# Patient Record
Sex: MALE | Race: WHITE | Employment: OTHER | URBAN - METROPOLITAN AREA
[De-identification: names, ages, dates, MRNs, and addresses within clinical notes are randomized per-mention and may not be internally consistent; named-entity substitution may affect disease eponyms.]

---

## 2017-01-04 ENCOUNTER — APPOINTMENT (OUTPATIENT)
Dept: CARDIAC REHAB | Age: 82
End: 2017-01-04

## 2017-01-11 ENCOUNTER — HOSPITAL ENCOUNTER (OUTPATIENT)
Dept: CARDIAC REHAB | Age: 82
End: 2017-01-11

## 2017-01-18 ENCOUNTER — HOSPITAL ENCOUNTER (OUTPATIENT)
Dept: CARDIAC REHAB | Age: 82
Discharge: HOME | End: 2017-01-18

## 2017-01-18 NOTE — PROGRESS NOTES
[address] [place] [address]   Cardiac Rehabilitation Discharge Assessment and Long-Term Goals    Exercise (Required):  LTG:  Maintain >150 minutes of aerobic exercise weekly, with MET levels > , resistance training 2-3 days/week and increase 6-MW test by  feet. Comments:  LTG Status: []- Met []- Not Met Comments:      Initial 6-MW Test:     Discharge 6-MW Test:   Discharge Plan: []-Maintenance Program    []-HEP      [x]-Other:NON-ROUTINE DISCHARGED. Nutrition (Required):  LTG:  Patient able to verbalize an understanding of life-long adherence to a healthy diet to include low sodium diet, lower caffeine and alcohol intake. Comments:   LTG Status: []-Met []-Not Met Comments:   Initial RYP score:   Discharge RYP score:   Discharge Plan: []-Maintain dietary            [x]-Other: NON-ROUTINE DISCHARGED. Psychosocial (Required):  LTG:  Use relaxation techniques when stressors are identified. Verbalize coping skills and decrease vulnerability to stress. Maintain decreased smoking behaviors/smoking cessation. Improve PHQ-9score, [facility] COOP score and maintain a healthy psychosocial well-being. * Comments:   LTG Status: []-Met    []-Not Met Comments:      Initial PHQ-9 score:   Discharge PHQ-9 score:   Discharge Plan: []-  Maintenance healthy psychosocial well-being          [x]-  Other: NON-ROUTINE DISCHARGED. *See attached [facility] [place] Quality of Life tool for pre and post-treatment screenings. Oxygen:     []- Risk Factor []- Not a Risk Factor at This Time  LTG:  Patient will independently perform pursed-lip breathing and diaphragmatic breathing, especially when short of breath. Patient will follow Oxygen prescription.   Comments:  LTG Status: []-Met []-Not Met Comments:      Initial resting saturations:      Discharge resting saturations:  Discharge Plan: []-Maintenance healthy oxygen saturations  []-Other:     CHF (Prevention of Exacerbation): []- Risk Factor []-Not a Risk Factor at This Time  LTG:  Able to verbalize individual baselines, signs and symptoms of worsening heart failure and appropriate self-management. Compliant with medications, sodium intake and fluid intake. Weighs self daily and monitors blood pressure at home. Exercises daily. Comments:   LTG Status: []-Met []-Not Met Comments:   Discharge Plan:  []-Maintain CHF exacerbation prevention     []-Other:      Hypertension: []-Risk Factor []-Not a Risk Factor at This Time  LTG:  Consistent resting BP < , home monitoring and BP medication compliance. Comments:   LTG Status: []-Met []-Not Met Comments:      Initial Resting BP:    Discharge Resting BP:   Discharge Plan: []-Maintain healthy blood pressure  []- Other     Diabetes: []-Risk Factor []-Not a Risk Factor at This Time  LTG:  Blood sugar < 250 and > 80 for exercise, fasting blood sugar <130 and medication compliance. Comments:   LTG Status: []-Met       []- Not Met Comments:      Initial Fasting Blood Glucose:  Discharge Fasting Blood Glucose:   Discharge Plan: []- Maintain healthy glucose levels     []- Other:    Josie Harris RN  1/18/2017, 8:52 AM    I have reviewed the above findings and concur with these findings with any changes and/or additional comments noted below.   I certify I have conducted the outcome assessment (above) based on patient-centered outcomes (including the [facility]) which includes objective clinical measurements of the effectiveness of the cardiac rehabilitation program for this individual.

## 2017-01-23 ENCOUNTER — APPOINTMENT (OUTPATIENT)
Dept: CARDIAC REHAB | Age: 82
End: 2017-01-23

## 2017-01-24 ENCOUNTER — OFFICE VISIT (OUTPATIENT)
Dept: INTERNAL MEDICINE CLINIC | Age: 82
End: 2017-01-24

## 2017-01-24 VITALS
TEMPERATURE: 96.9 F | HEIGHT: 70 IN | BODY MASS INDEX: 29.06 KG/M2 | RESPIRATION RATE: 16 BRPM | DIASTOLIC BLOOD PRESSURE: 80 MMHG | OXYGEN SATURATION: 97 % | HEART RATE: 66 BPM | SYSTOLIC BLOOD PRESSURE: 130 MMHG | WEIGHT: 203 LBS

## 2017-01-24 DIAGNOSIS — Z00.00 ROUTINE GENERAL MEDICAL EXAMINATION AT A HEALTH CARE FACILITY: Primary | ICD-10-CM

## 2017-01-24 DIAGNOSIS — E78.2 MIXED HYPERLIPIDEMIA: ICD-10-CM

## 2017-01-24 DIAGNOSIS — E11.22 TYPE 2 DIABETES MELLITUS WITH STAGE 3 CHRONIC KIDNEY DISEASE, WITH LONG-TERM CURRENT USE OF INSULIN (HCC): ICD-10-CM

## 2017-01-24 DIAGNOSIS — I25.10 ATHEROSCLEROSIS OF NATIVE CORONARY ARTERY OF NATIVE HEART WITHOUT ANGINA PECTORIS: ICD-10-CM

## 2017-01-24 DIAGNOSIS — N18.30 TYPE 2 DIABETES MELLITUS WITH STAGE 3 CHRONIC KIDNEY DISEASE, WITH LONG-TERM CURRENT USE OF INSULIN (HCC): ICD-10-CM

## 2017-01-24 DIAGNOSIS — I10 ESSENTIAL HYPERTENSION: Chronic | ICD-10-CM

## 2017-01-24 DIAGNOSIS — Z79.4 TYPE 2 DIABETES MELLITUS WITH STAGE 3 CHRONIC KIDNEY DISEASE, WITH LONG-TERM CURRENT USE OF INSULIN (HCC): ICD-10-CM

## 2017-01-24 NOTE — PATIENT INSTRUCTIONS
Learning About Diabetes Food Guidelines  Your Care Instructions  Meal planning is important to manage diabetes. It helps keep your blood sugar at a target level (which you set with your doctor). You don't have to eat special foods. You can eat what your family eats, including sweets once in a while. But you do have to pay attention to how often you eat and how much you eat of certain foods. You may want to work with a dietitian or a certified diabetes educator (CDE) to help you plan meals and snacks. A dietitian or CDE can also help you lose weight if that is one of your goals. What should you know about eating carbs? Managing the amount of carbohydrate (carbs) you eat is an important part of healthy meals when you have diabetes. Carbohydrate is found in many foods. · Learn which foods have carbs. And learn the amounts of carbs in different foods. ¨ Bread, cereal, pasta, and rice have about 15 grams of carbs in a serving. A serving is 1 slice of bread (1 ounce), ½ cup of cooked cereal, or 1/3 cup of cooked pasta or rice. ¨ Fruits have 15 grams of carbs in a serving. A serving is 1 small fresh fruit, such as an apple or orange; ½ of a banana; ½ cup of cooked or canned fruit; ½ cup of fruit juice; 1 cup of melon or raspberries; or 2 tablespoons of dried fruit. ¨ Milk and no-sugar-added yogurt have 15 grams of carbs in a serving. A serving is 1 cup of milk or 2/3 cup of no-sugar-added yogurt. ¨ Starchy vegetables have 15 grams of carbs in a serving. A serving is ½ cup of mashed potatoes or sweet potato; 1 cup winter squash; ½ of a small baked potato; ½ cup of cooked beans; or ½ cup cooked corn or green peas. · Learn how much carbs to eat each day and at each meal. A dietitian or CDE can teach you how to keep track of the amount of carbs you eat. This is called carbohydrate counting. · If you are not sure how to count carbohydrate grams, use the Plate Method to plan meals.  It is a good, quick way to make sure that you have a balanced meal. It also helps you spread carbs throughout the day. ¨ Divide your plate by types of foods. Put non-starchy vegetables on half the plate, meat or other protein food on one-quarter of the plate, and a grain or starchy vegetable in the final quarter of the plate. To this you can add a small piece of fruit and 1 cup of milk or yogurt, depending on how many carbs you are supposed to eat at a meal.  · Try to eat about the same amount of carbs at each meal. Do not \"save up\" your daily allowance of carbs to eat at one meal.  · Proteins have very little or no carbs per serving. Examples of proteins are beef, chicken, turkey, fish, eggs, tofu, cheese, cottage cheese, and peanut butter. A serving size of meat is 3 ounces, which is about the size of a deck of cards. Examples of meat substitute serving sizes (equal to 1 ounce of meat) are 1/4 cup of cottage cheese, 1 egg, 1 tablespoon of peanut butter, and ½ cup of tofu. How can you eat out and still eat healthy? · Learn to estimate the serving sizes of foods that have carbohydrate. If you measure food at home, it will be easier to estimate the amount in a serving of restaurant food. · If the meal you order has too much carbohydrate (such as potatoes, corn, or baked beans), ask to have a low-carbohydrate food instead. Ask for a salad or green vegetables. · If you use insulin, check your blood sugar before and after eating out to help you plan how much to eat in the future. · If you eat more carbohydrate at a meal than you had planned, take a walk or do other exercise. This will help lower your blood sugar. What else should you know? · Limit saturated fat, such as the fat from meat and dairy products. This is a healthy choice because people who have diabetes are at higher risk of heart disease. So choose lean cuts of meat and nonfat or low-fat dairy products. Use olive or canola oil instead of butter or shortening when cooking.   · Don't skip meals. Your blood sugar may drop too low if you skip meals and take insulin or certain medicines for diabetes. · Check with your doctor before you drink alcohol. Alcohol can cause your blood sugar to drop too low. Alcohol can also cause a bad reaction if you take certain diabetes medicines. Follow-up care is a key part of your treatment and safety. Be sure to make and go to all appointments, and call your doctor if you are having problems. It's also a good idea to know your test results and keep a list of the medicines you take. Where can you learn more? Go to http://da-ahsan.info/. Enter R351 in the search box to learn more about \"Learning About Diabetes Food Guidelines. \"  Current as of: May 23, 2016  Content Version: 11.1  © 8937-8230 [employer]. Care instructions adapted under license by [employer] (which disclaims liability or warranty for this information). If you have questions about a medical condition or this instruction, always ask your healthcare professional. [place] [address] any warranty or liability for your use of this information. Medicare Wellness Visit, Male    The best way to live healthy is to have a healthy lifestyle by eating a well-balanced diet, exercising regularly, limiting alcohol and stopping smoking. Regular physical exams and screening tests are another way to keep healthy. Preventive exams provided by your health care provider can find health problems before they become diseases or illnesses. Preventive services including immunizations, screening tests, monitoring and exams can help you take care of your own health. All people over age 72 should have a pneumovax  and and a prevnar shot to prevent pneumonia. These are once in a lifetime unless you and your provider decide differently. All people over 65 should have a yearly flu shot and a tetanus vaccine every 10 years.     Screening for diabetes mellitus with a blood sugar test should be done every year. Glaucoma is a disease of the eye due to increased ocular pressure that can lead to blindness and it should be done every year by an eye professional.    Cardiovascular screening tests that check for elevated lipids (fatty part of blood) which can lead to heart disease and strokes should be done every 5 years. Colorectal screening that evaluates for blood or polyps in your colon should be done yearly as a stool test or every five years as a flexible sigmoidoscope or every 10 years as a colonoscopy up to age 76. Men up to age 76 may need a screening blood test for prostate cancer at certain intervals, depending on their personal and family history. This decision is between the patient and his provider. If you have been a smoker or had family history of abdominal aortic aneurysms, you and your provider may decide to schedule an ultrasound test of your aorta. Hepatitis C screening is also recommended for anyone born between [birth year] through [address]. A shingles vaccine is also recommended once in a lifetime after age 61. Your Medicare Wellness Exam is recommended annually.     Here is a list of your current Health Maintenance items with a due date:  Health Maintenance Due   Topic Date Due    Diabetic Foot Care  07/31/1945    Albumin Urine Test  07/31/1945    Eye Exam  07/31/1945    Shingles Vaccine  07/31/1997    Glaucoma Screening   07/31/2002    Annual Well Visit  12/04/2016    Cholesterol Test   01/07/2017

## 2017-01-24 NOTE — MR AVS SNAPSHOT
Visit Information Date & Time Provider Department Dept. Phone Encounter #  
 1/24/2017  2:00 PM Riddhi Morin MD [facility] [place] [phone] [id number] Follow-up Instructions Return if symptoms worsen or fail to improve. Follow-up and Disposition History Your Appointments 2/13/2017  3:00 PM  
Follow Up with Jamel Quiroz MD  
[facility] [place] [id number] ([address]) Appt Note: 4 month follow up Veronica Bautista [phone]  
[phone] [address]  
  
    
  
 1/31/2017  1:00 PM  
Any with Estrellita Green MD  
[facility] ([address]) Appt Note: annual  
 [address] 
[address] [place] [address]  
[address] [facility] [address] [address] [facility] [address] Upcoming Health Maintenance Date Due  
 FOOT EXAM Q1 7/31/1945 MICROALBUMIN Q1 7/31/1945 EYE EXAM RETINAL OR DILATED Q1 7/31/1945 ZOSTER VACCINE AGE 60> 7/31/1997 GLAUCOMA SCREENING Q2Y 7/31/2002 MEDICARE YEARLY EXAM 12/4/2016 LIPID PANEL Q1 1/7/2017 HEMOGLOBIN A1C Q6M 2/26/2017 DTaP/Tdap/Td series (2 - Td) 6/27/2022 Allergies as of 1/24/2017  Review Complete On: 1/24/2017 By: Riddhi Morin MD  
  
 Severity Noted Reaction Type Reactions Amaryl [Glimepiride]    Drowsiness Lipitor [Atorvastatin]    Myalgia Niacin    Other (comments) Facial blistering, swelling in face Pravachol [Pravastatin]    Myalgia Zocor [Simvastatin]    Myalgia Current Immunizations  Reviewed on 12/4/2015 Name Date Influenza Vaccine (Quad) PF 10/4/2016, 12/4/2015 Pneumococcal Conjugate (PCV-13) 12/3/2014 Pneumococcal Polysaccharide (PPSV-23) 5/23/2013 10:38 AM, 4/4/2003 Tdap 6/27/2012 Not reviewed this visit You Were Diagnosed With   
  
 Codes Comments Routine general medical examination at a health care facility    -  Primary ICD-10-CM: Z00.00 ICD-9-CM: V70.0 Essential hypertension     ICD-10-CM: I10 
ICD-9-CM: 401.9 Type 2 diabetes mellitus with stage 3 chronic kidney disease, with long-term current use of insulin (HCC)     ICD-10-CM: E11.22, N18.3, Z79.4 ICD-9-CM: 250.40, 585.3, V58.67 Mixed hyperlipidemia     ICD-10-CM: E78.2 ICD-9-CM: 272.2 Atherosclerosis of native coronary artery of native heart without angina pectoris     ICD-10-CM: I25.10 ICD-9-CM: 414.01 Vitals BP Pulse Temp Resp Height(growth percentile) 130/80 (BP 1 Location: Left arm, BP Patient Position: Sitting) 66 96.9 °F (36.1 °C) (Tympanic) 16 5' 10\" (1.778 m) Weight(growth percentile) SpO2 BMI Smoking Status 203 lb (92.1 kg) 97% 29.13 kg/m2 Former Smoker BMI and BSA Data Body Mass Index Body Surface Area  
 29.13 kg/m 2 2.13 m 2 Preferred Pharmacy Pharmacy Name Phone [id number] [address] [facility] [phone] Your Updated Medication List  
  
   
This list is accurate as of: 1/24/17  2:41 PM.  Always use your most recent med list.  
  
  
  
  
 acetaminophen 325 mg tablet Commonly known as:  TYLENOL Take 2 Tabs by mouth every four (4) hours as needed. aspirin delayed-release 81 mg tablet Take 1 Tab by mouth daily. buPROPion  mg SR tablet Commonly known as:  WELLBUTRIN SR  
1 bid CALTRATE 600 PO Take 1 Tab by mouth daily. CENTRUM SILVER PO Take 1 Tab by mouth daily. clotrimazole-betamethasone topical cream  
Commonly known as:  LOTRISONE  
qpply bid prn groin rash COMPOUNDMAX BASE Crea Generic drug:  cream base no. 171 (bulk) 240 g by Does Not Apply route four (4) times daily. Anti-Inflam Meloxicam 0.09% Topirmate 1% Methocarbamol 2%  Lidocaine 2% Prilocaine 2% CRESTOR 10 mg tablet Generic drug:  rosuvastatin Take 10 mg by mouth nightly.  
  
 gabapentin 300 mg capsule Commonly known as:  NEURONTIN Take 1 Cap by mouth daily. Indications: NEUROPATHIC PAIN, RESTLESS LEGS SYNDROME  
  
 guaiFENesin-codeine 100-10 mg/5 mL solution Commonly known as:  CHERATUSSIN AC  
1 or 2 tsp q 6 hrs prn cough LANTUS SOLOSTAR 100 unit/mL (3 mL) pen Generic drug:  insulin glargine 30 Units by SubCUTAneous route nightly. 30 units  
  
 metoprolol succinate 50 mg XL tablet Commonly known as:  TOPROL-XL  
TAKE 1 TABLET BY MOUTH ONCE DAILY Justina Pen Needle 32 gauge x 5/32\" Ndle Generic drug:  Insulin Needles (Disposable) Use as directed. ONGLYZA 5 mg Tab tablet Generic drug:  sAXagliptin Take 5 mg by mouth daily. Indications: TYPE 2 DIABETES MELLITUS  
  
 RANEXA 500 mg SR tablet Generic drug:  ranolazine ER  
TAKE 1 TAB BY MOUTH TWO (2) TIMES A DAY. VESIcare 5 mg tablet Generic drug:  solifenacin Take 5 mg by mouth daily. Follow-up Instructions Return if symptoms worsen or fail to improve. Patient Instructions Learning About Diabetes Food Guidelines Your Care Instructions Meal planning is important to manage diabetes. It helps keep your blood sugar at a target level (which you set with your doctor). You don't have to eat special foods. You can eat what your family eats, including sweets once in a while. But you do have to pay attention to how often you eat and how much you eat of certain foods. You may want to work with a dietitian or a certified diabetes educator (CDE) to help you plan meals and snacks. A dietitian or CDE can also help you lose weight if that is one of your goals. What should you know about eating carbs? Managing the amount of carbohydrate (carbs) you eat is an important part of healthy meals when you have diabetes. Carbohydrate is found in many foods. · Learn which foods have carbs. And learn the amounts of carbs in different foods. ¨ Bread, cereal, pasta, and rice have about 15 grams of carbs in a serving. A serving is 1 slice of bread (1 ounce), ½ cup of cooked cereal, or 1/3 cup of cooked pasta or rice. ¨ Fruits have 15 grams of carbs in a serving. A serving is 1 small fresh fruit, such as an apple or orange; ½ of a banana; ½ cup of cooked or canned fruit; ½ cup of fruit juice; 1 cup of melon or raspberries; or 2 tablespoons of dried fruit. ¨ Milk and no-sugar-added yogurt have 15 grams of carbs in a serving. A serving is 1 cup of milk or 2/3 cup of no-sugar-added yogurt. ¨ Starchy vegetables have 15 grams of carbs in a serving. A serving is ½ cup of mashed potatoes or sweet potato; 1 cup winter squash; ½ of a small baked potato; ½ cup of cooked beans; or ½ cup cooked corn or green peas. · Learn how much carbs to eat each day and at each meal. A dietitian or CDE can teach you how to keep track of the amount of carbs you eat. This is called carbohydrate counting. · If you are not sure how to count carbohydrate grams, use the Plate Method to plan meals. It is a good, quick way to make sure that you have a balanced meal. It also helps you spread carbs throughout the day. ¨ Divide your plate by types of foods. Put non-starchy vegetables on half the plate, meat or other protein food on one-quarter of the plate, and a grain or starchy vegetable in the final quarter of the plate. To this you can add a small piece of fruit and 1 cup of milk or yogurt, depending on how many carbs you are supposed to eat at a meal. 
· Try to eat about the same amount of carbs at each meal. Do not \"save up\" your daily allowance of carbs to eat at one meal. 
· Proteins have very little or no carbs per serving. Examples of proteins are beef, chicken, turkey, fish, eggs, tofu, cheese, cottage cheese, and peanut butter. A serving size of meat is 3 ounces, which is about the size of a deck of cards.  Examples of meat substitute serving sizes (equal to 1 ounce of meat) are 1/4 cup of cottage cheese, 1 egg, 1 tablespoon of peanut butter, and ½ cup of tofu. How can you eat out and still eat healthy? · Learn to estimate the serving sizes of foods that have carbohydrate. If you measure food at home, it will be easier to estimate the amount in a serving of restaurant food. · If the meal you order has too much carbohydrate (such as potatoes, corn, or baked beans), ask to have a low-carbohydrate food instead. Ask for a salad or green vegetables. · If you use insulin, check your blood sugar before and after eating out to help you plan how much to eat in the future. · If you eat more carbohydrate at a meal than you had planned, take a walk or do other exercise. This will help lower your blood sugar. What else should you know? · Limit saturated fat, such as the fat from meat and dairy products. This is a healthy choice because people who have diabetes are at higher risk of heart disease. So choose lean cuts of meat and nonfat or low-fat dairy products. Use olive or canola oil instead of butter or shortening when cooking. · Don't skip meals. Your blood sugar may drop too low if you skip meals and take insulin or certain medicines for diabetes. · Check with your doctor before you drink alcohol. Alcohol can cause your blood sugar to drop too low. Alcohol can also cause a bad reaction if you take certain diabetes medicines. Follow-up care is a key part of your treatment and safety. Be sure to make and go to all appointments, and call your doctor if you are having problems. It's also a good idea to know your test results and keep a list of the medicines you take. Where can you learn more? Go to http://da-ahsan.info/. Enter D722 in the search box to learn more about \"Learning About Diabetes Food Guidelines. \" Current as of: May 23, 2016 Content Version: 11.1 © 4617-5851 [employer].  Care instructions adapted under license by [address] (which disclaims liability or warranty for this information). If you have questions about a medical condition or this instruction, always ask your healthcare professional. [address] any warranty or liability for your use of this information. Medicare Wellness Visit, Male The best way to live healthy is to have a healthy lifestyle by eating a well-balanced diet, exercising regularly, limiting alcohol and stopping smoking. Regular physical exams and screening tests are another way to keep healthy. Preventive exams provided by your health care provider can find health problems before they become diseases or illnesses. Preventive services including immunizations, screening tests, monitoring and exams can help you take care of your own health. All people over age 72 should have a pneumovax  and and a prevnar shot to prevent pneumonia. These are once in a lifetime unless you and your provider decide differently. All people over 65 should have a yearly flu shot and a tetanus vaccine every 10 years. Screening for diabetes mellitus with a blood sugar test should be done every year. Glaucoma is a disease of the eye due to increased ocular pressure that can lead to blindness and it should be done every year by an eye professional. 
 
Cardiovascular screening tests that check for elevated lipids (fatty part of blood) which can lead to heart disease and strokes should be done every 5 years. Colorectal screening that evaluates for blood or polyps in your colon should be done yearly as a stool test or every five years as a flexible sigmoidoscope or every 10 years as a colonoscopy up to age 76. Men up to age 76 may need a screening blood test for prostate cancer at certain intervals, depending on their personal and family history. This decision is between the patient and his provider. If you have been a smoker or had family history of abdominal aortic aneurysms, you and your provider may decide to schedule an ultrasound test of your aorta. Hepatitis C screening is also recommended for anyone born between [birth year] through [address]. A shingles vaccine is also recommended once in a lifetime after age 61. Your Medicare Wellness Exam is recommended annually. Here is a list of your current Health Maintenance items with a due date: 
Health Maintenance Due Topic Date Due  
 Diabetic Foot Care  07/31/1945  Albumin Urine Test  07/31/1945 [facility] Eye Exam  07/31/1945  Shingles Vaccine  07/31/1997  Glaucoma Screening   07/31/2002 [facility] Annual Well Visit  12/04/2016  Cholesterol Test   01/07/2017 Patient Instructions History Introducing [facility]! Mariana Bell introduces [employer] patient portal. Now you can access parts of your medical record, email your doctor's office, and request medication refills online. 1. In your internet browser, go to [email or web address] 2. Click on the First Time User? Click Here link in the Sign In box. You will see the New Member Sign Up page. 3. Enter your [employer] Access Code exactly as it appears below. You will not need to use this code after youve completed the sign-up process. If you do not sign up before the expiration date, you must request a new code. · [employer] Access Code: [email or web address] Expires: 4/24/2017  2:02 PM 
 
4. Enter the last four digits of your Social Security Number (xxxx) and Date of Birth (mm/dd/yyyy) as indicated and click Submit. You will be taken to the next sign-up page. 5. Create a [employer] ID. This will be your [employer] login ID and cannot be changed, so think of one that is secure and easy to remember. 6. Create a [employer] password. You can change your password at any time. 7. Enter your Password Reset Question and Answer. This can be used at a later time if you forget your password. 8. Enter your e-mail address. You will receive e-mail notification when new information is available in [address] 9. Click Sign Up. You can now view and download portions of your medical record. 10. Click the Download Summary menu link to download a portable copy of your medical information. If you have questions, please visit the Frequently Asked Questions section of the [employer] website. Remember, [employer] is NOT to be used for urgent needs. For medical emergencies, dial 911. Now available from your iPhone and Android! Please provide this summary of care documentation to your next provider. Your primary care clinician is listed as Valente Nieto. If you have any questions after today's visit, please call [phone].

## 2017-01-24 NOTE — PROGRESS NOTES
This is a Subsequent Medicare Annual Wellness Visit providing Personalized Prevention Plan Services (PPPS) (Performed 12 months after initial AWV and PPPS )    I have reviewed the patient's medical history in detail and updated the computerized patient record. History     Past Medical History   Diagnosis Date    Angina     Anxiety     Arthritis     CAD (coronary artery disease)      s/p drug-eluting stents to RCA for high-grade stenoses in 2/09    Cardiac catheterization 08/24/2016     Mod calcification. Co-dom. oLM 50-70%. LAD 30%. Dx 30%. Cx 70% focal.  OM 80% focal.  RCA 30%.  Cardiac echocardiogram 08/23/2016     Tech difficult. EF 55-60%. No WMA. Normal diastolic fx. Lipomatous septal hypertrophy.  Cardiac nuclear imaging test, abnormal 08/23/2016     Sm reversible inferior defect concerning for ischemia. Mild inferior hypk. EF 68%. Neg EKG on pharm stress test.    Carotid duplex 08/26/2016     Mild <50% CYRUS stenosis. Chronic LICA occlusion. Similar to study of 3/29/16.     Carotid stenosis (HCC)      w/ known total occlusion of lt internal carotid artery; followed by pts vascular surgeon    Chronic kidney disease     COPD (chronic obstructive pulmonary disease) ([address]     Depression     Diabetes ([address]      type 2    Dyslipidemia      on Crestor; managed by pts PCP    Dyspnea     Heart disease     Hypercholesterolemia     Hypertension     Low back pain     Osteoarthrosis involving multiple sites     Skin cancer ([address]      squamous cell lesions of the skin    Stroke ([address]     Venous (peripheral) insufficiency       Past Surgical History   Procedure Laterality Date    Hx other surgical       groin surgery    Hx hernia repair       1970s    Hx angioplasty  2009     2 stents placed and heart attack during the procedure    Hx colonoscopy  10/2003     neg; declines f/u    Hx heart catheterization  5/2013    Hx tonsil and adenoidectomy      Cardiac catheterization 8/24/2016          Coronary artery angiogram  8/24/2016          Lv angiography  8/24/2016          Ir intravascular ultrasound initial  8/24/2016          Hx coronary artery bypass graft  09/2016     Current Outpatient Prescriptions   Medication Sig Dispense Refill    RANEXA 500 mg SR tablet TAKE 1 TAB BY MOUTH TWO (2) TIMES A DAY. 180 Tab 2    metoprolol succinate (TOPROL-XL) 50 mg XL tablet TAKE 1 TABLET BY MOUTH ONCE DAILY 90 Tab 2    rosuvastatin (CRESTOR) 10 mg tablet Take 10 mg by mouth nightly.  solifenacin (VESICARE) 5 mg tablet Take 5 mg by mouth daily.  guaiFENesin-codeine (CHERATUSSIN AC) 100-10 mg/5 mL solution 1 or 2 tsp q 6 hrs prn cough 120 mL 1    clotrimazole-betamethasone (LOTRISONE) topical cream qpply bid prn groin rash 45 g 0    aspirin delayed-release 81 mg tablet Take 1 Tab by mouth daily. 30 Tab 2    acetaminophen (TYLENOL) 325 mg tablet Take 2 Tabs by mouth every four (4) hours as needed. (Patient taking differently: Take 2 Tabs by mouth every six (6) hours as needed for Pain.) 100 Tab 2    COMPOUNDMAX BASE crea 240 g by Does Not Apply route four (4) times daily. Anti-Inflam Meloxicam 0.09% Topirmate 1% Methocarbamol 2%  Lidocaine 2% Prilocaine 2% (Patient not taking: Reported on 10/5/2016) 240 g 3    buPROPion SR (WELLBUTRIN SR) 100 mg SR tablet 1 bid (Patient not taking: Reported on 10/24/2016) 180 Tab 0    SHAWN PEN NEEDLE 32 x 5/32 \" ndle Use as directed. 3    gabapentin (NEURONTIN) 300 mg capsule Take 1 Cap by mouth daily. Indications: NEUROPATHIC PAIN, RESTLESS LEGS SYNDROME 90 Cap 2    insulin glargine (LANTUS SOLOSTAR) 100 unit/mL (3 mL) pen 30 Units by SubCUTAneous route nightly. 30 units       saxagliptin (ONGLYZA) 5 mg Tab tablet Take 5 mg by mouth daily. Indications: TYPE 2 DIABETES MELLITUS      MULTIVITAMINS W-MINERALS/LUT (CENTRUM SILVER PO) Take 1 Tab by mouth daily.  CALCIUM CARBONATE (CALTRATE 600 PO) Take 1 Tab by mouth daily. Allergies   Allergen Reactions    Amaryl [Glimepiride] Drowsiness    Lipitor [Atorvastatin] Myalgia    Niacin Other (comments)     Facial blistering, swelling in face    Pravachol [Pravastatin] Myalgia    Zocor [Simvastatin] Myalgia     Family History   Problem Relation Age of Onset   [de-identified] Parkinsonism Mother     Hypertension Mother     Cancer Father      lung    Heart defect Brother      Social History   Substance Use Topics    Smoking status: Former Smoker     Years: 2.00     Quit date: 1/1/1955    Smokeless tobacco: Never Used    Alcohol use Yes      Comment: drinks weekly couple beers     Patient Active Problem List   Diagnosis Code    Hypercholesterolemia E78.00    Carot[facility] artery disease ([facility] I77.9    Spondylosis M47.9    Coronary atherosclerosis of native coronary artery I25.10    HTN (hypertension) I10    Atherosclerosis of native arteries of the extremities with intermittent claudication I70.219    Carotid artery occlusion without infarction I65.29    DM (diabetes mellitus) ([facility] E11.9    Spondylosis M47.9    CKD (chronic kidney disease) stage 3, GFR 30-59 ml/min N18.3    CHI (closed head injury) S09. 90XA    Low back pain M54.5    Venous (peripheral) insufficiency I87.2    Osteoarthrosis involving multiple sites M15.9    Type 2 diabetes mellitus with stage 3 chronic kidney disease (HCC) E11.22, N18.3    Acute chest pain R07.9    CAD (coronary artery disease) I25.10    COPD (chronic obstructive pulmonary disease) ([facility]) J44.9    Type 2 diabetes mellitus without complication, with long-term current use of insulin ([facility]) E11.9, Z79.4    S/P CABG x 2 Z95.1    Mixed hyperlipidemia E78.2       Depression Risk Factor Screening:     PHQ 2 / 9, over the last two weeks 12/4/2015   Little interest or pleasure in doing things Several days   Feeling down, depressed or hopeless Several days   Total Score PHQ 2 2     Alcohol Risk Factor Screening:    On any occasion during the past 3 months, have you had more than 4 drinks containing alcohol? Yes    Do you average more than 14 drinks per week? No      Functional Ability and Level of Safety:     Hearing Loss   moderate    Activities of Daily Living   Partial assistance. Requires assistance with: ambulation    Fall Risk     Fall Risk Assessment, last 12 mths 10/7/2016   Able to walk? Yes   Fall in past 12 months? Yes   Fall with injury? No   Number of falls in past 12 months 8 or more   Fall Risk Score 8     Abuse Screen   Patient is not abused    Review of Systems   A comprehensive review of systems was negative except for that written in the HPI. Physical Examination     Evaluation of Cognitive Function:  Mood/affect:  neutral  Appearance: age appropriate  Family member/caregiver input: wife    heavy WN in NAD  Visit Vitals    /80 (BP 1 Location: Left arm, BP Patient Position: Sitting)    Pulse 66    Temp 96.9 °F (36.1 °C) (Tympanic)    Resp 16    Ht 5' 10\" (1.778 m)    Wt 203 lb (92.1 kg)    SpO2 97%    BMI 29.13 kg/m2     OP: clear  Neck: supple w/o mass or bruits  Chest: clear  CV: RRR w/o m,r,g; pulses NP distal legs  Abd: +BS, soft, NT w/o mass or HSM  Ext: tr - 1+ edema  Neuro: NF      Patient Care Team:  Jennifer Nicholas MD as PCP - General (Internal Medicine)  Claudell Sarin, MD (Cardiology)  Nena Kwan MD as Consulting Provider (Endocrinology)  Emilia Loo NP (Nurse Practitioner)    Advice/Referrals/Counseling   Education and counseling provided:  Are appropriate based on today's review and evaluation    Assessment/Plan     Encounter Diagnoses   Name Primary?     Routine general medical examination at a health care facility Yes    Essential hypertension     Type 2 diabetes mellitus with stage 3 chronic kidney disease, with long-term current use of insulin (HCC)     Mixed hyperlipidemia     Atherosclerosis of native coronary artery of native heart without angina pectoris    Medicare wellness performed  Labs followed by endocrine  Continue dietary/exercise efforts  Vaccines: flu vaccine already given  Advance directive discussed    PROGRESS NOTE  HPI:   F/u visit for routine evaluation of HTN, T2DM, hyperlipidemia, hypothyroidism, depression (mild), CAD  Labs followed by endocrine  w/o chest pain/abd. discomfort; no increased dyspnea, cough or pedal edema; denies constitutional complaints of fever, night sweats or wt loss; no evidence of GI/ hemorrhage; no polyuria/polydipsia. Activity is sedentary    ROS is otherwise negative. Past Medical History   Diagnosis Date    Angina     Anxiety     Arthritis     CAD (coronary artery disease)      s/p drug-eluting stents to RCA for high-grade stenoses in 2/09    Cardiac catheterization 08/24/2016     Mod calcification. Co-dom. oLM 50-70%. LAD 30%. Dx 30%. Cx 70% focal.  OM 80% focal.  RCA 30%.  Cardiac echocardiogram 08/23/2016     Tech difficult. EF 55-60%. No WMA. Normal diastolic fx. Lipomatous septal hypertrophy.  Cardiac nuclear imaging test, abnormal 08/23/2016     Sm reversible inferior defect concerning for ischemia. Mild inferior hypk. EF 68%. Neg EKG on pharm stress test.    Carotid duplex 08/26/2016     Mild <50% CYRUS stenosis. Chronic LICA occlusion. Similar to study of 3/29/16.     Carotid stenosis (HCC)      w/ known total occlusion of lt internal carotid artery; followed by pts vascular surgeon    Chronic kidney disease     COPD (chronic obstructive pulmonary disease) ([address]     Depression     Diabetes ([address]      type 2    Dyslipidemia      on Crestor; managed by pts PCP    Dyspnea     Heart disease     Hypercholesterolemia     Hypertension     Low back pain     Osteoarthrosis involving multiple sites     Skin cancer ([address]      squamous cell lesions of the skin    Stroke ([address]     Venous (peripheral) insufficiency        Past Surgical History   Procedure Laterality Date    Hx other surgical groin surgery    Hx hernia repair       1970s    Hx angioplasty  2009     2 stents placed and heart attack during the procedure    Hx colonoscopy  10/2003     neg; declines f/u    Hx heart catheterization  5/2013    Hx tonsil and adenoidectomy      Cardiac catheterization  8/24/2016          Coronary artery angiogram  8/24/2016          Lv angiography  8/24/2016          Ir intravascular ultrasound initial  8/24/2016          Hx coronary artery bypass graft  09/2016       Social History     Social History    Marital status:      Spouse name: N/A    Number of children: N/A    Years of education: N/A     Occupational History    Not on file. Social History Main Topics    Smoking status: Former Smoker     Years: 2.00     Quit date: 1/1/1955    Smokeless tobacco: Never Used    Alcohol use Yes      Comment: drinks weekly couple beers    Drug use: No    Sexual activity: No     Other Topics Concern    Not on file     Social History Narrative       Allergies   Allergen Reactions    Amaryl [Glimepiride] Drowsiness    Lipitor [Atorvastatin] Myalgia    Niacin Other (comments)     Facial blistering, swelling in face    Pravachol [Pravastatin] Myalgia    Zocor [Simvastatin] Myalgia       Family History   Problem Relation Age of Onset    Parkinsonism Mother     Hypertension Mother     Cancer Father      lung    Heart defect Brother        Current Outpatient Prescriptions   Medication Sig Dispense Refill    RANEXA 500 mg SR tablet TAKE 1 TAB BY MOUTH TWO (2) TIMES A DAY. 180 Tab 2    metoprolol succinate (TOPROL-XL) 50 mg XL tablet TAKE 1 TABLET BY MOUTH ONCE DAILY 90 Tab 2    rosuvastatin (CRESTOR) 10 mg tablet Take 10 mg by mouth nightly.  solifenacin (VESICARE) 5 mg tablet Take 5 mg by mouth daily.       guaiFENesin-codeine (CHERATUSSIN AC) 100-10 mg/5 mL solution 1 or 2 tsp q 6 hrs prn cough 120 mL 1    clotrimazole-betamethasone (LOTRISONE) topical cream qpply bid prn groin rash 45 g 0    aspirin delayed-release 81 mg tablet Take 1 Tab by mouth daily. 30 Tab 2    acetaminophen (TYLENOL) 325 mg tablet Take 2 Tabs by mouth every four (4) hours as needed. (Patient taking differently: Take 2 Tabs by mouth every six (6) hours as needed for Pain.) 100 Tab 2    COMPOUNDMAX BASE crea 240 g by Does Not Apply route four (4) times daily. Anti-Inflam Meloxicam 0.09% Topirmate 1% Methocarbamol 2%  Lidocaine 2% Prilocaine 2% (Patient not taking: Reported on 10/5/2016) 240 g 3    buPROPion SR (WELLBUTRIN SR) 100 mg SR tablet 1 bid (Patient not taking: Reported on 10/24/2016) 180 Tab 0    SHAWN PEN NEEDLE 32 x 5/32 \" ndle Use as directed. 3    gabapentin (NEURONTIN) 300 mg capsule Take 1 Cap by mouth daily. Indications: NEUROPATHIC PAIN, RESTLESS LEGS SYNDROME 90 Cap 2    insulin glargine (LANTUS SOLOSTAR) 100 unit/mL (3 mL) pen 30 Units by SubCUTAneous route nightly. 30 units       saxagliptin (ONGLYZA) 5 mg Tab tablet Take 5 mg by mouth daily. Indications: TYPE 2 DIABETES MELLITUS      MULTIVITAMINS W-MINERALS/LUT (CENTRUM SILVER PO) Take 1 Tab by mouth daily.  CALCIUM CARBONATE (CALTRATE 600 PO) Take 1 Tab by mouth daily. Visit Vitals    /80 (BP 1 Location: Left arm, BP Patient Position: Sitting)    Pulse 66    Temp 96.9 °F (36.1 °C) (Tympanic)    Resp 16    Ht 5' 10\" (1.778 m)    Wt 203 lb (92.1 kg)    SpO2 97%    BMI 29.13 kg/m2       PE  Well nourished in NAD  HEENT:  OP: clear. Neck: supple w/o mass or bruits. Chest: clear. CV: RRR w/o m,r,g; pulses NP distal legs  Abd: soft, NT, w/o HSM or mass. Ext: tr - 1 + edema. Neuro: NF. Assessment and Plan    Encounter Diagnoses   Name Primary?     Routine general medical examination at a health care facility Yes    Essential hypertension     Type 2 diabetes mellitus with stage 3 chronic kidney disease, with long-term current use of insulin (HCC)     Mixed hyperlipidemia     Atherosclerosis of native coronary artery of native heart without angina pectoris      BP @ goal  Labs monitored by his endocrinologist (T2DM/hyperlipidemia/hypothyroidism)  Up to date with eye exams  CAD - stable post CABG Fall 2016; keep f/u with cardiology  Plans to see neurology for increasing cognitive decline post CABG  No change in rx  OV 4 - 6  mos or prn  I have explained plan to patient and the patient verbalizes understanding

## 2017-01-25 ENCOUNTER — APPOINTMENT (OUTPATIENT)
Dept: CARDIAC REHAB | Age: 82
End: 2017-01-25

## 2017-01-30 ENCOUNTER — APPOINTMENT (OUTPATIENT)
Dept: CARDIAC REHAB | Age: 82
End: 2017-01-30

## 2017-02-01 ENCOUNTER — APPOINTMENT (OUTPATIENT)
Dept: CARDIAC REHAB | Age: 82
End: 2017-02-01

## 2017-02-06 ENCOUNTER — APPOINTMENT (OUTPATIENT)
Dept: CARDIAC REHAB | Age: 82
End: 2017-02-06

## 2017-02-08 ENCOUNTER — APPOINTMENT (OUTPATIENT)
Dept: CARDIAC REHAB | Age: 82
End: 2017-02-08

## 2017-02-13 ENCOUNTER — OFFICE VISIT (OUTPATIENT)
Dept: CARDIOLOGY CLINIC | Age: 82
End: 2017-02-13

## 2017-02-13 ENCOUNTER — APPOINTMENT (OUTPATIENT)
Dept: CARDIAC REHAB | Age: 82
End: 2017-02-13

## 2017-02-13 VITALS
DIASTOLIC BLOOD PRESSURE: 74 MMHG | BODY MASS INDEX: 29.2 KG/M2 | OXYGEN SATURATION: 96 % | SYSTOLIC BLOOD PRESSURE: 130 MMHG | WEIGHT: 204 LBS | HEIGHT: 70 IN | HEART RATE: 68 BPM

## 2017-02-13 DIAGNOSIS — I25.10 CORONARY ARTERY DISEASE INVOLVING NATIVE CORONARY ARTERY OF NATIVE HEART WITHOUT ANGINA PECTORIS: Primary | ICD-10-CM

## 2017-02-13 DIAGNOSIS — Z79.4 TYPE 2 DIABETES MELLITUS WITHOUT COMPLICATION, WITH LONG-TERM CURRENT USE OF INSULIN (HCC): ICD-10-CM

## 2017-02-13 DIAGNOSIS — Z95.1 S/P CABG X 2: ICD-10-CM

## 2017-02-13 DIAGNOSIS — F03.91 DEMENTIA WITH BEHAVIORAL DISTURBANCE, UNSPECIFIED DEMENTIA TYPE: ICD-10-CM

## 2017-02-13 DIAGNOSIS — I65.29 CAROTID ARTERY OCCLUSION WITHOUT INFARCTION, UNSPECIFIED LATERALITY: ICD-10-CM

## 2017-02-13 DIAGNOSIS — E11.9 TYPE 2 DIABETES MELLITUS WITHOUT COMPLICATION, WITH LONG-TERM CURRENT USE OF INSULIN (HCC): ICD-10-CM

## 2017-02-13 DIAGNOSIS — E78.00 HYPERCHOLESTEROLEMIA: Chronic | ICD-10-CM

## 2017-02-13 DIAGNOSIS — I10 ESSENTIAL HYPERTENSION: Chronic | ICD-10-CM

## 2017-02-13 NOTE — MR AVS SNAPSHOT
Visit Information Date & Time Provider Department Dept. Phone Encounter #  
 2/13/2017  3:00 PM Ana Rosa Dalton MD [facility] [address] [id number]  
  
 3/8/2017  1:45 PM  
Any with Janice Luong MD  
[facility] ([address]) Appt Note: annual; lvm to rs appt Aysha [address] [place] [address]  
[address] [facility] [address] [place] [address] Upcoming Health Maintenance Date Due  
 FOOT EXAM Q1 7/31/1945 MICROALBUMIN Q1 7/31/1945 EYE EXAM RETINAL OR DILATED Q1 7/31/1945 ZOSTER VACCINE AGE 60> 7/31/1997 GLAUCOMA SCREENING Q2Y 7/31/2002 LIPID PANEL Q1 1/7/2017 HEMOGLOBIN A1C Q6M 2/26/2017 MEDICARE YEARLY EXAM 1/25/2018 DTaP/Tdap/Td series (2 - Td) 6/27/2022 Allergies as of 2/13/2017  Review Complete On: 1/24/2017 By: Jayjay Hackett MD  
  
 Severity Noted Reaction Type Reactions Amaryl [Glimepiride]    Drowsiness Lipitor [Atorvastatin]    Myalgia Niacin    Other (comments) Facial blistering, swelling in face Pravachol [Pravastatin]    Myalgia Zocor [Simvastatin]    Myalgia Current Immunizations  Reviewed on 12/4/2015 Name Date Influenza Vaccine (Quad) PF 10/4/2016, 12/4/2015 Pneumococcal Conjugate (PCV-13) 12/3/2014 Pneumococcal Polysaccharide (PPSV-23) 5/23/2013 10:38 AM, 4/4/2003 Tdap 6/27/2012 Not reviewed this visit You Were Diagnosed With   
  
 Codes Comments Hypercholesterolemia    -  Primary ICD-10-CM: E78.00 ICD-9-CM: 272.0 Essential hypertension     ICD-10-CM: I10 
ICD-9-CM: 401.9 Coronary artery disease involving native coronary artery of native heart without angina pectoris     ICD-10-CM: I25.10 ICD-9-CM: 414.01 Atherosclerosis of native coronary artery of native heart without angina pectoris     ICD-10-CM: I25.10 ICD-9-CM: 414.01   
 Carotid artery occlusion without infarction, unspecified laterality     ICD-10-CM: I65.29 ICD-9-CM: 433.10 Vitals BP Pulse Height(growth percentile) Weight(growth percentile) SpO2 BMI  
 130/74 68 5' 10\" (1.778 m) 204 lb (92.5 kg) 96% 29.27 kg/m2 Smoking Status Former Smoker Vitals History BMI and BSA Data Body Mass Index Body Surface Area  
 29.27 kg/m 2 2.14 m 2 Preferred Pharmacy Pharmacy Name Phone [id number] [address] Virk, [facility] [facility] [phone] Your Updated Medication List  
  
   
This list is accurate as of: 2/13/17  3:42 PM.  Always use your most recent med list.  
  
  
  
  
 acetaminophen 325 mg tablet Commonly known as:  TYLENOL Take 2 Tabs by mouth every four (4) hours as needed. aspirin delayed-release 81 mg tablet Take 1 Tab by mouth daily. buPROPion  mg SR tablet Commonly known as:  WELLBUTRIN SR  
1 bid CALTRATE 600 PO Take 1 Tab by mouth daily. CENTRUM SILVER PO Take 1 Tab by mouth daily. clotrimazole-betamethasone topical cream  
Commonly known as:  LOTRISONE  
qpply bid prn groin rash COMPOUNDMAX BASE Crea Generic drug:  cream base no. 171 (bulk) 240 g by Does Not Apply route four (4) times daily. Anti-Inflam Meloxicam 0.09% Topirmate 1% Methocarbamol 2%  Lidocaine 2% Prilocaine 2% CRESTOR 10 mg tablet Generic drug:  rosuvastatin Take 10 mg by mouth nightly.  
  
 gabapentin 300 mg capsule Commonly known as:  NEURONTIN Take 1 Cap by mouth daily. Indications: NEUROPATHIC PAIN, RESTLESS LEGS SYNDROME  
  
 guaiFENesin-codeine 100-10 mg/5 mL solution Commonly known as:  CHERATUSSIN AC  
1 or 2 tsp q 6 hrs prn cough LANTUS SOLOSTAR 100 unit/mL (3 mL) pen Generic drug:  insulin glargine 30 Units by SubCUTAneous route nightly. 30 units  
  
 metoprolol succinate 50 mg XL tablet Commonly known as:  TOPROL-XL  
 TAKE 1 TABLET BY MOUTH ONCE DAILY Justina Pen Needle 32 gauge x 5/32\" Ndle Generic drug:  Insulin Needles (Disposable) Use as directed. ONGLYZA 5 mg Tab tablet Generic drug:  sAXagliptin Take 5 mg by mouth daily. Indications: TYPE 2 DIABETES MELLITUS  
  
 RANEXA 500 mg SR tablet Generic drug:  ranolazine ER  
TAKE 1 TAB BY MOUTH TWO (2) TIMES A DAY. VESIcare 5 mg tablet Generic drug:  solifenacin Take 5 mg by mouth daily. We Performed the Following AMB POC EKG ROUTINE W/ 12 LEADS, INTER & REP [68303 CPT(R)] Introducing [facility] SERVICES! Louis Hansen introduces [employer] patient portal. Now you can access parts of your medical record, email your doctor's office, and request medication refills online. 1. In your internet browser, go to [email or web address]://[employer] ([employer]). [employer] ([employer]) 2. Click on the First Time User? Click Here link in the Sign In box. You will see the New Member Sign Up page. 3. Enter your [employer] Access Code exactly as it appears below. You will not need to use this code after youve completed the sign-up process. If you do not sign up before the expiration date, you must request a new code. · [employer] Access Code: [email or web address] Expires: 4/24/2017  2:02 PM 
 
4. Enter the last four digits of your Social Security Number (xxxx) and Date of Birth (mm/dd/yyyy) as indicated and click Submit. You will be taken to the next sign-up page. 5. Create a [employer] ID. This will be your [employer] login ID and cannot be changed, so think of one that is secure and easy to remember. 6. Create a [employer] password. You can change your password at any time. 7. Enter your Password Reset Question and Answer. This can be used at a later time if you forget your password. 8. Enter your e-mail address. You will receive e-mail notification when new information is available in [address] 9. Click Sign Up. You can now view and download portions of your medical record. 10. Click the Download Summary menu link to download a portable copy of your medical information. If you have questions, please visit the Frequently Asked Questions section of the [employer] website. Remember, [employer] is NOT to be used for urgent needs. For medical emergencies, dial 911. Now available from your iPhone and Android! Please provide this summary of care documentation to your next provider. Your primary care clinician is listed as Sidney Andrews. If you have any questions after today's visit, please call [phone].

## 2017-02-13 NOTE — PROGRESS NOTES
HISTORY OF PRESENT ILLNESS  Daysi Funes is a 80 y.o. male. Leg Swelling   Pertinent negatives include no chest pain, no abdominal pain, no headaches and no shortness of breath. Patient presents for a follow-up office visit. He has a significant history for coronary disease, status post drug-eluting stent in his RCA 2009, carotid artery disease, hypertension, dyslipidemia, and diabetes mellitus type 2. Patient was admitted to the hospital in August 2016 with symptoms concerning for unstable angina. He ruled out for a myocardial infarction, but underwent a nuclear stress test which is somewhat abnormal and as a result he underwent a cardiac catheterization which demonstrated high-grade left main coronary artery stenosis with a 70% lesion confirmed by I harman. He also had an obtuse marginal focal stenosis of 75-80%. He subsequently underwent a two-vessel bypass surgery several days later using a LIMA to LAD and SVG to OM1. His postoperative course was somewhat complicated by increased confusion and delirium. The patient has since been diagnosed with dementia, neurologic workup ongoing via neurology. He was last seen in our office 4 months ago. Since last visit he states he has been feeling very well with exception of his memory issues and hallucinations. He denies any recurrent chest pain or pressure. His shortness of breath has resolved. In fact he states he feels he has more energy than he has in the past year. He does have mild leg swelling at the end of the day, which usually improves at night. No palpitations dizziness or syncope. Past Medical History   Diagnosis Date    Angina     Anxiety     Arthritis     CAD (coronary artery disease)      s/p drug-eluting stents to RCA for high-grade stenoses in 2/09    Cardiac catheterization 08/24/2016     Mod calcification. Co-dom. oLM 50-70%. LAD 30%. Dx 30%. Cx 70% focal.  OM 80% focal.  RCA 30%.       Cardiac echocardiogram 08/23/2016     Tech difficult. EF 55-60%. No WMA. Normal diastolic fx. Lipomatous septal hypertrophy.  Cardiac nuclear imaging test, abnormal 08/23/2016     Sm reversible inferior defect concerning for ischemia. Mild inferior hypk. EF 68%. Neg EKG on pharm stress test.    Carotid duplex 08/26/2016     Mild <50% CYRUS stenosis. Chronic LICA occlusion. Similar to study of 3/29/16.  Carotid stenosis (HCC)      w/ known total occlusion of lt internal carotid artery; followed by pts vascular surgeon    Chronic kidney disease     [facility]     Depression     Diabetes ([facility]      type 2    Dyslipidemia      on Crestor; managed by pts PCP    Dyspnea     Heart disease     Hypercholesterolemia     Hypertension     Low back pain     Osteoarthrosis involving multiple sites     Skin cancer ([facility]      squamous cell lesions of the skin    Stroke ([facility]     Venous (peripheral) insufficiency       Current Outpatient Prescriptions   Medication Sig Dispense Refill    RANEXA 500 mg SR tablet TAKE 1 TAB BY MOUTH TWO (2) TIMES A DAY. 180 Tab 2    metoprolol succinate (TOPROL-XL) 50 mg XL tablet TAKE 1 TABLET BY MOUTH ONCE DAILY 90 Tab 2    rosuvastatin (CRESTOR) 10 mg tablet Take 10 mg by mouth nightly.  solifenacin (VESICARE) 5 mg tablet Take 5 mg by mouth daily.  guaiFENesin-codeine (CHERATUSSIN AC) 100-10 mg/5 mL solution 1 or 2 tsp q 6 hrs prn cough 120 mL 1    clotrimazole-betamethasone (LOTRISONE) topical cream qpply bid prn groin rash 45 g 0    aspirin delayed-release 81 mg tablet Take 1 Tab by mouth daily. 30 Tab 2    acetaminophen (TYLENOL) 325 mg tablet Take 2 Tabs by mouth every four (4) hours as needed. (Patient taking differently: Take 2 Tabs by mouth every six (6) hours as needed for Pain.) 100 Tab 2    COMPOUNDMAX BASE crea 240 g by Does Not Apply route four (4) times daily.  Anti-Inflam Meloxicam 0.09% Topirmate 1% Methocarbamol 2%  Lidocaine 2% Prilocaine 2% (Patient not taking: Reported on 10/5/2016) 240 g 3    buPROPion SR (WELLBUTRIN SR) 100 mg SR tablet 1 bid (Patient not taking: Reported on 10/24/2016) 180 Tab 0    SHAWN PEN NEEDLE 32 x 5/32 \" ndle Use as directed. 3    gabapentin (NEURONTIN) 300 mg capsule Take 1 Cap by mouth daily. Indications: NEUROPATHIC PAIN, RESTLESS LEGS SYNDROME 90 Cap 2    insulin glargine (LANTUS SOLOSTAR) 100 unit/mL (3 mL) pen 30 Units by SubCUTAneous route nightly. 30 units       saxagliptin (ONGLYZA) 5 mg Tab tablet Take 5 mg by mouth daily. Indications: TYPE 2 DIABETES MELLITUS      MULTIVITAMINS W-MINERALS/LUT (CENTRUM SILVER PO) Take 1 Tab by mouth daily.  CALCIUM CARBONATE (CALTRATE 600 PO) Take 1 Tab by mouth daily. Allergies   Allergen Reactions    Amaryl [Glimepiride] Drowsiness    Lipitor [Atorvastatin] Myalgia    Niacin Other (comments)     Facial blistering, swelling in face    Pravachol [Pravastatin] Myalgia    Zocor [Simvastatin] Myalgia      Social History   Substance Use Topics    Smoking status: Former Smoker     Years: 2.00     Quit date: 1/1/1955    Smokeless tobacco: Never Used    Alcohol use Yes      Comment: drinks weekly couple beers         Review of Systems   Constitutional: Negative for chills, fever and weight loss. HENT: Negative for nosebleeds. Eyes: Negative for blurred vision and double vision. Respiratory: Negative for cough, shortness of breath and wheezing. Cardiovascular: Positive for leg swelling. Negative for chest pain, palpitations, orthopnea, claudication and PND. Gastrointestinal: Negative for abdominal pain, heartburn, nausea and vomiting. Genitourinary: Negative for dysuria and hematuria. Musculoskeletal: Negative for falls and myalgias. Skin: Negative for rash. Neurological: Negative for dizziness, focal weakness and headaches. Endo/Heme/Allergies: Does not bruise/bleed easily. Psychiatric/Behavioral: Positive for hallucinations and memory loss. Negative for substance abuse. Visit Vitals    /74    Pulse 68    Ht 5' 10\" (1.778 m)    Wt 92.5 kg (204 lb)    SpO2 96%    BMI 29.27 kg/m2      Physical Exam   Constitutional: He is oriented to person, place, and time. He appears well-developed and well-nourished. HENT:   Head: Normocephalic and atraumatic. Eyes: Conjunctivae are normal.   Neck: Neck supple. No JVD present. Carotid bruit is not present. Cardiovascular: Normal rate, regular rhythm, S1 normal, S2 normal and normal pulses. Exam reveals no gallop and no S3. No murmur heard. Pulmonary/Chest: Breath sounds normal. He has no wheezes. He has no rales. Abdominal: Soft. Bowel sounds are normal. There is no tenderness. Musculoskeletal: He exhibits edema (trace bilateral lower extremity). Neurological: He is alert and oriented to person, place, and time. Skin: Skin is warm and dry. EKG:  Normal sinus rhythm,  Normal axis, normal QTc interval, nonspecific T-wave abnormality. Compared to the previous EKG, no significant interval change. ASSESSMENT and PLAN    Coronary artery disease. Status post to drug-eluting stents to his mid RCA in 2009, Xience 2.5 mm in diameter for a total length of 40 mm. He underwent a repeat cardiac catheterization in May 2014 showing a widely patent RCA stent and no significant additional disease. He developed unstable angina, underwent a repeat cardiac catheterization in August 2016 which demonstrated high-grade left main stenosis of 70% and a focal 75-80% OM1 lesion. His RCA stent was patent. He then underwent a two-vessel bypass surgery using a LIMA to LAD and SVG to OM1. No recurrent chest pain since his procedure. I would continue an aspirin, statin, and beta-blocker indefinitely. Dyslipidemia. On Crestor 10 mg, his goal LDL should be 70 or less. Hypertension.   Patient blood pressure remains controlled on his current antihypertensive regimen. All of which I would continue. Carotid artery disease, and known total occlusion of his left internal carotid artery, his right internal carotid artery is patent. This was unchanged on a follow-up carotid scan in August 2016. Diabetes mellitus type 2, on oral agents and insulin, being managed by his PCP, his hemoglobin A1c should be less than 7. Dementia. Workup in progress by neurology. Followup in 6 months, sooner if needed.

## 2017-02-13 NOTE — PROGRESS NOTES
1. Have you been to the ER, urgent care clinic since your last visit? Hospitalized since your last visit? No     2. Have you seen or consulted any other health care providers outside of the [address] [place] [address] since your last visit? Include any pap smears or colon screening.  No

## 2017-02-15 ENCOUNTER — APPOINTMENT (OUTPATIENT)
Dept: CARDIAC REHAB | Age: 82
End: 2017-02-15

## 2017-02-20 ENCOUNTER — APPOINTMENT (OUTPATIENT)
Dept: CARDIAC REHAB | Age: 82
End: 2017-02-20

## 2017-02-22 ENCOUNTER — HOSPITAL ENCOUNTER (OUTPATIENT)
Age: 82
Discharge: HOME | End: 2017-02-22
Attending: PSYCHIATRY & NEUROLOGY
Payer: MEDICARE

## 2017-02-22 ENCOUNTER — APPOINTMENT (OUTPATIENT)
Dept: CARDIAC REHAB | Age: 82
End: 2017-02-22

## 2017-02-22 ENCOUNTER — HOSPITAL ENCOUNTER (OUTPATIENT)
Dept: LAB | Age: 82
Discharge: HOME | End: 2017-02-22
Payer: MEDICARE

## 2017-02-22 DIAGNOSIS — F03.90 DEMENTIA (HCC): ICD-10-CM

## 2017-02-22 LAB
FOLATE SERPL-MCNC: >20 NG/ML (ref 3.1–17.5)
T4 FREE SERPL-MCNC: 1 NG/DL (ref 0.7–1.5)
TSH SERPL DL<=0.05 MIU/L-ACNC: 1.37 UIU/ML (ref 0.36–3.74)
VIT B12 SERPL-MCNC: 734 PG/ML (ref 211–911)

## 2017-02-22 PROCEDURE — 70551 MRI BRAIN STEM W/O DYE: CPT

## 2017-02-24 LAB
VIT B1 BLD-SCNC: 194.9 NMOL/L (ref 66.5–200)
VIT B6 SERPL-MCNC: 12.9 UG/L (ref 5.3–46.7)

## 2017-02-27 PROBLEM — R32 ENURESIS: Status: ACTIVE | Noted: 2017-02-27

## 2017-05-31 ENCOUNTER — OFFICE VISIT (OUTPATIENT)
Dept: INTERNAL MEDICINE CLINIC | Age: 82
End: 2017-05-31

## 2017-05-31 VITALS
RESPIRATION RATE: 16 BRPM | WEIGHT: 195 LBS | OXYGEN SATURATION: 97 % | DIASTOLIC BLOOD PRESSURE: 60 MMHG | HEIGHT: 70 IN | HEART RATE: 79 BPM | BODY MASS INDEX: 27.92 KG/M2 | SYSTOLIC BLOOD PRESSURE: 120 MMHG | TEMPERATURE: 98.9 F

## 2017-05-31 DIAGNOSIS — E88.81 METABOLIC SYNDROME: ICD-10-CM

## 2017-05-31 DIAGNOSIS — F01.518 VASCULAR DEMENTIA WITH BEHAVIOR DISTURBANCE: Primary | ICD-10-CM

## 2017-05-31 NOTE — PROGRESS NOTES
1. Have you been to the ER, urgent care clinic since your last visit? Hospitalized since your last visit? No    2. Have you seen or consulted any other health care providers outside of the [address] [place] [address] since your last visit? Include any pap smears or colon screening.  No

## 2017-05-31 NOTE — PROGRESS NOTES
HPI:   Increasing dementia x last 12 mos with occasional hallucinations  Neurology has recently seen him and felt etiology vascular; he began CPAP for BÁRBARA  Hx notable for metabolic syndrome    ROS is otherwise negative. Past Medical History:   Diagnosis Date    Angina     Anxiety     Arthritis     CAD (coronary artery disease)     s/p drug-eluting stents to RCA for high-grade stenoses in 2/09    Cardiac catheterization 08/24/2016    Mod calcification. Co-dom. oLM 50-70%. LAD 30%. Dx 30%. Cx 70% focal.  OM 80% focal.  RCA 30%.  Cardiac echocardiogram 08/23/2016    Tech difficult. EF 55-60%. No WMA. Normal diastolic fx. Lipomatous septal hypertrophy.  Cardiac nuclear imaging test, abnormal 08/23/2016    Sm reversible inferior defect concerning for ischemia. Mild inferior hypk. EF 68%. Neg EKG on pharm stress test.    Carotid duplex 08/26/2016    Mild <50% CYRUS stenosis. Chronic LICA occlusion. Similar to study of 3/29/16.     Carotid stenosis (HCC)     w/ known total occlusion of lt internal carotid artery; followed by pts vascular surgeon    Chronic kidney disease     COPD (chronic obstructive pulmonary disease) ([address]     Dementia     Depression     Diabetes ([address]     type 2    Dyslipidemia     on Crestor; managed by pts PCP    Dyspnea     Heart disease     Hypercholesterolemia     Hypertension     Low back pain     Osteoarthrosis involving multiple sites     Skin cancer ([address]     squamous cell lesions of the skin    Stroke ([address]     Venous (peripheral) insufficiency        Past Surgical History:   Procedure Laterality Date    CARDIAC CATHETERIZATION  8/24/2016         CORONARY ARTERY ANGIOGRAM  8/24/2016         HX ANGIOPLASTY  2009    2 stents placed and heart attack during the procedure    HX COLONOSCOPY  10/2003    neg; declines f/u    HX CORONARY ARTERY BYPASS GRAFT  09/2016    HX HEART CATHETERIZATION  5/2013    HX HERNIA REPAIR      1970s    HX OTHER SURGICAL      groin surgery    HX TONSIL AND ADENOIDECTOMY      IR INTRAVASCULAR ULTRASOUND INITIAL  8/24/2016         LV ANGIOGRAPHY  8/24/2016            Social History     Social History    Marital status:      Spouse name: N/A    Number of children: N/A    Years of education: N/A     Occupational History    Not on file. Social History Main Topics    Smoking status: Former Smoker     Years: 2.00     Quit date: 1/1/1955    Smokeless tobacco: Never Used    Alcohol use Yes      Comment: drinks weekly couple beers    Drug use: No    Sexual activity: No     Other Topics Concern    Not on file     Social History Narrative       Allergies   Allergen Reactions    Amaryl [Glimepiride] Drowsiness    Lipitor [Atorvastatin] Myalgia    Niacin Other (comments)     Facial blistering, swelling in face    Pravachol [Pravastatin] Myalgia    Zocor [Simvastatin] Myalgia       Family History   Problem Relation Age of Onset    Parkinsonism Mother     Hypertension Mother     Cancer Father      lung    Heart defect Brother        Current Outpatient Prescriptions   Medication Sig Dispense Refill    solifenacin (VESICARE) 5 mg tablet Take 1 Tab by mouth daily. Indications: BLADDER HYPERACTIVITY, INCREASED URINARY FREQUENCY, URINARY URGE INCONTINENCE, URINARY URGENCY 90 Tab 6    metoprolol succinate (TOPROL-XL) 50 mg XL tablet TAKE 1 TABLET BY MOUTH ONCE DAILY 90 Tab 2    rosuvastatin (CRESTOR) 10 mg tablet Take 10 mg by mouth nightly.  guaiFENesin-codeine (CHERATUSSIN AC) 100-10 mg/5 mL solution 1 or 2 tsp q 6 hrs prn cough 120 mL 1    clotrimazole-betamethasone (LOTRISONE) topical cream qpply bid prn groin rash 45 g 0    aspirin delayed-release 81 mg tablet Take 1 Tab by mouth daily. 30 Tab 2    acetaminophen (TYLENOL) 325 mg tablet Take 2 Tabs by mouth every four (4) hours as needed.  (Patient taking differently: Take 2 Tabs by mouth every six (6) hours as needed for Pain.) 100 Tab 2    COMPOUNDMAX BASE crea 240 g by Does Not Apply route four (4) times daily. Anti-Inflam Meloxicam 0.09% Topirmate 1% Methocarbamol 2%  Lidocaine 2% Prilocaine 2% (Patient not taking: Reported on 10/5/2016) 240 g 3    buPROPion SR (WELLBUTRIN SR) 100 mg SR tablet 1 bid 180 Tab 0    SHAWN PEN NEEDLE 32 x 5/32 \" ndle Use as directed. 3    gabapentin (NEURONTIN) 300 mg capsule Take 1 Cap by mouth daily. Indications: NEUROPATHIC PAIN, RESTLESS LEGS SYNDROME 90 Cap 2    insulin glargine (LANTUS SOLOSTAR) 100 unit/mL (3 mL) pen 30 Units by SubCUTAneous route nightly. 30 units       MULTIVITAMINS W-MINERALS/LUT (CENTRUM SILVER PO) Take 1 Tab by mouth daily.  CALCIUM CARBONATE (CALTRATE 600 PO) Take 1 Tab by mouth daily. Visit Vitals    /60 (BP 1 Location: Right arm, BP Patient Position: Sitting)    Pulse 79    Temp 98.9 °F (37.2 °C) (Tympanic)    Resp 16    Ht 5' 10\" (1.778 m)    Wt 195 lb (88.5 kg)    SpO2 97%    BMI 27.98 kg/m2       PE  Well nourished in NAD  HEENT:  OP: clear. Neck: supple w/o mass or bruits. Chest: clear. CV: RRR w/o m,r,g; pulses NP distal legs  Abd: soft, NT, w/o HSM or mass. Ext: 1+ edema. Neuro: mild confusion; NF. Assessment and Plan    Encounter Diagnoses   Name Primary?  Vascular dementia with behavior disturbance Yes    Metabolic syndrome    Dementia - sees neuro (Dr Jem Schafer); recent w/u c/w vascular etiology; explained antipsychotics tend to cause excess harm vs benefit  Metabolic syndrome - management per endocrine  I have reviewed/discussed the above normal BMI with the patient. I have recommended the following interventions: dietary management education, guidance, and counseling .  .    OV 4-6 mos or prn  I have explained plan to wife/patient and the they verbalizes understanding

## 2017-06-22 ENCOUNTER — HOSPITAL ENCOUNTER (EMERGENCY)
Age: 82
Discharge: HOME | End: 2017-06-22
Attending: EMERGENCY MEDICINE
Payer: MEDICARE

## 2017-06-22 ENCOUNTER — APPOINTMENT (OUTPATIENT)
Dept: CT IMAGING | Age: 82
End: 2017-06-22
Attending: EMERGENCY MEDICINE
Payer: MEDICARE

## 2017-06-22 VITALS
DIASTOLIC BLOOD PRESSURE: 95 MMHG | WEIGHT: 200 LBS | BODY MASS INDEX: 28.63 KG/M2 | HEART RATE: 70 BPM | OXYGEN SATURATION: 99 % | SYSTOLIC BLOOD PRESSURE: 119 MMHG | TEMPERATURE: 98.2 F | HEIGHT: 70 IN | RESPIRATION RATE: 18 BRPM

## 2017-06-22 DIAGNOSIS — S02.2XXA CLOSED FRACTURE OF NASAL BONE, INITIAL ENCOUNTER: ICD-10-CM

## 2017-06-22 DIAGNOSIS — T07.XXXA MULTIPLE ABRASIONS: ICD-10-CM

## 2017-06-22 DIAGNOSIS — R03.0 ELEVATED BLOOD PRESSURE READING: ICD-10-CM

## 2017-06-22 DIAGNOSIS — S09.90XA CHI (CLOSED HEAD INJURY), INITIAL ENCOUNTER: Primary | ICD-10-CM

## 2017-06-22 PROCEDURE — 99284 EMERGENCY DEPT VISIT MOD MDM: CPT

## 2017-06-22 PROCEDURE — 72125 CT NECK SPINE W/O DYE: CPT

## 2017-06-22 PROCEDURE — 70450 CT HEAD/BRAIN W/O DYE: CPT

## 2017-06-22 PROCEDURE — 74011250637 HC RX REV CODE- 250/637: Performed by: EMERGENCY MEDICINE

## 2017-06-22 RX ORDER — OXYCODONE AND ACETAMINOPHEN 5; 325 MG/1; MG/1
1 TABLET ORAL
Status: COMPLETED | OUTPATIENT
Start: 2017-06-22 | End: 2017-06-22

## 2017-06-22 RX ORDER — OXYCODONE AND ACETAMINOPHEN 5; 325 MG/1; MG/1
1 TABLET ORAL
Qty: 15 TAB | Refills: 0 | Status: SHIPPED | OUTPATIENT
Start: 2017-06-22 | End: 2017-09-26

## 2017-06-22 RX ADMIN — OXYCODONE HYDROCHLORIDE AND ACETAMINOPHEN 1 TABLET: 5; 325 TABLET ORAL at 17:36

## 2017-06-22 NOTE — ED NOTES
Spoke to patients wife on the telephone. She provided a phone number to contact once the patient is discharged.      [phone] (h) [phone] (c)

## 2017-06-22 NOTE — ED NOTES
Hourly rounding complete. Safety  Pt resting   [x  ]  On stretcher with side rails up and bed in locked position, call bell within reach  [  ]  Sitting in chair with casters locked, call bell within reach    Toileting  [x  ] pt denies need to use bathroom  [  ] pt assisted to bathroom  [  ] pt assisted with bedpan  [  ] pt independent to bathroom as needed    Ongoing Plan of Care  Plan of care and expected time for test and results reviewed with pt.     Pain Management / Comfort  [  ] dimmed lights  [  ] warm blanket provided  [  ] pain assessed  [x  ] monitor alarms reviewed

## 2017-06-22 NOTE — ED NOTES
Reviewed DC instructions with patient. Pt verbalized an understanding. Pt instructed to follow up with PCP this week and return to ED with any worsening S/S. Pt was sent home with 1 prescription. Pt signed paper DC instructions; RN placed in scan bin. RN contacted patients wife for a ride home. RN used wheelchair to take patient to waiting room. Pts family stated they would drive patient home. Pt left ED AOX4; with no distress noted. Pt left ED with all belongings.

## 2017-06-22 NOTE — DISCHARGE INSTRUCTIONS
Scrapes (Abrasions): Care Instructions  Your Care Instructions  Scrapes (abrasions) are wounds where your skin has been rubbed or torn off. Most scrapes do not go deep into the skin, but some may remove several layers of skin. Scrapes usually don't bleed much, but they may ooze pinkish fluid. Scrapes on the head or face may appear worse than they are. They may bleed a lot because of the good blood supply to this area. Most scrapes heal well and may not need a bandage. They usually heal within 3 to 7 days. A large, deep scrape may take 1 to 2 weeks or longer to heal. A scab may form on some scrapes. Follow-up care is a key part of your treatment and safety. Be sure to make and go to all appointments, and call your doctor if you are having problems. It's also a good idea to know your test results and keep a list of the medicines you take. How can you care for yourself at home? · If your doctor told you how to care for your wound, follow your doctor's instructions. If you did not get instructions, follow this general advice:  ¨ Wash the scrape with clean water 2 times a day. Don't use hydrogen peroxide or alcohol, which can slow healing. ¨ You may cover the scrape with a thin layer of petroleum jelly, such as Vaseline, and a nonstick bandage. ¨ Apply more petroleum jelly and replace the bandage as needed. · Prop up the injured area on a pillow anytime you sit or lie down during the next 3 days. Try to keep it above the level of your heart. This will help reduce swelling. · Be safe with medicines. Take pain medicines exactly as directed. ¨ If the doctor gave you a prescription medicine for pain, take it as prescribed. ¨ If you are not taking a prescription pain medicine, ask your doctor if you can take an over-the-counter medicine. When should you call for help?   Call your doctor now or seek immediate medical care if:  · You have signs of infection, such as:  ¨ Increased pain, swelling, warmth, or redness around the scrape. ¨ Red streaks leading from the scrape. ¨ Pus draining from the scrape. ¨ A fever. · The scrape starts to bleed, and blood soaks through the bandage. Oozing small amounts of blood is normal.  Watch closely for changes in your health, and be sure to contact your doctor if the scrape is not getting better each day. Where can you learn more? Go to http://da-ahsan.info/. Enter A374 in the search box to learn more about \"Scrapes (Abrasions): Care Instructions. \"  Current as of: March 20, 2017  Content Version: 11.3  © 7758-5000 [employer]. Care instructions adapted under license by [employer] (which disclaims liability or warranty for this information). If you have questions about a medical condition or this instruction, always ask your healthcare professional. [place] [address] any warranty or liability for your use of this information. Broken Nose: Care Instructions  Your Care Instructions    A broken nose is a break, or fracture, of the bone or cartilage. Most broken noses need only home care and a follow-up visit with a doctor. The swelling should go down in a few days. Bruises around your eyes and nose should go away in 2 to 3 weeks. You heal best when you take good care of yourself. Eat a variety of healthy foods, and don't smoke. Follow-up care is a key part of your treatment and safety. Be sure to make and go to all appointments, and call your doctor if you are having problems. It's also a good idea to know your test results and keep a list of the medicines you take. How can you care for yourself at home? · If you have a nasal splint or packing, leave it in place until a doctor removes it. · If your doctor prescribed antibiotics, take them as directed. Do not stop taking them just because you feel better. You need to take the full course of antibiotics.   · Take decongestants as directed to help you breathe after the splint or packing is removed. Your doctor may give you a prescription or suggest over-the-counter medicine. · Be safe with medicines. Take pain medicines exactly as directed. ¨ If the doctor gave you a prescription medicine for pain, take it as prescribed. ¨ If you are not taking a prescription pain medicine, ask your doctor if you can take an over-the-counter medicine. · Put ice or a cold pack on your nose for 10 to 20 minutes at a time. Try to do this every 1 to 2 hours for the first 3 days (when you are awake) or until the swelling goes down. Put a thin cloth between the ice pack and your skin. · Sleep with your head slightly raised until the swelling goes down. Prop up your head and shoulders on pillows. · Do not play contact sports for 6 weeks. When should you call for help? Call your doctor now or seek immediate medical care if:  · You have a fever or a severe headache. · You have a nosebleed that does not stop after you have pinched your nostrils together 3 times for 10 minutes each time (30 minutes total). · Blood runs down the back of your throat even after you pinch your nose. · You have signs of infection, such as:  ¨ Increased pain, swelling, warmth, or redness. ¨ Red streaks leading from the wound. ¨ Pus draining from your nose. ¨ A fever. · You have nausea and vomiting, confusion, or trouble staying awake. Watch closely for changes in your health, and be sure to contact your doctor if:  · Your nose still hurts after you take pain medicine. · You cannot breathe through your nose after the swelling goes down. · You do not get better as expected. Where can you learn more? Go to http://da-ahsan.info/. Enter Y345 in the search box to learn more about \"Broken Nose: Care Instructions. \"  Current as of: March 21, 2017  Content Version: 11.3  © 1799-7939 [employer].  Care instructions adapted under license by [employer] (which disclaims liability or warranty for this information). If you have questions about a medical condition or this instruction, always ask your healthcare professional. [address] any warranty or liability for your use of this information.

## 2017-06-22 NOTE — ED PROVIDER NOTES
HPI Comments: Patient with history of CAD hypertension, diabetes brought in by EMS after a fall. He was using a new cane, planted it in a puddle, slipped and fell forward. No loss consciousness he does take aspirin and Plavix. Has brisk bleeding from nose, evidence of trauma to the head and LEFT knee, he was able to ambulate after the fall, denies any chest pain or shortness breath denies any abdominal pain nausea or vomiting. He started complaining of cervical pain, no thoracic pain or lumbar pain. No difficulty moving arms or legs. \    Patient is a 80 y.o. male presenting with fall. Fall   Associated symptoms include headaches. Pertinent negatives include no fever and no abdominal pain. Past Medical History:   Diagnosis Date    Angina     Anxiety     Arthritis     CAD (coronary artery disease)     s/p drug-eluting stents to RCA for high-grade stenoses in 2/09    Cardiac catheterization 08/24/2016    Mod calcification. Co-dom. oLM 50-70%. LAD 30%. Dx 30%. Cx 70% focal.  OM 80% focal.  RCA 30%.  Cardiac echocardiogram 08/23/2016    Tech difficult. EF 55-60%. No WMA. Normal diastolic fx. Lipomatous septal hypertrophy.  Cardiac nuclear imaging test, abnormal 08/23/2016    Sm reversible inferior defect concerning for ischemia. Mild inferior hypk. EF 68%. Neg EKG on pharm stress test.    Carotid duplex 08/26/2016    Mild <50% CYRUS stenosis. Chronic LICA occlusion. Similar to study of 3/29/16.     Carotid stenosis (HCC)     w/ known total occlusion of lt internal carotid artery; followed by pts vascular surgeon    Chronic kidney disease     COPD (chronic obstructive pulmonary disease) ([address]     Dementia     Depression     Diabetes ([facility] [address]     type 2    Dyslipidemia     on Crestor; managed by pts PCP    Dyspnea     Heart disease     Hypercholesterolemia     Hypertension     Low back pain     Osteoarthrosis involving multiple sites     Skin cancer ([address]     squamous cell lesions of the skin    Stroke ([facility] [address]     Venous (peripheral) insufficiency        Past Surgical History:   Procedure Laterality Date    CARDIAC CATHETERIZATION  8/24/2016         CORONARY ARTERY ANGIOGRAM  8/24/2016         HX ANGIOPLASTY  2009    2 stents placed and heart attack during the procedure    HX COLONOSCOPY  10/2003    neg; declines f/u    HX CORONARY ARTERY BYPASS GRAFT  09/2016    HX HEART CATHETERIZATION  5/2013    HX HERNIA REPAIR      1970s    HX OTHER SURGICAL      groin surgery    HX TONSIL AND ADENOIDECTOMY      IR INTRAVASCULAR ULTRASOUND INITIAL  8/24/2016         LV ANGIOGRAPHY  8/24/2016              Family History:   Problem Relation Age of Onset    Parkinsonism Mother     Hypertension Mother     Cancer Father      lung    Heart defect Brother        Social History     Social History    Marital status:      Spouse name: N/A    Number of children: N/A    Years of education: N/A     Occupational History    Not on file. Social History Main Topics    Smoking status: Former Smoker     Years: 2.00     Quit date: 1/1/1955    Smokeless tobacco: Never Used    Alcohol use Yes      Comment: drinks weekly couple beers    Drug use: No    Sexual activity: No     Other Topics Concern    Not on file     Social History Narrative         ALLERGIES: Amaryl [glimepiride]; Lipitor [atorvastatin]; Niacin; Pravachol [pravastatin]; and Zocor [simvastatin]    Review of Systems   Constitutional: Negative for fever. HENT: Negative for nosebleeds. Eyes: Negative for visual disturbance. Respiratory: Negative for shortness of breath. Cardiovascular: Negative for chest pain. Gastrointestinal: Negative for abdominal pain. Genitourinary: Negative for dysuria. Musculoskeletal: Positive for arthralgias. Negative for myalgias. Skin: Positive for wound. Negative for rash. Neurological: Positive for headaches. Negative for weakness.    All other systems reviewed and are negative. Vitals:    06/22/17 1656 06/22/17 1657 06/22/17 1658 06/22/17 1700   BP:    (!) 119/95   Pulse:       Resp:       Temp:       SpO2: 99% 100% 99% 99%   Weight:       Height:                Physical Exam   Constitutional:   General:  Well-developed, well-nourished, no apparent distress. Head: Normocephalic, abrasion on the crown of the head approximately 2 cm in diameter. .    Eyes:  Pupils midrange extraocular movements intact. No pallor or conjunctival injection. Nose: Tenderness palpation over the nasal bridge, there is a 1 cm skin tear/abrasion with active bleeding from the nostrils, pressure was applied. .    Ears:  Grossly normal to inspection, no discharge. Mouth:  Mucous membranes moist, no appreciable intraoral lesion. Neck/Back:  Trachea midline, no asymmetry. No pain on palpation down cervical, thoracic, or lumbar spine step-off or deformity. Chest:  Grossly normal inspection, symmetric chest rise. No pain on palpation of the chest wall  Pulmonary:  Clear to auscultation bilaterally no wheezes rhonchi or rales. Cardiovascular:  S1-S2 no murmurs rubs or gallops. Abdomen: Soft, nontender, nondistended no guarding rebound or peritoneal signs. Extremities:  Grossly normal to inspection, peripheral pulses intact  upper extremity is nontender to palpation LEFT knee overlying abrasion without appreciable joint effusion or point tenderness, RIGHT lower extremity chronic skin changes and stasis dermatitis  Neurologic:  Alert and oriented no appreciable focal neurologic deficit. Skin:  Warm and dry  Psychiatric:  Grossly normal mood and affect. Nursing note reviewed, vital signs reviewed.           MDM  Number of Diagnoses or Management Options  CHI (closed head injury), initial encounter:   Closed fracture of nasal bone, initial encounter:   Elevated blood pressure reading:   Multiple abrasions:   Diagnosis management comments: ED course:  Patient presents after a trip and fall, mechanical but higher risk for intracranial pathology since he is on aspirin and Plavix. We'll plan for CT head and neck update tetanus, history of epistaxis and reevaluate     CT head and neck per radiology no acute findings     Tetanus not needed per patient had a recently had a tetanus shot, he requested Percocet for pain, was instructed to push fluids and take fiber and follow up with primary care physician for further management will be referred to ENT for a suspected nasal fracture     Patient's presentation, history, physical exam and laboratory evaluations were reviewed. At this time patient was felt to be stable for outpatient management and follow with primary care/specialist.  Patient was instructed to return to the emergency department with any concerns. Disposition:     Discharged home        Portions of this chart were created with Dragon medical speech to text program.   Unrecognized errors may be present.       ED Course       Procedures

## 2017-06-22 NOTE — ED NOTES
Hourly rounding complete. Safety  Pt resting   [x  ]  On stretcher with side rails up and bed in locked position, call bell within reach  [  ]  Sitting in chair with casters locked, call bell within reach    Toileting  [ x ] pt denies need to use bathroom  [  ] pt assisted to bathroom  [  ] pt assisted with bedpan  [  ] pt independent to bathroom as needed    Ongoing Plan of Care  Plan of care and expected time for test and results reviewed with pt. Pain Management / Comfort  [  ] dimmed lights  [  ] warm blanket provided  [  ] pain assessed  [ x ] monitor alarms reviewed    Pts bleeding has stopped and is controlled at this time.

## 2017-07-10 ENCOUNTER — HOSPITAL ENCOUNTER (EMERGENCY)
Age: 82
Discharge: HOME | End: 2017-07-11
Attending: EMERGENCY MEDICINE | Admitting: EMERGENCY MEDICINE
Payer: MEDICARE
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
## 2017-07-10 DIAGNOSIS — S40.012A CONTUSION OF LEFT SHOULDER, INITIAL ENCOUNTER: ICD-10-CM

## 2017-07-10 LAB
ANION GAP BLD CALC-SCNC: 5 MMOL/L (ref 3–18)
BASOPHILS # BLD AUTO: 0.1 K/UL (ref 0–0.1)
BASOPHILS # BLD: 1 % (ref 0–2)
BUN SERPL-MCNC: 24 MG/DL (ref 7–18)
BUN/CREAT SERPL: 17 (ref 12–20)
CALCIUM SERPL-MCNC: 8.7 MG/DL (ref 8.5–10.1)
CHLORIDE SERPL-SCNC: 108 MMOL/L (ref 100–108)
CO2 SERPL-SCNC: 29 MMOL/L (ref 21–32)
CREAT SERPL-MCNC: 1.44 MG/DL (ref 0.6–1.3)
DIFFERENTIAL METHOD BLD: ABNORMAL
EOSINOPHIL # BLD: 0.3 K/UL (ref 0–0.4)
EOSINOPHIL NFR BLD: 3 % (ref 0–5)
ERYTHROCYTE [DISTWIDTH] IN BLOOD BY AUTOMATED COUNT: 20 % (ref 11.6–14.5)
GLUCOSE BLD STRIP.AUTO-MCNC: 105 MG/DL (ref 70–110)
GLUCOSE SERPL-MCNC: 104 MG/DL (ref 74–99)
HCT VFR BLD AUTO: 37.3 % (ref 36–48)
HGB BLD-MCNC: 11.7 G/DL (ref 13–16)
LYMPHOCYTES # BLD AUTO: 19 % (ref 21–52)
LYMPHOCYTES # BLD: 2 K/UL (ref 0.9–3.6)
MCH RBC QN AUTO: 22.8 PG (ref 24–34)
MCHC RBC AUTO-ENTMCNC: 31.4 G/DL (ref 31–37)
MCV RBC AUTO: 72.6 FL (ref 74–97)
MONOCYTES # BLD: 1.5 K/UL (ref 0.05–1.2)
MONOCYTES NFR BLD AUTO: 14 % (ref 3–10)
NEUTS SEG # BLD: 6.6 K/UL (ref 1.8–8)
NEUTS SEG NFR BLD AUTO: 63 % (ref 40–73)
PLATELET # BLD AUTO: 314 K/UL (ref 135–420)
PMV BLD AUTO: 9.6 FL (ref 9.2–11.8)
POTASSIUM SERPL-SCNC: 4.4 MMOL/L (ref 3.5–5.5)
RBC # BLD AUTO: 5.14 M/UL (ref 4.7–5.5)
SODIUM SERPL-SCNC: 142 MMOL/L (ref 136–145)
WBC # BLD AUTO: 10.5 K/UL (ref 4.6–13.2)

## 2017-07-10 PROCEDURE — 80048 BASIC METABOLIC PNL TOTAL CA: CPT | Performed by: EMERGENCY MEDICINE

## 2017-07-10 PROCEDURE — 85025 COMPLETE CBC W/AUTO DIFF WBC: CPT | Performed by: EMERGENCY MEDICINE

## 2017-07-10 PROCEDURE — 99285 EMERGENCY DEPT VISIT HI MDM: CPT

## 2017-07-10 PROCEDURE — 93005 ELECTROCARDIOGRAM TRACING: CPT

## 2017-07-10 PROCEDURE — 82962 GLUCOSE BLOOD TEST: CPT

## 2017-07-10 RX ORDER — METOPROLOL SUCCINATE 50 MG/1
TABLET, EXTENDED RELEASE ORAL
Qty: 90 TAB | Refills: 3 | Status: SHIPPED | OUTPATIENT
Start: 2017-07-10 | End: 2018-03-27

## 2017-07-11 ENCOUNTER — APPOINTMENT (OUTPATIENT)
Dept: CT IMAGING | Age: 82
End: 2017-07-11
Attending: PHYSICIAN ASSISTANT
Payer: MEDICARE

## 2017-07-11 ENCOUNTER — APPOINTMENT (OUTPATIENT)
Dept: GENERAL RADIOLOGY | Age: 82
End: 2017-07-11
Attending: EMERGENCY MEDICINE
Payer: MEDICARE

## 2017-07-11 VITALS
DIASTOLIC BLOOD PRESSURE: 93 MMHG | SYSTOLIC BLOOD PRESSURE: 118 MMHG | OXYGEN SATURATION: 100 % | RESPIRATION RATE: 23 BRPM | HEART RATE: 65 BPM | TEMPERATURE: 98.9 F

## 2017-07-11 LAB
ATRIAL RATE: 67 BPM
CALCULATED P AXIS, ECG09: 62 DEGREES
CALCULATED R AXIS, ECG10: 45 DEGREES
CALCULATED T AXIS, ECG11: 28 DEGREES
DIAGNOSIS, 93000: NORMAL
P-R INTERVAL, ECG05: 156 MS
Q-T INTERVAL, ECG07: 406 MS
QRS DURATION, ECG06: 90 MS
QTC CALCULATION (BEZET), ECG08: 429 MS
VENTRICULAR RATE, ECG03: 67 BPM

## 2017-07-11 PROCEDURE — 73030 X-RAY EXAM OF SHOULDER: CPT

## 2017-07-11 PROCEDURE — 74011250637 HC RX REV CODE- 250/637: Performed by: PHYSICIAN ASSISTANT

## 2017-07-11 PROCEDURE — 70450 CT HEAD/BRAIN W/O DYE: CPT

## 2017-07-11 PROCEDURE — 72125 CT NECK SPINE W/O DYE: CPT

## 2017-07-11 RX ORDER — HYDROCODONE BITARTRATE AND ACETAMINOPHEN 5; 325 MG/1; MG/1
1 TABLET ORAL
Qty: 6 TAB | Refills: 0 | Status: SHIPPED | OUTPATIENT
Start: 2017-07-11 | End: 2017-09-26

## 2017-07-11 RX ORDER — OXYCODONE AND ACETAMINOPHEN 5; 325 MG/1; MG/1
1 TABLET ORAL
Status: COMPLETED | OUTPATIENT
Start: 2017-07-11 | End: 2017-07-11

## 2017-07-11 RX ADMIN — OXYCODONE AND ACETAMINOPHEN 1 TABLET: 5; 325 TABLET ORAL at 04:00

## 2017-07-11 NOTE — ED NOTES
Pt arrives from home by medic with c/o neck pain. Pt was walking outside when he tripped over the edge of a sidewalk and fell.  No LOC>

## 2017-07-11 NOTE — ED PROVIDER NOTES
HPI Comments: 73yo male presenting to ED for evaluation of neck pain and left shoulder pain after tripping while walking on sidewalk [place]. Pt was visiting friend in [place] and reportedly tripped over uneven part of sidewalk causing him to fall on left shoulder and head. Witnessed by wife, no LOC reported. C/o left shoulder pain with palpation and certain movements and neck pain. Denies change in vision, headaches, dizziness, CP, SOB, back pain, abdominal pain, NVD, urinary symptoms or any other symptoms at this time. Pt is currently on plavix. Past Medical History:  No date: Angina  No date: Anxiety  No date: Arthritis  No date: CAD (coronary artery disease)      Comment: s/p drug-eluting stents to RCA for high-grade                stenoses in 2/09 08/24/2016: Cardiac catheterization      Comment: Mod calcification. Co-dom. oLM 50-70%. LAD                30%.  Dx 30%. Cx 70% focal.  OM 80% focal.                 RCA 30%.    08/23/2016: Cardiac echocardiogram      Comment: Tech difficult. EF 55-60%. No WMA. Normal                diastolic fx. Lipomatous septal hypertrophy. 08/23/2016: Cardiac nuclear imaging test, abnormal      Comment: Sm reversible inferior defect concerning for                ischemia. Mild inferior hypk. EF 68%. Neg                EKG on pharm stress test.  08/26/2016: Carotid duplex      Comment: Mild <50% CYRUS stenosis. Chronic LICA                occlusion. Similar to study of 3/29/16.   No date: Carotid stenosis (HCC)      Comment: w/ known total occlusion of lt internal                carotid artery; followed by pts vascular                surgeon  No date: Chronic kidney disease  No date: COPD (chronic obstructive pulmonary disease) (*  No date: Dementia  No date: Depression  No date: Diabetes ([facility] [address]      Comment: type 2  No date: Dyslipidemia      Comment: on Crestor; managed by pts PCP  No date: Dyspnea  No date: Heart disease  No date: Hypercholesterolemia  No date: Hypertension  No date: Low back pain  No date: Osteoarthrosis involving multiple sites  No date: Skin cancer ([address]      Comment: squamous cell lesions of the skin  No date: Stroke [facility])  No date: Venous (peripheral) insufficiency   Debbi Yung MD PCP      Patient is a 80 y.o. male presenting with neck pain and fall. The history is provided by the patient and the spouse. Neck Pain      Fall          Past Medical History:   Diagnosis Date    Angina     Anxiety     Arthritis     CAD (coronary artery disease)     s/p drug-eluting stents to RCA for high-grade stenoses in 2/09    Cardiac catheterization 08/24/2016    Mod calcification. Co-dom. oLM 50-70%. LAD 30%. Dx 30%. Cx 70% focal.  OM 80% focal.  RCA 30%.  Cardiac echocardiogram 08/23/2016    Tech difficult. EF 55-60%. No WMA. Normal diastolic fx. Lipomatous septal hypertrophy.  Cardiac nuclear imaging test, abnormal 08/23/2016    Sm reversible inferior defect concerning for ischemia. Mild inferior hypk. EF 68%. Neg EKG on pharm stress test.    Carotid duplex 08/26/2016    Mild <50% CYRUS stenosis. Chronic LICA occlusion. Similar to study of 3/29/16.     Carotid stenosis (HCC)     w/ known total occlusion of lt internal carotid artery; followed by pts vascular surgeon    Chronic kidney disease     COPD (chronic obstructive pulmonary disease) ([address]     Dementia     Depression     Diabetes ([address]     type 2    Dyslipidemia     on Crestor; managed by pts PCP    Dyspnea     Heart disease     Hypercholesterolemia     Hypertension     Low back pain     Osteoarthrosis involving multiple sites     Skin cancer ([address]     squamous cell lesions of the skin    Stroke ([address]     Venous (peripheral) insufficiency        Past Surgical History:   Procedure Laterality Date    CARDIAC CATHETERIZATION  8/24/2016         CORONARY ARTERY ANGIOGRAM  8/24/2016         HX ANGIOPLASTY  2009    2 stents placed and heart attack during the procedure    HX COLONOSCOPY  10/2003    neg; declines f/u    HX CORONARY ARTERY BYPASS GRAFT  09/2016    HX HEART CATHETERIZATION  5/2013    HX HERNIA REPAIR      1970s    HX OTHER SURGICAL      groin surgery    HX TONSIL AND ADENOIDECTOMY      IR INTRAVASCULAR ULTRASOUND INITIAL  8/24/2016         LV ANGIOGRAPHY  8/24/2016              Family History:   Problem Relation Age of Onset    Parkinsonism Mother     Hypertension Mother     Cancer Father      lung    Heart defect Brother        Social History     Social History    Marital status:      Spouse name: N/A    Number of children: N/A    Years of education: N/A     Occupational History    Not on file. Social History Main Topics    Smoking status: Former Smoker     Years: 2.00     Quit date: 1/1/1955    Smokeless tobacco: Never Used    Alcohol use Yes      Comment: drinks weekly couple beers    Drug use: No    Sexual activity: No     Other Topics Concern    Not on file     Social History Narrative         ALLERGIES: Amaryl [glimepiride]; Lipitor [atorvastatin]; Niacin; Pravachol [pravastatin]; and Zocor [simvastatin]    Review of Systems   Musculoskeletal: Positive for neck pain. Vitals:    07/10/17 2212   BP: 157/59   Pulse: 67   Resp: 11   Temp: 98.9 °F (37.2 °C)   SpO2: 99%            Physical Exam   Constitutional: He is oriented to person, place, and time. He appears well-developed and well-nourished. No distress. HENT:   Head: Normocephalic and atraumatic. Nose: Nose normal.   Mouth/Throat: Oropharynx is clear and moist. No oropharyngeal exudate. Erythema noted to inferior orbits but pt reports this is from another recent fall where he slipped on hardwood floors at home and broke nose. Eyes: Conjunctivae and EOM are normal. Pupils are equal, round, and reactive to light. Neck: Neck supple. Spinous process tenderness and muscular tenderness present. Decreased range of motion present.  No edema and no erythema present. Cardiovascular: Normal rate, regular rhythm, normal heart sounds and intact distal pulses. No murmur heard. Pulmonary/Chest: Effort normal and breath sounds normal. No respiratory distress. He has no wheezes. He has no rales. He exhibits no tenderness. Abdominal: Soft. Bowel sounds are normal. He exhibits no distension and no mass. There is no tenderness. There is no rebound and no guarding. Musculoskeletal: He exhibits tenderness. He exhibits no edema or deformity. Left anterior shoulder TTP with deep palpation, certain movements. No deformity. FROM appreciated. Neurological: He is alert and oriented to person, place, and time. He has normal reflexes. Skin: Skin is warm and dry. He is not diaphoretic. Nursing note and vitals reviewed. MDM  Number of Diagnoses or Management Options  Contusion of left shoulder, initial encounter:   Fall, initial encounter:   Diagnosis management comments: Patient s/p mechanical fall with headache and neck pain associated without evidence of intracranial/thoracic/abdominal/spine/ or long bone injury. Due to plavix, age will image with CT scan. No preceding syncope, chest pain, palpitations or weakness that would mandate more aggressive work-up for an underlying cardiovascular source. Patient denies dysuria, frequency or fever. C-collar removed using Nexus criteria. No midline tenderness when collar removed. FROM appreciated. Ct of head and bran and CT of C-spine negative for acute processes. Xray of left shoulder visualized by self. No acute process identifiable by my eyes. At this time pt is appropriate for d/c home with wife. No indication for further ED work up or admission. Wife requesting pain Rx, will give very short course with precautions for drowsiness and fall risk. PCP f/u in 2 days.   Return precautions discussed       Amount and/or Complexity of Data Reviewed  Clinical lab tests: ordered and reviewed  Tests in the radiology section of CPT®: ordered and reviewed      ED Course       Procedures

## 2017-07-11 NOTE — DISCHARGE INSTRUCTIONS

## 2017-09-29 ENCOUNTER — OFFICE VISIT (OUTPATIENT)
Dept: INTERNAL MEDICINE CLINIC | Age: 82
End: 2017-09-29

## 2017-09-29 VITALS
HEIGHT: 70 IN | TEMPERATURE: 97.5 F | RESPIRATION RATE: 16 BRPM | WEIGHT: 202 LBS | OXYGEN SATURATION: 98 % | BODY MASS INDEX: 28.92 KG/M2 | DIASTOLIC BLOOD PRESSURE: 76 MMHG | HEART RATE: 64 BPM | SYSTOLIC BLOOD PRESSURE: 130 MMHG

## 2017-09-29 DIAGNOSIS — E88.81 METABOLIC SYNDROME: ICD-10-CM

## 2017-09-29 DIAGNOSIS — I87.2 VENOUS INSUFFICIENCY: Primary | ICD-10-CM

## 2017-09-29 DIAGNOSIS — Z23 ENCOUNTER FOR IMMUNIZATION: ICD-10-CM

## 2017-09-29 RX ORDER — FUROSEMIDE 40 MG/1
TABLET ORAL
Qty: 90 TAB | Refills: 3 | Status: SHIPPED | OUTPATIENT
Start: 2017-09-29 | End: 2018-03-27

## 2017-09-29 RX ORDER — ROSUVASTATIN CALCIUM 10 MG/1
10 TABLET, COATED ORAL
Qty: 90 TAB | Refills: 2 | Status: SHIPPED | OUTPATIENT
Start: 2017-09-29 | End: 2018-03-27

## 2017-09-29 RX ORDER — POTASSIUM CHLORIDE 750 MG/1
TABLET, EXTENDED RELEASE ORAL
Qty: 90 TAB | Refills: 3 | Status: SHIPPED | OUTPATIENT
Start: 2017-09-29 | End: 2017-10-30

## 2017-09-29 NOTE — PROGRESS NOTES
HPI:   Multiple problems including dementia, vascular disease, metabolic syndrome presents with  Increasing pedal edema x 3 weeks w/o chest pain, dyspnea, orthopnea, PND; improved with elevation      ROS is otherwise negative. Past Medical History:   Diagnosis Date    Angina     Anxiety     Arthritis     CAD (coronary artery disease)     s/p drug-eluting stents to RCA for high-grade stenoses in 2/09    Cardiac catheterization 08/24/2016    Mod calcification. Co-dom. oLM 50-70%. LAD 30%. Dx 30%. Cx 70% focal.  OM 80% focal.  RCA 30%.  Cardiac echocardiogram 08/23/2016    Tech difficult. EF 55-60%. No WMA. Normal diastolic fx. Lipomatous septal hypertrophy.  Cardiac nuclear imaging test, abnormal 08/23/2016    Sm reversible inferior defect concerning for ischemia. Mild inferior hypk. EF 68%. Neg EKG on pharm stress test.    Carotid duplex 08/26/2016    Mild <50% CYRUS stenosis. Chronic LICA occlusion. Similar to study of 3/29/16.     Carotid stenosis (HCC)     w/ known total occlusion of lt internal carotid artery; followed by pts vascular surgeon    Chronic kidney disease     COPD (chronic obstructive pulmonary disease) ([address]     Dementia     Depression     Diabetes ([address]     type 2    Dyslipidemia     on Crestor; managed by pts PCP    Dyspnea     Heart disease     Hypercholesterolemia     Hypertension     Low back pain     Osteoarthrosis involving multiple sites     Skin cancer ([address]     squamous cell lesions of the skin    Stroke ([address]     Venous (peripheral) insufficiency        Past Surgical History:   Procedure Laterality Date    CARDIAC CATHETERIZATION  8/24/2016         CORONARY ARTERY ANGIOGRAM  8/24/2016         HX ANGIOPLASTY  2009    2 stents placed and heart attack during the procedure    HX COLONOSCOPY  10/2003    neg; declines f/u    HX CORONARY ARTERY BYPASS GRAFT  09/2016    HX HEART CATHETERIZATION  5/2013    HX HERNIA REPAIR      1970s    HX OTHER SURGICAL      groin surgery    HX TONSIL AND ADENOIDECTOMY      IR INTRAVASCULAR ULTRASOUND INITIAL  8/24/2016         LV ANGIOGRAPHY  8/24/2016            Social History     Social History    Marital status:      Spouse name: N/A    Number of children: N/A    Years of education: N/A     Occupational History    Not on file. Social History Main Topics    Smoking status: Former Smoker     Years: 2.00     Quit date: 1/1/1955    Smokeless tobacco: Never Used    Alcohol use Yes      Comment: drinks weekly couple beers    Drug use: No    Sexual activity: No     Other Topics Concern    Not on file     Social History Narrative       Allergies   Allergen Reactions    Amaryl [Glimepiride] Drowsiness    Lipitor [Atorvastatin] Myalgia    Niacin Other (comments)     Facial blistering, swelling in face    Pravachol [Pravastatin] Myalgia    Zocor [Simvastatin] Myalgia       Family History   Problem Relation Age of Onset    Parkinsonism Mother     Hypertension Mother     Cancer Father      lung    Heart defect Brother        Current Outpatient Prescriptions   Medication Sig Dispense Refill    isosorbide mononitrate ER (IMDUR) 30 mg tablet TAKE 1 TAB BY MOUTH EVERY MORNING. 3    metoprolol succinate (TOPROL-XL) 50 mg XL tablet TAKE 1 TABLET BY MOUTH ONCE DAILY 90 Tab 3    solifenacin (VESICARE) 5 mg tablet Take 1 Tab by mouth daily. Indications: BLADDER HYPERACTIVITY, INCREASED URINARY FREQUENCY, URINARY URGE INCONTINENCE, URINARY URGENCY 90 Tab 6    aspirin delayed-release 81 mg tablet Take 1 Tab by mouth daily. 30 Tab 2    acetaminophen (TYLENOL) 325 mg tablet Take 2 Tabs by mouth every four (4) hours as needed. (Patient taking differently: Take 2 Tabs by mouth every six (6) hours as needed for Pain.) 100 Tab 2    COMPOUNDMAX BASE crea 240 g by Does Not Apply route four (4) times daily.  Anti-Inflam Meloxicam 0.09% Topirmate 1% Methocarbamol 2%  Lidocaine 2% Prilocaine 2% (Patient not taking: Reported on 10/5/2016) 240 g 3    buPROPion SR (WELLBUTRIN SR) 100 mg SR tablet 1 bid 180 Tab 0    SHAWN PEN NEEDLE 32 x 5/32 \" ndle Use as directed. 3    gabapentin (NEURONTIN) 300 mg capsule Take 1 Cap by mouth daily. Indications: NEUROPATHIC PAIN, RESTLESS LEGS SYNDROME 90 Cap 2    insulin glargine (LANTUS SOLOSTAR) 100 unit/mL (3 mL) pen 30 Units by SubCUTAneous route nightly. 30 units       MULTIVITAMINS W-MINERALS/LUT (CENTRUM SILVER PO) Take 1 Tab by mouth daily.  CALCIUM CARBONATE (CALTRATE 600 PO) Take 1 Tab by mouth daily. Visit Vitals    /76 (BP 1 Location: Left arm, BP Patient Position: Sitting)    Pulse 64    Temp 97.5 °F (36.4 °C) (Tympanic)    Resp 16    Ht 5' 10\" (1.778 m)    Wt 202 lb (91.6 kg)    SpO2 98%    BMI 28.98 kg/m2       PE  heavy in NAD  HEENT:   OP: clear. Neck: supple w/o mass or bruits. Chest: clear. CV: RRR w/o m,r,g; pulses NP distal legs  Abd: soft, NT, w/o HSM or mass. Ext: 1+ edema with venous stasis dermatitis  Neuro: NF. Assessment and Plan    Encounter Diagnoses   Name Primary?  Venous insufficiency Yes    Metabolic syndrome     Encounter for immunization      Venous insufficiency   Decrease sodium; elevate; compression hose; lasix 40 mg qam; KCl 10 meq qd  F/U worsening or unimproved 7 days  Metabolic syndrome - continue dietary efforts; endocrinology manages  Keep f/u with multiple consultants; pt stopped crestor (ran out of it); advised reinstituting it  Flu vaccine given  I have reviewed/discussed the above normal BMI with the patient. I have recommended the following interventions: dietary management education, guidance, and counseling .  .    OV 4 weeks or prn  I have explained plan to wife andpatient and the they verbalizes understanding

## 2017-09-29 NOTE — PROGRESS NOTES
1. Have you been to the ER, urgent care clinic since your last visit? Hospitalized since your last visit? Yes When: sept  Where: [facility] Reason for visit: fall    2. Have you seen or consulted any other health care providers outside of the [address] [place] [address] since your last visit? Include any pap smears or colon screening.  No

## 2017-09-29 NOTE — PATIENT INSTRUCTIONS
Diet and Exercise for Metabolic Syndrome: Care Instructions  Your Care Instructions  Metabolic syndrome is the name for a group of health problems. It includes having too much fat around your waist and high blood pressure. It also includes high triglycerides, high blood sugar, and low levels of healthy (HDL) cholesterol. These problems make it more likely you will have a heart attack or stroke or get diabetes. Your family history (your genes) can cause metabolic syndrome. So can unhealthy eating habits and not getting enough exercise. You can help lower your risk of heart attack, stroke, and diabetes if you eat healthy foods and get more exercise. It may be hard to make these lifestyle changes. But even small changes can help. Follow-up care is a key part of your treatment and safety. Be sure to make and go to all appointments, and call your doctor if you are having problems. It's also a good idea to know your test results and keep a list of the medicines you take. How can you care for yourself at home? Eat more fruits and vegetables  · Fruits and vegetables have nutrients to help protect you from heart disease and high blood pressure. They are low in fat and high in fiber. Dark green, orange, and yellow ones are the healthiest.  · Keep lots of vegetables ready for snacks. · Buy fruit that is in season. Then put it where you can see it so you will want to eat it. · Cook dishes that have a lot of vegetables. Soups and stir-fries are good choices. Limit saturated and trans fats  · Read food labels, and try to avoid saturated and trans fats. They increase your risk of heart disease. · Use olive or canola oil when you cook. · Bake, broil, grill, or steam foods. Avoid fried foods. · Limit how much high-fat meat you eat. This includes hot dogs and sausages. When you prepare meat, cut off all the fat. · You can replace high-fat meat with fish and skinless poultry.  You can also try products made from soybeans, like tofu. Soybeans may be very good for your heart. · Eat at least 2 servings of fish a week. Fish with omega-3 fatty acids may help reduce your risk of heart attack. [place] and sardines are good examples. · Choose low-fat or fat-free milk and dairy products. Eat foods high in fiber  · Foods high in fiber may reduce your cholesterol. And they may give you important vitamins and minerals. Good examples are oatmeal, cooked dried beans, brown rice, citrus fruits, and apples. · Eat whole-grain breads and cereals. They have more fiber than white bread or pastries. Limit high-sugar foods  · Limit foods and drinks that are high in sugar. Some examples are soda pop, sugar-sweetened fruit drinks, candy, and many desserts. · Limit the amount of sugar, honey, and other sweeteners that you add to food and drinks. · Choose water instead of soda pop or other sugar-sweetened drinks. · Limit fruit juice. Limit salt and sodium  · You can help lower your blood pressure if you limit salt and sodium. · If you take the salt shaker off the table, it may be easier to use less salt. You can also try using half the salt in a recipe. And you can avoid adding salt to cooking water for pasta, rice, and potatoes. · Try to eat fewer snacks, fast foods, and other high-salt, processed foods. Check labels so you know how much sodium a food has. · Choose canned goods (soups, vegetables, and beans) that are low in sodium. Get regular exercise  · Get more exercise. Make sure your doctor knows when you start a new exercise program. Even small amounts of exercise will help you get stronger and have more energy. It can also help you manage your weight and your stress. · Walking is a good choice. Little by little, increase the amount you walk every day. Try for at least 30 minutes on most days of the week. Where can you learn more? Go to http://dottie.info/.   Enter 539 [id number] in the search box to learn more about \"Diet and Exercise for Metabolic Syndrome: Care Instructions. \"  Current as of: July 26, 2016  Content Version: 11.3  © 7045-5806 [employer]. Care instructions adapted under license by [employer] (which disclaims liability or warranty for this information). If you have questions about a medical condition or this instruction, always ask your healthcare professional. [address] any warranty or liability for your use of this information.

## 2017-10-05 ENCOUNTER — TELEPHONE (OUTPATIENT)
Dept: INTERNAL MEDICINE CLINIC | Age: 82
End: 2017-10-05

## 2017-10-05 NOTE — TELEPHONE ENCOUNTER
Spoke with patients wife and told her  That Dr Elías Ramachandran said to continue with the lasix and potassium.  Also reminded her that he needs to wear compression stocking and keep thaat leg elevated

## 2017-10-05 NOTE — TELEPHONE ENCOUNTER
Patients wife called regarding him taking lasix and potassium. He saw Dr Rudy Dhillon on Friday and was treated with lasix and potassium for swelling of the leg. The wife something really strange is happening. The medication is making his other leg become really thin and is not affecting the swollen leg. Please call her at [phone].

## 2017-10-06 ENCOUNTER — TELEPHONE (OUTPATIENT)
Dept: INTERNAL MEDICINE CLINIC | Age: 82
End: 2017-10-06

## 2017-10-06 DIAGNOSIS — I87.2 VENOUS STASIS ULCER LIMITED TO BREAKDOWN OF SKIN WITHOUT VARICOSE VEINS, UNSPECIFIED SITE (HCC): Primary | ICD-10-CM

## 2017-10-06 DIAGNOSIS — L97.901 VENOUS STASIS ULCER LIMITED TO BREAKDOWN OF SKIN WITHOUT VARICOSE VEINS, UNSPECIFIED SITE (HCC): Primary | ICD-10-CM

## 2017-10-06 NOTE — TELEPHONE ENCOUNTER
Mrs Reeves contacted office to request a referral to wound care for ulcers on leg. One was scratched open today and she needs assistance with care of his wounds.

## 2017-10-16 ENCOUNTER — TELEPHONE (OUTPATIENT)
Dept: INTERNAL MEDICINE CLINIC | Age: 82
End: 2017-10-16

## 2017-10-16 DIAGNOSIS — I87.2 VENOUS INSUFFICIENCY: Primary | ICD-10-CM

## 2017-10-16 NOTE — TELEPHONE ENCOUNTER
Patient wife states her  was given medication for swelling in his right leg. Patient is not able to take the medication. She states it affecting him mentally. She doesn't know what she needs to do. Please advise.

## 2017-10-23 ENCOUNTER — HOSPITAL ENCOUNTER (EMERGENCY)
Age: 82
Discharge: HOME | DRG: 602 | End: 2017-10-24
Attending: EMERGENCY MEDICINE
Payer: MEDICARE

## 2017-10-23 ENCOUNTER — APPOINTMENT (OUTPATIENT)
Dept: CT IMAGING | Age: 82
DRG: 602 | End: 2017-10-23
Attending: EMERGENCY MEDICINE
Payer: MEDICARE

## 2017-10-23 DIAGNOSIS — M25.561 ARTHRALGIA OF RIGHT LOWER LEG: Primary | ICD-10-CM

## 2017-10-23 DIAGNOSIS — L97.909 DIABETIC LEG ULCER (HCC): ICD-10-CM

## 2017-10-23 DIAGNOSIS — E11.622 DIABETIC LEG ULCER (HCC): ICD-10-CM

## 2017-10-23 LAB
ANION GAP SERPL CALC-SCNC: 3 MMOL/L (ref 3–18)
APTT PPP: 32.4 SEC (ref 23–36.4)
BASOPHILS # BLD: 0.1 K/UL (ref 0–0.06)
BASOPHILS NFR BLD: 1 % (ref 0–2)
BNP SERPL-MCNC: 233 PG/ML (ref 0–1800)
BUN SERPL-MCNC: 19 MG/DL (ref 7–18)
BUN/CREAT SERPL: 16 (ref 12–20)
CALCIUM SERPL-MCNC: 9.2 MG/DL (ref 8.5–10.1)
CHLORIDE SERPL-SCNC: 104 MMOL/L (ref 100–108)
CO2 SERPL-SCNC: 32 MMOL/L (ref 21–32)
CREAT SERPL-MCNC: 1.2 MG/DL (ref 0.6–1.3)
D DIMER PPP FEU-MCNC: 0.86 UG/ML(FEU)
DIFFERENTIAL METHOD BLD: ABNORMAL
EOSINOPHIL # BLD: 0.5 K/UL (ref 0–0.4)
EOSINOPHIL NFR BLD: 5 % (ref 0–5)
ERYTHROCYTE [DISTWIDTH] IN BLOOD BY AUTOMATED COUNT: 20.9 % (ref 11.6–14.5)
GLUCOSE SERPL-MCNC: 200 MG/DL (ref 74–99)
HCT VFR BLD AUTO: 45.1 % (ref 36–48)
HGB BLD-MCNC: 13.9 G/DL (ref 13–16)
INR PPP: 1.1 (ref 0.8–1.2)
LACTATE BLD-SCNC: 1 MMOL/L (ref 0.4–2)
LYMPHOCYTES # BLD: 1.8 K/UL (ref 0.9–3.6)
LYMPHOCYTES NFR BLD: 17 % (ref 21–52)
MCH RBC QN AUTO: 22.5 PG (ref 24–34)
MCHC RBC AUTO-ENTMCNC: 30.8 G/DL (ref 31–37)
MCV RBC AUTO: 73.1 FL (ref 74–97)
MONOCYTES # BLD: 1.6 K/UL (ref 0.05–1.2)
MONOCYTES NFR BLD: 15 % (ref 3–10)
NEUTS SEG # BLD: 6.4 K/UL (ref 1.8–8)
NEUTS SEG NFR BLD: 62 % (ref 40–73)
PLATELET # BLD AUTO: 361 K/UL (ref 135–420)
PLATELET COMMENTS,PCOM: ABNORMAL
PMV BLD AUTO: 10 FL (ref 9.2–11.8)
POTASSIUM SERPL-SCNC: 4.4 MMOL/L (ref 3.5–5.5)
PROTHROMBIN TIME: 13.4 SEC (ref 11.5–15.2)
RBC # BLD AUTO: 6.17 M/UL (ref 4.7–5.5)
RBC MORPH BLD: ABNORMAL
SODIUM SERPL-SCNC: 139 MMOL/L (ref 136–145)
WBC # BLD AUTO: 10.4 K/UL (ref 4.6–13.2)

## 2017-10-23 RX ORDER — SODIUM CHLORIDE 0.9 % (FLUSH) 0.9 %
5-10 SYRINGE (ML) INJECTION AS NEEDED
Status: DISCONTINUED | OUTPATIENT
Start: 2017-10-23 | End: 2017-10-24 | Stop reason: HOSPADM

## 2017-10-23 RX ORDER — SODIUM CHLORIDE 0.9 % (FLUSH) 0.9 %
5-10 SYRINGE (ML) INJECTION EVERY 8 HOURS
Status: DISCONTINUED | OUTPATIENT
Start: 2017-10-23 | End: 2017-10-24 | Stop reason: HOSPADM

## 2017-10-23 RX ORDER — SULFAMETHOXAZOLE AND TRIMETHOPRIM 800; 160 MG/1; MG/1
1 TABLET ORAL ONCE
Status: COMPLETED | OUTPATIENT
Start: 2017-10-23 | End: 2017-10-23

## 2017-10-23 RX ORDER — BACITRACIN ZINC 500 UNIT/G
OINTMENT (GRAM) TOPICAL
Status: COMPLETED | OUTPATIENT
Start: 2017-10-23 | End: 2017-10-24

## 2017-10-23 RX ORDER — TRAMADOL HYDROCHLORIDE 50 MG/1
50 TABLET ORAL
Status: COMPLETED | OUTPATIENT
Start: 2017-10-23 | End: 2017-10-23

## 2017-10-23 RX ORDER — SULFAMETHOXAZOLE AND TRIMETHOPRIM 800; 160 MG/1; MG/1
1 TABLET ORAL 2 TIMES DAILY
Qty: 14 TAB | Refills: 0 | Status: SHIPPED | OUTPATIENT
Start: 2017-10-23 | End: 2017-10-30

## 2017-10-23 RX ADMIN — TRAMADOL HYDROCHLORIDE 50 MG: 50 TABLET, FILM COATED ORAL at 21:44

## 2017-10-23 RX ADMIN — SULFAMETHOXAZOLE AND TRIMETHOPRIM 1 TABLET: 800; 160 TABLET ORAL at 21:44

## 2017-10-23 RX ADMIN — CEFAZOLIN SODIUM 1 G: 1 INJECTION, POWDER, FOR SOLUTION INTRAMUSCULAR; INTRAVENOUS at 21:45

## 2017-10-23 NOTE — ED TRIAGE NOTES
Pt states he has a diabetic wound on his foot that continued to bleed and he is on blood thinners, he also reports increased leg swelling and has noted redness and pain in the foot. Leg is warm to touch.

## 2017-10-24 ENCOUNTER — HOSPITAL ENCOUNTER (INPATIENT)
Age: 82
LOS: 6 days | Discharge: NURSING, REHAB OR LONG-TERM CARE | DRG: 602 | End: 2017-10-30
Attending: EMERGENCY MEDICINE | Admitting: HOSPITALIST
Payer: MEDICARE

## 2017-10-24 ENCOUNTER — HOSPITAL ENCOUNTER (EMERGENCY)
Dept: CT IMAGING | Age: 82
DRG: 602 | End: 2017-10-24
Attending: PHYSICIAN ASSISTANT
Payer: MEDICARE

## 2017-10-24 VITALS
BODY MASS INDEX: 28.63 KG/M2 | SYSTOLIC BLOOD PRESSURE: 168 MMHG | TEMPERATURE: 97.6 F | HEIGHT: 70 IN | HEART RATE: 71 BPM | RESPIRATION RATE: 20 BRPM | WEIGHT: 200 LBS | OXYGEN SATURATION: 97 % | DIASTOLIC BLOOD PRESSURE: 75 MMHG

## 2017-10-24 DIAGNOSIS — E11.65 POORLY CONTROLLED DIABETES MELLITUS (HCC): ICD-10-CM

## 2017-10-24 DIAGNOSIS — F03.91 DEMENTIA WITH BEHAVIORAL DISTURBANCE, UNSPECIFIED DEMENTIA TYPE: ICD-10-CM

## 2017-10-24 DIAGNOSIS — T14.8XXA OPEN WOUND: Primary | ICD-10-CM

## 2017-10-24 DIAGNOSIS — L03.115 CELLULITIS OF RIGHT LOWER EXTREMITY: ICD-10-CM

## 2017-10-24 PROBLEM — L03.119 CELLULITIS, LEG: Status: ACTIVE | Noted: 2017-10-24

## 2017-10-24 LAB
ALBUMIN SERPL-MCNC: 3.6 G/DL (ref 3.4–5)
ALBUMIN/GLOB SERPL: 1.1 {RATIO} (ref 0.8–1.7)
ALP SERPL-CCNC: 129 U/L (ref 45–117)
ALT SERPL-CCNC: 26 U/L (ref 16–61)
ANION GAP SERPL CALC-SCNC: 5 MMOL/L (ref 3–18)
APPEARANCE UR: CLEAR
AST SERPL-CCNC: 30 U/L (ref 15–37)
BASOPHILS # BLD: 0 K/UL (ref 0–0.06)
BASOPHILS NFR BLD: 0 % (ref 0–2)
BILIRUB SERPL-MCNC: 1.4 MG/DL (ref 0.2–1)
BILIRUB UR QL: NEGATIVE
BUN SERPL-MCNC: 19 MG/DL (ref 7–18)
BUN/CREAT SERPL: 14 (ref 12–20)
CALCIUM SERPL-MCNC: 8.9 MG/DL (ref 8.5–10.1)
CHLORIDE SERPL-SCNC: 101 MMOL/L (ref 100–108)
CO2 SERPL-SCNC: 31 MMOL/L (ref 21–32)
COLOR UR: YELLOW
CREAT SERPL-MCNC: 1.36 MG/DL (ref 0.6–1.3)
DIFFERENTIAL METHOD BLD: ABNORMAL
EOSINOPHIL # BLD: 0 K/UL (ref 0–0.4)
EOSINOPHIL NFR BLD: 0 % (ref 0–5)
EPITH CASTS URNS QL MICRO: NORMAL /LPF (ref 0–5)
ERYTHROCYTE [DISTWIDTH] IN BLOOD BY AUTOMATED COUNT: 20.2 % (ref 11.6–14.5)
GLOBULIN SER CALC-MCNC: 3.4 G/DL (ref 2–4)
GLUCOSE SERPL-MCNC: 215 MG/DL (ref 74–99)
GLUCOSE UR STRIP.AUTO-MCNC: 250 MG/DL
HCT VFR BLD AUTO: 43.9 % (ref 36–48)
HGB BLD-MCNC: 13.9 G/DL (ref 13–16)
HGB UR QL STRIP: ABNORMAL
KETONES UR QL STRIP.AUTO: NEGATIVE MG/DL
LACTATE BLD-SCNC: 1.5 MMOL/L (ref 0.4–2)
LEUKOCYTE ESTERASE UR QL STRIP.AUTO: NEGATIVE
LYMPHOCYTES # BLD: 0.1 K/UL (ref 0.9–3.6)
LYMPHOCYTES NFR BLD: 1 % (ref 21–52)
MCH RBC QN AUTO: 22.6 PG (ref 24–34)
MCHC RBC AUTO-ENTMCNC: 31.7 G/DL (ref 31–37)
MCV RBC AUTO: 71.3 FL (ref 74–97)
MONOCYTES # BLD: 0.7 K/UL (ref 0.05–1.2)
MONOCYTES NFR BLD: 6 % (ref 3–10)
NEUTS SEG # BLD: 11.4 K/UL (ref 1.8–8)
NEUTS SEG NFR BLD: 93 % (ref 40–73)
NITRITE UR QL STRIP.AUTO: NEGATIVE
PH UR STRIP: 7 [PH] (ref 5–8)
PLATELET # BLD AUTO: 346 K/UL (ref 135–420)
PLATELET COMMENTS,PCOM: ABNORMAL
PMV BLD AUTO: 9.8 FL (ref 9.2–11.8)
POTASSIUM SERPL-SCNC: 4.5 MMOL/L (ref 3.5–5.5)
PROT SERPL-MCNC: 7 G/DL (ref 6.4–8.2)
PROT UR STRIP-MCNC: ABNORMAL MG/DL
RBC # BLD AUTO: 6.16 M/UL (ref 4.7–5.5)
RBC #/AREA URNS HPF: NORMAL /HPF (ref 0–5)
RBC MORPH BLD: ABNORMAL
SODIUM SERPL-SCNC: 137 MMOL/L (ref 136–145)
SP GR UR REFRACTOMETRY: 1.02 (ref 1–1.03)
UROBILINOGEN UR QL STRIP.AUTO: 1 EU/DL (ref 0.2–1)
WBC # BLD AUTO: 12.2 K/UL (ref 4.6–13.2)
WBC URNS QL MICRO: NORMAL /HPF (ref 0–4)

## 2017-10-24 PROCEDURE — 83605 ASSAY OF LACTIC ACID: CPT

## 2017-10-24 PROCEDURE — 74011000250 HC RX REV CODE- 250: Performed by: PHYSICIAN ASSISTANT

## 2017-10-24 PROCEDURE — 96365 THER/PROPH/DIAG IV INF INIT: CPT

## 2017-10-24 PROCEDURE — 81001 URINALYSIS AUTO W/SCOPE: CPT

## 2017-10-24 PROCEDURE — 96361 HYDRATE IV INFUSION ADD-ON: CPT

## 2017-10-24 PROCEDURE — 85025 COMPLETE CBC W/AUTO DIFF WBC: CPT | Performed by: EMERGENCY MEDICINE

## 2017-10-24 PROCEDURE — 99285 EMERGENCY DEPT VISIT HI MDM: CPT

## 2017-10-24 PROCEDURE — 80053 COMPREHEN METABOLIC PANEL: CPT | Performed by: EMERGENCY MEDICINE

## 2017-10-24 PROCEDURE — 74011000258 HC RX REV CODE- 258: Performed by: PHYSICIAN ASSISTANT

## 2017-10-24 PROCEDURE — 87086 URINE CULTURE/COLONY COUNT: CPT

## 2017-10-24 PROCEDURE — 74011250636 HC RX REV CODE- 250/636: Performed by: PHYSICIAN ASSISTANT

## 2017-10-24 PROCEDURE — 96372 THER/PROPH/DIAG INJ SC/IM: CPT

## 2017-10-24 PROCEDURE — 65270000029 HC RM PRIVATE

## 2017-10-24 PROCEDURE — 87040 BLOOD CULTURE FOR BACTERIA: CPT

## 2017-10-24 RX ADMIN — BACITRACIN ZINC: 500 OINTMENT TOPICAL at 00:16

## 2017-10-24 RX ADMIN — SODIUM CHLORIDE 500 ML: 900 INJECTION, SOLUTION INTRAVENOUS at 19:19

## 2017-10-24 RX ADMIN — WATER 10 MG: 1 INJECTION INTRAMUSCULAR; INTRAVENOUS; SUBCUTANEOUS at 19:54

## 2017-10-24 RX ADMIN — SODIUM CHLORIDE 1000 MG: 900 INJECTION, SOLUTION INTRAVENOUS at 22:24

## 2017-10-24 RX ADMIN — CEFTRIAXONE 1 G: 1 INJECTION, POWDER, FOR SOLUTION INTRAMUSCULAR; INTRAVENOUS at 21:00

## 2017-10-24 NOTE — ED NOTES
Diet Ginger Ale given per patient request and with provider approval.  Patient resting in bed. Warm blankets provided. No s/sx of distress noted.

## 2017-10-24 NOTE — IP AVS SNAPSHOT
Gia Taylor 
 
 
 [address] Patient: Julissa Perez. MRN: [id number] :1935 About your hospitalization You were admitted on:  2017 You last received care in the:  [facility] [address] You were discharged on:  2017 Why you were hospitalized Your primary diagnosis was:  Not on File Your diagnoses also included:  Open Wound, Cellulitis, Leg, Poorly Controlled Diabetes Mellitus (Hcc) Things You Need To Do (next 8 weeks) Follow up with Merrick Montague MD  
  
Phone:  [phone] Where:  [facility],  [facility], [address] [facility] [address]  TRANSITIONAL CARE MANAGEMENT with Alicia Ortiz MD at 12:15 PM  
Where:  [address] ([address] [place] [address])  Office Visit with VIVEK Bliss at 10:00 AM  
Where: [facility] ([address] [place] [address]) Discharge Orders None A check montserrat indicates which time of day the medication should be taken. My Medications STOP taking these medications buPROPion  mg SR tablet Commonly known as:  WELLBUTRIN SR  
   
  
 potassium chloride 10 mEq tablet Commonly known as:  KLOR-CON  
   
  
 trimethoprim-sulfamethoxazole 160-800 mg per tablet Commonly known as:  BACTRIM DS  
   
  
  
TAKE these medications as instructed Instructions Each Dose to Equal  
 Morning Noon Evening Bedtime  
 acetaminophen 500 mg tablet Commonly known as:  TYLENOL Your last dose was: Your next dose is: Take 1 Tab by mouth every six (6) hours as needed. 500 mg  
    
   
   
   
  
 amoxicillin-clavulanate 875-125 mg per tablet Commonly known as:  AUGMENTIN Your last dose was: Your next dose is: Take 1 Tab by mouth every twelve (12) hours for 7 days. 1 Tab  
    
   
   
   
  
 aspirin delayed-release 81 mg tablet Your last dose was: Your next dose is: Take 1 Tab by mouth daily. 81 mg CALTRATE 600 PO Your last dose was: Your next dose is: Take 1 Tab by mouth daily. 1 Tab CENTRUM SILVER PO Your last dose was: Your next dose is: Take 1 Tab by mouth daily. 1 Tab COMPOUNDMAX BASE Crea Generic drug:  cream base no. 171 (bulk) Your last dose was: Your next dose is:    
   
   
 240 g by Does Not Apply route four (4) times daily. Anti-Inflam Meloxicam 0.09% Topirmate 1% Methocarbamol 2%  Lidocaine 2% Prilocaine 2%  
 240 g  
    
   
   
   
  
 cycloSPORINE 0.05 % ophthalmic emulsion Commonly known as:  RESTASIS Your last dose was: Your next dose is:    
   
   
 Administer 1 Drop to both eyes every twelve (12) hours. 1 Drop  
    
   
   
   
  
 furosemide 40 mg tablet Commonly known as:  LASIX Your last dose was: Your next dose is:    
   
   
 1 qam  
     
   
   
   
  
 gabapentin 300 mg capsule Commonly known as:  NEURONTIN Your last dose was: Your next dose is: Take 1 Cap by mouth daily. Indications: NEUROPATHIC PAIN, RESTLESS LEGS SYNDROME  
 300 mg  
    
   
   
   
  
 isosorbide mononitrate ER 30 mg tablet Commonly known as:  IMDUR Your last dose was: Your next dose is: TAKE 1 TAB BY MOUTH EVERY MORNING. Lactobacillus Acidoph & Bulgar 1 million cell Tab tablet Commonly known as:  Verta Hood Your last dose was: Your next dose is: Take 2 Tabs by mouth two (2) times a day for 10 days. 2 Tab LANTUS SOLOSTAR 100 unit/mL (3 mL) Inpn Generic drug:  insulin glargine Your last dose was: Your next dose is:    
   
   
 30 Units by SubCUTAneous route nightly. 30 units 30 Units  
    
   
   
   
  
 metoprolol succinate 50 mg XL tablet Commonly known as:  TOPROL-XL Your last dose was: Your next dose is: TAKE 1 TABLET BY MOUTH ONCE DAILY Justina Pen Needle 32 gauge x 5/32\" Ndle Generic drug:  Insulin Needles (Disposable) Your last dose was: Your next dose is:    
   
   
 Use as directed. OTHER Your last dose was: Your next dose is:    
   
   
 Incentive spirometry- Use as directed  
     
   
   
   
  
 polyvinyl alcohol 1.4 % ophthalmic solution Commonly known as:  Shelvia Matar Your last dose was: Your next dose is:    
   
   
 Administer 1 Drop to both eyes as needed for up to 30 days. 1 Drop  
    
   
   
   
  
 rosuvastatin 10 mg tablet Commonly known as:  CRESTOR Your last dose was: Your next dose is: Take 1 Tab by mouth nightly. 10 mg  
    
   
   
   
  
 solifenacin 5 mg tablet Commonly known as:  VESIcare Your last dose was: Your next dose is: Take 1 Tab by mouth daily. Indications: BLADDER HYPERACTIVITY, INCREASED URINARY FREQUENCY, URINARY URGE INCONTINENCE, URINARY URGENCY  
 5 mg Where to Get Your Medications These medications were sent to [address] [place] [address], [address] [place] [address],[address] [address] Hours:  24-hours Phone:  [phone]  
  acetaminophen 500 mg tablet  
 aspirin delayed-release 81 mg tablet  
 cycloSPORINE 0.05 % ophthalmic emulsion Lactobacillus Acidoph & Bulgar 1 million cell Tab tablet  
 polyvinyl alcohol 1.4 % ophthalmic solution Information on where to get these meds will be given to you by the nurse or doctor. ! Ask your nurse or doctor about these medications  
  amoxicillin-clavulanate 875-125 mg per tablet OTHER Discharge Instructions Diet- Cardiac, Diabetic, chopped meats Glucerna , 1 can PO BID Activity - as tolerated FALL PRECAUTIONS 
ASPIRATION PRECAUTIONS DECUBITUS PRECAUTIONS Seizure precautions Incentive Spirometry Q30 minutes when awake PT, OT Evaluate and Treat Check CBC, CMP, Mg in 3 days Wound care as directed F/u with PCP in 1 week F/u with Vascular Surgeon in 10 days ACO Transitions of Care Introducing [employer] [address] Rocio Navarro offers a voluntary care coordination program to provide high quality service and care to [facility] fee-for-service beneficiaries. Mariel Jaime was designed to help you enhance your health and well-being through the following services: ? Transitions of Care  support for individuals who are transitioning from one care setting to another (example: Hospital to home). ? Chronic and Complex Care Coordination  support for individuals and caregivers of those with serious or chronic illnesses or with more than one chronic (ongoing) condition and those who take a number of different medications. If you meet specific medical criteria, a [facility] [address] may call you directly to coordinate your care with your primary care physician and your other care providers. For questions about the [facility] programs, please, contact your physicians office. For general questions or additional information about Accountable Care Organizations: 
Please visit www.medicare.gov/acos. html or call 1-800-MEDICARE (4-724-968--401-454-0533) TTY users should call 1-601.123.3338. [employer] Announcement We are excited to announce that we are making your provider's discharge notes available to you in [employer].   You will see these notes when they are completed and signed by the physician that discharged you from your recent hospital stay. If you have any questions or concerns about any information you see in shoutr, please call the Health Information Department where you were seen or reach out to your Primary Care Provider for more information about your plan of care. Introducing [facility] SERVICES! Rigoberto Salomon introduces shoutr patient portal. Now you can access parts of your medical record, email your doctor's office, and request medication refills online. 1. In your internet browser, go to [email or web address] 2. Click on the First Time User? Click Here link in the Sign In box. You will see the New Member Sign Up page. 3. Enter your shoutr Access Code exactly as it appears below. You will not need to use this code after youve completed the sign-up process. If you do not sign up before the expiration date, you must request a new code. · shoutr Access Code: [email or web address] Expires: 12/25/2017  3:50 PM 
 
4. Enter the last four digits of your Social Security Number (xxxx) and Date of Birth (mm/dd/yyyy) as indicated and click Submit. You will be taken to the next sign-up page. 5. Create a shoutr ID. This will be your shoutr login ID and cannot be changed, so think of one that is secure and easy to remember. 6. Create a shoutr password. You can change your password at any time. 7. Enter your Password Reset Question and Answer. This can be used at a later time if you forget your password. 8. Enter your e-mail address. You will receive e-mail notification when new information is available in [address] 9. Click Sign Up. You can now view and download portions of your medical record. 10. Click the Download Summary menu link to download a portable copy of your medical information.  
 
If you have questions, please visit the Frequently Asked Questions section of the [employer]. Remember, MyChart is NOT to be used for urgent needs. For medical emergencies, dial 911. Now available from your iPhone and Android! Providers Seen During Your Hospitalization Provider Specialty Primary office phone Carley Burnette MD Emergency Medicine [phone] Jc Camarena MD Internal Medicine [phone] Your Primary Care Physician (PCP) Primary Care Physician Office Phone Office Fax Holger Corral [phone] You are allergic to the following Allergen Reactions Amaryl (Glimepiride) Drowsiness Lipitor (Atorvastatin) Myalgia Niacin Other (comments) Facial blistering, swelling in face Pravachol (Pravastatin) Myalgia Zocor (Simvastatin) Myalgia Recent Documentation Weight BMI Smoking Status 90.5 kg 28.64 kg/m2 Former Smoker Emergency Contacts Name Discharge Info Relation Home [facility] [3] Spouse [3] [phone] [facility]  Spouse [3] [phone] Naya Tran  Spouse [3] [phone] Patient Belongings The following personal items are in your possession at time of discharge: 
  Dental Appliances: None  Visual Aid: None      Home Medications: None   Jewelry: None  Clothing: Pants, Shirt    Other Valuables: None Please provide this summary of care documentation to your next provider. Signatures-by signing, you are acknowledging that this After Visit Summary has been reviewed with you and you have received a copy. Patient Signature:  ____________________________________________________________ Date:  ____________________________________________________________  
  
Ade Loco Provider Signature:  ____________________________________________________________ Date:  ____________________________________________________________

## 2017-10-24 NOTE — ED NOTES
Per patient's family, patient began having shaking and not answering her. Episode witnessed by MD and EDT. Per MD, IV Ancef discontinued.

## 2017-10-24 NOTE — ED NOTES
Pt and spouse instructed to follow up with his PCP/neurologist/vascular specialists in the am.  Instructed to return for worsening pain/redness/fever/swelling/other concerns. Wound care twice daily recommended with use of antibiotic ointment and occlusive dressings after gentle soap/water wash (no soaking). Pt affect calm, respirations regular/non labored. No distress noted.

## 2017-10-24 NOTE — ED NOTES
Patient noted to have episode of urinary incontinence on return from CT. Patient wearing depends. Linens and brief changed. Patient cleaned of urinary incontinence. Patient's skin noted to be flaky and dry to back and abdomen. No s/sx of pressure ulcers to buttocks. Patient repositioned in bed for comfort. Absorbent bag placed under patient's buttocks and legs. Incontinence brief in place. Patient noted to have saturated prior incontinence brief. Wife remains at bedside.

## 2017-10-24 NOTE — ED NOTES
Report received/care assumed. Pt is awake/alert/conversant; affect is calm, respirations regular/non labored, and skin warm/dry. Adult non verbal pain scale 0; pt does report pain but does not quantify pain at this time. No distress noted at this time. Rails up x 2 with call light in reach.

## 2017-10-24 NOTE — ED TRIAGE NOTES
EMS states that pt's wife stated that pt hd an episode of confusion today. Pt has dementia and is back to baseline according to wife. Pt was seen here yesterday for wound to right lower leg.

## 2017-10-24 NOTE — ED NOTES
Bedside and Verbal shift change report given to [address] [place] [address] (oncoming nurse) by Patria Cohen RN (offgoing nurse).  Report included the following information SBAR, ED Summary, Procedure Summary, Intake/Output, MAR, Recent Results and Cardiac Rhythm SR.

## 2017-10-24 NOTE — IP AVS SNAPSHOT
[address] Patient: Teressa Aaron. MRN: [id number] :1935 My Medications STOP taking these medications buPROPion  mg SR tablet Commonly known as:  WELLBUTRIN SR  
   
  
 potassium chloride 10 mEq tablet Commonly known as:  KLOR-CON  
   
  
 trimethoprim-sulfamethoxazole 160-800 mg per tablet Commonly known as:  BACTRIM DS  
   
  
  
TAKE these medications as instructed Instructions Each Dose to Equal  
 Morning Noon Evening Bedtime  
 acetaminophen 500 mg tablet Commonly known as:  TYLENOL Your last dose was: Your next dose is: Take 1 Tab by mouth every six (6) hours as needed. 500 mg  
    
   
   
   
  
 amoxicillin-clavulanate 875-125 mg per tablet Commonly known as:  AUGMENTIN Your last dose was: Your next dose is: Take 1 Tab by mouth every twelve (12) hours for 7 days. 1 Tab  
    
   
   
   
  
 aspirin delayed-release 81 mg tablet Your last dose was: Your next dose is: Take 1 Tab by mouth daily. 81 mg CALTRATE 600 PO Your last dose was: Your next dose is: Take 1 Tab by mouth daily. 1 Tab CENTRUM SILVER PO Your last dose was: Your next dose is: Take 1 Tab by mouth daily. 1 Tab COMPOUNDMAX BASE Crea Generic drug:  cream base no. 171 (bulk) Your last dose was: Your next dose is:    
   
   
 240 g by Does Not Apply route four (4) times daily. Anti-Inflam Meloxicam 0.09% Topirmate 1% Methocarbamol 2%  Lidocaine 2% Prilocaine 2%  
 240 g  
    
   
   
   
  
 cycloSPORINE 0.05 % ophthalmic emulsion Commonly known as:  RESTASIS Your last dose was: Your next dose is: Administer 1 Drop to both eyes every twelve (12) hours. 1 Drop  
    
   
   
   
  
 furosemide 40 mg tablet Commonly known as:  LASIX Your last dose was: Your next dose is:    
   
   
 1 qam  
     
   
   
   
  
 gabapentin 300 mg capsule Commonly known as:  NEURONTIN Your last dose was: Your next dose is: Take 1 Cap by mouth daily. Indications: NEUROPATHIC PAIN, RESTLESS LEGS SYNDROME  
 300 mg  
    
   
   
   
  
 isosorbide mononitrate ER 30 mg tablet Commonly known as:  IMDUR Your last dose was: Your next dose is: TAKE 1 TAB BY MOUTH EVERY MORNING. Lactobacillus Acidoph & Bulgar 1 million cell Tab tablet Commonly known as:  Jaguar [place] Your last dose was: Your next dose is: Take 2 Tabs by mouth two (2) times a day for 10 days. 2 Tab LANTUS SOLOSTAR 100 unit/mL (3 mL) Inpn Generic drug:  insulin glargine Your last dose was: Your next dose is:    
   
   
 30 Units by SubCUTAneous route nightly. 30 units 30 Units  
    
   
   
   
  
 metoprolol succinate 50 mg XL tablet Commonly known as:  TOPROL-XL Your last dose was: Your next dose is: TAKE 1 TABLET BY MOUTH ONCE DAILY Justina Pen Needle 32 gauge x 5/32\" Ndle Generic drug:  Insulin Needles (Disposable) Your last dose was: Your next dose is:    
   
   
 Use as directed. OTHER Your last dose was: Your next dose is:    
   
   
 Incentive spirometry- Use as directed  
     
   
   
   
  
 polyvinyl alcohol 1.4 % ophthalmic solution Commonly known as:  Raman Chung Your last dose was: Your next dose is:    
   
   
 Administer 1 Drop to both eyes as needed for up to 30 days. 1 Drop  
    
   
   
   
  
 rosuvastatin 10 mg tablet Commonly known as:  CRESTOR  
 Your last dose was: Your next dose is: Take 1 Tab by mouth nightly. 10 mg  
    
   
   
   
  
 solifenacin 5 mg tablet Commonly known as:  VESIcare Your last dose was: Your next dose is: Take 1 Tab by mouth daily. Indications: BLADDER HYPERACTIVITY, INCREASED URINARY FREQUENCY, URINARY URGE INCONTINENCE, URINARY URGENCY  
 5 mg Where to Get Your Medications These medications were sent to [address] [place] [address], [facility] [place], [place],[address] [place] [address] Hours:  24-hours Phone:  [phone]  
  acetaminophen 500 mg tablet  
 aspirin delayed-release 81 mg tablet  
 cycloSPORINE 0.05 % ophthalmic emulsion Lactobacillus Acidoph & Bulgar 1 million cell Tab tablet  
 polyvinyl alcohol 1.4 % ophthalmic solution Information on where to get these meds will be given to you by the nurse or doctor. ! Ask your nurse or doctor about these medications  
  amoxicillin-clavulanate 875-125 mg per tablet  OTHER

## 2017-10-24 NOTE — ED NOTES
Patient's spouse advises that patient sustained a fall and stuck his head on October 13th. Provider made aware of her statement.

## 2017-10-24 NOTE — ED PROVIDER NOTES
HPI Comments: Lis Hayden. is a [de-identified] y.o. male with hx of hypercholesterolemia, DM, dyslipidemia, HTN, carotid stenosis, CKD, venous peripheral insufficiency, CAD, CVA, CABG, skin cancer, arthritis, and dementia presenting to the ED with c/o worsening redness and swelling to right lower leg. Pt poor historian due to dementia. Wife at bedside assisting with hx. Wife states pt's dementia has worsened since CABG in September 2016. Wife states sx have been ongoing for the past couple of months, however last night pt was scratching the area and pulled a piece of skin, reports area began bleeding and bled for 45 minutes. Pt has an open wound on his right lower leg that has been there for the past couple of months. Pt denies fever, nausea, vomiting or diarrhea. Associated sx include bilateral leg swelling. Severity is 3/10. Pt is complaint with his medications, report he takes Lantus and Plavix. Pt is currently not taking any abx. Pt has no other sx or complaints. PCP: Allyson Bajwa MD         Past Medical History:   Diagnosis Date    Angina     Anxiety     Arthritis     CAD (coronary artery disease)     s/p drug-eluting stents to RCA for high-grade stenoses in 2/09    Cardiac catheterization 08/24/2016    Mod calcification. Co-dom. oLM 50-70%. LAD 30%. Dx 30%. Cx 70% focal.  OM 80% focal.  RCA 30%.  Cardiac echocardiogram 08/23/2016    Tech difficult. EF 55-60%. No WMA. Normal diastolic fx. Lipomatous septal hypertrophy.  Cardiac nuclear imaging test, abnormal 08/23/2016    Sm reversible inferior defect concerning for ischemia. Mild inferior hypk. EF 68%. Neg EKG on pharm stress test.    Carotid duplex 08/26/2016    Mild <50% CYRUS stenosis. Chronic LICA occlusion. Similar to study of 3/29/16.     Carotid stenosis (HCC)     w/ known total occlusion of lt internal carotid artery; followed by pts vascular surgeon    Chronic kidney disease     COPD (ch[facility] obstructive [facility] disease) ([facility]     Dementia     Depression     Diabetes ([facility] [address]     type 2    Dyslipidemia     on Crestor; managed by pts PCP    Dyspnea     Heart disease     Hypercholesterolemia     Hypertension     Low back pain     Osteoarthrosis involving multiple sites     Skin cancer ([facility] [address]     squamous cell lesions of the skin    Stroke ([facility] [address] [facility]     Venous (peripheral) insufficiency        Past Surgical History:   Procedure Laterality Date    CARDIAC CATHETERIZATION  8/24/2016         CORONARY ARTERY ANGIOGRAM  8/24/2016         HX ANGIOPLASTY  2009    2 stents placed and heart attack during the procedure    HX COLONOSCOPY  10/2003    neg; declines f/u    HX CORONARY ARTERY BYPASS GRAFT  09/2016    HX HEART CATHETERIZATION  5/2013    HX HERNIA REPAIR      1970s    HX OTHER SURGICAL      groin surgery    HX TONSIL AND ADENOIDECTOMY      IR INTRAVASCULAR ULTRASOUND INITIAL  8/24/2016         LV ANGIOGRAPHY  8/24/2016              Family History:   Problem Relation Age of Onset    Parkinsonism Mother     Hypertension Mother     Cancer Father      lung    Heart defect Brother        Social History     Social History    Marital status:      Spouse name: N/A    Number of children: N/A    Years of education: N/A     Occupational History    Not on file. Social History Main Topics    Smoking status: Former Smoker     Years: 2.00     Quit date: 1/1/1955    Smokeless tobacco: Never Used    Alcohol use Yes      Comment: drinks weekly couple beers    Drug use: No    Sexual activity: No     Other Topics Concern    Not on file     Social History Narrative         ALLERGIES: Amaryl [glimepiride]; Lipitor [atorvastatin]; Niacin; Pravachol [pravastatin]; and Zocor [simvastatin]    Review of Systems   Constitutional: Negative for diaphoresis and fever. HENT: Negative for congestion. Respiratory: Negative for cough and shortness of breath.     Cardiovascular: Positive for leg swelling (Bilateral leg swelling ). Negative for chest pain. Gastrointestinal: Negative for abdominal pain, diarrhea, nausea and vomiting. Musculoskeletal: Negative for back pain. Skin: Positive for wound (Open wound on lower right leg ). Negative for rash. Redness to his right lower leg    Neurological: Negative for dizziness. All other systems reviewed and are negative. Vitals:    10/23/17 2200 10/23/17 2245 10/23/17 2311 10/23/17 2350   BP: 189/76   168/75   Pulse: 72 70 70 71   Resp: 19 15 14 20   Temp:       SpO2: 100% 100% 97%    Weight:       Height:                Physical Exam   Constitutional: He appears well-developed and well-nourished. HENT:   Head: Normocephalic and atraumatic. Eyes: Pupils are equal, round, and reactive to light. Neck: Neck supple. Cardiovascular: Normal rate. No murmur heard. Pulmonary/Chest: Effort normal. He has no wheezes. Abdominal: Soft. There is no tenderness. Musculoskeletal: He exhibits no tenderness. Right mid lower extremity erythema with 2+ edema   No warmth    Neurological: He is alert. A&Ox2   Neurovascularly intact    Skin: No pallor. 2x4 cm open wound on interior calf with no drainage    Nursing note and vitals reviewed. [facility]  ED Course       Procedures    Vitals:  No data found.         Medications ordered:   Medications   ceFAZolin (ANCEF) 1 g in 0.9% sodium chloride (MBP/ADV) 50 mL MBP (0 g IntraVENous Stopped 10/23/17 2201)   trimethoprim-sulfamethoxazole (BACTRIM DS, SEPTRA DS) 160-800 mg per tablet 1 Tab (1 Tab Oral Given 10/23/17 2144)   traMADol (ULTRAM) tablet 50 mg (50 mg Oral Given 10/23/17 2144)   bacitracin zinc (BACITRACIN) 500 unit/gram ointment ( Topical Given 10/24/17 0016)         Lab findings:  Recent Results (from the past 12 hour(s))   CBC WITH AUTOMATED DIFF    Collection Time: 10/24/17  6:10 PM   Result Value Ref Range    WBC 12.2 4.6 - 13.2 K/uL    RBC 6.16 (H) 4.70 - 5.50 M/uL    HGB 13.9 13.0 - 16.0 g/dL    HCT 43.9 36.0 - 48.0 %    MCV 71.3 (L) 74.0 - 97.0 FL    MCH 22.6 (L) 24.0 - 34.0 PG    MCHC 31.7 31.0 - 37.0 g/dL    RDW 20.2 (H) 11.6 - 14.5 %    PLATELET 771 262 - 445 K/uL    MPV 9.8 9.2 - 11.8 FL    NEUTROPHILS 93 (H) 40 - 73 %    LYMPHOCYTES 1 (L) 21 - 52 %    MONOCYTES 6 3 - 10 %    EOSINOPHILS 0 0 - 5 %    BASOPHILS 0 0 - 2 %    ABS. NEUTROPHILS 11.4 (H) 1.8 - 8.0 K/UL    ABS. LYMPHOCYTES 0.1 (L) 0.9 - 3.6 K/UL    ABS. MONOCYTES 0.7 0.05 - 1.2 K/UL    ABS. EOSINOPHILS 0.0 0.0 - 0.4 K/UL    ABS. BASOPHILS 0.0 0.0 - 0.06 K/UL    DF AUTOMATED      PLATELET COMMENTS ADEQUATE PLATELETS      RBC COMMENTS ANISOCYTOSIS  2+       METABOLIC PANEL, COMPREHENSIVE    Collection Time: 10/24/17  6:10 PM   Result Value Ref Range    Sodium 137 136 - 145 mmol/L    Potassium 4.5 3.5 - 5.5 mmol/L    Chloride 101 100 - 108 mmol/L    CO2 31 21 - 32 mmol/L    Anion gap 5 3.0 - 18 mmol/L    Glucose 215 (H) 74 - 99 mg/dL    BUN 19 (H) 7.0 - 18 MG/DL    Creatinine 1.36 (H) 0.6 - 1.3 MG/DL    BUN/Creatinine ratio 14 12 - 20      GFR est AA >60 >60 ml/min/1.73m2    GFR est non-AA 50 (L) >60 ml/min/1.73m2    Calcium 8.9 8.5 - 10.1 MG/DL    Bilirubin, total 1.4 (H) 0.2 - 1.0 MG/DL    ALT (SGPT) 26 16 - 61 U/L    AST (SGOT) 30 15 - 37 U/L    Alk.  phosphatase 129 (H) 45 - 117 U/L    Protein, total 7.0 6.4 - 8.2 g/dL    Albumin 3.6 3.4 - 5.0 g/dL    Globulin 3.4 2.0 - 4.0 g/dL    A-G Ratio 1.1 0.8 - 1.7     POC LACTIC ACID    Collection Time: 10/24/17  6:37 PM   Result Value Ref Range    Lactic Acid (POC) 1.5 0.4 - 2.0 mmol/L   URINALYSIS W/ RFLX MICROSCOPIC    Collection Time: 10/24/17  8:20 PM   Result Value Ref Range    Color YELLOW      Appearance CLEAR      Specific gravity 1.017 1.005 - 1.030      pH (UA) 7.0 5.0 - 8.0      Protein TRACE (A) NEG mg/dL    Glucose 250 (A) NEG mg/dL    Ketone NEGATIVE  NEG mg/dL    Bilirubin NEGATIVE  NEG      Blood TRACE (A) NEG      Urobilinogen 1.0 0.2 - 1.0 EU/dL    Nitrites NEGATIVE  NEG      Leukocyte Esterase NEGATIVE  NEG     URINE MICROSCOPIC ONLY    Collection Time: 10/24/17  8:20 PM   Result Value Ref Range    WBC 0 to 3 0 - 4 /hpf    RBC 4 to 10 0 - 5 /hpf    Epithelial cells FEW 0 - 5 /lpf   GLUCOSE, POC    Collection Time: 10/25/17  3:16 AM   Result Value Ref Range    Glucose (POC) 125 (H) 70 - 110 mg/dL           X-Ray, CT or other radiology findings or impressions:  CT HEAD WO CONT   1. No acute intracranial process identified. DUPLEX LOWER EXT VENOUS RIGHT   1. No evidence of deep venous thrombosis detected in the veins  visualized. 2. Deep veins visualized include the common femoral, femoral,  popliteal, posterior tibial and peroneal veins. 3. No evidence of superficial thrombosis detected. 4. Superficial veins visualized include the proximal great saphenous  vein. 5. No evidence of deep vein thrombosis in the contralateral common  femoral vein. Progress notes, Consult notes or additional Procedure notes: Worsening cellulitis, but no s/o sepsis. Worsening dementia but stable for dc and close f/u. No s/o dvt. abx given. Det ret inst given. Spouse agrees w dc plan. No emc. Verbalizes understanding of detailed ret inst given. Disposition:  Diagnosis:   1. Arthralgia of right lower leg    2. Diabetic leg ulcer ([facility] [address]        Disposition: Discharge     Follow-up Information     Follow up With Details Comments Contact Info    Maki Ledesma MD Schedule an appointment as soon as possible for a visit in 2 days  [address] [place] [address] [address] [facility] [address]      Nyasia Ch MD Schedule an appointment as soon as possible for a visit in 2 days  [facility]  [phone]             Discharge Medication List as of 10/23/2017 11:26 PM      START taking these medications    Details   trimethoprim-sulfamethoxazole (BACTRIM DS) 160-800 mg per tablet Take 1 Tab by mouth two (2) times a day for 7 days. , Print, Disp-14 Tab, R-0 CONTINUE these medications which have NOT CHANGED    Details   furosemide (LASIX) 40 mg tablet 1 qam, Normal, Disp-90 Tab, R-3      potassium chloride (KLOR-CON) 10 mEq tablet 1 every day with lasix, Normal, Disp-90 Tab, R-3      rosuvastatin (CRESTOR) 10 mg tablet Take 1 Tab by mouth nightly., Normal, Disp-90 Tab, R-2      isosorbide mononitrate ER (IMDUR) 30 mg tablet TAKE 1 TAB BY MOUTH EVERY MORNING., Historical Med, R-3      metoprolol succinate (TOPROL-XL) 50 mg XL tablet TAKE 1 TABLET BY MOUTH ONCE DAILY, Normal, Disp-90 Tab, R-3      solifenacin (VESICARE) 5 mg tablet Take 1 Tab by mouth daily. Indications: BLADDER HYPERACTIVITY, INCREASED URINARY FREQUENCY, URINARY URGE INCONTINENCE, URINARY URGENCY, Normal, Disp-90 Tab, R-6      aspirin delayed-release 81 mg tablet Take 1 Tab by mouth daily. , No Print, Disp-30 Tab, R-2      acetaminophen (TYLENOL) 325 mg tablet Take 2 Tabs by mouth every four (4) hours as needed., No Print, Disp-100 Tab, R-2      COMPOUNDMAX BASE crea 240 g by Does Not Apply route four (4) times daily. Anti-Inflam Meloxicam 0.09% Topirmate 1% Methocarbamol 2%  Lidocaine 2% Prilocaine 2%, Normal, Disp-240 g, R-3, TEODORA      buPROPion SR (WELLBUTRIN SR) 100 mg SR tablet 1 bid, No Print, Disp-180 Tab, R-0      SHAWN PEN NEEDLE 32 x 5/32 \" ndle Use as directed., Historical Med, R-3      gabapentin (NEURONTIN) 300 mg capsule Take 1 Cap by mouth daily. Indications: NEUROPATHIC PAIN, RESTLESS LEGS SYNDROME, Normal, Disp-90 Cap, R-2      insulin glargine (LANTUS SOLOSTAR) 100 unit/mL (3 mL) pen 30 Units by SubCUTAneous route nightly. 30 units , Historical Med      MULTIVITAMINS W-MINERALS/LUT (CENTRUM SILVER PO) Take 1 Tab by mouth daily. , Historical Med      CALCIUM CARBONATE (CALTRATE 600 PO) Take 1 Tab by mouth daily. , Historical Med               Scribe Attestation      Christine Morel acting as a scribe for and in the presence of Jacob Infante MD      October 23, 2017 at 8:18 PM       Provider Attestation:      I personally performed the services described in the documentation, reviewed the documentation, as recorded by the scribe in my presence, and it accurately and completely records my words and actions.  October 23, 2017 at 8:18 PM - Ysabel Yanez MD

## 2017-10-24 NOTE — PROCEDURES
[facility]  *** FINAL REPORT ***    Name: Destinee Soto  MRN: [id number]  : 1935  HIS Order #: [id number]  [id number] [facility] Visit #: [id number]  Date: 23 Oct 2017    TYPE OF TEST: Peripheral Venous Testing    REASON FOR TEST  Pain in limb, Limb swelling    Right Leg:-  Deep venous thrombosis:           No  Superficial venous thrombosis:    No  Deep venous insufficiency:        Not examined  Superficial venous insufficiency: Not examined      INTERPRETATION/FINDINGS  Right leg :  Duplex images were obtained using 2-D gray scale, color flow, and  spectral Doppler analysis. 1. No evidence of deep venous thrombosis detected in the veins  visualized. 2. Deep veins visualized include the common femoral, femoral,  popliteal, posterior tibial and peroneal veins. 3. No evidence of superficial thrombosis detected. 4. Superficial veins visualized include the proximal great saphenous  vein. 5. No evidence of deep vein thrombosis in the contralateral common  femoral vein. ADDITIONAL COMMENTS    I have personally reviewed the data relevant to the interpretation of  this  study. TECHNOLOGIST: Luana Miner RVT  Signed: 10/23/2017 10:29 PM    PHYSICIAN: Alphonse Glass D.O.   Signed: 10/24/2017 01:37 PM

## 2017-10-24 NOTE — ED PROVIDER NOTES
Patient is a [de-identified] y.o. male presenting with altered mental status. Altered mental status       Curt Carey. is a [de-identified] y.o. male with hx of diabetes, CAD post stenting, CABG, Stroke currently on plavix was seen here last night due to R lower leg pain and skin tear secondary to scratching. Pt has hx of dementia and is accompanied with wife via EMS who provided much of the history. He was give Rx for bactrim which she forgot to get filled. He is hear today because per wife \" he has been acting strange for the past couple of days\" Denies of any fever, diarrhea c/o abdominal pain or urinary sx. Past Medical History:   Diagnosis Date    Angina     Anxiety     Arthritis     CAD (coronary artery disease)     s/p drug-eluting stents to RCA for high-grade stenoses in 2/09    Cardiac catheterization 08/24/2016    Mod calcification. Co-dom. oLM 50-70%. LAD 30%. Dx 30%. Cx 70% focal.  OM 80% focal.  RCA 30%.  Cardiac echocardiogram 08/23/2016    Tech difficult. EF 55-60%. No WMA. Normal diastolic fx. Lipomatous septal hypertrophy.  Cardiac nuclear imaging test, abnormal 08/23/2016    Sm reversible inferior defect concerning for ischemia. Mild inferior hypk. EF 68%. Neg EKG on pharm stress test.    Carotid duplex 08/26/2016    Mild <50% CYRUS stenosis. Chronic LICA occlusion. Similar to study of 3/29/16.     Carotid stenosis (HCC)     w/ known total occlusion of lt internal carotid artery; followed by pts vascular surgeon    Chronic kidney disease     COPD (chronic obstructive pulmonary disease) ([address]     Dementia     Depression     Diabetes ([address]     type 2    Dyslipidemia     on Crestor; managed by pts PCP    Dyspnea     Heart disease     Hypercholesterolemia     Hypertension     Low back pain     Osteoarthrosis involving multiple sites     Skin cancer ([address]     squamous cell lesions of the skin    Stroke ([address]     Venous (peripheral) insufficiency        Past Surgical History:   Procedure Laterality Date    CARDIAC CATHETERIZATION  8/24/2016         CORONARY ARTERY ANGIOGRAM  8/24/2016         HX ANGIOPLASTY  2009    2 stents placed and heart attack during the procedure    HX COLONOSCOPY  10/2003    neg; declines f/u    HX CORONARY ARTERY BYPASS GRAFT  09/2016    HX HEART CATHETERIZATION  5/2013    HX HERNIA REPAIR      1970s    HX OTHER SURGICAL      groin surgery    HX TONSIL AND ADENOIDECTOMY      IR INTRAVASCULAR ULTRASOUND INITIAL  8/24/2016         LV ANGIOGRAPHY  8/24/2016              Family History:   Problem Relation Age of Onset    Parkinsonism Mother     Hypertension Mother     Cancer Father      lung    Heart defect Brother        Social History     Social History    Marital status:      Spouse name: N/A    Number of children: N/A    Years of education: N/A     Occupational History    Not on file. Social History Main Topics    Smoking status: Former Smoker     Years: 2.00     Quit date: 1/1/1955    Smokeless tobacco: Never Used    Alcohol use Yes      Comment: drinks weekly couple beers    Drug use: No    Sexual activity: No     Other Topics Concern    Not on file     Social History Narrative         ALLERGIES: Amaryl [glimepiride]; Lipitor [atorvastatin]; Niacin; Pravachol [pravastatin]; and Zocor [simvastatin]    Review of Systems   Constitutional: Negative. HENT: Negative. Eyes: Negative. Respiratory: Negative. Cardiovascular: Negative. Gastrointestinal: Negative. Endocrine: Negative. Genitourinary: Negative. Musculoskeletal: Negative. Skin: Negative. Allergic/Immunologic: Negative. Neurological: Negative. Hematological: Negative. Vitals:    10/24/17 1806   BP: 157/63   Pulse: 92   Resp: 18   Temp: 99.1 °F (37.3 °C)   SpO2: 96%            Physical Exam   Constitutional:   Appears agitated otherwise follow command. HENT:   Head: Normocephalic and atraumatic.    Eyes: Conjunctivae and EOM are normal. Pupils are equal, round, and reactive to light. Neck: Normal range of motion. Cardiovascular: Normal rate, regular rhythm and normal heart sounds. Pulmonary/Chest: Effort normal and breath sounds normal. No respiratory distress. He has no wheezes. He has no rales. He exhibits no tenderness. Abdominal: Soft. Bowel sounds are normal. He exhibits no distension. There is no tenderness. There is no rebound and no guarding. Musculoskeletal:        Legs:  Psychiatric:   Appear agitated . MDM  Number of Diagnoses or Management Options  Cellulitis of right lower extremity:   Dementia with behavioral disturbance, unspecified dementia type:   Open wound:   Poorly controlled diabetes mellitus [facility]):   Diagnosis management comments: Pt had Ct head yesterday which was negative for ICH. Presents today with wife with increased agitation and per wife apparent seizure episodes. Increased agitation and attempting to pull IV access. Given dose of geodone 10 gm IM. Wife not able to take care for him. Will admit. Placed order for rocephin and vancomycin for soft tissue coverage. Spoke to Dr Rolando James who is the accepting physician. Explained daughter and wife on the admission. ED Course       Procedures    Recent Results (from the past 12 hour(s))   CBC WITH AUTOMATED DIFF    Collection Time: 10/24/17  6:10 PM   Result Value Ref Range    WBC 12.2 4.6 - 13.2 K/uL    RBC 6.16 (H) 4.70 - 5.50 M/uL    HGB 13.9 13.0 - 16.0 g/dL    HCT 43.9 36.0 - 48.0 %    MCV 71.3 (L) 74.0 - 97.0 FL    MCH 22.6 (L) 24.0 - 34.0 PG    MCHC 31.7 31.0 - 37.0 g/dL    RDW 20.2 (H) 11.6 - 14.5 %    PLATELET 577 816 - 028 K/uL    MPV 9.8 9.2 - 11.8 FL    NEUTROPHILS 93 (H) 40 - 73 %    LYMPHOCYTES 1 (L) 21 - 52 %    MONOCYTES 6 3 - 10 %    EOSINOPHILS 0 0 - 5 %    BASOPHILS 0 0 - 2 %    ABS. NEUTROPHILS 11.4 (H) 1.8 - 8.0 K/UL    ABS. LYMPHOCYTES 0.1 (L) 0.9 - 3.6 K/UL    ABS. MONOCYTES 0.7 0.05 - 1.2 K/UL    ABS. EOSINOPHILS 0.0 0.0 - 0.4 K/UL    ABS. BASOPHILS 0.0 0.0 - 0.06 K/UL    DF AUTOMATED      PLATELET COMMENTS ADEQUATE PLATELETS      RBC COMMENTS ANISOCYTOSIS  2+       METABOLIC PANEL, COMPREHENSIVE    Collection Time: 10/24/17  6:10 PM   Result Value Ref Range    Sodium 137 136 - 145 mmol/L    Potassium 4.5 3.5 - 5.5 mmol/L    Chloride 101 100 - 108 mmol/L    CO2 31 21 - 32 mmol/L    Anion gap 5 3.0 - 18 mmol/L    Glucose 215 (H) 74 - 99 mg/dL    BUN 19 (H) 7.0 - 18 MG/DL    Creatinine 1.36 (H) 0.6 - 1.3 MG/DL    BUN/Creatinine ratio 14 12 - 20      GFR est AA >60 >60 ml/min/1.73m2    GFR est non-AA 50 (L) >60 ml/min/1.73m2    Calcium 8.9 8.5 - 10.1 MG/DL    Bilirubin, total 1.4 (H) 0.2 - 1.0 MG/DL    ALT (SGPT) 26 16 - 61 U/L    AST (SGOT) 30 15 - 37 U/L    Alk. phosphatase 129 (H) 45 - 117 U/L    Protein, total 7.0 6.4 - 8.2 g/dL    Albumin 3.6 3.4 - 5.0 g/dL    Globulin 3.4 2.0 - 4.0 g/dL    A-G Ratio 1.1 0.8 - 1.7     POC LACTIC ACID    Collection Time: 10/24/17  6:37 PM   Result Value Ref Range    Lactic Acid (POC) 1.5 0.4 - 2.0 mmol/L       CLINICAL IMPRESSION:    1. Open wound    2. Dementia with behavioral disturbance, unspecified dementia type    3. Cellulitis of right lower extremity    4.  Poorly controlled diabetes mellitus ([facility] [address]        Written by De Contreras PA-C

## 2017-10-25 LAB
GLUCOSE BLD STRIP.AUTO-MCNC: 125 MG/DL (ref 70–110)
GLUCOSE BLD STRIP.AUTO-MCNC: 155 MG/DL (ref 70–110)

## 2017-10-25 PROCEDURE — 74011000258 HC RX REV CODE- 258: Performed by: HOSPITALIST

## 2017-10-25 PROCEDURE — 92610 EVALUATE SWALLOWING FUNCTION: CPT

## 2017-10-25 PROCEDURE — 74011636637 HC RX REV CODE- 636/637: Performed by: HOSPITALIST

## 2017-10-25 PROCEDURE — 82962 GLUCOSE BLOOD TEST: CPT

## 2017-10-25 PROCEDURE — 74011250636 HC RX REV CODE- 250/636: Performed by: HOSPITALIST

## 2017-10-25 PROCEDURE — 74011250637 HC RX REV CODE- 250/637: Performed by: HOSPITALIST

## 2017-10-25 PROCEDURE — 65270000029 HC RM PRIVATE

## 2017-10-25 RX ORDER — INSULIN GLARGINE 100 [IU]/ML
30 INJECTION, SOLUTION SUBCUTANEOUS
Status: DISCONTINUED | OUTPATIENT
Start: 2017-10-25 | End: 2017-10-30 | Stop reason: HOSPADM

## 2017-10-25 RX ORDER — ISOSORBIDE MONONITRATE 30 MG/1
30 TABLET, EXTENDED RELEASE ORAL DAILY
Status: DISCONTINUED | OUTPATIENT
Start: 2017-10-25 | End: 2017-10-30 | Stop reason: HOSPADM

## 2017-10-25 RX ORDER — METOPROLOL SUCCINATE 50 MG/1
50 TABLET, EXTENDED RELEASE ORAL DAILY
Status: DISCONTINUED | OUTPATIENT
Start: 2017-10-25 | End: 2017-10-30 | Stop reason: HOSPADM

## 2017-10-25 RX ORDER — ONDANSETRON 2 MG/ML
4 INJECTION INTRAMUSCULAR; INTRAVENOUS
Status: DISCONTINUED | OUTPATIENT
Start: 2017-10-25 | End: 2017-10-30 | Stop reason: HOSPADM

## 2017-10-25 RX ORDER — LORAZEPAM 2 MG/ML
1 INJECTION INTRAMUSCULAR
Status: DISCONTINUED | OUTPATIENT
Start: 2017-10-25 | End: 2017-10-30 | Stop reason: HOSPADM

## 2017-10-25 RX ORDER — CYCLOSPORINE 0.5 MG/ML
1 EMULSION OPHTHALMIC EVERY 12 HOURS
Status: DISCONTINUED | OUTPATIENT
Start: 2017-10-25 | End: 2017-10-30 | Stop reason: HOSPADM

## 2017-10-25 RX ORDER — ROSUVASTATIN CALCIUM 5 MG/1
10 TABLET, COATED ORAL
Status: DISCONTINUED | OUTPATIENT
Start: 2017-10-25 | End: 2017-10-30 | Stop reason: HOSPADM

## 2017-10-25 RX ORDER — FUROSEMIDE 40 MG/1
40 TABLET ORAL DAILY
Status: DISCONTINUED | OUTPATIENT
Start: 2017-10-25 | End: 2017-10-30 | Stop reason: HOSPADM

## 2017-10-25 RX ORDER — HEPARIN SODIUM 5000 [USP'U]/ML
5000 INJECTION, SOLUTION INTRAVENOUS; SUBCUTANEOUS EVERY 8 HOURS
Status: DISCONTINUED | OUTPATIENT
Start: 2017-10-26 | End: 2017-10-30 | Stop reason: HOSPADM

## 2017-10-25 RX ORDER — SOLIFENACIN SUCCINATE 5 MG/1
5 TABLET, FILM COATED ORAL DAILY
Status: DISCONTINUED | OUTPATIENT
Start: 2017-10-25 | End: 2017-10-30 | Stop reason: HOSPADM

## 2017-10-25 RX ORDER — GABAPENTIN 300 MG/1
300 CAPSULE ORAL DAILY
Status: DISCONTINUED | OUTPATIENT
Start: 2017-10-25 | End: 2017-10-30 | Stop reason: HOSPADM

## 2017-10-25 RX ORDER — SODIUM CHLORIDE 9 MG/ML
75 INJECTION, SOLUTION INTRAVENOUS CONTINUOUS
Status: DISCONTINUED | OUTPATIENT
Start: 2017-10-25 | End: 2017-10-29

## 2017-10-25 RX ORDER — ASPIRIN 81 MG/1
81 TABLET ORAL DAILY
Status: DISCONTINUED | OUTPATIENT
Start: 2017-10-25 | End: 2017-10-30 | Stop reason: HOSPADM

## 2017-10-25 RX ORDER — HEPARIN SODIUM 5000 [USP'U]/ML
5000 INJECTION, SOLUTION INTRAVENOUS; SUBCUTANEOUS EVERY 12 HOURS
Status: DISCONTINUED | OUTPATIENT
Start: 2017-10-25 | End: 2017-10-25

## 2017-10-25 RX ORDER — POTASSIUM CHLORIDE 750 MG/1
10 TABLET, EXTENDED RELEASE ORAL DAILY
Status: DISCONTINUED | OUTPATIENT
Start: 2017-10-25 | End: 2017-10-25

## 2017-10-25 RX ORDER — BUPROPION HYDROCHLORIDE 100 MG/1
100 TABLET, EXTENDED RELEASE ORAL DAILY
Status: DISCONTINUED | OUTPATIENT
Start: 2017-10-25 | End: 2017-10-26

## 2017-10-25 RX ORDER — ACETAMINOPHEN 500 MG
500 TABLET ORAL
Status: DISCONTINUED | OUTPATIENT
Start: 2017-10-25 | End: 2017-10-30 | Stop reason: HOSPADM

## 2017-10-25 RX ORDER — POLYVINYL ALCOHOL 14 MG/ML
1 SOLUTION/ DROPS OPHTHALMIC AS NEEDED
Status: DISCONTINUED | OUTPATIENT
Start: 2017-10-25 | End: 2017-10-30 | Stop reason: HOSPADM

## 2017-10-25 RX ORDER — ACETAMINOPHEN 325 MG/1
650 TABLET ORAL
Status: DISCONTINUED | OUTPATIENT
Start: 2017-10-25 | End: 2017-10-25

## 2017-10-25 RX ADMIN — GABAPENTIN 300 MG: 300 CAPSULE ORAL at 10:21

## 2017-10-25 RX ADMIN — SODIUM CHLORIDE 3.38 G: 900 INJECTION, SOLUTION INTRAVENOUS at 08:00

## 2017-10-25 RX ADMIN — SODIUM CHLORIDE 1000 MG: 900 INJECTION, SOLUTION INTRAVENOUS at 05:18

## 2017-10-25 RX ADMIN — HEPARIN SODIUM 5000 UNITS: 5000 INJECTION, SOLUTION INTRAVENOUS; SUBCUTANEOUS at 17:15

## 2017-10-25 RX ADMIN — METOPROLOL SUCCINATE 50 MG: 50 TABLET, EXTENDED RELEASE ORAL at 10:21

## 2017-10-25 RX ADMIN — VANCOMYCIN HYDROCHLORIDE 1250 MG: 10 INJECTION, POWDER, LYOPHILIZED, FOR SOLUTION INTRAVENOUS at 21:35

## 2017-10-25 RX ADMIN — SOLIFENACIN SUCCINATE 5 MG: 5 TABLET, FILM COATED ORAL at 10:20

## 2017-10-25 RX ADMIN — ASPIRIN 81 MG: 81 TABLET, COATED ORAL at 10:21

## 2017-10-25 RX ADMIN — HEPARIN SODIUM 5000 UNITS: 5000 INJECTION, SOLUTION INTRAVENOUS; SUBCUTANEOUS at 05:22

## 2017-10-25 RX ADMIN — INSULIN GLARGINE 30 UNITS: 100 INJECTION, SOLUTION SUBCUTANEOUS at 03:17

## 2017-10-25 RX ADMIN — SODIUM CHLORIDE 75 ML/HR: 900 INJECTION, SOLUTION INTRAVENOUS at 03:00

## 2017-10-25 RX ADMIN — ISOSORBIDE MONONITRATE 30 MG: 30 TABLET ORAL at 10:21

## 2017-10-25 RX ADMIN — BUPROPION HYDROCHLORIDE 100 MG: 100 TABLET, FILM COATED, EXTENDED RELEASE ORAL at 10:20

## 2017-10-25 RX ADMIN — INSULIN GLARGINE 30 UNITS: 100 INJECTION, SOLUTION SUBCUTANEOUS at 21:35

## 2017-10-25 RX ADMIN — FUROSEMIDE 40 MG: 40 TABLET ORAL at 10:20

## 2017-10-25 RX ADMIN — ROSUVASTATIN CALCIUM 10 MG: 5 TABLET ORAL at 21:37

## 2017-10-25 RX ADMIN — SODIUM CHLORIDE 3.38 G: 900 INJECTION, SOLUTION INTRAVENOUS at 03:18

## 2017-10-25 RX ADMIN — SODIUM CHLORIDE 3.38 G: 900 INJECTION, SOLUTION INTRAVENOUS at 20:54

## 2017-10-25 RX ADMIN — SODIUM CHLORIDE 75 ML/HR: 900 INJECTION, SOLUTION INTRAVENOUS at 20:53

## 2017-10-25 RX ADMIN — SODIUM CHLORIDE 3.38 G: 900 INJECTION, SOLUTION INTRAVENOUS at 14:18

## 2017-10-25 RX ADMIN — POTASSIUM CHLORIDE 10 MEQ: 10 TABLET, EXTENDED RELEASE ORAL at 10:28

## 2017-10-25 NOTE — ED NOTES
Pts family updated on plan of care, Angella Mendiola (daughter) [phone]; Garett Walter (spouse) [phone]

## 2017-10-25 NOTE — ED NOTES
Pts wife, Mary Persons called and updated on plan of care, given room number at [facility]

## 2017-10-25 NOTE — DIABETES MGMT
Glycemic Control Plan of Care    POC BG report on 10/24/2017: None. POC BG report on 10/25/2017 at time of review: None. Patient somewhat confused. Family visiting at bedside stated that she brought chocolate cake and patient ate it. Recommendation(s):  1.) POC BG checks ACHS. Done. 2.) Encouraged family member who came to visit not to bring extra food from outside. 3.) Consider correctional insulin for POC BG > 150 mg/dL. Assessment:  Patient is [de-identified]year old with past medical history including type 2 diabetes mellitus, hypertension, carotid artery occlusion, CKD stage 3, venous peripheral insufficiency, CAD with CABG, dyslipidemia, skin cancer, and COPD - was admitted on 10/24/2017 with report of worsening redness and swelling of right lower leg. Noted:  Cellulitis right leg. Type 2 diabetes mellitus with pending A1c report (ordered 10/25/2017). Most recent blood glucose values:    Results for Avi Magana (MRN [id number]) as of 10/25/2017 15:20   Ref. Range 10/25/2017 03:16   GLUCOSE,FAST - POC Latest Ref Range: 70 - 110 mg/dL 125 (H)     No POC BG report at time of this review for 10/25/2017. Placed order. Current A1C: Last reported A1c of 6.5% (08/26/2016). Ordered A1c 10/25/2017 and pending result. Current hospital diabetes medications:  Basal lantus insulin 30 units daily at bedtime. Total daily dose insulin requirement previous day: 10/24/2017  Lantus: 30 units    Home diabetes medications: Patient seen earlier this afternoon with a family member visiting at bedside and obtained the following information:  Lantus 30 units daily at bedtime. Diet: Diabetic consistent carb regular. Goals:  Blood glucose will be within target range of  mg/dL by 10/28/2017.     Education:  ___  Refer to Diabetes Education Record             _X__  Education not indicated at this time    Charito Mayers RN

## 2017-10-25 NOTE — PROGRESS NOTES
Problem: Dysphagia (Adult)  Goal: *Acute Goals and Plan of Care (Insert Text)  Patient will:  1. Tolerate PO trials with 0 s/s overt distress in 4/5 trials  2. Utilize compensatory swallow strategies/maneuvers (decrease bite/sip, size/rate, alt. liq/sol) with min cues in 4/5 trials    Recommend:   Regular diet with thin liquids  Meds as tolerated  Aspiration precautions  HOB >45 degrees during all intake and for at least 30 min after po   Small bites/sips, Slow rate of intake     Speech LAnguage Pathology bedside swallow evaluation    Patient: Migdalia Overton [de-identified] y.o. male)  Date: 10/25/2017  Primary Diagnosis: Cellulitis, leg  Open wound  Poorly controlled diabetes mellitus (HCC)  Cellulitis, leg  Precautions: Aspiration       ASSESSMENT :  Based on the objective data described below, the patient presents with mild dysphagia related to confusion. Pt alert but confused, oriented to person only. Oral mech exam revealed structures functional for speech and deglutition. Pt with noted paraphasias, suspect related to hx of dementia. Pt observed self-feeding regular, thin liquid lunch tray. Pt demo mildly prolonged mastication with wrap from lunch tray, able to clear with liquid wash. Pt tolerating thin liquids +straw via consecutive swallows with timely swallow initiation and adequate laryngeal elevation to palpation. Recommend continue regular diet with thin liquids with use of the above mentioned comp strategies. Will follow up x1-2 visits to ensure diet tolerance. Patient will benefit from skilled intervention to address the above impairments.   Patients rehabilitation potential is considered to be Good  Factors which may influence rehabilitation potential include:   []            None noted  [x]            Mental ability/status  [x]            Medical condition  []            Home/family situation and support systems  []            Safety awareness  []            Pain tolerance/management  [] Other: PLAN :  Recommendations and Planned Interventions:  As above   Frequency/Duration: Patient will be followed by speech-language pathology x1-2 visits to address goals. Discharge Recommendations: To Be Determined     SUBJECTIVE:   Patient stated \"yeah. OBJECTIVE:     Past Medical History:   Diagnosis Date    Angina     Anxiety     Arthritis     CAD (coronary artery disease)     s/p drug-eluting stents to RCA for high-grade stenoses in 2/09    Cardiac catheterization 08/24/2016    Mod calcification. Co-dom. oLM 50-70%. LAD 30%. Dx 30%. Cx 70% focal.  OM 80% focal.  RCA 30%.  Cardiac echocardiogram 08/23/2016    Tech difficult. EF 55-60%. No WMA. Normal diastolic fx. Lipomatous septal hypertrophy.  Cardiac nuclear imaging test, abnormal 08/23/2016    Sm reversible inferior defect concerning for ischemia. Mild inferior hypk. EF 68%. Neg EKG on pharm stress test.    Carotid duplex 08/26/2016    Mild <50% CYRUS stenosis. Chronic LICA occlusion. Similar to study of 3/29/16.     Carotid stenosis (HCC)     w/ known total occlusion of lt internal carotid artery; followed by pts vascular surgeon    Chronic kidney disease     COPD (chronic obstructive pulmonary disease) ([address]     Dementia     Depression     Diabetes ([address]     type 2    Dyslipidemia     on Crestor; managed by pts PCP    Dyspnea     Heart disease     Hypercholesterolemia     Hypertension     Low back pain     Osteoarthrosis involving multiple sites     Skin cancer ([address]     squamous cell lesions of the skin    Stroke ([address]     Venous (peripheral) insufficiency      Past Surgical History:   Procedure Laterality Date    CARDIAC CATHETERIZATION  8/24/2016         CORONARY ARTERY ANGIOGRAM  8/24/2016         HX ANGIOPLASTY  2009    2 stents placed and heart attack during the procedure    HX COLONOSCOPY  10/2003    neg; declines f/u    HX CORONARY ARTERY BYPASS GRAFT  09/2016    HX HEART CATHETERIZATION 5/2013    HX HERNIA REPAIR      1970s    HX OTHER SURGICAL      groin surgery    HX TONSIL AND ADENOIDECTOMY      IR INTRAVASCULAR ULTRASOUND INITIAL  8/24/2016         LV ANGIOGRAPHY  8/24/2016          Prior Level of Function/Home Situation: Unknown  Diet prior to admission: Unknown  Current Diet:  Regular   Cognitive and Communication Status:  Neurologic State: Alert, Confused  Orientation Level: Oriented to person  Cognition: Follows commands  Oral Assessment:  Oral Assessment  Labial: No impairment  Dentition: Intact  Oral Hygiene: Good  Lingual: No impairment  Velum: No impairment  Mandible: No impairment  P.O. Trials:  Patient Position: [facility] at [facility]  Vocal quality prior to P.O.: No impairment  Consistency Presented: Thin liquid; Solid  How Presented: Self-fed/presented;Cup/sip;Spoon;Straw;Successive swallows  Bolus Acceptance: No impairment  Bolus Formation/Control: Impaired  Type of Impairment: Mastication  Propulsion: Discoordination  Oral Residue: None  Initiation of Swallow: No impairment  Laryngeal Elevation: Functional  Aspiration Signs/Symptoms: None  Pharyngeal Phase Characteristics: No impairment, issues, or problems   Effective Modifications: Small sips and bites; Alternate liquids/solids  Cues for Modifications: Minimal  Oral Phase Severity: Mild  Pharyngeal Phase Severity : No impairment    GCODESwallowing:  Swallow Current Status CI= 1-19%   Swallow Goal Status CI= 1-19%    The severity rating is based on the following outcomes:    Clinical Judgment    Pain:  Pt confused, unable to report pain level    After treatment:   []            Patient left in no apparent distress sitting up in chair  [x]            Patient left in no apparent distress in bed  [x]            Call bell left within reach  [x]            Nursing notified  []            Caregiver present  []            Bed alarm activated    COMMUNICATION/EDUCATION:   [x]            SLP educated pt with regard to compensatory swallow strategies and       aspiration/reflux precautions including: small bites/sips,       alternate liquids/solids, decrease feeding rate, HOB > 45 with all po, and upright in bed at 30 degrees after po for at least 45 minutes. []            Patient/family have participated as able in goal setting and plan of care. []            Patient/family agree to work toward stated goals and plan of care. []            Patient understands intent and goals of therapy; neutral about participation. [x]            Patient is unable to participate in goal setting and plan of care.     Thank you for this referral.  Melissa Recinos M.S., [id number] [facility]  Speech-Language Pathologist

## 2017-10-25 NOTE — PROGRESS NOTES
Hospitalist Progress Note    Patient: Hyacinth Ashford. MRN: [id number]  CSN: [id number]    YOB: 1935  Age: [de-identified] y.o. Sex: male    DOA: 10/24/2017 LOS:  LOS: 1 day          Did well with SLP. Wife at bedside reports 2 episodes at [facility] of patient's arms extended out in front of him and shaking, and then he stared off and did not respond to her. He has hx of stroke, she cannot recall which neurologist he followed with in the past.     She cleans his eyes with warm, moist wash cloths daily, and uses restasis and artificial tears. He had appointment she believes with Dr. Reid Lozada this week, not certain of name of vascular surgeon. She states wounds to legs look much better than at [facility]. Assessment/Plan       1. Cellulitis with associated wound RLE. Blood cx (10/24) ngtd. Wound care. Vascular consult. abx.  2. Dementia with behavioral disturbance   3. DM2 - ssi  4. mild dysphagia related to confusion - SLP recs noted  5. Anemia mc hc - check studies. 6. Acute metabolic encephalopathy multifactorial in the setting of possible seizure, and acute infection  7. Possible seizure - seizure precautions, tele, neuro to consult   8. Full code. Wife at bedside, all questions answered to the best of my ability. Taylor Hall (daughter) [phone]; Holden Raines (spouse) [phone]    Additional Notes:      Case discussed with:  [x]Patient  [x]Family  [x]Nursing  []Case Management  DVT Prophylaxis:  []Lovenox  [x]Hep SQ  []SCDs  []Coumadin   []On Heparin gtt    Vital signs/Intake and Output:  Visit Vitals    /57 (BP 1 Location: Right arm, BP Patient Position: At rest)    Pulse 78    Temp 99.4 °F (37.4 °C)    Resp 18    SpO2 96%     Current Shift:     Last three shifts:       Awake alert and oriented x 1  Ncat. Perrl. Ectropion b.l lower eyelid, crusting to lashes  RRR  cta b.l   Soft nt nd nabs  Trace edema b.l. dp 1+ b.l  Moving all 4s.    Open wound 1.5 cm to right mid shin poa. + surrounding erythema and warmth. Medications Reviewed      Labs: Results:       Chemistry Recent Labs      10/24/17   1810  10/23/17   2005   GLU  215*  200*   NA  137  139   K  4.5  4.4   CL  101  104   CO2  31  32   BUN  19*  19*   CREA  1.36*  1.20   CA  8.9  9.2   AGAP  5  3   BUCR  14  16   AP  129*   --    TP  7.0   --    ALB  3.6   --    GLOB  3.4   --    AGRAT  1.1   --       CBC w/Diff Recent Labs      10/24/17   1810  10/23/17   2005   WBC  12.2  10.4   RBC  6.16*  6.17*   HGB  13.9  13.9   HCT  43.9  45.1   PLT  346  361   GRANS  93*  62   LYMPH  1*  17*   EOS  0  5      Cardiac Enzymes No results for input(s): CPK, CKND1, QUAN in the last 72 hours. No lab exists for component: CKRMB, TROIP   Coagulation Recent Labs      10/23/17   2005   PTP  13.4   INR  1.1   APTT  32.4       Lipid Panel Lab Results   Component Value Date/Time    Cholesterol, total 117 05/23/2013 01:28 AM    HDL Cholesterol 37 05/23/2013 01:28 AM    LDL, calculated 62 05/23/2013 01:28 AM    VLDL, calculated 18 05/23/2013 01:28 AM    Triglyceride 90 05/23/2013 01:28 AM    CHOL/HDL Ratio 3.2 05/23/2013 01:28 AM      BNP No results for input(s): BNPP in the last 72 hours. Liver Enzymes Recent Labs      10/24/17   1810   TP  7.0   ALB  3.6   AP  129*   SGOT  30      Thyroid Studies Lab Results   Component Value Date/Time    TSH 1.37 02/22/2017 12:03 PM        Procedures/imaging: see electronic medical records for all procedures/Xrays and details which were not copied into this note but were reviewed prior to creation of Plan.

## 2017-10-25 NOTE — ED NOTES
TRANSFER - OUT REPORT:    Verbal report given to [id number] Leodan Pimentel RN(name) on [facility].  being transferred to Medical room 461(unit) for routine progression of care       Report consisted of patients Situation, Background, Assessment and   Recommendations(SBAR). Information from the following report(s) SBAR, Kardex and ED Summary was reviewed with the receiving nurse. Lines:   Peripheral IV 10/24/17 Right Wrist (Active)        Opportunity for questions and clarification was provided.       Patient transported with:

## 2017-10-25 NOTE — INTERDISCIPLINARY ROUNDS
IDR/SLIDR Summary          Patient: Leslie Barker MRN: [id number]    Age: [de-identified] y.o. YOB: 1935 Room/Bed: [facility]/   Admit Diagnosis: Cellulitis, leg  Open wound  Poorly controlled diabetes mellitus (HCC)  Cellulitis, leg  Principal Diagnosis: <principal problem not specified>   Goals: Safety  Readmission: NO  Quality Measure: SEPSIS  VTE Prophylaxis: Chemical  Influenza Vaccine screening completed? YES  Pneumococcal Vaccine screening completed? NO  Mobility needs: Yes   Nutrition plan:Yes  Consults:P.T, O.T. and Speech    Financial concerns:No  Escalated to CM? YES  RRAT Score: 38   Interventions:Home Health  Testing due for pt today?  NO  LOS: 1 days Expected length of stay unknown days  Discharge plan: home with family and home health   PCP: Lakesha Wilson MD  Transportation needs: Yes    Days before discharge:two or more days before discharge   Discharge disposition: Home    Signed:     Re Feldman RN  10/25/2017  6:41 PM

## 2017-10-25 NOTE — PROGRESS NOTES
Kinetic Dosing- Initial Progress Note    Pharmacy Consult ordered by Dr. Matilda Packer     Indication: SSTI    Patient clinical status and labs ordered/reviewed. Pt Weight     Serum Creatinine Lab Results   Component Value Date/Time    Creatinine 1.36 10/24/2017 06:10 PM       Creatinine Clearance Estimated Creatinine Clearance: 49.1 mL/min (based on Cr of 1.36). BUN Lab Results   Component Value Date/Time    BUN 19 10/24/2017 06:10 PM       WBC Lab Results   Component Value Date/Time    WBC 12.2 10/24/2017 06:10 PM      Temperature Temp: 99.4 °F (37.4 °C)   HR Pulse (Heart Rate): 78     BP BP: 131/57           Kinetic Dosing Parameters:   Vd = 75 L     K = 0.027 hr-1              t ½ = 25 hr    Drug Levels:   Vancomycin    No results for input(s): VANCP, VANCT, VANCR, VANRA in the last 72 hours. Gentamicin   No results for input(s): GENP, GENT in the last 72 hours. No lab exists for component:  GENR   Tobramycin   No results for input(s): TOBP, TOBT, TOBR in the last 72 hours. Amikacin   No results for input(s): Torey Madera in the last 72 hours.     No lab exists for component:  NOE Naik DAMIKR     Dose for naïve patient was initiated at: Vancomycin 2gm load then 1250mg q24h     Continue to monitor    Sign: MAN[facility]  Date: 10/25/2017  Time: 10:37 AM

## 2017-10-25 NOTE — H&P
HISTORY & PHYSICAL            Patient: Peter Fuentes MRN: [id number]  [facility]: [id number]    YOB: 1935  Age: [de-identified] y.o. Sex: male    DOA: 10/24/2017 LOS:  LOS: 1 day        DOA: 10/24/2017        Assessment/Plan     Active Problems:    Open wound (10/24/2017)      Cellulitis, leg (10/24/2017)      Poorly controlled diabetes mellitus ([address] (10/24/2017)        Plan:  1. Cellulitis R leg - IV Abx - Vanco & Zosyn , will probably benefit from wound care   2. T2DM - monitor BS , check A1c  3. HTN - continue home meds, monitor BP  4. HLPD - continue crestor   5. CAD - continue imdur   6. Continue other home meds   DVT Px - Heparin   Full code           Severity of Signs & Symptoms  - Severe  Risk of adverse events - high  Current Medical Rx Plan - As Above   Patient history & comorbidities - Per HPI  Discharge Plan - SNF           HPI:     Peter Fuentes is a [de-identified] y.o. male with hx of diabetes, CAD post stenting, CABG, Stroke currently on plavix was seen at [facility] ER last night due to R lower leg pain and skin tear secondary to scratching. Pt has hx of dementia and is unable to provide any info -- the wife is currently not available by bedside & most of the info is obtained from the Er records. Pt was seen in the ER last & given Bactrim DS  which she forgot to get filled. He is here today because per wife \" he has been acting strange for the past couple of days\" Denies of any fever, diarrhea c/o abdominal pain or urinary sx - per Er chart note   ER also mentions that wife says she is not able to care for him anymore   Will admit for IV Abx     Past Medical History:   Diagnosis Date    Angina     Anxiety     Arthritis     CAD (coronary artery disease)     s/p drug-eluting stents to RCA for high-grade stenoses in 2/09    Cardiac catheterization 08/24/2016    Mod calcification. Co-dom. oLM 50-70%. LAD 30%. Dx 30%. Cx 70% focal.  OM 80% focal.  RCA 30%.       Cardiac echocardiogram 08/23/2016    Tech difficult. EF 55-60%. No WMA. Normal diastolic fx. Lipomatous septal hypertrophy.  Cardiac nuclear imaging test, abnormal 08/23/2016    Sm reversible inferior defect concerning for ischemia. Mild inferior hypk. EF 68%. Neg EKG on pharm stress test.    Carotid duplex 08/26/2016    Mild <50% CYRUS stenosis. Chronic LICA occlusion. Similar to study of 3/29/16.     Carotid stenosis (HCC)     w/ known total occlusion of lt internal carotid artery; followed by pts vascular surgeon    Chronic kidney disease     COPD (chronic obstructive pulmonary disease) ([address]     Dementia     Depression     Diabetes ([address]     type 2    Dyslipidemia     on Crestor; managed by pts PCP    Dyspnea     Heart disease     Hypercholesterolemia     Hypertension     Low back pain     Osteoarthrosis involving multiple sites     Skin cancer ([address]     squamous cell lesions of the skin    Stroke ([address]     Venous (peripheral) insufficiency        Past Surgical History:   Procedure Laterality Date    CARDIAC CATHETERIZATION  8/24/2016         CORONARY ARTERY ANGIOGRAM  8/24/2016         HX ANGIOPLASTY  2009    2 stents placed and heart attack during the procedure    HX COLONOSCOPY  10/2003    neg; declines f/u    HX CORONARY ARTERY BYPASS GRAFT  09/2016    HX HEART CATHETERIZATION  5/2013    HX HERNIA REPAIR      1970s    HX OTHER SURGICAL      groin surgery    HX TONSIL AND ADENOIDECTOMY      IR INTRAVASCULAR ULTRASOUND INITIAL  8/24/2016         LV ANGIOGRAPHY  8/24/2016            Family History   Problem Relation Age of Onset    Parkinsonism Mother     Hypertension Mother     Cancer Father      lung    Heart defect Brother        Social History     Social History    Marital status:      Spouse name: N/A    Number of children: N/A    Years of education: N/A     Social History Main Topics    Smoking status: Former Smoker     Years: 2.00     Quit date: 1/1/1955    Smokeless tobacco: Never Used   [employer] Alcohol use Yes      Comment: drinks weekly couple beers    Drug use: No    Sexual activity: No     Other Topics Concern    None     Social History Narrative       Prior to Admission medications    Medication Sig Start Date End Date Taking? Authorizing Provider   trimethoprim-sulfamethoxazole (BACTRIM DS) 160-800 mg per tablet Take 1 Tab by mouth two (2) times a day for 7 days. 10/23/17 10/30/17  Layo Segura MD   furosemide (LASIX) 40 mg tablet 1 qam 9/29/17   Juma Souza MD   potassium chloride (KLOR-CON) 10 mEq tablet 1 every day with lasix 9/29/17   Juma Souza MD   rosuvastatin (CRESTOR) 10 mg tablet Take 1 Tab by mouth nightly. 9/29/17   Juma Souza MD   isosorbide mononitrate ER (IMDUR) 30 mg tablet TAKE 1 TAB BY MOUTH EVERY MORNING. 7/26/17   Historical Provider   metoprolol succinate (TOPROL-XL) 50 mg XL tablet TAKE 1 TABLET BY MOUTH ONCE DAILY 7/10/17   Corinne Fabry, MD   solifenacin (VESICARE) 5 mg tablet Take 1 Tab by mouth daily. Indications: BLADDER HYPERACTIVITY, INCREASED URINARY FREQUENCY, URINARY URGE INCONTINENCE, URINARY URGENCY 2/27/17   Russ Vega MD   aspirin delayed-release 81 mg tablet Take 1 Tab by mouth daily. 9/2/16   Brayden Byrd PA-C   acetaminophen (TYLENOL) 325 mg tablet Take 2 Tabs by mouth every four (4) hours as needed. Patient taking differently: Take 2 Tabs by mouth every six (6) hours as needed for Pain. 9/2/16   Chi Hill PA-C   COMPOUNDMAX BASE crea 240 g by Does Not Apply route four (4) times daily. Anti-Inflam Meloxicam 0.09% Topirmate 1% Methocarbamol 2%  Lidocaine 2% Prilocaine 2% 2/17/16   Francisco Browning NP   buPROPion SR [facility] SR) 100 mg SR tablet 1 bid 12/4/15   Juma Souza MD   SHAWN PEN NEEDLE 32 x 5/32 \" ndle Use as directed. 10/20/15   Historical Provider   gabapentin (NEURONTIN) 300 mg capsule Take 1 Cap by mouth daily.  Indications: NEUROPATHIC PAIN, RESTLESS LEGS SYNDROME 11/3/15   Francisco Browning NP insulin glargine (LANTUS SOLOSTAR) 100 unit/mL (3 mL) pen 30 Units by SubCUTAneous route nightly. 30 units     Historical Provider   MULTIVITAMINS W-MINERALS/LUT (CENTRUM SILVER PO) Take 1 Tab by mouth daily. Historical Provider   CALCIUM CARBONATE (CALTRATE 600 PO) Take 1 Tab by mouth daily. Historical Provider       Allergies   Allergen Reactions    Amaryl [Glimepiride] Drowsiness    Lipitor [Atorvastatin] Myalgia    Niacin Other (comments)     Facial blistering, swelling in face    Pravachol [Pravastatin] Myalgia    Zocor [Simvastatin] Myalgia       Review of Systems  Review of systems not obtained due to patient factors. Physical Exam:      Visit Vitals    /52    Pulse 85    Temp 98.6 °F (37 °C)    Resp 22    SpO2 94%       Physical Exam:    Gen: In general, this is a well nourished male in no acute distress  HEENT: Sclerae nonicteric. Oral mucous membranes moist. Dentition poor  Neck: Supple with midline trachea. CV: RRR without murmur or rub appreciated. Resp:Respirations are unlabored without use of accessory muscles. Lung fields bilaterally without wheezes or rhonchi. Abd: Soft, nontender, nondistended. Extrem: Extremities are warm, without cyanosis or clubbing. R leg  pitting pretibial edema with redness, covered in bandage ( new dressing done in the ER )   Skin: Warm, no visible rashes.   Neuro: Patient has dementia     Labs Reviewed:    Recent Results (from the past 24 hour(s))   CBC WITH AUTOMATED DIFF    Collection Time: 10/24/17  6:10 PM   Result Value Ref Range    WBC 12.2 4.6 - 13.2 K/uL    RBC 6.16 (H) 4.70 - 5.50 M/uL    HGB 13.9 13.0 - 16.0 g/dL    HCT 43.9 36.0 - 48.0 %    MCV 71.3 (L) 74.0 - 97.0 FL    MCH 22.6 (L) 24.0 - 34.0 PG    MCHC 31.7 31.0 - 37.0 g/dL    RDW 20.2 (H) 11.6 - 14.5 %    PLATELET 730 233 - 393 K/uL    MPV 9.8 9.2 - 11.8 FL    NEUTROPHILS 93 (H) 40 - 73 %    LYMPHOCYTES 1 (L) 21 - 52 %    MONOCYTES 6 3 - 10 %    EOSINOPHILS 0 0 - 5 % BASOPHILS 0 0 - 2 %    ABS. NEUTROPHILS 11.4 (H) 1.8 - 8.0 K/UL    ABS. LYMPHOCYTES 0.1 (L) 0.9 - 3.6 K/UL    ABS. MONOCYTES 0.7 0.05 - 1.2 K/UL    ABS. EOSINOPHILS 0.0 0.0 - 0.4 K/UL    ABS. BASOPHILS 0.0 0.0 - 0.06 K/UL    DF AUTOMATED      PLATELET COMMENTS ADEQUATE PLATELETS      RBC COMMENTS ANISOCYTOSIS  2+       METABOLIC PANEL, COMPREHENSIVE    Collection Time: 10/24/17  6:10 PM   Result Value Ref Range    Sodium 137 136 - 145 mmol/L    Potassium 4.5 3.5 - 5.5 mmol/L    Chloride 101 100 - 108 mmol/L    CO2 31 21 - 32 mmol/L    Anion gap 5 3.0 - 18 mmol/L    Glucose 215 (H) 74 - 99 mg/dL    BUN 19 (H) 7.0 - 18 MG/DL    Creatinine 1.36 (H) 0.6 - 1.3 MG/DL    BUN/Creatinine ratio 14 12 - 20      GFR est AA >60 >60 ml/min/1.73m2    GFR est non-AA 50 (L) >60 ml/min/1.73m2    Calcium 8.9 8.5 - 10.1 MG/DL    Bilirubin, total 1.4 (H) 0.2 - 1.0 MG/DL    ALT (SGPT) 26 16 - 61 U/L    AST (SGOT) 30 15 - 37 U/L    Alk.  phosphatase 129 (H) 45 - 117 U/L    Protein, total 7.0 6.4 - 8.2 g/dL    Albumin 3.6 3.4 - 5.0 g/dL    Globulin 3.4 2.0 - 4.0 g/dL    A-G Ratio 1.1 0.8 - 1.7     POC LACTIC ACID    Collection Time: 10/24/17  6:37 PM   Result Value Ref Range    Lactic Acid (POC) 1.5 0.4 - 2.0 mmol/L   URINALYSIS W/ RFLX MICROSCOPIC    Collection Time: 10/24/17  8:20 PM   Result Value Ref Range    Color YELLOW      Appearance CLEAR      Specific gravity 1.017 1.005 - 1.030      pH (UA) 7.0 5.0 - 8.0      Protein TRACE (A) NEG mg/dL    Glucose 250 (A) NEG mg/dL    Ketone NEGATIVE  NEG mg/dL    Bilirubin NEGATIVE  NEG      Blood TRACE (A) NEG      Urobilinogen 1.0 0.2 - 1.0 EU/dL    Nitrites NEGATIVE  NEG      Leukocyte Esterase NEGATIVE  NEG     URINE MICROSCOPIC ONLY    Collection Time: 10/24/17  8:20 PM   Result Value Ref Range    WBC 0 to 3 0 - 4 /hpf    RBC 4 to 10 0 - 5 /hpf    Epithelial cells FEW 0 - 5 /lpf   GLUCOSE, POC    Collection Time: 10/25/17  3:16 AM   Result Value Ref Range    Glucose (POC) 125 (H) 70 - 110 mg/dL       Imaging Reviewed:    None         Abel Lazo MD  10/25/2017, 2:47 AM

## 2017-10-26 LAB
ALBUMIN SERPL-MCNC: 2.5 G/DL (ref 3.4–5)
ALBUMIN/GLOB SERPL: 0.8 {RATIO} (ref 0.8–1.7)
ALP SERPL-CCNC: 83 U/L (ref 45–117)
ALT SERPL-CCNC: 25 U/L (ref 16–61)
ANION GAP SERPL CALC-SCNC: 6 MMOL/L (ref 3–18)
AST SERPL-CCNC: 39 U/L (ref 15–37)
BACTERIA SPEC CULT: NORMAL
BASOPHILS # BLD: 0 K/UL (ref 0–0.1)
BASOPHILS NFR BLD: 0 % (ref 0–2)
BILIRUB DIRECT SERPL-MCNC: 0.3 MG/DL (ref 0–0.2)
BILIRUB SERPL-MCNC: 0.8 MG/DL (ref 0.2–1)
BUN SERPL-MCNC: 22 MG/DL (ref 7–18)
BUN/CREAT SERPL: 15 (ref 12–20)
CALCIUM SERPL-MCNC: 7.4 MG/DL (ref 8.5–10.1)
CHLORIDE SERPL-SCNC: 105 MMOL/L (ref 100–108)
CHOLEST SERPL-MCNC: 74 MG/DL
CO2 SERPL-SCNC: 27 MMOL/L (ref 21–32)
CREAT SERPL-MCNC: 1.49 MG/DL (ref 0.6–1.3)
DIFFERENTIAL METHOD BLD: ABNORMAL
EOSINOPHIL # BLD: 0.1 K/UL (ref 0–0.4)
EOSINOPHIL NFR BLD: 1 % (ref 0–5)
ERYTHROCYTE [DISTWIDTH] IN BLOOD BY AUTOMATED COUNT: 19.3 % (ref 11.6–14.5)
EST. AVERAGE GLUCOSE BLD GHB EST-MCNC: 180 MG/DL
GLOBULIN SER CALC-MCNC: 3.1 G/DL (ref 2–4)
GLUCOSE BLD STRIP.AUTO-MCNC: 120 MG/DL (ref 70–110)
GLUCOSE BLD STRIP.AUTO-MCNC: 160 MG/DL (ref 70–110)
GLUCOSE BLD STRIP.AUTO-MCNC: 62 MG/DL (ref 70–110)
GLUCOSE SERPL-MCNC: 77 MG/DL (ref 74–99)
HBA1C MFR BLD: 7.9 % (ref 4.2–5.6)
HCT VFR BLD AUTO: 35.8 % (ref 36–48)
HDLC SERPL-MCNC: 31 MG/DL (ref 40–60)
HDLC SERPL: 2.4 {RATIO} (ref 0–5)
HGB BLD-MCNC: 11.3 G/DL (ref 13–16)
LDLC SERPL CALC-MCNC: 27.2 MG/DL (ref 0–100)
LIPID PROFILE,FLP: ABNORMAL
LYMPHOCYTES # BLD: 1.1 K/UL (ref 0.9–3.6)
LYMPHOCYTES NFR BLD: 11 % (ref 21–52)
MAGNESIUM SERPL-MCNC: 2 MG/DL (ref 1.6–2.6)
MCH RBC QN AUTO: 23.1 PG (ref 24–34)
MCHC RBC AUTO-ENTMCNC: 31.6 G/DL (ref 31–37)
MCV RBC AUTO: 73.2 FL (ref 74–97)
MONOCYTES # BLD: 1.1 K/UL (ref 0.05–1.2)
MONOCYTES NFR BLD: 11 % (ref 3–10)
NEUTS SEG # BLD: 7.3 K/UL (ref 1.8–8)
NEUTS SEG NFR BLD: 77 % (ref 40–73)
PLATELET # BLD AUTO: 208 K/UL (ref 135–420)
PMV BLD AUTO: 10.3 FL (ref 9.2–11.8)
POTASSIUM SERPL-SCNC: 3.4 MMOL/L (ref 3.5–5.5)
PROT SERPL-MCNC: 5.6 G/DL (ref 6.4–8.2)
RBC # BLD AUTO: 4.89 M/UL (ref 4.7–5.5)
SERVICE CMNT-IMP: NORMAL
SODIUM SERPL-SCNC: 138 MMOL/L (ref 136–145)
TRIGL SERPL-MCNC: 79 MG/DL (ref ?–150)
VLDLC SERPL CALC-MCNC: 15.8 MG/DL
WBC # BLD AUTO: 9.5 K/UL (ref 4.6–13.2)

## 2017-10-26 PROCEDURE — 95816 EEG AWAKE AND DROWSY: CPT

## 2017-10-26 PROCEDURE — 36415 COLL VENOUS BLD VENIPUNCTURE: CPT | Performed by: HOSPITALIST

## 2017-10-26 PROCEDURE — 74011250637 HC RX REV CODE- 250/637: Performed by: HOSPITALIST

## 2017-10-26 PROCEDURE — 65270000029 HC RM PRIVATE

## 2017-10-26 PROCEDURE — 80048 BASIC METABOLIC PNL TOTAL CA: CPT | Performed by: HOSPITALIST

## 2017-10-26 PROCEDURE — 97530 THERAPEUTIC ACTIVITIES: CPT

## 2017-10-26 PROCEDURE — 80076 HEPATIC FUNCTION PANEL: CPT | Performed by: HOSPITALIST

## 2017-10-26 PROCEDURE — 83735 ASSAY OF MAGNESIUM: CPT | Performed by: HOSPITALIST

## 2017-10-26 PROCEDURE — 74011250636 HC RX REV CODE- 250/636: Performed by: HOSPITALIST

## 2017-10-26 PROCEDURE — 74011636637 HC RX REV CODE- 636/637: Performed by: HOSPITALIST

## 2017-10-26 PROCEDURE — 97162 PT EVAL MOD COMPLEX 30 MIN: CPT

## 2017-10-26 PROCEDURE — 77030011256 HC DRSG MEPILEX <16IN NO BORD MOLN -A

## 2017-10-26 PROCEDURE — 93922 UPR/L XTREMITY ART 2 LEVELS: CPT

## 2017-10-26 PROCEDURE — 85025 COMPLETE CBC W/AUTO DIFF WBC: CPT | Performed by: HOSPITALIST

## 2017-10-26 PROCEDURE — 74011000258 HC RX REV CODE- 258: Performed by: HOSPITALIST

## 2017-10-26 PROCEDURE — 83036 HEMOGLOBIN GLYCOSYLATED A1C: CPT | Performed by: HOSPITALIST

## 2017-10-26 PROCEDURE — 80061 LIPID PANEL: CPT | Performed by: HOSPITALIST

## 2017-10-26 PROCEDURE — 95819 EEG AWAKE AND ASLEEP: CPT

## 2017-10-26 PROCEDURE — 95816 EEG AWAKE AND DROWSY: CPT | Performed by: HOSPITALIST

## 2017-10-26 PROCEDURE — 82962 GLUCOSE BLOOD TEST: CPT

## 2017-10-26 PROCEDURE — 74011000250 HC RX REV CODE- 250: Performed by: HOSPITALIST

## 2017-10-26 RX ORDER — THERA TABS 400 MCG
1 TAB ORAL DAILY
Status: DISCONTINUED | OUTPATIENT
Start: 2017-10-27 | End: 2017-10-30 | Stop reason: HOSPADM

## 2017-10-26 RX ORDER — POTASSIUM CHLORIDE 1.5 G/1.77G
20 POWDER, FOR SOLUTION ORAL
Status: COMPLETED | OUTPATIENT
Start: 2017-10-26 | End: 2017-10-26

## 2017-10-26 RX ADMIN — BUPROPION HYDROCHLORIDE 100 MG: 100 TABLET, FILM COATED, EXTENDED RELEASE ORAL at 09:55

## 2017-10-26 RX ADMIN — HEPARIN SODIUM 5000 UNITS: 5000 INJECTION, SOLUTION INTRAVENOUS; SUBCUTANEOUS at 09:55

## 2017-10-26 RX ADMIN — CYCLOSPORINE 1 DROP: 0.5 EMULSION OPHTHALMIC at 01:24

## 2017-10-26 RX ADMIN — SODIUM CHLORIDE 3.38 G: 900 INJECTION, SOLUTION INTRAVENOUS at 22:27

## 2017-10-26 RX ADMIN — FUROSEMIDE 40 MG: 40 TABLET ORAL at 09:54

## 2017-10-26 RX ADMIN — ROSUVASTATIN CALCIUM 10 MG: 5 TABLET ORAL at 22:28

## 2017-10-26 RX ADMIN — METOPROLOL SUCCINATE 50 MG: 50 TABLET, EXTENDED RELEASE ORAL at 09:55

## 2017-10-26 RX ADMIN — HEPARIN SODIUM 5000 UNITS: 5000 INJECTION, SOLUTION INTRAVENOUS; SUBCUTANEOUS at 18:21

## 2017-10-26 RX ADMIN — INSULIN GLARGINE 30 UNITS: 100 INJECTION, SOLUTION SUBCUTANEOUS at 22:27

## 2017-10-26 RX ADMIN — SODIUM CHLORIDE 3.38 G: 900 INJECTION, SOLUTION INTRAVENOUS at 01:23

## 2017-10-26 RX ADMIN — HEPARIN SODIUM 5000 UNITS: 5000 INJECTION, SOLUTION INTRAVENOUS; SUBCUTANEOUS at 01:25

## 2017-10-26 RX ADMIN — ASPIRIN 81 MG: 81 TABLET, COATED ORAL at 09:55

## 2017-10-26 RX ADMIN — CYCLOSPORINE 1 DROP: 0.5 EMULSION OPHTHALMIC at 22:39

## 2017-10-26 RX ADMIN — ISOSORBIDE MONONITRATE 30 MG: 30 TABLET ORAL at 09:55

## 2017-10-26 RX ADMIN — GABAPENTIN 300 MG: 300 CAPSULE ORAL at 09:54

## 2017-10-26 RX ADMIN — CYCLOSPORINE 1 DROP: 0.5 EMULSION OPHTHALMIC at 09:00

## 2017-10-26 RX ADMIN — SOLIFENACIN SUCCINATE 5 MG: 5 TABLET, FILM COATED ORAL at 09:55

## 2017-10-26 RX ADMIN — SODIUM CHLORIDE 3.38 G: 900 INJECTION, SOLUTION INTRAVENOUS at 09:54

## 2017-10-26 RX ADMIN — VANCOMYCIN HYDROCHLORIDE 1250 MG: 10 INJECTION, POWDER, LYOPHILIZED, FOR SOLUTION INTRAVENOUS at 23:20

## 2017-10-26 RX ADMIN — POTASSIUM CHLORIDE 20 MEQ: 1.5 POWDER, FOR SOLUTION ORAL at 11:56

## 2017-10-26 NOTE — ROUTINE PROCESS
Bedside and Verbal shift change report given to Argelia Lanza RN (oncoming nurse) by Yessenia Mariscal RN (offgoing nurse). Report included the following information SBAR and Kardex.

## 2017-10-26 NOTE — WOUND CARE
Physical Exam   Room 461: wound assessment  Wound Leg Lower Anterior;Right (Active) vascular wound POA   DRESSING TYPE Open to air 10/26/2017 10:44 AM   Non-Pressure Injury Partial thickness (epider/derm) 10/26/2017 10:44 AM   Wound Length (cm) 1 cm 10/26/2017 10:44 AM   Wound Width (cm) 2.5 cm 10/26/2017 10:44 AM   Wound Depth (cm) 0.1 10/26/2017 10:44 AM   Wound Surface area (cm^3) 0.25 cm^2 10/26/2017 10:44 AM   Condition of Base Granulation 10/26/2017 10:44 AM   Condition of Edges Closed 10/26/2017 10:44 AM   Epithelialization (%) 0 10/26/2017 10:44 AM   Tissue Type Red 10/26/2017 10:44 AM   Tissue Type Percent Red 100 10/26/2017 10:44 AM   Drainage Amount  Scant 10/26/2017 10:44 AM   Drainage Color Serosanguinous 10/26/2017 10:44 AM   Wound Odor None 10/26/2017 10:44 AM   Periwound Skin Condition Intact 10/26/2017 10:44 AM   Dressing Type Applied Open to air 10/26/2017 10:44 AM   Procedure Tolerated Well 10/26/2017 10:44 AM   Number of days:2   Pt agreeable to recommended treatment. Pt was incontinent of stool, pericare provided & bed pad changed. Wound care education provided to pt at this time. Will turn over care to nursing staff at this time.   Ashia YANEZN, RN, [facility] [place], [address]

## 2017-10-26 NOTE — PROGRESS NOTES
Hospitalist Progress Note      Patient: Michelle Ngo. MRN: [id number]  CSN: [id number]    YOB: 1935  Age: [de-identified] y.o. Sex: male    DOA: 10/24/2017 LOS:  LOS: 2 days          No seizure activity noted by family friends at bedside. None documented in chart. Patient is more interactive and appears to be feeling better. accucheck 62 this am.     Assessment/Plan       1. Cellulitis with associated wound RLE. Blood cx (10/24) ngtd. Wound care. Vascular consult. Abx. Duplex negative for clot RLE. 2. Dementia with behavioral disturbance   3. DM2 with hypoglycemia - ssi. A1c 7.9. Consult diabetes management. HS glucerna, nutritionist consulted  4. mild dysphagia related to confusion - SLP recs noted  5. Anemia mc hc - check studies. 6. Acute metabolic encephalopathy multifactorial in the setting of possible seizure, and acute infection  7. Possible seizure - seizure precautions, tele, neuro to consult   8. Hypokalemia replete as needed. 9. Full code. Wife at bedside. Ree Bautista (daughter) [phone]; Irineo Bosworth (spouse) [phone]    Additional Notes:      Case discussed with:  [x]Patient  []Family  [x]Nursing  []Case Management  DVT Prophylaxis:  []Lovenox  [x]Hep SQ  []SCDs  []Coumadin   []On Heparin gtt    Vital signs/Intake and Output:  Visit Vitals    /71 (BP 1 Location: Right arm, BP Patient Position: At rest)    Pulse 63    Temp 98.3 °F (36.8 °C)    Resp 18    Wt 90.5 kg (199 lb 9.6 oz)    SpO2 98%    BMI 28.64 kg/m2     Current Shift:     Last three shifts:       Awake alert and oriented x 1, improved eye contact, more interactive and appropriate. Ncat. Perrl. Ectropion b.l lower eyelid, crusting to lashes  RRR  cta b.l   Soft nt nd nabs  Trace edema b.l. dp 1+ b.l  Moving all 4s. Open wound 1.5 cm to right mid shin poa. + minimal surrounding erythema and warmth.        Medications Reviewed      Labs: Results:       Chemistry Recent Labs      10/26/17   0332  10/24/17   1810 10/23/17   2005   GLU  77  215*  200*   NA  138  137  139   K  3.4*  4.5  4.4   CL  105  101  104   CO2  27  31  32   BUN  22*  19*  19*   CREA  1.49*  1.36*  1.20   CA  7.4*  8.9  9.2   AGAP  6  5  3   BUCR  15  14  16   AP  83  129*   --    TP  5.6*  7.0   --    ALB  2.5*  3.6   --    GLOB  3.1  3.4   --    AGRAT  0.8  1.1   --       CBC w/Diff Recent Labs      10/26/17   0332  10/24/17   1810  10/23/17   2005   WBC  9.5  12.2  10.4   RBC  4.89  6.16*  6.17*   HGB  11.3*  13.9  13.9   HCT  35.8*  43.9  45.1   PLT  208  346  361   GRANS  77*  93*  62   LYMPH  11*  1*  17*   EOS  1  0  5      Cardiac Enzymes No results for input(s): CPK, CKND1, QUAN in the last 72 hours. No lab exists for component: CKRMB, TROIP   Coagulation Recent Labs      10/23/17   2005   PTP  13.4   INR  1.1   APTT  32.4       Lipid Panel Lab Results   Component Value Date/Time    Cholesterol, total 74 10/26/2017 03:32 AM    HDL Cholesterol 31 10/26/2017 03:32 AM    LDL, calculated 27.2 10/26/2017 03:32 AM    VLDL, calculated 15.8 10/26/2017 03:32 AM    Triglyceride 79 10/26/2017 03:32 AM    CHOL/HDL Ratio 2.4 10/26/2017 03:32 AM      BNP No results for input(s): BNPP in the last 72 hours. Liver Enzymes Recent Labs      10/26/17   0332   TP  5.6*   ALB  2.5*   AP  83   SGOT  39*      Thyroid Studies Lab Results   Component Value Date/Time    TSH 1.37 02/22/2017 12:03 PM        Procedures/imaging: see electronic medical records for all procedures/Xrays and details which were not copied into this note but were reviewed prior to creation of Plan.

## 2017-10-26 NOTE — DIABETES MGMT
Glycemic Control Plan of Care    POC BG range on 10/25/2017: 125-155 mg/dL. POC BG report on 10/26/2017 at time of review: 62 (time: 8:35 AM), 160 mg/dL. No hypoglycemia treatment noted. Patient stated that he ate his breakfast. Discussed with Haja Waters RN, hospital treatment process for hypoglycemia. Noted Dr. Johnnie Ling already ordered bedtime snack. Patient awake, alert and oriented x3. Completed assessment of home diabetes management and education. Patient stated that he is on lantus insulin 30 units daily at bedtime at home, but regularly eat chocolate cakes. See separate education notes, 10/26/2017. Recommendation(s):  1.) Continue POC BG monitoring and discussed with nursing to follow hospital policy for treatment of hypoglycemia. Assessment:  Patient is [de-identified]year old with past medical history including type 2 diabetes mellitus, hypertension, carotid artery occlusion, CKD stage 3, venous peripheral insufficiency, CAD with CABG, dyslipidemia, skin cancer, and COPD - was admitted on 10/24/2017 with report of worsening redness and swelling of right lower leg. Noted:  Cellulitis right leg. Type 2 diabetes mellitus with current A1c of 7.9% (10/26/2017). Most recent blood glucose values:    Results for Mukesh Cunningham (MRN [id number]) as of 10/26/2017 13:24   Ref. Range 10/25/2017 03:16 10/25/2017 15:39   GLUCOSE,FAST - POC Latest Ref Range: 70 - 110 mg/dL 125 (H) 155 (H)     Results for Mukesh Cunningham (MRN [id number]) as of 10/26/2017 13:24   Ref. Range 10/26/2017 08:55 10/26/2017 12:48   GLUCOSE,FAST - POC Latest Ref Range: 70 - 110 mg/dL 62 (L) 160 (H)     Current A1C: 7.9% (10/26/2017) is equivalent to average blood glucose of 180 mg/dL during the last 2-3 months. Current hospital diabetes medications:  Basal lantus insulin 30 units daily at bedtime.     Total daily dose insulin requirement previous day: 10/25/2017  Lantus: 30 units    Home diabetes medications: Patient seen earlier this afternoon with a family member visiting at bedside and obtained the following information:  Lantus 30 units daily at bedtime. Diet: Diabetic consistent carb regular; no concentrated sweets; HS snack    Goals:  Blood glucose will be within target range of  mg/dL by 10/29/2017.     Education:  ___  Refer to Diabetes Education Record             _X__  Education not indicated at this time    Celia Douglass RN

## 2017-10-26 NOTE — PROGRESS NOTES
0900- Assisted patient with breakfast.    1542- Back to the room from procedure. 1720- Dressing change to the right leg as per order. Pt. Resting quietly in bed.

## 2017-10-26 NOTE — PROGRESS NOTES
Problem: Mobility Impaired (Adult and Pediatric)  Goal: *Acute Goals and Plan of Care (Insert Text)  STG's to be addressed within 3 days:  1. Bed mobility:  Supine to sit to supine S with HR for meals. 2. Activity tolerance: Tolerate up in chair 1-2 hrs for ADLs. 3. Transfers:  Sit to stand to chair S with LRAD for ADL's. LTG's to be addressed within 7 days:  1. Standing/Ambulation Balance:  Increase to Good with LRAD for safe transfers and gait. 2. Ambulation:  Ambulate > 200 ft. S with LRAD for home mobility. 3. Patient Education:  Independent with HEP for home safety. Outcome: Progressing Towards Goal  physical Therapy EVALUATION    Patient: Silver Oconnor. [de-identified] y.o. male)  Date: 10/26/2017  Primary Diagnosis: Cellulitis, leg  Open wound  Poorly controlled diabetes mellitus (HCC)  Cellulitis, leg  Precautions:   Fall    ASSESSMENT :  Based on the objective data described below, the patient presents to PT with decreased functional mobility with regard to bed mobility, transfers, gait and overall tolerance for activity. Patient's spouse present at bedside, reports that patient requires increased assistance with ADLs, was utilizing a cane for ambulation, has increased number of falls PTA. Patient with new onset seizure, R LE cellulitis. Patient received in bed, confused, oriented to self only, demonstrates impulsivity and poor safety awareness. Patient require mod A for bed mobility, constant verbal/tactile/manual cues for hand placement and task sequencing. Patient able to transition into standing min A with RW, mild unsteadiness with initial stand. Patient noted to have soiled linens with urine and BM, CNA in at bedside for dafne-care. Patient able to ambulate ~ 125 ft min A with RW, noted path deviations, constant verbal/tactile/visual/manual cues provided for correction and for safety.   Educated patient with maintaining appropriate CoG over Serafin within RW, limited carry over noted secondary to confusion. Patient able to return back to room, set up in chair at bedside for dinner, spouse at bedside. Instructed patient/spouse to call for assistance when patient is ready to return to bed or when spouse is leaving, both patient and spouse verbalized comprehension. Patient would benefit from PT to address above impairments and assist with discharge planning. Patient will benefit from skilled intervention to address the above impairments. Patients rehabilitation potential is considered to be Fair  Factors which may influence rehabilitation potential include:   []         None noted  [x]         Mental ability/status  [x]         Medical condition  []         Home/family situation and support systems  [x]         Safety awareness  []         Pain tolerance/management  []         Other:      PLAN :  Recommendations and Planned Interventions:  [x]           Bed Mobility Training             [x]    Neuromuscular Re-Education  [x]           Transfer Training                   []    Orthotic/Prosthetic Training  [x]           Gait Training                          []    Modalities  [x]           Therapeutic Exercises          []    Edema Management/Control  [x]           Therapeutic Activities            [x]    Patient and Family Training/Education  []           Other (comment):    Frequency/Duration: Patient will be followed by physical therapy daily x 4-7 x week to address goals. [facility] [address] [facility]  Further Equipment Recommendations for Discharge: rolling walker     SUBJECTIVE:   Patient stated Oh hello.     OBJECTIVE DATA SUMMARY:     Past Medical History:   Diagnosis Date    Angina     Anxiety     Arthritis     CAD (coronary artery disease)     s/p drug-eluting stents to RCA for high-grade stenoses in 2/09    Cardiac catheterization 08/24/2016    Mod calcification. Co-dom. oLM 50-70%. LAD 30%. Dx 30%.   Cx 70% focal.  OM 80% focal.  RCA 30%.  Cardiac echocardiogram 08/23/2016    Tech difficult. EF 55-60%. No WMA. Normal diastolic fx. Lipomatous septal hypertrophy.  Cardiac nuclear imaging test, abnormal 08/23/2016    Sm reversible inferior defect concerning for ischemia. Mild inferior hypk. EF 68%. Neg EKG on pharm stress test.    Carotid duplex 08/26/2016    Mild <50% CYRUS stenosis. Chronic LICA occlusion. Similar to study of 3/29/16.     Carotid stenosis (HCC)     w/ known total occlusion of lt internal carotid artery; followed by pts vascular surgeon    Chronic kidney disease     COPD (chronic obstructive pulmonary disease) ([address]     Dementia     Depression     Diabetes ([address]     type 2    Dyslipidemia     on Crestor; managed by pts PCP    Dyspnea     Heart disease     Hypercholesterolemia     Hypertension     Low back pain     Osteoarthrosis involving multiple sites     Skin cancer ([address]     squamous cell lesions of the skin    Stroke [facility]     Venous (peripheral) insufficiency      Past Surgical History:   Procedure Laterality Date    CARDIAC CATHETERIZATION  8/24/2016         CORONARY ARTERY ANGIOGRAM  8/24/2016         HX ANGIOPLASTY  2009    2 stents placed and heart attack during the procedure    HX COLONOSCOPY  10/2003    neg; declines f/u    HX CORONARY ARTERY BYPASS GRAFT  09/2016    HX HEART CATHETERIZATION  5/2013    HX HERNIA REPAIR      1970s    HX OTHER SURGICAL      groin surgery    HX TONSIL AND ADENOIDECTOMY      IR INTRAVASCULAR ULTRASOUND INITIAL  8/24/2016         LV ANGIOGRAPHY  8/24/2016          Barriers to Learning/Limitations: yes;  altered mental status (Confusion)  Compensate with: Visual Cues, Verbal Cues and Tactile Cues  Prior Level of Function/Home Situation:   Home Situation  Home Environment: Private residence  # Steps to Enter: 1  One/Two Story Residence: Two story, live on 1st floor  # of Interior Steps: 14  Interior Rails: None  Lift Chair Available: Yes  Living Alone: No  Support Systems: Spouse/Significant Other/Partner  Patient Expects to be Discharged to[de-identified] Private residence  Current DME Used/Available at Home: [address]  Critical Behavior:  Neurologic State: Alert;Confused  Psychosocial  Patient Behaviors: Calm;Confused; Cooperative  Family  Behaviors: Cooperative;Supportive  Strength:    Strength: Generally decreased, functional (B LE 3/5)  Tone & Sensation:   Tone: Normal (B LE)  Sensation: Intact (B LE intact to LT)   Range Of Motion:  AROM: Generally decreased, functional (B LE)  Functional Mobility:  Bed Mobility:  Rolling: Minimum assistance  Supine to Sit: Moderate assistance  Scooting: Minimum assistance  Transfers:  Sit to Stand: Minimum assistance; Additional time (with RW)  Stand to Sit: Contact guard assistance; Additional time (with RW)  Balance:   Sitting: Impaired; With support  Sitting - Static: Fair (occasional)  Sitting - Dynamic: Fair (occasional)  Standing: Impaired;Pull to stand; With support  Standing - Static: Fair;Constant support (with RW)  Standing - Dynamic : Fair (with RW)  Ambulation/Gait Training:  Distance (ft): 125 Feet (ft)  Assistive Device: Gait belt;Walker, rolling  Ambulation - Level of Assistance: Minimal assistance  Base of Support: Narrowed  Speed/Chanel: Pace decreased (<100 feet/min); Slow;Shuffled  Interventions: Visual/Demos; Verbal cues; Tactile cues; Safety awareness training;Manual cues  Pain:  Pain Scale 1: Numeric (0 - 10)  Pain Intensity 1: 0  Activity Tolerance:   Fair/good  Please refer to the flowsheet for vital signs taken during this treatment.   After treatment:   [x] Patient left in no apparent distress sitting up in chair  [] Patient left sitting on EOB  [] Patient left in no apparent distress in bed  [] Patient declined to be OOB at this time due to   [x] Call bell left within reach  [] Nursing notified  [x] Caregiver present  [] Bed alarm activated    COMMUNICATION/EDUCATION:   [x]         Fall prevention education was provided and the patient/caregiver indicated understanding. [x]         Patient/family have participated as able in goal setting and plan of care. [x]         Patient/family agree to work toward stated goals and plan of care. []         Patient understands intent and goals of therapy, but is neutral about his/her participation. []         Patient is unable to participate in goal setting and plan of care.     Thank you for this referral.  Calista Foster, PT   Time Calculation: 31 mins     G-codes:  Eval Complexity: History: MEDIUM  Complexity : 1-2 comorbidities / personal factors will impact the outcome/ POC Exam:MEDIUM Complexity : 3 Standardized tests and measures addressing body structure, function, activity limitation and / or participation in recreation  Presentation: MEDIUM Complexity : Evolving with changing characteristics  Overall Complexity:MEDIUM

## 2017-10-26 NOTE — CONSULTS
Surgery Consult      Patient: Migdalia Overton MRN: [id number]  CSN: [id number]      YOB: 1935    Age: [de-identified] y.o. Sex: male      DOA: 10/24/2017       HPI:     Migdalia Overton is a [de-identified] y.o. male who presents with bilateral lower extremities with ulcers. Worse on the right then left. Unclear if he has any claudication but does seem that he has good days and bad days and seems to have more pain and discomfort when more swollen. No rest pain. Seems somewhat out of it and not fully clear with his care currently. States no previous dvt or PE. Does have varicose veins. Past Medical History:   Diagnosis Date    Angina     Anxiety     Arthritis     CAD (coronary artery disease)     s/p drug-eluting stents to RCA for high-grade stenoses in 2/09    Cardiac catheterization 08/24/2016    Mod calcification. Co-dom. oLM 50-70%. LAD 30%. Dx 30%. Cx 70% focal.  OM 80% focal.  RCA 30%.  Cardiac echocardiogram 08/23/2016    Tech difficult. EF 55-60%. No WMA. Normal diastolic fx. Lipomatous septal hypertrophy.  Cardiac nuclear imaging test, abnormal 08/23/2016    Sm reversible inferior defect concerning for ischemia. Mild inferior hypk. EF 68%. Neg EKG on pharm stress test.    Carotid duplex 08/26/2016    Mild <50% CYRUS stenosis. Chronic LICA occlusion. Similar to study of 3/29/16.     Carotid stenosis (HCC)     w/ known total occlusion of lt internal carotid artery; followed by pts vascular surgeon    Chronic kidney disease     COPD (chronic obstructive pulmonary disease) ([address]     Dementia     Depression     Diabetes ([address]     type 2    Dyslipidemia     on Crestor; managed by pts PCP    Dyspnea     Heart disease     Hypercholesterolemia     Hypertension     Low back pain     Osteoarthrosis involving multiple sites     Skin cancer ([address]     squamous cell lesions of the skin    Stroke ([address]     Venous (peripheral) insufficiency        Past Surgical History: Procedure Laterality Date    CARDIAC CATHETERIZATION  8/24/2016         CORONARY ARTERY ANGIOGRAM  8/24/2016         HX ANGIOPLASTY  2009    2 stents placed and heart attack during the procedure    HX COLONOSCOPY  10/2003    neg; declines f/u    HX CORONARY ARTERY BYPASS GRAFT  09/2016    HX HEART CATHETERIZATION  5/2013    HX HERNIA REPAIR      1970s    HX OTHER SURGICAL      groin surgery    HX TONSIL AND ADENOIDECTOMY      IR INTRAVASCULAR ULTRASOUND INITIAL  8/24/2016         LV ANGIOGRAPHY  8/24/2016            Family History   Problem Relation Age of Onset    Parkinsonism Mother     Hypertension Mother     Cancer Father      lung    Heart defect Brother        Social History     Social History    Marital status:      Spouse name: N/A    Number of children: N/A    Years of education: N/A     Social History Main Topics    Smoking status: Former Smoker     Years: 2.00     Quit date: 1/1/1955    Smokeless tobacco: Never Used    Alcohol use Yes      Comment: drinks weekly couple beers    Drug use: No    Sexual activity: No     Other Topics Concern    None     Social History Narrative       Prior to Admission medications    Medication Sig Start Date End Date Taking? Authorizing Provider   trimethoprim-sulfamethoxazole (BACTRIM DS) 160-800 mg per tablet Take 1 Tab by mouth two (2) times a day for 7 days. 10/23/17 10/30/17  Giselle Carter MD   furosemide (LASIX) 40 mg tablet 1 qam 9/29/17   Roxanna Vizcarra MD   potassium chloride (KLOR-CON) 10 mEq tablet 1 every day with lasix 9/29/17   Roxanna Vizcarra MD   rosuvastatin (CRESTOR) 10 mg tablet Take 1 Tab by mouth nightly.  9/29/17   Roxanna Vizcarra MD   isosorbide mononitrate ER (IMDUR) 30 mg tablet TAKE 1 TAB BY MOUTH EVERY MORNING. 7/26/17   Historical Provider   metoprolol succinate (TOPROL-XL) 50 mg XL tablet TAKE 1 TABLET BY MOUTH ONCE DAILY 7/10/17   Lenore Navarro MD   solifenacin (VESICARE) 5 mg tablet Take 1 Tab by mouth daily. Indications: BLADDER HYPERACTIVITY, INCREASED URINARY FREQUENCY, URINARY URGE INCONTINENCE, URINARY URGENCY 2/27/17   America Hickman MD   aspirin delayed-release 81 mg tablet Take 1 Tab by mouth daily. 9/2/16   Flakita Dunham PA-C   acetaminophen (TYLENOL) 325 mg tablet Take 2 Tabs by mouth every four (4) hours as needed. Patient taking differently: Take 2 Tabs by mouth every six (6) hours as needed for Pain. 9/2/16   Yelena Hill PA-C   COMPOUNDMAX BASE crea 240 g by Does Not Apply route four (4) times daily. Anti-Inflam Meloxicam 0.09% Topirmate 1% Methocarbamol 2%  Lidocaine 2% Prilocaine 2% 2/17/16   Martina Kaminski, KATHY   buPROPion SR [facility] SR) 100 mg SR tablet 1 bid 12/4/15   Mikaela Jefferson MD   SHAWN PEN NEEDLE 32 x 5/32 \" ndle Use as directed. 10/20/15   Historical Provider   gabapentin (NEURONTIN) 300 mg capsule Take 1 Cap by mouth daily. Indications: NEUROPATHIC PAIN, RESTLESS LEGS SYNDROME 11/3/15   Martina Kaminski, NP   insulin glargine (LANTUS SOLOSTAR) 100 unit/mL (3 mL) pen 30 Units by SubCUTAneous route nightly. 30 units     Historical Provider   MULTIVITAMINS W-MINERALS/LUT (CENTRUM SILVER PO) Take 1 Tab by mouth daily. Historical Provider   CALCIUM CARBONATE (CALTRATE 600 PO) Take 1 Tab by mouth daily.     Historical Provider       Allergies   Allergen Reactions    Amaryl [Glimepiride] Drowsiness    Lipitor [Atorvastatin] Myalgia    Niacin Other (comments)     Facial blistering, swelling in face    Pravachol [Pravastatin] Myalgia    Zocor [Simvastatin] Myalgia       Physical Exam:      Visit Vitals    /62 (BP 1 Location: Left arm, BP Patient Position: At rest)    Pulse 68    Temp 97.3 °F (36.3 °C)    Resp 18    Wt 199 lb 9.6 oz (90.5 kg)    SpO2 96%    BMI 28.64 kg/m2       GENERAL: alert, cooperative, distracted, no distress, appears older than stated age, pale, THROAT & NECK: normal and no erythema or exudates noted. , LUNG: clear to auscultation bilaterally, HEART: regular rate and rhythm, S1, S2 normal, no murmur, click, rub or gallop, ABDOMEN: soft, non-tender. Bowel sounds normal. No masses,  no organomegaly, EXTREMITIES:  edema trace on RLE and none on LLE does have varicose veins of bilateral lower extremities. Some errythema of RLE calf with numerous small skin ulcers only involving skin layer with 100% fibrinous exudate no purulence identified and they are circumferential around calf. LLE is free from ulcers, NEUROLOGIC: AOx2. Cranial nerves 2-12 and sensation grossly intact. ROS:  Constitutional: negative  Eyes: negative  Ears, nose, mouth, throat, and face: negative  Respiratory: negative  Cardiovascular: negative  Gastrointestinal: negative  Genitourinary:negative  Hematologic/lymphatic: negative  Musculoskeletal:negative  Neurological: negative  Behavioral/Psych: negative  Endocrine: negative  Allergic/Immunologic: negative  Unless otherwise mentioned in the HPI. Data Review:    CBC:   Lab Results   Component Value Date/Time    WBC 9.5 10/26/2017 03:32 AM    RBC 4.89 10/26/2017 03:32 AM    HGB 11.3 10/26/2017 03:32 AM    HCT 35.8 10/26/2017 03:32 AM    PLATELET 077 50/05/5722 03:32 AM      BMP:   Lab Results   Component Value Date/Time    Glucose 77 10/26/2017 03:32 AM    Sodium 138 10/26/2017 03:32 AM    Potassium 3.4 10/26/2017 03:32 AM    Chloride 105 10/26/2017 03:32 AM    CO2 27 10/26/2017 03:32 AM    BUN 22 10/26/2017 03:32 AM    Creatinine 1.49 10/26/2017 03:32 AM    Calcium 7.4 10/26/2017 03:32 AM     Coagulation:   Lab Results   Component Value Date/Time    Prothrombin time 13.4 10/23/2017 08:05 PM    INR 1.1 10/23/2017 08:05 PM    aPTT 32.4 10/23/2017 08:05 PM         Assessment/Plan     [de-identified] y/o male with swelling of RLE with ulcers. --Needs outpatient workup with CVI ultrasound  --Continue abx  --Wrap leg with kerlex and ace bandage from base of toes to knee.   --will get arterial ultrasound for baseline imaging  --Likely venous ulcers will improve with compression of leg. --Please call with any further questions or concerns.     Active Problems:    Open wound (10/24/2017)      Cellulitis, leg (10/24/2017)      Poorly controlled diabetes mellitus ([facility] (10/24/2017)        Consuelo Simpson MD  October 26, 2017

## 2017-10-26 NOTE — DIABETES MGMT
Diabetes Patient/Family Education Record    Factors That  May Influence Patients Ability  to Learn or  Comply with Recommendations   []   Language barrier    []   Cultural needs   []   Motivation    []   Cognitive limitation    []   Physical   [x]   Education    []   Physiological factors   []   Hearing/vision/speaking impairment   []   Buddhism beliefs    []   Financial factors   []  Other:   []  No factors identified at this time. Person Instructed:   [x]   Patient   []   Family   []  Other     Preference for Learning:   [x]   Verbal   [x]   Written   []  Demonstration     Level of Comprehension & Competence:    [x]  Good                                      [] Fair                                     []  Poor                             []  Needs Reinforcement   [x]  Teachback completed    Education Component:   [x]  Medication management, including how to administer insulin (if appropriate) and potential medication interactions: Patient stated that he is on lantus insulin 30 units daily before bedtime. [x]  Nutritional management: Patient stated, \"I love eating chocolate cakes all the time and it's delicious. \" Educated patient on the importance of limiting intake of concentrated sweets such as chocolate cakes and he agreed to eat only twice a week instead of everyday. []  Exercise   [x]  Signs, symptoms, and treatment of hyperglycemia and hypoglycemia   [] Prevention, recognition and treatment of hyperglycemia and hypoglycemia   [x]  Importance of blood glucose monitoring and how to obtain a blood glucose meter: Patient stated that he has BG meter and testing supplies at home.  Encouraged patient to check his blood sugar daily as recommended by his medical provider.    []  Instruction on use of the blood glucose meter   [x]  Discuss the importance of HbA1C monitoring: Discussed with patient A1c 7.9% (10/26/2017) which is equivalent to average blood glucose of 180 mg/dL during the past 2-3 months. Upon further discussion, patient reported pattern of elevated fasting blood sugar above 160 and stated, \"I love eating chocolate cake. \" Educated patient on nutrition. See notes above.    []  Sick day guidelines   [x]  Proper use and disposal of lancets, needles, syringes or insulin pens (if appropriate)   []  Potential long-term complications (retinopathy, kidney disease, neuropathy, foot care)   [x] Information about whom to contact in case of emergency or for more information    [x]  Goal:  Patient/family will demonstrate understanding of Diabetes Self Management Skills by: 10/26/2017  Plan for post-discharge education or self-management support:    [x] Outpatient class schedule provided            [] Patient Declined    [] Scheduled for outpatient classes (date) _______         Hernán Obando RN

## 2017-10-26 NOTE — PROGRESS NOTES
Care Management Interventions  PCP Verified by CM: Yes  Physical Therapy Consult: Yes  Occupational Therapy Consult: Yes  Speech Therapy Consult: Yes  Current Support Network: Lives with Spouse  Plan discussed with Pt/Family/Caregiver: Yes     Pt is a [de-identified]year old male admitted for cellulitis, leg, open wound. Pt is alert and confused in room. Pt's spouse available and shares her concerns regarding her caring for pt. Pt's spouse inquired about options for placement. She was provided with SNF listing. Pt's spouse mentions that pt does not qualify for Medicaid due to assets, she informs that they may have long term care insurance. CM provided her with additional resources for Assisted Living-Memory care, [facility]. Pt's spouse shares that they have a daughter who is a  and lives in [place].

## 2017-10-26 NOTE — PROGRESS NOTES
Problem: Falls - Risk of  Goal: *Absence of Falls  Document Pan Fall Risk and appropriate interventions in the flowsheet.    Outcome: Progressing Towards Goal  Fall Risk Interventions:       Mentation Interventions: Bed/chair exit alarm, Adequate sleep, hydration, pain control, Evaluate medications/consider consulting pharmacy    Medication Interventions: Bed/chair exit alarm, Evaluate medications/consider consulting pharmacy, Patient to call before getting OOB, Teach patient to arise slowly    Elimination Interventions: Bed/chair exit alarm, Call light in reach, Patient to call for help with toileting needs    History of Falls Interventions: Bed/chair exit alarm, Door open when patient unattended

## 2017-10-26 NOTE — PROCEDURES
[facility]  *** FINAL REPORT ***    Name: Nita Krishnamurthy  MRN: [id number]    Inpatient  : 1935  HIS Order #: [id number]  [address] [facility] Visit #: [id number]  Date: 26 Oct 2017    TYPE OF TEST: Peripheral Arterial Testing    REASON FOR TEST    Right Leg  Doppler:    Normal  Ankle/Brachial: 0.97    Left Leg  Doppler:    Normal  Ankle/Brachial: 0.99    INTERPRETATION/FINDINGS  Physiologic testing was performed using continuous wave Doppler and  segmental pressures. 1. No evidence of arterial insufficiency in the bilateral lower  extremities at rest.  2. The right ankle/brachial index is 0.97 and the left ankle/brachial  index is 0.99. ADDITIONAL COMMENTS    I have personally reviewed the data relevant to the interpretation of  this  study.     TECHNOLOGIST: Erika Cordova RVT  Signed: 10/26/2017 03:44 PM    PHYSICIAN: Malena Holguin MD  Signed: 10/27/2017 09:46 AM

## 2017-10-26 NOTE — CONSULTS
[facility] [address]    Name:  Malu Rivera  MR#:  [id number]  :  1935  Account #:  [de-identified]  Date of Adm:  10/24/2017  Date of Consultation:  10/26/2017      REASON FOR CONSULTATION: New-onset seizure. HISTORY OF PRESENT ILLNESS: This is an 82-year-old man who  had 2 seizure events as documented on the chart on 10/25/2017 at  2:47 a.m. where the patient was acting strange for a couple of days  and then had an episode where he had an extension of his arms and  had seizure-like behavior. The patient is on Wellbutrin. He cannot tell  me how long he has been on Wellbutrin other than \"I been on it a  while. \"    Other medications include  1. Lasix. 2. Neurontin 300 mg a day. 3. Insulin. 4. Isosorbide. 5. Ativan as needed. 6. Metoprolol. 7. Zofran. 8. Currently on piperacillin tazobactam.  9. Crestor. 10. Vesicare. 11. Vancomycin. 12. Multivitamin. Otherwise, the patient's past medical history, social history, family  history and review of systems cannot be obtained in any certainty, as  he clearly has a dementing process. On the chart, it is noted that he  has known coronary artery disease, carotid vascular disease with  carotid stenosis, renal insufficiency, dementia, depression, type 2  diabetes, dyslipidemia, and history of stroke. FAMILY HISTORY: Mother with Parkinson's. He was on the Wellbutrin in the outpatient setting. PHYSICAL EXAMINATION  VITAL SIGNS: Shows a blood pressure of 134/62. 96% saturation. Nontachypneic, pulse 68, afebrile. NEUROLOGIC: Pleasant, awake. States that he has to [employer] back to [place], he is in charge of a fire storm. \" He told the dietitian that  he was losing weight and that his normal weight was 150 pounds,  although he weighs 200 pounds on admission today. He recognizes he  is in [place]. He can register, but had difficulty recalling. He did not  drift. Gaze is conjugate. Pupils were reactive.  He did not have  asterixis. Reflexes were nonpathologic. His heart was regular. His A1c  is 7.9. His GFR is 45. ASSESSMENT: Asked to provide input in a patient who has had 2  events consistent with seizure. EEG shows some diffuse mild slowing,  reasonably appropriate for his age. Likely an underlying dementing  process. The patient is on Wellbutrin. In patients with new onset  seizures who are on bupropion, I discontinue the bupropion. It is a  common side effect that this drug could cause seizure. If seizure  events continue off of bupropion, other causes of seizure including an  MRI of the brain should be explored. Also noted that the patient is on  VESIcare. Be aware that it may have affect the patient's underlying  dementing process, as it does have an anticholinergic quality. If not  needed or if it does not give adequate benefit for whatever it is treating,  please discontinue it. I have no further recommendations. MD MING Jimenez / Ame Mcgee  D:  10/26/2017   15:12  T:  10/26/2017   15:54  Job #:  [id number]

## 2017-10-26 NOTE — PROGRESS NOTES
NUTRITION    Nutrition Consult: General Nutrition Management & Supplements     RECOMMENDATIONS / PLAN:     - Assistance with meals  - Add chopped meats, chopped vegetables, and exclude knife (due to confusion of which silverware to use)   - Continue RD inpatient monitoring and evaluation. NUTRITION INTERVENTIONS & DIAGNOSIS:     [x] Meals/Snacks: modified diet  [x] Medical food supplementation: Glucerna Shake once daily  [x] Coordination of care: discussed with RN that pt needs assistance with meals. Nutrition Diagnosis:  Inadequate oral intake related to decreased appetite as evidenced by poor meal intake x 2 weeks PTA    ASSESSMENT:     Pt had poor appetite and meal intake x 2 weeks PTA per wife. Had good appetite today, but difficulty with meals due to his dementia. Discussed modifying diet order; wife agreed with plan. Per wife, pt having confusion over which silverware to use during meals. Pt needs assistance with meals. Average po intake adequate to meet patients estimated nutritional needs:   [] Yes     [x] No   [] Unable to determine at this time    Diet: DIET DIABETIC WITH OPTIONS Consistent Carb 1800kcal; Regular; No Conc. Sweets  DIET NUTRITIONAL SUPPLEMENTS HS Snack; [facility]      Food Allergies: none known   Current Appetite:   [] Good     [x] Fair     [] Poor     [] Other:  Appetite/meal intake prior to admission:   [] Good     [] Fair     [x] Poor (x 2 weeks PTA)     [] Other:  Feeding Limitations:  [] Swallowing difficulty    [] Chewing difficulty    [] Other:  Current Meal Intake: No data found.       BM:  10/25  Skin Integrity: vascular wound right lower leg  Edema: 1+ LLE, LUE  Pertinent Medications: Reviewed    Recent Labs      10/26/17   0332  10/24/17   1810  10/23/17   2005   NA  138  137  139   K  3.4*  4.5  4.4   CL  105  101  104   CO2  27  31  32   GLU  77  215*  200*   BUN  22*  19*  19*   CREA  1.49*  1.36*  1.20   CA  7.4*  8.9  9.2   MG  2.0   --    --    ALB  2.5*  3.6 --    SGOT  39*  30   --    ALT  25  26   --      No intake or output data in the 24 hours ending 10/26/17 1726    Anthropometrics:  Ht Readings from Last 1 Encounters:   10/23/17 5' 10\" (1.778 m)     Last 3 Recorded Weights in this Encounter    10/25/17 2144   Weight: 90.5 kg (199 lb 9.6 oz)     Body mass index is 28.64 kg/(m^2). Weight History:  Per wife report, pt lost weight a year ago in September 2016. Since then pt's wt has remained stable. Weight Metrics 10/25/2017 10/23/2017 9/29/2017 9/26/2017 6/22/2017 5/31/2017 2/27/2017   Weight 199 lb 9.6 oz 200 lb 202 lb 197 lb 200 lb 195 lb 204 lb   BMI 28.64 kg/m2 28.7 kg/m2 28.98 kg/m2 28.27 kg/m2 28.7 kg/m2 27.98 kg/m2 29.27 kg/m2        Admitting Diagnosis: Cellulitis, leg  Open wound  Poorly controlled diabetes mellitus (HCC)  Cellulitis, leg  Pertinent PMHx: CAD, CKD, COPD, dementia, depression, DM, dyslipidemia, heart disease, hypercholesterolemia, HTN, stroke, skin cancer    Education Needs:        [x] None identified  [] Identified - Not appropriate at this time  []  Identified and addressed - refer to education log  Learning Limitations:   [] None identified  [x] Identified: dementia    Cultural, Adventist & ethnic food preferences:  [x] None identified    [] Identified and addressed     ESTIMATED NUTRITION NEEDS:     Calories: 5859-5142 kcal (MSJx1.2-1.3) based on  [x] Actual BW: 91 kg      [] IBW   Protein: 73-91 gm (0.8-1 gm/kg) based on  [x] Actual BW      [] IBW   Fluid: 1 mL/kcal     MONITORING & EVALUATION:     Nutrition Goal(s):   1. Po intake of meals will meet >75% of patient estimated nutritional needs within the next 7 days.   Outcome:  [] Met/Ongoing    []  Not Met    [x] New/Initial Goal     Monitoring:   [x] Diet tolerance   [x] Meal intake   [x] Supplement intake   [] GI symptoms/ability to tolerate po diet   [] Respiratory status   [] Plan of care      Previous Recommendations (for follow-up assessments only):     [] Implemented       []   Not Implemented (RD to address)     [] No Recommendation Made     Discharge Planning:  Diabetic, cardiac diet   [x] Participated in care planning, discharge planning, & interdisciplinary rounds as appropriate      Myrl Lesches, RD   Pager: [phone]

## 2017-10-27 LAB
ANION GAP SERPL CALC-SCNC: 4 MMOL/L (ref 3–18)
BASOPHILS # BLD: 0 K/UL (ref 0–0.1)
BASOPHILS NFR BLD: 0 % (ref 0–2)
BUN SERPL-MCNC: 18 MG/DL (ref 7–18)
BUN/CREAT SERPL: 14 (ref 12–20)
CALCIUM SERPL-MCNC: 7.4 MG/DL (ref 8.5–10.1)
CHLORIDE SERPL-SCNC: 104 MMOL/L (ref 100–108)
CO2 SERPL-SCNC: 29 MMOL/L (ref 21–32)
CREAT SERPL-MCNC: 1.25 MG/DL (ref 0.6–1.3)
DATE LAST DOSE: NORMAL
DIFFERENTIAL METHOD BLD: ABNORMAL
EOSINOPHIL # BLD: 0.3 K/UL (ref 0–0.4)
EOSINOPHIL NFR BLD: 3 % (ref 0–5)
ERYTHROCYTE [DISTWIDTH] IN BLOOD BY AUTOMATED COUNT: 19.3 % (ref 11.6–14.5)
GLUCOSE BLD STRIP.AUTO-MCNC: 120 MG/DL (ref 70–110)
GLUCOSE BLD STRIP.AUTO-MCNC: 140 MG/DL (ref 70–110)
GLUCOSE BLD STRIP.AUTO-MCNC: 72 MG/DL (ref 70–110)
GLUCOSE SERPL-MCNC: 76 MG/DL (ref 74–99)
HCT VFR BLD AUTO: 37.7 % (ref 36–48)
HGB BLD-MCNC: 11.8 G/DL (ref 13–16)
LYMPHOCYTES # BLD: 1.4 K/UL (ref 0.9–3.6)
LYMPHOCYTES NFR BLD: 13 % (ref 21–52)
MAGNESIUM SERPL-MCNC: 2 MG/DL (ref 1.6–2.6)
MCH RBC QN AUTO: 22.9 PG (ref 24–34)
MCHC RBC AUTO-ENTMCNC: 31.3 G/DL (ref 31–37)
MCV RBC AUTO: 73.2 FL (ref 74–97)
MONOCYTES # BLD: 1.4 K/UL (ref 0.05–1.2)
MONOCYTES NFR BLD: 13 % (ref 3–10)
NEUTS SEG # BLD: 7.5 K/UL (ref 1.8–8)
NEUTS SEG NFR BLD: 71 % (ref 40–73)
PLATELET # BLD AUTO: 214 K/UL (ref 135–420)
PMV BLD AUTO: 10.2 FL (ref 9.2–11.8)
POTASSIUM SERPL-SCNC: 3.3 MMOL/L (ref 3.5–5.5)
RBC # BLD AUTO: 5.15 M/UL (ref 4.7–5.5)
REPORTED DOSE,DOSE: NORMAL UNITS
REPORTED DOSE/TIME,TMG: 2200
SODIUM SERPL-SCNC: 137 MMOL/L (ref 136–145)
VANCOMYCIN TROUGH SERPL-MCNC: 10.8 UG/ML (ref 10–20)
WBC # BLD AUTO: 10.7 K/UL (ref 4.6–13.2)

## 2017-10-27 PROCEDURE — 80202 ASSAY OF VANCOMYCIN: CPT | Performed by: HOSPITALIST

## 2017-10-27 PROCEDURE — 65270000029 HC RM PRIVATE

## 2017-10-27 PROCEDURE — 74011250637 HC RX REV CODE- 250/637: Performed by: HOSPITALIST

## 2017-10-27 PROCEDURE — 82962 GLUCOSE BLOOD TEST: CPT

## 2017-10-27 PROCEDURE — 74011636637 HC RX REV CODE- 636/637: Performed by: HOSPITALIST

## 2017-10-27 PROCEDURE — 97535 SELF CARE MNGMENT TRAINING: CPT

## 2017-10-27 PROCEDURE — 97166 OT EVAL MOD COMPLEX 45 MIN: CPT

## 2017-10-27 PROCEDURE — 74011000258 HC RX REV CODE- 258: Performed by: HOSPITALIST

## 2017-10-27 PROCEDURE — 74011000250 HC RX REV CODE- 250: Performed by: HOSPITALIST

## 2017-10-27 PROCEDURE — 36415 COLL VENOUS BLD VENIPUNCTURE: CPT | Performed by: HOSPITALIST

## 2017-10-27 PROCEDURE — 92526 ORAL FUNCTION THERAPY: CPT

## 2017-10-27 PROCEDURE — 74011250636 HC RX REV CODE- 250/636: Performed by: HOSPITALIST

## 2017-10-27 PROCEDURE — 85025 COMPLETE CBC W/AUTO DIFF WBC: CPT | Performed by: HOSPITALIST

## 2017-10-27 PROCEDURE — 83735 ASSAY OF MAGNESIUM: CPT | Performed by: HOSPITALIST

## 2017-10-27 PROCEDURE — 77030027138 HC INCENT SPIROMETER -A

## 2017-10-27 PROCEDURE — 80048 BASIC METABOLIC PNL TOTAL CA: CPT | Performed by: HOSPITALIST

## 2017-10-27 PROCEDURE — 97110 THERAPEUTIC EXERCISES: CPT

## 2017-10-27 RX ADMIN — SOLIFENACIN SUCCINATE 5 MG: 5 TABLET, FILM COATED ORAL at 09:41

## 2017-10-27 RX ADMIN — HEPARIN SODIUM 5000 UNITS: 5000 INJECTION, SOLUTION INTRAVENOUS; SUBCUTANEOUS at 03:03

## 2017-10-27 RX ADMIN — VANCOMYCIN HYDROCHLORIDE 1250 MG: 10 INJECTION, POWDER, LYOPHILIZED, FOR SOLUTION INTRAVENOUS at 23:19

## 2017-10-27 RX ADMIN — ISOSORBIDE MONONITRATE 30 MG: 30 TABLET ORAL at 09:40

## 2017-10-27 RX ADMIN — ASPIRIN 81 MG: 81 TABLET, COATED ORAL at 09:41

## 2017-10-27 RX ADMIN — CYCLOSPORINE 1 DROP: 0.5 EMULSION OPHTHALMIC at 21:40

## 2017-10-27 RX ADMIN — GABAPENTIN 300 MG: 300 CAPSULE ORAL at 09:41

## 2017-10-27 RX ADMIN — THERA TABS 1 TABLET: TAB at 09:41

## 2017-10-27 RX ADMIN — HEPARIN SODIUM 5000 UNITS: 5000 INJECTION, SOLUTION INTRAVENOUS; SUBCUTANEOUS at 18:00

## 2017-10-27 RX ADMIN — HEPARIN SODIUM 5000 UNITS: 5000 INJECTION, SOLUTION INTRAVENOUS; SUBCUTANEOUS at 09:39

## 2017-10-27 RX ADMIN — SODIUM CHLORIDE 3.38 G: 900 INJECTION, SOLUTION INTRAVENOUS at 08:00

## 2017-10-27 RX ADMIN — SODIUM CHLORIDE 3.38 G: 900 INJECTION, SOLUTION INTRAVENOUS at 02:43

## 2017-10-27 RX ADMIN — METOPROLOL SUCCINATE 50 MG: 50 TABLET, EXTENDED RELEASE ORAL at 09:40

## 2017-10-27 RX ADMIN — CYCLOSPORINE 1 DROP: 0.5 EMULSION OPHTHALMIC at 09:00

## 2017-10-27 RX ADMIN — INSULIN GLARGINE 30 UNITS: 100 INJECTION, SOLUTION SUBCUTANEOUS at 21:41

## 2017-10-27 RX ADMIN — FUROSEMIDE 40 MG: 40 TABLET ORAL at 09:40

## 2017-10-27 RX ADMIN — SODIUM CHLORIDE 3.38 G: 900 INJECTION, SOLUTION INTRAVENOUS at 21:34

## 2017-10-27 RX ADMIN — ROSUVASTATIN CALCIUM 10 MG: 5 TABLET ORAL at 21:41

## 2017-10-27 RX ADMIN — SODIUM CHLORIDE 3.38 G: 900 INJECTION, SOLUTION INTRAVENOUS at 14:00

## 2017-10-27 NOTE — PROGRESS NOTES
I have assumed care of Mr. Anaya. The patient was assessed at shift change. He is currently sleeping.

## 2017-10-27 NOTE — PROGRESS NOTES
Problem: Mobility Impaired (Adult and Pediatric)  Goal: *Acute Goals and Plan of Care (Insert Text)  STG's to be addressed within 3 days:  1. Bed mobility:  Supine to sit to supine S with HR for meals. 2. Activity tolerance: Tolerate up in chair 1-2 hrs for ADLs. 3. Transfers:  Sit to stand to chair S with LRAD for ADL's. LTG's to be addressed within 7 days:  1. Standing/Ambulation Balance:  Increase to Good with LRAD for safe transfers and gait. 2. Ambulation:  Ambulate > 200 ft. S with LRAD for home mobility. 3. Patient Education:  Independent with HEP for home safety. Outcome: Progressing Towards Goal  physical Therapy TREATMENT    Patient: Teressa Aaron. [de-identified] y.o. male)  Date: 10/27/2017  Diagnosis: Cellulitis, leg  Open wound  Poorly controlled diabetes mellitus (HCC)  Cellulitis, leg <principal problem not specified>  Precautions: Fall   Chart, physical therapy assessment, plan of care and goals were reviewed. ASSESSMENT:  Patient presents today sleeping in bed, rousable to voice, pleasant and attentive throughout treatment. Patient refusing OOB activity at this time d/t lethargy, however agreeable to in-bed exercise training. He states he prefers morning treatments. Patient trained in supine heel slides, SLR, hip ABD/ADD, and marches requiring consistent verbal and tactile cuing for correct technique. Patient then required moderate cuing to remember exercises, provided with list on white board with instruction to complete 3X/day. Patient will likely require additional reinforcement d/t confusion. Progression toward goals:  []      Improving appropriately and progressing toward goals  [x]      Improving slowly and progressing toward goals  []      Not making progress toward goals and plan of care will be adjusted     PLAN:  Patient continues to benefit from skilled intervention to address the above impairments. Continue treatment per established plan of care.   Discharge Recommendations: Skilled Nursing Facility  Further Equipment Recommendations for Discharge:  rolling walker     SUBJECTIVE:   Patient stated I'm too lazy to think right now.     OBJECTIVE DATA SUMMARY:   Critical Behavior:  Neurologic State: Alert, Confused  Orientation Level: Oriented to person, Disoriented to time, Disoriented to situation, Disoriented to place  Cognition: Follows commands, Impulsive  Safety/Judgement: Decreased awareness of environment, Decreased awareness of need for assistance, Decreased awareness of need for safety, Decreased insight into deficits, Fall prevention  Functional Mobility Training:  Bed Mobility:  Patient refused  Transfers:  Patient refused  Ambulation/Gait Training:   N/A patient refused   Therapeutic Exercises:   Supine heel slides, SLR, hip ABD. Marches 3X10 each  Pain:  Pain Scale 1: Numeric (0 - 10)  Pain Intensity 1: 0  Activity Tolerance:   Fair  Please refer to the flowsheet for vital signs taken during this treatment. After treatment:   [] Patient left in no apparent distress sitting up in chair  [x] Patient left in no apparent distress in bed  [x] Call bell left within reach  [x] Nursing notified Jc Van)  [] Caregiver present  [] Bed alarm activated      David Caballero   Time Calculation: 25 mins    Mobility [id number] Current  CK= 40-59%   Goal  CI= 1-19%. The severity rating is based on the Level of Assistance required for Functional Mobility and ADLs.

## 2017-10-27 NOTE — PROGRESS NOTES
Hospitalist Progress Note      Patient: Rhonda Anthony. MRN: [id number]  CSN: [id number]    YOB: 1935  Age: [de-identified] y.o. Sex: male    DOA: 10/24/2017 LOS:  LOS: 3 days        Patient seen and evaluated independently on 10/27/17   Patient lying in bed in NAD, awake, has dementia    Assessment/Plan       1. Cellulitis with associated wound RLE. Blood cx (10/24) ngtd. Wound care. Vascular input noted, abx  2. Dementia with behavioral disturbance   3. DM2 with hypoglycemia - ssi. monitor  4. mild dysphagia related to confusion - diet as per speech  6. Acute metabolic encephalopathy multifactorial in the setting of possible seizure, and acute infection- improved  7. Possible seizure - seizure precautions, as per neuro   9. Full code.    Dispo- ?snf/SNF,  involved  D/w GE Restrepo Grant (daughter) [phone]; Luis Felipe Raza (spouse) [phone]    Additional Notes:      Case discussed with:  [x]Patient  []Family  [x]Nursing  []Case Management  DVT Prophylaxis:  []Lovenox  [x]Hep SQ  []SCDs  []Coumadin   []On Heparin gtt    Vital signs/Intake and Output:  Visit Vitals    /63 (BP 1 Location: Right arm, BP Patient Position: At rest)    Pulse 65    Temp 98.6 °F (37 °C)    Resp 19    Wt 90.5 kg (199 lb 9.6 oz)    SpO2 95%    BMI 28.64 kg/m2       General:  Awake, has dementia  Cardiovascular:  S1S2+, RRR  Pulmonary:  CTA b/l  GI:  Soft, BS+, NT, ND  Extremities:  Right leg cellulitis and wound        Medications Reviewed      Labs: Results:       Chemistry Recent Labs      10/27/17   0340  10/26/17   0332  10/24/17   1810   GLU  76  77  215*   NA  137  138  137   K  3.3*  3.4*  4.5   CL  104  105  101   CO2  29  27  31   BUN  18  22*  19*   CREA  1.25  1.49*  1.36*   CA  7.4*  7.4*  8.9   AGAP  4  6  5   BUCR  14  15  14   AP   --   83  129*   TP   --   5.6*  7.0   ALB   --   2.5*  3.6   GLOB   --   3.1  3.4   AGRAT   --   0.8  1.1      CBC w/Diff Recent Labs      10/27/17   0340  10/26/17   0332  10/24/17 1810   WBC  10.7  9.5  12.2   RBC  5.15  4.89  6.16*   HGB  11.8*  11.3*  13.9   HCT  37.7  35.8*  43.9   PLT  214  208  346   GRANS  71  77*  93*   LYMPH  13*  11*  1*   EOS  3  1  0      Cardiac Enzymes No results for input(s): CPK, CKND1, QUAN in the last 72 hours. No lab exists for component: CKRMB, TROIP   Coagulation No results for input(s): PTP, INR, APTT in the last 72 hours. No lab exists for component: INREXT, INREXT    Lipid Panel Lab Results   Component Value Date/Time    Cholesterol, total 74 10/26/2017 03:32 AM    HDL Cholesterol 31 10/26/2017 03:32 AM    LDL, calculated 27.2 10/26/2017 03:32 AM    VLDL, calculated 15.8 10/26/2017 03:32 AM    Triglyceride 79 10/26/2017 03:32 AM    CHOL/HDL Ratio 2.4 10/26/2017 03:32 AM      BNP No results for input(s): BNPP in the last 72 hours. Liver Enzymes Recent Labs      10/26/17   0332   TP  5.6*   ALB  2.5*   AP  83   SGOT  39*      Thyroid Studies Lab Results   Component Value Date/Time    TSH 1.37 02/22/2017 12:03 PM        Procedures/imaging: see electronic medical records for all procedures/Xrays and details which were not copied into this note but were reviewed prior to creation of Plan.

## 2017-10-27 NOTE — WOUND CARE
Physical Exam   Room 461: applied size F double layer tubigrip to right leg from base of toes to tibia. Will write orders for skin care.   Sebastien YANEZN, RN, [facility] [place], [address]

## 2017-10-27 NOTE — CDMP QUERY
Wound Care Nurse evaluated patient on 10-26-17 and charted  the following assessment. If you concur with Wound Care eval, could you please document?    => Vascular wound right lower leg, anterior, POA (1 cm x 2.5 cm x 0.1 cm)  => Other Explanation of clinical findings  => Unable to Determine (no explanation of clinical findings)    The medical record reflects the following:    ----> Risk: [de-identified] yr old male presented with c/o R lower leg pain/skin tear due to scratching    ----> Clinical Indicators: Per Wound Care Nurse Note of 10-26-17:  Vascular wound right lower leg, anterior ,POA (1 cm x 2.5 cm x 0.1 cm)    ----> Treatment: Per Wound Care Notes of 10-27-17: applied size F double layer tubigrip to right leg from base of toes to tibia. Will write orders for skin care. If you DECLINE this query or would like to communicate with [employer], please utilize the \"[employer] message box\" at the TOP of the Progress Note on the right.       Thank you,  Josie Arias RN

## 2017-10-27 NOTE — PROGRESS NOTES
Chart reviewed. Arterial studies show no evidence of arterial insufficiency in the bilateral lower extremities at rest.  The right ankle/brachial index is 0.97 and the left ankle/brachial index is 0.99. Will need continued wound care for RLE ulcers as well as compression wrap and leg elevation for edema. Needs outpt workup with CVI ultrasound. Orders for outpt f/u placed. Will remain available as needed. Please call with questions.      Dasha Marcial  [phone]

## 2017-10-27 NOTE — ROUTINE PROCESS
1920-Bedside and Verbal shift change report given to Nabila RN (oncoming nurse) by Blank Corona RN (offgoing nurse). Report included the following information SBAR, Kardex, ED Summary, [facility] and Recent Results.
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
## 2017-10-27 NOTE — PROGRESS NOTES
Problem: Self Care Deficits Care Plan (Adult)  Goal: *Acute Goals and Plan of Care (Insert Text)  Occupational Therapy Goals  Initiated 10/27/2017 within 7 day(s). 1.  Patient will perform lower body dressing with minimal assistance/contact guard assist   2. Patient will perform functional task for 5 minutes while seated on EOB with supervision for balance. 3.  Patient will perform toilet transfers with supervision/set-up. 4.  Patient will perform all aspects of toileting with supervision/set-up. Outcome: Progressing Towards Goal  Occupational Therapy EVALUATION    Patient: Yinka Pascual [de-identified] y.o. male)  Date: 10/27/2017  Primary Diagnosis: Cellulitis, leg  Open wound  Poorly controlled diabetes mellitus (HCC)  Cellulitis, leg        Precautions:   Fall    ASSESSMENT :  Based on the objective data described below, the patient presents with decreased ADLs, decreased functional mobility and muscle weakness, complicated by pleasant confusion. Patient motivated to care for himself but limited by sitting/standing balance during functional tasks. Min assist given for LB self care while seated. Patient unable to use urinal independently while seated on EOB. He has a supportive wife at home. Recommend continued therapy at SNF after discharge to maximize patient's independence for safe return to home. Bed alarm set at end of session. Patient will benefit from skilled intervention to address the above impairments.   Patients rehabilitation potential is considered to be Good  Factors which may influence rehabilitation potential include:   []             None noted  []             Mental ability/status  [x]             Medical condition  []             Home/family situation and support systems  []             Safety awareness  []             Pain tolerance/management  []             Other:      PLAN :  Recommendations and Planned Interventions:  [x]               Self Care Training                  [x] Therapeutic Activities  [x]               Functional Mobility Training    []        Cognitive Retraining  [x]               Therapeutic Exercises           [x]        Endurance Activities  [x]               Balance Training                   []        Neuromuscular Re-Education  []               Visual/Perceptual Training     [x]   Home Safety Training  [x]               Patient Education                 [x]        Family Training/Education  []               Other (comment):    Frequency/Duration: Patient will be followed by occupational therapy 1-2 times per day/4-7 days per week to address goals. Discharge Recommendations: [facility]  Further Equipment Recommendations for Discharge: N/A     Barriers to Learning/Limitations: yes;  altered mental status (i.e.Sedation, Confusion)  Compensate with: visual, verbal, tactile, kinesthetic cues/model     PATIENT COMPLEXITY      Eval Complexity: History: MEDIUM Complexity : Expanded review of history including physical, cognitive and psychosocial  history ; Examination: MEDIUM Complexity : 3-5 performance deficits relating to physical, cognitive , or psychosocial skils that result in activity limitations and / or participation restrictions; Decision Making:MEDIUM Complexity : Patient may present with comorbidities that affect occupational performnce. Miniml to moderate modification of tasks or assistance (eg, physical or verbal ) with assesment(s) is necessary to enable patient to complete evaluation  Assessment: Moderate Complexity     G-CODES:     Self Care  Current  CK= 40-59%   Goal  CJ= 20-39%. The severity rating is based on the Level of Assistance required for Functional Mobility and ADLs. SUBJECTIVE:   Patient stated I have a little surprise for you.     OBJECTIVE DATA SUMMARY:     Past Medical History:   Diagnosis Date    Angina     Anxiety     Arthritis     CAD (coronary artery disease)     s/p drug-eluting stents to RCA for high-grade stenoses in 2/09    Cardiac catheterization 08/24/2016    Mod calcification. Co-dom. oLM 50-70%. LAD 30%. Dx 30%. Cx 70% focal.  OM 80% focal.  RCA 30%.  Cardiac echocardiogram 08/23/2016    Tech difficult. EF 55-60%. No WMA. Normal diastolic fx. Lipomatous septal hypertrophy.  Cardiac nuclear imaging test, abnormal 08/23/2016    Sm reversible inferior defect concerning for ischemia. Mild inferior hypk. EF 68%. Neg EKG on pharm stress test.    Carotid duplex 08/26/2016    Mild <50% CYRUS stenosis. Chronic LICA occlusion. Similar to study of 3/29/16.  Carotid stenosis (HCC)     w/ known total occlusion of lt internal carotid artery; followed by pts vascular surgeon    Chronic kidney disease     COPD (chronic obstructive pulmonary disease) ([address]     Dementia     Depression     Diabetes ([address]     type 2    Dyslipidemia     on Crestor; managed by pts PCP    Dyspnea     Heart disease     Hypercholesterolemia     Hypertension     Low back pain     Osteoarthrosis involving multiple sites     Skin cancer ([address]     squamous cell lesions of the skin    Stroke ([address]     Venous (peripheral) insufficiency      Past Surgical History:   Procedure Laterality Date    CARDIAC CATHETERIZATION  8/24/2016         CORONARY ARTERY ANGIOGRAM  8/24/2016         HX ANGIOPLASTY  2009    2 stents placed and heart attack during the procedure    HX COLONOSCOPY  10/2003    neg; declines f/u    HX CORONARY ARTERY BYPASS GRAFT  09/2016    HX HEART CATHETERIZATION  5/2013    HX HERNIA REPAIR      1970s    HX OTHER SURGICAL      groin surgery    HX TONSIL AND ADENOIDECTOMY      IR INTRAVASCULAR ULTRASOUND INITIAL  8/24/2016         LV ANGIOGRAPHY  8/24/2016          Prior Level of Function/Home Situation: Pt stated he was independent with basic self care tasks and used a [facility] for functional mobility PTA.   Home Situation  Home Environment: Private residence  # Steps to Enter: 1  One/Two Story Residence: Two story, live on 1st floor  # of Interior Steps: 15  Interior Rails: None  Lift Chair Available: Yes  Living Alone: No  Support Systems: Spouse/Significant Other/Partner  Patient Expects to be Discharged to[de-identified] Private residence  Current DME Used/Available at Home: Cane, quad  Tub or Shower Type:  (Pt sponge bathes at home.)  [x]  Right hand dominant   []  Left hand dominant  Cognitive/Behavioral Status:  Neurologic State: Alert;Confused  Orientation Level: Oriented to person;Disoriented to time;Disoriented to situation;Disoriented to place  Cognition: Follows commands; Impulsive    Skin: Intact on UEs    Edema: None noted in UEs    Vision/Perceptual:    Acuity: Within Defined Limits      Coordination:  Fine Motor Skills-Upper: Left Intact; Right Intact    Gross Motor Skills-Upper: Left Intact; Right Intact    Balance:  Sitting: Impaired  Standing: Impaired    Strength:  Strength: Within functional limits (UEs)    Tone & Sensation:  Tone: Normal (UEs)  Sensation: Intact (UEs)    Range of Motion:  AROM: Within functional limits (UEs)  PROM: Within functional limits (UEs)    Functional Mobility and Transfers for ADLs:  Bed Mobility:  Supine to Sit: Minimum assistance  Sit to Supine: Minimum assistance  Transfers:  Sit to Stand: Minimum assistance   Toilet Transfer : Minimum assistance     ADL Assessment:  Feeding: Setup    Oral Facial Hygiene/Grooming: Setup    Bathing: Moderate assistance    Upper Body Dressing: Setup;Supervision    Lower Body Dressing: Moderate assistance    Toileting: Moderate assistance    ADL Intervention:  Patient with bladder accident in bed and stating he needed to urinate once seated on EOB. Patient given urinal but unable to position it correctly and he urinated on the bed. Hygiene performed with min assist for buttocks. He was able to clean his perineal area with setup. New gown donned with setup. Min assist needed for standing during hygiene and linen change on bed. Pain:  Pt reports 0/10 pain or discomfort prior to treatment.    Pt reports 0/10 pain or discomfort post treatment. Activity Tolerance:   Good    Please refer to the flowsheet for vital signs taken during this treatment. After treatment:   [] Patient left in no apparent distress sitting up in chair  [x] Patient left in no apparent distress in bed  [x] Call bell left within reach  [x] Nursing notified  [] Caregiver present  [x] Bed alarm activated    COMMUNICATION/EDUCATION:   [x] Home safety education was provided and the patient/caregiver indicated understanding. [x] Patient/family have participated as able in goal setting and plan of care. [x] Patient/family agree to work toward stated goals and plan of care. [] Patient understands intent and goals of therapy, but is neutral about his/her participation. [] Patient is unable to participate in goal setting and plan of care.     Thank you for this referral.  Gaby Iraheta MS OTR/L   Time Calculation: 25 mins

## 2017-10-27 NOTE — PROGRESS NOTES
No interval seizures  Will leave as final recommendation to image brain either CT or preferably mri  Please call or reconsult if significant pathology found  Stay off Wellbutrin  Will sign off  Reconsult if needed

## 2017-10-27 NOTE — ROUTINE PROCESS
0700--Bedside shift change report given to COLETTE Coffey (oncoming nurse) by [employer], RN (offgoing nurse). Report included the following information SBAR, MAR and Recent Results. 0810--Bedside shift change report given to Elias Marcus (oncoming nurse) by Jacquelyn Segovia RN (offgoing nurse). Report included the following information SBAR, MAR and Recent Results.

## 2017-10-27 NOTE — PROCEDURES
[place]    Name:  Tutu Carpio  MR#:  [id number]  :  1935  Account #:  [de-identified]  Date of Adm:  10/24/2017  Date of Service:      REFERRING PHYSICIAN:     BETTY PHYSICIAN: Stefania Hall. Octavia Monte MD    INDICATIONS: This is an 17-year-old male who is right handed who is  sent for altered mental status and possible staring spells. CURRENT MEDICATIONS: Now include. 1. Lantus. 2. Neurontin. 3. Wellbutrin. 4. Toprol. 5. Imdur. 6. Lasix. 7. Crestor. 8. Zosyn. 9. Vancomycin. 10. Zofran. 11. Ativan. The study was performed as an inpatient utilizing both referential and  differential montages, as well as [employer] 10/20 electrode  placement system on an 18-channel EEG instrument. The patient was  initially awake. He demonstrates 2-way 5-6 Hz theta activity which is  generalized seen throughout the recording, time is obscured by  movement artifact. The patient was noted to be awake and at times  drowsy without significant change in background activity. There were  no focal, lateralized or abnormal paroxysmal discharges noted. Otherwise, minimal alerting photic stimulation failed to change  background activity. There were no other focal lateralized or abnormal  paroxysmal discharges noted. On single channel EKG monitoring,  ectopy was appreciated. ELECTROENCEPHALOGRAM INTERPRETATION: The recording is  abnormal due to the presence of mild generalized slowing. This may  be secondary to his dementia consistent with other encephalopathy. EEG, there were no epileptiform . Findings  discussed with caretaker. Findings discussed with Dr. Lady Craig.         Ronald Higgins MD    NM / CD  D:  10/26/2017   15:42  T:  10/27/2017   06:23  Job #:  [id number]

## 2017-10-27 NOTE — PROGRESS NOTES
Bedside and Verbal shift change report given to Isha Godinez RN (oncoming nurse) by [employer] RN (offgoing nurse). Report included the following information SBAR and Kardex.

## 2017-10-27 NOTE — PROGRESS NOTES
Problem: Dysphagia (Adult)  Goal: *Acute Goals and Plan of Care (Insert Text)  Patient will:  1. Tolerate PO trials with 0 s/s overt distress in 4/5 trials - GOAL MET 10/27/17  2. Utilize compensatory swallow strategies/maneuvers (decrease bite/sip, size/rate, alt. liq/sol) with min cues in 4/5 trials - GOAL MET 10/27/17    Recommend:   Regular diet with thin liquids  Meds as tolerated  Aspiration precautions  HOB >45 degrees during all intake and for at least 30 min after po   Small bites/sips, Slow rate of intake      Outcome: Progressing Towards Goal  Speech language pathology dysphagia treatment & discharge    Patient: Lurdes Garcia. [de-identified] y.o. male)  Date: 10/27/2017  Diagnosis: Cellulitis, leg  Open wound  Poorly controlled diabetes mellitus (HCC)  Cellulitis, leg <principal problem not specified>       Precautions:  Fall    ASSESSMENT:  Dysphagia tx completed this afternoon. Pt accepted regular solids with thin liquids without overt s/sx of aspiration. Exhibited (+) bolus cohesion, manipulation and A-P transit. Further exhibited (+) swallow timing/reflex and hyolaryngeal excursion. Pt able to manipulate and clear with 0 clinical s/s aspiration and/or oropharyngeal dysphagia. Pt safe for regular solid, thin liquid diet. SLP educated pt on role of speech therapist in current setting with re: speech/swallow; verbalized comprehension. SLP available for re-evaluation if indicated by MD.     Progression toward goals:  [x]         Improving appropriately - goals met/approximated  []         Not making progress/Not appropriate - will d/c POC     PLAN:  Recommendations and Planned Interventions:  Maximum therapeutic gains met; safest, least restrictive diet achieved. D/C ST intervention at this time. Discharge Recommendations:  None     SUBJECTIVE:   Patient stated I am much better.     OBJECTIVE:   Cognitive and Communication Status:  Neurologic State: Appropriate for age  Orientation Level: Appropriate for age  Cognition: Appropriate decision making  Perception: Appears intact  Perseveration: No perseveration noted  Safety/Judgement: Decreased awareness of environment, Decreased awareness of need for assistance, Decreased awareness of need for safety, Decreased insight into deficits, Fall prevention     Dysphagia Treatment:  Oral Assessment:  Oral Assessment  Labial: No impairment  Dentition: Natural, Intact  Oral Hygiene: good  Lingual: No impairment  Velum: No impairment  Mandible: No impairment  P.O. Trials:   Patient Position: [facility] 45   Vocal quality prior to P.O.: No impairment   Consistency Presented: Thin liquid, Solid   How Presented: Self-fed/presented, Straw, Successive swallows       Bolus Acceptance: No impairment   Bolus Formation/Control: No impairment   Type of Impairment: Mastication   Propulsion: No impairment   Oral Residue: None   Initiation of Swallow: No impairment   Laryngeal Elevation: Functional   Aspiration Signs/Symptoms: None   Pharyngeal Phase Characteristics: No impairment, issues, or problems    Effective Modifications: None   Cues for Modifications: Minimal     Oral Phase Severity: No impairment   Pharyngeal Phase Severity : No impairment     PAIN:  Start of Tx: 0  End of Tx: 0     After treatment:   []              Patient left in no apparent distress sitting up in chair  [x]              Patient left in no apparent distress in bed  [x]              Call bell left within reach  [x]              Nursing notified  []              Caregiver present  []              Bed alarm activated      COMMUNICATION/EDUCATION:   [x] Aspiration precautions; swallow safety; compensatory techniques  []        Patient unable to participate in education; education ongoing with staff  []  Posted safety precautions in patient's room. [] Oral-motor/laryngeal strengthening exercises      Christina TUCKER 1623 Jazzy MARTÍNEZ CCC-SLP   Time Calculation: 10 mins

## 2017-10-28 LAB
GLUCOSE BLD STRIP.AUTO-MCNC: 163 MG/DL (ref 70–110)
GLUCOSE BLD STRIP.AUTO-MCNC: 169 MG/DL (ref 70–110)
GLUCOSE BLD STRIP.AUTO-MCNC: 80 MG/DL (ref 70–110)
GLUCOSE BLD STRIP.AUTO-MCNC: 99 MG/DL (ref 70–110)

## 2017-10-28 PROCEDURE — 74011250637 HC RX REV CODE- 250/637: Performed by: HOSPITALIST

## 2017-10-28 PROCEDURE — 77030010545

## 2017-10-28 PROCEDURE — 97110 THERAPEUTIC EXERCISES: CPT

## 2017-10-28 PROCEDURE — 74011250636 HC RX REV CODE- 250/636: Performed by: HOSPITALIST

## 2017-10-28 PROCEDURE — 82962 GLUCOSE BLOOD TEST: CPT

## 2017-10-28 PROCEDURE — 74011636637 HC RX REV CODE- 636/637: Performed by: HOSPITALIST

## 2017-10-28 PROCEDURE — 74011000250 HC RX REV CODE- 250: Performed by: HOSPITALIST

## 2017-10-28 PROCEDURE — 65270000029 HC RM PRIVATE

## 2017-10-28 PROCEDURE — 74011000258 HC RX REV CODE- 258: Performed by: HOSPITALIST

## 2017-10-28 RX ADMIN — SODIUM CHLORIDE 3.38 G: 900 INJECTION, SOLUTION INTRAVENOUS at 16:40

## 2017-10-28 RX ADMIN — SODIUM CHLORIDE 3.38 G: 900 INJECTION, SOLUTION INTRAVENOUS at 20:43

## 2017-10-28 RX ADMIN — CYCLOSPORINE 1 DROP: 0.5 EMULSION OPHTHALMIC at 21:49

## 2017-10-28 RX ADMIN — ROSUVASTATIN CALCIUM 10 MG: 5 TABLET ORAL at 21:48

## 2017-10-28 RX ADMIN — SODIUM CHLORIDE 3.38 G: 900 INJECTION, SOLUTION INTRAVENOUS at 10:26

## 2017-10-28 RX ADMIN — HEPARIN SODIUM 5000 UNITS: 5000 INJECTION, SOLUTION INTRAVENOUS; SUBCUTANEOUS at 03:18

## 2017-10-28 RX ADMIN — SODIUM CHLORIDE 3.38 G: 900 INJECTION, SOLUTION INTRAVENOUS at 03:15

## 2017-10-28 RX ADMIN — INSULIN GLARGINE 30 UNITS: 100 INJECTION, SOLUTION SUBCUTANEOUS at 21:48

## 2017-10-28 RX ADMIN — VANCOMYCIN HYDROCHLORIDE 1250 MG: 10 INJECTION, POWDER, LYOPHILIZED, FOR SOLUTION INTRAVENOUS at 16:41

## 2017-10-28 NOTE — PROGRESS NOTES
Had spoken to pharmacist Marilyn Hawley re: Vanco trough = 10.8 ug/ml. He said to give the currently ordered dose.

## 2017-10-28 NOTE — PROGRESS NOTES
Hospitalist Progress Note      Patient: Heath Nguyen. MRN: [id number]  CSN: [id number]    YOB: 1935  Age: [de-identified] y.o. Sex: male    DOA: 10/24/2017 LOS:  LOS: 4 days        Patient sitting in bed in NAD, wife at bedside    Assessment/Plan       1. Cellulitis with associated wound RLE. Blood cx (10/24) ngtd. Wound care. Vascular input noted, - cont abx  2. Dementia with behavioral disturbance   3. DM2 with hypoglycemia - SSI, lantus  4. mild dysphagia related to confusion - diet as per speech  6. Acute metabolic encephalopathy multifactorial in the setting of possible seizure, and acute infection- improved  7. Possible seizure - seizure precautions, as per neuro  , check MRI  9. Full code.  D/w Wife Marlene Gomez- SNF, d/w  [address] [place] [address]  D/w RN  Sarah Chun (daughter) [phone]; Sudhir Jones (spouse) [phone]    Additional Notes:      Case discussed with:  [x]Patient  []Family  [x]Nursing  []Case Management  DVT Prophylaxis:  []Lovenox  [x]Hep SQ  []SCDs  []Coumadin   []On Heparin gtt    Vital signs/Intake and Output:  Visit Vitals    /76 (BP 1 Location: Right arm, BP Patient Position: At rest)    Pulse 97    Temp 97.8 °F (36.6 °C)    Resp 18    Wt 90.5 kg (199 lb 9.6 oz)    SpO2 97%    BMI 28.64 kg/m2       General:  Awake, has dementia  Cardiovascular:  S1S2+, RRR  Pulmonary:  CTA b/l  GI:  Soft, BS+, NT, ND  Extremities:  Right leg cellulitis and wound        Medications Reviewed      Labs: Results:       Chemistry Recent Labs      10/27/17   0340  10/26/17   0332   GLU  76  77   NA  137  138   K  3.3*  3.4*   CL  104  105   CO2  29  27   BUN  18  22*   CREA  1.25  1.49*   CA  7.4*  7.4*   AGAP  4  6   BUCR  14  15   AP   --   83   TP   --   5.6*   ALB   --   2.5*   GLOB   --   3.1   AGRAT   --   0.8      CBC w/Diff Recent Labs      10/27/17   0340  10/26/17   0332   WBC  10.7  9.5   RBC  5.15  4.89   HGB  11.8*  11.3*   HCT  37.7  35.8*   PLT  214  208   GRANS  71  77*   LYMPH  13*  11*   EOS 3  1      Cardiac Enzymes No results for input(s): CPK, CKND1, QUAN in the last 72 hours. No lab exists for component: CKRMB, TROIP   Coagulation No results for input(s): PTP, INR, APTT in the last 72 hours. No lab exists for component: INREXT, INREXT    Lipid Panel Lab Results   Component Value Date/Time    Cholesterol, total 74 10/26/2017 03:32 AM    HDL Cholesterol 31 10/26/2017 03:32 AM    LDL, calculated 27.2 10/26/2017 03:32 AM    VLDL, calculated 15.8 10/26/2017 03:32 AM    Triglyceride 79 10/26/2017 03:32 AM    CHOL/HDL Ratio 2.4 10/26/2017 03:32 AM      BNP No results for input(s): BNPP in the last 72 hours. Liver Enzymes Recent Labs      10/26/17   0332   TP  5.6*   ALB  2.5*   AP  83   SGOT  39*      Thyroid Studies Lab Results   Component Value Date/Time    TSH 1.37 02/22/2017 12:03 PM        Procedures/imaging: see electronic medical records for all procedures/Xrays and details which were not copied into this note but were reviewed prior to creation of Plan.     Daniel Richmond MD

## 2017-10-28 NOTE — ROUTINE PROCESS
Bedside and Verbal shift change report given to GE Andersen (oncoming nurse) by Leif Delatorre RN (offgoing nurse). Report included the following information SBAR, Kardex, MAR and Recent Results. SITUATION:  Code Status: Full Code  Reason for Admission: Cellulitis, leg  Open wound  Poorly controlled diabetes mellitus ([facility]  Cellulitis, leg  Hospital day: 4  Problem List:       Hospital Problems  Date Reviewed: 9/29/2017          Codes Class Noted POA    Open wound ICD-10-CM: T14. 8XXA  ICD-9-CM: 879.8  10/24/2017 Unknown        Cellulitis, leg ICD-10-CM: T28.673  ICD-9-CM: 682.6  10/24/2017 Unknown        Poorly controlled diabetes mellitus ([facility] ICD-10-CM: E11.65  ICD-9-CM: 250.00  10/24/2017 Unknown              BACKGROUND:   Past Medical History:   Past Medical History:   Diagnosis Date    Angina     Anxiety     Arthritis     CAD (coronary artery disease)     s/p drug-eluting stents to RCA for high-grade stenoses in 2/09    Cardiac catheterization 08/24/2016    Mod calcification. Co-dom. oLM 50-70%. LAD 30%. Dx 30%. Cx 70% focal.  OM 80% focal.  RCA 30%.  Cardiac echocardiogram 08/23/2016    Tech difficult. EF 55-60%. No WMA. Normal diastolic fx. Lipomatous septal hypertrophy.  Cardiac nuclear imaging test, abnormal 08/23/2016    Sm reversible inferior defect concerning for ischemia. Mild inferior hypk. EF 68%. Neg EKG on pharm stress test.    Carotid duplex 08/26/2016    Mild <50% CYRUS stenosis. Chronic LICA occlusion. Similar to study of 3/29/16.     Carotid stenosis (HCC)     w/ known total occlusion of lt internal carotid artery; followed by pts vascular surgeon    Chronic kidney disease     COPD (chronic obstructive pulmonary disease) ([facility]     Dementia     Depression     Diabetes ([facility]     type 2    Dyslipidemia     on Crestor; managed by pts PCP    Dyspnea     Heart disease     Hypercholesterolemia     Hypertension     Low back pain     Osteoarthrosis involving multiple sites     Skin cancer ([facility]     squamous cell lesions of the skin    Stroke ([facility]     Venous (peripheral) insufficiency       Patient taking anticoagulants yes    Patient has a defibrillator: no    History of shots YES for example, flu, pneumonia, tetanus   Isolation History NO for example, MRSA, CDiff    ASSESSMENT:  Changes in Assessment Throughout Shift: none  Significant Changes in 24 hours (for example, RR/code, fall)  Patient has Central Line: no   Patient has Kim Cath: no   Mobility Issues  PT  IV Patency  OR Checklist  Pending Tests    Last Vitals:  Vitals w/ MEWS Score (last day)     Date/Time MEWS Score Pulse Resp Temp BP Level of Consciousness SpO2    10/28/17 0829 1 70 19 97.8 °F (36.6 °C) 163/80 Alert 93 %    10/28/17 0447 1 66 20 98.1 °F (36.7 °C) 171/77 Alert 97 %    10/28/17 0035 1 62 20 99.3 °F (37.4 °C) 165/70 Alert 96 %    10/27/17 2129 1 75 18 98.9 °F (37.2 °C) 157/62 Alert 93 %    10/27/17 1603 1 61 18 97.8 °F (36.6 °C) 134/66 Alert 97 %    10/27/17 1134 1 65 19 98.6 °F (37 °C) 144/63 Alert 95 %    10/27/17 0938 -- 68 18 -- 161/79 Alert 96 %    10/27/17 0400 1 71 16 98.1 °F (36.7 °C) 158/72 Alert 94 %    10/27/17 0000 1 77 18 98.2 °F (36.8 °C) 161/64 Alert 93 %            PAIN    Pain Assessment    Pain Intensity 1: 0 (10/28/17 0318)              Patient Stated Pain Goal: 0  Intervention effective: yes  Time of last intervention: Denied pain Reassessment Completed: yes   Other actions taken for pain: Distraction    Last 3 Weights:  Last 3 Recorded Weights in this Encounter    10/25/17 2144   Weight: 90.5 kg (199 lb 9.6 oz)   Weight change:     INTAKE/OUPUT    Current Shift:      Last three shifts: 10/26 1901 - 10/28 0700  In: -   Out: 750 [Urine:750]    RECOMMENDATIONS AND DISCHARGE PLANNING  Patient needs and requests: Comfort and safety    Pending tests/procedures: labs     Discharge plan for patient: SNF    Discharge planning Needs or Barriers: NONE    Estimated Discharge Date: TBD Posted on Whiteboard in Patients Room: yes       \"HEALS\" SAFETY CHECK  A safety check occurred in the patient's room between off going nurse and oncoming nurse listed above. The safety check included the below items:    H  High Alert Medications Verify all high alert medication drips (heparin, PCA, etc.)  E  Equipment Suction is set up for ALL patients (with zafar)  Red plugs utilized for all equipment (IV pumps, etc.)  WOWs wiped down at end of shift. Room stocked with oxygen, suction, and other unit-specific supplies  A  Alarms Bed alarm is set for fall risk patients  Ensure chair alarm is in place and activated if patient is up in a chair  L  Lines Check IV for any infiltration  Kim bag is empty if patient has a Kim   Tubing and IV bags are labeled  S  Safety  Room is clean, patient is clean, and equipment is clean. Hallways are clear from equipment besides carts. Fall bracelet on for fall risk patients  Ensure room is clear and free of clutter  Suction is set up for ALL patients (with zafar)  Hallways are clear from equipment besides carts.    Isolation precautions followed, supplies available outside room, sign posted    Claudetta Ebbs, RN
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
## 2017-10-28 NOTE — PROGRESS NOTES
Problem: Falls - Risk of  Goal: *Absence of Falls  Document Pan Fall Risk and appropriate interventions in the flowsheet.    Outcome: Progressing Towards Goal  Fall Risk Interventions:  Mobility Interventions: Bed/chair exit alarm, Patient to call before getting OOB    Mentation Interventions: Bed/chair exit alarm, Door open when patient unattended    Medication Interventions: Bed/chair exit alarm, Patient to call before getting OOB, Teach patient to arise slowly    Elimination Interventions: Bed/chair exit alarm, Call light in reach, Patient to call for help with toileting needs, Urinal in reach    History of Falls Interventions: Bed/chair exit alarm, Door open when patient unattended, Room close to nurse's station

## 2017-10-29 LAB
ANION GAP SERPL CALC-SCNC: 8 MMOL/L (ref 3–18)
BASOPHILS # BLD: 0.1 K/UL (ref 0–0.06)
BASOPHILS NFR BLD: 1 % (ref 0–3)
BUN SERPL-MCNC: 13 MG/DL (ref 7–18)
BUN/CREAT SERPL: 13 (ref 12–20)
CALCIUM SERPL-MCNC: 7.9 MG/DL (ref 8.5–10.1)
CHLORIDE SERPL-SCNC: 107 MMOL/L (ref 100–108)
CO2 SERPL-SCNC: 25 MMOL/L (ref 21–32)
CREAT SERPL-MCNC: 1.04 MG/DL (ref 0.6–1.3)
DIFFERENTIAL METHOD BLD: ABNORMAL
EOSINOPHIL # BLD: 0.1 K/UL (ref 0–0.4)
EOSINOPHIL NFR BLD: 1 % (ref 0–5)
ERYTHROCYTE [DISTWIDTH] IN BLOOD BY AUTOMATED COUNT: 19 % (ref 11.6–14.5)
GLUCOSE BLD STRIP.AUTO-MCNC: 101 MG/DL (ref 70–110)
GLUCOSE BLD STRIP.AUTO-MCNC: 149 MG/DL (ref 70–110)
GLUCOSE BLD STRIP.AUTO-MCNC: 169 MG/DL (ref 70–110)
GLUCOSE BLD STRIP.AUTO-MCNC: 79 MG/DL (ref 70–110)
GLUCOSE SERPL-MCNC: 111 MG/DL (ref 74–99)
HCT VFR BLD AUTO: 37.3 % (ref 36–48)
HGB BLD-MCNC: 11.9 G/DL (ref 13–16)
LYMPHOCYTES # BLD: 2 K/UL (ref 0.8–3.5)
LYMPHOCYTES NFR BLD: 19 % (ref 20–51)
MAGNESIUM SERPL-MCNC: 2.1 MG/DL (ref 1.6–2.6)
MCH RBC QN AUTO: 23.1 PG (ref 24–34)
MCHC RBC AUTO-ENTMCNC: 31.9 G/DL (ref 31–37)
MCV RBC AUTO: 72.3 FL (ref 74–97)
MONOCYTES # BLD: 1.2 K/UL (ref 0–1)
MONOCYTES NFR BLD: 12 % (ref 2–9)
NEUTS BAND NFR BLD MANUAL: 1 % (ref 0–5)
NEUTS SEG # BLD: 6.9 K/UL (ref 1.8–8)
NEUTS SEG NFR BLD: 66 % (ref 42–75)
PLATELET # BLD AUTO: 297 K/UL (ref 135–420)
PLATELET COMMENTS,PCOM: ABNORMAL
PMV BLD AUTO: 10.7 FL (ref 9.2–11.8)
POTASSIUM SERPL-SCNC: 3.2 MMOL/L (ref 3.5–5.5)
RBC # BLD AUTO: 5.16 M/UL (ref 4.7–5.5)
RBC MORPH BLD: ABNORMAL
SODIUM SERPL-SCNC: 140 MMOL/L (ref 136–145)
WBC # BLD AUTO: 10.3 K/UL (ref 4.6–13.2)

## 2017-10-29 PROCEDURE — 74011250637 HC RX REV CODE- 250/637: Performed by: HOSPITALIST

## 2017-10-29 PROCEDURE — 74011250636 HC RX REV CODE- 250/636: Performed by: HOSPITALIST

## 2017-10-29 PROCEDURE — 80048 BASIC METABOLIC PNL TOTAL CA: CPT | Performed by: HOSPITALIST

## 2017-10-29 PROCEDURE — 82962 GLUCOSE BLOOD TEST: CPT

## 2017-10-29 PROCEDURE — 85025 COMPLETE CBC W/AUTO DIFF WBC: CPT | Performed by: HOSPITALIST

## 2017-10-29 PROCEDURE — 74011000250 HC RX REV CODE- 250: Performed by: HOSPITALIST

## 2017-10-29 PROCEDURE — 74011000258 HC RX REV CODE- 258: Performed by: HOSPITALIST

## 2017-10-29 PROCEDURE — 74011636637 HC RX REV CODE- 636/637: Performed by: HOSPITALIST

## 2017-10-29 PROCEDURE — 36415 COLL VENOUS BLD VENIPUNCTURE: CPT | Performed by: HOSPITALIST

## 2017-10-29 PROCEDURE — 83735 ASSAY OF MAGNESIUM: CPT | Performed by: HOSPITALIST

## 2017-10-29 PROCEDURE — 65270000029 HC RM PRIVATE

## 2017-10-29 PROCEDURE — 74011250637 HC RX REV CODE- 250/637: Performed by: EMERGENCY MEDICINE

## 2017-10-29 RX ORDER — POTASSIUM CHLORIDE 1.5 G/1.77G
40 POWDER, FOR SOLUTION ORAL
Status: COMPLETED | OUTPATIENT
Start: 2017-10-29 | End: 2017-10-29

## 2017-10-29 RX ADMIN — POTASSIUM CHLORIDE 40 MEQ: 1.5 POWDER, FOR SOLUTION ORAL at 18:02

## 2017-10-29 RX ADMIN — INSULIN GLARGINE 30 UNITS: 100 INJECTION, SOLUTION SUBCUTANEOUS at 21:34

## 2017-10-29 RX ADMIN — ISOSORBIDE MONONITRATE 30 MG: 30 TABLET ORAL at 10:01

## 2017-10-29 RX ADMIN — SODIUM CHLORIDE 3.38 G: 900 INJECTION, SOLUTION INTRAVENOUS at 10:01

## 2017-10-29 RX ADMIN — FUROSEMIDE 40 MG: 40 TABLET ORAL at 10:01

## 2017-10-29 RX ADMIN — CYCLOSPORINE 1 DROP: 0.5 EMULSION OPHTHALMIC at 10:20

## 2017-10-29 RX ADMIN — HEPARIN SODIUM 5000 UNITS: 5000 INJECTION, SOLUTION INTRAVENOUS; SUBCUTANEOUS at 10:20

## 2017-10-29 RX ADMIN — GABAPENTIN 300 MG: 300 CAPSULE ORAL at 10:01

## 2017-10-29 RX ADMIN — THERA TABS 1 TABLET: TAB at 10:01

## 2017-10-29 RX ADMIN — METOPROLOL SUCCINATE 50 MG: 50 TABLET, EXTENDED RELEASE ORAL at 10:19

## 2017-10-29 RX ADMIN — SODIUM CHLORIDE 3.38 G: 900 INJECTION, SOLUTION INTRAVENOUS at 20:30

## 2017-10-29 RX ADMIN — HEPARIN SODIUM 5000 UNITS: 5000 INJECTION, SOLUTION INTRAVENOUS; SUBCUTANEOUS at 18:01

## 2017-10-29 RX ADMIN — CYCLOSPORINE 1 DROP: 0.5 EMULSION OPHTHALMIC at 21:35

## 2017-10-29 RX ADMIN — SODIUM CHLORIDE 3.38 G: 900 INJECTION, SOLUTION INTRAVENOUS at 01:06

## 2017-10-29 RX ADMIN — SODIUM CHLORIDE 75 ML/HR: 900 INJECTION, SOLUTION INTRAVENOUS at 04:00

## 2017-10-29 RX ADMIN — SODIUM CHLORIDE 3.38 G: 900 INJECTION, SOLUTION INTRAVENOUS at 14:08

## 2017-10-29 RX ADMIN — VANCOMYCIN HYDROCHLORIDE 1250 MG: 10 INJECTION, POWDER, LYOPHILIZED, FOR SOLUTION INTRAVENOUS at 10:45

## 2017-10-29 RX ADMIN — ROSUVASTATIN CALCIUM 10 MG: 5 TABLET ORAL at 21:35

## 2017-10-29 RX ADMIN — ASPIRIN 81 MG: 81 TABLET, COATED ORAL at 10:01

## 2017-10-29 RX ADMIN — HEPARIN SODIUM 5000 UNITS: 5000 INJECTION, SOLUTION INTRAVENOUS; SUBCUTANEOUS at 01:05

## 2017-10-29 RX ADMIN — SOLIFENACIN SUCCINATE 5 MG: 5 TABLET, FILM COATED ORAL at 10:01

## 2017-10-29 NOTE — ROUTINE PROCESS
Bedside and Verbal shift change report given to aDrien Denis LPN (oncoming nurse) by Maddie Couch, RN (offgoing nurse). Report included the following information SBAR, Kardex, MAR and Recent Results. SITUATION:  Code Status: Full Code  Reason for Admission: Cellulitis, leg  Open wound  Poorly controlled diabetes mellitus ([facility] [address]  Cellulitis, leg  Hospital day: 5  Problem List:       Hospital Problems  Date Reviewed: 9/29/2017          Codes Class Noted POA    Open wound ICD-10-CM: T14. 8XXA  ICD-9-CM: 879.8  10/24/2017 Unknown        Cellulitis, leg ICD-10-CM: H47.760  ICD-9-CM: 682.6  10/24/2017 Unknown        Poorly controlled diabetes mellitus ([facility] ICD-10-CM: E11.65  ICD-9-CM: 250.00  10/24/2017 Unknown              BACKGROUND:   Past Medical History:   Past Medical History:   Diagnosis Date    Angina     Anxiety     Arthritis     CAD (coronary artery disease)     s/p drug-eluting stents to RCA for high-grade stenoses in 2/09    Cardiac catheterization 08/24/2016    Mod calcification. Co-dom. oLM 50-70%. LAD 30%. Dx 30%. Cx 70% focal.  OM 80% focal.  RCA 30%.  Cardiac echocardiogram 08/23/2016    Tech difficult. EF 55-60%. No WMA. Normal diastolic fx. Lipomatous septal hypertrophy.  Cardiac nuclear imaging test, abnormal 08/23/2016    Sm reversible inferior defect concerning for ischemia. Mild inferior hypk. EF 68%. Neg EKG on pharm stress test.    Carotid duplex 08/26/2016    Mild <50% CYRUS stenosis. Chronic LICA occlusion. Similar to study of 3/29/16.     Carotid stenosis (HCC)     w/ known total occlusion of lt internal carotid artery; followed by pts vascular surgeon    Chronic kidney disease     COPD (chronic obstructive pulmonary disease) ([facility]     Dementia     Depression     Diabetes ([facility] [address]     type 2    Dyslipidemia     on Crestor; managed by pts PCP    Dyspnea     Heart disease     Hypercholesterolemia     Hypertension     Low back pain     Osteoarthrosis involving multiple sites     Skin cancer ([facility]     squamous cell lesions of the skin    [facility]     Venous (peripheral) insufficiency       Patient taking anticoagulants yes    Patient has a defibrillator: no    History of shots YES for example, flu, pneumonia, tetanus   Isolation History NO for example, MRSA, CDiff    ASSESSMENT:  Changes in Assessment Throughout Shift: none  Significant Changes in 24 hours (for example, RR/code, fall)  Patient has Central Line: no   Patient has Kim Cath: no   Mobility Issues  PT  IV Patency  OR Checklist  Pending Tests    Last Vitals:  Vitals w/ MEWS Score (last day)     Date/Time MEWS Score Pulse Resp Temp BP Level of Consciousness SpO2    10/29/17 0546 1 72 18 98.7 °F (37.1 °C) 160/65 Alert 98 %    10/28/17 1953 1 69 18 98.6 °F (37 °C) 158/68 Alert 98 %    10/28/17 1715 1 97 18 97.8 °F (36.6 °C) 154/76 Alert 97 %    10/28/17 1315 1 68 20 97.6 °F (36.4 °C) 131/66 Alert 92 %    10/28/17 0829 1 70 19 97.8 °F (36.6 °C) 163/80 Alert 93 %    10/28/17 0447 1 66 20 98.1 °F (36.7 °C) 171/77 Alert 97 %    10/28/17 0035 1 62 20 99.3 °F (37.4 °C) 165/70 Alert 96 %            PAIN    Pain Assessment    Pain Intensity 1: 0 (10/29/17 0400)              Patient Stated Pain Goal: 0  Intervention effective: yes  Time of last intervention: Denied pain Reassessment Completed: yes   Other actions taken for pain: Distraction    Last 3 Weights:  Last 3 Recorded Weights in this Encounter    10/25/17 2144   Weight: 90.5 kg (199 lb 9.6 oz)   Weight change:     INTAKE/OUPUT    Current Shift:      Last three shifts: 10/27 1901 - 10/29 0700  In: 7140 [P.O.:240;  I.V.:6900]  Out: 800 [address] AND DISCHARGE PLANNING  Patient needs and requests: Comfort and safety    Pending tests/procedures: labs     Discharge plan for patient: SNF    Discharge planning Needs or Barriers: NONE    Estimated Discharge Date: TBD Posted on Whiteboard in Patients Room: yes       \"HEALS\" SAFETY CHECK  A safety check occurred in the patient's room between off going nurse and oncoming nurse listed above. The safety check included the below items:    H  High Alert Medications Verify all high alert medication drips (heparin, PCA, etc.)  E  Equipment Suction is set up for ALL patients (with zafar)  Red plugs utilized for all equipment (IV pumps, etc.)  WOWs wiped down at end of shift. Room stocked with oxygen, suction, and other unit-specific supplies  A  Alarms Bed alarm is set for fall risk patients  Ensure chair alarm is in place and activated if patient is up in a chair  L  Lines Check IV for any infiltration  Kim bag is empty if patient has a Kim   Tubing and IV bags are labeled  S  Safety  Room is clean, patient is clean, and equipment is clean. Hallways are clear from equipment besides carts. Fall bracelet on for fall risk patients  Ensure room is clear and free of clutter  Suction is set up for ALL patients (with zafar)  Hallways are clear from equipment besides carts.    Isolation precautions followed, supplies available outside room, sign posted    Eyad Queen RN

## 2017-10-29 NOTE — PROGRESS NOTES
Hospitalist Progress Note      Patient: Michelle Ngo. MRN: [id number]  CSN: [id number]    YOB: 1935  Age: [de-identified] y.o. Sex: male    DOA: 10/24/2017 LOS:  LOS: 5 days        Patient in NAD, lying in bed    Assessment/Plan       1. Cellulitis with associated wound RLE. Blood cx (10/24) ngtd. Wound care. Vascular input noted, -on abx  2. Dementia with behavioral disturbance   3. DM2 with hypoglycemia - SSI, lantus  4. mild dysphagia related to confusion - diet as per speech  6. Acute metabolic encephalopathy multifactorial in the setting of possible seizure, and acute infection- improved  7. Possible seizure - seizure precautions, as per neuro  , d/w Neurology Dr Cheyenne Ndiaye and MRI can be done as OP  9. Full code.    9- Vascular wound right lower leg, anterior, POA (1 cm x 2.5 cm x 0.1 cm- wound care  Replete K  Dispo- SNF, ?tomorrow  D/w RN  Ree Michele (daughter) [phone]; Irineo Bosworth (spouse) [phone]    Additional Notes:      Case discussed with:  [x]Patient  []Family  [x]Nursing  []Case Management  DVT Prophylaxis:  []Lovenox  [x]Hep SQ  []SCDs  []Coumadin   []On Heparin gtt    Vital signs/Intake and Output:  Visit Vitals    /54 (BP 1 Location: Right arm, BP Patient Position: At rest)    Pulse 64    Temp 97.8 °F (36.6 °C)    Resp 20    Wt 90.5 kg (199 lb 9.6 oz)    SpO2 95%    BMI 28.64 kg/m2       General:  Awake, has dementia  Cardiovascular:  S1S2+, RRR  Pulmonary:  CTA b/l  GI:  Soft, BS+, NT, ND  Extremities:  Right leg cellulitis and wound      Medications Reviewed      Labs: Results:       Chemistry Recent Labs      10/29/17   0303  10/27/17   0340   GLU  111*  76   NA  140  137   K  3.2*  3.3*   CL  107  104   CO2  25  29   BUN  13  18   CREA  1.04  1.25   CA  7.9*  7.4*   AGAP  8  4   BUCR  13  14      CBC w/Diff Recent Labs      10/29/17   0303  10/27/17   0340   WBC  10.3  10.7   RBC  5.16  5.15   HGB  11.9*  11.8*   HCT  37.3  37.7   PLT  297  214   GRANS  66  71   LYMPH  19*  13*   EOS 1  3      Cardiac Enzymes No results for input(s): CPK, CKND1, QUAN in the last 72 hours. No lab exists for component: CKRMB, TROIP   Coagulation No results for input(s): PTP, INR, APTT in the last 72 hours. No lab exists for component: INREXT, INREXT    Lipid Panel Lab Results   Component Value Date/Time    Cholesterol, total 74 10/26/2017 03:32 AM    HDL Cholesterol 31 10/26/2017 03:32 AM    LDL, calculated 27.2 10/26/2017 03:32 AM    VLDL, calculated 15.8 10/26/2017 03:32 AM    Triglyceride 79 10/26/2017 03:32 AM    CHOL/HDL Ratio 2.4 10/26/2017 03:32 AM      BNP No results for input(s): BNPP in the last 72 hours. Liver Enzymes No results for input(s): TP, ALB, TBIL, AP, SGOT, GPT in the last 72 hours. No lab exists for component: DBIL   Thyroid Studies Lab Results   Component Value Date/Time    TSH 1.37 02/22/2017 12:03 PM        Procedures/imaging: see electronic medical records for all procedures/Xrays and details which were not copied into this note but were reviewed prior to creation of Plan.     Robbin Tran MD

## 2017-10-29 NOTE — PROGRESS NOTES
Problem: Falls - Risk of  Goal: *Absence of Falls  Document Pan Fall Risk and appropriate interventions in the flowsheet.    Outcome: Progressing Towards Goal  Fall Risk Interventions:  Mobility Interventions: Bed/chair exit alarm, Patient to call before getting OOB    Mentation Interventions: Bed/chair exit alarm, Adequate sleep, hydration, pain control, Door open when patient unattended    Medication Interventions: Bed/chair exit alarm, Patient to call before getting OOB    Elimination Interventions: Bed/chair exit alarm, Call light in reach, Patient to call for help with toileting needs    History of Falls Interventions: Bed/chair exit alarm, Door open when patient unattended

## 2017-10-30 VITALS
RESPIRATION RATE: 18 BRPM | DIASTOLIC BLOOD PRESSURE: 68 MMHG | TEMPERATURE: 97.6 F | HEART RATE: 75 BPM | BODY MASS INDEX: 28.64 KG/M2 | WEIGHT: 199.6 LBS | SYSTOLIC BLOOD PRESSURE: 155 MMHG | OXYGEN SATURATION: 98 %

## 2017-10-30 LAB
ANION GAP SERPL CALC-SCNC: 5 MMOL/L (ref 3–18)
BACTERIA SPEC CULT: NORMAL
BACTERIA SPEC CULT: NORMAL
BUN SERPL-MCNC: 11 MG/DL (ref 7–18)
BUN/CREAT SERPL: 9 (ref 12–20)
CALCIUM SERPL-MCNC: 8.5 MG/DL (ref 8.5–10.1)
CHLORIDE SERPL-SCNC: 106 MMOL/L (ref 100–108)
CO2 SERPL-SCNC: 30 MMOL/L (ref 21–32)
CREAT SERPL-MCNC: 1.25 MG/DL (ref 0.6–1.3)
DATE LAST DOSE: NORMAL
GLUCOSE BLD STRIP.AUTO-MCNC: 111 MG/DL (ref 70–110)
GLUCOSE BLD STRIP.AUTO-MCNC: 143 MG/DL (ref 70–110)
GLUCOSE BLD STRIP.AUTO-MCNC: 62 MG/DL (ref 70–110)
GLUCOSE SERPL-MCNC: 144 MG/DL (ref 74–99)
POTASSIUM SERPL-SCNC: 3.4 MMOL/L (ref 3.5–5.5)
REPORTED DOSE,DOSE: NORMAL UNITS
REPORTED DOSE/TIME,TMG: 1000
SERVICE CMNT-IMP: NORMAL
SERVICE CMNT-IMP: NORMAL
SODIUM SERPL-SCNC: 141 MMOL/L (ref 136–145)
VANCOMYCIN TROUGH SERPL-MCNC: 16.9 UG/ML (ref 10–20)

## 2017-10-30 PROCEDURE — 36415 COLL VENOUS BLD VENIPUNCTURE: CPT | Performed by: HOSPITALIST

## 2017-10-30 PROCEDURE — 77030011256 HC DRSG MEPILEX <16IN NO BORD MOLN -A

## 2017-10-30 PROCEDURE — 74011000250 HC RX REV CODE- 250: Performed by: HOSPITALIST

## 2017-10-30 PROCEDURE — 74011250636 HC RX REV CODE- 250/636: Performed by: HOSPITALIST

## 2017-10-30 PROCEDURE — 74011250637 HC RX REV CODE- 250/637: Performed by: HOSPITALIST

## 2017-10-30 PROCEDURE — 97530 THERAPEUTIC ACTIVITIES: CPT

## 2017-10-30 PROCEDURE — 74011000258 HC RX REV CODE- 258: Performed by: HOSPITALIST

## 2017-10-30 PROCEDURE — 97110 THERAPEUTIC EXERCISES: CPT

## 2017-10-30 PROCEDURE — 80048 BASIC METABOLIC PNL TOTAL CA: CPT | Performed by: HOSPITALIST

## 2017-10-30 PROCEDURE — 80202 ASSAY OF VANCOMYCIN: CPT | Performed by: HOSPITALIST

## 2017-10-30 PROCEDURE — 82962 GLUCOSE BLOOD TEST: CPT

## 2017-10-30 RX ORDER — AMOXICILLIN AND CLAVULANATE POTASSIUM 875; 125 MG/1; MG/1
1 TABLET, FILM COATED ORAL EVERY 12 HOURS
Status: DISCONTINUED | OUTPATIENT
Start: 2017-10-30 | End: 2017-10-30 | Stop reason: HOSPADM

## 2017-10-30 RX ORDER — AMOXICILLIN AND CLAVULANATE POTASSIUM 875; 125 MG/1; MG/1
1 TABLET, FILM COATED ORAL EVERY 12 HOURS
Qty: 14 TAB | Refills: 0 | Status: SHIPPED
Start: 2017-10-30 | End: 2017-11-06

## 2017-10-30 RX ORDER — ASPIRIN 81 MG/1
81 TABLET ORAL DAILY
Qty: 30 TAB | Refills: 0 | Status: SHIPPED | OUTPATIENT
Start: 2017-10-30 | End: 2018-03-27

## 2017-10-30 RX ORDER — ACETAMINOPHEN 500 MG
500 TABLET ORAL
Qty: 20 TAB | Refills: 0 | Status: ON HOLD | OUTPATIENT
Start: 2017-10-30 | End: 2018-03-26

## 2017-10-30 RX ORDER — CYCLOSPORINE 0.5 MG/ML
1 EMULSION OPHTHALMIC EVERY 12 HOURS
Qty: 15 EACH | Refills: 0 | Status: ON HOLD | OUTPATIENT
Start: 2017-10-30 | End: 2018-03-26

## 2017-10-30 RX ORDER — UREA 10 %
2 LOTION (ML) TOPICAL 2 TIMES DAILY
Qty: 40 TAB | Refills: 0 | Status: SHIPPED | OUTPATIENT
Start: 2017-10-30 | End: 2017-11-09

## 2017-10-30 RX ORDER — UREA 10 %
2 LOTION (ML) TOPICAL 2 TIMES DAILY
Status: DISCONTINUED | OUTPATIENT
Start: 2017-10-30 | End: 2017-10-30 | Stop reason: HOSPADM

## 2017-10-30 RX ORDER — POLYVINYL ALCOHOL 14 MG/ML
1 SOLUTION/ DROPS OPHTHALMIC AS NEEDED
Qty: 15 ML | Refills: 0 | Status: SHIPPED | OUTPATIENT
Start: 2017-10-30 | End: 2017-11-29

## 2017-10-30 RX ADMIN — CYCLOSPORINE 1 DROP: 0.5 EMULSION OPHTHALMIC at 09:00

## 2017-10-30 RX ADMIN — SOLIFENACIN SUCCINATE 5 MG: 5 TABLET, FILM COATED ORAL at 08:35

## 2017-10-30 RX ADMIN — SODIUM CHLORIDE 3.38 G: 900 INJECTION, SOLUTION INTRAVENOUS at 01:17

## 2017-10-30 RX ADMIN — THERA TABS 1 TABLET: TAB at 08:35

## 2017-10-30 RX ADMIN — SODIUM CHLORIDE 3.38 G: 900 INJECTION, SOLUTION INTRAVENOUS at 08:35

## 2017-10-30 RX ADMIN — ISOSORBIDE MONONITRATE 30 MG: 30 TABLET ORAL at 08:35

## 2017-10-30 RX ADMIN — ASPIRIN 81 MG: 81 TABLET, COATED ORAL at 08:35

## 2017-10-30 RX ADMIN — METOPROLOL SUCCINATE 50 MG: 50 TABLET, EXTENDED RELEASE ORAL at 08:35

## 2017-10-30 RX ADMIN — HEPARIN SODIUM 5000 UNITS: 5000 INJECTION, SOLUTION INTRAVENOUS; SUBCUTANEOUS at 01:18

## 2017-10-30 RX ADMIN — FUROSEMIDE 40 MG: 40 TABLET ORAL at 08:35

## 2017-10-30 RX ADMIN — HEPARIN SODIUM 5000 UNITS: 5000 INJECTION, SOLUTION INTRAVENOUS; SUBCUTANEOUS at 11:52

## 2017-10-30 RX ADMIN — GABAPENTIN 300 MG: 300 CAPSULE ORAL at 08:35

## 2017-10-30 RX ADMIN — VANCOMYCIN HYDROCHLORIDE 1250 MG: 10 INJECTION, POWDER, LYOPHILIZED, FOR SOLUTION INTRAVENOUS at 04:03

## 2017-10-30 NOTE — PROGRESS NOTES
Problem: Falls - Risk of  Goal: *Absence of Falls  Document Pan Fall Risk and appropriate interventions in the flowsheet.    Outcome: Progressing Towards Goal  Fall Risk Interventions:  Mobility Interventions: Bed/chair exit alarm, Patient to call before getting OOB    Mentation Interventions: Bed/chair exit alarm, Door open when patient unattended    Medication Interventions: Bed/chair exit alarm, Patient to call before getting OOB    Elimination Interventions: Bed/chair exit alarm, Call light in reach, Patient to call for help with toileting needs    History of Falls Interventions: Bed/chair exit alarm, Door open when patient unattended

## 2017-10-30 NOTE — DISCHARGE INSTRUCTIONS
Diet- Cardiac, Diabetic, chopped meats  Glucerna , 1 can PO BID  Activity - as tolerated  FALL PRECAUTIONS  ASPIRATION PRECAUTIONS  DECUBITUS PRECAUTIONS  Seizure precautions  Incentive Spirometry Q30 minutes when awake  PT, OT Evaluate and Treat  Check CBC, CMP, Mg in 3 days  Wound care as directed  F/u with PCP in 1 week  F/u with Vascular Surgeon in 10 days

## 2017-10-30 NOTE — PROGRESS NOTES
I have assumed care of  Johana Angel. He was assessed during bedside report. He is currently sleeping.

## 2017-10-30 NOTE — ROUTINE PROCESS
Bedside and Verbal shift change report given to Jaxon Ruffin (oncoming nurse) by Shakir Mcdaniel RN (offgoing nurse). Report included the following information SBAR, Kardex, MAR and Recent Results. SITUATION:  Code Status: Full Code  Reason for Admission: Cellulitis, leg  Open wound  Poorly controlled diabetes mellitus ([facility]  Cellulitis, leg  Hospital day: 6  Problem List:       Hospital Problems  Date Reviewed: 9/29/2017          Codes Class Noted POA    Open wound ICD-10-CM: T14. 8XXA  ICD-9-CM: 879.8  10/24/2017 Unknown        Cellulitis, leg ICD-10-CM: L35.359  ICD-9-CM: 682.6  10/24/2017 Unknown        Poorly controlled diabetes mellitus ([facility] [address] ICD-10-CM: E11.65  ICD-9-CM: 250.00  10/24/2017 Unknown              BACKGROUND:   Past Medical History:   Past Medical History:   Diagnosis Date    Angina     Anxiety     Arthritis     CAD (coronary artery disease)     s/p drug-eluting stents to RCA for high-grade stenoses in 2/09    Cardiac catheterization 08/24/2016    Mod calcification. Co-dom. oLM 50-70%. LAD 30%. Dx 30%. Cx 70% focal.  OM 80% focal.  RCA 30%.  Cardiac echocardiogram 08/23/2016    Tech difficult. EF 55-60%. No WMA. Normal diastolic fx. Lipomatous septal hypertrophy.  Cardiac nuclear imaging test, abnormal 08/23/2016    Sm reversible inferior defect concerning for ischemia. Mild inferior hypk. EF 68%. Neg EKG on pharm stress test.    Carotid duplex 08/26/2016    Mild <50% CYRUS stenosis. Chronic LICA occlusion. Similar to study of 3/29/16.     Carotid stenosis (HCC)     w/ known total occlusion of lt internal carotid artery; followed by pts vascular surgeon    Chronic kidney disease     COPD (chronic obstructive pulmonary disease) ([facility] [address]     Dementia     Depression     Diabetes ([facility] [address]     type 2    Dyslipidemia     on Crestor; managed by pts PCP    Dyspnea     Heart disease     Hypercholesterolemia     Hypertension     Low back pain     Osteoarthrosis involving multiple sites     Skin cancer ([facility] [address]     squamous cell lesions of the skin    Stroke ([facility]     Venous (peripheral) insufficiency       Patient taking anticoagulants yes    Patient has a defibrillator: no    History of shots YES for example, flu, pneumonia, tetanus   Isolation History NO for example, MRSA, CDiff    ASSESSMENT:  Changes in Assessment Throughout Shift: none  Significant Changes in 24 hours (for example, RR/code, fall)  Patient has Central Line: no   Patient has Kim Cath: no   Mobility Issues  PT  IV Patency  OR Checklist  Pending Tests    Last Vitals:  Vitals w/ MEWS Score (last day)     Date/Time MEWS Score Pulse Resp Temp BP Level of Consciousness SpO2    10/30/17 0419 1 66 19 99.3 °F (37.4 °C) 151/82 Alert 95 %    10/30/17 0011 1 67 19 98.4 °F (36.9 °C) 150/63 Alert 96 %    10/29/17 2042 1 64 20 97.8 °F (36.6 °C) 121/67 Alert 95 %    10/29/17 1807 1 60 19 97.8 °F (36.6 °C) 151/65 Alert 97 %    10/29/17 1303 1 64 20 97.8 °F (36.6 °C) 131/54 Alert 95 %    10/29/17 0900 -- -- -- -- -- (!)  Confused or Agitated --    10/29/17 0839 1 67 19 98.1 °F (36.7 °C) 157/66 Alert 96 %    10/29/17 0546 1 72 18 98.7 °F (37.1 °C) 160/65 Alert 98 %            PAIN    Pain Assessment    Pain Intensity 1: 0 (10/30/17 0400)              Patient Stated Pain Goal: 0  Intervention effective: yes  Time of last intervention: Denied pain Reassessment Completed: yes   Other actions taken for pain: Distraction    Last 3 Weights:  Last 3 Recorded Weights in this Encounter    10/25/17 2144   Weight: 90.5 kg (199 lb 9.6 oz)   Weight change:     INTAKE/OUPUT    Current Shift:      Last three shifts: 10/28 1901 - 10/30 0700  In: -   Out: 200 [Urine:200]    RECOMMENDATIONS AND DISCHARGE PLANNING  Patient needs and requests: Comfort and safety    Pending tests/procedures: labs     Discharge plan for patient: SNF    Discharge planning Needs or Barriers: NONE    Estimated Discharge Date: TBD Posted on Whiteboard in Patients Room: yes       \"HEALS\" SAFETY CHECK  A safety check occurred in the patient's room between off going nurse and oncoming nurse listed above. The safety check included the below items:    H  High Alert Medications Verify all high alert medication drips (heparin, PCA, etc.)  E  Equipment Suction is set up for ALL patients (with zafar)  Red plugs utilized for all equipment (IV pumps, etc.)  WOWs wiped down at end of shift. Room stocked with oxygen, suction, and other unit-specific supplies  A  Alarms Bed alarm is set for fall risk patients  Ensure chair alarm is in place and activated if patient is up in a chair  L  Lines Check IV for any infiltration  Kim bag is empty if patient has a Kim   Tubing and IV bags are labeled  S  Safety  Room is clean, patient is clean, and equipment is clean. Hallways are clear from equipment besides carts. Fall bracelet on for fall risk patients  Ensure room is clear and free of clutter  Suction is set up for ALL patients (with zafar)  Hallways are clear from equipment besides carts.    Isolation precautions followed, supplies available outside room, sign posted    Alem Celeste RN

## 2017-10-30 NOTE — PROGRESS NOTES
When  attempted to visit patient it was not a good time to visit. Patient was resting during hospital Quiet Hour. Chaplains will provide spiritual care as needed, as requested. Merrick Rios MDiv.   Board Certified [employer] [phone]

## 2017-10-30 NOTE — DISCHARGE SUMMARY
[facility]  TRANSFER SUMMARY    Name:  Jf Zamarripa  MR#:  [id number]  :  1935  Account #:  [de-identified]  Date of Adm:  10/24/2017  Date of Transfer:  10/30/2017      DISCHARGE DIAGNOSES  1. Cellulitis with associated wound on the right lower extremity. 2. Dementia with behavior disturbances. 3. Type 2 diabetes with some episodes of hypoglycemia. 4. Mild dysphagia. 5. Acute metabolic encephalopathy, which has shown some  improvement to baseline. 6. Possible seizure episodes, which were felt to be related to  Wellbutrin. 7. Vascular wound to the right leg, for which the patient is receiving  wound care. 8. Hypokalemia. HOSPITAL COURSE: This is an 44-year-old male who was brought to  the ER with right leg pain and skin tear. The patient has known history  of dementia and there was some history of behavior disturbances. The  patient was admitted for cellulitis. The patient was started on  antibiotics. Wound Care was consulted. Vascular Surgery was  consulted. It was felt that the wound was a vascular wound. Vascular  Surgery has recommended to continue wound care. We will complete  a course of antibiotics. The patient was also noted to have some  possible seizure activity. Neurology was consulted and they  discontinued the Wellbutrin. There is concern that this may be  Wellbutrin related. At admission, the patient did have a CT scan of the  head, which was negative. Neurology subsequently recommended  MRI of the brain if possible, so I discussed with the neurologist, Dr. Cheyenne Ndiaye, yesterday, which was 10/29/2017, and he recommends that  the MRI can be done as an outpatient, so I will defer to the physician  taking care of the patient at the [facility] nursing [facility] to arrange for the  patient to have an MRI of the brain to be done in the next 10 days.  The  patient will need to follow up with the PCP in 1 week and Vascular  Surgery in 10 days and with Neurology in the next 10 days. So, the  patient has remained hemodynamically stable. PT, OT have assessed  the patient and have recommended skilled nursing facility placement. I  have discussed with the patient's wife, the patient is a FULL CODE. Wife is agreeable with plans to transfer to skilled nursing facility. I also  discussed with the  and they are working on arranging  the bed. So, the patient will be transitioned to skilled nursing facility  once a bed will be available. DISCHARGE MEDICATIONS  Include  1. Tylenol 500 mg p.o. every 6 hours p.r.n. pain or fever. 2. Augmentin 875/125 one tablet p.o. every 12 hours for 7 more days. 3. Aspirin 81 mg p.o. daily. 4. Caltrate 600 mg p.o. daily. 5. Centrum Silver 1 tablet p.o. daily. 6. Compound base cream as directed. 7. Restasis eyedrops 1 drop to both eyes every 12 hours. 8. Lasix 40 mg p.o. daily in the morning. 9. Neurontin 300 mg p.o. daily. 10. Imdur 30 mg p.o. daily. 11. Floranex 2 tablets p.o. twice daily for the next 10 days. 12. Lantus 30 units subcutaneous daily at night. 13. Toprol-XL 50 mg p.o. daily. 14. Liquifilm tears 1 drop to both eyes as needed. 15. Crestor 10 mg p.o. daily. 16. VESIcare 5 mg tablet p.o. daily. INSTRUCTIONS  1. Diet is cardiac, diabetic, chopped meats and vegetables. No  concentrated sweets. No knife in patient's room. It confuses the  patient. Please provide only a spoon and fork. Glucerna shake 1 p.o.  twice daily. 2. Activity as tolerated. 3. Fall precautions. 4. Aspiration precautions. 5. Decubitus precautions. 6. Seizure precautions. 7. Incentive spirometry every 30 minutes when awake. 8. PT, OT evaluate and treat. 9. Check a CBC, CMP, magnesium in 3 days. 10. Wound care as directed. 11. Follow up with PCP in 1 week. 12. Follow up with vascular surgeon in 10 days. 13. Follow up with Neurology in 10 days. 14. Please arrange for the patient to have an MRI of the brain in the  next 10 days.   15. Please check Accu-Cheks or fingerstick blood sugar checks before  meals and at bedtime. So, the patient will be transitioned to skilled nursing facility once a bed  is available. TIME SPENT: Total time for the discharge is more than 35 minutes.         MD KOJO Galeana / KELSEA  D:  10/30/2017   12:29  T:  10/30/2017   13:02  Job #:  [id number]

## 2017-10-30 NOTE — PROGRESS NOTES
Problem: Mobility Impaired (Adult and Pediatric)  Goal: *Acute Goals and Plan of Care (Insert Text)  STG's to be addressed within 3 days:  1. Bed mobility:  Supine to sit to supine S with HR for meals. 2. Activity tolerance: Tolerate up in chair 1-2 hrs for ADLs. 3. Transfers:  Sit to stand to chair S with LRAD for ADL's. LTG's to be addressed within 7 days:  1. Standing/Ambulation Balance:  Increase to Good with LRAD for safe transfers and gait. 2. Ambulation:  Ambulate > 200 ft. S with LRAD for home mobility. 3. Patient Education:  Independent with HEP for home safety. physical Therapy TREATMENT    Patient: Teressa Aaron. [de-identified] y.o. male)  Date: 10/30/2017  Diagnosis: Cellulitis, leg  Open wound  Poorly controlled diabetes mellitus (HCC)  Cellulitis, leg <principal problem not specified>       Precautions: Fall   Chart, physical therapy assessment, plan of care and goals were reviewed. ASSESSMENT:  Pt found lying supine improperly in bed, apparent that pt may have been attempting to transfer form bed. Pt pleasant and willing to participate in therapy but only able to orient to person. Pt required min-a for bed mobility to seated EOB due to lack of acknowledgement of VC. Pt performed seated therex with no adverse affects. Pt verbally stated \"stinks don't it\" but gave no inclination that pt was having BM. CNA arrived to assist with cleaning. During pericare, pt performed sit to stand to RW with min-a requiring VC to promote forward weight shift, which allowed pt to stand with only CGA. Pt able to standing ~5 min at EOB with education on posture but pt unable to maintain position due to confusion level. Pt returned to bed supine requiring only VC and SBA even for scooting. Pt followed commands increasingly as tx progressed.    Progression toward goals:  []      Improving appropriately and progressing toward goals  [x]      Improving slowly and progressing toward goals  []      Not making progress toward goals and plan of care will be adjusted     PLAN:  Patient continues to benefit from skilled intervention to address the above impairments. Continue treatment per established plan of care. Discharge Recommendations:  Fredy Mcdaniel  Further Equipment Recommendations for Discharge:  Evette Reges     Mobility  Current  CJ= 20-39%   Goal  CI= 1-19%. The severity rating is based on the Level of Assistance required for Functional Mobility and ADLs. SUBJECTIVE:   Patient stated Has my wife talked to you yet?     OBJECTIVE DATA SUMMARY:   Critical Behavior:  Neurologic State: Alert, Confused  Orientation Level: Oriented to person, Disoriented to time, Disoriented to situation, Disoriented to place  Cognition: Impaired decision making  Safety/Judgement: Decreased awareness of environment, Decreased awareness of need for assistance, Decreased awareness of need for safety, Decreased insight into deficits, Fall prevention  Functional Mobility Training:  Bed Mobility:  Rolling: Minimum assistance  Supine to Sit: Minimum assistance  Sit to Supine: Stand-by asssistance   Transfers:  Sit to Stand: Contact guard assistance;Minimum assistance  Stand to Sit: Contact guard assistance    Balance:  Sitting: Intact (Lack of safety awarness in general)  Sitting - Static: Good (unsupported)  Sitting - Dynamic: Fair (occasional)  Standing: Impaired;Pull to stand  Standing - Static: Fair (VC and TC for forward weight shift)  Ambulation/Gait Training:  Therapeutic Exercises:   Seated: Marches, LAQ, ankle pumps  Pain:  Pain Scale 1: Numeric (0 - 10)  Pain Intensity 1: 0   Activity Tolerance:   Fair+  Please refer to the flowsheet for vital signs taken during this treatment.   After treatment:   [] Patient left in no apparent distress sitting up in chair  [x] Patient left in no apparent distress in bed  [x] Call bell left within reach  [x] Nursing notified  [] Caregiver present  [] Bed alarm activated      Kimmy Kumar Gonzales   Time Calculation: 30 mins

## 2017-10-30 NOTE — INTERDISCIPLINARY ROUNDS
IDR/SLIDR Summary          Patient: Leslie Barker MRN: [id number]    Age: [de-identified] y.o. YOB: 1935 Room/Bed: [facility]   Admit Diagnosis: Cellulitis, leg  Open wound  Poorly controlled diabetes mellitus (HCC)  Cellulitis, leg  Principal Diagnosis: <principal problem not specified>   Goals: open wound is healed  Readmission: NO  Quality Measure: Not applicable  VTE Prophylaxis: Chemical  Influenza Vaccine screening completed? YES  Pneumococcal Vaccine screening completed? YES  Mobility needs: No   Nutrition plan:Yes  Consults:Case Management    Financial concerns:No  Escalated to CM? YES  RRAT Score: 41   Interventions:Home Health  Testing due for pt today?  YES  LOS: 6 days Expected length of stay 3 days  Discharge plan: Yes, SNF   PCP: Lakesha Wilson MD  Transportation needs: Yes    Days before discharge:two or more days before discharge   Discharge disposition: Nursing Home    Signed:     Ralph Sheldon RN  10/30/2017  3:12 AM

## 2017-10-30 NOTE — PROGRESS NOTES
The patient had a Blood sugar of 60. Orange juice was given and breakfast was eaten. His blood sugar was rechecked; It is now 111.

## 2017-10-31 ENCOUNTER — PATIENT OUTREACH (OUTPATIENT)
Dept: INTERNAL MEDICINE CLINIC | Age: 82
End: 2017-10-31

## 2017-10-31 NOTE — PROGRESS NOTES
Transitions of Care Coordination    Ambar Simpson was hospitalized at [facility] 10/24-10/30/17 for cellulitis of the right LE, dementia with behavioral disturbance, poorly controlled DM and transferred to [facility] RRAT = 41    Portions of the Discharge Summary written by Dr. Israel Aschoff on 10/30/17 are below in italics. DISCHARGE DIAGNOSES  1. Cellulitis with associated wound on the right lower extremity. 2. Dementia with behavior disturbances. 3. Type 2 diabetes with some episodes of hypoglycemia. 4. Mild dysphagia. 5. Acute metabolic encephalopathy, which has shown some  improvement to baseline. 6. Possible seizure episodes, which were felt to be related to  Wellbutrin. 7. Vascular wound to the right leg, for which the patient is receiving  wound care. 8. Hypokalemia.     HOSPITAL COURSE: This is an 63-year-old male who was brought to  the ER with right leg pain and skin tear. The patient has known history  of dementia and there was some history of behavior disturbances. The  patient was admitted for cellulitis. The patient was started on  antibiotics. Wound Care was consulted. Vascular Surgery was  consulted. It was felt that the wound was a vascular wound. Vascular  Surgery has recommended to continue wound care. We will complete  a course of antibiotics. The patient was also noted to have some  possible seizure activity. Neurology was consulted and they  discontinued the Wellbutrin. There is concern that this may be  Wellbutrin related. At admission, the patient did have a CT scan of the  head, which was negative. Neurology subsequently recommended  MRI of the brain if possible, so I discussed with the neurologist, Dr. Corey Cam, yesterday, which was 10/29/2017, and he recommends that  the MRI can be done as an outpatient, so I will defer to the physician  taking care of the patient at the skilled nursing facility to arrange for the  patient to have an MRI of the brain to be done in the next 10 days. The  patient will need to follow up with the PCP in 1 week and Vascular  Surgery in 10 days and with Neurology in the next 10 days. So, the  patient has remained hemodynamically stable. PT, OT have assessed  the patient and have recommended skilled nursing facility placement. I  have discussed with the patient's wife, the patient is a FULL CODE. Wife is agreeable with plans to transfer to skilled nursing facility. I also  discussed with the  and they are working on arranging  the bed. So, the patient will be transitioned to skilled nursing facility  once a bed will be available. INSTRUCTIONS  1. Diet is cardiac, diabetic, chopped meats and vegetables. No  concentrated sweets. No knife in patient's room. It confuses the  patient. Please provide only a spoon and fork. Glucerna shake 1 p.o.  twice daily. 2. Activity as tolerated. 3. Fall precautions. 4. Aspiration precautions. 5. Decubitus precautions. 6. Seizure precautions. 7. Incentive spirometry every 30 minutes when awake. 8. PT, OT evaluate and treat. 9. Check a CBC, CMP, magnesium in 3 days. 10. Wound care as directed. 11. Follow up with PCP in 1 week. 12. Follow up with vascular surgeon in 10 days. 13. Follow up with Neurology in 10 days. 14. Please arrange for the patient to have an MRI of the brain in the  next 10 days. 15. Please check Accu-Cheks or fingerstick blood sugar checks before  meals and at bedtime.       Merary Napier. is a [de-identified] y.o. male. This patient was received as a referral from High Risk Report . Call to [facility].)  Reached nurse, Jake Guzman, and identified self/role. Per nurse no problems or concerns identified at this time. Medications reconciled, allergies and orders reviewed. Message to Haris Rivas RN, for CCT follow up while in SNF. Goals        Post Hospitalization     Prevent complications post hospitalization.       Supportive resources in place to maintain patient in the community (ie.  Home Health, DME equipment, refer to, medication assistant plan, etc.)

## 2017-11-01 ENCOUNTER — HOSPITAL ENCOUNTER (OUTPATIENT)
Dept: LAB | Age: 82
Discharge: HOME | End: 2017-11-01

## 2017-11-01 LAB
EST. AVERAGE GLUCOSE BLD GHB EST-MCNC: 169 MG/DL
HBA1C MFR BLD: 7.5 % (ref 4.2–5.6)

## 2017-11-01 PROCEDURE — 83036 HEMOGLOBIN GLYCOSYLATED A1C: CPT | Performed by: FAMILY MEDICINE

## 2017-11-02 ENCOUNTER — PATIENT OUTREACH (OUTPATIENT)
Dept: CASE MANAGEMENT | Age: 82
End: 2017-11-02

## 2017-11-02 ENCOUNTER — HOSPITAL ENCOUNTER (OUTPATIENT)
Dept: LAB | Age: 82
Discharge: HOME | End: 2017-11-02

## 2017-11-02 LAB
ALBUMIN SERPL-MCNC: 2.8 G/DL (ref 3.4–5)
ALBUMIN/GLOB SERPL: 0.8 {RATIO} (ref 0.8–1.7)
ALP SERPL-CCNC: 128 U/L (ref 45–117)
ALT SERPL-CCNC: 70 U/L (ref 16–61)
ANION GAP SERPL CALC-SCNC: 7 MMOL/L (ref 3–18)
AST SERPL-CCNC: 41 U/L (ref 15–37)
BASOPHILS # BLD: 0.1 K/UL (ref 0–0.06)
BASOPHILS NFR BLD: 1 % (ref 0–2)
BILIRUB SERPL-MCNC: 0.6 MG/DL (ref 0.2–1)
BUN SERPL-MCNC: 20 MG/DL (ref 7–18)
BUN/CREAT SERPL: 16 (ref 12–20)
CALCIUM SERPL-MCNC: 8.4 MG/DL (ref 8.5–10.1)
CHLORIDE SERPL-SCNC: 102 MMOL/L (ref 100–108)
CO2 SERPL-SCNC: 28 MMOL/L (ref 21–32)
CREAT SERPL-MCNC: 1.22 MG/DL (ref 0.6–1.3)
DIFFERENTIAL METHOD BLD: ABNORMAL
EOSINOPHIL # BLD: 0.5 K/UL (ref 0–0.4)
EOSINOPHIL NFR BLD: 4 % (ref 0–5)
ERYTHROCYTE [DISTWIDTH] IN BLOOD BY AUTOMATED COUNT: 19.7 % (ref 11.6–14.5)
GLOBULIN SER CALC-MCNC: 3.3 G/DL (ref 2–4)
GLUCOSE SERPL-MCNC: 210 MG/DL (ref 74–99)
HCT VFR BLD AUTO: 41.6 % (ref 36–48)
HGB BLD-MCNC: 12.9 G/DL (ref 13–16)
LYMPHOCYTES # BLD: 2.3 K/UL (ref 0.9–3.6)
LYMPHOCYTES NFR BLD: 18 % (ref 21–52)
MAGNESIUM SERPL-MCNC: 2.2 MG/DL (ref 1.6–2.6)
MCH RBC QN AUTO: 22.9 PG (ref 24–34)
MCHC RBC AUTO-ENTMCNC: 31 G/DL (ref 31–37)
MCV RBC AUTO: 73.8 FL (ref 74–97)
MONOCYTES # BLD: 1.7 K/UL (ref 0.05–1.2)
MONOCYTES NFR BLD: 13 % (ref 3–10)
NEUTS SEG # BLD: 8.2 K/UL (ref 1.8–8)
NEUTS SEG NFR BLD: 64 % (ref 40–73)
PLATELET # BLD AUTO: 400 K/UL (ref 135–420)
PMV BLD AUTO: 10.6 FL (ref 9.2–11.8)
POTASSIUM SERPL-SCNC: 3.6 MMOL/L (ref 3.5–5.5)
PREALB SERPL-MCNC: 15.9 MG/DL (ref 20–40)
PROT SERPL-MCNC: 6.1 G/DL (ref 6.4–8.2)
RBC # BLD AUTO: 5.64 M/UL (ref 4.7–5.5)
SODIUM SERPL-SCNC: 137 MMOL/L (ref 136–145)
WBC # BLD AUTO: 12.8 K/UL (ref 4.6–13.2)

## 2017-11-02 PROCEDURE — 85025 COMPLETE CBC W/AUTO DIFF WBC: CPT | Performed by: FAMILY MEDICINE

## 2017-11-02 PROCEDURE — 83735 ASSAY OF MAGNESIUM: CPT | Performed by: FAMILY MEDICINE

## 2017-11-02 PROCEDURE — 80053 COMPREHEN METABOLIC PANEL: CPT | Performed by: FAMILY MEDICINE

## 2017-11-02 PROCEDURE — 84134 ASSAY OF PREALBUMIN: CPT | Performed by: FAMILY MEDICINE

## 2017-11-03 NOTE — PROGRESS NOTES
Community Care Team Documentation for Patient in [facility]     Patient discharged from [facility] 10/24/2017 - 10/30/2017 to [facility], [address], on 10/30/2017. Hospital Discharge diagnosis:  Cellulitis. SNF Attending Provider:      Anticipated discharge date from SNF:  11/28/2017      PCP : Akash Alonzo MD    Nurse Navigator: Tara Henderson RN    11/28 to [facility]. Severe cognitive deficits. Participated with therapy. Memory affects participation. Augmentin until 11/6. VSS    High Risk            33       Total Score        3 Has Seen PCP in Last 6 Months (Yes=3, No=0)    2 . Living with Significant Other. Assisted Living. LTAC. SNF. or   Rehab    28 Charlson Comorbidity Score (Age + Comorbid Conditions)        Criteria that do not apply:    Patient Length of Stay (>5 days = 3)    IP Visits Last 12 Months (1-3=4, 4=9, >4=11)    Pt.  Coverage (Medicare=5 , Medicaid, or Self-Pay=4)      Active Ambulatory Problems     Diagnosis Date Noted    Hypercholesterolemia     Carotid artery disease ([address]     Spondylosis 11/14/2012    Coronary atherosclerosis of native coronary artery 11/19/2012    HTN (hypertension) 11/19/2012    Atherosclerosis of native arteries of the extremities with intermittent claudication 11/19/2012    Carotid artery occlusion without infarction 11/19/2012    DM (diabetes mellitus) ([address] 11/19/2012    Spondylosis 11/19/2012    CKD (chronic kidney disease) stage 3, GFR 30-59 ml/min 05/20/2013    CHI (closed head injury) 05/11/2015    Low back pain     Venous (peripheral) insufficiency     Osteoarthrosis involving multiple sites     Type 2 diabetes mellitus with stage 3 chronic kidney disease ([address] 07/20/2016    Acute chest pain 08/22/2016    CAD (coronary artery disease) 08/29/2016    COPD (chronic obstructive pulmonary disease) ([address] 08/29/2016    Type 2 diabetes mellitus without complication, with long-term current use of insulin ([facility] 10/24/2016    S/P CABG x 2 10/24/2016    Mixed hyperlipidemia 01/24/2017    Enuresis 02/27/2017    Open wound 10/24/2017    Cellulitis, leg 10/24/2017    Poorly controlled diabetes mellitus ([address] 10/24/2017     Resolved Ambulatory Problems     Diagnosis Date Noted    Syncope and collapse 11/19/2012    Cough 11/19/2012    Unstable angina ([facility] 05/20/2013    Unstable angina ([facility] 05/01/2014     Past Medical History:   Diagnosis Date    Angina     Anxiety     Arthritis     CAD (coronary artery disease)     Cardiac catheterization 08/24/2016    Cardiac echocardiogram 08/23/2016    Cardiac nuclear imaging test, abnormal 08/23/2016    Carotid duplex 08/26/2016    [facility] stenosis [facility]     Chronic kidney disease     COPD (chronic obstructive pulmonary disease) ([facility])     Dementia     Depression     Diabetes (HCC)     Dyslipidemia     Dyspnea     Heart disease     Hypercholesterolemia     Hypertension     Low back pain     Osteoarthrosis involving multiple sites     Skin cancer ([facility]     Stroke ([facility]     Venous (peripheral) insufficiency

## 2017-11-09 ENCOUNTER — PATIENT OUTREACH (OUTPATIENT)
Dept: CASE MANAGEMENT | Age: 82
End: 2017-11-09

## 2017-11-10 ENCOUNTER — HOSPITAL ENCOUNTER (OUTPATIENT)
Age: 82
Discharge: HOME | End: 2017-11-10
Attending: FAMILY MEDICINE
Payer: MEDICARE

## 2017-11-10 DIAGNOSIS — R56.9 NEW ONSET SEIZURE (HCC): ICD-10-CM

## 2017-11-10 PROCEDURE — 70553 MRI BRAIN STEM W/O & W/DYE: CPT

## 2017-11-10 PROCEDURE — 74011250636 HC RX REV CODE- 250/636: Performed by: FAMILY MEDICINE

## 2017-11-10 PROCEDURE — A9585 GADOBUTROL INJECTION: HCPCS | Performed by: FAMILY MEDICINE

## 2017-11-10 RX ADMIN — GADOBUTROL 7.5 ML: 604.72 INJECTION INTRAVENOUS at 18:00

## 2017-11-16 ENCOUNTER — PATIENT OUTREACH (OUTPATIENT)
Dept: CASE MANAGEMENT | Age: 82
End: 2017-11-16

## 2017-11-17 NOTE — PROGRESS NOTES
Community Care Team Documentation for Patient in [facility]     Patient discharged from [facility] 10/24/2017 - 10/30/2017 to [facility], [address], on 10/30/2017. Hospital Discharge diagnosis:  Cellulitis. SNF Attending Provider:      Anticipated discharge date from SNF:  11/28/2017      PCP : Riddhi Morin MD    Nurse Navigator: Shereen De Luna RN    Dc to [facility]. SP ends 11/21. Progressing with therapy. Supervision for most activities, therapy goal: to get to modified independent     High Risk            33       Total Score        3 Has Seen PCP in Last 6 Months (Yes=3, No=0)    2 . Living with Significant Other. Assisted Living. LTAC. SNF. or   Rehab    28 Charlson Comorbidity Score (Age + Comorbid Conditions)        Criteria that do not apply:    Patient Length of Stay (>5 days = 3)    IP Visits Last 12 Months (1-3=4, 4=9, >4=11)    Pt.  Coverage (Medicare=5 , Medicaid, or Self-Pay=4)      Active Ambulatory Problems     Diagnosis Date Noted    Hypercholesterolemia     Carotid artery disease ([address]     Spondylosis 11/14/2012    Coronary atherosclerosis of native coronary artery 11/19/2012    HTN (hypertension) 11/19/2012    Atherosclerosis of native arteries of the extremities with intermittent claudication 11/19/2012    Carotid artery occlusion without infarction 11/19/2012    DM (diabetes mellitus) ([address] 11/19/2012    Spondylosis 11/19/2012    CKD (chronic kidney disease) stage 3, GFR 30-59 ml/min 05/20/2013    CHI (closed head injury) 05/11/2015    Low back pain     Venous (peripheral) insufficiency     Osteoarthrosis involving multiple sites     Type 2 diabetes mellitus with stage 3 chronic kidney disease ([address] 07/20/2016    Acute chest pain 08/22/2016    CAD (coronary artery disease) 08/29/2016    COPD (chronic obstructive pulmonary disease) ([address] 08/29/2016    Type 2 diabetes mellitus without complication, with long-term current use of insulin ([facility] 10/24/2016    S/P CABG x 2 10/24/2016    Mixed hyperlipidemia 01/24/2017    Enuresis 02/27/2017    Open wound 10/24/2017    Cellulitis, leg 10/24/2017    Poorly controlled diabetes mellitus ([address] 10/24/2017     Resolved Ambulatory Problems     Diagnosis Date Noted    Syncope and collapse 11/19/2012    Cough 11/19/2012    Unstable angina ([facility] 05/20/2013    Unstable angina ([facility] 05/01/2014     Past Medical History:   Diagnosis Date    Angina     Anxiety     Arthritis     CAD (coronary artery disease)     Cardiac catheterization 08/24/2016    Cardiac echocardiogram 08/23/2016    Cardiac nuclear imaging test, abnormal 08/23/2016    Carotid duplex 08/26/2016    [facility]     Chronic kidney disease     COPD (chronic obstructive pulmonary disease) ([facility]     Dementia     Depression     Diabetes (HCC)     Dyslipidemia     Dyspnea     Heart disease     Hypercholesterolemia     Hypertension     Low back pain     Osteoarthrosis involving multiple sites     Skin cancer ([address]     Stroke ([facility]     Venous (peripheral) insufficiency

## 2017-11-17 NOTE — PROGRESS NOTES
Community Care Team Documentation for Patient in [facility]     Patient discharged from [facility] 10/24/2017 - 10/30/2017 to [facility], on 10/30/2017. Hospital Discharge diagnosis:  Cellulitis. SNF Attending Provider:      Anticipated discharge date from SNF:  11/28/2017      PCP : Ernestine Monaco MD    Nurse Navigator: Giovanna Causey RN    Dc to [facility]. Severe cognitive deficits. Participated with therapy. No changes    High Risk            33       Total Score        3 Has Seen PCP in Last 6 Months (Yes=3, No=0)    2 . Living with Significant Other. Assisted Living. LTAC. SNF. or   Rehab    28 Charlson Comorbidity Score (Age + Comorbid Conditions)        Criteria that do not apply:    Patient Length of Stay (>5 days = 3)    IP Visits Last 12 Months (1-3=4, 4=9, >4=11)    Pt.  Coverage (Medicare=5 , Medicaid, or Self-Pay=4)      Active Ambulatory Problems     Diagnosis Date Noted    Hypercholesterolemia     Carotid artery disease ([address]     Spondylosis 11/14/2012    Coronary atherosclerosis of native coronary artery 11/19/2012    HTN (hypertension) 11/19/2012    Atherosclerosis of native arteries of the extremities with intermittent claudication 11/19/2012    Carotid artery occlusion without infarction 11/19/2012    DM (diabetes mellitus) ([address] 11/19/2012    Spondylosis 11/19/2012    CKD (chronic kidney disease) stage 3, GFR 30-59 ml/min 05/20/2013    CHI (closed head injury) 05/11/2015    Low back pain     Venous (peripheral) insufficiency     Osteoarthrosis involving multiple sites     Type 2 diabetes mellitus with stage 3 chronic kidney disease ([address] 07/20/2016    Acute chest pain 08/22/2016    CAD (coronary artery disease) 08/29/2016    COPD (chronic obstructive pulmonary disease) ([address] 08/29/2016    Type 2 diabetes mellitus without complication, with long-term current use of insulin ([address] 10/24/2016    S/P CABG x 2 10/24/2016    Mixed hyperlipidemia 01/24/2017    Enuresis 02/27/2017    Open wound 10/24/2017    Cellulitis, leg 10/24/2017    Poorly controlled diabetes mellitus ([address] 10/24/2017     Resolved Ambulatory Problems     Diagnosis Date Noted    Syncope and collapse 11/19/2012    Cough 11/19/2012    Unstable angina ([facility] 05/20/2013    Unstable angina ([facility] 05/01/2014     Past Medical History:   Diagnosis Date    Angina     Anxiety     Arthritis     CAD (coronary artery disease)     Cardiac catheterization 08/24/2016    Cardiac echocardiogram 08/23/2016    Cardiac nuclear imaging test, abnormal 08/23/2016    Carotid duplex 08/26/2016    Carotid stenosis (HCC)     Chronic kidney disease     COPD (chronic obstructive pulmonary disease) (HCC)     Dementia     Depression     Diabetes (HCC)     Dyslipidemia     Dyspnea     Heart disease     Hypercholesterolemia     Hypertension     Low back pain     Osteoarthrosis involving multiple sites     Skin cancer ([facility]     Stroke ([facility]     Venous (peripheral) insufficiency

## 2017-11-18 ENCOUNTER — HOSPITAL ENCOUNTER (OUTPATIENT)
Dept: LAB | Age: 82
Discharge: HOME | End: 2017-11-18

## 2017-11-18 LAB
EST. AVERAGE GLUCOSE BLD GHB EST-MCNC: 166 MG/DL
HBA1C MFR BLD: 7.4 % (ref 4.2–5.6)

## 2017-11-18 PROCEDURE — 87086 URINE CULTURE/COLONY COUNT: CPT | Performed by: FAMILY MEDICINE

## 2017-11-18 PROCEDURE — 83036 HEMOGLOBIN GLYCOSYLATED A1C: CPT | Performed by: FAMILY MEDICINE

## 2017-11-18 PROCEDURE — 87186 SC STD MICRODIL/AGAR DIL: CPT | Performed by: FAMILY MEDICINE

## 2017-11-18 PROCEDURE — 81001 URINALYSIS AUTO W/SCOPE: CPT | Performed by: FAMILY MEDICINE

## 2017-11-18 PROCEDURE — 87077 CULTURE AEROBIC IDENTIFY: CPT | Performed by: FAMILY MEDICINE

## 2017-11-20 LAB
APPEARANCE UR: NORMAL
BACTERIA URNS QL MICRO: ABNORMAL /HPF
BILIRUB UR QL: NEGATIVE
COLOR UR: YELLOW
EPITH CASTS URNS QL MICRO: ABNORMAL /LPF (ref 0–5)
GLUCOSE UR STRIP.AUTO-MCNC: NEGATIVE MG/DL
HGB UR QL STRIP: NORMAL
KETONES UR QL STRIP.AUTO: NEGATIVE MG/DL
LEUKOCYTE ESTERASE UR QL STRIP.AUTO: NORMAL
NITRITE UR QL STRIP.AUTO: NEGATIVE
PH UR STRIP: 5.5 [PH]
PROT UR STRIP-MCNC: NEGATIVE MG/DL
RBC #/AREA URNS HPF: ABNORMAL /HPF (ref 0–5)
SP GR UR REFRACTOMETRY: 1.02 (ref 1–1.04)
UROBILINOGEN UR QL STRIP.AUTO: 1 EU/DL
WBC URNS QL MICRO: ABNORMAL /HPF (ref 0–5)

## 2017-11-21 LAB
BACTERIA SPEC CULT: ABNORMAL
SERVICE CMNT-IMP: ABNORMAL

## 2017-11-22 ENCOUNTER — PATIENT OUTREACH (OUTPATIENT)
Dept: CASE MANAGEMENT | Age: 82
End: 2017-11-22

## 2017-11-27 NOTE — PROGRESS NOTES
Community Care Team Documentation for Patient in [facility]     Patient discharged from [facility] 10/24/2017 - 10/30/2017 to [facility], on 10/30/2017. Hospital Discharge diagnosis:  Cellulitis. SNF Attending Provider:      Anticipated discharge date from SNF:  11/28/2017      PCP : Qamar Frost MD    Nurse Navigator: Bala Gramajo RN    Dc to [facility]. [place], T-11/27 last day of therapy   SBA ADLs   S with bed mob and transfers   Gait with RW x 200ft and supervision    High Risk            33       Total Score        3 Has Seen PCP in Last 6 Months (Yes=3, No=0)    2 . Living with Significant Other. Assisted Living. LTAC. SNF. or   Rehab    28 Charlson Comorbidity Score (Age + Comorbid Conditions)        Criteria that do not apply:    Patient Length of Stay (>5 days = 3)    IP Visits Last 12 Months (1-3=4, 4=9, >4=11)    Pt.  Coverage (Medicare=5 , Medicaid, or Self-Pay=4)      Active Ambulatory Problems     Diagnosis Date Noted    Hypercholesterolemia     Carotid artery disease ([address]     Spondylosis 11/14/2012    Coronary atherosclerosis of native coronary artery 11/19/2012    HTN (hypertension) 11/19/2012    Atherosclerosis of native arteries of the extremities with intermittent claudication 11/19/2012    Carotid artery occlusion without infarction 11/19/2012    DM (diabetes mellitus) ([address] 11/19/2012    Spondylosis 11/19/2012    CKD (chronic kidney disease) stage 3, GFR 30-59 ml/min 05/20/2013    CHI (closed head injury) 05/11/2015    Low back pain     Venous (peripheral) insufficiency     Osteoarthrosis involving multiple sites     Type 2 diabetes mellitus with stage 3 chronic kidney disease ([address] 07/20/2016    Acute chest pain 08/22/2016    CAD (coronary artery disease) 08/29/2016    COPD (chronic obstructive pulmonary disease) ([address] 08/29/2016    Type 2 diabetes mellitus without complication, with long-term current use of insulin ([facility] 10/24/2016    S/P CABG x 2 10/24/2016    Mixed hyperlipidemia 01/24/2017    Enuresis 02/27/2017    Open wound 10/24/2017    Cellulitis, leg 10/24/2017    Poorly controlled diabetes mellitus ([address] 10/24/2017     Resolved Ambulatory Problems     Diagnosis Date Noted    Syncope and collapse 11/19/2012    Cough 11/19/2012    Unstable angina ([facility] 05/20/2013    Unstable angina ([facility] 05/01/2014     Past Medical History:   Diagnosis Date    Angina     Anxiety     Arthritis     CAD (coronary artery disease)     Cardiac catheterization 08/24/2016    Cardiac echocardiogram 08/23/2016    Cardiac nuclear imaging test, abnormal 08/23/2016    Carotid duplex 08/26/2016    [facility]     Chronic kidney disease     COPD (chronic obstructive pulmonary disease) ([facility]     Dementia     Depression     Diabetes (HCC)     Dyslipidemia     Dyspnea     Heart disease     Hypercholesterolemia     Hypertension     Low back pain     Osteoarthrosis involving multiple sites     Skin cancer ([address]     Stroke ([facility]     Venous (peripheral) insufficiency

## 2017-11-29 ENCOUNTER — PATIENT OUTREACH (OUTPATIENT)
Dept: INTERNAL MEDICINE CLINIC | Age: 82
End: 2017-11-29

## 2017-11-29 RX ORDER — ASCORBIC ACID 250 MG
TABLET ORAL 2 TIMES DAILY
COMMUNITY
End: 2018-03-27

## 2017-11-29 RX ORDER — TEMAZEPAM 7.5 MG/1
CAPSULE ORAL
Status: ON HOLD | COMMUNITY
End: 2018-03-26

## 2017-11-29 NOTE — PROGRESS NOTES
Transitions of Care Coordination    Seven Irwin was hospitalized at [facility] 10/24-10/30/17 for cellulitis of the right LE, dementia with behavioral disturbance, poorly controlled DM and transferred to [facility] 10/30-11/28/17. The patient has been discharged to [address] [place] [address] where he will be a permanent resident. Call to [facility]. Reached nurse, Barbie Morton, and identified self/role. Per Barbie Morton patient is doing well. No problems or concerns noted at this time. Medications reconciled. Allergies and orders reviewed. Ry Raines is ordered for wound care. Will follow. Goals        Post Hospitalization     Prevent complications post hospitalization.  Supportive resources in place to maintain patient in the community (ie.  Home Health, DME equipment, refer to, medication assistant plan, etc.)

## 2017-12-06 ENCOUNTER — PATIENT OUTREACH (OUTPATIENT)
Dept: INTERNAL MEDICINE CLINIC | Age: 82
End: 2017-12-06

## 2017-12-06 NOTE — PROGRESS NOTES
Transitions of Care Coordination    Gómez Domínguez was hospitalized at [facility] 10/24-10/30/17 for cellulitis of the right LE, dementia with behavioral disturbance, poorly controlled DM and transferred to [facility] 10/30-11/28/17. The patient has been discharged to [address] [place] [address] where he will be a permanent resident.     Call to [facility]. Reached nurse, Ortega Ramos, and identified self/role. Per nurse patient is doing well. No problems or concerns noted at this time. Will follow. Goals Addressed             Most Recent       Post Hospitalization     Prevent complications post hospitalization. On track (12/6/2017)     Supportive resources in place to maintain patient in the community (ie.  Home Health, DME equipment, refer to, medication assistant plan, etc.)   On track (12/6/2017)             Plan:  Follow up with [facility]done 12/6/17

## 2017-12-29 ENCOUNTER — PATIENT OUTREACH (OUTPATIENT)
Dept: INTERNAL MEDICINE CLINIC | Age: 82
End: 2017-12-29

## 2017-12-29 NOTE — PROGRESS NOTES
Transitions of Care Coordination    Vesta Rivers was hospitalized at [facility] 10/24-10/30/17 for cellulitis of the right LE, dementia with behavioral disturbance, poorly controlled DM and transferred to [facility] 10/30-11/28/17.  The patient was discharged to [address] [place] [address] where he is a permanent resident. Chart reviewed. Goals met. Episode resolved: no ED visit or hospitalization 30 days from discharge. Goals Addressed             Most Recent       Post Hospitalization     Prevent complications post hospitalization. On track (12/29/2017)     Supportive resources in place to maintain patient in the community (ie.  Home Health, DME equipment, refer to, medication assistant plan, etc.)   On track (12/29/2017)             Plan:  Follow up with [facility] 12/6/17

## 2018-03-22 ENCOUNTER — HOSPITAL ENCOUNTER (INPATIENT)
Age: 83
LOS: 5 days | Discharge: HOME | DRG: 194 | End: 2018-03-27
Attending: EMERGENCY MEDICINE | Admitting: INTERNAL MEDICINE
Payer: MEDICARE

## 2018-03-22 ENCOUNTER — APPOINTMENT (OUTPATIENT)
Dept: CT IMAGING | Age: 83
DRG: 194 | End: 2018-03-22
Attending: EMERGENCY MEDICINE
Payer: MEDICARE

## 2018-03-22 ENCOUNTER — HOSPITAL ENCOUNTER (EMERGENCY)
Age: 83
Discharge: HOME | DRG: 194 | End: 2018-03-22
Attending: EMERGENCY MEDICINE | Admitting: EMERGENCY MEDICINE
Payer: MEDICARE

## 2018-03-22 ENCOUNTER — APPOINTMENT (OUTPATIENT)
Dept: GENERAL RADIOLOGY | Age: 83
DRG: 194 | End: 2018-03-22
Attending: PHYSICIAN ASSISTANT
Payer: MEDICARE

## 2018-03-22 VITALS
TEMPERATURE: 99.4 F | RESPIRATION RATE: 23 BRPM | HEART RATE: 79 BPM | OXYGEN SATURATION: 96 % | SYSTOLIC BLOOD PRESSURE: 150 MMHG | DIASTOLIC BLOOD PRESSURE: 74 MMHG

## 2018-03-22 DIAGNOSIS — I10 ESSENTIAL HYPERTENSION: Chronic | ICD-10-CM

## 2018-03-22 DIAGNOSIS — J10.1 INFLUENZA B: Primary | ICD-10-CM

## 2018-03-22 DIAGNOSIS — G47.09 OTHER INSOMNIA: ICD-10-CM

## 2018-03-22 PROBLEM — J11.1 FLU: Status: ACTIVE | Noted: 2018-03-22

## 2018-03-22 PROBLEM — R06.2 WHEEZING: Status: ACTIVE | Noted: 2018-03-22

## 2018-03-22 LAB
ALBUMIN SERPL-MCNC: 3.6 G/DL (ref 3.4–5)
ALBUMIN/GLOB SERPL: 1.1 {RATIO} (ref 0.8–1.7)
ALP SERPL-CCNC: 144 U/L (ref 45–117)
ALT SERPL-CCNC: 22 U/L (ref 16–61)
ANION GAP SERPL CALC-SCNC: 12 MMOL/L (ref 3–18)
ANION GAP SERPL CALC-SCNC: 7 MMOL/L (ref 3–18)
AST SERPL-CCNC: 29 U/L (ref 15–37)
BASOPHILS # BLD: 0 K/UL (ref 0–0.06)
BASOPHILS # BLD: 0 K/UL (ref 0–0.1)
BASOPHILS NFR BLD: 0 % (ref 0–2)
BASOPHILS NFR BLD: 0 % (ref 0–3)
BILIRUB SERPL-MCNC: 1.3 MG/DL (ref 0.2–1)
BNP SERPL-MCNC: 603 PG/ML (ref 0–1800)
BUN SERPL-MCNC: 15 MG/DL (ref 7–18)
BUN SERPL-MCNC: 16 MG/DL (ref 7–18)
BUN/CREAT SERPL: 11 (ref 12–20)
BUN/CREAT SERPL: 12 (ref 12–20)
CALCIUM SERPL-MCNC: 8.4 MG/DL (ref 8.5–10.1)
CALCIUM SERPL-MCNC: 8.8 MG/DL (ref 8.5–10.1)
CHLORIDE SERPL-SCNC: 102 MMOL/L (ref 100–108)
CHLORIDE SERPL-SCNC: 102 MMOL/L (ref 100–108)
CO2 SERPL-SCNC: 25 MMOL/L (ref 21–32)
CO2 SERPL-SCNC: 29 MMOL/L (ref 21–32)
CREAT SERPL-MCNC: 1.37 MG/DL (ref 0.6–1.3)
CREAT SERPL-MCNC: 1.41 MG/DL (ref 0.6–1.3)
DIFFERENTIAL METHOD BLD: ABNORMAL
DIFFERENTIAL METHOD BLD: ABNORMAL
EOSINOPHIL # BLD: 0.1 K/UL (ref 0–0.4)
EOSINOPHIL # BLD: 0.4 K/UL (ref 0–0.4)
EOSINOPHIL NFR BLD: 1 % (ref 0–5)
EOSINOPHIL NFR BLD: 3 % (ref 0–5)
ERYTHROCYTE [DISTWIDTH] IN BLOOD BY AUTOMATED COUNT: 15.7 % (ref 11.6–14.5)
ERYTHROCYTE [DISTWIDTH] IN BLOOD BY AUTOMATED COUNT: 16.1 % (ref 11.6–14.5)
EST. AVERAGE GLUCOSE BLD GHB EST-MCNC: 206 MG/DL
FLUAV AG NPH QL IA: NEGATIVE
FLUBV AG NOSE QL IA: POSITIVE
GLOBULIN SER CALC-MCNC: 3.4 G/DL (ref 2–4)
GLUCOSE BLD STRIP.AUTO-MCNC: 209 MG/DL (ref 70–110)
GLUCOSE SERPL-MCNC: 199 MG/DL (ref 74–99)
GLUCOSE SERPL-MCNC: 202 MG/DL (ref 74–99)
HBA1C MFR BLD: 8.8 % (ref 4.2–5.6)
HCT VFR BLD AUTO: 48.6 % (ref 36–48)
HCT VFR BLD AUTO: 53.6 % (ref 36–48)
HGB BLD-MCNC: 16.7 G/DL (ref 13–16)
HGB BLD-MCNC: 17.7 G/DL (ref 13–16)
LACTATE BLD-SCNC: 0.9 MMOL/L (ref 0.4–2)
LACTATE BLD-SCNC: 2.4 MMOL/L (ref 0.4–2)
LYMPHOCYTES # BLD: 1 K/UL (ref 0.9–3.6)
LYMPHOCYTES # BLD: 1.6 K/UL (ref 0.8–3.5)
LYMPHOCYTES NFR BLD: 14 % (ref 20–51)
LYMPHOCYTES NFR BLD: 9 % (ref 21–52)
MCH RBC QN AUTO: 27.6 PG (ref 24–34)
MCH RBC QN AUTO: 28.2 PG (ref 24–34)
MCHC RBC AUTO-ENTMCNC: 33 G/DL (ref 31–37)
MCHC RBC AUTO-ENTMCNC: 34.4 G/DL (ref 31–37)
MCV RBC AUTO: 82 FL (ref 74–97)
MCV RBC AUTO: 83.6 FL (ref 74–97)
MONOCYTES # BLD: 1.3 K/UL (ref 0–1)
MONOCYTES # BLD: 1.4 K/UL (ref 0.05–1.2)
MONOCYTES NFR BLD: 11 % (ref 2–9)
MONOCYTES NFR BLD: 13 % (ref 3–10)
NEUTS BAND NFR BLD MANUAL: 1 % (ref 0–5)
NEUTS SEG # BLD: 8.4 K/UL (ref 1.8–8)
NEUTS SEG # BLD: 8.4 K/UL (ref 1.8–8)
NEUTS SEG NFR BLD: 71 % (ref 42–75)
NEUTS SEG NFR BLD: 77 % (ref 40–73)
PLATELET # BLD AUTO: 189 K/UL (ref 135–420)
PLATELET # BLD AUTO: 200 K/UL (ref 135–420)
PLATELET COMMENTS,PCOM: ABNORMAL
PMV BLD AUTO: 10.6 FL (ref 9.2–11.8)
PMV BLD AUTO: 10.8 FL (ref 9.2–11.8)
POTASSIUM SERPL-SCNC: 3.5 MMOL/L (ref 3.5–5.5)
POTASSIUM SERPL-SCNC: 4.1 MMOL/L (ref 3.5–5.5)
PROT SERPL-MCNC: 7 G/DL (ref 6.4–8.2)
RBC # BLD AUTO: 5.93 M/UL (ref 4.7–5.5)
RBC # BLD AUTO: 6.41 M/UL (ref 4.7–5.5)
RBC MORPH BLD: ABNORMAL
SODIUM SERPL-SCNC: 138 MMOL/L (ref 136–145)
SODIUM SERPL-SCNC: 139 MMOL/L (ref 136–145)
WBC # BLD AUTO: 10.9 K/UL (ref 4.6–13.2)
WBC # BLD AUTO: 11.7 K/UL (ref 4.6–13.2)

## 2018-03-22 PROCEDURE — 65270000029 HC RM PRIVATE

## 2018-03-22 PROCEDURE — 87804 INFLUENZA ASSAY W/OPTIC: CPT | Performed by: PHYSICIAN ASSISTANT

## 2018-03-22 PROCEDURE — 99283 EMERGENCY DEPT VISIT LOW MDM: CPT

## 2018-03-22 PROCEDURE — 83605 ASSAY OF LACTIC ACID: CPT

## 2018-03-22 PROCEDURE — 99285 EMERGENCY DEPT VISIT HI MDM: CPT

## 2018-03-22 PROCEDURE — 83036 HEMOGLOBIN GLYCOSYLATED A1C: CPT | Performed by: INTERNAL MEDICINE

## 2018-03-22 PROCEDURE — 80053 COMPREHEN METABOLIC PANEL: CPT | Performed by: EMERGENCY MEDICINE

## 2018-03-22 PROCEDURE — 82962 GLUCOSE BLOOD TEST: CPT

## 2018-03-22 PROCEDURE — 85025 COMPLETE CBC W/AUTO DIFF WBC: CPT | Performed by: PHYSICIAN ASSISTANT

## 2018-03-22 PROCEDURE — 80048 BASIC METABOLIC PNL TOTAL CA: CPT | Performed by: PHYSICIAN ASSISTANT

## 2018-03-22 PROCEDURE — 83880 ASSAY OF NATRIURETIC PEPTIDE: CPT | Performed by: INTERNAL MEDICINE

## 2018-03-22 PROCEDURE — 74011250637 HC RX REV CODE- 250/637: Performed by: PHYSICIAN ASSISTANT

## 2018-03-22 PROCEDURE — 72125 CT NECK SPINE W/O DYE: CPT

## 2018-03-22 PROCEDURE — 70450 CT HEAD/BRAIN W/O DYE: CPT

## 2018-03-22 PROCEDURE — 71046 X-RAY EXAM CHEST 2 VIEWS: CPT

## 2018-03-22 PROCEDURE — 87040 BLOOD CULTURE FOR BACTERIA: CPT | Performed by: PHYSICIAN ASSISTANT

## 2018-03-22 PROCEDURE — 74011250636 HC RX REV CODE- 250/636: Performed by: INTERNAL MEDICINE

## 2018-03-22 RX ORDER — ISOSORBIDE MONONITRATE 30 MG/1
30 TABLET, EXTENDED RELEASE ORAL DAILY
Status: DISCONTINUED | OUTPATIENT
Start: 2018-03-23 | End: 2018-03-27 | Stop reason: HOSPADM

## 2018-03-22 RX ORDER — OSELTAMIVIR PHOSPHATE 75 MG/1
75 CAPSULE ORAL 2 TIMES DAILY
Status: DISCONTINUED | OUTPATIENT
Start: 2018-03-22 | End: 2018-03-22 | Stop reason: DRUGHIGH

## 2018-03-22 RX ORDER — HEPARIN SODIUM 5000 [USP'U]/ML
5000 INJECTION, SOLUTION INTRAVENOUS; SUBCUTANEOUS EVERY 8 HOURS
Status: DISCONTINUED | OUTPATIENT
Start: 2018-03-22 | End: 2018-03-27 | Stop reason: HOSPADM

## 2018-03-22 RX ORDER — GABAPENTIN 300 MG/1
300 CAPSULE ORAL DAILY
Status: DISCONTINUED | OUTPATIENT
Start: 2018-03-23 | End: 2018-03-27 | Stop reason: HOSPADM

## 2018-03-22 RX ORDER — FUROSEMIDE 10 MG/ML
40 INJECTION INTRAMUSCULAR; INTRAVENOUS DAILY
Status: DISCONTINUED | OUTPATIENT
Start: 2018-03-22 | End: 2018-03-23

## 2018-03-22 RX ORDER — OSELTAMIVIR PHOSPHATE 30 MG/1
30 CAPSULE ORAL 2 TIMES DAILY
Status: DISCONTINUED | OUTPATIENT
Start: 2018-03-23 | End: 2018-03-27 | Stop reason: HOSPADM

## 2018-03-22 RX ORDER — INSULIN LISPRO 100 [IU]/ML
INJECTION, SOLUTION INTRAVENOUS; SUBCUTANEOUS
Status: DISCONTINUED | OUTPATIENT
Start: 2018-03-23 | End: 2018-03-27 | Stop reason: HOSPADM

## 2018-03-22 RX ORDER — OSELTAMIVIR PHOSPHATE 75 MG/1
75 CAPSULE ORAL
Status: COMPLETED | OUTPATIENT
Start: 2018-03-22 | End: 2018-03-22

## 2018-03-22 RX ORDER — DEXTROSE 50 % IN WATER (D50W) INTRAVENOUS SYRINGE
25-50 AS NEEDED
Status: DISCONTINUED | OUTPATIENT
Start: 2018-03-22 | End: 2018-03-27 | Stop reason: HOSPADM

## 2018-03-22 RX ORDER — ROSUVASTATIN CALCIUM 10 MG/1
10 TABLET, COATED ORAL
Status: DISCONTINUED | OUTPATIENT
Start: 2018-03-22 | End: 2018-03-27 | Stop reason: HOSPADM

## 2018-03-22 RX ORDER — OSELTAMIVIR PHOSPHATE 75 MG/1
75 CAPSULE ORAL 2 TIMES DAILY
Qty: 10 CAP | Refills: 0 | Status: SHIPPED | OUTPATIENT
Start: 2018-03-22 | End: 2018-03-27

## 2018-03-22 RX ORDER — ALBUTEROL SULFATE 0.83 MG/ML
5 SOLUTION RESPIRATORY (INHALATION)
Status: DISPENSED | OUTPATIENT
Start: 2018-03-22 | End: 2018-03-23

## 2018-03-22 RX ORDER — NALOXONE HYDROCHLORIDE 0.4 MG/ML
0.4 INJECTION, SOLUTION INTRAMUSCULAR; INTRAVENOUS; SUBCUTANEOUS AS NEEDED
Status: DISCONTINUED | OUTPATIENT
Start: 2018-03-22 | End: 2018-03-27 | Stop reason: HOSPADM

## 2018-03-22 RX ORDER — SOLIFENACIN SUCCINATE 5 MG/1
5 TABLET, FILM COATED ORAL DAILY
Status: DISCONTINUED | OUTPATIENT
Start: 2018-03-23 | End: 2018-03-27 | Stop reason: HOSPADM

## 2018-03-22 RX ORDER — IPRATROPIUM BROMIDE AND ALBUTEROL SULFATE 2.5; .5 MG/3ML; MG/3ML
3 SOLUTION RESPIRATORY (INHALATION)
Status: DISCONTINUED | OUTPATIENT
Start: 2018-03-22 | End: 2018-03-27 | Stop reason: HOSPADM

## 2018-03-22 RX ORDER — DOCUSATE SODIUM 100 MG/1
100 CAPSULE, LIQUID FILLED ORAL
Status: DISCONTINUED | OUTPATIENT
Start: 2018-03-22 | End: 2018-03-27 | Stop reason: HOSPADM

## 2018-03-22 RX ORDER — METOPROLOL SUCCINATE 50 MG/1
50 TABLET, EXTENDED RELEASE ORAL DAILY
Status: DISCONTINUED | OUTPATIENT
Start: 2018-03-23 | End: 2018-03-27 | Stop reason: HOSPADM

## 2018-03-22 RX ORDER — MAGNESIUM SULFATE 100 %
4 CRYSTALS MISCELLANEOUS AS NEEDED
Status: DISCONTINUED | OUTPATIENT
Start: 2018-03-22 | End: 2018-03-27 | Stop reason: HOSPADM

## 2018-03-22 RX ORDER — ACETAMINOPHEN 325 MG/1
650 TABLET ORAL
Status: DISCONTINUED | OUTPATIENT
Start: 2018-03-22 | End: 2018-03-27 | Stop reason: HOSPADM

## 2018-03-22 RX ORDER — ASPIRIN 81 MG/1
81 TABLET ORAL DAILY
Status: DISCONTINUED | OUTPATIENT
Start: 2018-03-23 | End: 2018-03-27 | Stop reason: HOSPADM

## 2018-03-22 RX ORDER — INSULIN GLARGINE 100 [IU]/ML
15 INJECTION, SOLUTION SUBCUTANEOUS
Status: DISCONTINUED | OUTPATIENT
Start: 2018-03-22 | End: 2018-03-23

## 2018-03-22 RX ORDER — ONDANSETRON 2 MG/ML
4 INJECTION INTRAMUSCULAR; INTRAVENOUS
Status: DISCONTINUED | OUTPATIENT
Start: 2018-03-22 | End: 2018-03-27 | Stop reason: HOSPADM

## 2018-03-22 RX ORDER — OXYCODONE AND ACETAMINOPHEN 5; 325 MG/1; MG/1
1 TABLET ORAL
Status: DISCONTINUED | OUTPATIENT
Start: 2018-03-22 | End: 2018-03-27 | Stop reason: HOSPADM

## 2018-03-22 RX ADMIN — HEPARIN SODIUM 5000 UNITS: 5000 INJECTION, SOLUTION INTRAVENOUS; SUBCUTANEOUS at 22:15

## 2018-03-22 RX ADMIN — FUROSEMIDE 40 MG: 10 INJECTION, SOLUTION INTRAMUSCULAR; INTRAVENOUS at 21:51

## 2018-03-22 RX ADMIN — OSELTAMIVIR PHOSPHATE 75 MG: 75 CAPSULE ORAL at 12:00

## 2018-03-22 RX ADMIN — METHYLPREDNISOLONE SODIUM SUCCINATE 125 MG: 125 INJECTION, POWDER, FOR SOLUTION INTRAMUSCULAR; INTRAVENOUS at 21:57

## 2018-03-22 NOTE — ED PROVIDER NOTES
HPI Comments: Jacqueline Hansen is a [de-identified] y.o. Male with hx of HTN, DM sent from NH due to cough and bodyaches. Pt states he was seen in NH by PCP yesterday and was prescribed unknown medication for his cough. Pt reports not feeling any better today. He reports generalized weakness/malaise. States he has been so tired that he didn't get out of bed today until EMS arrived. Pt c/o bodyaches \"everywhere\". He c/o productive cough with yellow sputum, no blood. He denies CP, palps, SOB, wheezing, abd pain, n/v/d, dizziness, HA. No leg swelling, rash, numbness, tingling or weakness to extremities        Patient is a [de-identified] y.o. male presenting with general illness and cough. The history is provided by the patient. Generalized Body Aches   This is a new problem. The current episode started yesterday. The problem occurs constantly. The problem has not changed since onset. Pertinent negatives include no chest pain, no abdominal pain, no headaches and no shortness of breath. Nothing aggravates the symptoms. Nothing relieves the symptoms. He has tried nothing for the symptoms. Cough   This is a new problem. The current episode started yesterday. The problem occurs hourly. The problem has not changed since onset. The cough is productive of purulent sputum. There has been no fever. Associated symptoms include eye redness and myalgias. Pertinent negatives include no chest pain, no chills, no sweats, no ear congestion, no ear pain, no headaches, no rhinorrhea, no sore throat, no shortness of breath, no wheezing, no nausea and no vomiting. Treatments tried: unknown. He is not a smoker. His past medical history is significant for COPD. Past Medical History:   Diagnosis Date    Angina     Anxiety     Arthritis     CAD (coronary artery disease)     s/p drug-eluting stents to RCA for high-grade stenoses in 2/09    Cardiac catheterization 08/24/2016    Mod calcification. Co-dom. oLM 50-70%. LAD 30%. Dx 30%.   Cx 70% focal.  OM 80% focal.  RCA 30%.  Cardiac echocardiogram 08/23/2016    Tech difficult. EF 55-60%. No WMA. Normal diastolic fx. Lipomatous septal hypertrophy.  Cardiac nuclear imaging test, abnormal 08/23/2016    Sm reversible inferior defect concerning for ischemia. Mild inferior hypk. EF 68%. Neg EKG on pharm stress test.    Carotid duplex 08/26/2016    Mild <50% CYRUS stenosis. Chronic LICA occlusion. Similar to study of 3/29/16.  Carotid stenosis (HCC)     w/ known total occlusion of lt internal carotid artery; followed by pts vascular surgeon    Chronic kidney disease     COPD (chronic obstructive pulmonary disease) ([address]     Dementia     Depression     Diabetes ([facility]     type 2    Dyslipidemia     on Crestor; managed by pts PCP    Dyspnea     Heart disease     Hypercholesterolemia     Hypertension     Low back pain     Osteoarthrosis involving multiple sites     Skin cancer ([facility]     squamous cell lesions of the skin    Stroke ([facility]     Venous (peripheral) insufficiency        Past Surgical History:   Procedure Laterality Date    CARDIAC CATHETERIZATION  8/24/2016         CORONARY ARTERY ANGIOGRAM  8/24/2016         HX ANGIOPLASTY  2009    2 stents placed and heart attack during the procedure    HX COLONOSCOPY  10/2003    neg; declines f/u    HX CORONARY ARTERY BYPASS GRAFT  09/2016    HX HEART CATHETERIZATION  5/2013    HX HERNIA REPAIR      1970s    HX OTHER SURGICAL      groin surgery    HX TONSIL AND ADENOIDECTOMY      IR INTRAVASCULAR ULTRASOUND INITIAL  8/24/2016         LV ANGIOGRAPHY  8/24/2016              Family History:   Problem Relation Age of Onset    Parkinsonism Mother     Hypertension Mother     Cancer Father      lung    Heart defect Brother        Social History     Social History    Marital status:      Spouse name: N/A    Number of children: N/A    Years of education: N/A     Occupational History    Not on file.      Social History Main Topics    Smoking status: Former Smoker     Years: 2.00     Quit date: 1/1/1955    Smokeless tobacco: Never Used    Alcohol use Yes      Comment: drinks weekly couple beers    Drug use: No    Sexual activity: No     Other Topics Concern    Not on file     Social History Narrative         ALLERGIES: Amaryl [glimepiride]; Lipitor [atorvastatin]; Niacin; Pravachol [pravastatin]; and Zocor [simvastatin]    Review of Systems   Constitutional: Positive for fatigue. Negative for chills and fever. HENT: Positive for congestion. Negative for ear pain, mouth sores, rhinorrhea, sinus pain, sore throat and trouble swallowing. Eyes: Positive for discharge and redness. Pt with chronic condition causing bilat eye redness and discharge. No new changes, pt has been evaluated since childhood. Respiratory: Positive for cough. Negative for chest tightness, shortness of breath and wheezing. Cardiovascular: Negative for chest pain, palpitations and leg swelling. Gastrointestinal: Negative for abdominal distention, abdominal pain, constipation, diarrhea, nausea and vomiting. Genitourinary: Negative. Musculoskeletal: Positive for myalgias. Negative for arthralgias, back pain and joint swelling. Skin: Negative. Neurological: Negative for headaches. Psychiatric/Behavioral: Negative. Vitals:    03/22/18 0900 03/22/18 0915 03/22/18 0930 03/22/18 0945   BP: 145/64 144/60 133/66 150/74   Pulse: 79 79 76 79   Resp: 23 21 22 23   Temp:       SpO2: 95% 95% 95% 96%            Physical Exam   Constitutional: He is oriented to person, place, and time. Vital signs are normal. He appears well-developed and well-nourished. He is cooperative. Non-toxic appearance. No distress. HENT:   Head: Normocephalic and atraumatic.    Right Ear: Tympanic membrane, external ear and ear canal normal.   Left Ear: Tympanic membrane, external ear and ear canal normal.   Nose: Nose normal.   Mouth/Throat: Uvula is midline, oropharynx is clear and moist and mucous membranes are normal.   Eyes: Conjunctivae and EOM are normal. Right eye exhibits discharge and exudate. Left eye exhibits discharge and exudate. bilat inner lower lids erythematous, bilat eye discharge, prurulent   Neck: Normal range of motion. Neck supple. Cardiovascular: Normal rate, regular rhythm and normal heart sounds. Pulmonary/Chest: Effort normal and breath sounds normal. No accessory muscle usage. No respiratory distress. Abdominal: Soft. Normal appearance and bowel sounds are normal. There is no tenderness. There is no rebound and no CVA tenderness. Musculoskeletal: Normal range of motion. He exhibits no edema. Neurological: He is alert and oriented to person, place, and time. He has normal strength. Skin: Skin is warm and intact. No rash noted. Psychiatric: He has a normal mood and affect. His speech is normal and behavior is normal. Judgment and thought content normal. Cognition and memory are normal.   Nursing note and vitals reviewed.        MDM  Number of Diagnoses or Management Options  Diagnosis management comments: Cough with bodyaches x 1 day  DDx: influenza, URI, pnuemonia  Check labs due to pt feeling generalized weakness/malaise and due to comorbidities        ED Course       Procedures    Vitals:  Patient Vitals for the past 12 hrs:   Temp Pulse Resp BP SpO2   03/22/18 0945 - 79 23 150/74 96 %   03/22/18 0930 - 76 22 133/66 95 %   03/22/18 0915 - 79 21 144/60 95 %   03/22/18 0900 - 79 23 145/64 95 %   03/22/18 0845 - 78 24 126/55 94 %   03/22/18 0842 99.4 °F (37.4 °C) 83 19 183/68 99 %       Medications ordered:   Medications   oseltamivir (TAMIFLU) capsule 75 mg (not administered)         Lab findings:  Recent Results (from the past 12 hour(s))   INFLUENZA A & B AG (RAPID TEST)    Collection Time: 03/22/18  9:40 AM   Result Value Ref Range    Influenza A Antigen NEGATIVE  NEG      Influenza B Antigen POSITIVE (A) NEG METABOLIC PANEL, BASIC    Collection Time: 03/22/18  9:40 AM   Result Value Ref Range    Sodium 138 136 - 145 mmol/L    Potassium 3.5 3.5 - 5.5 mmol/L    Chloride 102 100 - 108 mmol/L    CO2 29 21 - 32 mmol/L    Anion gap 7 3.0 - 18 mmol/L    Glucose 202 (H) 74 - 99 mg/dL    BUN 16 7.0 - 18 MG/DL    Creatinine 1.37 (H) 0.6 - 1.3 MG/DL    BUN/Creatinine ratio 12 12 - 20      GFR est AA >60 >60 ml/min/1.73m2    GFR est non-AA 50 (L) >60 ml/min/1.73m2    Calcium 8.8 8.5 - 10.1 MG/DL   CBC WITH AUTOMATED DIFF    Collection Time: 03/22/18  9:40 AM   Result Value Ref Range    WBC 11.7 4.6 - 13.2 K/uL    RBC 5.93 (H) 4.70 - 5.50 M/uL    HGB 16.7 (H) 13.0 - 16.0 g/dL    HCT 48.6 (H) 36.0 - 48.0 %    MCV 82.0 74.0 - 97.0 FL    MCH 28.2 24.0 - 34.0 PG    MCHC 34.4 31.0 - 37.0 g/dL    RDW 15.7 (H) 11.6 - 14.5 %    PLATELET 103 913 - 309 K/uL    MPV 10.6 9.2 - 11.8 FL    NEUTROPHILS PENDING %    LYMPHOCYTES PENDING %    MONOCYTES PENDING %    EOSINOPHILS PENDING %    BASOPHILS PENDING %    ABS. NEUTROPHILS PENDING K/UL    ABS. LYMPHOCYTES PENDING K/UL    ABS. MONOCYTES PENDING K/UL    ABS. EOSINOPHILS PENDING K/UL    ABS. BASOPHILS PENDING K/UL    DF PENDING    POC LACTIC ACID    Collection Time: 03/22/18  9:41 AM   Result Value Ref Range    Lactic Acid (POC) 0.9 0.4 - 2.0 mmol/L       X-Ray, CT or other radiology findings or impressions:  XR CHEST PA LAT   Final Result        IMPRESSION:    1. No acute infiltrates or pleural effusion. 2. Patient had prior thoracic surgery. Progress notes, Consult notes or additional Procedure notes:   10:14 AM called [address] [place] [address] to speak with nurse caring for pt. Placed on hold for 7 minutes, no answer. 10:26 AM ED  attempted to speak with [address] [place] [address] Road, no answer once, placed on hold for 10 minutes 2nd call.     10:36 AM Maya Luo, pt's PCP listed on nursing home face sheet paged, no answer at 11:01 AM    11:53 AM spoke with Maya Luo regarding pt, informed him of diagnosis of flu and treatment with Tamiflu. He will speak with NH staff regarding isolation precautions and treating him with meds. Reevaluation of patient:   10:37 AM  Informed pt he has the flu. Will give pt first dose of Tamiflu now prior to discharge. Pt's vitals stable. Disposition:  Diagnosis:   1. Influenza B        Disposition: [address] [facility]    Follow-up Information     Follow up With Details Comments Contact Info    [facility] EMERGENCY DEPT  As needed, If symptoms worsen [address] [address]  [address], NP Call today for re-evaluation [address]  [address] [facility] [address] [phone] [facility]      Hector Torres MD Call today for re-evaluation [phone]              Patient's Medications   Start Taking    OSELTAMIVIR (TAMIFLU) 75 MG CAPSULE    Take 1 Cap by mouth two (2) times a day for 5 days. Continue Taking    ACETAMINOPHEN (TYLENOL) 500 MG TABLET    Take 1 Tab by mouth every six (6) hours as needed. ASCORBIC ACID, VITAMIN C, (VITAMIN C) 250 MG TABLET    Take  by mouth two (2) times a day. ASPIRIN DELAYED-RELEASE 81 MG TABLET    Take 1 Tab by mouth daily. CALCIUM CARBONATE (CALTRATE 600 PO)    Take 1 Tab by mouth daily. COMPOUNDMAX BASE CREA    240 g by Does Not Apply route four (4) times daily. Anti-Inflam Meloxicam 0.09% Topirmate 1% Methocarbamol 2%  Lidocaine 2% Prilocaine 2%    CYCLOSPORINE (RESTASIS) 0.05 % OPHTHALMIC EMULSION    Administer 1 Drop to both eyes every twelve (12) hours. FUROSEMIDE (LASIX) 40 MG TABLET    1 qam    GABAPENTIN (NEURONTIN) 300 MG CAPSULE    Take 1 Cap by mouth daily. Indications: NEUROPATHIC PAIN, RESTLESS LEGS SYNDROME    INSULIN GLARGINE (LANTUS SOLOSTAR) 100 UNIT/ML (3 ML) PEN    30 Units by SubCUTAneous route nightly. 30 units     ISOSORBIDE MONONITRATE ER (IMDUR) 30 MG TABLET    TAKE 1 TAB BY MOUTH EVERY MORNING.     METOPROLOL SUCCINATE (TOPROL-XL) 50 MG XL TABLET    TAKE 1 TABLET BY MOUTH ONCE DAILY    MULTIVITAMINS W-MINERALS/LUT (CENTRUM SILVER PO)    Take 1 Tab by mouth daily. SHAWN PEN NEEDLE 32 X 5/32 \" NDLE    Use as directed. OTHER    Incentive spirometry- Use as directed    ROSUVASTATIN (CRESTOR) 10 MG TABLET    Take 1 Tab by mouth nightly. SOLIFENACIN (VESICARE) 5 MG TABLET    Take 1 Tab by mouth daily. Indications: BLADDER HYPERACTIVITY, INCREASED URINARY FREQUENCY, URINARY URGE INCONTINENCE, URINARY URGENCY    TEMAZEPAM (RESTORIL) 7.5 MG CAPSULE    Take  by mouth nightly as needed for Sleep. These Medications have changed    No medications on file   Stop Taking    No medications on file     The patient will be discharged to . Warning signs of worsening condition were discussed and the patient verbalized understanding. Based on patient's age, coexisting illness, exam, and the results of this ED evaluation, the decision to treat as an outpatient was made. While it is impossible to completely exclude the possibility of underlying serious disease or worsening of condition, I feel the relative likelihood is extremely low. I discussed this uncertainty with the patient, who understood ED evaluation and treatment and felt comfortable with the outpatient treatment plan. All questions regarding care, test results, and follow up were answered. The patient is stable and appropriate to discharge. Patient understands importance to return to the emergency department for any new or worsening symptoms. I stressed the importance of follow up for repeat assessment and possibly further evaluation/treatment. Attempts made to contact providers at  but no answer.     Jayde Riggs PA-C

## 2018-03-22 NOTE — IP AVS SNAPSHOT
[address] Patient: Trev Do MRN: [id number] :1935 A check montserrat indicates which time of day the medication should be taken. My Medications START taking these medications Instructions Each Dose to Equal  
 Morning Noon Evening Bedtime  
 albuterol-ipratropium 2.5 mg-0.5 mg/3 ml Nebu Commonly known as:  Izola Cons Your last dose was: Your next dose is:    
   
   
 3 mL by Nebulization route every six (6) hours as needed. 3 mL  
    
   
   
   
  
 alcohol swabs Padm Commonly known as:  ALCOHOL PREP SWABS Your last dose was: Your next dose is:    
   
   
 Use as directed Blood-Glucose Meter monitoring kit Your last dose was: Your next dose is:    
   
   
 Use as directed  
     
   
   
   
  
 ciprofloxacin HCl 0.3 % ophthalmic solution Commonly known as:  Flip Lopezs Your last dose was: Your next dose is:    
   
   
 Administer 1 Drop to both eyes every four (4) hours (while awake). For 3 more days. End 3/29/2018  Indications: BACTERIAL CONJUNCTIVITIS  
 1 Drop  
    
   
   
   
  
 docusate sodium 100 mg capsule Commonly known as:  Kenphil Argue Your last dose was: Your next dose is: Take 1 Cap by mouth two (2) times daily as needed for Constipation for up to 90 days. 100 mg  
    
   
   
   
  
 famotidine 20 mg tablet Commonly known as:  PEPCID Your last dose was: Your next dose is: Take 1 Tab by mouth two (2) times a day. Indications: PREVENTION OF STRESS ULCER  
 20 mg  
    
   
   
   
  
 guaiFENesin 100 mg/5 mL liquid Commonly known as:  ROBITUSSIN Your last dose was: Your next dose is: Take 5 mL by mouth every four (4) hours as needed for Cough.   
 100 mg  
    
   
   
   
  
 insulin lispro 200 unit/mL (3 mL) Inpn Commonly known as:  HumaLOG KwikPen Insulin Your last dose was: Your next dose is: For Blood Sugar (mg/dL) of:    Less than 150 =   0 units          150 -199 =   2 units 200 -249 =   4 units 250 -299 =   6 units 300 -349 =   8 units 350 and above =   10 units Check BG then Inject insulin per SS 3 times daily before meals and at bedtime  Indications: type 2 diabetes mellitus  
     
   
   
   
  
 lancets 23 gauge Misc Your last dose was: Your next dose is:    
   
   
 1 Box by Does Not Apply route Before breakfast, lunch, and dinner. Use as directed 1 Box  
    
   
   
   
  
 predniSONE 10 mg tablet Commonly known as:  Renée Mary Your last dose was: Your next dose is: Take 4 tabs for 1 day, then 3 tabs for 3 days, then 2 tabs for 3 days, then 1 tab by mouth for 3 days  Indications: COPD exacerbation CHANGE how you take these medications Instructions Each Dose to Equal  
 Morning Noon Evening Bedtime  
 furosemide 20 mg tablet Commonly known as:  LASIX What changed:   
- medication strength 
- how much to take 
- how to take this - when to take this 
- additional instructions Your last dose was: Your next dose is: Take 1 Tab by mouth daily. Indications: Edema 20 mg  
    
   
   
   
  
 insulin glargine 100 unit/mL (3 mL) Inpn Commonly known as:  LANTUS SOLOSTAR U-100 INSULIN What changed:   
- how much to take 
- additional instructions Your last dose was: Your next dose is:    
   
   
 35 Units by SubCUTAneous route nightly. Indications: type 2 diabetes mellitus 35 Units  
    
   
   
   
  
 isosorbide mononitrate ER 30 mg tablet Commonly known as:  IMDUR Start taking on:  3/27/2018 What changed:  See the new instructions. Your last dose was: Your next dose is: Take 1 Tab by mouth daily. 30 mg  
    
   
   
   
  
 metoprolol succinate 50 mg XL tablet Commonly known as:  TOPROL-XL Start taking on:  3/27/2018 What changed:  See the new instructions. Your last dose was: Your next dose is: Take 1 Tab by mouth daily. Indications: hypertension 50 mg  
    
   
   
   
  
 multivit-min-FA-lycopen-lutein 0.4-300-250 mg-mcg-mcg Tab Commonly known as:  CENTRUM SILVER What changed:   
- medication strength 
- how much to take 
- how to take this - when to take this 
- additional instructions Your last dose was: Your next dose is: Take 1 tab daily  
     
   
   
   
  
 oseltamivir 30 mg capsule Commonly known as:  TAMIFLU What changed:   
- medication strength 
- how much to take Your last dose was: Your next dose is: Take 1 Cap by mouth two (2) times a day. Indications: INFLUENZA  
 30 mg  
    
   
   
   
  
 temazepam 7.5 mg capsule Commonly known as:  RESTORIL What changed:  how much to take Your last dose was: Your next dose is: Take 1 Cap by mouth nightly as needed for Sleep. Max Daily Amount: 7.5 mg.  
 7.5 mg  
    
   
   
   
  
  
CONTINUE taking these medications Instructions Each Dose to Equal  
 Morning Noon Evening Bedtime  
 acetaminophen 500 mg tablet Commonly known as:  TYLENOL Your last dose was: Your next dose is: Take 1 Tab by mouth every six (6) hours as needed. Indications: pain 500 mg  
    
   
   
   
  
 aspirin delayed-release 81 mg tablet Start taking on:  3/27/2018 Your last dose was: Your next dose is: Take 1 Tab by mouth daily. Indications: Cerebral Thromboembolism Prevention 81 mg CALTRATE 600 PO Your last dose was: Your next dose is: Take 1 Tab by mouth daily. 1 Tab COMPOUNDMAX BASE Crea Generic drug:  cream base no. 171 (bulk) Your last dose was: Your next dose is:    
   
   
 240 g by Does Not Apply route four (4) times daily. Anti-Inflam Meloxicam 0.09% Topirmate 1% Methocarbamol 2%  Lidocaine 2% Prilocaine 2%  
 240 g  
    
   
   
   
  
 cycloSPORINE 0.05 % ophthalmic emulsion Commonly known as:  RESTASIS Your last dose was: Your next dose is:    
   
   
 Administer 1 Drop to both eyes every twelve (12) hours. Indications: Dry Eye  
 1 Drop  
    
   
   
   
  
 gabapentin 300 mg capsule Commonly known as:  NEURONTIN Start taking on:  3/27/2018 Your last dose was: Your next dose is: Take 1 Cap by mouth daily. Indications: NEUROPATHIC PAIN, Restless Legs Syndrome 300 mg Insulin Needles (Disposable) 32 gauge x 5/32\" Ndle Commonly known as:  Justina Pen Needle Your last dose was: Your next dose is:    
   
   
 Use as directed. OTHER Your last dose was: Your next dose is:    
   
   
 Incentive spirometry- Use as directed  
     
   
   
   
  
 rosuvastatin 10 mg tablet Commonly known as:  CRESTOR Your last dose was: Your next dose is: Take 1 Tab by mouth nightly. 10 mg  
    
   
   
   
  
 solifenacin 5 mg tablet Commonly known as:  VESIcare Start taking on:  3/27/2018 Your last dose was: Your next dose is: Take 1 Tab by mouth daily. Indications: Bladder Hyperactivity, INCREASED URINARY FREQUENCY, URINARY URGE INCONTINENCE, URINARY URGENCY  
 5 mg STOP taking these medications VITAMIN C 250 mg tablet Generic drug:  ascorbic acid (vitamin C) Where to Get Your Medications These medications were sent to [address] [place] [address], [facility] [place] [address] Hours:  24-hours Phone:  [phone] Blood-Glucose Meter monitoring kit Information on where to get these meds will be given to you by the nurse or doctor. ! Ask your nurse or doctor about these medications  
  acetaminophen 500 mg tablet  
 albuterol-ipratropium 2.5 mg-0.5 mg/3 ml Nebu  
 alcohol swabs Padm  
 aspirin delayed-release 81 mg tablet  
 ciprofloxacin HCl 0.3 % ophthalmic solution  
 cycloSPORINE 0.05 % ophthalmic emulsion  
 docusate sodium 100 mg capsule  
 famotidine 20 mg tablet  
 furosemide 20 mg tablet  
 gabapentin 300 mg capsule  
 guaiFENesin 100 mg/5 mL liquid  
 insulin glargine 100 unit/mL (3 mL) Inpn  
 insulin lispro 200 unit/mL (3 mL) Inpn Insulin Needles (Disposable) 32 gauge x 5/32\" Ndle  
 isosorbide mononitrate ER 30 mg tablet  
 lancets 23 gauge Misc  
 metoprolol succinate 50 mg XL tablet  
 multivit-min-FA-lycopen-lutein 0.4-300-250 mg-mcg-mcg Tab  
 oseltamivir 30 mg capsule OTHER  
 predniSONE 10 mg tablet  
 rosuvastatin 10 mg tablet  
 solifenacin 5 mg tablet  
 temazepam 7.5 mg capsule

## 2018-03-22 NOTE — ED NOTES
Attempted to call report back to [facility] without success. Discharge instructions to be reviewed with patient upon arrival of [address] transport.

## 2018-03-22 NOTE — IP AVS SNAPSHOT
[address] [facility] 
 
 
 [address]  [facility] Patient: Una Corona MRN: [id number] :1935 About your hospitalization You were admitted on:  2018 You last received care in the:  [facility] [address] [facility] You were discharged on:  2018 Why you were hospitalized Your primary diagnosis was:  Flu Your diagnoses also included:  Copd (Chronic Obstructive Pulmonary Disease) ([facility]), Wheezing, Type 2 Diabetes Mellitus With Stage 3 Chronic Kidney Disease ([facility]) Follow-up Information Follow up With Details Comments Contact Info Fred Pickett MD In 1 week  [facility] Ronak Flanagan [facility] [address] [facility] [address] 
[phone] Tabatha Stern MD In 1 week  [address] [place] [id number] 
[phone] Discharge Orders None A check montserrat indicates which time of day the medication should be taken. My Medications START taking these medications Instructions Each Dose to Equal  
 Morning Noon Evening Bedtime  
 albuterol-ipratropium 2.5 mg-0.5 mg/3 ml Nebu Commonly known as:  Crystal  Your last dose was: Your next dose is:    
   
   
 3 mL by Nebulization route every six (6) hours as needed. 3 mL  
    
   
   
   
  
 alcohol swabs Pad Commonly known as:  ALCOHOL PREP SWABS Your last dose was: Your next dose is:    
   
   
 Use as directed Blood-Glucose Meter monitoring kit Your last dose was: Your next dose is:    
   
   
 Use as directed  
     
   
   
   
  
 ciprofloxacin HCl 0.3 % ophthalmic solution Commonly known as:  Brianna Graves Your last dose was: Your next dose is:    
   
   
 Administer 1 Drop to both eyes every four (4) hours (while awake). For 3 more days. End 3/29/2018  Indications: BACTERIAL CONJUNCTIVITIS  
 1 Drop  
    
   
   
   
  
 docusate sodium 100 mg capsule Commonly known as:  Desiree Monica Your last dose was: Your next dose is: Take 1 Cap by mouth two (2) times daily as needed for Constipation for up to 90 days. 100 mg  
    
   
   
   
  
 famotidine 20 mg tablet Commonly known as:  PEPCID Your last dose was: Your next dose is: Take 1 Tab by mouth two (2) times a day. Indications: PREVENTION OF STRESS ULCER  
 20 mg  
    
   
   
   
  
 guaiFENesin 100 mg/5 mL liquid Commonly known as:  ROBITUSSIN Your last dose was: Your next dose is: Take 5 mL by mouth every four (4) hours as needed for Cough. 100 mg  
    
   
   
   
  
 insulin lispro 200 unit/mL (3 mL) Inpn Commonly known as:  HumaLOG KwikPen Insulin Your last dose was: Your next dose is: For Blood Sugar (mg/dL) of:    Less than 150 =   0 units          150 -199 =   2 units 200 -249 =   4 units 250 -299 =   6 units 300 -349 =   8 units 350 and above =   10 units Check BG then Inject insulin per SS 3 times daily before meals and at bedtime  Indications: type 2 diabetes mellitus  
     
   
   
   
  
 lancets 23 gauge Misc Your last dose was: Your next dose is:    
   
   
 1 Box by Does Not Apply route Before breakfast, lunch, and dinner. Use as directed 1 Box  
    
   
   
   
  
 predniSONE 10 mg tablet Commonly known as:  Nhung Blair Your last dose was: Your next dose is: Take 4 tabs for 1 day, then 3 tabs for 3 days, then 2 tabs for 3 days, then 1 tab by mouth for 3 days  Indications: COPD exacerbation CHANGE how you take these medications Instructions Each Dose to Equal  
 Morning Noon Evening Bedtime  
 furosemide 20 mg tablet Commonly known as:  LASIX What changed:   
- medication strength 
- how much to take 
- how to take this - when to take this 
- additional instructions Your last dose was: Your next dose is: Take 1 Tab by mouth daily. Indications: Edema 20 mg  
    
   
   
   
  
 insulin glargine 100 unit/mL (3 mL) Inpn Commonly known as:  LANTUS SOLOSTAR U-100 INSULIN What changed:   
- how much to take 
- additional instructions Your last dose was: Your next dose is:    
   
   
 35 Units by SubCUTAneous route nightly. Indications: type 2 diabetes mellitus 35 Units  
    
   
   
   
  
 isosorbide mononitrate ER 30 mg tablet Commonly known as:  IMDUR Start taking on:  3/27/2018 What changed:  See the new instructions. Your last dose was: Your next dose is: Take 1 Tab by mouth daily. 30 mg  
    
   
   
   
  
 metoprolol succinate 50 mg XL tablet Commonly known as:  TOPROL-XL Start taking on:  3/27/2018 What changed:  See the new instructions. Your last dose was: Your next dose is: Take 1 Tab by mouth daily. Indications: hypertension 50 mg  
    
   
   
   
  
 multivit-min-FA-lycopen-lutein 0.4-300-250 mg-mcg-mcg Tab Commonly known as:  CENTRUM SILVER What changed:   
- medication strength 
- how much to take 
- how to take this - when to take this 
- additional instructions Your last dose was: Your next dose is: Take 1 tab daily  
     
   
   
   
  
 oseltamivir 30 mg capsule Commonly known as:  TAMIFLU What changed:   
- medication strength 
- how much to take Your last dose was: Your next dose is: Take 1 Cap by mouth two (2) times a day. Indications: INFLUENZA  
 30 mg  
    
   
   
   
  
 temazepam 7.5 mg capsule Commonly known as:  RESTORIL What changed:  how much to take Your last dose was: Your next dose is: Take 1 Cap by mouth nightly as needed for Sleep. Max Daily Amount: 7.5 mg.  
 7.5 mg  
    
   
   
   
  
  
CONTINUE taking these medications Instructions Each Dose to Equal  
 Morning Noon Evening Bedtime  
 acetaminophen 500 mg tablet Commonly known as:  TYLENOL Your last dose was: Your next dose is: Take 1 Tab by mouth every six (6) hours as needed. Indications: pain 500 mg  
    
   
   
   
  
 aspirin delayed-release 81 mg tablet Start taking on:  3/27/2018 Your last dose was: Your next dose is: Take 1 Tab by mouth daily. Indications: Cerebral Thromboembolism Prevention 81 mg CALTRATE 600 PO Your last dose was: Your next dose is: Take 1 Tab by mouth daily. 1 Tab COMPOUNDMAX BASE Crea Generic drug:  cream base no. 171 (bulk) Your last dose was: Your next dose is:    
   
   
 240 g by Does Not Apply route four (4) times daily. Anti-Inflam Meloxicam 0.09% Topirmate 1% Methocarbamol 2%  Lidocaine 2% Prilocaine 2%  
 240 g  
    
   
   
   
  
 cycloSPORINE 0.05 % ophthalmic emulsion Commonly known as:  RESTASIS Your last dose was: Your next dose is:    
   
   
 Administer 1 Drop to both eyes every twelve (12) hours. Indications: Dry Eye  
 1 Drop  
    
   
   
   
  
 gabapentin 300 mg capsule Commonly known as:  NEURONTIN Start taking on:  3/27/2018 Your last dose was: Your next dose is: Take 1 Cap by mouth daily. Indications: NEUROPATHIC PAIN, Restless Legs Syndrome 300 mg Insulin Needles (Disposable) 32 gauge x 5/32\" Ndle Commonly known as:  Justina Pen Needle Your last dose was: Your next dose is:    
   
   
 Use as directed. OTHER Your last dose was: Your next dose is:    
   
   
 Incentive spirometry- Use as directed  
     
   
   
   
  
 rosuvastatin 10 mg tablet Commonly known as:  CRESTOR Your last dose was: Your next dose is: Take 1 Tab by mouth nightly. 10 mg  
    
   
   
   
  
 solifenacin 5 mg tablet Commonly known as:  VESIcare Start taking on:  3/27/2018 Your last dose was: Your next dose is: Take 1 Tab by mouth daily. Indications: Bladder Hyperactivity, INCREASED URINARY FREQUENCY, URINARY URGE INCONTINENCE, URINARY URGENCY  
 5 mg STOP taking these medications VITAMIN C 250 mg tablet Generic drug:  ascorbic acid (vitamin C) Where to Get Your Medications These medications were sent to [address] [place] [address], [address] [facility] [place] [address] Hours:  24-hours Phone:  [phone] Blood-Glucose Meter monitoring kit Information on where to get these meds will be given to you by the nurse or doctor. ! Ask your nurse or doctor about these medications  
  acetaminophen 500 mg tablet  
 albuterol-ipratropium 2.5 mg-0.5 mg/3 ml Nebu  
 alcohol swabs Padm  
 aspirin delayed-release 81 mg tablet  
 ciprofloxacin HCl 0.3 % ophthalmic solution  
 cycloSPORINE 0.05 % ophthalmic emulsion  
 docusate sodium 100 mg capsule  
 famotidine 20 mg tablet  
 furosemide 20 mg tablet  
 gabapentin 300 mg capsule  
 guaiFENesin 100 mg/5 mL liquid  
 insulin glargine 100 unit/mL (3 mL) Inpn  
 insulin lispro 200 unit/mL (3 mL) Inpn Insulin Needles (Disposable) 32 gauge x 5/32\" Ndle  
 isosorbide mononitrate ER 30 mg tablet  
 lancets 23 gauge Misc  
 metoprolol succinate 50 mg XL tablet  
 multivit-min-FA-lycopen-lutein 0.4-300-250 mg-mcg-mcg Tab  
 oseltamivir 30 mg capsule OTHER  
 predniSONE 10 mg tablet  
 rosuvastatin 10 mg tablet  
 solifenacin 5 mg tablet  
 temazepam 7.5 mg capsule Discharge Instructions Learning About Asthma Triggers What are asthma triggers? When you have asthma, certain things can make your symptoms worse.  These are called triggers. Learn what triggers an asthma attack for you, and avoid the triggers when you can. Common triggers include colds, smoke, air pollution, dust, pollen, pets, stress, and cold air. How do asthma triggers affect you? Triggers can make it harder for your lungs to work as they should. They can lead to sudden breathing problems and other symptoms. When you are around a trigger, an asthma attack is more likely. If your symptoms are severe, you may need emergency treatment or have to go to the hospital for treatment. What can you do to avoid triggers? The first thing is to know your triggers. When you are having symptoms, note the things around you that might be causing them. Then look for patterns that may be triggering your symptoms. Record your triggers on a piece of paper or in an asthma diary. When you have your list of possible triggers, work with your doctor to find ways to avoid them. Avoid colds and flu. Get a pneumococcal vaccine shot. If you have had one before, ask your doctor whether you need a second dose. Get a flu vaccine every year, as soon as it's available. If you must be around people with colds or the flu, wash your hands often. Here are some ways to avoid a few common triggers. · Do not smoke or allow others to smoke around you. If you need help quitting, talk to your doctor about stop-smoking programs and medicines. These can increase your chances of quitting for good. · If there is a lot of pollution, pollen, or dust outside, stay at home and keep your windows closed. Use an air conditioner or air filter in your home. Check your local weather report or newspaper for air quality and pollen reports. What else should you know? · Take your controller medicine every day, not just when you have symptoms. It helps prevent problems before they occur.  
· Your doctor may suggest that you check how well your lungs are working by measuring your peak expiratory flow (PEF) throughout the day. Your PEF may drop when you are near things that trigger symptoms. Where can you learn more? Go to http://da-ahsan.info/. Enter B203 in the search box to learn more about \"Learning About Asthma Triggers. \" Current as of: May 12, 2017 Content Version: 11.4 © 7669-4575 [employer]. Care instructions adapted under license by [employer] (which disclaims liability or warranty for this information). If you have questions about a medical condition or this instruction, always ask your healthcare professional. [place] [address] any warranty or liability for your use of this information. Learning About Asthma Triggers What are asthma triggers? When you have asthma, certain things can make your symptoms worse. These are called triggers. Learn what triggers an asthma attack for you, and avoid the triggers when you can. Common triggers include colds, smoke, air pollution, dust, pollen, pets, stress, and cold air. How do asthma triggers affect you? Triggers can make it harder for your lungs to work as they should. They can lead to sudden breathing problems and other symptoms. When you are around a trigger, an asthma attack is more likely. If your symptoms are severe, you may need emergency treatment or have to go to the hospital for treatment. What can you do to avoid triggers? The first thing is to know your triggers. When you are having symptoms, note the things around you that might be causing them. Then look for patterns that may be triggering your symptoms. Record your triggers on a piece of paper or in an asthma diary. When you have your list of possible triggers, work with your doctor to find ways to avoid them. Avoid colds and flu. Get a pneumococcal vaccine shot. If you have had one before, ask your doctor whether you need a second dose.  Get a flu vaccine every year, as soon as it's available. If you must be around people with colds or the flu, wash your hands often. Here are some ways to avoid a few common triggers. · Do not smoke or allow others to smoke around you. If you need help quitting, talk to your doctor about stop-smoking programs and medicines. These can increase your chances of quitting for good. · If there is a lot of pollution, pollen, or dust outside, stay at home and keep your windows closed. Use an air conditioner or air filter in your home. Check your local weather report or newspaper for air quality and pollen reports. What else should you know? · Take your controller medicine every day, not just when you have symptoms. It helps prevent problems before they occur. · Your doctor may suggest that you check how well your lungs are working by measuring your peak expiratory flow (PEF) throughout the day. Your PEF may drop when you are near things that trigger symptoms. Where can you learn more? Go to http://da-ahsan.info/. Enter A568 in the search box to learn more about \"Learning About Asthma Triggers. \" Current as of: May 12, 2017 Content Version: 11.4 © 2378-8171 Healthwise, [employer]. Care instructions adapted under license by [employer] (which disclaims liability or warranty for this information). If you have questions about a medical condition or this instruction, always ask your healthcare professional. [address] any warranty or liability for your use of this information. ACO Transitions of Care Introducing [employer] [address] Rocio Navarro offers a voluntary care coordination program to provide high quality service and care to [facility] fee-for-service beneficiaries. Darlene Bishop was designed to help you enhance your health and well-being through the following services: ? Transitions of Care  support for individuals who are transitioning from one care setting to another (example: Hospital to home). ? Chronic and Complex Care Coordination  support for individuals and caregivers of those with serious or chronic illnesses or with more than one chronic (ongoing) condition and those who take a number of different medications. If you meet specific medical criteria, a [facility] may call you directly to coordinate your care with your primary care physician and your other care providers. For questions about the [facility] programs, please, contact your physicians office. For general questions or additional information about Accountable Care Organizations: 
Please visit www.medicare.gov/acos. html or call 1-800-MEDICARE (1-777.290.9054) TTY users should call 4-471.266.2567. [employer] Announcement We are excited to announce that we are making your provider's discharge notes available to you in [employer]. You will see these notes when they are completed and signed by the physician that discharged you from your recent hospital stay. If you have any questions or concerns about any information you see in SkyPower, please call the Health Information Department where you were seen or reach out to your Primary Care Provider for more information about your plan of care. Introducing [facility] & HE[facility] SERVICES! [employer] introduces [employer] patient portal. Now you can access parts of your medical record, email your doctor's office, and request medication refills online. 1. In your internet browser, go to [email or web address] 2. Click on the First Time User? Click Here link in the Sign In box. You will see the New Member Sign Up page. 3. Enter your [employer] Access Code exactly as it appears below. You will not need to use this code after youve completed the sign-up process.  If you do not sign up before the expiration date, you must request a new code. · [employer] Access Code: [email or web address] Expires: 6/20/2018 10:57 AM 
 
4. Enter the last four digits of your Social Security Number (xxxx) and Date of Birth (mm/dd/yyyy) as indicated and click Submit. You will be taken to the next sign-up page. 5. Create a MobilePeakt ID. This will be your [employer] login ID and cannot be changed, so think of one that is secure and easy to remember. 6. Create a [employer] password. You can change your password at any time. 7. Enter your Password Reset Question and Answer. This can be used at a later time if you forget your password. 8. Enter your e-mail address. You will receive e-mail notification when new information is available in [address] 9. Click Sign Up. You can now view and download portions of your medical record. 10. Click the Download Summary menu link to download a portable copy of your medical information. If you have questions, please visit the Frequently Asked Questions section of the [employer] website. Remember, [employer] is NOT to be used for urgent needs. For medical emergencies, dial 911. Now available from your iPhone and Android! Providers Seen During Your Hospitalization Provider Specialty Primary office phone Daniel Lamb MD Emergency Medicine [phone] Shant Herring MD Hospitalist [phone] Jose Alejandro Holland MD [facility] [phone] Boston Bryant MD Family Practice [phone] Louis Dempsey MD Family Practice [phone] Your Primary Care Physician (PCP) Primary Care Physician Office Phone Office Fax Airam Gross [phone] You are allergic to the following Allergen Reactions Amaryl (Glimepiride) Drowsiness Lipitor (Atorvastatin) Myalgia Niacin Other (comments) Facial blistering, swelling in face Pravachol (Pravastatin) Myalgia Zocor (Simvastatin) Myalgia Recent Documentation Height Weight BMI Smoking Status 1.78 m 73 kg 23.04 kg/m2 Former Smoker Emergency Contacts Name Discharge Info Relation Home Work Mobile Naya Dunne CAREGIVER [3] Spouse [3] [phone] Patient Belongings The following personal items are in your possession at time of discharge: 
  Dental Appliances: None  Visual Aid: None      Home Medications: None   Jewelry: None  Clothing: None    Other Valuables: None Please provide this summary of care documentation to your next provider. Signatures-by signing, you are acknowledging that this After Visit Summary has been reviewed with you and you have received a copy. Patient Signature:  ____________________________________________________________ Date:  ____________________________________________________________  
  
[facility] Signature:  ____________________________________________________________ Date:  ____________________________________________________________

## 2018-03-22 NOTE — ED NOTES
Orange juice provided to the patient. Awaiting medical transport to take patient back home. Explanation of wait provided to the patient. The patient verbalized understanding.

## 2018-03-22 NOTE — ED TRIAGE NOTES
Pt arrived via EMS, pt was seen this AM and dx with flu B, was c/o generalized weakness this AM, had a fall at [employer], states he felt slightly dizzy before falling, denies LOC, c/o neck pain on movement, has bilateral wheezing, given 1 albuterol in transport, mask placed on patient upon arrival, a&ox3, side rails up, attached to continuous pulse ox and BP, safety intact, report given to [facility], [address] [facility]

## 2018-03-22 NOTE — ED PROVIDER NOTES
EMERGENCY DEPARTMENT HISTORY AND PHYSICAL EXAM    6:01 PM      Date: 3/22/2018  Patient Name: Sheran Cooks    History of Presenting Illness     Chief Complaint   Patient presents with   Jannell Erb Fall    Neck Pain         History Provided By: Patient    Chief Complaint: syncopal episode  Duration: 30 Minutes  Timing:  Acute  Location: n/a  Quality: n/a  Severity: N/A  Modifying Factors: diagnosed with Flu this morning  Associated Symptoms: denies any other associated signs or symptoms      Additional History (Context): Sheran Cooks is a [de-identified] y.o. male with diabetes, hypertension and COPD who presents after an acute onset syncopal episode about 30 minutes ago and was diagnosed this morning with the Flu. Pt was back at his nursing facility after being seen at this ED this morning for the Flu and after bathing was trying to stand up and could not and then fell on the floor and hit his head on the radiator. No other concerns or symptoms at this time. PCP: Jessica Ling MD    Current Facility-Administered Medications   Medication Dose Route Frequency Provider Last Rate Last Dose    albuterol (PROVENTIL VENTOLIN) nebulizer solution 5 mg  5 mg Nebulization NOW Jojo Nicholson MD        sodium chloride 0.9 % bolus infusion 1,000 mL  1,000 mL IntraVENous ONCE Jojo Nicholson MD        sodium chloride 0.9 % bolus infusion 1,000 mL  1,000 mL IntraVENous ONCE Jojo Nicholson MD        oseltamivir (TAMIFLU) capsule 75 mg  75 mg Oral BID Jojo Nicholson MD         Current Outpatient Prescriptions   Medication Sig Dispense Refill    oseltamivir (TAMIFLU) 75 mg capsule Take 1 Cap by mouth two (2) times a day for 5 days. 10 Cap 0    ascorbic acid, vitamin C, (VITAMIN C) 250 mg tablet Take  by mouth two (2) times a day.  temazepam (RESTORIL) 7.5 mg capsule Take  by mouth nightly as needed for Sleep.  acetaminophen (TYLENOL) 500 mg tablet Take 1 Tab by mouth every six (6) hours as needed.  20 Tab 0    aspirin delayed-release 81 mg tablet Take 1 Tab by mouth daily. 30 Tab 0    cycloSPORINE (RESTASIS) 0.05 % ophthalmic emulsion Administer 1 Drop to both eyes every twelve (12) hours. 15 Each 0    OTHER Incentive spirometry- Use as directed 1 Each 0    furosemide (LASIX) 40 mg tablet 1 qam 90 Tab 3    rosuvastatin (CRESTOR) 10 mg tablet Take 1 Tab by mouth nightly. 90 Tab 2    isosorbide mononitrate ER (IMDUR) 30 mg tablet TAKE 1 TAB BY MOUTH EVERY MORNING. 3    metoprolol succinate (TOPROL-XL) 50 mg XL tablet TAKE 1 TABLET BY MOUTH ONCE DAILY 90 Tab 3    solifenacin (VESICARE) 5 mg tablet Take 1 Tab by mouth daily. Indications: BLADDER HYPERACTIVITY, INCREASED URINARY FREQUENCY, URINARY URGE INCONTINENCE, URINARY URGENCY 90 Tab 6    COMPOUNDMAX BASE crea 240 g by Does Not Apply route four (4) times daily. Anti-Inflam Meloxicam 0.09% Topirmate 1% Methocarbamol 2%  Lidocaine 2% Prilocaine 2% 240 g 3    SHAWN PEN NEEDLE 32 x 5/32 \" ndle Use as directed. 3    gabapentin (NEURONTIN) 300 mg capsule Take 1 Cap by mouth daily. Indications: NEUROPATHIC PAIN, RESTLESS LEGS SYNDROME 90 Cap 2    insulin glargine (LANTUS SOLOSTAR) 100 unit/mL (3 mL) pen 30 Units by SubCUTAneous route nightly. 30 units       MULTIVITAMINS W-MINERALS/LUT (CENTRUM SILVER PO) Take 1 Tab by mouth daily.  CALCIUM CARBONATE (CALTRATE 600 PO) Take 1 Tab by mouth daily. Past History     Past Medical History:  Past Medical History:   Diagnosis Date    Angina     Anxiety     Arthritis     CAD (coronary artery disease)     s/p drug-eluting stents to RCA for high-grade stenoses in 2/09    Cardiac catheterization 08/24/2016    Mod calcification. Co-dom. oLM 50-70%. LAD 30%. Dx 30%. Cx 70% focal.  OM 80% focal.  RCA 30%.  Cardiac echocardiogram 08/23/2016    Tech difficult. EF 55-60%. No WMA. Normal diastolic fx. Lipomatous septal hypertrophy.       Cardiac nuclear imaging test, abnormal 08/23/2016    Brandie reversible inferior defect concerning for ischemia. Mild inferior hypk. EF 68%. Neg EKG on pharm stress test.    Carotid duplex 08/26/2016    Mild <50% CYRUS stenosis. Chronic LICA occlusion. Similar to study of 3/29/16.  Carotid stenosis (HCC)     w/ known total occlusion of lt internal carotid artery; followed by pts vascular surgeon    Chronic kidney disease     [facility]     Dementia     Depression     Diabetes ([facility]     type 2    Dyslipidemia     on Crestor; managed by pts PCP    Dyspnea     Heart disease     Hypercholesterolemia     Hypertension     Low back pain     Osteoarthrosis involving multiple sites     Skin cancer ([facility] [address]     squamous cell lesions of the skin    Stroke ([facility]     Venous (peripheral) insufficiency        Past Surgical History:  Past Surgical History:   Procedure Laterality Date    CARDIAC CATHETERIZATION  8/24/2016         CORONARY ARTERY ANGIOGRAM  8/24/2016         HX ANGIOPLASTY  2009    2 stents placed and heart attack during the procedure    HX COLONOSCOPY  10/2003    neg; declines f/u    HX CORONARY ARTERY BYPASS GRAFT  09/2016    HX HEART CATHETERIZATION  5/2013    HX HERNIA REPAIR      1970s    HX OTHER SURGICAL      groin surgery    HX TONSIL AND ADENOIDECTOMY      IR INTRAVASCULAR ULTRASOUND INITIAL  8/24/2016         LV ANGIOGRAPHY  8/24/2016            Family History:  Family History   Problem Relation Age of Onset    Parkinsonism Mother     Hypertension Mother     Cancer Father      lung    Heart defect Brother        Social History:  Social History   Substance Use Topics    Smoking status: Former Smoker     Years: 2.00     Quit date: 1/1/1955    Smokeless tobacco: Never Used    Alcohol use Yes      Comment: drinks weekly couple beers       Allergies:   Allergies   Allergen Reactions    Amaryl [Glimepiride] Drowsiness    Lipitor [Atorvastatin] Myalgia    Niacin Other (comments)     Facial blistering, swelling in face    Pravachol [Pravastatin] Myalgia    Zocor [Simvastatin] Myalgia         Review of Systems       Review of Systems   Constitutional: Negative for chills and fever. Respiratory: Negative for shortness of breath. Cardiovascular: Negative for chest pain. Gastrointestinal: Negative for diarrhea, nausea and vomiting. Neurological: Positive for syncope. All other systems reviewed and are negative. Physical Exam     Visit Vitals    BP (!) 149/129 (BP 1 Location: Left arm, BP Patient Position: At rest)    Pulse (!) 112    Temp 98.9 °F (37.2 °C)    Resp 16    SpO2 96%         Physical Exam   Constitutional: He is oriented to person, place, and time. He appears well-developed and well-nourished. No distress. HENT:   Head: Normocephalic and atraumatic. Eyes: Conjunctivae and EOM are normal. Right eye exhibits no discharge. Left eye exhibits no discharge. No scleral icterus. Injected lower eye lids bilaterally    Neck: Normal range of motion. Neck supple. No tracheal deviation present. Tenderness to palpation to cervical spine   Cardiovascular: Normal rate, regular rhythm and normal heart sounds. No murmur heard. Pulmonary/Chest: Effort normal. No respiratory distress. He has wheezes (expiratory ) in the right upper field, the right middle field, the right lower field, the left upper field, the left middle field and the left lower field. He has no rales. Abdominal: Soft. He exhibits no distension. There is no tenderness. There is no rebound and no guarding. Musculoskeletal: Normal range of motion. He exhibits no edema or deformity. Neurological: He is alert and oriented to person, place, and time. No cranial nerve deficit. Skin: Skin is warm and dry. He is not diaphoretic. Psychiatric: He has a normal mood and affect.  His behavior is normal. Judgment and thought content normal.         Diagnostic Study Results     Labs -  Recent Results (from the past 12 hour(s))   INFLUENZA A & B AG (RAPID TEST)    Collection Time: 03/22/18  9:40 AM   Result Value Ref Range    Influenza A Antigen NEGATIVE  NEG      Influenza B Antigen POSITIVE (A) NEG     METABOLIC PANEL, BASIC    Collection Time: 03/22/18  9:40 AM   Result Value Ref Range    Sodium 138 136 - 145 mmol/L    Potassium 3.5 3.5 - 5.5 mmol/L    Chloride 102 100 - 108 mmol/L    CO2 29 21 - 32 mmol/L    Anion gap 7 3.0 - 18 mmol/L    Glucose 202 (H) 74 - 99 mg/dL    BUN 16 7.0 - 18 MG/DL    Creatinine 1.37 (H) 0.6 - 1.3 MG/DL    BUN/Creatinine ratio 12 12 - 20      GFR est AA >60 >60 ml/min/1.73m2    GFR est non-AA 50 (L) >60 ml/min/1.73m2    Calcium 8.8 8.5 - 10.1 MG/DL   CBC WITH AUTOMATED DIFF    Collection Time: 03/22/18  9:40 AM   Result Value Ref Range    WBC 11.7 4.6 - 13.2 K/uL    RBC 5.93 (H) 4.70 - 5.50 M/uL    HGB 16.7 (H) 13.0 - 16.0 g/dL    HCT 48.6 (H) 36.0 - 48.0 %    MCV 82.0 74.0 - 97.0 FL    MCH 28.2 24.0 - 34.0 PG    MCHC 34.4 31.0 - 37.0 g/dL    RDW 15.7 (H) 11.6 - 14.5 %    PLATELET 096 946 - 944 K/uL    MPV 10.6 9.2 - 11.8 FL    NEUTROPHILS 71 42 - 75 %    BAND NEUTROPHILS 1 0 - 5 %    LYMPHOCYTES 14 (L) 20 - 51 %    MONOCYTES 11 (H) 2 - 9 %    EOSINOPHILS 3 0 - 5 %    BASOPHILS 0 0 - 3 %    ABS. NEUTROPHILS 8.4 (H) 1.8 - 8.0 K/UL    ABS. LYMPHOCYTES 1.6 0.8 - 3.5 K/UL    ABS. MONOCYTES 1.3 (H) 0 - 1.0 K/UL    ABS. EOSINOPHILS 0.4 0.0 - 0.4 K/UL    ABS.  BASOPHILS 0.0 0.0 - 0.06 K/UL    DF MANUAL      PLATELET COMMENTS ADEQUATE PLATELETS      RBC COMMENTS ANISOCYTOSIS  1+       POC LACTIC ACID    Collection Time: 03/22/18  9:41 AM   Result Value Ref Range    Lactic Acid (POC) 0.9 0.4 - 2.0 mmol/L   CBC WITH AUTOMATED DIFF    Collection Time: 03/22/18  6:00 PM   Result Value Ref Range    WBC 10.9 4.6 - 13.2 K/uL    RBC 6.41 (H) 4.70 - 5.50 M/uL    HGB 17.7 (H) 13.0 - 16.0 g/dL    HCT 53.6 (H) 36.0 - 48.0 %    MCV 83.6 74.0 - 97.0 FL    MCH 27.6 24.0 - 34.0 PG    MCHC 33.0 31.0 - 37.0 g/dL    RDW 16.1 (H) 11.6 - 14.5 %    PLATELET 884 242 - 290 K/uL    MPV 10.8 9.2 - 11.8 FL    NEUTROPHILS 77 (H) 40 - 73 %    LYMPHOCYTES 9 (L) 21 - 52 %    MONOCYTES 13 (H) 3 - 10 %    EOSINOPHILS 1 0 - 5 %    BASOPHILS 0 0 - 2 %    ABS. NEUTROPHILS 8.4 (H) 1.8 - 8.0 K/UL    ABS. LYMPHOCYTES 1.0 0.9 - 3.6 K/UL    ABS. MONOCYTES 1.4 (H) 0.05 - 1.2 K/UL    ABS. EOSINOPHILS 0.1 0.0 - 0.4 K/UL    ABS. BASOPHILS 0.0 0.0 - 0.1 K/UL    DF AUTOMATED     METABOLIC PANEL, COMPREHENSIVE    Collection Time: 03/22/18  6:00 PM   Result Value Ref Range    Sodium 139 136 - 145 mmol/L    Potassium 4.1 3.5 - 5.5 mmol/L    Chloride 102 100 - 108 mmol/L    CO2 25 21 - 32 mmol/L    Anion gap 12 3.0 - 18 mmol/L    Glucose 199 (H) 74 - 99 mg/dL    BUN 15 7.0 - 18 MG/DL    Creatinine 1.41 (H) 0.6 - 1.3 MG/DL    BUN/Creatinine ratio 11 (L) 12 - 20      GFR est AA 59 (L) >60 ml/min/1.73m2    GFR est non-AA 48 (L) >60 ml/min/1.73m2    Calcium 8.4 (L) 8.5 - 10.1 MG/DL    Bilirubin, total 1.3 (H) 0.2 - 1.0 MG/DL    ALT (SGPT) 22 16 - 61 U/L    AST (SGOT) 29 15 - 37 U/L    Alk. phosphatase 144 (H) 45 - 117 U/L    Protein, total 7.0 6.4 - 8.2 g/dL    Albumin 3.6 3.4 - 5.0 g/dL    Globulin 3.4 2.0 - 4.0 g/dL    A-G Ratio 1.1 0.8 - 1.7     POC LACTIC ACID    Collection Time: 03/22/18  8:32 PM   Result Value Ref Range    Lactic Acid (POC) 2.4 (HH) 0.4 - 2.0 mmol/L       Radiologic Studies -   CT SPINE CERV WO CONT   Final Result      CT HEAD WO CONT   Final Result      CT SPINE CERV: IMPRESSION:     1. No evidence of acute fracture or acute malalignment.     2. Multilevel advanced degenerative/chronic changes as detailed above,  characterized by posterior longitudinal ligament ossification, causing  significant spinal canal and foraminal stenoses, similar to prior. CT HEAD: IMPRESSION:     1. No acute intracranial findings.     2.  A few small remote left hemispheric infarcts, similar to prior.     -Mild periventricular white matter low-attenuation, likely chronic microvascular  ischemic change. Medical Decision Making   I am the first provider for this patient. I reviewed the vital signs, available nursing notes, past medical history, past surgical history, family history and social history. Vital Signs-Reviewed the patient's vital signs. Records Reviewed: Nursing Notes and Old Medical Records (Time of Review: 6:01 PM)    ED Course: Progress Notes, Reevaluation, and Consults:  Consult:  Discussed care with Dr Julio Pyle, Specialty: Hospitalist. Standard discussion; including history of patients chief complaint, available diagnostic results, and treatment course. Agrees to admit. 8:47 PM, 3/22/2018     Provider Notes (Medical Decision Making):   Pt with Flu B and was intially discharged home but was weak and had a syncopal episode with head injury, pt returned with worsened FLU symptoms and a lactate of 2.4 and will be admitted due to high probability of deterioration. For Hospitalized Patients:    1. Hospitalization Decision Time:  The decision to hospitalize the patient was made by Dr. Tahira Hinds at 8:46 PM on 3/22/2018    2. Aspirin: Aspirin was not given because the patient did not present with a stroke at the time of their Emergency Department evaluation    Diagnosis     Clinical Impression:   1. Influenza B        Disposition: admit    Follow-up Information     Follow up With Details Comments Contact Info    Db Chris MD Go in 2 days For follow up [address] [facility] [address] [facility] EMERGENCY DEPT Go to As needed, If symptoms worsen [address] [facility] [address]  [phone]           Patient's Medications   Start Taking    No medications on file   Continue Taking    ACETAMINOPHEN (TYLENOL) 500 MG TABLET    Take 1 Tab by mouth every six (6) hours as needed. ASCORBIC ACID, VITAMIN C, (VITAMIN C) 250 MG TABLET    Take  by mouth two (2) times a day.     ASPIRIN DELAYED-RELEASE 81 MG TABLET    Take 1 Tab by mouth daily. CALCIUM CARBONATE (CALTRATE 600 PO)    Take 1 Tab by mouth daily. COMPOUNDMAX BASE CREA    240 g by Does Not Apply route four (4) times daily. Anti-Inflam Meloxicam 0.09% Topirmate 1% Methocarbamol 2%  Lidocaine 2% Prilocaine 2%    CYCLOSPORINE (RESTASIS) 0.05 % OPHTHALMIC EMULSION    Administer 1 Drop to both eyes every twelve (12) hours. FUROSEMIDE (LASIX) 40 MG TABLET    1 qam    GABAPENTIN (NEURONTIN) 300 MG CAPSULE    Take 1 Cap by mouth daily. Indications: NEUROPATHIC PAIN, RESTLESS LEGS SYNDROME    INSULIN GLARGINE (LANTUS SOLOSTAR) 100 UNIT/ML (3 ML) PEN    30 Units by SubCUTAneous route nightly. 30 units     ISOSORBIDE MONONITRATE ER (IMDUR) 30 MG TABLET    TAKE 1 TAB BY MOUTH EVERY MORNING. METOPROLOL SUCCINATE (TOPROL-XL) 50 MG XL TABLET    TAKE 1 TABLET BY MOUTH ONCE DAILY    MULTIVITAMINS W-MINERALS/LUT (CENTRUM SILVER PO)    Take 1 Tab by mouth daily. SHAWN PEN NEEDLE 32 X 5/32 \" NDLE    Use as directed. OSELTAMIVIR (TAMIFLU) 75 MG CAPSULE    Take 1 Cap by mouth two (2) times a day for 5 days. OTHER    Incentive spirometry- Use as directed    ROSUVASTATIN (CRESTOR) 10 MG TABLET    Take 1 Tab by mouth nightly. SOLIFENACIN (VESICARE) 5 MG TABLET    Take 1 Tab by mouth daily. Indications: BLADDER HYPERACTIVITY, INCREASED URINARY FREQUENCY, URINARY URGE INCONTINENCE, URINARY URGENCY    TEMAZEPAM (RESTORIL) 7.5 MG CAPSULE    Take  by mouth nightly as needed for Sleep.    These Medications have changed    No medications on file   Stop Taking    No medications on file     _______________________________    Attestations:  [facility] acting as a scribe for and in the presence of Marci Little MD      March 22, 2018 at 6:01 PM       Provider Attestation:      I personally performed the services described in the documentation, reviewed the documentation, as recorded by the scribe in my presence, and it accurately and completely records my words and actions.  March 22, 2018 at 6:01 PM - Delta Chávez MD    _______________________________

## 2018-03-22 NOTE — DISCHARGE INSTRUCTIONS

## 2018-03-22 NOTE — ED NOTES
Pt verbalized understanding of discharge instructions and signed for copy, copy sent to [facility], pt leaving ED at this time via [address] transport.

## 2018-03-22 NOTE — ED TRIAGE NOTES
Pt arrived via EMS c/o generalized body aches, pt was dx with bronchitis yesterday and was given medication.  Pt states he felt no relief, pt is currently wearing mask, meets no sepsis criteria at this time, alert and orientedx3, confused to time, changed into gown, safety intact      Pt is from [facility]

## 2018-03-22 NOTE — ED NOTES
Sepsis Assessment Documentation:       Vital Signs  Level of Consciousness: Alert  Temp: 99.4 °F (37.4 °C)  Temp Source: Oral  Pulse (Heart Rate): 83  Resp Rate: 19  BP: 183/68  MAP (Calculated): 106  BP 1 Location: Left arm  BP 1 Method: Automatic  BP Patient Position: At rest  MEWS Score: 1      Recent Dx of bronchitis

## 2018-03-23 LAB
ANION GAP SERPL CALC-SCNC: 10 MMOL/L (ref 3–18)
BUN SERPL-MCNC: 18 MG/DL (ref 7–18)
BUN/CREAT SERPL: 12 (ref 12–20)
CALCIUM SERPL-MCNC: 8.8 MG/DL (ref 8.5–10.1)
CHLORIDE SERPL-SCNC: 100 MMOL/L (ref 100–108)
CO2 SERPL-SCNC: 26 MMOL/L (ref 21–32)
CREAT SERPL-MCNC: 1.47 MG/DL (ref 0.6–1.3)
ERYTHROCYTE [DISTWIDTH] IN BLOOD BY AUTOMATED COUNT: 16.2 % (ref 11.6–14.5)
GLUCOSE BLD STRIP.AUTO-MCNC: 222 MG/DL (ref 70–110)
GLUCOSE BLD STRIP.AUTO-MCNC: 232 MG/DL (ref 70–110)
GLUCOSE BLD STRIP.AUTO-MCNC: 297 MG/DL (ref 70–110)
GLUCOSE BLD STRIP.AUTO-MCNC: 327 MG/DL (ref 70–110)
GLUCOSE BLD STRIP.AUTO-MCNC: 407 MG/DL (ref 70–110)
GLUCOSE BLD STRIP.AUTO-MCNC: 421 MG/DL (ref 70–110)
GLUCOSE SERPL-MCNC: 305 MG/DL (ref 74–99)
HCT VFR BLD AUTO: 49.8 % (ref 36–48)
HGB BLD-MCNC: 16.9 G/DL (ref 13–16)
MAGNESIUM SERPL-MCNC: 2.3 MG/DL (ref 1.6–2.6)
MCH RBC QN AUTO: 28 PG (ref 24–34)
MCHC RBC AUTO-ENTMCNC: 33.9 G/DL (ref 31–37)
MCV RBC AUTO: 82.5 FL (ref 74–97)
PHOSPHATE SERPL-MCNC: 2.8 MG/DL (ref 2.5–4.9)
PLATELET # BLD AUTO: 211 K/UL (ref 135–420)
PMV BLD AUTO: 11 FL (ref 9.2–11.8)
POTASSIUM SERPL-SCNC: 3.8 MMOL/L (ref 3.5–5.5)
RBC # BLD AUTO: 6.04 M/UL (ref 4.7–5.5)
SODIUM SERPL-SCNC: 136 MMOL/L (ref 136–145)
WBC # BLD AUTO: 10.7 K/UL (ref 4.6–13.2)

## 2018-03-23 PROCEDURE — 85027 COMPLETE CBC AUTOMATED: CPT | Performed by: INTERNAL MEDICINE

## 2018-03-23 PROCEDURE — 74011250636 HC RX REV CODE- 250/636: Performed by: PHYSICIAN ASSISTANT

## 2018-03-23 PROCEDURE — 80048 BASIC METABOLIC PNL TOTAL CA: CPT | Performed by: INTERNAL MEDICINE

## 2018-03-23 PROCEDURE — 65270000029 HC RM PRIVATE

## 2018-03-23 PROCEDURE — 74011250637 HC RX REV CODE- 250/637: Performed by: EMERGENCY MEDICINE

## 2018-03-23 PROCEDURE — 83735 ASSAY OF MAGNESIUM: CPT | Performed by: INTERNAL MEDICINE

## 2018-03-23 PROCEDURE — 36415 COLL VENOUS BLD VENIPUNCTURE: CPT | Performed by: INTERNAL MEDICINE

## 2018-03-23 PROCEDURE — 77030011256 HC DRSG MEPILEX <16IN NO BORD MOLN -A

## 2018-03-23 PROCEDURE — 84100 ASSAY OF PHOSPHORUS: CPT | Performed by: INTERNAL MEDICINE

## 2018-03-23 PROCEDURE — 74011250637 HC RX REV CODE- 250/637: Performed by: INTERNAL MEDICINE

## 2018-03-23 PROCEDURE — 74011636637 HC RX REV CODE- 636/637: Performed by: FAMILY MEDICINE

## 2018-03-23 PROCEDURE — 74011636637 HC RX REV CODE- 636/637: Performed by: PHYSICIAN ASSISTANT

## 2018-03-23 PROCEDURE — 77030033263 HC DRSG MEPILEX 16-48IN BORD MOLN -B

## 2018-03-23 PROCEDURE — 82962 GLUCOSE BLOOD TEST: CPT

## 2018-03-23 PROCEDURE — 74011636637 HC RX REV CODE- 636/637: Performed by: INTERNAL MEDICINE

## 2018-03-23 PROCEDURE — 74011250636 HC RX REV CODE- 250/636: Performed by: INTERNAL MEDICINE

## 2018-03-23 PROCEDURE — 77030010545

## 2018-03-23 RX ORDER — GUAIFENESIN 100 MG/5ML
100 SOLUTION ORAL
Status: DISCONTINUED | OUTPATIENT
Start: 2018-03-23 | End: 2018-03-27 | Stop reason: HOSPADM

## 2018-03-23 RX ORDER — INSULIN GLARGINE 100 [IU]/ML
30 INJECTION, SOLUTION SUBCUTANEOUS
Status: DISCONTINUED | OUTPATIENT
Start: 2018-03-23 | End: 2018-03-24

## 2018-03-23 RX ORDER — SODIUM CHLORIDE 9 MG/ML
75 INJECTION, SOLUTION INTRAVENOUS CONTINUOUS
Status: DISCONTINUED | OUTPATIENT
Start: 2018-03-23 | End: 2018-03-24

## 2018-03-23 RX ADMIN — INSULIN GLARGINE 30 UNITS: 100 INJECTION, SOLUTION SUBCUTANEOUS at 22:47

## 2018-03-23 RX ADMIN — HEPARIN SODIUM 5000 UNITS: 5000 INJECTION, SOLUTION INTRAVENOUS; SUBCUTANEOUS at 22:48

## 2018-03-23 RX ADMIN — INSULIN LISPRO 9 UNITS: 100 INJECTION, SOLUTION INTRAVENOUS; SUBCUTANEOUS at 11:02

## 2018-03-23 RX ADMIN — HEPARIN SODIUM 5000 UNITS: 5000 INJECTION, SOLUTION INTRAVENOUS; SUBCUTANEOUS at 14:22

## 2018-03-23 RX ADMIN — ISOSORBIDE MONONITRATE 30 MG: 30 TABLET, EXTENDED RELEASE ORAL at 11:01

## 2018-03-23 RX ADMIN — METHYLPREDNISOLONE SODIUM SUCCINATE 40 MG: 40 INJECTION, POWDER, FOR SOLUTION INTRAMUSCULAR; INTRAVENOUS at 05:37

## 2018-03-23 RX ADMIN — INSULIN LISPRO 6 UNITS: 100 INJECTION, SOLUTION INTRAVENOUS; SUBCUTANEOUS at 22:48

## 2018-03-23 RX ADMIN — METHYLPREDNISOLONE SODIUM SUCCINATE 40 MG: 40 INJECTION, POWDER, FOR SOLUTION INTRAMUSCULAR; INTRAVENOUS at 14:22

## 2018-03-23 RX ADMIN — GABAPENTIN 300 MG: 300 CAPSULE ORAL at 11:00

## 2018-03-23 RX ADMIN — OSELTAMIVIR PHOSPHATE 30 MG: 30 CAPSULE ORAL at 01:07

## 2018-03-23 RX ADMIN — INSULIN LISPRO 15 UNITS: 100 INJECTION, SOLUTION INTRAVENOUS; SUBCUTANEOUS at 17:22

## 2018-03-23 RX ADMIN — ROSUVASTATIN CALCIUM 10 MG: 10 TABLET, FILM COATED ORAL at 22:48

## 2018-03-23 RX ADMIN — OSELTAMIVIR PHOSPHATE 30 MG: 30 CAPSULE ORAL at 11:01

## 2018-03-23 RX ADMIN — HEPARIN SODIUM 5000 UNITS: 5000 INJECTION, SOLUTION INTRAVENOUS; SUBCUTANEOUS at 05:38

## 2018-03-23 RX ADMIN — METHYLPREDNISOLONE SODIUM SUCCINATE 40 MG: 40 INJECTION, POWDER, FOR SOLUTION INTRAMUSCULAR; INTRAVENOUS at 22:49

## 2018-03-23 RX ADMIN — ASPIRIN 81 MG: 81 TABLET, COATED ORAL at 11:01

## 2018-03-23 RX ADMIN — SODIUM CHLORIDE 75 ML/HR: 900 INJECTION, SOLUTION INTRAVENOUS at 12:00

## 2018-03-23 RX ADMIN — ROSUVASTATIN CALCIUM 10 MG: 10 TABLET, FILM COATED ORAL at 01:07

## 2018-03-23 RX ADMIN — OSELTAMIVIR PHOSPHATE 30 MG: 30 CAPSULE ORAL at 17:17

## 2018-03-23 RX ADMIN — INSULIN LISPRO 15 UNITS: 100 INJECTION, SOLUTION INTRAVENOUS; SUBCUTANEOUS at 14:20

## 2018-03-23 RX ADMIN — METOPROLOL SUCCINATE 50 MG: 50 TABLET, EXTENDED RELEASE ORAL at 11:01

## 2018-03-23 RX ADMIN — SOLIFENACIN SUCCINATE 5 MG: 5 TABLET, FILM COATED ORAL at 11:00

## 2018-03-23 RX ADMIN — INSULIN GLARGINE 15 UNITS: 100 INJECTION, SOLUTION SUBCUTANEOUS at 01:07

## 2018-03-23 NOTE — PROGRESS NOTES
[facility] Hospitalist Group  Progress Note    Patient: Jose Luis Mayes Age: [de-identified] y.o. : 1935 MR#: [id number] SSN: [id number]  Date: 3/23/2018     Subjective:     Pt reports coming to hospital twice. First for SOB and found to be flu positive. Came back after a fall. Denies SOB now, states he feels \"pretty good\". He is not on chronic O2 at home. States he is previous 1/2ppd smoker for 4 years. Denies any chest pain, SOB, abd pain, N/V/D/C. Assessment/Plan:   1. Influenza B positive: cont Tamiflu. 2. COPD with acute exacerbation: bronchodilators prn, IV steroids. Continue O2 supplementation and wean as able. 3. Type 2 diabetes mellitus with hyperglycemia: diabetic mgt consult, increase Lantus to 30 units qhs per home use. Cont SSI. 4. Mechanical Fall: CT head with no acute intracranial findings, CT cerv spine also neg for acute findings. 5. Acute on CRF: gentle hydration, Dc Lasix for now. May restart PO tomorrow. 6. Dementia: monitor. 7. Coronary artery disease with 2- vessel disease s/p CABG x 2  on 2016: check echo. Reviewed previous echo. 8. Hypertension: cont. BP meds  9. Dyslipidemia: cont statin    Goals of care:  Full code  Disposition:  [x]PT/OT ordered   [x] Case management referral    Case discussed with:  [x]Patient  []Family  []Nursing  []Case Management  DVT Prophylaxis:  []Lovenox  [x]Hep SQ  []SCDs  []Coumadin   []On Heparin gtt    Objective:   VS:   Visit Vitals    /69 (BP 1 Location: Right arm, BP Patient Position: At rest)    Pulse 74    Temp 96.3 °F (35.7 °C)    Resp 20    Ht 5' 10.08\" (1.78 m)  Comment: bmi    Wt 73 kg (160 lb 15 oz)    SpO2 91%    BMI 23.04 kg/m2      Tmax/24hrs: Temp (24hrs), Av.8 °F (36.6 °C), Min:96.3 °F (35.7 °C), Max:99.1 °F (37.3 °C)    Intake/Output Summary (Last 24 hours) at 03/23/18 1111  Last data filed at 18 0900   Gross per 24 hour   Intake              400 ml   Output                0 ml Net              400 ml       General:  Awake, alert, NAD  Cardiovascular:  RRR  Pulmonary: CTA  GI:  NT, normal BS  Extremities:  No edema or cyanosis  Neuro: AAOx4    Labs:    Recent Results (from the past 24 hour(s))   CBC WITH AUTOMATED DIFF    Collection Time: 03/22/18  6:00 PM   Result Value Ref Range    WBC 10.9 4.6 - 13.2 K/uL    RBC 6.41 (H) 4.70 - 5.50 M/uL    HGB 17.7 (H) 13.0 - 16.0 g/dL    HCT 53.6 (H) 36.0 - 48.0 %    MCV 83.6 74.0 - 97.0 FL    MCH 27.6 24.0 - 34.0 PG    MCHC 33.0 31.0 - 37.0 g/dL    RDW 16.1 (H) 11.6 - 14.5 %    PLATELET 830 753 - 217 K/uL    MPV 10.8 9.2 - 11.8 FL    NEUTROPHILS 77 (H) 40 - 73 %    LYMPHOCYTES 9 (L) 21 - 52 %    MONOCYTES 13 (H) 3 - 10 %    EOSINOPHILS 1 0 - 5 %    BASOPHILS 0 0 - 2 %    ABS. NEUTROPHILS 8.4 (H) 1.8 - 8.0 K/UL    ABS. LYMPHOCYTES 1.0 0.9 - 3.6 K/UL    ABS. MONOCYTES 1.4 (H) 0.05 - 1.2 K/UL    ABS. EOSINOPHILS 0.1 0.0 - 0.4 K/UL    ABS. BASOPHILS 0.0 0.0 - 0.1 K/UL    DF AUTOMATED     METABOLIC PANEL, COMPREHENSIVE    Collection Time: 03/22/18  6:00 PM   Result Value Ref Range    Sodium 139 136 - 145 mmol/L    Potassium 4.1 3.5 - 5.5 mmol/L    Chloride 102 100 - 108 mmol/L    CO2 25 21 - 32 mmol/L    Anion gap 12 3.0 - 18 mmol/L    Glucose 199 (H) 74 - 99 mg/dL    BUN 15 7.0 - 18 MG/DL    Creatinine 1.41 (H) 0.6 - 1.3 MG/DL    BUN/Creatinine ratio 11 (L) 12 - 20      GFR est AA 59 (L) >60 ml/min/1.73m2    GFR est non-AA 48 (L) >60 ml/min/1.73m2    Calcium 8.4 (L) 8.5 - 10.1 MG/DL    Bilirubin, total 1.3 (H) 0.2 - 1.0 MG/DL    ALT (SGPT) 22 16 - 61 U/L    AST (SGOT) 29 15 - 37 U/L    Alk.  phosphatase 144 (H) 45 - 117 U/L    Protein, total 7.0 6.4 - 8.2 g/dL    Albumin 3.6 3.4 - 5.0 g/dL    Globulin 3.4 2.0 - 4.0 g/dL    A-G Ratio 1.1 0.8 - 1.7     NT-PRO BNP    Collection Time: 03/22/18  7:55 PM   Result Value Ref Range    NT pro- 0 - 1800 PG/ML   POC LACTIC ACID    Collection Time: 03/22/18  8:32 PM   Result Value Ref Range    Lactic Acid (POC) 2.4 (HH) 0.4 - 2.0 mmol/L   GLUCOSE, POC    Collection Time: 03/22/18 10:20 PM   Result Value Ref Range    Glucose (POC) 209 (H) 70 - 110 mg/dL   GLUCOSE, POC    Collection Time: 03/23/18 12:37 AM   Result Value Ref Range    Glucose (POC) 232 (H) 70 - 335 mg/dL   METABOLIC PANEL, BASIC    Collection Time: 03/23/18  2:47 AM   Result Value Ref Range    Sodium 136 136 - 145 mmol/L    Potassium 3.8 3.5 - 5.5 mmol/L    Chloride 100 100 - 108 mmol/L    CO2 26 21 - 32 mmol/L    Anion gap 10 3.0 - 18 mmol/L    Glucose 305 (H) 74 - 99 mg/dL    BUN 18 7.0 - 18 MG/DL    Creatinine 1.47 (H) 0.6 - 1.3 MG/DL    BUN/Creatinine ratio 12 12 - 20      GFR est AA 56 (L) >60 ml/min/1.73m2    GFR est non-AA 46 (L) >60 ml/min/1.73m2    Calcium 8.8 8.5 - 10.1 MG/DL   MAGNESIUM    Collection Time: 03/23/18  2:47 AM   Result Value Ref Range    Magnesium 2.3 1.6 - 2.6 mg/dL   PHOSPHORUS    Collection Time: 03/23/18  2:47 AM   Result Value Ref Range    Phosphorus 2.8 2.5 - 4.9 MG/DL   CBC W/O DIFF    Collection Time: 03/23/18  2:47 AM   Result Value Ref Range    WBC 10.7 4.6 - 13.2 K/uL    RBC 6.04 (H) 4.70 - 5.50 M/uL    HGB 16.9 (H) 13.0 - 16.0 g/dL    HCT 49.8 (H) 36.0 - 48.0 %    MCV 82.5 74.0 - 97.0 FL    MCH 28.0 24.0 - 34.0 PG    MCHC 33.9 31.0 - 37.0 g/dL    RDW 16.2 (H) 11.6 - 14.5 %    PLATELET 103 381 - 658 K/uL    MPV 11.0 9.2 - 11.8 FL   GLUCOSE, POC    Collection Time: 03/23/18  7:32 AM   Result Value Ref Range    Glucose (POC) 337 (H) 70 - 110 mg/dL   GLUCOSE, POC    Collection Time: 03/23/18  7:33 AM   Result Value Ref Range    Glucose (POC) 297 (H) 70 - 110 mg/dL   GLUCOSE, POC    Collection Time: 03/23/18 11:06 AM   Result Value Ref Range    Glucose (POC) 421 (HH) 70 - 110 mg/dL       Signed By: Sunil Weinstein PA-C     March 23, 2018 11:11 AM

## 2018-03-23 NOTE — H&P
History & Physical    Patient: Hemalatha Mercado MRN: [id number]  CSN: [id number]    YOB: 1935  Age: [de-identified] y.o. Sex: male      DOA: 3/22/2018    Chief Complaint:   Chief Complaint   Patient presents with    Fall    Neck Pain          HPI:     Hemalatha Mercado is a [de-identified] y.o.  male who has PMH  Of CAD s/p RCA stent in 2009,  carotid artery disease with total occlusion of left ICA, hypertension, dyslipidemia, diabetes mellitus and dementia  PT presents originally to ER in AM with a CC of body aches and productive cough. Pt was found +ve for influenza and given Tamiflu. Pt went back to his AL facility in There Pt had a fall and looked weaker with SOB and dizzy. Pt was sent back to ER. Pt is seen and examined. pleasantly confused but very poor historian ( base line as per his wife)  Pt has elevated lactic acid and heavy wheezing with SOB on mild exertion and looks weak. Denies CP/fever/abdominal pain and urinary Sx but continues to c/o generalized weakness and has heavy wheezing. Past Medical History:   Diagnosis Date    Angina     Anxiety     Arthritis     CAD (coronary artery disease)     s/p drug-eluting stents to RCA for high-grade stenoses in 2/09    Cardiac catheterization 08/24/2016    Mod calcification. Co-dom. oLM 50-70%. LAD 30%. Dx 30%. Cx 70% focal.  OM 80% focal.  RCA 30%.  Cardiac echocardiogram 08/23/2016    Tech difficult. EF 55-60%. No WMA. Normal diastolic fx. Lipomatous septal hypertrophy.  Cardiac nuclear imaging test, abnormal 08/23/2016    Sm reversible inferior defect concerning for ischemia. Mild inferior hypk. EF 68%. Neg EKG on pharm stress test.    Carotid duplex 08/26/2016    Mild <50% CYRUS stenosis. Chronic LICA occlusion. Similar to study of 3/29/16.     Carotid stenosis (HCC)     w/ known total occlusion of lt internal carotid artery; followed by pts vascular surgeon    Chronic kidney disease     [facility]) [facility]     Dementia     Depression     Diabetes ([facility]     type 2    Dyslipidemia     on Crestor; managed by pts PCP    Dyspnea     Heart disease     Hypercholesterolemia     Hypertension     Low back pain     Osteoarthrosis involving multiple sites     Skin cancer ([facility]     squamous cell lesions of the skin    Stroke ([facility]     Venous (peripheral) insufficiency        Past Surgical History:   Procedure Laterality Date    CARDIAC CATHETERIZATION  8/24/2016         CORONARY ARTERY ANGIOGRAM  8/24/2016         HX ANGIOPLASTY  2009    2 stents placed and heart attack during the procedure    HX COLONOSCOPY  10/2003    neg; declines f/u    HX CORONARY ARTERY BYPASS GRAFT  09/2016    HX HEART CATHETERIZATION  5/2013    HX HERNIA REPAIR      1970s    HX OTHER SURGICAL      groin surgery    HX TONSIL AND ADENOIDECTOMY      IR INTRAVASCULAR ULTRASOUND INITIAL  8/24/2016         LV ANGIOGRAPHY  8/24/2016            Family History   Problem Relation Age of Onset    Parkinsonism Mother     Hypertension Mother     Cancer Father      lung    Heart defect Brother        Social History     Social History    Marital status:      Spouse name: N/A    Number of children: N/A    Years of education: N/A     Social History Main Topics    Smoking status: Former Smoker     Years: 2.00     Quit date: 1/1/1955    Smokeless tobacco: Never Used    Alcohol use Yes      Comment: drinks weekly couple beers    Drug use: No    Sexual activity: No     Other Topics Concern    Not on file     Social History Narrative       Prior to Admission medications    Medication Sig Start Date End Date Taking? Authorizing Provider   oseltamivir (TAMIFLU) 75 mg capsule Take 1 Cap by mouth two (2) times a day for 5 days. 3/22/18 3/27/18  Jayde Guerrero PA-C   ascorbic acid, vitamin C, (VITAMIN C) 250 mg tablet Take  by mouth two (2) times a day.     Historical Provider   temazepam (RESTORIL) 7.5 mg capsule Take  by mouth nightly as needed for Sleep. Historical Provider   acetaminophen (TYLENOL) 500 mg tablet Take 1 Tab by mouth every six (6) hours as needed. 10/30/17   Tatum Peraza MD   aspirin delayed-release 81 mg tablet Take 1 Tab by mouth daily. 10/30/17   Tatum Peraza MD   cycloSPORINE (RESTASIS) 0.05 % ophthalmic emulsion Administer 1 Drop to both eyes every twelve (12) hours. 10/30/17   Tatum Peraza MD   OTHER Incentive spirometry- Use as directed 10/30/17   Tatum Peraza MD   furosemide (LASIX) 40 mg tablet 1 qam 9/29/17   Celso Phalen, MD   rosuvastatin (CRESTOR) 10 mg tablet Take 1 Tab by mouth nightly. 9/29/17   Celso Phalen, MD   isosorbide mononitrate ER (IMDUR) 30 mg tablet TAKE 1 TAB BY MOUTH EVERY MORNING. 7/26/17   Historical Provider   metoprolol succinate (TOPROL-XL) 50 mg XL tablet TAKE 1 TABLET BY MOUTH ONCE DAILY 7/10/17   Manish Felix MD   solifenacin (VESICARE) 5 mg tablet Take 1 Tab by mouth daily. Indications: BLADDER HYPERACTIVITY, INCREASED URINARY FREQUENCY, URINARY URGE INCONTINENCE, URINARY URGENCY 2/27/17   Alirio Christine MD   [facility] crea 240 g by Does Not Apply route four (4) times daily. Anti-Inflam Meloxicam 0.09% Topirmate 1% Methocarbamol 2%  Lidocaine 2% Prilocaine 2% 2/17/16   Kaylen Casarez NP   SHAWN PEN NEEDLE 32 x 5/32 \" ndle Use as directed. 10/20/15   Historical Provider   gabapentin (NEURONTIN) 300 mg capsule Take 1 Cap by mouth daily. Indications: NEUROPATHIC PAIN, RESTLESS LEGS SYNDROME 11/3/15   Kaylen Casarez NP   insulin glargine (LANTUS SOLOSTAR) 100 unit/mL (3 mL) pen 30 Units by SubCUTAneous route nightly. 30 units     Historical Provider   MULTIVITAMINS W-MINERALS/LUT (CENTRUM SILVER PO) Take 1 Tab by mouth daily. Historical Provider   CALCIUM CARBONATE (CALTRATE 600 PO) Take 1 Tab by mouth daily.     Historical Provider       Allergies   Allergen Reactions    Amaryl [Glimepiride] Drowsiness    Lipitor [Atorvastatin] Myalgia    Niacin Other (comments)     Facial blistering, swelling in face    Pravachol [Pravastatin] Myalgia    Zocor [Simvastatin] Myalgia         Review of Systems  GENERAL: Patient alert, awake and oriented times 2, able to communicate full sentences and not in distress. Looks weak  HEENT: No change in vision, no earache, tinnitus, sore throat or sinus congestion. NECK: No pain or stiffness. PULMONARY: +ve shortness of breath, cough and wheeze. Cardiovascular: no pnd / orthopnea, no CP  GASTROINTESTINAL: No abdominal pain, nausea, vomiting or diarrhea, melena or bright red blood per rectum. GENITOURINARY: No urinary frequency, urgency, hesitancy or dysuria. MUSCULOSKELETAL: No joint or muscle pain, no back pain, no recent trauma. DERMATOLOGIC: No rash, no itching, no lesions. ENDOCRINE: No polyuria, polydipsia, no heat or cold intolerance. No recent change in weight. HEMATOLOGICAL: No anemia or easy bruising or bleeding. NEUROLOGIC: No headache, seizures, numbness, tingling or weakness. Physical Exam:     Physical Exam:  Visit Vitals    /65    Pulse 93    Temp 98.9 °F (37.2 °C)    Resp 16    Wt 73 kg (160 lb 15 oz)    SpO2 96%    BMI 23.09 kg/m2      O2 Device: Room air    Temp (24hrs), Av.2 °F (37.3 °C), Min:98.9 °F (37.2 °C), Max:99.4 °F (37.4 °C)             General:  Alert, cooperative, no distress, appears stated age. Head: Normocephalic, without obvious abnormality, atraumatic. Eyes:  Conjunctivae/corneas clear. PERRL, EOMs intact. Nose: Nares normal. No drainage or sinus tenderness. Neck: Supple, symmetrical, trachea midline, no adenopathy, thyroid: no enlargement, no carotid bruit and no JVD. Lungs:   Decrease BS with heavy wheezing more on YENI   Heart:  Regular rate and rhythm, S1, S2 normal.     Abdomen: Soft, non-tender. Bowel sounds normal.    Extremities: Extremities normal, atraumatic, no cyanosis or edema.    Pulses: 2+ and symmetric all extremities. Skin:  No rashes or lesions   Neurologic: AAOx2, No focal motor or sensory deficit. Labs Reviewed: All lab results for the last 24 hours reviewed.   CXR and EKG    Procedures/imaging: see electronic medical records for all procedures/Xrays and details which were not copied into this note but were reviewed prior to creation of Plan      Assessment/Plan     Principal Problem:    Flu (3/22/2018)    Active Problems:    Type 2 diabetes mellitus with stage 3 chronic kidney disease ([facility] (7/20/2016)      COPD (chronic obstructive pulmonary disease) ([facility] (8/29/2016)      Wheezing (3/22/2018)       Pt will be admitted for COPD exacerbation 2 ry to Influenza B    Will continue IV steroids, Bronchodilators  Will continue Oseltamivir  BID  Will hold on Abx    Hx of CAD and HTN >> continue ASA and BB  Will continue Lasix but IV for now   Will get BNP    DM with hyperglycemia >> will continue lantus Smaller dose and SSI    Acute on CRF >> received IVF on admission  Will monitor for now      DVT/GI Prophylaxis: Hep SQ    Plan of care is discussed in details with Patient/Family at bedside and agreed upon    Gilda Steele MD  3/22/2018 9:48 PM

## 2018-03-23 NOTE — PROGRESS NOTES
I have assumed care of Mr. Kari Oneal. He was assessed after bedside report. He is currently resting in his recliner.

## 2018-03-23 NOTE — ED NOTES
Pt. Incontinent of urine; dafne care given and new brief applied.  Pt. Repositioned in bed w/assist.

## 2018-03-23 NOTE — DIABETES MGMT
Diabetes Patient/Family Education Record    Factors That  May Influence Patients Ability  to Learn or  Comply with Recommendations   []   Language barrier    []   Cultural needs   []   Motivation    []   Cognitive limitation    []   Physical   []   Education    []   Physiological factors   []   Hearing/vision/speaking impairment   []   Lutheran beliefs    []   Financial factors   []  Other:   []  No factors identified at this time. Person Instructed:   [x]   Patient   []   Family   []  Other     Preference for Learning:   [x]   Verbal   [x]   Written   []  Demonstration     Level of Comprehension & Competence:    [x]  Good                                      [] Fair                                     []  Poor                             []  Needs Reinforcement   [x]  Teachback completed    Education Component: Patient stated that he is staying in an assisted living facility. [x]  Medication management, including how to administer insulin (if appropriate) and potential medication interactions: Patient stated that he is only on one diabetes medication:  Lantus insulin (SoloStar) which is a pen. He is getting 30 units daily at bedtime. Patient stated that he is staying in an assisted living facility and the staff is assisting with his insulin. [x]  Nutritional management: Patient stated that he is getting 3 meals daily at the assisted facility where he is staying. He verbalized understanding about the importance of eating adequate food. [x]  Exercise: Patient reported that he has been experiencing loss of balance recently and history of falls but denies injury. Patient knows not to walk without assistance if he is feeling dizzy and weak. He has access in the facility to call for assistance.    [x]  Signs, symptoms, and treatment of hyperglycemia and hypoglycemia: Patient stated that he does not experience low blood sugar regularly but he knows when it is happening because of feeling shaky and sweaty. Discussed and emphasized the importance of eating 3 meals daily to help prevent low blood sugar. Patient is more concern about his elevated blood sugar at least during the last couple of months. Patient denies not getting his daily lantus insulin every evening before bed. Patient also stated that the assisted living facility staff is making sure he is on the right diet because of his diabetes. [x] Prevention, recognition and treatment of hyperglycemia and hypoglycemia   [x]  Importance of blood glucose monitoring and how to obtain a blood glucose meter: Patient stated that he has been staying in an assisted living facility and the staff there is making sure that his blood sugar is monitored. Patient stated that he has no problem using BG meter.    []  Instruction on use of the blood glucose meter   [x]  Discuss the importance of HbA1C monitoring: Current A1c of 8.8% (03/22/2018) which is equivalent to average blood glucose of 206 mg/dL during the past 2-3 months.    Patient reported difficult to control blood sugar recently and described, \"it has been all over\" and suspect that it's due to  medical conditions which has affected his blood sugar.    []  Sick day guidelines   []  Proper use and disposal of lancets, needles, syringes or insulin pens (if appropriate)   []  Potential long-term complications (retinopathy, kidney disease, neuropathy, foot care)   [x] Information about whom to contact in case of emergency or for more information    [x]  Goal:  Patient/family will demonstrate understanding of Diabetes Self Management Skills by: 3/30/2018  Plan for post-discharge education or self-management support:    [x] Outpatient class schedule provided            [] Patient Declined    [] Scheduled for outpatient classes (date) _______     Margaret Bee RN  pgr [phone]

## 2018-03-23 NOTE — DIABETES MGMT
GLYCEMIC CONTROL:    POC BG report on 03/22/2018: 209 mg/dL. POC BG report on 03/23/2018 at time of review; 232, 337, 297 mg/dL. Patient reported eating meals with no problem. Patient on 15 units lantus QHS at time of review and correctional lispro insulin. Home diabetes med: lantus (pen) insulin (SoloStar) 30 units daily at bedtime. Patient is currently on IV Solumedrol 40 mg every 6 hours. Discussed elevated BG readings with VIVEK Oliver with hospitalist and obtained verbal order to increase basal lantus insulin dose to 30 units QHS. Also modified correctional lispro insulin to very resistant dose.     Jamilah Garcia RN [facility]  Pager: [phone]

## 2018-03-23 NOTE — ROUTINE PROCESS
TRANSFER - OUT REPORT:    Verbal report given to Nik Rios RN on Hemalatha Mercado  being transferred to [address] [place] [address] for routine progression of care       Report consisted of patients Situation, Background, Assessment and   Recommendations(SBAR). Information from the following report(s) SBAR, ED Summary and MAR was reviewed with the receiving nurse. Lines:   Peripheral IV 03/22/18 Right Antecubital (Active)   Site Assessment Clean, dry, & intact 3/22/2018  6:00 PM   Phlebitis Assessment 0 3/22/2018  6:00 PM   Infiltration Assessment 0 3/22/2018  6:00 PM       Peripheral IV 03/22/18 Right Hand (Active)   Site Assessment Clean, dry, & intact 3/22/2018  6:30 PM   Phlebitis Assessment 0 3/22/2018  6:30 PM   Infiltration Assessment 0 3/22/2018  6:30 PM        Opportunity for questions and clarification was provided.       Patient transported with:   Monitor  Registered Nurse

## 2018-03-23 NOTE — DIABETES MGMT
NUTRITIONAL ASSESSMENT GLYCEMIC CONTROL/ PLAN OF CARE     Jeannie Brunner           [de-identified] y.o.           3/22/2018                 1. Influenza B       INTERVENTIONS/PLAN:   Continue inpatient monitoring and intervention   ASSESSMENT:    Pt is an [de-identified]year old male with a past medical history significant for CAD, hypertension, dyslipidemia, diabetes, and dementia. Pt resides at assisted living facility. Blood glucose is currently elevated, noted Lantus insulin increased to home dose. Pt is tolerating diet and eating well.      Diabetes Management:   Recent blood glucose:   3/23/2018 00:37 3/23/2018 07:32 3/23/2018 07:33 3/23/2018 11:06   232 (H) 337 (H) 297 (H) 421 (HH)    Within target range (non-ICU: <140; ICU<180): [] Yes   [x]  No    Current Insulin regimen:   Lantus insulin 30 units every bedtime  Correctional Lispro insulin 4 times daily ACHS (very insulin resistant)  Home medication/insulin regimen:   Lantus insulin 30 units at bedtime  HbA1c: 8.8% (estimated average glucose of 206 mg/dL)  Adequate glycemic control PTA:  [] Yes  [x] No     SUBJECTIVE/OBJECTIVE:   Information obtained from: chart review, staff    Diet: Diabetic consistent carbohydrate, 1500 mL FR    Patient Vitals for the past 100 hrs:   % Diet Eaten   03/23/18 0900 100 %     Medications: [x]  Reviewed     Most Recent POC Glucose:   Recent Labs      03/23/18   0247  03/22/18   1800  03/22/18   0940   GLU  305*  199*  202*      Labs:   Lab Results   Component Value Date/Time    Hemoglobin A1c 8.8 (H) 03/22/2018 09:40 AM    Hemoglobin A1c, External 5.8 01/07/2016     Lab Results   Component Value Date/Time    Sodium 136 03/23/2018 02:47 AM    Potassium 3.8 03/23/2018 02:47 AM    Chloride 100 03/23/2018 02:47 AM    CO2 26 03/23/2018 02:47 AM    Anion gap 10 03/23/2018 02:47 AM    Glucose 305 (H) 03/23/2018 02:47 AM    BUN 18 03/23/2018 02:47 AM    Creatinine 1.47 (H) 03/23/2018 02:47 AM    Calcium 8.8 03/23/2018 02:47 AM    Magnesium 2.3 03/23/2018 02:47 AM    Phosphorus 2.8 03/23/2018 02:47 AM    Albumin 3.6 03/22/2018 06:00 PM     Anthropometrics: Wt Readings from Last 1 Encounters:   03/22/18 73 kg (160 lb 15 oz)      Ht Readings from Last 1 Encounters:   03/23/18 5' 10.08\" (1.78 m)     Estimated Nutrition Needs:  3752-0701 Kcal/day, 58-73 grams protein/day  Based on:   [x] Actual BW    []  IBW   []  Adjusted BW      Nutrition Diagnoses:    Altered nutrition related lab value related to diabetes as evidenced by Hemoglobin A1c of 8.8%  Nutrition Interventions: coordination of care  Goal: Blood glucose will be within target range of  mg/dL by 3/26/18    Nutrition Monitoring and Evaluation    []     Monitor po intake on meal rounds  [x]     Continue inpatient monitoring and intervention  []     Other:    Jovan Lpoez RD, CDE  pgr [phone]

## 2018-03-24 LAB
ANION GAP SERPL CALC-SCNC: 8 MMOL/L (ref 3–18)
BUN SERPL-MCNC: 39 MG/DL (ref 7–18)
BUN/CREAT SERPL: 27 (ref 12–20)
CALCIUM SERPL-MCNC: 8.3 MG/DL (ref 8.5–10.1)
CHLORIDE SERPL-SCNC: 104 MMOL/L (ref 100–108)
CO2 SERPL-SCNC: 27 MMOL/L (ref 21–32)
CREAT SERPL-MCNC: 1.47 MG/DL (ref 0.6–1.3)
ERYTHROCYTE [DISTWIDTH] IN BLOOD BY AUTOMATED COUNT: 16 % (ref 11.6–14.5)
GLUCOSE BLD STRIP.AUTO-MCNC: 285 MG/DL (ref 70–110)
GLUCOSE BLD STRIP.AUTO-MCNC: 337 MG/DL (ref 70–110)
GLUCOSE BLD STRIP.AUTO-MCNC: 339 MG/DL (ref 70–110)
GLUCOSE BLD STRIP.AUTO-MCNC: 401 MG/DL (ref 70–110)
GLUCOSE BLD STRIP.AUTO-MCNC: 427 MG/DL (ref 70–110)
GLUCOSE SERPL-MCNC: 237 MG/DL (ref 74–99)
HCT VFR BLD AUTO: 46 % (ref 36–48)
HGB BLD-MCNC: 15.6 G/DL (ref 13–16)
MCH RBC QN AUTO: 27.9 PG (ref 24–34)
MCHC RBC AUTO-ENTMCNC: 33.9 G/DL (ref 31–37)
MCV RBC AUTO: 82.1 FL (ref 74–97)
PLATELET # BLD AUTO: 230 K/UL (ref 135–420)
PMV BLD AUTO: 11 FL (ref 9.2–11.8)
POTASSIUM SERPL-SCNC: 4 MMOL/L (ref 3.5–5.5)
RBC # BLD AUTO: 5.6 M/UL (ref 4.7–5.5)
SODIUM SERPL-SCNC: 139 MMOL/L (ref 136–145)
WBC # BLD AUTO: 20.7 K/UL (ref 4.6–13.2)

## 2018-03-24 PROCEDURE — 36415 COLL VENOUS BLD VENIPUNCTURE: CPT | Performed by: INTERNAL MEDICINE

## 2018-03-24 PROCEDURE — 85027 COMPLETE CBC AUTOMATED: CPT | Performed by: INTERNAL MEDICINE

## 2018-03-24 PROCEDURE — 74011636637 HC RX REV CODE- 636/637: Performed by: PHYSICIAN ASSISTANT

## 2018-03-24 PROCEDURE — 97530 THERAPEUTIC ACTIVITIES: CPT

## 2018-03-24 PROCEDURE — 74011250637 HC RX REV CODE- 250/637: Performed by: EMERGENCY MEDICINE

## 2018-03-24 PROCEDURE — 74011250636 HC RX REV CODE- 250/636: Performed by: PHYSICIAN ASSISTANT

## 2018-03-24 PROCEDURE — 74011000250 HC RX REV CODE- 250: Performed by: PHYSICIAN ASSISTANT

## 2018-03-24 PROCEDURE — 80048 BASIC METABOLIC PNL TOTAL CA: CPT | Performed by: INTERNAL MEDICINE

## 2018-03-24 PROCEDURE — 74011250637 HC RX REV CODE- 250/637: Performed by: PHYSICIAN ASSISTANT

## 2018-03-24 PROCEDURE — 74011250636 HC RX REV CODE- 250/636: Performed by: INTERNAL MEDICINE

## 2018-03-24 PROCEDURE — 74011636637 HC RX REV CODE- 636/637: Performed by: FAMILY MEDICINE

## 2018-03-24 PROCEDURE — 97161 PT EVAL LOW COMPLEX 20 MIN: CPT

## 2018-03-24 PROCEDURE — 65270000029 HC RM PRIVATE

## 2018-03-24 PROCEDURE — 74011250637 HC RX REV CODE- 250/637: Performed by: INTERNAL MEDICINE

## 2018-03-24 PROCEDURE — 82962 GLUCOSE BLOOD TEST: CPT

## 2018-03-24 RX ORDER — FUROSEMIDE 20 MG/1
20 TABLET ORAL DAILY
Status: DISCONTINUED | OUTPATIENT
Start: 2018-03-24 | End: 2018-03-27 | Stop reason: HOSPADM

## 2018-03-24 RX ORDER — PREDNISONE 20 MG/1
40 TABLET ORAL
Status: DISCONTINUED | OUTPATIENT
Start: 2018-03-25 | End: 2018-03-27 | Stop reason: HOSPADM

## 2018-03-24 RX ORDER — INSULIN GLARGINE 100 [IU]/ML
38 INJECTION, SOLUTION SUBCUTANEOUS
Status: DISCONTINUED | OUTPATIENT
Start: 2018-03-24 | End: 2018-03-25

## 2018-03-24 RX ORDER — CIPROFLOXACIN HYDROCHLORIDE 3.5 MG/ML
1 SOLUTION/ DROPS TOPICAL
Status: DISCONTINUED | OUTPATIENT
Start: 2018-03-24 | End: 2018-03-27 | Stop reason: HOSPADM

## 2018-03-24 RX ADMIN — INSULIN GLARGINE 38 UNITS: 100 INJECTION, SOLUTION SUBCUTANEOUS at 22:19

## 2018-03-24 RX ADMIN — HEPARIN SODIUM 5000 UNITS: 5000 INJECTION, SOLUTION INTRAVENOUS; SUBCUTANEOUS at 05:25

## 2018-03-24 RX ADMIN — ISOSORBIDE MONONITRATE 30 MG: 30 TABLET, EXTENDED RELEASE ORAL at 08:36

## 2018-03-24 RX ADMIN — INSULIN LISPRO 15 UNITS: 100 INJECTION, SOLUTION INTRAVENOUS; SUBCUTANEOUS at 17:00

## 2018-03-24 RX ADMIN — HEPARIN SODIUM 5000 UNITS: 5000 INJECTION, SOLUTION INTRAVENOUS; SUBCUTANEOUS at 22:18

## 2018-03-24 RX ADMIN — ASPIRIN 81 MG: 81 TABLET, COATED ORAL at 08:37

## 2018-03-24 RX ADMIN — INSULIN LISPRO 15 UNITS: 100 INJECTION, SOLUTION INTRAVENOUS; SUBCUTANEOUS at 12:57

## 2018-03-24 RX ADMIN — ROSUVASTATIN CALCIUM 10 MG: 10 TABLET, FILM COATED ORAL at 22:18

## 2018-03-24 RX ADMIN — SOLIFENACIN SUCCINATE 5 MG: 5 TABLET, FILM COATED ORAL at 08:36

## 2018-03-24 RX ADMIN — OSELTAMIVIR PHOSPHATE 30 MG: 30 CAPSULE ORAL at 17:01

## 2018-03-24 RX ADMIN — HEPARIN SODIUM 5000 UNITS: 5000 INJECTION, SOLUTION INTRAVENOUS; SUBCUTANEOUS at 15:23

## 2018-03-24 RX ADMIN — METOPROLOL SUCCINATE 50 MG: 50 TABLET, EXTENDED RELEASE ORAL at 08:37

## 2018-03-24 RX ADMIN — METHYLPREDNISOLONE SODIUM SUCCINATE 40 MG: 40 INJECTION, POWDER, FOR SOLUTION INTRAMUSCULAR; INTRAVENOUS at 15:23

## 2018-03-24 RX ADMIN — METHYLPREDNISOLONE SODIUM SUCCINATE 40 MG: 40 INJECTION, POWDER, FOR SOLUTION INTRAMUSCULAR; INTRAVENOUS at 05:24

## 2018-03-24 RX ADMIN — OSELTAMIVIR PHOSPHATE 30 MG: 30 CAPSULE ORAL at 08:37

## 2018-03-24 RX ADMIN — FUROSEMIDE 20 MG: 20 TABLET ORAL at 16:59

## 2018-03-24 RX ADMIN — CIPROFLOXACIN HYDROCHLORIDE 1 DROP: 3 SOLUTION/ DROPS OPHTHALMIC at 17:32

## 2018-03-24 RX ADMIN — CIPROFLOXACIN HYDROCHLORIDE 1 DROP: 3 SOLUTION/ DROPS OPHTHALMIC at 22:22

## 2018-03-24 RX ADMIN — INSULIN LISPRO 12 UNITS: 100 INJECTION, SOLUTION INTRAVENOUS; SUBCUTANEOUS at 22:20

## 2018-03-24 RX ADMIN — GABAPENTIN 300 MG: 300 CAPSULE ORAL at 08:36

## 2018-03-24 RX ADMIN — SODIUM CHLORIDE 75 ML/HR: 900 INJECTION, SOLUTION INTRAVENOUS at 04:08

## 2018-03-24 RX ADMIN — INSULIN LISPRO 9 UNITS: 100 INJECTION, SOLUTION INTRAVENOUS; SUBCUTANEOUS at 08:40

## 2018-03-24 NOTE — PROGRESS NOTES
Problem: Mobility Impaired (Adult and Pediatric)  Goal: *Acute Goals and Plan of Care (Insert Text)  Physical Therapy Goals  Initiated 3/24/2018 and to be accomplished within 5-7 day(s)  1. Patient will move from supine to sit and sit to supine  in bed with supervision/set-up. 2.  Patient will transfer from bed to chair and chair to bed with supervision/set-up using the least restrictive device. 3.  Patient will perform sit to stand with supervision/set-up. 4.  Patient will ambulate with supervision/set-up for 75 feet with the least restrictive device. 5.  Patient will ascend/descend 3-4 stairs with 1-2 handrail(s) with minimal assistance/contact guard assist.  Outcome: Progressing Towards Goal  physical Therapy EVALUATION    Patient: Una Corona (58 y.o. male)  Date: 3/24/2018  Primary Diagnosis: Flu        Precautions:    Fall, Skin    ASSESSMENT :  Based on the objective data described below, the patient presents to PT with decreased independence with functional mobility and decreased endurance and activity tolerance. He was found supine in bed and agreeable for PT consult. He required min A for supine to sit and CG for sit to stand to sit for bed to chair transfer. He was left sitting up in the bedside chair eating his breakfast.     Patient will benefit from skilled intervention to address the above impairments.   Patients rehabilitation potential is considered to be Good  Factors which may influence rehabilitation potential include:   [x]         None noted  []         Mental ability/status  []         Medical condition  []         Home/family situation and support systems  []         Safety awareness  []         Pain tolerance/management  []         Other:      PLAN :  Recommendations and Planned Interventions:  [x]           Bed Mobility Training             [x]    Neuromuscular Re-Education  [x]           Transfer Training                   []    Orthotic/Prosthetic Training  [x] Gait Training                          []    Modalities  [x]           Therapeutic Exercises          []    Edema Management/Control  [x]           Therapeutic Activities            [x]    Patient and Family Training/Education  []           Other (comment):    Frequency/Duration: Patient will be followed by physical therapy 1-2 times per day/4-7 days per week to address goals. Discharge Recommendations: Fredy Mcdaniel  Further Equipment Recommendations for Discharge: N/A     G-CODES      Mobility  Current  CJ= 20-39%   Goal  CI= 1-19%. The severity rating is based on the Level of Assistance required for Functional Mobility and ADLs. Eval Complexity: History: MEDIUM  Complexity : 1-2 comorbidities / personal factors will impact the outcome/ POC Exam:MEDIUM Complexity : 3 Standardized tests and measures addressing body structure, function, activity limitation and / or participation in recreation  Presentation: LOW Complexity : Stable, uncomplicated  Clinical Decision Making:Other outcome measures level of assistance with functional mobilty  LOW Overall Complexity:LOW     SUBJECTIVE:   Patient stated I have a black straight cane here somewhere. Have you seen my briefcase I had with me? Zhen Webster    OBJECTIVE DATA SUMMARY:     Past Medical History:   Diagnosis Date    Angina     Anxiety     Arthritis     CAD (coronary artery disease)     s/p drug-eluting stents to RCA for high-grade stenoses in 2/09    Cardiac catheterization 08/24/2016    Mod calcification. Co-dom. oLM 50-70%. LAD 30%. Dx 30%. Cx 70% focal.  OM 80% focal.  RCA 30%.  Cardiac echocardiogram 08/23/2016    Tech difficult. EF 55-60%. No WMA. Normal diastolic fx. Lipomatous septal hypertrophy.  Cardiac nuclear imaging test, abnormal 08/23/2016    Sm reversible inferior defect concerning for ischemia. Mild inferior hypk. EF 68%. Neg EKG on pharm stress test.    Carotid duplex 08/26/2016    Mild <50% CYRUS stenosis. Chronic LICA occlusion. Similar to study of 3/29/16.  Carotid stenosis (HCC)     w/ known total occlusion of lt internal carotid artery; followed by pts vascular surgeon    Chronic kidney disease     [facility] [address]     Dementia     Depression     Diabetes ([facility] [address]     type 2    Dyslipidemia     on Crestor; managed by pts PCP    Dyspnea     Heart disease     Hypercholesterolemia     Hypertension     Low back pain     Osteoarthrosis involving multiple sites     Skin cancer ([facility] [address]     squamous cell lesions of the skin    Stroke ([facility] [address]     Venous (peripheral) insufficiency      Past Surgical History:   Procedure Laterality Date    CARDIAC CATHETERIZATION  8/24/2016         CORONARY ARTERY ANGIOGRAM  8/24/2016         HX ANGIOPLASTY  2009    2 stents placed and heart attack during the procedure    HX COLONOSCOPY  10/2003    neg; declines f/u    HX CORONARY ARTERY BYPASS GRAFT  09/2016    HX HEART CATHETERIZATION  5/2013    HX HERNIA REPAIR      1970s    HX OTHER SURGICAL      groin surgery    HX TONSIL AND ADENOIDECTOMY      IR INTRAVASCULAR ULTRASOUND INITIAL  8/24/2016         LV ANGIOGRAPHY  8/24/2016          Prior Level of Function/Home Situation: SC use per his reports, was at an assissted living facility  [address]: Skilled nursing facility  One/Two Story Residence: One story  Living Alone: No  Support Systems: Family member(s)  Patient Expects to be Discharged to[de-identified] Skilled nursing facility  Current DME Used/Available at Home: Cane, straight  Critical Behavior:  Neurologic State: Alert  Orientation Level: Oriented to person;Oriented to place;Oriented to situation  Cognition: Follows commands; Appropriate decision making; Appropriate for age attention/concentration; Appropriate safety awareness  Safety/Judgement: Fall prevention  Psychosocial  Patient Behaviors: Calm; Cooperative  Purposeful Interaction: Yes  Strength:    Strength: Within functional limits (B LE)  Tone & Sensation:   Tone: Normal    Sensation: Intact     Range Of Motion:  AROM: Within functional limits (B LE)   Functional Mobility:  Bed Mobility:     Supine to Sit: Minimum assistance;Contact guard assistance      Transfers:  Sit to Stand: Contact guard assistance  Stand to Sit: Contact guard assistance     Bed to Chair: Contact guard assistance   Balance:   Sitting: Intact  Standing: With support  Ambulation/Gait Training:  Distance (ft): 3 Feet (ft) (bed to chair transfer)  Assistive Device: Other (comment) (R arm assist)  Ambulation - Level of Assistance: Contact guard assistance     Gait Description (WDL): Exceptions to [facility]     Base of Support: Widened     Speed/Chanel: Fluctuations  Step Length: Left shortened;Right shortened     Therapeutic Exercises:      Pain:  Pt reports 0/10 pain or discomfort prior to treatment.    Pt reports 0/10 pain or discomfort post treatment. Activity Tolerance:    vlad well. Please refer to the flowsheet for vital signs taken during this treatment. After treatment:   [x]         Patient left in no apparent distress sitting up in chair  []         Patient left in no apparent distress in bed  [x]         Call bell left within reach  []         Nursing notified  []         Caregiver present  []         Bed alarm activated    COMMUNICATION/EDUCATION:   [x]         Fall prevention education was provided and the patient/caregiver indicated understanding. [x]         Patient/family have participated as able in goal setting and plan of care. [x]         Patient/family agree to work toward stated goals and plan of care. []         Patient understands intent and goals of therapy, but is neutral about his/her participation. []         Patient is unable to participate in goal setting and plan of care.     Thank you for this referral.  F[place], PT   Time Calculation: 20 mins

## 2018-03-24 NOTE — PROGRESS NOTES
Care Management Interventions  PCP Verified by CM: Yes  Mode of Transport at Discharge:  (family)  Transition of Care Consult (CM Consult): Discharge Planning  Current Support Network: Assisted Living, Family Lives Nearby  Confirm Follow Up Transport: Family  Plan discussed with Pt/Family/Caregiver: Yes  Discharge Location  Discharge Placement: Skilled nursing facility    Pt is a [de-identified] year admitted for flu, COPD. Pt is alert and oriented and alone in room. Pt reports that his wife lives at home and that he lives in the independent side of assisted living at [facility]. He states that he stays in a facility because his wife was concerned about leaving him alone. Pt states that he has been at [facility] for about 4 months for memory loss but that he does not feel like he needs to be there anymore as he feels that his memory has improved. Pt informed that PT is recommending SNF rehab. Pt states that he has been to [facility] before for [facility]hab and that is where he would like to go for rehab. Informed him that he probably will not qualify to go to [facility] but that this CM will call [facility] to ask them to evaluate pt. Pt given SNF list to review.

## 2018-03-24 NOTE — ROUTINE PROCESS
Bedside and Verbal shift change report given to Jacklyn RN (oncoming nurse) by Smiley Benedict RN (offgoing nurse). Report included the following information SBAR, Kardex, MAR and Recent Results. SITUATION:  Code Status: Full Code  Reason for Admission: Flu  Hospital day: 2  Problem List:       Hospital Problems  Date Reviewed: 9/29/2017          Codes Class Noted POA    * (Principal)Flu ICD-10-CM: J11.1  ICD-9-CM: 487.1  3/22/2018 Yes        Wheezing ICD-10-CM: R06.2  ICD-9-CM: 786.07  3/22/2018 Yes        COPD (chronic obstructive pulmonary disease) ([facility]) (Chronic) ICD-10-CM: J44.9  ICD-9-CM: 068  8/29/2016 Yes        Type 2 diabetes mellitus with stage 3 chronic kidney disease (HCC) ICD-10-CM: E11.22, N18.3  ICD-9-CM: 250.40, 585.3  7/20/2016 Yes              BACKGROUND:   Past Medical History:   Past Medical History:   Diagnosis Date    Angina     Anxiety     Arthritis     CAD (coronary artery disease)     s/p drug-eluting stents to RCA for high-grade stenoses in 2/09    Cardiac catheterization 08/24/2016    Mod calcification. Co-dom. oLM 50-70%. LAD 30%. Dx 30%. Cx 70% focal.  OM 80% focal.  RCA 30%.  Cardiac echocardiogram 08/23/2016    Tech difficult. EF 55-60%. No WMA. Normal diastolic fx. Lipomatous septal hypertrophy.  Cardiac nuclear imaging test, abnormal 08/23/2016    Sm reversible inferior defect concerning for ischemia. Mild inferior hypk. EF 68%. Neg EKG on pharm stress test.    Carotid duplex 08/26/2016    Mild <50% CYRUS stenosis. Chronic LICA occlusion. Similar to study of 3/29/16.     Carotid stenosis ([facility])     w/ known total occlusion of lt internal carotid artery; followed by pts vascular surgeon    Chronic kidney disease     COPD [facility] disease) ([facility] [address]     Dementia     Depression     Diabetes ([facility] [address]     type 2    Dyslipidemia     on Crestor; managed by pts PCP    Dyspnea     Heart disease     Hypercholesterolemia     Hypertension     Low back pain     Osteoarthrosis involving multiple sites     Skin cancer (HCC)     squamous cell lesions of the skin    Stroke ([facility] [address]     Venous (peripheral) insufficiency       Patient taking anticoagulants yes    Patient has a defibrillator: no    History of shots NO for example, flu, pneumonia, tetanus   Isolation History YES for example, MRSA, CDiff    ASSESSMENT:  Changes in Assessment Throughout Shift: NONE  Significant Changes in 24 hours (for example, RR/code, fall)  Patient has Central Line: no Reasons if yes:   Patient has Kim Cath: no Reasons if yes: Mobility Issues  PT  IV Patency  OR Checklist  Pending Tests    Last Vitals:  Vitals w/ MEWS Score (last day)     Date/Time MEWS Score Pulse Resp Temp BP Level of Consciousness SpO2    03/24/18 1613 1 70 18 96.9 °F (36.1 °C) 134/68 Alert 94 %    03/24/18 1240 1 68 18 96.6 °F (35.9 °C) 130/53 Alert 96 %    03/24/18 0714 1 84 18 97 °F (36.1 °C) 147/72 Alert 96 %    03/24/18 0400 1 67 18 97.4 °F (36.3 °C) 145/74 Alert 96 %    03/24/18 0000 1 84 18 97.6 °F (36.4 °C) 137/74 Alert 96 %    03/23/18 2000 1 72 18 96.5 °F (35.8 °C) 116/61 Alert 96 %    03/23/18 1724 1 75 18 98.3 °F (36.8 °C) 110/59 Alert --    03/23/18 1107 1 74 20 96.3 °F (35.7 °C) 129/69 Alert 91 %    03/23/18 0800 1 78 20 97 °F (36.1 °C) 132/75 Alert 96 %    03/23/18 0423 2 82 22 99.1 °F (37.3 °C) 120/69 Alert 94 %    03/23/18 0105 -- 83 24 -- 132/70 Alert 91 %            PAIN    Pain Assessment    Pain Intensity 1: 0 (03/24/18 0821)              Patient Stated Pain Goal: 0  Intervention effective: yes  Time of last intervention: No stated pain.  Reassessment Completed: yes   Other actions taken for pain:     Last 3 Weights:  Last 3 Recorded Weights in this Encounter    03/22/18 2100   Weight: 73 kg (160 lb 15 oz)   Weight change:     INTAKE/OUPUT    Current Shift:      Last three shifts: 03/23 0701 - 03/24 1900  In: 1360 [P.O.:1360]  Out: 501 [Urine:501]    RECOMMENDATIONS AND DISCHARGE PLANNING  Patient needs and requests: none    Pending tests/procedures: labs     Discharge plan for patient: TBD    Discharge planning Needs or Barriers: None    Estimated Discharge Date: TBD Posted on Whiteboard in Patients Room: yes       \"HEALS\" SAFETY CHECK  A safety check occurred in the patient's room between off going nurse and oncoming nurse listed above. The safety check included the below items:    H  High Alert Medications Verify all high alert medication drips (heparin, PCA, etc.)  E  Equipment Suction is set up for ALL patients (with zafar)  Red plugs utilized for all equipment (IV pumps, etc.)  WOWs wiped down at end of shift. Room stocked with oxygen, suction, and other unit-specific supplies  A  Alarms Bed alarm is set for fall risk patients  Ensure chair alarm is in place and activated if patient is up in a chair  L  Lines Check IV for any infiltration  Kim bag is empty if patient has a Kim   Tubing and IV bags are labeled  S  Safety  Room is clean, patient is clean, and equipment is clean. Hallways are clear from equipment besides carts. Fall bracelet on for fall risk patients  Ensure room is clear and free of clutter  Suction is set up for ALL patients (with zafar)  Hallways are clear from equipment besides carts.    Isolation precautions followed, supplies available outside room, sign posted    Smiley Benedict RN

## 2018-03-24 NOTE — ROUTINE PROCESS
Bedside and Verbal shift change report given to GE Watkins (oncoming nurse) by Zack Hart RN (offgoing nurse). Report included the following information SBAR, Kardex, MAR and Recent Results. SITUATION:  Code Status: Full Code  Reason for Admission: Flu  Hospital day: 2  Problem List:       Hospital Problems  Date Reviewed: 9/29/2017          Codes Class Noted POA    * (Principal)Flu ICD-10-CM: J11.1  ICD-9-CM: 487.1  3/22/2018 Yes        Wheezing ICD-10-CM: R06.2  ICD-9-CM: 786.07  3/22/2018 Yes        COPD (chronic obstructive pulmonary disease) (HCC) (Chronic) ICD-10-CM: J44.9  ICD-9-CM: 137  8/29/2016 Yes        Type 2 diabetes mellitus with stage 3 chronic kidney disease (HCC) ICD-10-CM: E11.22, N18.3  ICD-9-CM: 250.40, 585.3  7/20/2016 Yes              BACKGROUND:   Past Medical History:   Past Medical History:   Diagnosis Date    Angina     Anxiety     Arthritis     CAD (coronary artery disease)     s/p drug-eluting stents to RCA for high-grade stenoses in 2/09    Cardiac catheterization 08/24/2016    Mod calcification. Co-dom. oLM 50-70%. LAD 30%. Dx 30%. Cx 70% focal.  OM 80% focal.  RCA 30%.  Cardiac echocardiogram 08/23/2016    Tech difficult. EF 55-60%. No WMA. Normal diastolic fx. Lipomatous septal hypertrophy.  Cardiac nuclear imaging test, abnormal 08/23/2016    Sm reversible inferior defect concerning for ischemia. Mild inferior hypk. EF 68%. Neg EKG on pharm stress test.    Carotid duplex 08/26/2016    Mild <50% CYRUS stenosis. Chronic LICA occlusion. Similar to study of 3/29/16.     Carotid stenosis ([facility])     w/ known total occlusion of lt internal carotid artery; followed by pts vascular surgeon    Chronic kidney disease     COPD (chronic obstructive [facility] disease) [facility] [address]     Dementia     Depression     Diabetes ([facility] [address]     type 2    Dyslipidemia     on Crestor; managed by pts PCP    Dyspnea     Heart disease     Hypercholesterolemia     Hypertension     Low back pain     Osteoarthrosis involving multiple sites     Skin cancer (HCC)     squamous cell lesions of the skin    Stroke ([facility] [address]     Venous (peripheral) insufficiency       Patient taking anticoagulants yes    Patient has a defibrillator: no    History of shots NO for example, flu, pneumonia, tetanus   Isolation History YES for example, MRSA, CDiff    ASSESSMENT:  Changes in Assessment Throughout Shift: NONE  Significant Changes in 24 hours (for example, RR/code, fall)  Patient has Central Line: no Reasons if yes:   Patient has Kim Cath: no Reasons if yes: Mobility Issues  PT  IV Patency  OR Checklist  Pending Tests    Last Vitals:  Vitals w/ MEWS Score (last day)     Date/Time MEWS Score Pulse Resp Temp BP Level of Consciousness SpO2    03/24/18 0714 1 84 18 97 °F (36.1 °C) 147/72 Alert 96 %    03/24/18 0400 1 67 18 97.4 °F (36.3 °C) 145/74 Alert 96 %    03/24/18 0000 1 84 18 97.6 °F (36.4 °C) 137/74 Alert 96 %    03/23/18 2000 1 72 18 96.5 °F (35.8 °C) 116/61 Alert 96 %    03/23/18 1724 1 75 18 98.3 °F (36.8 °C) 110/59 Alert --    03/23/18 1107 1 74 20 96.3 °F (35.7 °C) 129/69 Alert 91 %    03/23/18 0800 1 78 20 97 °F (36.1 °C) 132/75 Alert 96 %    03/23/18 0423 2 82 22 99.1 °F (37.3 °C) 120/69 Alert 94 %    03/23/18 0105 -- 83 24 -- 132/70 Alert 91 %            PAIN    Pain Assessment    Pain Intensity 1: 0 (03/24/18 0400)              Patient Stated Pain Goal: 0  Intervention effective: yes  Time of last intervention: No stated pain.  Reassessment Completed: yes   Other actions taken for pain:     Last 3 Weights:  Last 3 Recorded Weights in this Encounter    03/22/18 2100   Weight: 73 kg (160 lb 15 oz)   Weight change:     INTAKE/OUPUT    Current Shift:      Last three shifts: 03/22 1901 - 03/24 0700  In: 400 [P.O.:400]  Out: 500 [Urine:500]    RECOMMENDATIONS AND DISCHARGE PLANNING  Patient needs and requests: none    Pending tests/procedures: labs     Discharge plan for patient: TBD    Discharge planning Needs or Barriers: None    Estimated Discharge Date: TBD Posted on Whiteboard in Patients Room: yes       \"HEALS\" SAFETY CHECK  A safety check occurred in the patient's room between off going nurse and oncoming nurse listed above. The safety check included the below items:    H  High Alert Medications Verify all high alert medication drips (heparin, PCA, etc.)  E  Equipment Suction is set up for ALL patients (with zafar)  Red plugs utilized for all equipment (IV pumps, etc.)  WOWs wiped down at end of shift. Room stocked with oxygen, suction, and other unit-specific supplies  A  Alarms Bed alarm is set for fall risk patients  Ensure chair alarm is in place and activated if patient is up in a chair  L  Lines Check IV for any infiltration  Kim bag is empty if patient has a Kim   Tubing and IV bags are labeled  S  Safety  Room is clean, patient is clean, and equipment is clean. Hallways are clear from equipment besides carts. Fall bracelet on for fall risk patients  Ensure room is clear and free of clutter  Suction is set up for ALL patients (with zafar)  Hallways are clear from equipment besides carts.    Isolation precautions followed, supplies available outside room, sign posted    Reza Lindquist RN

## 2018-03-24 NOTE — PROGRESS NOTES
[facility]ist [facility]  Progress Note    Patient: Rachael Covarrubias Age: [de-identified] y.o. : 1935 MR#: [id number] SSN: [id number]  Date: 3/24/2018     Subjective:     Pt states he feels well. Denies any chest pain, SOB, abd pain, N/V/D/C. D/w Spouse Ms. Dunne at [phone], updated on PT recs for SNF. She would like CM to call cell # [phone] regarding SNF options. Assessment/Plan:   1. Influenza B positive: cont Tamiflu. 2. COPD with acute exacerbation: bronchodilators prn, switch IV steroids to PO. Continue O2 supplementation and wean as able. 3. Type 2 diabetes mellitus with hyperglycemia: diabetic mgt consult, increase Lantus to 38 units. Cont SSI. 4. Mechanical Fall: CT head with no acute intracranial findings, CT cerv spine also neg for acute findings. 5. Acute on CRF stage 3: s/p gentle hydration, start low dose Lasix  6. Dementia: monitor. 7. Coronary artery disease with 2- vessel disease s/p CABG x 2  on 2016: check echo. Reviewed previous echo. 8. Hypertension: cont. BP meds  9. Dyslipidemia: cont statin  10. Possible bacterial conjunctivitis: start Cipro eye drops for 5 days. 11. Leukocytosis: likely d/t steroids. Monitor. IV steroids is dc'd. Goals of care:  Full code  Disposition:  [x]PT/OT ordered   [x] Case management referral.   PT recs SNF, CM is consult    Case discussed with:  [x]Patient  []Family  [x]Nursing  [x]Case Management  DVT Prophylaxis:  []Lovenox  [x]Hep SQ  []SCDs  []Coumadin   []On Heparin gtt    Objective:   VS:   Visit Vitals    /53 (BP 1 Location: Left arm, BP Patient Position: At rest)    Pulse 68    Temp 96.6 °F (35.9 °C)    Resp 18    Ht 5' 10.08\" (1.78 m)  Comment: bmi    Wt 73 kg (160 lb 15 oz)    SpO2 96%    BMI 23.04 kg/m2      Tmax/24hrs: Temp (24hrs), Av.2 °F (36.2 °C), Min:96.5 °F (35.8 °C), Max:98.3 °F (36.8 °C)    Intake/Output Summary (Last 24 hours) at 18 1512  Last data filed at 03/24/18 0925   Gross per 24 hour   Intake              240 ml   Output              500 ml   Net             -260 ml       General:  Awake, alert, NAD  Cardiovascular:  RRR  Pulmonary: CTA  GI:  NT, normal BS  Extremities:  No edema or cyanosis  Eye: chronic red loose eyelids, small purulent fluid over eyelashes and eye    Labs:    Recent Results (from the past 24 hour(s))   GLUCOSE, POC    Collection Time: 03/23/18  5:22 PM   Result Value Ref Range    Glucose (POC) 327 (H) 70 - 110 mg/dL   GLUCOSE, POC    Collection Time: 03/23/18  9:43 PM   Result Value Ref Range    Glucose (POC) 222 (H) 70 - 211 mg/dL   METABOLIC PANEL, BASIC    Collection Time: 03/24/18  1:55 AM   Result Value Ref Range    Sodium 139 136 - 145 mmol/L    Potassium 4.0 3.5 - 5.5 mmol/L    Chloride 104 100 - 108 mmol/L    CO2 27 21 - 32 mmol/L    Anion gap 8 3.0 - 18 mmol/L    Glucose 237 (H) 74 - 99 mg/dL    BUN 39 (H) 7.0 - 18 MG/DL    Creatinine 1.47 (H) 0.6 - 1.3 MG/DL    BUN/Creatinine ratio 27 (H) 12 - 20      GFR est AA 56 (L) >60 ml/min/1.73m2    GFR est non-AA 46 (L) >60 ml/min/1.73m2    Calcium 8.3 (L) 8.5 - 10.1 MG/DL   CBC W/O DIFF    Collection Time: 03/24/18  1:55 AM   Result Value Ref Range    WBC 20.7 (H) 4.6 - 13.2 K/uL    RBC 5.60 (H) 4.70 - 5.50 M/uL    HGB 15.6 13.0 - 16.0 g/dL    HCT 46.0 36.0 - 48.0 %    MCV 82.1 74.0 - 97.0 FL    MCH 27.9 24.0 - 34.0 PG    MCHC 33.9 31.0 - 37.0 g/dL    RDW 16.0 (H) 11.6 - 14.5 %    PLATELET 430 426 - 275 K/uL    MPV 11.0 9.2 - 11.8 FL   GLUCOSE, POC    Collection Time: 03/24/18  7:20 AM   Result Value Ref Range    Glucose (POC) 285 (H) 70 - 110 mg/dL   GLUCOSE, POC    Collection Time: 03/24/18 12:45 PM   Result Value Ref Range    Glucose (POC) 427 (HH) 70 - 110 mg/dL       Signed By: Blayne Covarrubias PA-C     March 24, 2018 3:12 PM

## 2018-03-24 NOTE — PROGRESS NOTES
Assumed care; Pt resting in recliner; no distress noted; Pt denies pain; Chair alarm on; Call light and personal belonging within reach. Will continue to monitor. 0000: Pt returned to bed; Bed alarm on; Bed in low position; Call light and personal belonging within reach.  Will continue to monitor

## 2018-03-24 NOTE — PROGRESS NOTES
03/24/18 1200   RT Walking Oximetry   Stage Resting (Room Air)   SpO2 93 %   HR 88 bpm   Rate of Dyspnea 0   O2 Device None (Room air)   Walk/Assistive Device Halie Khan Walked in Feet (ft) 50 ft   Comments pt tolerated well

## 2018-03-24 NOTE — PROGRESS NOTES
Problem: Falls - Risk of  Goal: *Absence of Falls  Document Pan Fall Risk and appropriate interventions in the flowsheet.    Outcome: Progressing Towards Goal  Fall Risk Interventions:  Mobility Interventions: Patient to call before getting OOB, Bed/chair exit alarm    Mentation Interventions: Adequate sleep, hydration, pain control, Bed/chair exit alarm, More frequent rounding, Reorient patient, Toileting rounds    Medication Interventions: Patient to call before getting OOB, Teach patient to arise slowly    Elimination Interventions: Call light in reach    History of Falls Interventions: Room close to nurse's station, Door open when patient unattended

## 2018-03-25 LAB
ANION GAP SERPL CALC-SCNC: 10 MMOL/L (ref 3–18)
BUN SERPL-MCNC: 34 MG/DL (ref 7–18)
BUN/CREAT SERPL: 26 (ref 12–20)
CALCIUM SERPL-MCNC: 8.3 MG/DL (ref 8.5–10.1)
CHLORIDE SERPL-SCNC: 103 MMOL/L (ref 100–108)
CO2 SERPL-SCNC: 25 MMOL/L (ref 21–32)
CREAT SERPL-MCNC: 1.32 MG/DL (ref 0.6–1.3)
ERYTHROCYTE [DISTWIDTH] IN BLOOD BY AUTOMATED COUNT: 16 % (ref 11.6–14.5)
GLUCOSE BLD STRIP.AUTO-MCNC: 176 MG/DL (ref 70–110)
GLUCOSE BLD STRIP.AUTO-MCNC: 268 MG/DL (ref 70–110)
GLUCOSE BLD STRIP.AUTO-MCNC: 293 MG/DL (ref 70–110)
GLUCOSE BLD STRIP.AUTO-MCNC: 338 MG/DL (ref 70–110)
GLUCOSE SERPL-MCNC: 237 MG/DL (ref 74–99)
HCT VFR BLD AUTO: 48.3 % (ref 36–48)
HGB BLD-MCNC: 15.8 G/DL (ref 13–16)
MCH RBC QN AUTO: 27.4 PG (ref 24–34)
MCHC RBC AUTO-ENTMCNC: 32.7 G/DL (ref 31–37)
MCV RBC AUTO: 83.7 FL (ref 74–97)
PLATELET # BLD AUTO: 249 K/UL (ref 135–420)
PMV BLD AUTO: 11.1 FL (ref 9.2–11.8)
POTASSIUM SERPL-SCNC: 4 MMOL/L (ref 3.5–5.5)
RBC # BLD AUTO: 5.77 M/UL (ref 4.7–5.5)
SODIUM SERPL-SCNC: 138 MMOL/L (ref 136–145)
WBC # BLD AUTO: 19.2 K/UL (ref 4.6–13.2)

## 2018-03-25 PROCEDURE — 74011250637 HC RX REV CODE- 250/637: Performed by: EMERGENCY MEDICINE

## 2018-03-25 PROCEDURE — 74011000250 HC RX REV CODE- 250: Performed by: INTERNAL MEDICINE

## 2018-03-25 PROCEDURE — 74011250636 HC RX REV CODE- 250/636: Performed by: INTERNAL MEDICINE

## 2018-03-25 PROCEDURE — 36415 COLL VENOUS BLD VENIPUNCTURE: CPT | Performed by: PHYSICIAN ASSISTANT

## 2018-03-25 PROCEDURE — 74011250637 HC RX REV CODE- 250/637: Performed by: PHYSICIAN ASSISTANT

## 2018-03-25 PROCEDURE — 65270000029 HC RM PRIVATE

## 2018-03-25 PROCEDURE — 97165 OT EVAL LOW COMPLEX 30 MIN: CPT

## 2018-03-25 PROCEDURE — 97530 THERAPEUTIC ACTIVITIES: CPT

## 2018-03-25 PROCEDURE — 85027 COMPLETE CBC AUTOMATED: CPT | Performed by: PHYSICIAN ASSISTANT

## 2018-03-25 PROCEDURE — 82962 GLUCOSE BLOOD TEST: CPT

## 2018-03-25 PROCEDURE — 74011636637 HC RX REV CODE- 636/637: Performed by: FAMILY MEDICINE

## 2018-03-25 PROCEDURE — 74011636637 HC RX REV CODE- 636/637: Performed by: PHYSICIAN ASSISTANT

## 2018-03-25 PROCEDURE — 97116 GAIT TRAINING THERAPY: CPT

## 2018-03-25 PROCEDURE — 74011250637 HC RX REV CODE- 250/637: Performed by: INTERNAL MEDICINE

## 2018-03-25 PROCEDURE — 80048 BASIC METABOLIC PNL TOTAL CA: CPT | Performed by: PHYSICIAN ASSISTANT

## 2018-03-25 RX ORDER — GUAIFENESIN 100 MG/5ML
100 SOLUTION ORAL
Status: DISCONTINUED | OUTPATIENT
Start: 2018-03-25 | End: 2018-03-27 | Stop reason: HOSPADM

## 2018-03-25 RX ORDER — INSULIN GLARGINE 100 [IU]/ML
41 INJECTION, SOLUTION SUBCUTANEOUS
Status: DISCONTINUED | OUTPATIENT
Start: 2018-03-25 | End: 2018-03-26

## 2018-03-25 RX ADMIN — CIPROFLOXACIN HYDROCHLORIDE 1 DROP: 3 SOLUTION/ DROPS OPHTHALMIC at 05:56

## 2018-03-25 RX ADMIN — OSELTAMIVIR PHOSPHATE 30 MG: 30 CAPSULE ORAL at 08:14

## 2018-03-25 RX ADMIN — HEPARIN SODIUM 5000 UNITS: 5000 INJECTION, SOLUTION INTRAVENOUS; SUBCUTANEOUS at 21:34

## 2018-03-25 RX ADMIN — CIPROFLOXACIN HYDROCHLORIDE 1 DROP: 3 SOLUTION/ DROPS OPHTHALMIC at 19:02

## 2018-03-25 RX ADMIN — ISOSORBIDE MONONITRATE 30 MG: 30 TABLET, EXTENDED RELEASE ORAL at 08:15

## 2018-03-25 RX ADMIN — OSELTAMIVIR PHOSPHATE 30 MG: 30 CAPSULE ORAL at 17:31

## 2018-03-25 RX ADMIN — INSULIN GLARGINE 41 UNITS: 100 INJECTION, SOLUTION SUBCUTANEOUS at 21:37

## 2018-03-25 RX ADMIN — METOPROLOL SUCCINATE 50 MG: 50 TABLET, EXTENDED RELEASE ORAL at 08:14

## 2018-03-25 RX ADMIN — INSULIN LISPRO 12 UNITS: 100 INJECTION, SOLUTION INTRAVENOUS; SUBCUTANEOUS at 15:33

## 2018-03-25 RX ADMIN — FUROSEMIDE 20 MG: 20 TABLET ORAL at 08:14

## 2018-03-25 RX ADMIN — INSULIN LISPRO 9 UNITS: 100 INJECTION, SOLUTION INTRAVENOUS; SUBCUTANEOUS at 11:49

## 2018-03-25 RX ADMIN — ROSUVASTATIN CALCIUM 10 MG: 10 TABLET, FILM COATED ORAL at 21:35

## 2018-03-25 RX ADMIN — CIPROFLOXACIN HYDROCHLORIDE 1 DROP: 3 SOLUTION/ DROPS OPHTHALMIC at 08:21

## 2018-03-25 RX ADMIN — PREDNISONE 40 MG: 20 TABLET ORAL at 08:14

## 2018-03-25 RX ADMIN — HEPARIN SODIUM 5000 UNITS: 5000 INJECTION, SOLUTION INTRAVENOUS; SUBCUTANEOUS at 14:37

## 2018-03-25 RX ADMIN — INSULIN LISPRO 9 UNITS: 100 INJECTION, SOLUTION INTRAVENOUS; SUBCUTANEOUS at 21:38

## 2018-03-25 RX ADMIN — ASPIRIN 81 MG: 81 TABLET, COATED ORAL at 08:14

## 2018-03-25 RX ADMIN — CIPROFLOXACIN HYDROCHLORIDE 1 DROP: 3 SOLUTION/ DROPS OPHTHALMIC at 15:17

## 2018-03-25 RX ADMIN — INSULIN LISPRO 3 UNITS: 100 INJECTION, SOLUTION INTRAVENOUS; SUBCUTANEOUS at 08:15

## 2018-03-25 RX ADMIN — HEPARIN SODIUM 5000 UNITS: 5000 INJECTION, SOLUTION INTRAVENOUS; SUBCUTANEOUS at 05:56

## 2018-03-25 RX ADMIN — GUAIFENESIN 100 MG: 200 SOLUTION ORAL at 15:11

## 2018-03-25 RX ADMIN — GABAPENTIN 300 MG: 300 CAPSULE ORAL at 08:14

## 2018-03-25 RX ADMIN — IPRATROPIUM BROMIDE AND ALBUTEROL SULFATE 3 ML: .5; 3 SOLUTION RESPIRATORY (INHALATION) at 06:03

## 2018-03-25 RX ADMIN — SOLIFENACIN SUCCINATE 5 MG: 5 TABLET, FILM COATED ORAL at 08:14

## 2018-03-25 NOTE — ROUTINE PROCESS
Bedside and Verbal shift change report given to Jacklyn CAROLINA (oncoming nurse) by Foster Ross RN (offgoing nurse). Report included the following information SBAR, Kardex, MAR and Recent Results. SITUATION:  Code Status: Full Code  Reason for Admission: Flu  Hospital day: 3  Problem List:       Hospital Problems  Date Reviewed: 9/29/2017          Codes Class Noted POA    * (Principal)Flu ICD-10-CM: J11.1  ICD-9-CM: 487.1  3/22/2018 Yes        Wheezing ICD-10-CM: R06.2  ICD-9-CM: 786.07  3/22/2018 Yes        COPD (chronic obstructive pulmonary disease) (HCC) (Chronic) ICD-10-CM: J44.9  ICD-9-CM: 428  8/29/2016 Yes        Type 2 diabetes mellitus with stage 3 chronic kidney disease (HCC) ICD-10-CM: E11.22, N18.3  ICD-9-CM: 250.40, 585.3  7/20/2016 Yes              BACKGROUND:   Past Medical History:   Past Medical History:   Diagnosis Date    Angina     Anxiety     Arthritis     CAD (coronary artery disease)     s/p drug-eluting stents to RCA for high-grade stenoses in 2/09    Cardiac catheterization 08/24/2016    Mod calcification. Co-dom. oLM 50-70%. LAD 30%. Dx 30%. Cx 70% focal.  OM 80% focal.  RCA 30%.  Cardiac echocardiogram 08/23/2016    Tech difficult. EF 55-60%. No WMA. Normal diastolic fx. Lipomatous septal hypertrophy.  Cardiac nuclear imaging test, abnormal 08/23/2016    Sm reversible inferior defect concerning for ischemia. Mild inferior hypk. EF 68%. Neg EKG on pharm stress test.    Carotid duplex 08/26/2016    Mild <50% CYRUS stenosis. Chronic LICA occlusion. Similar to study of 3/29/16.     Carot[facility] stenosis [facility]     w/ known total occlusion of lt internal carotid artery; followed by pts vascular surgeon    Chronic kidney disease     [facility]) [facility] [address]     Dementia     Depression     Diabetes ([facility] [address]     type 2    Dyslipidemia     on Crestor; managed by pts PCP    Dyspnea     Heart disease     Hypercholesterolemia     Hypertension     Low back pain     Osteoarthrosis involving multiple sites     Skin cancer (HCC)     squamous cell lesions of the skin    Stroke ([facility] [address]     Venous (peripheral) insufficiency       Patient taking anticoagulants yes    Patient has a defibrillator: no    History of shots NO for example, flu, pneumonia, tetanus   Isolation History YES for example, MRSA, CDiff    ASSESSMENT:  Changes in Assessment Throughout Shift: NONE  Significant Changes in 24 hours (for example, RR/code, fall)  Patient has Central Line: no Reasons if yes:   Patient has Kim Cath: no Reasons if yes: Mobility Issues  PT  IV Patency  OR Checklist  Pending Tests    Last Vitals:  Vitals w/ MEWS Score (last day)     Date/Time MEWS Score Pulse Resp Temp BP Level of Consciousness SpO2    03/25/18 1615 1 69 18 96.9 °F (36.1 °C) 151/81 Alert 95 %    03/25/18 1116 1 61 18 97 °F (36.1 °C) 118/65 Alert 95 %    03/25/18 0734 1 60 18 96.9 °F (36.1 °C) 162/78 Alert 94 %    03/25/18 0400 1 69 18 97.6 °F (36.4 °C) 150/75 Alert 96 %    03/24/18 2350 1 64 18 97 °F (36.1 °C) 151/70 Alert 96 %    03/24/18 2000 1 69 18 97.4 °F (36.3 °C) 156/71 Alert 95 %    03/24/18 1613 1 70 18 96.9 °F (36.1 °C) 134/68 Alert 94 %    03/24/18 1240 1 68 18 96.6 °F (35.9 °C) 130/53 Alert 96 %    03/24/18 0714 1 84 18 97 °F (36.1 °C) 147/72 Alert 96 %    03/24/18 0400 1 67 18 97.4 °F (36.3 °C) 145/74 Alert 96 %    03/24/18 0000 1 84 18 97.6 °F (36.4 °C) 137/74 Alert 96 %            PAIN    Pain Assessment    Pain Intensity 1: 0 (03/25/18 1615)              Patient Stated Pain Goal: 0  Intervention effective: yes  Time of last intervention: No stated pain.  Reassessment Completed: yes   Other actions taken for pain:     Last 3 Weights:  Last 3 Recorded Weights in this Encounter    03/22/18 2100   Weight: 73 kg (160 lb 15 oz)   Weight change:     INTAKE/OUPUT    Current Shift:      Last three shifts: 03/24 0701 - 03/25 1900  In: 2000 [P.O.:2000]  Out: 951 [Urine:951]    RECOMMENDATIONS AND DISCHARGE PLANNING  Patient needs and requests: none    Pending tests/procedures: labs     Discharge plan for patient: TBD    Discharge planning Needs or Barriers: None    Estimated Discharge Date: TBD Posted on Whiteboard in Patients Room: yes       \"HEALS\" SAFETY CHECK  A safety check occurred in the patient's room between off going nurse and oncoming nurse listed above. The safety check included the below items:    H  High Alert Medications Verify all high alert medication drips (heparin, PCA, etc.)  E  Equipment Suction is set up for ALL patients (with zafar)  Red plugs utilized for all equipment (IV pumps, etc.)  WOWs wiped down at end of shift. Room stocked with oxygen, suction, and other unit-specific supplies  A  Alarms Bed alarm is set for fall risk patients  Ensure chair alarm is in place and activated if patient is up in a chair  L  Lines Check IV for any infiltration  Kim bag is empty if patient has a Kim   Tubing and IV bags are labeled  S  Safety  Room is clean, patient is clean, and equipment is clean. Hallways are clear from equipment besides carts. Fall bracelet on for fall risk patients  Ensure room is clear and free of clutter  Suction is set up for ALL patients (with zafar)  Hallways are clear from equipment besides carts.    Isolation precautions followed, supplies available outside room, sign posted    Foster Ross RN

## 2018-03-25 NOTE — PROGRESS NOTES
Problem: Mobility Impaired (Adult and Pediatric)  Goal: *Acute Goals and Plan of Care (Insert Text)  Physical Therapy Goals  Initiated 3/24/2018 and to be accomplished within 5-7 day(s)  1. Patient will move from supine to sit and sit to supine  in bed with supervision/set-up. 2.  Patient will transfer from bed to chair and chair to bed with supervision/set-up using the least restrictive device. 3.  Patient will perform sit to stand with supervision/set-up. 4.  Patient will ambulate with supervision/set-up for 75 feet with the least restrictive device. 5.  Patient will ascend/descend 3-4 stairs with 1-2 handrail(s) with minimal assistance/contact guard assist.   physical Therapy TREATMENT    Patient: Georgiana Romero (9 y.o. male)  Date: 3/25/2018  Diagnosis: Flu Flu       Precautions: Fall, Skin  Chart, physical therapy assessment, plan of care and goals were reviewed. ASSESSMENT:  Pt cleared with RN to participate in session. Pt with droplet precautions due to influenza, limited mobility at this time. Pt agreeable to session, ambulating with supervision in room with several 180 turns without LOB x50 feet with RW. Able to perform bed mobility with Marguerite and sit to stand with supervision x2. Pt return to sitting in recliner with chair alarm activated. Pt reported at baseline activity and assistance level though this PT unsure of reliability of patient. Pt cognition and mental status limit his independence at this time. He exhibits poor safety awareness with increased falls in the last several months. Pt left with all needs met and call bell in reach. Progression toward goals:  [x]      Improving appropriately and progressing toward goals  []      Improving slowly and progressing toward goals  []      Not making progress toward goals and plan of care will be adjusted     PLAN:  Patient continues to benefit from skilled intervention to address the above impairments.   Continue treatment per established plan of care. Discharge Recommendations:  Assisted Living  Further Equipment Recommendations for Discharge:  Has [address] and rollator      G-CODES:       SUBJECTIVE:   Patient stated I think they were trying to give me pills to knock me out, I had the police called, but everything was dropped.     OBJECTIVE DATA SUMMARY:   Critical Behavior:  Neurologic State: Alert  Orientation Level: Oriented X4  Cognition: Follows commands  Safety/Judgement: Fall prevention  Functional Mobility Training:  Bed Mobility:  Rolling: Independent  Supine to Sit: Modified independent  Sit to Supine: Modified independent            Transfers:  Sit to Stand: Supervision  Stand to Sit: Supervision                             Balance:  Sitting: Intact  Standing: Impaired; Without support  Standing - Static: Good  Standing - Dynamic : Fair  Ambulation/Gait Training:  Distance (ft): 50 Feet (ft)  Assistive Device: Walker, rolling  Ambulation - Level of Assistance: Supervision                       Speed/Chanel: Slow                      Stairs:               Pain:  Pt reports 0/10 pain or discomfort prior to treatment.    Pt reports 0/10 pain or discomfort post treatment. Activity Tolerance:   Good activity tolerance without complaints of fatigue with ambulation in room though increased coughing throughout session  Please refer to the flowsheet for vital signs taken during this treatment. After treatment:   [x] Patient left in no apparent distress sitting up in chair  [] Patient left in no apparent distress in bed  [x] Call bell left within reach  [x] Nursing notified  [] Caregiver present  [x] Chair alarm activated      J Luis Angeles, PT   Time Calculation: 28 mins   Mobility  Current  CI= 1-19%   Goal  CH= 0%. The severity rating is based on the Level of Assistance required for Functional Mobility and ADLs.

## 2018-03-25 NOTE — PROGRESS NOTES
Problem: Self Care Deficits Care Plan (Adult)  Goal: *Acute Goals and Plan of Care (Insert Text)  Outcome: Resolved/Met Date Met: 03/25/18  Occupational Therapy EVALUATION/discharge    Patient: Adolfo Patel (85 y.o. male)  Date: 3/25/2018  Primary Diagnosis: Flu        Precautions:   Fall, Skin    ASSESSMENT AND RECOMMENDATIONS:  Based on the objective data described below, the patient presents with SBA-MOD (I) independent level ADL with fww. Pt describes this to be near baseline level with increased fatigue due to feeling poorly only. Pt was oriented x4, and stated he does not have ADL assist at the [facility] with IADL assist only. Recommend return to this level of assist with initial supervision increase for bathing. Skilled occupational therapy is not indicated at this time. Discharge Recommendations: None  Further Equipment Recommendations for Discharge: N/A      Barriers to Learning/Limitations: None  Compensate with: visual, verbal, tactile, kinesthetic cues/model     COMPLEXITY     Eval Complexity: History: LOW Complexity : Brief history review ; Examination: LOW Complexity : 1-3 performance deficits relating to physical, cognitive , or psychosocial skils that result in activity limitations and / or participation restrictions ; Decision Making:LOW Complexity : No comorbidities that affect functional and no verbal or physical assistance needed to complete eval tasks  Assessment: low Complexity        G-CODES:     Self Care  Current  CI= 1-19%   Goal  CI= 1-19%   D/C  CI= 1-19%. The severity rating is based on the Level of Assistance required for Functional Mobility and ADLs. SUBJECTIVE:   Patient stated I didn't get a lot of sleep.     OBJECTIVE DATA SUMMARY:     Past Medical History:   Diagnosis Date    Angina     Anxiety     Arthritis     CAD (coronary artery disease)     s/p drug-eluting stents to RCA for high-grade stenoses in 2/09    Cardiac catheterization 08/24/2016    Mod calcification. Co-dom. oLM 50-70%. LAD 30%. Dx 30%. Cx 70% focal.  OM 80% focal.  RCA 30%.  Cardiac echocardiogram 08/23/2016    Tech difficult. EF 55-60%. No WMA. Normal diastolic fx. Lipomatous septal hypertrophy.  Cardiac nuclear imaging test, abnormal 08/23/2016    Sm reversible inferior defect concerning for ischemia. Mild inferior hypk. EF 68%. Neg EKG on pharm stress test.    Carotid duplex 08/26/2016    Mild <50% CYRUS stenosis. Chronic LICA occlusion. Similar to study of 3/29/16.     Carotid stenosis (HCC)     w/ known total occlusion of lt internal carotid artery; followed by pts vascular surgeon    Chronic kidney disease     COPD (chronic obstructive pulmonary disease) ([address]     Dementia     Depression     Diabetes ([address]     type 2    Dyslipidemia     on Crestor; managed by pts PCP    Dyspnea     Heart disease     Hypercholesterolemia     Hypertension     Low back pain     Osteoarthrosis involving multiple sites     Skin cancer ([address]     squamous cell lesions of the skin    Stroke ([address]     Venous (peripheral) insufficiency      Past Surgical History:   Procedure Laterality Date    CARDIAC CATHETERIZATION  8/24/2016         CORONARY ARTERY ANGIOGRAM  8/24/2016         HX ANGIOPLASTY  2009    2 stents placed and heart attack during the procedure    HX COLONOSCOPY  10/2003    neg; declines f/u    HX CORONARY ARTERY BYPASS GRAFT  09/2016    HX HEART CATHETERIZATION  5/2013    HX HERNIA REPAIR      1970s    HX OTHER SURGICAL      groin surgery    HX TONSIL AND ADENOIDECTOMY      IR INTRAVASCULAR ULTRASOUND INITIAL  8/24/2016         LV ANGIOGRAPHY  8/24/2016          Prior Level of Function/Home Situation: mod (I) SPC  Home Situation  Home Environment: Assisted living  One/Two Story Residence: One story  Living Alone: No  Support Systems: Family member(s)  Patient Expects to be Discharged to[de-identified] Skilled nursing facility  Current DME Used/Available at Home: [address] [facility] [place], [place]  Tub or Shower Type: Tub/Shower combination  []     Right hand dominant   []     Left hand dominant  Cognitive/Behavioral Status:  Neurologic State: Alert  Orientation Level: Oriented X4  Cognition: Follows commands       Skin: no noted concern    Edema: no noted concern    Vision/Perceptual:    Tracking:  (no noted concern)                                Coordination:  Coordination: Within functional limits (BUE)            Balance:   good with fww    Strength:    Strength: Within functional limits (BUE)                Tone & Sensation:    Tone: Normal (BUE)  Sensation: Intact (BUE)                      Range of Motion:    AROM: Within functional limits (BUE)                         Functional Mobility and Transfers for ADLs:  Bed Mobility:              Transfers: Toilet Transfer : Supervision                ADL Assessment:  Feeding: Independent    Oral Facial Hygiene/Grooming: Independent    Bathing: Supervision    Upper Body Dressing: Independent    Lower Body Dressing: Independent    Toileting: Independent                ADL Intervention:        mod (I)- supervision level       Pain:  Pt reports 0/10 pain or discomfort prior to treatment.    Pt reports 0/10 pain or discomfort post treatment. Activity Tolerance:   good    Please refer to the flowsheet for vital signs taken during this treatment. After treatment:   [x]  Patient left in no apparent distress sitting up in chair  []  Patient left in no apparent distress in bed  [x]  Call bell left within reach  []  Nursing notified  []  Caregiver present  [x]  chair alarm activated    COMMUNICATION/EDUCATION:   Communication/Collaboration:  [x]      Home safety education was provided and the patient/caregiver indicated understanding. []      Patient/family have participated as able and agree with findings and recommendations. []      Patient is unable to participate in plan of care at this time.     Richard Haywood OT  Time Calculation: 19 mins

## 2018-03-25 NOTE — ROUTINE PROCESS
Bedside and Verbal shift change report given to Marisol Swanson RN (oncoming nurse) by Gege Mann RN (offgoing nurse). Report included the following information SBAR, Kardex, MAR and Recent Results. SITUATION:    Code Status: Full Code   Reason for Admission: [address] day: 3   Problem List:       Hospital Problems  Date Reviewed: 9/29/2017          Codes Class Noted POA    * (Principal)Flu ICD-10-CM: J11.1  ICD-9-CM: 487.1  3/22/2018 Yes        Wheezing ICD-10-CM: R06.2  ICD-9-CM: 786.07  3/22/2018 Yes        COPD (chronic obstructive pulmonary disease) (HCC) (Chronic) ICD-10-CM: J44.9  ICD-9-CM: 340  8/29/2016 Yes        Type 2 diabetes mellitus with stage 3 chronic kidney disease (HCC) ICD-10-CM: E11.22, N18.3  ICD-9-CM: 250.40, 585.3  7/20/2016 Yes              BACKGROUND:    Past Medical History:   Past Medical History:   Diagnosis Date    Angina     Anxiety     Arthritis     CAD (coronary artery disease)     s/p drug-eluting stents to RCA for high-grade stenoses in 2/09    Cardiac catheterization 08/24/2016    Mod calcification. Co-dom. oLM 50-70%. LAD 30%. Dx 30%. Cx 70% focal.  OM 80% focal.  RCA 30%.  Cardiac echocardiogram 08/23/2016    Tech difficult. EF 55-60%. No WMA. Normal diastolic fx. Lipomatous septal hypertrophy.  Cardiac nuclear imaging test, abnormal 08/23/2016    Sm reversible inferior defect concerning for ischemia. Mild inferior hypk. EF 68%. Neg EKG on pharm stress test.    Carotid duplex 08/26/2016    Mild <50% CYRUS stenosis. Chronic LICA occlusion. Similar to study of 3/29/16.     [facility]     w/ known total occlusion of lt internal carotid artery; followed by pts vascular surgeon    Chronic kidney disease     COPD (chronic obstructive [facility] disease) ([facility] [address]     Dementia     Depression     Diabetes ([facility] [address]     type 2    Dyslipidemia     on Crestor; managed by pts PCP    Dyspnea     Heart disease     Hypercholesterolemia     Hypertension     Low back pain     Osteoarthrosis involving multiple sites     Skin cancer ([facility] [address]     squamous cell lesions of the skin    Stroke ([facility] [address]     Venous (peripheral) insufficiency          Patient taking anticoagulants yes     ASSESSMENT:    Changes in Assessment Throughout Shift: none     Patient has Central Line: no Reasons if yes: n/a   Patient has Kim Cath: no Reasons if yes: n/a      Last Vitals:     Vitals:    03/24/18 1240 03/24/18 1613 03/24/18 2000 03/24/18 2350   BP: 130/53 134/68 156/71 151/70   Pulse: 68 70 69 64   Resp: 18 18 18 18   Temp: 96.6 °F (35.9 °C) 96.9 °F (36.1 °C) 97.4 °F (36.3 °C) 97 °F (36.1 °C)   SpO2: 96% 94% 95% 96%   Weight:       Height:            IV and DRAINS (will only show if present)   Peripheral IV 03/22/18 Right Antecubital-Site Assessment: Clean, dry, & intact  Peripheral IV 03/22/18 Right Hand-Site Assessment: Clean, dry, & intact     WOUND (if present)   Wound Type:  none   Dressing present Dressing Present : No   Wound Concerns/Notes:  none     PAIN    Pain Assessment    Pain Intensity 1: 0 (03/24/18 1922)              Patient Stated Pain Goal: 0  o Interventions for Pain:  None given  o Intervention effective: no c/o pain  o Time of last intervention: n/a   o Reassessment Completed: yes      Last 3 Weights:  Last 3 Recorded Weights in this Encounter    03/22/18 2100   Weight: 73 kg (160 lb 15 oz)     Weight change:      INTAKE/OUPUT    Current Shift: 03/24 1901 - 03/25 0700  In: -   Out: 400 [Urine:400]    Last three shifts: 03/23 0701 - 03/24 1900  In: 1360 [P.O.:1360]  Out: 501 [Urine:501]     LAB RESULTS     Recent Labs      03/24/18   0155  03/23/18   0247  03/22/18   1800   WBC  20.7*  10.7  10.9   HGB  15.6  16.9*  17.7*   HCT  46.0  49.8*  53.6*   PLT  230  211  189        Recent Labs      03/24/18   0155  03/23/18   0247  03/22/18   1800   NA  139  136  139   K  4.0  3.8  4.1   GLU  237*  305*  199*   BUN  39*  18 15   CREA  1.47*  1.47*  1.41*   CA  8.3*  8.8  8.4*   MG   --   2.3   --        RECOMMENDATIONS AND DISCHARGE PLANNING     1. Pending tests/procedures/ Plan of Care or Other Needs: PT/OT    2. Discharge plan for patient and Needs/Barriers: SNF    3. Estimated Discharge Date: 3/28/18 Posted on Whiteboard in cht Room: yes      4. The patient's care plan was reviewed with the oncoming nurse. \"HEALS\" SAFETY CHECK      Fall Risk    Total Score: 5    Safety Measures: Safety Measures: Bed/Chair alarm on, Bed/Chair-Wheels locked, Bed in low position, Call light within reach    A safety check occurred in the patient's room between off going nurse and oncoming nurse listed above. The safety check included the below items  Area Items   H  High Alert Medications - Verify all high alert medication drips (heparin, PCA, etc.)   E  Equipment - Suction is set up for ALL patients (with zafar)  - Red plugs utilized for all equipment (IV pumps, etc.)  - WOWs wiped down at end of shift.  - Room stocked with oxygen, suction, and other unit-specific supplies   A  Alarms - Bed alarm is set for fall risk patients  - Ensure chair alarm is in place and activated if patient is up in a chair   L  Lines - Check IV for any infiltration  - Kim bag is empty if patient has a Kim   - Tubing and IV bags are labeled   S  Safety   - Room is clean, patient is clean, and equipment is clean. - Hallways are clear from equipment besides carts. - Fall bracelet on for fall risk patients  - Ensure room is clear and free of clutter  - Suction is set up for ALL patients (with zafar)  - Hallways are clear from equipment besides carts.    - Isolation precautions followed, supplies available outside room, sign posted     Yury Hinojosa RN

## 2018-03-25 NOTE — PROGRESS NOTES
[facility]ist Group  Progress Note    Patient: Re Gallagher Age: [de-identified] y.o. : 1935 MR#: [id number] SSN: [id number]  Date: 3/25/2018  Subjective:     Pt reports intermittent cough. Denies any chest pain, SOB, abd pain, N/V/D/C. Assessment/Plan:   1. Influenza B positive: cont Tamiflu. 2. COPD with acute exacerbation: bronchodilators prn, steroids PO. Continue O2 supplementation and wean as able. 3. Type 2 diabetes mellitus with hyperglycemia: diabetic mgt consult, increase Lantus to 41 units. Cont SSI. 4. Mechanical Fall: CT head with no acute intracranial findings, CT cerv spine also neg for acute findings. 5. Acute on CRF stage 3: s/p gentle hydration, start low dose Lasix  6. Dementia: monitor. 7. Coronary artery disease with 2- vessel disease s/p CABG x 2  on 2016: check echo. Reviewed previous echo. 8. Hypertension: cont. BP meds  9. Dyslipidemia: cont statin  10. Possible bacterial conjunctivitis vs blepharitis: cont Cipro eye drops for 5 days. 11. Leukocytosis: likely d/t steroids. Monitor. IV steroids is dc'd. 12. Cough: Robitussin prn     Goals of care:  Full code  Disposition:  [x]PT/OT ordered   [x] Case management referral    Case discussed with:  [x]Patient  []Family  [x]Nursing  []Case Management  DVT Prophylaxis:  []Lovenox  [x]Hep SQ  []SCDs  []Coumadin   []On Heparin gtt    Objective:   VS:   Visit Vitals    /65 (BP 1 Location: Left arm, BP Patient Position: At rest)    Pulse 61    Temp 97 °F (36.1 °C)    Resp 18    Ht 5' 10.08\" (1.78 m)  Comment: bmi    Wt 73 kg (160 lb 15 oz)    SpO2 95%    BMI 23.04 kg/m2      Tmax/24hrs: Temp (24hrs), Av.1 °F (36.2 °C), Min:96.9 °F (36.1 °C), Max:97.6 °F (36.4 °C)    Intake/Output Summary (Last 24 hours) at 18 1408  Last data filed at 18 1251   Gross per 24 hour   Intake             1280 ml   Output              401 ml   Net              879 ml       General: Dimitrios Dumont, alert, NAD  Cardiovascular:  RRR  Pulmonary: CTA  GI:  NT, normal BS  Extremities:  No edema or cyanosis  Eye: chronic red loose eyelids.  Small purulent fluid over eyelashes and eye- improved    Labs:    Recent Results (from the past 24 hour(s))   GLUCOSE, POC    Collection Time: 03/24/18  4:07 PM   Result Value Ref Range    Glucose (POC) 401 (HH) 70 - 110 mg/dL   GLUCOSE, POC    Collection Time: 03/24/18  9:54 PM   Result Value Ref Range    Glucose (POC) 339 (H) 70 - 110 mg/dL   CBC W/O DIFF    Collection Time: 03/25/18  3:18 AM   Result Value Ref Range    WBC 19.2 (H) 4.6 - 13.2 K/uL    RBC 5.77 (H) 4.70 - 5.50 M/uL    HGB 15.8 13.0 - 16.0 g/dL    HCT 48.3 (H) 36.0 - 48.0 %    MCV 83.7 74.0 - 97.0 FL    MCH 27.4 24.0 - 34.0 PG    MCHC 32.7 31.0 - 37.0 g/dL    RDW 16.0 (H) 11.6 - 14.5 %    PLATELET 482 484 - 496 K/uL    MPV 11.1 9.2 - 82.9 FL   METABOLIC PANEL, BASIC    Collection Time: 03/25/18  3:18 AM   Result Value Ref Range    Sodium 138 136 - 145 mmol/L    Potassium 4.0 3.5 - 5.5 mmol/L    Chloride 103 100 - 108 mmol/L    CO2 25 21 - 32 mmol/L    Anion gap 10 3.0 - 18 mmol/L    Glucose 237 (H) 74 - 99 mg/dL    BUN 34 (H) 7.0 - 18 MG/DL    Creatinine 1.32 (H) 0.6 - 1.3 MG/DL    BUN/Creatinine ratio 26 (H) 12 - 20      GFR est AA >60 >60 ml/min/1.73m2    GFR est non-AA 52 (L) >60 ml/min/1.73m2    Calcium 8.3 (L) 8.5 - 10.1 MG/DL   GLUCOSE, POC    Collection Time: 03/25/18  7:37 AM   Result Value Ref Range    Glucose (POC) 176 (H) 70 - 110 mg/dL   GLUCOSE, POC    Collection Time: 03/25/18 11:22 AM   Result Value Ref Range    Glucose (POC) 268 (H) 70 - 110 mg/dL       Signed By: Lucina Garcia PA-C     March 25, 2018 2:08 PM

## 2018-03-25 NOTE — PROGRESS NOTES
Spoke to pt and he states that he has not decided where he would like to go for rehab. Pt states that he and his wife are reviewing and would like to tour the facilities. Informed him that they can call the facilities today and see which ones will let his wife tour them today. Informed him that MD plans to discharge him tomorrow and that it would be best if they can choose facilities they would like to go to today and let CM know today or first thing in the morning. Pt verbalized understanding. Called referral to Rashi Chowdhury with ARU per pt's request.     36 - Spoke to pt again and he states that they have not made any decisions as to where they would like him to go for rehab. He states that his wife is out now looking at facilities.

## 2018-03-26 LAB
ANION GAP SERPL CALC-SCNC: 8 MMOL/L (ref 3–18)
BUN SERPL-MCNC: 30 MG/DL (ref 7–18)
BUN/CREAT SERPL: 22 (ref 12–20)
CALCIUM SERPL-MCNC: 8.4 MG/DL (ref 8.5–10.1)
CHLORIDE SERPL-SCNC: 104 MMOL/L (ref 100–108)
CO2 SERPL-SCNC: 28 MMOL/L (ref 21–32)
CREAT SERPL-MCNC: 1.34 MG/DL (ref 0.6–1.3)
ERYTHROCYTE [DISTWIDTH] IN BLOOD BY AUTOMATED COUNT: 15.6 % (ref 11.6–14.5)
GLUCOSE BLD STRIP.AUTO-MCNC: 188 MG/DL (ref 70–110)
GLUCOSE BLD STRIP.AUTO-MCNC: 234 MG/DL (ref 70–110)
GLUCOSE BLD STRIP.AUTO-MCNC: 256 MG/DL (ref 70–110)
GLUCOSE BLD STRIP.AUTO-MCNC: 359 MG/DL (ref 70–110)
GLUCOSE SERPL-MCNC: 281 MG/DL (ref 74–99)
HCT VFR BLD AUTO: 45.5 % (ref 36–48)
HGB BLD-MCNC: 15.4 G/DL (ref 13–16)
MCH RBC QN AUTO: 27.8 PG (ref 24–34)
MCHC RBC AUTO-ENTMCNC: 33.8 G/DL (ref 31–37)
MCV RBC AUTO: 82.3 FL (ref 74–97)
PLATELET # BLD AUTO: 229 K/UL (ref 135–420)
PMV BLD AUTO: 11.2 FL (ref 9.2–11.8)
POTASSIUM SERPL-SCNC: 4.1 MMOL/L (ref 3.5–5.5)
RBC # BLD AUTO: 5.53 M/UL (ref 4.7–5.5)
SODIUM SERPL-SCNC: 140 MMOL/L (ref 136–145)
WBC # BLD AUTO: 13.1 K/UL (ref 4.6–13.2)

## 2018-03-26 PROCEDURE — C8929 TTE W OR WO FOL WCON,DOPPLER: HCPCS

## 2018-03-26 PROCEDURE — 36415 COLL VENOUS BLD VENIPUNCTURE: CPT | Performed by: PHYSICIAN ASSISTANT

## 2018-03-26 PROCEDURE — 74011250636 HC RX REV CODE- 250/636: Performed by: PHYSICIAN ASSISTANT

## 2018-03-26 PROCEDURE — 74011636637 HC RX REV CODE- 636/637: Performed by: PHYSICIAN ASSISTANT

## 2018-03-26 PROCEDURE — 97116 GAIT TRAINING THERAPY: CPT

## 2018-03-26 PROCEDURE — 82962 GLUCOSE BLOOD TEST: CPT

## 2018-03-26 PROCEDURE — 85027 COMPLETE CBC AUTOMATED: CPT | Performed by: PHYSICIAN ASSISTANT

## 2018-03-26 PROCEDURE — 74011250637 HC RX REV CODE- 250/637: Performed by: INTERNAL MEDICINE

## 2018-03-26 PROCEDURE — 74011250636 HC RX REV CODE- 250/636: Performed by: INTERNAL MEDICINE

## 2018-03-26 PROCEDURE — 74011250637 HC RX REV CODE- 250/637: Performed by: PHYSICIAN ASSISTANT

## 2018-03-26 PROCEDURE — 65270000029 HC RM PRIVATE

## 2018-03-26 PROCEDURE — 80048 BASIC METABOLIC PNL TOTAL CA: CPT | Performed by: PHYSICIAN ASSISTANT

## 2018-03-26 PROCEDURE — 74011250637 HC RX REV CODE- 250/637: Performed by: EMERGENCY MEDICINE

## 2018-03-26 PROCEDURE — 74011636637 HC RX REV CODE- 636/637: Performed by: FAMILY MEDICINE

## 2018-03-26 PROCEDURE — 97530 THERAPEUTIC ACTIVITIES: CPT

## 2018-03-26 RX ORDER — FAMOTIDINE 20 MG/1
20 TABLET, FILM COATED ORAL 2 TIMES DAILY
Qty: 30 TAB | Refills: 0 | Status: ON HOLD | OUTPATIENT
Start: 2018-03-26 | End: 2021-05-26 | Stop reason: SDUPTHER

## 2018-03-26 RX ORDER — METOPROLOL SUCCINATE 50 MG/1
50 TABLET, EXTENDED RELEASE ORAL DAILY
Qty: 30 TAB | Refills: 0 | Status: ON HOLD | OUTPATIENT
Start: 2018-03-27 | End: 2019-12-10

## 2018-03-26 RX ORDER — GABAPENTIN 300 MG/1
300 CAPSULE ORAL DAILY
Qty: 30 CAP | Refills: 0 | Status: ON HOLD | OUTPATIENT
Start: 2018-03-27 | End: 2021-05-26 | Stop reason: SDUPTHER

## 2018-03-26 RX ORDER — DOCUSATE SODIUM 100 MG/1
100 CAPSULE, LIQUID FILLED ORAL
Qty: 60 CAP | Refills: 0 | Status: SHIPPED | OUTPATIENT
Start: 2018-03-26 | End: 2018-06-24

## 2018-03-26 RX ORDER — ISOPROPYL ALCOHOL 70 ML/100ML
SWAB TOPICAL
Qty: 100 PAD | Refills: 0 | Status: ON HOLD | OUTPATIENT
Start: 2018-03-26 | End: 2019-12-10

## 2018-03-26 RX ORDER — ACETAMINOPHEN 500 MG
500 TABLET ORAL
Qty: 20 TAB | Refills: 0 | Status: ON HOLD | OUTPATIENT
Start: 2018-03-26 | End: 2019-12-10

## 2018-03-26 RX ORDER — INSULIN GLARGINE 100 [IU]/ML
35 INJECTION, SOLUTION SUBCUTANEOUS
Qty: 1 PEN | Refills: 3 | Status: ON HOLD | OUTPATIENT
Start: 2018-03-26 | End: 2019-12-10

## 2018-03-26 RX ORDER — SOLIFENACIN SUCCINATE 5 MG/1
5 TABLET, FILM COATED ORAL DAILY
Qty: 30 TAB | Refills: 0 | Status: ON HOLD | OUTPATIENT
Start: 2018-03-27 | End: 2019-12-10

## 2018-03-26 RX ORDER — PEN NEEDLE, DIABETIC 31 GX3/16"
NEEDLE, DISPOSABLE MISCELLANEOUS
Qty: 100 PEN NEEDLE | Refills: 0 | Status: ON HOLD | OUTPATIENT
Start: 2018-03-26 | End: 2019-12-10

## 2018-03-26 RX ORDER — GUAIFENESIN 100 MG/5ML
100 SOLUTION ORAL
Qty: 1 BOTTLE | Refills: 0 | Status: ON HOLD | OUTPATIENT
Start: 2018-03-26 | End: 2019-12-10

## 2018-03-26 RX ORDER — ROSUVASTATIN CALCIUM 10 MG/1
10 TABLET, COATED ORAL
Qty: 30 TAB | Refills: 0 | Status: ON HOLD | OUTPATIENT
Start: 2018-03-26 | End: 2019-12-10

## 2018-03-26 RX ORDER — CYCLOSPORINE 0.5 MG/ML
1 EMULSION OPHTHALMIC EVERY 12 HOURS
Qty: 15 EACH | Refills: 0 | Status: SHIPPED | OUTPATIENT
Start: 2018-03-26 | End: 2021-05-27

## 2018-03-26 RX ORDER — FUROSEMIDE 20 MG/1
20 TABLET ORAL DAILY
Qty: 30 TAB | Refills: 0 | Status: SHIPPED | OUTPATIENT
Start: 2018-03-26 | End: 2021-05-27

## 2018-03-26 RX ORDER — IPRATROPIUM BROMIDE AND ALBUTEROL SULFATE 2.5; .5 MG/3ML; MG/3ML
3 SOLUTION RESPIRATORY (INHALATION)
Qty: 30 NEBULE | Refills: 0 | Status: ON HOLD | OUTPATIENT
Start: 2018-03-26 | End: 2019-12-10

## 2018-03-26 RX ORDER — ISOSORBIDE MONONITRATE 30 MG/1
30 TABLET, EXTENDED RELEASE ORAL DAILY
Qty: 30 TAB | Refills: 0 | Status: ON HOLD | OUTPATIENT
Start: 2018-03-27 | End: 2021-05-26 | Stop reason: SDUPTHER

## 2018-03-26 RX ORDER — TEMAZEPAM 7.5 MG/1
7.5 CAPSULE ORAL
Qty: 12 CAP | Refills: 0 | Status: ON HOLD | OUTPATIENT
Start: 2018-03-26 | End: 2019-12-10

## 2018-03-26 RX ORDER — PREDNISONE 10 MG/1
TABLET ORAL
Qty: 22 TAB | Refills: 0 | Status: ON HOLD | OUTPATIENT
Start: 2018-03-26 | End: 2019-12-10

## 2018-03-26 RX ORDER — ASPIRIN 81 MG/1
81 TABLET ORAL DAILY
Qty: 30 TAB | Refills: 0 | Status: ON HOLD | OUTPATIENT
Start: 2018-03-27 | End: 2021-05-26 | Stop reason: SDUPTHER

## 2018-03-26 RX ORDER — INSULIN GLARGINE 100 [IU]/ML
45 INJECTION, SOLUTION SUBCUTANEOUS
Status: DISCONTINUED | OUTPATIENT
Start: 2018-03-26 | End: 2018-03-27 | Stop reason: HOSPADM

## 2018-03-26 RX ORDER — OSELTAMIVIR PHOSPHATE 30 MG/1
30 CAPSULE ORAL 2 TIMES DAILY
Qty: 7 CAP | Refills: 0 | Status: SHIPPED | OUTPATIENT
Start: 2018-03-26 | End: 2018-03-27

## 2018-03-26 RX ORDER — CIPROFLOXACIN HYDROCHLORIDE 3.5 MG/ML
1 SOLUTION/ DROPS TOPICAL
Qty: 2 ML | Refills: 0 | Status: ON HOLD | OUTPATIENT
Start: 2018-03-26 | End: 2019-12-10

## 2018-03-26 RX ORDER — INSULIN PUMP SYRINGE, 3 ML
EACH MISCELLANEOUS
Qty: 1 KIT | Refills: 0 | Status: ON HOLD | OUTPATIENT
Start: 2018-03-26 | End: 2019-12-10

## 2018-03-26 RX ADMIN — CIPROFLOXACIN HYDROCHLORIDE 1 DROP: 3 SOLUTION/ DROPS OPHTHALMIC at 06:48

## 2018-03-26 RX ADMIN — SOLIFENACIN SUCCINATE 5 MG: 5 TABLET, FILM COATED ORAL at 11:40

## 2018-03-26 RX ADMIN — CIPROFLOXACIN HYDROCHLORIDE 1 DROP: 3 SOLUTION/ DROPS OPHTHALMIC at 14:38

## 2018-03-26 RX ADMIN — PERFLUTREN 2 ML: 6.52 INJECTION, SUSPENSION INTRAVENOUS at 09:59

## 2018-03-26 RX ADMIN — INSULIN LISPRO 6 UNITS: 100 INJECTION, SOLUTION INTRAVENOUS; SUBCUTANEOUS at 11:41

## 2018-03-26 RX ADMIN — ISOSORBIDE MONONITRATE 30 MG: 30 TABLET, EXTENDED RELEASE ORAL at 11:40

## 2018-03-26 RX ADMIN — INSULIN LISPRO 9 UNITS: 100 INJECTION, SOLUTION INTRAVENOUS; SUBCUTANEOUS at 17:43

## 2018-03-26 RX ADMIN — HEPARIN SODIUM 5000 UNITS: 5000 INJECTION, SOLUTION INTRAVENOUS; SUBCUTANEOUS at 14:37

## 2018-03-26 RX ADMIN — CIPROFLOXACIN HYDROCHLORIDE 1 DROP: 3 SOLUTION/ DROPS OPHTHALMIC at 22:15

## 2018-03-26 RX ADMIN — OXYCODONE HYDROCHLORIDE AND ACETAMINOPHEN 1 TABLET: 5; 325 TABLET ORAL at 22:16

## 2018-03-26 RX ADMIN — HEPARIN SODIUM 5000 UNITS: 5000 INJECTION, SOLUTION INTRAVENOUS; SUBCUTANEOUS at 22:14

## 2018-03-26 RX ADMIN — OSELTAMIVIR PHOSPHATE 30 MG: 30 CAPSULE ORAL at 11:40

## 2018-03-26 RX ADMIN — HEPARIN SODIUM 5000 UNITS: 5000 INJECTION, SOLUTION INTRAVENOUS; SUBCUTANEOUS at 06:48

## 2018-03-26 RX ADMIN — METOPROLOL SUCCINATE 50 MG: 50 TABLET, EXTENDED RELEASE ORAL at 11:40

## 2018-03-26 RX ADMIN — INSULIN GLARGINE 45 UNITS: 100 INJECTION, SOLUTION SUBCUTANEOUS at 22:21

## 2018-03-26 RX ADMIN — ROSUVASTATIN CALCIUM 10 MG: 10 TABLET, FILM COATED ORAL at 22:16

## 2018-03-26 RX ADMIN — OSELTAMIVIR PHOSPHATE 30 MG: 30 CAPSULE ORAL at 17:42

## 2018-03-26 RX ADMIN — CIPROFLOXACIN HYDROCHLORIDE 1 DROP: 3 SOLUTION/ DROPS OPHTHALMIC at 17:42

## 2018-03-26 RX ADMIN — ASPIRIN 81 MG: 81 TABLET, COATED ORAL at 11:41

## 2018-03-26 RX ADMIN — CIPROFLOXACIN HYDROCHLORIDE 1 DROP: 3 SOLUTION/ DROPS OPHTHALMIC at 11:41

## 2018-03-26 RX ADMIN — INSULIN LISPRO 15 UNITS: 100 INJECTION, SOLUTION INTRAVENOUS; SUBCUTANEOUS at 22:22

## 2018-03-26 RX ADMIN — FUROSEMIDE 20 MG: 20 TABLET ORAL at 11:40

## 2018-03-26 RX ADMIN — GABAPENTIN 300 MG: 300 CAPSULE ORAL at 11:40

## 2018-03-26 RX ADMIN — PREDNISONE 40 MG: 20 TABLET ORAL at 11:40

## 2018-03-26 NOTE — PROGRESS NOTES
PATIENT RESTING IN CHAIR WITH EYES CLOSED. RESPIRATIONS EVEN AND UNLABORED. NO COMPLAINTS NOTED AT THIS TIME. WILL CONTINUE TO MONITOR.

## 2018-03-26 NOTE — PROGRESS NOTES
Problem: Mobility Impaired (Adult and Pediatric)  Goal: *Acute Goals and Plan of Care (Insert Text)  Physical Therapy Goals  Initiated 3/24/2018 and to be accomplished within 5-7 day(s)  1. Patient will move from supine to sit and sit to supine  in bed with supervision/set-up. 2.  Patient will transfer from bed to chair and chair to bed with supervision/set-up using the least restrictive device. 3.  Patient will perform sit to stand with supervision/set-up. 4.  Patient will ambulate with supervision/set-up for 75 feet with the least restrictive device. 5.  Patient will ascend/descend 3-4 stairs with 1-2 handrail(s) with minimal assistance/contact guard assist. - D/C 3/26/2018  Outcome: Resolved/Met Date Met: 03/26/18  physical Therapy TREATMENT/DISCHARGE    Patient: Parag Ardon (45 y.o. male)  Date: 3/26/2018  Diagnosis: Flu Flu  Precautions: Fall, Skin  Chart, physical therapy assessment, plan of care and goals were reviewed. OBJECTIVE/ASSESSMENT:  Patient presents today standing up from recliner with chair alarm sounding, patient educated at length regarding purpose of chair/bed alarms and need for supervision in the hospital d/t safety concerns. Patient receptive to education, verbalization of understanding noted. Patient then ambulated to bathroom without assistive device for independent toileting. He ambulated ~80 ft in room with and without use of assistive device. Patient carries RW with him, demonstrates intact balance without assistive device however would benefit from use of SPC. Patient states he has [facility] at home and uses it regularly. Patient confirms he is at baseline functional mobility, does not need to perform stairs if return to [facility]. Patient will be D/C from PT caseload at this time as he is at baseline functional mobility. Education: Patient educated regarding safety concerns with transfers, ambulation, and activity tolerance.  Patient receptive and agreeable to all education at this time with verbalization of understanding noted. Progression toward goals:  [x]      Goals met  []      Improving appropriately and progressing toward goals  []      Improving slowly and progressing toward goals  []      Not making progress toward goals and plan of care will be adjusted     PLAN:  Patient will be discharged from physical therapy at this time. Rationale for discharge:  [x] Goals Achieved  [] [address]  [] Patient not participating in therapy  [] Other:  Discharge Recommendations:  Assisted Living  Further Equipment Recommendations for Discharge:  N/A - patient has SPC, RW, and rollator available     SUBJECTIVE:   Patient stated I live at [facility] because my wife said she was worried about my safety at home.     OBJECTIVE DATA SUMMARY:   Critical Behavior:  Neurologic State: Alert, Confused  Orientation Level: Disoriented to person, Disoriented to place  Cognition: Follows commands  Safety/Judgement: Fall prevention  Functional Mobility Training:  Bed Mobility:   N/A patient in recliner  Transfers:  Sit to Stand: Supervision  Stand to Sit: Supervision  Balance:  Sitting: Intact  Standing: Intact; With support; Without support  Standing - Static: Good  Standing - Dynamic : Good  Ambulation/Gait Training:  Distance (ft): 80 Feet (ft)  Assistive Device: Walker, rolling (None)  Ambulation - Level of Assistance: Supervision  Base of Support: Center of gravity altered  Speed/Chanel: Pace decreased (<100 feet/min)  Step Length: Left shortened;Right shortened  Pain:  Pre session: 0/10  Post session: 0/10  Activity Tolerance:   Good  Please refer to the flowsheet for vital signs taken during this treatment.   After treatment:   [x] Patient left in no apparent distress sitting up in chair  [] Patient left in no apparent distress in bed  [x] Call bell left within reach  [x] Nursing notified Liz Francisco)  [] Caregiver present  [x] Chair alarm activated  Tamika Conception   Time Calculation: 29 mins    Mobility  Current  CI= 1-19%  D/C  CI= 1-19%. The severity rating is based on the Level of Assistance required for Functional Mobility and ADLs.

## 2018-03-26 NOTE — PROGRESS NOTES
[facility] REFERRAL CONTACT NOTE  [address] [place] [address] for Physical Rehabilitation    RE: Louise Cisneros     Thank you for the opportunity to review this patient's case for admission to [address] [place] [address] for Physical Rehabilitation. Based on our pre-admission screening:     [x ] This patient does not meet criteria for admission to [facility] for Physical  Rehabilitation due to:      [x ] Too functional, per documentation, patient does not require both Physical and Occupational Therapy Services at an acute rehabilitation level of intensity. [x ] We recommend the following:    [x ] [facility]  [  Again, Thank you for this referral. Should you have any questions please do not hesitate to call. Sincerely,  Jose Coleman. Marina Pereira, [address]   Marina Pereira RN  Admissions [facility] for Physical Rehabilitation  [phone]

## 2018-03-26 NOTE — DISCHARGE SUMMARY
[facility]  Discharge Summary       Patient: Marilin Easton Age: [de-identified] y.o. : 1935 MR#: [id number] SSN: [id number]  PCP on record: Sarah Batista MD  Admit date: 3/22/2018  Discharge date: 3/26/2018    Disposition:    []Home   []Home with Home Health   []SNF/NH   []Rehab   [x]halfway   []Alternate Facility:____________________    Discharge Diagnoses:                             1. Influenza B positive  2. COPD with acute exacerbation  3. Type 2 diabetes mellitus with hyperglycemia  4. Mechanical Fall   5. Acute on CRF stage 3  6. Dementia  7. Coronary artery disease with 2- vessel disease s/p CABG x 2  on 2016  8. Hypertension  9. Dyslipidemia  10. Possible bacterial conjunctivitis vs blepharitis  11. Leukocytosis  12. Cough    Discharge Medications:     Current Discharge Medication List      START taking these medications    Details   guaiFENesin (ROBITUSSIN) 100 mg/5 mL liquid Take 5 mL by mouth every four (4) hours as needed for Cough. Qty: 1 Bottle, Refills: 0      docusate sodium (COLACE) 100 mg capsule Take 1 Cap by mouth two (2) times daily as needed for Constipation for up to 90 days. Qty: 60 Cap, Refills: 0      ciprofloxacin HCl (CILOXAN) 0.3 % ophthalmic solution Administer 1 Drop to both eyes every four (4) hours (while awake). For 3 more days. End 3/29/2018  Indications: BACTERIAL CONJUNCTIVITIS  Qty: 2 mL, Refills: 0      albuterol-ipratropium (DUO-NEB) 2.5 mg-0.5 mg/3 ml nebu 3 mL by Nebulization route every six (6) hours as needed.   Qty: 30 Nebule, Refills: 0      insulin lispro (HUMALOG KWIKPEN INSULIN) 200 unit/mL (3 mL) inpn For Blood Sugar (mg/dL) of:     Less than 150 =   0 units           150 -199 =   2 units  200 -249 =   4 units  250 -299 =   6 units  300 -349 =   8 units  350 and above =   10 units  Check BG then Inject insulin per SS 3 times daily before meals and at bedtime  Indications: type 2 diabetes mellitus  Qty: 1 Pen, Refills: 0 Blood-Glucose Meter monitoring kit Use as directed  Qty: 1 Kit, Refills: 0      lancets 23 gauge misc 1 Box by Does Not Apply route Before breakfast, lunch, and dinner. Use as directed  Qty: 1 Lancet, Refills: 0      alcohol swabs (ALCOHOL PREP SWABS) padm Use as directed  Qty: 100 Pad, Refills: 0      predniSONE (DELTASONE) 10 mg tablet Take 4 tabs for 1 day, then 3 tabs for 3 days, then 2 tabs for 3 days, then 1 tab by mouth for 3 days  Indications: COPD exacerbation  Qty: 22 Tab, Refills: 0      famotidine (PEPCID) 20 mg tablet Take 1 Tab by mouth two (2) times a day. Indications: PREVENTION OF STRESS ULCER  Qty: 30 Tab, Refills: 0         CONTINUE these medications which have CHANGED    Details   acetaminophen (TYLENOL) 500 mg tablet Take 1 Tab by mouth every six (6) hours as needed. Indications: pain  Qty: 20 Tab, Refills: 0      aspirin delayed-release 81 mg tablet Take 1 Tab by mouth daily. Indications: Cerebral Thromboembolism Prevention  Qty: 30 Tab, Refills: 0      cycloSPORINE (RESTASIS) 0.05 % ophthalmic emulsion Administer 1 Drop to both eyes every twelve (12) hours. Indications: Dry Eye  Qty: 15 Each, Refills: 0      gabapentin (NEURONTIN) 300 mg capsule Take 1 Cap by mouth daily. Indications: NEUROPATHIC PAIN, Restless Legs Syndrome  Qty: 30 Cap, Refills: 0      insulin glargine (LANTUS SOLOSTAR U-100 INSULIN) 100 unit/mL (3 mL) inpn 35 Units by SubCUTAneous route nightly. Indications: type 2 diabetes mellitus  Qty: 1 Pen, Refills: 3      isosorbide mononitrate ER (IMDUR) 30 mg tablet Take 1 Tab by mouth daily. Qty: 30 Tab, Refills: 0    Associated Diagnoses: Essential hypertension      metoprolol succinate (TOPROL-XL) 50 mg XL tablet Take 1 Tab by mouth daily.  Indications: hypertension  Qty: 30 Tab, Refills: 0      multivit-min-FA-lycopen-lutein (CENTRUM SILVER) 0.4-300-250 mg-mcg-mcg tab Take 1 tab daily  Qty: 30 Tab, Refills: 0      Insulin Needles, Disposable, (SHAWN PEN NEEDLE) 32 gauge x 5/32\" ndle Use as directed. Qty: 100 Pen Needle, Refills: 0      rosuvastatin (CRESTOR) 10 mg tablet Take 1 Tab by mouth nightly. Qty: 30 Tab, Refills: 0      OTHER Incentive spirometry- Use as directed  Qty: 1 Each, Refills: 0    Associated Diagnoses: Influenza B      solifenacin (VESICARE) 5 mg tablet Take 1 Tab by mouth daily. Indications: Bladder Hyperactivity, INCREASED URINARY FREQUENCY, URINARY URGE INCONTINENCE, URINARY URGENCY  Qty: 30 Tab, Refills: 0      temazepam (RESTORIL) 7.5 mg capsule Take 1 Cap by mouth nightly as needed for Sleep. Max Daily Amount: 7.5 mg.  Qty: 12 Cap, Refills: 0    Associated Diagnoses: Other insomnia      furosemide (LASIX) 20 mg tablet Take 1 Tab by mouth daily. Indications: Edema  Qty: 30 Tab, Refills: 0      oseltamivir (TAMIFLU) 30 mg capsule Take 1 Cap by mouth two (2) times a day. Indications: INFLUENZA  Qty: 7 Cap, Refills: 0         CONTINUE these medications which have NOT CHANGED    Details   COMPOUNDMAX BASE crea 240 g by Does Not Apply route four (4) times daily. Anti-Inflam Meloxicam 0.09% Topirmate 1% Methocarbamol 2%  Lidocaine 2% Prilocaine 2%  Qty: 240 g, Refills: 3      CALCIUM CARBONATE (CALTRATE 600 PO) Take 1 Tab by mouth daily. STOP taking these medications       ascorbic acid, vitamin C, (VITAMIN C) 250 mg tablet Comments:   Reason for Stopping:               Consults:  none  -   Procedures: Transthoracic Echocardiogram  -     Significant Diagnostic Studies:   SUMMARY:  Procedure information: This was a technically difficult study. Echocardiographic views were limited by poor acoustic  window availability. Intravenous contrast (Definity) was administered. Left ventricle: Size was normal. Systolic function was by visual assessment. Ejection fraction was estimated in the  range of 55 % to 60 %. No obvious wall motion abnormalities identified in the   views obtained. Wall thickness was mildly  increased.  Left ventricular diastolic function parameters were normal.    Right ventricle: The tricuspid jet envelope definition was inadequate for   estimation of RV systolic pressure. Hospital Course by Problem   1. Influenza B positive: admitted with body aches and productive cough and tested positive for influenza B. Managed with Tamiflu. 2. COPD with acute exacerbation: managed with bronchodilators prn, and steroids PO. Continue O2 supplementation and pt was weaned before discharge. Ambulatory O2 before discharge was 94%. Pt will not qualify for home O2 at this time. 3. Type 2 diabetes mellitus with hyperglycemia: Pt was on steroids while inpt, and blood sugar was elevated. Lantus increased while inpt. diabetic mgt consulted. Pt is discharge with 35 units of Lantus, however pt is on tapered steroids. Pt may need to increase Lantus dose due to steroids use. Pt is advised to follow with PCP. 4. Mechanical Fall: CT head with no acute intracranial findings, CT cerv spine also neg for acute findings. 5. Acute on CRF stage 3: pt received gentle hydration while inpt, low dose Lasix restarted at 20mg daily and at discharge. Was on chronic 40mg at home  6. Dementia: monitored while inpt  7. Coronary artery disease with 2- vessel disease s/p CABG x 2  on 8/29/2016: echo on this admission with EF of 55-60%. 8. Hypertension: managed with BP meds  9. Dyslipidemia: managed with statin  10. Possible bacterial conjunctivitis vs blepharitis: managed with Cipro eye drops for 5 days. 11. Leukocytosis: likely d/t steroids, resolved while inpt. 12. Cough: managed with Robitussin prn    Today's examination of the patient revealed:     Subjective:   Pt reports feeling well overall but also reports intermittent cough.  Denies any chest pain, SOB, abd pain, N/V/D/C    Objective:   VS:   Visit Vitals    /73 (BP 1 Location: Left arm, BP Patient Position: At rest)    Pulse (!) 59    Temp 96.8 °F (36 °C)    Resp 18    Ht 5' 10.08\" (1.78 m)  Comment: bmi    Wt 73 kg (160 lb 15 oz)    SpO2 97%    BMI 23.04 kg/m2      Tmax/24hrs: Temp (24hrs), Av °F (36.1 °C), Min:96.4 °F (35.8 °C), Max:97.5 °F (36.4 °C)     Input/Output:   Intake/Output Summary (Last 24 hours) at 18 1459  Last data filed at 18 0932   Gross per 24 hour   Intake              477 ml   Output              650 ml   Net             -173 ml       General:  Awake, alert, NAD  Cardiovascular:  RRR  Pulmonary: CTA  GI:  NT, normal BS  Extremities:  No edema or cyanosis  Eye: chronic red loose eyelids.  Small purulent fluid over eyelashes and eye- improved     Labs:    Recent Results (from the past 24 hour(s))   GLUCOSE, POC    Collection Time: 18  3:26 PM   Result Value Ref Range    Glucose (POC) 338 (H) 70 - 110 mg/dL   GLUCOSE, POC    Collection Time: 18  9:36 PM   Result Value Ref Range    Glucose (POC) 293 (H) 70 - 110 mg/dL   CBC W/O DIFF    Collection Time: 18  2:34 AM   Result Value Ref Range    WBC 13.1 4.6 - 13.2 K/uL    RBC 5.53 (H) 4.70 - 5.50 M/uL    HGB 15.4 13.0 - 16.0 g/dL    HCT 45.5 36.0 - 48.0 %    MCV 82.3 74.0 - 97.0 FL    MCH 27.8 24.0 - 34.0 PG    MCHC 33.8 31.0 - 37.0 g/dL    RDW 15.6 (H) 11.6 - 14.5 %    PLATELET 745 698 - 174 K/uL    MPV 11.2 9.2 - 27.2 FL   METABOLIC PANEL, BASIC    Collection Time: 18  2:34 AM   Result Value Ref Range    Sodium 140 136 - 145 mmol/L    Potassium 4.1 3.5 - 5.5 mmol/L    Chloride 104 100 - 108 mmol/L    CO2 28 21 - 32 mmol/L    Anion gap 8 3.0 - 18 mmol/L    Glucose 281 (H) 74 - 99 mg/dL    BUN 30 (H) 7.0 - 18 MG/DL    Creatinine 1.34 (H) 0.6 - 1.3 MG/DL    BUN/Creatinine ratio 22 (H) 12 - 20      GFR est AA >60 >60 ml/min/1.73m2    GFR est non-AA 51 (L) >60 ml/min/1.73m2    Calcium 8.4 (L) 8.5 - 10.1 MG/DL   GLUCOSE, POC    Collection Time: 18  7:32 AM   Result Value Ref Range    Glucose (POC) 188 (H) 70 - 110 mg/dL   GLUCOSE, POC    Collection Time: 18 11:16 AM   Result Value Ref Range    Glucose (POC) 234 (H) 70 - 110 mg/dL     Additional Data Reviewed:     Condition:   Follow-up Appointments:   1. Your PCP: Adelaide Salomon MD, within 7-10days  2.  Your Pulmonologist: Jong England MD within 1 week.    >30 minutes spent coordinating this discharge (review instructions/follow-up, prescriptions, preparing report for sign off)    Signed:  Davis Anthony PA-C  3/26/2018  3:28 PM

## 2018-03-26 NOTE — ROUTINE PROCESS
Bedside and Verbal shift change report given to Suleman Gaffney , RN (oncoming nurse) by Gill Zaman RN (offgoing nurse). Report included the following information SBAR, Kardex, MAR and Recent Results. SITUATION:    Code Status: Full Code   Reason for Admission: [address] [employer] day: 4   Problem List:       Hospital Problems  Date Reviewed: 9/29/2017          Codes Class Noted POA    * (Principal)Flu ICD-10-CM: J11.1  ICD-9-CM: 487.1  3/22/2018 Yes        Wheezing ICD-10-CM: R06.2  ICD-9-CM: 786.07  3/22/2018 Yes        COPD (chronic obstructive pulmonary disease) (HCC) (Chronic) ICD-10-CM: J44.9  ICD-9-CM: 878  8/29/2016 Yes        Type 2 diabetes mellitus with stage 3 chronic kidney disease (HCC) ICD-10-CM: E11.22, N18.3  ICD-9-CM: 250.40, 585.3  7/20/2016 Yes              BACKGROUND:    Past Medical History:   Past Medical History:   Diagnosis Date    Angina     Anxiety     Arthritis     CAD (coronary artery disease)     s/p drug-eluting stents to RCA for high-grade stenoses in 2/09    Cardiac catheterization 08/24/2016    Mod calcification. Co-dom. oLM 50-70%. LAD 30%. Dx 30%. Cx 70% focal.  OM 80% focal.  RCA 30%.  Cardiac echocardiogram 08/23/2016    Tech difficult. EF 55-60%. No WMA. Normal diastolic fx. Lipomatous septal hypertrophy.  Cardiac nuclear imaging test, abnormal 08/23/2016    Sm reversible inferior defect concerning for ischemia. Mild inferior hypk. EF 68%. Neg EKG on pharm stress test.    Carotid duplex 08/26/2016    Mild <50% CYRUS stenosis. Chronic LICA occlusion. Similar to study of 3/29/16.     Carotid stenosis ([facility])     w/ known total occlusion of lt internal carotid artery; followed by pts vascular surgeon    Chronic kidney disease     [facility]     Dementia     Depression     Diabetes ([facility]     type 2    Dyslipidemia     on Crestor; managed by pts PCP    Dyspnea     Heart disease     Hypercholesterolemia     Hypertension     Low back pain     Osteoarthrosis involving multiple sites     Skin cancer ([facility]     squamous cell lesions of the skin    Stroke ([facility]     Venous (peripheral) insufficiency          Patient taking anticoagulants yes     ASSESSMENT:    Changes in Assessment Throughout Shift: none     Patient has Central Line: no Reasons if yes: n/a   Patient has Kim Cath: no Reasons if yes: n/a      Last Vitals:     Vitals:    03/25/18 1116 03/25/18 1615 03/25/18 2000 03/26/18 0000   BP: 118/65 151/81 163/78 161/77   Pulse: 61 69 69 69   Resp: 18 18 20 18   Temp: 97 °F (36.1 °C) 96.9 °F (36.1 °C) 97.5 °F (36.4 °C) 97.4 °F (36.3 °C)   SpO2: 95% 95% 96% 96%   Weight:       Height:            IV and DRAINS (will only show if present)   Peripheral IV 03/22/18 Right Antecubital-Site Assessment: Clean, dry, & intact  Peripheral IV 03/22/18 Right Hand-Site Assessment: Clean, dry, & intact     WOUND (if present)   Wound Type:  none   Dressing present Dressing Present : No   Wound Concerns/Notes:  none     PAIN    Pain Assessment    Pain Intensity 1: 0 (03/25/18 2351)              Patient Stated Pain Goal: 0  o Interventions for Pain:  None given  o Intervention effective: no c/o pain  o Time of last intervention: n/a   o Reassessment Completed: yes      Last 3 Weights:  Last 3 Recorded Weights in this Encounter    03/22/18 2100   Weight: 73 kg (160 lb 15 oz)     Weight change:      INTAKE/OUPUT    Current Shift:      Last three shifts: 03/24 0701 - 03/25 1900  In: 2000 [P.O.:2000]  Out: 951 [Urine:951]     LAB RESULTS     Recent Labs      03/25/18 0318 03/24/18   0155  03/23/18   0247   WBC  19.2*  20.7*  10.7   HGB  15.8  15.6  16.9*   HCT  48.3*  46.0  49.8*   PLT  249  230  211        Recent Labs      03/25/18 0318 03/24/18   0155  03/23/18   0247   NA  138  139  136   K  4.0  4.0  3.8   GLU  237*  237*  305*   BUN  34*  39*  18   CREA  1.32*  1.47*  1.47*   CA  8.3*  8.3* 8.8   MG   --    --   2.3       RECOMMENDATIONS AND DISCHARGE PLANNING     1. Pending tests/procedures/ Plan of Care or Other Needs: PT/OT    2. Discharge plan for patient and Needs/Barriers: SNF    3. Estimated Discharge Date: 3/28/18 Posted on Whiteboard in [facility] Room: yes      4. The patient's care plan was reviewed with the oncoming nurse. \"HEALS\" SAFETY CHECK      Fall Risk    Total Score: 5    Safety Measures: Safety Measures: Bed/Chair alarm on, Bed/Chair-Wheels locked, Call light within reach    A safety check occurred in the patient's room between off going nurse and oncoming nurse listed above. The safety check included the below items  Area Items   H  High Alert Medications - Verify all high alert medication drips (heparin, PCA, etc.)   E  Equipment - Suction is set up for ALL patients (with zafar)  - Red plugs utilized for all equipment (IV pumps, etc.)  - WOWs wiped down at end of shift.  - Room stocked with oxygen, suction, and other unit-specific supplies   A  Alarms - Bed alarm is set for fall risk patients  - Ensure chair alarm is in place and activated if patient is up in a chair   L  Lines - Check IV for any infiltration  - Kim bag is empty if patient has a Kim   - Tubing and IV bags are labeled   S  Safety   - Room is clean, patient is clean, and equipment is clean. - Hallways are clear from equipment besides carts. - Fall bracelet on for fall risk patients  - Ensure room is clear and free of clutter  - Suction is set up for ALL patients (with zafar)  - Hallways are clear from equipment besides carts.    - Isolation precautions followed, supplies available outside room, sign posted     Savannah Nunez, RN

## 2018-03-26 NOTE — DIABETES MGMT
Glycemic Control Plan of Care    Age: [de-identified]  Noted history of dementia. Patient staying in an assistance living facility prior to admission. T2DM with current A1c of 8.8% (03/22/2018). Patient placed on Solumedrol this admission and changed to prednisone currently at 40 mg daily. Nursing staff reported sundowning. Patient currently awake and alert. POC BG range on 03/25/2018: 176-338 mg/dL. He received a total of 41 units of lantus insulin, and also received a total of 33 units of very resistant dose of correctional lispro insulin on 03/25/2018. Discussed with Maryl Severe, PA with hospitalist regarding disposition and patient's ability to manage his diabetes in the assisted living facility. Recommendation(s):  1.) Lantus insulin 35 units daily (HS) at discharge and f/u with his PCP for further evaluation of BG and insulin dose. Assessment:  Patient is [de-identified]year old with past medical history including type 2 diabetes mellitus, COPD, hypercholesterolemia,  Hypertension, CKD stage 3, CAD with CABG, and dementia - was admitted on 03/22/2018 with report of generalized body aches and confusion. Noted:  Dementia. Influenza B positive. COPD exacerbation. CKD stage 3. Type 2 diabetes mellitus with current A1C of 8.8% (03/22/2018)    Most recent blood glucose values:    Results for [facility] (MRN [id number]) as of 3/26/2018 11:33   Ref. Range 3/25/2018 07:37 3/25/2018 11:22 3/25/2018 15:26 3/25/2018 21:36   GLUCOSE,FAST - POC Latest Ref Range: 70 - 110 mg/dL 176 (H) 268 (H) 338 (H) 293 (H)     Results for [facility] (MRN [id number]) as of 3/26/2018 11:33   Ref. Range 3/26/2018 07:32 3/26/2018 11:16   GLUCOSE,FAST - POC Latest Ref Range: 70 - 110 mg/dL 188 (H) 234 (H)     Current A1C: 8.8.% (03/22/2018) is equivalent to average blood glucose of 206 mg/dL during the past 2-3 months.     Current hospital diabetes medications:  Basal lantus insulin 45 units daily at bedtime starting 03/26/2018. Correctional lispro insulin ACHS. Very resistant dose. Total daily dose insulin requirement previous day: 03/25/2018  Lantus: 41 units  Lispro: 33 units  TDD: 74 units of insulin    Home diabetes medications: Patient reported on 03/23/2018:  Lantus insulin 30 units dailiy. Diet: Diabetic consistent carb regular; FR 1500 ml    Goals:  Blood glucose will be within target range of  mg/dL by 03/29/2018.     Education:  _X__  Refer to Diabetes Education Record: 03/23/2018.             ___  Education not indicated at this time    Marisabel Foote RN [facility]  Pager: [phone]

## 2018-03-26 NOTE — PROGRESS NOTES
Care Management Interventions  PCP Verified by CM: Yes  Mode of Transport at Discharge:  (family)  Transition of Care Consult (CM Consult): Discharge Planning, Assisted Living  Physical Therapy Consult: Yes  Occupational Therapy Consult: Yes  Current Support Network: Assisted Living, Family Lives Nearby  Confirm Follow Up Transport: Family  Plan discussed with Pt/Family/Caregiver: Yes  Discharge Location  Discharge Placement: Assisted Living     CM was informed of plans to discharge pt back to [facility]. CM contacted facility and spoke with Lilliana Rushing (resident care director) who shares that pt was independent with ADLs and ambulation and pt will need to be at that level in order to return to facility. Physician has been informed. Another call made to resident care director informing her that pt was able to ambulate with the assistance of a rolling walker. Message was left on voicemail for director to return call. 11:49pm  CM attempted to contact pt's spouse on updates on facilities she visited. Memory was full and CM was not able to leave message. CM was given an optional cell # ([phone]) to contact pt's spouse Mrs. Dunne. Message left on voicemail requesting for call back. 4:30pm  Pt's spouse has arrived to this facility. She is informed that attempts were made to contact her to inform of pt's return to facility. Call made to [employer] requesting to speak with resident care director or . Call was transferred to a line which kept ringing and automatically hung up.     4:45pm  CM met with pt's spouse. She is aware of plans for discharge and understands that discharge was cancelled since we were unable to communicate with her. Pt's spouse is aware of plans for discharge tomorrow.

## 2018-03-26 NOTE — PROGRESS NOTES
I have assumed care of Alon Charles Poe. He was assessed after bedside report. He is currently sitting in a recliner.

## 2018-03-26 NOTE — PROGRESS NOTES
PT orders received, chart reviewed. Pt unable to participate with PT due to:  []  Nausea/vomiting  [x]  Eating lunch  []  Pain  []  Pt lethargic  []  Off Unit  Will f/u later as patient's schedule allows. Thank you.   Carina Avila PT, DPT

## 2018-03-27 VITALS
HEART RATE: 59 BPM | RESPIRATION RATE: 18 BRPM | SYSTOLIC BLOOD PRESSURE: 146 MMHG | WEIGHT: 160.94 LBS | DIASTOLIC BLOOD PRESSURE: 65 MMHG | HEIGHT: 70 IN | TEMPERATURE: 97.3 F | OXYGEN SATURATION: 96 % | BODY MASS INDEX: 23.04 KG/M2

## 2018-03-27 LAB
ANION GAP SERPL CALC-SCNC: 6 MMOL/L (ref 3–18)
BUN SERPL-MCNC: 29 MG/DL (ref 7–18)
BUN/CREAT SERPL: 24 (ref 12–20)
CALCIUM SERPL-MCNC: 7.8 MG/DL (ref 8.5–10.1)
CHLORIDE SERPL-SCNC: 103 MMOL/L (ref 100–108)
CO2 SERPL-SCNC: 30 MMOL/L (ref 21–32)
CREAT SERPL-MCNC: 1.23 MG/DL (ref 0.6–1.3)
ERYTHROCYTE [DISTWIDTH] IN BLOOD BY AUTOMATED COUNT: 15.4 % (ref 11.6–14.5)
GLUCOSE BLD STRIP.AUTO-MCNC: 96 MG/DL (ref 70–110)
GLUCOSE SERPL-MCNC: 188 MG/DL (ref 74–99)
HCT VFR BLD AUTO: 46.3 % (ref 36–48)
HGB BLD-MCNC: 15.2 G/DL (ref 13–16)
MCH RBC QN AUTO: 27.4 PG (ref 24–34)
MCHC RBC AUTO-ENTMCNC: 32.8 G/DL (ref 31–37)
MCV RBC AUTO: 83.4 FL (ref 74–97)
PLATELET # BLD AUTO: 242 K/UL (ref 135–420)
PMV BLD AUTO: 11.4 FL (ref 9.2–11.8)
POTASSIUM SERPL-SCNC: 4 MMOL/L (ref 3.5–5.5)
RBC # BLD AUTO: 5.55 M/UL (ref 4.7–5.5)
SODIUM SERPL-SCNC: 139 MMOL/L (ref 136–145)
WBC # BLD AUTO: 13.2 K/UL (ref 4.6–13.2)

## 2018-03-27 PROCEDURE — 36415 COLL VENOUS BLD VENIPUNCTURE: CPT | Performed by: PHYSICIAN ASSISTANT

## 2018-03-27 PROCEDURE — 85027 COMPLETE CBC AUTOMATED: CPT | Performed by: PHYSICIAN ASSISTANT

## 2018-03-27 PROCEDURE — 80048 BASIC METABOLIC PNL TOTAL CA: CPT | Performed by: PHYSICIAN ASSISTANT

## 2018-03-27 PROCEDURE — 74011250637 HC RX REV CODE- 250/637: Performed by: EMERGENCY MEDICINE

## 2018-03-27 PROCEDURE — 74011250637 HC RX REV CODE- 250/637: Performed by: PHYSICIAN ASSISTANT

## 2018-03-27 PROCEDURE — 82962 GLUCOSE BLOOD TEST: CPT

## 2018-03-27 PROCEDURE — 74011250636 HC RX REV CODE- 250/636: Performed by: INTERNAL MEDICINE

## 2018-03-27 PROCEDURE — 74011250637 HC RX REV CODE- 250/637: Performed by: INTERNAL MEDICINE

## 2018-03-27 RX ORDER — OSELTAMIVIR PHOSPHATE 30 MG/1
30 CAPSULE ORAL 2 TIMES DAILY
Qty: 5 CAP | Refills: 0 | Status: ON HOLD | OUTPATIENT
Start: 2018-03-27 | End: 2019-12-10

## 2018-03-27 RX ADMIN — ISOSORBIDE MONONITRATE 30 MG: 30 TABLET, EXTENDED RELEASE ORAL at 11:04

## 2018-03-27 RX ADMIN — ASPIRIN 81 MG: 81 TABLET, COATED ORAL at 11:04

## 2018-03-27 RX ADMIN — GABAPENTIN 300 MG: 300 CAPSULE ORAL at 11:04

## 2018-03-27 RX ADMIN — CIPROFLOXACIN HYDROCHLORIDE 1 DROP: 3 SOLUTION/ DROPS OPHTHALMIC at 11:05

## 2018-03-27 RX ADMIN — METOPROLOL SUCCINATE 50 MG: 50 TABLET, EXTENDED RELEASE ORAL at 11:04

## 2018-03-27 RX ADMIN — HEPARIN SODIUM 5000 UNITS: 5000 INJECTION, SOLUTION INTRAVENOUS; SUBCUTANEOUS at 07:23

## 2018-03-27 RX ADMIN — OSELTAMIVIR PHOSPHATE 30 MG: 30 CAPSULE ORAL at 11:04

## 2018-03-27 RX ADMIN — SOLIFENACIN SUCCINATE 5 MG: 5 TABLET, FILM COATED ORAL at 11:04

## 2018-03-27 RX ADMIN — CIPROFLOXACIN HYDROCHLORIDE 1 DROP: 3 SOLUTION/ DROPS OPHTHALMIC at 07:24

## 2018-03-27 RX ADMIN — FUROSEMIDE 20 MG: 20 TABLET ORAL at 11:04

## 2018-03-27 NOTE — DISCHARGE SUMMARY
ADDENDUM TO DISCHARGE SUMMARY:   Visit Vitals    /65 (BP 1 Location: Right arm)    Pulse (!) 59    Temp 97.3 °F (36.3 °C)    Resp 18    Ht 5' 10.08\" (1.78 m)  Comment: bmi    Wt 73 kg (160 lb 15 oz)    SpO2 96%    BMI 23.04 kg/m2     Current Discharge Medication List      START taking these medications    Details   guaiFENesin (ROBITUSSIN) 100 mg/5 mL liquid Take 5 mL by mouth every four (4) hours as needed for Cough. Qty: 1 Bottle, Refills: 0      docusate sodium (COLACE) 100 mg capsule Take 1 Cap by mouth two (2) times daily as needed for Constipation for up to 90 days. Qty: 60 Cap, Refills: 0      ciprofloxacin HCl (CILOXAN) 0.3 % ophthalmic solution Administer 1 Drop to both eyes every four (4) hours (while awake). For 3 more days. End 3/29/2018  Indications: BACTERIAL CONJUNCTIVITIS  Qty: 2 mL, Refills: 0      albuterol-ipratropium (DUO-NEB) 2.5 mg-0.5 mg/3 ml nebu 3 mL by Nebulization route every six (6) hours as needed. Qty: 30 Nebule, Refills: 0      insulin lispro (HUMALOG KWIKPEN INSULIN) 200 unit/mL (3 mL) inpn For Blood Sugar (mg/dL) of:     Less than 150 =   0 units           150 -199 =   2 units  200 -249 =   4 units  250 -299 =   6 units  300 -349 =   8 units  350 and above =   10 units  Check BG then Inject insulin per SS 3 times daily before meals and at bedtime  Indications: type 2 diabetes mellitus  Qty: 1 Pen, Refills: 0      Blood-Glucose Meter monitoring kit Use as directed  Qty: 1 Kit, Refills: 0      lancets 23 gauge misc 1 Box by Does Not Apply route Before breakfast, lunch, and dinner.  Use as directed  Qty: 1 Lancet, Refills: 0      alcohol swabs (ALCOHOL PREP SWABS) padm Use as directed  Qty: 100 Pad, Refills: 0      predniSONE (DELTASONE) 10 mg tablet Take 4 tabs for 1 day, then 3 tabs for 3 days, then 2 tabs for 3 days, then 1 tab by mouth for 3 days  Indications: COPD exacerbation  Qty: 22 Tab, Refills: 0      famotidine (PEPCID) 20 mg tablet Take 1 Tab by mouth two (2) times a day. Indications: PREVENTION OF STRESS ULCER  Qty: 30 Tab, Refills: 0         CONTINUE these medications which have CHANGED    Details   oseltamivir (TAMIFLU) 30 mg capsule Take 1 Cap by mouth two (2) times a day. Indications: INFLUENZA  Qty: 5 Cap, Refills: 0      acetaminophen (TYLENOL) 500 mg tablet Take 1 Tab by mouth every six (6) hours as needed. Indications: pain  Qty: 20 Tab, Refills: 0      aspirin delayed-release 81 mg tablet Take 1 Tab by mouth daily. Indications: Cerebral Thromboembolism Prevention  Qty: 30 Tab, Refills: 0      cycloSPORINE (RESTASIS) 0.05 % ophthalmic emulsion Administer 1 Drop to both eyes every twelve (12) hours. Indications: Dry Eye  Qty: 15 Each, Refills: 0      gabapentin (NEURONTIN) 300 mg capsule Take 1 Cap by mouth daily. Indications: NEUROPATHIC PAIN, Restless Legs Syndrome  Qty: 30 Cap, Refills: 0      insulin glargine (LANTUS SOLOSTAR U-100 INSULIN) 100 unit/mL (3 mL) inpn 35 Units by SubCUTAneous route nightly. Indications: type 2 diabetes mellitus  Qty: 1 Pen, Refills: 3      isosorbide mononitrate ER (IMDUR) 30 mg tablet Take 1 Tab by mouth daily. Qty: 30 Tab, Refills: 0    Associated Diagnoses: Essential hypertension      metoprolol succinate (TOPROL-XL) 50 mg XL tablet Take 1 Tab by mouth daily. Indications: hypertension  Qty: 30 Tab, Refills: 0      multivit-min-FA-lycopen-lutein (CENTRUM SILVER) 0.4-300-250 mg-mcg-mcg tab Take 1 tab daily  Qty: 30 Tab, Refills: 0      Insulin Needles, Disposable, (SHAWN PEN NEEDLE) 32 gauge x 5/32\" ndle Use as directed. Qty: 100 Pen Needle, Refills: 0      rosuvastatin (CRESTOR) 10 mg tablet Take 1 Tab by mouth nightly. Qty: 30 Tab, Refills: 0      OTHER Incentive spirometry- Use as directed  Qty: 1 Each, Refills: 0    Associated Diagnoses: Influenza B      solifenacin (VESICARE) 5 mg tablet Take 1 Tab by mouth daily.  Indications: Bladder Hyperactivity, INCREASED URINARY FREQUENCY, URINARY URGE INCONTINENCE, URINARY URGENCY  Qty: 30 Tab, Refills: 0      temazepam (RESTORIL) 7.5 mg capsule Take 1 Cap by mouth nightly as needed for Sleep. Max Daily Amount: 7.5 mg.  Qty: 12 Cap, Refills: 0    Associated Diagnoses: Other insomnia      furosemide (LASIX) 20 mg tablet Take 1 Tab by mouth daily. Indications: Edema  Qty: 30 Tab, Refills: 0         CONTINUE these medications which have NOT CHANGED    Details   COMPOUNDMAX BASE crea 240 g by Does Not Apply route four (4) times daily. Anti-Inflam Meloxicam 0.09% Topirmate 1% Methocarbamol 2%  Lidocaine 2% Prilocaine 2%  Qty: 240 g, Refills: 3      CALCIUM CARBONATE (CALTRATE 600 PO) Take 1 Tab by mouth daily. STOP taking these medications       ascorbic acid, vitamin C, (VITAMIN C) 250 mg tablet Comments:   Reason for Stopping:             Subjective:   Reports continued non-productive cough, no chest pain, n/v, +good appetite. Last BM 3 days ago, denies constipation or abdominal discomfort. Objective:   General:  Awake, alert, NAD  Cardiovascular:  RRR  Pulmonary: CTA  GI:  NT, normal BS  Extremities:  No edema or cyanosis  Eye: chronic red loose eyelids. Small purulent fluid over eyelashes and eye- improved     Assessment / Plan:   Patient remains stable for for discharge.    Tamiflu adjusted to reflect additional doses that were received in hospital    Signed By: Nolan Melton NP     March 27, 2018

## 2018-03-27 NOTE — DISCHARGE INSTRUCTIONS
Discharge Instructions    Patient: Toño Godinez MRN: [id number]  CSN: [id number]    YOB: 1935  Age: [de-identified] y.o. Sex: male    DOA: 3/22/2018 LOS:  LOS: 5 days   Discharge Date:      DIET:  Diabetic Diet    ACTIVITY: Activity as tolerated    FOLLOW UP CARE:  Dr. Tutu Wall MD in [address] Macias NP  3/27/2018 9:19 AM           Learning About Asthma Triggers  What are asthma triggers? When you have asthma, certain things can make your symptoms worse. These are called triggers. Learn what triggers an asthma attack for you, and avoid the triggers when you can. Common triggers include colds, smoke, air pollution, dust, pollen, pets, stress, and cold air. How do asthma triggers affect you? Triggers can make it harder for your lungs to work as they should. They can lead to sudden breathing problems and other symptoms. When you are around a trigger, an asthma attack is more likely. If your symptoms are severe, you may need emergency treatment or have to go to the hospital for treatment. What can you do to avoid triggers? The first thing is to know your triggers. When you are having symptoms, note the things around you that might be causing them. Then look for patterns that may be triggering your symptoms. Record your triggers on a piece of paper or in an asthma diary. When you have your list of possible triggers, work with your doctor to find ways to avoid them. Avoid colds and flu. Get a pneumococcal vaccine shot. If you have had one before, ask your doctor whether you need a second dose. Get a flu vaccine every year, as soon as it's available. If you must be around people with colds or the flu, wash your hands often. Here are some ways to avoid a few common triggers. · Do not smoke or allow others to smoke around you. If you need help quitting, talk to your doctor about stop-smoking programs and medicines. These can increase your chances of quitting for good.   · If there is a lot of pollution, pollen, or dust outside, stay at home and keep your windows closed. Use an air conditioner or air filter in your home. Check your local weather report or newspaper for air quality and pollen reports. What else should you know? · Take your controller medicine every day, not just when you have symptoms. It helps prevent problems before they occur. · Your doctor may suggest that you check how well your lungs are working by measuring your peak expiratory flow (PEF) throughout the day. Your PEF may drop when you are near things that trigger symptoms. Where can you learn more? Go to http://daBootup Labsahsan.info/. Enter K303 in the search box to learn more about \"Learning About Asthma Triggers. \"  Current as of: May 12, 2017  Content Version: 11.4  © 9282-5164 [employer] | [employer]. Care instructions adapted under license by [employer] (which disclaims liability or warranty for this information). If you have questions about a medical condition or this instruction, always ask your healthcare professional. [place] [address] any warranty or liability for your use of this information. Learning About Asthma Triggers  What are asthma triggers? When you have asthma, certain things can make your symptoms worse. These are called triggers. Learn what triggers an asthma attack for you, and avoid the triggers when you can. Common triggers include colds, smoke, air pollution, dust, pollen, pets, stress, and cold air. How do asthma triggers affect you? Triggers can make it harder for your lungs to work as they should. They can lead to sudden breathing problems and other symptoms. When you are around a trigger, an asthma attack is more likely. If your symptoms are severe, you may need emergency treatment or have to go to the hospital for treatment. What can you do to avoid triggers? The first thing is to know your triggers.   When you are having symptoms, note the things around you that might be causing them. Then look for patterns that may be triggering your symptoms. Record your triggers on a piece of paper or in an asthma diary. When you have your list of possible triggers, work with your doctor to find ways to avoid them. Avoid colds and flu. Get a pneumococcal vaccine shot. If you have had one before, ask your doctor whether you need a second dose. Get a flu vaccine every year, as soon as it's available. If you must be around people with colds or the flu, wash your hands often. Here are some ways to avoid a few common triggers. · Do not smoke or allow others to smoke around you. If you need help quitting, talk to your doctor about stop-smoking programs and medicines. These can increase your chances of quitting for good. · If there is a lot of pollution, pollen, or dust outside, stay at home and keep your windows closed. Use an air conditioner or air filter in your home. Check your local weather report or newspaper for air quality and pollen reports. What else should you know? · Take your controller medicine every day, not just when you have symptoms. It helps prevent problems before they occur. · Your doctor may suggest that you check how well your lungs are working by measuring your peak expiratory flow (PEF) throughout the day. Your PEF may drop when you are near things that trigger symptoms. Where can you learn more? Go to http://da-ahsan.info/. Enter V240 in the search box to learn more about \"Learning About Asthma Triggers. \"  Current as of: May 12, 2017  Content Version: 11.4  © 8433-9406 [employer]. Care instructions adapted under license by [employer] (which disclaims liability or warranty for this information).  If you have questions about a medical condition or this instruction, always ask your healthcare professional. [place] [address] any warranty or liability for your use of this information.

## 2018-03-27 NOTE — PROGRESS NOTES
Care Management Interventions  PCP Verified by CM: Yes  Mode of Transport at Discharge: [address]  [address] Time of Discharge: 1100  Transition of Care Consult (CM Consult): Discharge Planning, Assisted Living  Physical Therapy Consult: Yes  Occupational Therapy Consult: Yes  Current Support Network: Assisted Living  Confirm Follow Up Transport: Family  Plan discussed with Pt/Family/Caregiver: Yes  Discharge Location  Discharge Placement: Assisted Living     Pt is being discharged back to [facility] today. Pt's spouse was informed yesterday of plans for today. CM attempted to contact pt's spouse to inform of time for . Message was left on her voicemail. CM contacted [facility] and Ailinphilipp Hansen was made aware of pt's discharge and transportation . Pt, pt's nurse and NP informed. Nebulizer order called in to Erica Velez ([facility]) yesterday. Message left for [facility] rep requesting updates on DME. Call back received from [facility] rep who shares that nebulizer order will be sent to [facility] for delivery of DME to pt.

## 2018-03-28 LAB
BACTERIA SPEC CULT: NORMAL
BACTERIA SPEC CULT: NORMAL
SERVICE CMNT-IMP: NORMAL
SERVICE CMNT-IMP: NORMAL

## 2018-06-11 ENCOUNTER — HOSPITAL ENCOUNTER (EMERGENCY)
Age: 83
Discharge: HOME | End: 2018-06-12
Attending: EMERGENCY MEDICINE
Payer: MEDICARE

## 2018-06-11 ENCOUNTER — APPOINTMENT (OUTPATIENT)
Dept: CT IMAGING | Age: 83
End: 2018-06-11
Attending: EMERGENCY MEDICINE
Payer: MEDICARE

## 2018-06-11 VITALS
SYSTOLIC BLOOD PRESSURE: 150 MMHG | RESPIRATION RATE: 18 BRPM | HEIGHT: 70 IN | HEART RATE: 67 BPM | DIASTOLIC BLOOD PRESSURE: 86 MMHG | WEIGHT: 194 LBS | OXYGEN SATURATION: 100 % | BODY MASS INDEX: 27.77 KG/M2 | TEMPERATURE: 98.7 F

## 2018-06-11 DIAGNOSIS — S39.012A STRAIN OF LUMBAR REGION, INITIAL ENCOUNTER: Primary | ICD-10-CM

## 2018-06-11 DIAGNOSIS — M54.50 ACUTE LEFT-SIDED LOW BACK PAIN WITHOUT SCIATICA: ICD-10-CM

## 2018-06-11 PROCEDURE — 99284 EMERGENCY DEPT VISIT MOD MDM: CPT

## 2018-06-11 PROCEDURE — 74011250637 HC RX REV CODE- 250/637: Performed by: EMERGENCY MEDICINE

## 2018-06-11 PROCEDURE — 72128 CT CHEST SPINE W/O DYE: CPT

## 2018-06-11 PROCEDURE — 72131 CT LUMBAR SPINE W/O DYE: CPT

## 2018-06-11 RX ORDER — ACETAMINOPHEN 500 MG
1000 TABLET ORAL
Status: COMPLETED | OUTPATIENT
Start: 2018-06-11 | End: 2018-06-11

## 2018-06-11 RX ORDER — LIDOCAINE 50 MG/G
PATCH TOPICAL
Qty: 15 EACH | Refills: 0 | Status: ON HOLD | OUTPATIENT
Start: 2018-06-11 | End: 2019-12-10

## 2018-06-11 RX ADMIN — ACETAMINOPHEN 1000 MG: 500 TABLET, FILM COATED ORAL at 08:40

## 2018-06-11 NOTE — ED TRIAGE NOTES
Pt arrived in ED via EMS, medic reports pt a resident at [address] [place] [address] for the last 6 months. Pt reported to medic that 3 weeks ago he may have twisted his left hip, pt reported an increase in left pain for the last 3 weeks, pt pressed call bell in facility no one answered pt lowered self onto floor, pt denies falling or other trauma. Medic reports pt has an increase in pain with weight baring.

## 2018-06-11 NOTE — ED PROVIDER NOTES
EMERGENCY DEPARTMENT HISTORY AND PHYSICAL EXAM    7:24 AM      Date: 6/11/2018  Patient Name: Parag Ardon    History of Presenting Illness     Chief Complaint   Patient presents with    Hip Pain         History Provided By: Patient, EMS    Chief Complaint: Left lower back pain  Duration:  Weeks  Timing:  Acute, Constant and Worsening  Location: Left lower back  Quality:   Severity: Moderate  Modifying Factors: Sleeping improves the pain, standing up first thing in the morning and walking worsens the pain  Associated Symptoms: denies any other associated signs or symptoms      Additional History (Context): Parag Ardon is a [de-identified] y.o. male with a hx of HTN, diabetes, CKD, CABG, CAD, COPD, and stroke who presents with complaints of left lower back pain that started three weeks ago after twisting his hip while walking his dog. [place] area of [place] where he twisted his left hip. Initially he did not have much pain but the pain has progressively worsened since then. The pain is improved when sleeping and worsened when first getting up in the morning and when walking. This morning the pain was so bad that he was unable to get up, so he lowered himself onto the floor of his room and scooted over to his emergency button and was found on the floor by nursing home staff. He states that he was able to walk yesterday. He reports that he has been in the nursing home for the past two years since he had surgery. The staff told EMS that the pt had reported being given a large dose Tylenol 3-4 days ago for his pain by the staff; the staff state that no staff members provided him with Tylenol. He does not have a hx of dementia per nursing home staff. He takes ASA but no other anticoagulants. He denies chest pain, abdominal pain, SOB, dysuria, hematuria, and any further complaints.     PCP: Linda Loo NP    Current Outpatient Prescriptions Medication Sig Dispense Refill    lidocaine (LIDODERM) 5 % Apply patch to the affected area for 12 hours a day and remove for 12 hours a day. 15 Each 0    oseltamivir (TAMIFLU) 30 mg capsule Take 1 Cap by mouth two (2) times a day. Indications: INFLUENZA 5 Cap 0    acetaminophen (TYLENOL) 500 mg tablet Take 1 Tab by mouth every six (6) hours as needed. Indications: pain 20 Tab 0    aspirin delayed-release 81 mg tablet Take 1 Tab by mouth daily. Indications: Cerebral Thromboembolism Prevention 30 Tab 0    cycloSPORINE (RESTASIS) 0.05 % ophthalmic emulsion Administer 1 Drop to both eyes every twelve (12) hours. Indications: Dry Eye 15 Each 0    gabapentin (NEURONTIN) 300 mg capsule Take 1 Cap by mouth daily. Indications: NEUROPATHIC PAIN, Restless Legs Syndrome 30 Cap 0    insulin glargine (LANTUS SOLOSTAR U-100 INSULIN) 100 unit/mL (3 mL) inpn 35 Units by SubCUTAneous route nightly. Indications: type 2 diabetes mellitus 1 Pen 3    isosorbide mononitrate ER (IMDUR) 30 mg tablet Take 1 Tab by mouth daily. 30 Tab 0    metoprolol succinate (TOPROL-XL) 50 mg XL tablet Take 1 Tab by mouth daily. Indications: hypertension 30 Tab 0    multivit-min-FA-lycopen-lutein (CENTRUM SILVER) 0.4-300-250 mg-mcg-mcg tab Take 1 tab daily 30 Tab 0    Insulin Needles, Disposable, (SHAWN PEN NEEDLE) 32 gauge x 5/32\" ndle Use as directed. 100 Pen Needle 0    rosuvastatin (CRESTOR) 10 mg tablet Take 1 Tab by mouth nightly. 30 Tab 0    OTHER Incentive spirometry- Use as directed 1 Each 0    solifenacin (VESICARE) 5 mg tablet Take 1 Tab by mouth daily. Indications: Bladder Hyperactivity, INCREASED URINARY FREQUENCY, URINARY URGE INCONTINENCE, URINARY URGENCY 30 Tab 0    temazepam (RESTORIL) 7.5 mg capsule Take 1 Cap by mouth nightly as needed for Sleep. Max Daily Amount: 7.5 mg. 12 Cap 0    furosemide (LASIX) 20 mg tablet Take 1 Tab by mouth daily.  Indications: Edema 30 Tab 0    guaiFENesin (ROBITUSSIN) 100 mg/5 mL liquid Take 5 mL by mouth every four (4) hours as needed for Cough. 1 Bottle 0    docusate sodium (COLACE) 100 mg capsule Take 1 Cap by mouth two (2) times daily as needed for Constipation for up to 90 days. 60 Cap 0    ciprofloxacin HCl (CILOXAN) 0.3 % ophthalmic solution Administer 1 Drop to both eyes every four (4) hours (while awake). For 3 more days. End 3/29/2018  Indications: BACTERIAL CONJUNCTIVITIS 2 mL 0    albuterol-ipratropium (DUO-NEB) 2.5 mg-0.5 mg/3 ml nebu 3 mL by Nebulization route every six (6) hours as needed. 30 Nebule 0    insulin lispro (HUMALOG KWIKPEN INSULIN) 200 unit/mL (3 mL) inpn For Blood Sugar (mg/dL) of:     Less than 150 =   0 units           150 -199 =   2 units  200 -249 =   4 units  250 -299 =   6 units  300 -349 =   8 units  350 and above =   10 units  Check BG then Inject insulin per SS 3 times daily before meals and at bedtime  Indications: type 2 diabetes mellitus 1 Pen 0    Blood-Glucose Meter monitoring kit Use as directed 1 Kit 0    lancets 23 gauge misc 1 Box by Does Not Apply route Before breakfast, lunch, and dinner. Use as directed 1 Lancet 0    alcohol swabs (ALCOHOL PREP SWABS) padm Use as directed 100 Pad 0    predniSONE (DELTASONE) 10 mg tablet Take 4 tabs for 1 day, then 3 tabs for 3 days, then 2 tabs for 3 days, then 1 tab by mouth for 3 days  Indications: COPD exacerbation 22 Tab 0    famotidine (PEPCID) 20 mg tablet Take 1 Tab by mouth two (2) times a day. Indications: PREVENTION OF STRESS ULCER 30 Tab 0    COMPOUNDMAX BASE crea 240 g by Does Not Apply route four (4) times daily. Anti-Inflam Meloxicam 0.09% Topirmate 1% Methocarbamol 2%  Lidocaine 2% Prilocaine 2% 240 g 3    CALCIUM CARBONATE (CALTRATE 600 PO) Take 1 Tab by mouth daily.          Past History     Past Medical History:  Past Medical History:   Diagnosis Date    Angina     Anxiety     Arthritis     CAD (coronary artery disease)     s/p drug-eluting stents to RCA for high-grade stenoses in 2/09    Cardiac catheterization 08/24/2016    Mod calcification. Co-dom. oLM 50-70%. LAD 30%. Dx 30%. Cx 70% focal.  OM 80% focal.  RCA 30%.  Cardiac echocardiogram 08/23/2016    Tech difficult. EF 55-60%. No WMA. Normal diastolic fx. Lipomatous septal hypertrophy.  Cardiac nuclear imaging test, abnormal 08/23/2016    Sm reversible inferior defect concerning for ischemia. Mild inferior hypk. EF 68%. Neg EKG on pharm stress test.    Carotid duplex 08/26/2016    Mild <50% CYRUS stenosis. Chronic LICA occlusion. Similar to study of 3/29/16.     Carotid stenosis (HCC)     w/ known total occlusion of lt internal carotid artery; followed by pts vascular surgeon    Chronic kidney disease     COPD (chronic obstructive pulmonary disease) ([address]     Dementia     Depression     Diabetes ([address]     type 2    Dyslipidemia     on Crestor; managed by pts PCP    Dyspnea     Heart disease     Hypercholesterolemia     Hypertension     Low back pain     Osteoarthrosis involving multiple sites     Skin cancer ([address]     squamous cell lesions of the skin    Stroke ([address]     Venous (peripheral) insufficiency        Past Surgical History:  Past Surgical History:   Procedure Laterality Date    CARDIAC CATHETERIZATION  8/24/2016         CORONARY ARTERY ANGIOGRAM  8/24/2016         HX ANGIOPLASTY  2009    2 stents placed and heart attack during the procedure    HX COLONOSCOPY  10/2003    neg; declines f/u    HX CORONARY ARTERY BYPASS GRAFT  09/2016    HX HEART CATHETERIZATION  5/2013    HX HERNIA REPAIR      1970s    HX OTHER SURGICAL      groin surgery    HX TONSIL AND ADENOIDECTOMY      IR INTRAVASCULAR ULTRASOUND INITIAL  8/24/2016         LV ANGIOGRAPHY  8/24/2016            Family History:  Family History   Problem Relation Age of Onset    Parkinsonism Mother     Hypertension Mother     Cancer Father      lung    Heart defect Brother        Social History:  Social History   Substance Use Topics  Smoking status: Former Smoker     Years: 2.00     Quit date: 1/1/1955    Smokeless tobacco: Never Used    Alcohol use Yes      Comment: drinks weekly couple beers       Allergies: Allergies   Allergen Reactions    Amaryl [Glimepiride] Drowsiness    Lipitor [Atorvastatin] Myalgia    Niacin Other (comments)     Facial blistering, swelling in face    Pravachol [Pravastatin] Myalgia    Zocor [Simvastatin] Myalgia         Review of Systems     Review of Systems   Constitutional: Negative for chills and fever. Respiratory: Negative for shortness of breath. Cardiovascular: Negative for chest pain. Gastrointestinal: Negative for abdominal pain, nausea and vomiting. Genitourinary: Negative for difficulty urinating, dysuria and hematuria. Musculoskeletal: Positive for back pain. Skin: Negative for rash. Neurological: Negative for numbness. All other systems reviewed and are negative. Physical Exam     Visit Vitals    /86    Pulse 64    Temp 98.7 °F (37.1 °C)    Resp 24    Ht 5' 10\" (1.778 m)    Wt 88 kg (194 lb)    SpO2 97%    BMI 27.84 kg/m2         Physical Exam   Constitutional: He is oriented to person, place, and time. He appears well-developed and well-nourished. HENT:   Head: Normocephalic and atraumatic. Neck: Neck supple. No JVD present. Abdominal: Soft. He exhibits no distension. There is no tenderness. There is no rebound and no guarding. Musculoskeletal: He exhibits no edema. L hip, ROM intact, no joint tenderness  L lumbar paraspinal tenderness and spasm noted  DP 2+ BL  RLE 2+ edema, LLE no edema  Gross sensation intact Bl LE  Strength 5/5 BL LE   Neurological: He is alert and oriented to person, place, and time. Skin: Skin is warm and dry. No rash noted. No erythema. Psychiatric: Judgment normal.         Diagnostic Study Results     Labs -  No results found for this or any previous visit (from the past 12 hour(s)).     Radiologic Studies -   CT SPINE LUMB WO CONT   Final Result   IMPRESSION:     1. No evidence for acute fracture or malalignment.     2. Multilevel degenerative findings, as detailed above. -Relatively most prominent moderate degree spinal canal stenosis at L4/L5,  minimally progressed since 2014.  -Relatively most prominent foraminal narrowing at L5/S1. Per Dr. Coy Brewster, Radiology   CT SPINE [facility] WO CONT   Final Result   IMPRESSION:      1. No evidence of acute fracture or malalignment of the thoracic spine.       2. Degenerative findings, as described above.     Per Dr. Coy Brewster, Radiology         Medical Decision Making   I am the first provider for this patient. I reviewed the vital signs, available nursing notes, past medical history, past surgical history, family history and social history. Vital Signs-Reviewed the patient's vital signs. Pulse Oximetry Analysis -  99% on room air (Interpretation)WNL    Cardiac Monitor:  Rate: 62 BPM  Rhythm:  Normal Sinus Rhythm       Records Reviewed: Nursing Notes and Old Medical Records (Time of Review: 7:24 AM)    ED Course: Progress Notes, Reevaluation, and Consults:  9:25 AM Pt reevaluated at this time and is resting comfortably in NAD. Discussed results and findings, as well as, diagnosis and plan for discharge. Pt verbalizes understanding and agreement with plan. All questions addressed at this time. Provider Notes (Medical Decision Making): [de-identified] y/o male presents with L flank pain. No urinary sxs, worse with ambulating. No hip tenderness on exam and ROM intact so doubt occult hip fx. Will ct to eval for spinal fx due to age. No indication for labs, abd soft, non surgical.   Seems unlikely shingles due to duration of sxs. Diagnosis     Clinical Impression:   1. Strain of lumbar region, initial encounter    2.  Acute left-sided low back pain without sciatica        Disposition: Discharge    Follow-up Information     Follow up With Details Comments Contact Info    Jaylin Galvez, NP Call in 2 days  [facility]  [address] [facility] [address] [facility]      Archana Navas MD Call in 2 days  [facility] [address]  [phone]      [facility] EMERGENCY DEPT  As needed, If symptoms worsen [address] [facility] [address]  [phone]           Patient's Medications   Start Taking    LIDOCAINE (LIDODERM) 5 %    Apply patch to the affected area for 12 hours a day and remove for 12 hours a day. Continue Taking    ACETAMINOPHEN (TYLENOL) 500 MG TABLET    Take 1 Tab by mouth every six (6) hours as needed. Indications: pain    ALBUTEROL-IPRATROPIUM (DUO-NEB) 2.5 MG-0.5 MG/3 ML NEBU    3 mL by Nebulization route every six (6) hours as needed. ALCOHOL SWABS (ALCOHOL PREP SWABS) PADM    Use as directed    ASPIRIN DELAYED-RELEASE 81 MG TABLET    Take 1 Tab by mouth daily. Indications: Cerebral Thromboembolism Prevention    BLOOD-GLUCOSE METER MONITORING KIT    Use as directed    CALCIUM CARBONATE (CALTRATE 600 PO)    Take 1 Tab by mouth daily. CIPROFLOXACIN HCL (CILOXAN) 0.3 % OPHTHALMIC SOLUTION    Administer 1 Drop to both eyes every four (4) hours (while awake). For 3 more days. End 3/29/2018  Indications: BACTERIAL CONJUNCTIVITIS    COMPOUNDMAX BASE CREA    240 g by Does Not Apply route four (4) times daily. Anti-Inflam Meloxicam 0.09% Topirmate 1% Methocarbamol 2%  Lidocaine 2% Prilocaine 2%    CYCLOSPORINE (RESTASIS) 0.05 % OPHTHALMIC EMULSION    Administer 1 Drop to both eyes every twelve (12) hours. Indications: Dry Eye    DOCUSATE SODIUM (COLACE) 100 MG CAPSULE    Take 1 Cap by mouth two (2) times daily as needed for Constipation for up to 90 days. FAMOTIDINE (PEPCID) 20 MG TABLET    Take 1 Tab by mouth two (2) times a day. Indications: PREVENTION OF STRESS ULCER    FUROSEMIDE (LASIX) 20 MG TABLET    Take 1 Tab by mouth daily.  Indications: Edema    GABAPENTIN (NEURONTIN) 300 MG CAPSULE    Take 1 Cap by mouth daily. Indications: NEUROPATHIC PAIN, Restless Legs Syndrome    GUAIFENESIN (ROBITUSSIN) 100 MG/5 ML LIQUID    Take 5 mL by mouth every four (4) hours as needed for Cough. INSULIN GLARGINE (LANTUS SOLOSTAR U-100 INSULIN) 100 UNIT/ML (3 ML) INPN    35 Units by SubCUTAneous route nightly. Indications: type 2 diabetes mellitus    INSULIN LISPRO (HUMALOG KWIKPEN INSULIN) 200 UNIT/ML (3 ML) INPN    For Blood Sugar (mg/dL) of:     Less than 150 =   0 units           150 -199 =   2 units  200 -249 =   4 units  250 -299 =   6 units  300 -349 =   8 units  350 and above =   10 units  Check BG then Inject insulin per SS 3 times daily before meals and at bedtime  Indications: type 2 diabetes mellitus    INSULIN NEEDLES, DISPOSABLE, (SHAWN PEN NEEDLE) 32 GAUGE X 5/32\" NDLE    Use as directed. ISOSORBIDE MONONITRATE ER (IMDUR) 30 MG TABLET    Take 1 Tab by mouth daily. LANCETS 23 GAUGE MISC    1 Box by Does Not Apply route Before breakfast, lunch, and dinner. Use as directed    METOPROLOL SUCCINATE (TOPROL-XL) 50 MG XL TABLET    Take 1 Tab by mouth daily. Indications: hypertension    MULTIVIT-MIN-FA-LYCOPEN-LUTEIN (CENTRUM SILVER) 0.4-300-250 MG-MCG-MCG TAB    Take 1 tab daily    OSELTAMIVIR (TAMIFLU) 30 MG CAPSULE    Take 1 Cap by mouth two (2) times a day. Indications: INFLUENZA    OTHER    Incentive spirometry- Use as directed    PREDNISONE (DELTASONE) 10 MG TABLET    Take 4 tabs for 1 day, then 3 tabs for 3 days, then 2 tabs for 3 days, then 1 tab by mouth for 3 days  Indications: COPD exacerbation    ROSUVASTATIN (CRESTOR) 10 MG TABLET    Take 1 Tab by mouth nightly. SOLIFENACIN (VESICARE) 5 MG TABLET    Take 1 Tab by mouth daily. Indications: Bladder Hyperactivity, INCREASED URINARY FREQUENCY, URINARY URGE INCONTINENCE, URINARY URGENCY    TEMAZEPAM (RESTORIL) 7.5 MG CAPSULE    Take 1 Cap by mouth nightly as needed for Sleep. Max Daily Amount: 7.5 mg.    These Medications have changed    No medications on file   Stop Taking    No medications on file     _______________________________    Attestations:  Milagros [address] [place] [address] acting as a scribe for and in the presence of Bang Ahn DO      June 11, 2018 at 9:25 AM       Provider Attestation:      I personally performed the services described in the documentation, reviewed the documentation, as recorded by the scribe in my presence, and it accurately and completely records my words and actions.  June 11, 2018 at 9:25 AM - Bang Ahn DO    _______________________________

## 2018-06-11 NOTE — ED NOTES
Assumed care of pt. Report received from [facility]. Pt reports to ED with c/o intermittent L hip pain 2/10 \"aching\" at this time. Pt stating \"I was trying to get out of bed and I ended up on the floor. I didn't fall. \" pt is A&Ox4. Speech clear. resp even and unlabored. Will continue to monitor and intervene as necessary.

## 2018-06-11 NOTE — DISCHARGE INSTRUCTIONS
Back Pain: Care Instructions  Your Care Instructions    Back pain has many possible causes. It is often related to problems with muscles and ligaments of the back. It may also be related to problems with the nerves, discs, or bones of the back. Moving, lifting, standing, sitting, or sleeping in an awkward way can strain the back. Sometimes you don't notice the injury until later. Arthritis is another common cause of back pain. Although it may hurt a lot, back pain usually improves on its own within several weeks. Most people recover in 12 weeks or less. Using good home treatment and being careful not to stress your back can help you feel better sooner. Follow-up care is a key part of your treatment and safety. Be sure to make and go to all appointments, and call your doctor if you are having problems. It's also a good idea to know your test results and keep a list of the medicines you take. How can you care for yourself at home? · Sit or lie in positions that are most comfortable and reduce your pain. Try one of these positions when you lie down:  ¨ Lie on your back with your knees bent and supported by large pillows. ¨ Lie on the floor with your legs on the seat of a sofa or chair. Ron Ramp on your side with your knees and hips bent and a pillow between your legs. ¨ Lie on your stomach if it does not make pain worse. · Do not sit up in bed, and avoid soft couches and twisted positions. Bed rest can help relieve pain at first, but it delays healing. Avoid bed rest after the first day of back pain. · Change positions every 30 minutes. If you must sit for long periods of time, take breaks from sitting. Get up and walk around, or lie in a comfortable position. · Try using a heating pad on a low or medium setting for 15 to 20 minutes every 2 or 3 hours. Try a warm shower in place of one session with the heating pad. · You can also try an ice pack for 10 to 15 minutes every 2 to 3 hours.  Put a thin cloth between the ice pack and your skin. · Take pain medicines exactly as directed. ¨ If the doctor gave you a prescription medicine for pain, take it as prescribed. ¨ If you are not taking a prescription pain medicine, ask your doctor if you can take an over-the-counter medicine. · Take short walks several times a day. You can start with 5 to 10 minutes, 3 or 4 times a day, and work up to longer walks. Walk on level surfaces and avoid hills and stairs until your back is better. · Return to work and other activities as soon as you can. Continued rest without activity is usually not good for your back. · To prevent future back pain, do exercises to stretch and strengthen your back and stomach. Learn how to use good posture, safe lifting techniques, and proper body mechanics. When should you call for help? Call your doctor now or seek immediate medical care if:  ? · You have new or worsening numbness in your legs. ? · You have new or worsening weakness in your legs. (This could make it hard to stand up.)   ? · You lose control of your bladder or bowels. ? Watch closely for changes in your health, and be sure to contact your doctor if:  ? · Your pain gets worse. ? · You are not getting better after 2 weeks. Where can you learn more? Go to http://da-ahsan.info/. Enter I339 in the search box to learn more about \"Back Pain: Care Instructions. \"  Current as of: March 21, 2017  Content Version: 11.4  © 8863-2712 [employer]. Care instructions adapted under license by [employer] (which disclaims liability or warranty for this information). If you have questions about a medical condition or this instruction, always ask your healthcare professional. [place] [address] any warranty or liability for your use of this information. Learning About How to Have a Healthy Back  What causes back pain?     Back pain is often caused by overuse, strain, or injury. For example, people often hurt their backs playing sports or working in the yard, being jolted in a car accident, or lifting something too heavy. Aging plays a part too. Your bones and muscles tend to lose strength as you age, which makes injury more likely. The spongy discs between the bones of the spine (vertebrae) may suffer from wear and tear and no longer provide enough cushion between the bones. A disc that bulges or breaks open (herniated disc) can press on nerves, causing back pain. In some people, back pain is the result of arthritis, broken vertebrae caused by bone loss (osteoporosis), illness, or a spine problem. Although most people have back pain at one time or another, there are steps you can take to make it less likely. How can you have a healthy back? Reduce stress on your back through good posture  Slumping or slouching alone may not cause low back pain. But after the back has been strained or injured, bad posture can make pain worse. · Sleep in a position that maintains your back's normal curves and on a mattress that feels comfortable. Sleep on your side with a pillow between your knees, or sleep on your back with a pillow under your knees. These positions can reduce strain on your back. · Stand and sit up straight. \"Good posture\" generally means your ears, shoulders, and hips are in a straight line. · If you must stand for a long time, put one foot on a stool, ledge, or box. Switch feet every now and then. · Sit in a chair that is low enough to let you place both feet flat on the floor with both knees nearly level with your hips. If your chair or desk is too high, use a footrest to raise your knees. Place a small pillow, a rolled-up towel, or a lumbar roll in the curve of your back if you need extra support. · Try a kneeling chair, which helps tilt your hips forward. This takes pressure off your lower back. · Try sitting on an exercise ball.  It can rock from side to side, which helps keep your back loose. · When driving, keep your knees nearly level with your hips. Sit straight, and drive with both hands on the steering wheel. Your arms should be in a slightly bent position. Reduce stress on your back through careful lifting  · Squat down, bending at the hips and knees only. If you need to, put one knee to the floor and extend your other knee in front of you, bent at a right angle (half kneeling). · Press your chest straight forward. This helps keep your upper back straight while keeping a slight arch in your low back. · Hold the load as close to your body as possible, at the level of your belly button (navel). · Use your feet to change direction, taking small steps. · Lead with your hips as you change direction. Keep your shoulders in line with your hips as you move. · Set down your load carefully, squatting with your knees and hips only. Exercise and stretch your back  · Do some exercise on most days of the week, if your doctor says it is okay. You can walk, run, swim, or cycle. · Stretch your back muscles. Here are a few exercises to try:  Karn Noemier on your back, and gently pull one bent knee to your chest. Put that foot back on the floor, and then pull the other knee to your chest.  ¨ Do pelvic tilts. Lie on your back with your knees bent. Tighten your stomach muscles. Pull your belly button (navel) in and up toward your ribs. You should feel like your back is pressing to the floor and your hips and pelvis are slightly lifting off the floor. Hold for 6 seconds while breathing smoothly. ¨ Sit with your back flat against a wall. · Keep your core muscles strong. The muscles of your back, belly (abdomen), and buttocks support your spine. ¨ Pull in your belly and imagine pulling your navel toward your spine. Hold this for 6 seconds, then relax. Remember to keep breathing normally as you tense your muscles. ¨ Do curl-ups. Always do them with your knees bent.  Keep your low back on the floor, and curl your shoulders toward your knees using a smooth, slow motion. Keep your arms folded across your chest. If this bothers your neck, try putting your hands behind your neck (not your head), with your elbows spread apart. ¨ Lie on your back with your knees bent and your feet flat on the floor. Tighten your belly muscles, and then push with your feet and raise your buttocks up a few inches. Hold this position 6 seconds as you continue to breathe normally, then lower yourself slowly to the floor. Repeat 8 to 12 times. ¨ If you like group exercise, try Pilates or yoga. These classes have poses that strengthen the core muscles. Lead a healthy lifestyle  · Stay at a healthy weight to avoid strain on your back. · Do not smoke. Smoking increases the risk of osteoporosis, which weakens the spine. If you need help quitting, talk to your doctor about stop-smoking programs and medicines. These can increase your chances of quitting for good. Where can you learn more? Go to http://da-ahsan.info/. Enter L315 in the search box to learn more about \"Learning About How to Have a Healthy Back. \"  Current as of: March 21, 2017  Content Version: 11.4  © 8465-0963 [employer]. Care instructions adapted under license by [employer] (which disclaims liability or warranty for this information). If you have questions about a medical condition or this instruction, always ask your healthcare professional. [place] [address] any warranty or liability for your use of this information. Learning About Relief for Back Pain  What is back tension and strain? Back strain happens when you overstretch, or pull, a muscle in your back. You may hurt your back in an accident or when you exercise or lift something. Most back pain will get better with rest and time.  You can take care of yourself at home to help your back heal.  What can you do first to relieve back pain? When you first feel back pain, try these steps:  · Walk. Take a short walk (10 to 20 minutes) on a level surface (no slopes, hills, or stairs) every 2 to 3 hours. Walk only distances you can manage without pain, especially leg pain. · Relax. Find a comfortable position for rest. Some people are comfortable on the floor or a medium-firm bed with a small pillow under their head and another under their knees. Some people prefer to lie on their side with a pillow between their knees. Don't stay in one position for too long. · Try heat or ice. Try using a heating pad on a low or medium setting, or take a warm shower, for 15 to 20 minutes every 2 to 3 hours. Or you can buy single-use heat wraps that last up to 8 hours. You can also try an ice pack for 10 to 15 minutes every 2 to 3 hours. You can use an ice pack or a bag of frozen vegetables wrapped in a thin towel. There is not strong evidence that either heat or ice will help, but you can try them to see if they help. You may also want to try switching between heat and cold. · Take pain medicine exactly as directed. ¨ If the doctor gave you a prescription medicine for pain, take it as prescribed. ¨ If you are not taking a prescription pain medicine, ask your doctor if you can take an over-the-counter medicine. What else can you do? · Stretch and exercise. Exercises that increase flexibility may relieve your pain and make it easier for your muscles to keep your spine in a good, neutral position. And don't forget to keep walking. · Do self-massage. You can use self-massage to unwind after work or school or to energize yourself in the morning. You can easily massage your feet, hands, or neck. Self-massage works best if you are in comfortable clothes and are sitting or lying in a comfortable position. Use oil or lotion to massage bare skin. · Reduce stress.  Back pain can lead to a vicious Ruby: Distress about the pain tenses the muscles in your back, which in turn causes more pain. Learn how to relax your mind and your muscles to lower your stress. Where can you learn more? Go to http://da-ahsan.info/. Enter K182 in the search box to learn more about \"Learning About Relief for Back Pain. \"  Current as of: March 21, 2017  Content Version: 11.4  © 7132-6676 [employer]. Care instructions adapted under license by [employer] (which disclaims liability or warranty for this information). If you have questions about a medical condition or this instruction, always ask your healthcare professional. [place] [address] any warranty or liability for your use of this information. Back Strain: Care Instructions  Your Care Instructions    Back strain happens when you overstretch, or pull, a muscle in your back. You may hurt your back in an accident or when you exercise or lift something. Most back pain will get better with rest and time. You can take care of yourself at home to help your back heal.  Follow-up care is a key part of your treatment and safety. Be sure to make and go to all appointments, and call your doctor if you are having problems. It's also a good idea to know your test results and keep a list of the medicines you take. How can you care for yourself at home? · Try to stay as active as you can, but stop or reduce any activity that causes pain. · Put ice or a cold pack on the sore muscle for 10 to 20 minutes at a time to stop swelling. Try this every 1 to 2 hours for 3 days (when you are awake) or until the swelling goes down. Put a thin cloth between the ice pack and your skin. · After 2 or 3 days, apply a heating pad on low or a warm cloth to your back. Some doctors suggest that you go back and forth between hot and cold treatments. · Take pain medicines exactly as directed. ¨ If the doctor gave you a prescription medicine for pain, take it as prescribed.   ¨ If you are not taking a prescription pain medicine, ask your doctor if you can take an over-the-counter medicine. · Try sleeping on your side with a pillow between your legs. Or put a pillow under your knees when you lie on your back. These measures can ease pain in your lower back. · Return to your usual level of activity slowly. When should you call for help? Call 911 anytime you think you may need emergency care. For example, call if:  ? · You are unable to move a leg at all. ?Call your doctor now or seek immediate medical care if:  ? · You have new or worse symptoms in your legs, belly, or buttocks. Symptoms may include:  ¨ Numbness or tingling. ¨ Weakness. ¨ Pain. ? · You lose bladder or bowel control. ? Watch closely for changes in your health, and be sure to contact your doctor if you are not getting better as expected. Where can you learn more? Go to http://da-ahsan.info/. Enter V992 in the search box to learn more about \"Back Strain: Care Instructions. \"  Current as of: March 21, 2017  Content Version: 11.4  © 9639-9930 "[employer]. Care instructions adapted under license by [employer] (which disclaims liability or warranty for this information). If you have questions about a medical condition or this instruction, always ask your healthcare professional. [address] any warranty or liability for your use of this information.

## 2018-07-11 ENCOUNTER — HOSPITAL ENCOUNTER (EMERGENCY)
Age: 83
Discharge: HOME | End: 2018-07-11
Attending: EMERGENCY MEDICINE
Payer: MEDICARE

## 2018-07-11 ENCOUNTER — APPOINTMENT (OUTPATIENT)
Dept: CT IMAGING | Age: 83
End: 2018-07-11
Attending: EMERGENCY MEDICINE
Payer: MEDICARE

## 2018-07-11 ENCOUNTER — APPOINTMENT (OUTPATIENT)
Dept: GENERAL RADIOLOGY | Age: 83
End: 2018-07-11
Attending: EMERGENCY MEDICINE
Payer: MEDICARE

## 2018-07-11 VITALS
SYSTOLIC BLOOD PRESSURE: 176 MMHG | DIASTOLIC BLOOD PRESSURE: 68 MMHG | HEART RATE: 94 BPM | OXYGEN SATURATION: 100 % | TEMPERATURE: 96.7 F | RESPIRATION RATE: 16 BRPM

## 2018-07-11 DIAGNOSIS — R45.1 AGITATION: Primary | ICD-10-CM

## 2018-07-11 LAB
ALBUMIN SERPL-MCNC: 3.5 G/DL (ref 3.4–5)
ALBUMIN/GLOB SERPL: 1.1 {RATIO} (ref 0.8–1.7)
ALP SERPL-CCNC: 129 U/L (ref 45–117)
ALT SERPL-CCNC: 23 U/L (ref 16–61)
ANION GAP SERPL CALC-SCNC: 7 MMOL/L (ref 3–18)
APPEARANCE UR: CLEAR
AST SERPL-CCNC: 27 U/L (ref 15–37)
ATRIAL RATE: 71 BPM
BASOPHILS # BLD: 0.1 K/UL (ref 0–0.1)
BASOPHILS NFR BLD: 1 % (ref 0–2)
BILIRUB SERPL-MCNC: 1.4 MG/DL (ref 0.2–1)
BILIRUB UR QL: NEGATIVE
BUN SERPL-MCNC: 20 MG/DL (ref 7–18)
BUN/CREAT SERPL: 15 (ref 12–20)
CALCIUM SERPL-MCNC: 8.7 MG/DL (ref 8.5–10.1)
CALCULATED P AXIS, ECG09: 56 DEGREES
CALCULATED R AXIS, ECG10: 41 DEGREES
CALCULATED T AXIS, ECG11: 61 DEGREES
CHLORIDE SERPL-SCNC: 108 MMOL/L (ref 100–108)
CK MB CFR SERPL CALC: 1.2 % (ref 0–4)
CK MB CFR SERPL CALC: 1.3 % (ref 0–4)
CK MB SERPL-MCNC: 1.6 NG/ML (ref 5–25)
CK MB SERPL-MCNC: 1.6 NG/ML (ref 5–25)
CK SERPL-CCNC: 126 U/L (ref 39–308)
CK SERPL-CCNC: 133 U/L (ref 39–308)
CO2 SERPL-SCNC: 29 MMOL/L (ref 21–32)
COLOR UR: YELLOW
CREAT SERPL-MCNC: 1.31 MG/DL (ref 0.6–1.3)
DIAGNOSIS, 93000: NORMAL
DIFFERENTIAL METHOD BLD: ABNORMAL
EOSINOPHIL # BLD: 0.2 K/UL (ref 0–0.4)
EOSINOPHIL NFR BLD: 2 % (ref 0–5)
ERYTHROCYTE [DISTWIDTH] IN BLOOD BY AUTOMATED COUNT: 14.2 % (ref 11.6–14.5)
GLOBULIN SER CALC-MCNC: 3.1 G/DL (ref 2–4)
GLUCOSE SERPL-MCNC: 154 MG/DL (ref 74–99)
GLUCOSE UR STRIP.AUTO-MCNC: 100 MG/DL
HCT VFR BLD AUTO: 50.8 % (ref 36–48)
HGB BLD-MCNC: 17.1 G/DL (ref 13–16)
HGB UR QL STRIP: NEGATIVE
KETONES UR QL STRIP.AUTO: NEGATIVE MG/DL
LEUKOCYTE ESTERASE UR QL STRIP.AUTO: NEGATIVE
LYMPHOCYTES # BLD: 1.8 K/UL (ref 0.9–3.6)
LYMPHOCYTES NFR BLD: 19 % (ref 21–52)
MCH RBC QN AUTO: 28.4 PG (ref 24–34)
MCHC RBC AUTO-ENTMCNC: 33.7 G/DL (ref 31–37)
MCV RBC AUTO: 84.2 FL (ref 74–97)
MONOCYTES # BLD: 1.1 K/UL (ref 0.05–1.2)
MONOCYTES NFR BLD: 12 % (ref 3–10)
NEUTS SEG # BLD: 6.2 K/UL (ref 1.8–8)
NEUTS SEG NFR BLD: 66 % (ref 40–73)
NITRITE UR QL STRIP.AUTO: NEGATIVE
P-R INTERVAL, ECG05: 162 MS
PH UR STRIP: 7.5 [PH] (ref 5–8)
PLATELET # BLD AUTO: 176 K/UL (ref 135–420)
PMV BLD AUTO: 10.8 FL (ref 9.2–11.8)
POTASSIUM SERPL-SCNC: 3.9 MMOL/L (ref 3.5–5.5)
PROT SERPL-MCNC: 6.6 G/DL (ref 6.4–8.2)
PROT UR STRIP-MCNC: NEGATIVE MG/DL
Q-T INTERVAL, ECG07: 386 MS
QRS DURATION, ECG06: 92 MS
QTC CALCULATION (BEZET), ECG08: 419 MS
RBC # BLD AUTO: 6.03 M/UL (ref 4.7–5.5)
SODIUM SERPL-SCNC: 144 MMOL/L (ref 136–145)
SP GR UR REFRACTOMETRY: 1.01 (ref 1–1.03)
TROPONIN I SERPL-MCNC: 0.11 NG/ML (ref 0–0.04)
TROPONIN I SERPL-MCNC: 0.12 NG/ML (ref 0–0.04)
UROBILINOGEN UR QL STRIP.AUTO: 0.2 EU/DL (ref 0.2–1)
VENTRICULAR RATE, ECG03: 71 BPM
WBC # BLD AUTO: 9.4 K/UL (ref 4.6–13.2)

## 2018-07-11 PROCEDURE — 71045 X-RAY EXAM CHEST 1 VIEW: CPT

## 2018-07-11 PROCEDURE — 82550 ASSAY OF CK (CPK): CPT | Performed by: EMERGENCY MEDICINE

## 2018-07-11 PROCEDURE — 80053 COMPREHEN METABOLIC PANEL: CPT | Performed by: EMERGENCY MEDICINE

## 2018-07-11 PROCEDURE — 93005 ELECTROCARDIOGRAM TRACING: CPT

## 2018-07-11 PROCEDURE — 85025 COMPLETE CBC W/AUTO DIFF WBC: CPT | Performed by: EMERGENCY MEDICINE

## 2018-07-11 PROCEDURE — 70450 CT HEAD/BRAIN W/O DYE: CPT

## 2018-07-11 PROCEDURE — 81003 URINALYSIS AUTO W/O SCOPE: CPT | Performed by: EMERGENCY MEDICINE

## 2018-07-11 PROCEDURE — 99284 EMERGENCY DEPT VISIT MOD MDM: CPT

## 2018-07-11 RX ORDER — QUETIAPINE FUMARATE 25 MG/1
25 TABLET, FILM COATED ORAL
Qty: 30 TAB | Refills: 0 | Status: SHIPPED | OUTPATIENT
Start: 2018-07-11 | End: 2018-07-11

## 2018-07-11 RX ORDER — QUETIAPINE FUMARATE 25 MG/1
25 TABLET, FILM COATED ORAL
Qty: 30 TAB | Refills: 0 | Status: ON HOLD | OUTPATIENT
Start: 2018-07-11 | End: 2019-12-10 | Stop reason: ALTCHOICE
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
## 2018-07-11 NOTE — ROUTINE PROCESS
Received and reviewed discharge instructions, verbalized understanding. No distress at discharge by ambulation with cane assist. Is transferred by daughter and wife.

## 2018-07-11 NOTE — BSMART NOTE
Dr. Lilli Dean called and asked for possible medications recommendations for a Psychiatrist in regards to patients reported agitation and psychosis. Patient is a resident of [facility] on a Dementia Unit. He became aggressive with staff. Was started on Zyprexa 5 mg daily four days ago, which has not helped. Met with Psychiatrist Dr. Adelaide Bills. Dr. Cesar Dahl recommended to stop the Zyprexa. Suggested that Seroquel 25 mg every 4-5 pm if patient sundowns or at HS. Information above relayed to Dr. Lilli Dean.
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
## 2018-07-11 NOTE — ED NOTES
Patient up to restroom and asked to provide sample. Was unable to do so at this time.  Provided with urinal.
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
## 2018-07-15 ENCOUNTER — HOSPITAL ENCOUNTER (EMERGENCY)
Age: 83
Discharge: PSYCHIATRIC OR SUBSTANCE USE FACILITY | End: 2018-07-16
Attending: EMERGENCY MEDICINE
Payer: MEDICARE

## 2018-07-15 VITALS
SYSTOLIC BLOOD PRESSURE: 174 MMHG | OXYGEN SATURATION: 98 % | DIASTOLIC BLOOD PRESSURE: 83 MMHG | HEART RATE: 65 BPM | RESPIRATION RATE: 18 BRPM | TEMPERATURE: 97.9 F

## 2018-07-15 DIAGNOSIS — R46.89 PHYSICALLY AGGRESSIVE BEHAVIOR: Primary | ICD-10-CM

## 2018-07-15 DIAGNOSIS — F23 ACUTE PSYCHOSIS (HCC): ICD-10-CM

## 2018-07-15 DIAGNOSIS — E86.0 MILD DEHYDRATION: ICD-10-CM

## 2018-07-15 LAB
AMPHET UR QL SCN: NEGATIVE
ANION GAP SERPL CALC-SCNC: 7 MMOL/L (ref 3–18)
APPEARANCE UR: CLEAR
BARBITURATES UR QL SCN: NEGATIVE
BASOPHILS # BLD: 0.1 K/UL (ref 0–0.1)
BASOPHILS NFR BLD: 1 % (ref 0–2)
BENZODIAZ UR QL: NEGATIVE
BILIRUB UR QL: NEGATIVE
BUN SERPL-MCNC: 22 MG/DL (ref 7–18)
BUN/CREAT SERPL: 15 (ref 12–20)
CALCIUM SERPL-MCNC: 8.7 MG/DL (ref 8.5–10.1)
CANNABINOIDS UR QL SCN: NEGATIVE
CHLORIDE SERPL-SCNC: 106 MMOL/L (ref 100–108)
CO2 SERPL-SCNC: 31 MMOL/L (ref 21–32)
COCAINE UR QL SCN: NEGATIVE
COLOR UR: YELLOW
CREAT SERPL-MCNC: 1.49 MG/DL (ref 0.6–1.3)
DIFFERENTIAL METHOD BLD: ABNORMAL
EOSINOPHIL # BLD: 0.2 K/UL (ref 0–0.4)
EOSINOPHIL NFR BLD: 3 % (ref 0–5)
ERYTHROCYTE [DISTWIDTH] IN BLOOD BY AUTOMATED COUNT: 14.4 % (ref 11.6–14.5)
ETHANOL SERPL-MCNC: <3 MG/DL (ref 0–3)
GLUCOSE SERPL-MCNC: 174 MG/DL (ref 74–99)
GLUCOSE UR STRIP.AUTO-MCNC: NEGATIVE MG/DL
HCT VFR BLD AUTO: 46.9 % (ref 36–48)
HDSCOM,HDSCOM: NORMAL
HGB BLD-MCNC: 15.8 G/DL (ref 13–16)
HGB UR QL STRIP: NEGATIVE
KETONES UR QL STRIP.AUTO: NEGATIVE MG/DL
LEUKOCYTE ESTERASE UR QL STRIP.AUTO: NEGATIVE
LYMPHOCYTES # BLD: 1.3 K/UL (ref 0.9–3.6)
LYMPHOCYTES NFR BLD: 17 % (ref 21–52)
MCH RBC QN AUTO: 28 PG (ref 24–34)
MCHC RBC AUTO-ENTMCNC: 33.7 G/DL (ref 31–37)
MCV RBC AUTO: 83.2 FL (ref 74–97)
METHADONE UR QL: NEGATIVE
MONOCYTES # BLD: 1.2 K/UL (ref 0.05–1.2)
MONOCYTES NFR BLD: 15 % (ref 3–10)
NEUTS SEG # BLD: 5 K/UL (ref 1.8–8)
NEUTS SEG NFR BLD: 64 % (ref 40–73)
NITRITE UR QL STRIP.AUTO: NEGATIVE
OPIATES UR QL: NEGATIVE
PCP UR QL: NEGATIVE
PH UR STRIP: 6.5 [PH] (ref 5–8)
PLATELET # BLD AUTO: 197 K/UL (ref 135–420)
PMV BLD AUTO: 10.6 FL (ref 9.2–11.8)
POTASSIUM SERPL-SCNC: 4 MMOL/L (ref 3.5–5.5)
PROT UR STRIP-MCNC: NEGATIVE MG/DL
RBC # BLD AUTO: 5.64 M/UL (ref 4.7–5.5)
SODIUM SERPL-SCNC: 144 MMOL/L (ref 136–145)
SP GR UR REFRACTOMETRY: 1.01 (ref 1–1.03)
TROPONIN I SERPL-MCNC: <0.02 NG/ML (ref 0–0.04)
UROBILINOGEN UR QL STRIP.AUTO: 0.2 EU/DL (ref 0.2–1)
WBC # BLD AUTO: 7.7 K/UL (ref 4.6–13.2)

## 2018-07-15 PROCEDURE — 80048 BASIC METABOLIC PNL TOTAL CA: CPT | Performed by: EMERGENCY MEDICINE

## 2018-07-15 PROCEDURE — 80307 DRUG TEST PRSMV CHEM ANLYZR: CPT | Performed by: EMERGENCY MEDICINE

## 2018-07-15 PROCEDURE — 85025 COMPLETE CBC W/AUTO DIFF WBC: CPT | Performed by: EMERGENCY MEDICINE

## 2018-07-15 PROCEDURE — 81003 URINALYSIS AUTO W/O SCOPE: CPT | Performed by: EMERGENCY MEDICINE

## 2018-07-15 PROCEDURE — 84484 ASSAY OF TROPONIN QUANT: CPT | Performed by: EMERGENCY MEDICINE

## 2018-07-15 PROCEDURE — 99283 EMERGENCY DEPT VISIT LOW MDM: CPT
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
## 2018-07-16 NOTE — ED NOTES
Discharged in the care of hospitals police to [facility].  Report called to same

## 2018-07-16 NOTE — BSMART NOTE
Crisis Note: Ada VALDERRAMA, informed this writer that patient will be admitted at [facility]; awaiting the accepting MD.

## 2018-07-16 NOTE — ED NOTES
Bedside shift change report given to Laila Rosen RN (oncoming nurse) by Yury Bobby RN (offgoing nurse). Report included the following information SBAR, ED Summary, Intake/Output, MAR and Recent Results.

## 2018-07-16 NOTE — ED NOTES
Pt resting in bed comfortably. Pt updated on plan of care. Call bell is in reach. Bed in LLP. Will continue to monitor.

## 2018-07-16 NOTE — ED NOTES
Received patient in room 17. Patient appears in no distress. Family at bedside. Patient is currently awaiting placement and an accepting physician at [address] [place] [address] with patient and family. Questions encouraged and answered.    All verbalized an understanding

## 2018-07-16 NOTE — ED NOTES
9:15 PM :Pt care assumed from Dr. Taz Powers , ED provider. Pt complaint(s), current treatment plan, progression and available diagnostic results have been discussed thoroughly. Rounding occurred: yes  Intended Disposition: TBD   Pending diagnostic reports and/or labs (please list): waiting for placement. 9:15 PM Fiona Couch with [facility] states the pt is accepted at [facility]; will be transferred once he speaks to an accepting physician. Milagros Jeffries acting as a scribe for and in the presence of Adele Forde MD      July 15, 2018 at 9:17 PM       Provider Attestation:      I personally performed the services described in the documentation, reviewed the documentation, as recorded by the scribe in my presence, and it accurately and completely records my words and actions.  July 15, 2018 at 9:17 PM - Adele Forde MD

## 2018-07-16 NOTE — ED NOTES
Patient remains resting quietly on stretcher.   Awaiting police transport to [facility].  Patient is currently without complaints or needs

## 2018-08-23 ENCOUNTER — HOSPITAL ENCOUNTER (OUTPATIENT)
Age: 83
Discharge: HOME | End: 2018-08-23
Attending: NURSE PRACTITIONER
Payer: MEDICARE

## 2018-08-23 DIAGNOSIS — F01.518 VASCULAR DEMENTIA WITH BEHAVIORAL DISTURBANCE: ICD-10-CM

## 2018-08-23 PROCEDURE — 70551 MRI BRAIN STEM W/O DYE: CPT

## 2018-12-29 ENCOUNTER — HOSPITAL ENCOUNTER (EMERGENCY)
Age: 83
Discharge: HOME | End: 2018-12-30
Attending: EMERGENCY MEDICINE
Payer: MEDICARE

## 2018-12-29 ENCOUNTER — APPOINTMENT (OUTPATIENT)
Dept: CT IMAGING | Age: 83
End: 2018-12-29
Attending: EMERGENCY MEDICINE
Payer: MEDICARE

## 2018-12-29 ENCOUNTER — APPOINTMENT (OUTPATIENT)
Dept: GENERAL RADIOLOGY | Age: 83
End: 2018-12-29
Attending: EMERGENCY MEDICINE
Payer: MEDICARE

## 2018-12-29 VITALS
RESPIRATION RATE: 15 BRPM | TEMPERATURE: 97.5 F | OXYGEN SATURATION: 96 % | HEART RATE: 70 BPM | DIASTOLIC BLOOD PRESSURE: 76 MMHG | SYSTOLIC BLOOD PRESSURE: 161 MMHG

## 2018-12-29 DIAGNOSIS — R53.1 EPISODE OF GENERALIZED WEAKNESS: Primary | ICD-10-CM

## 2018-12-29 DIAGNOSIS — E86.0 DEHYDRATION: ICD-10-CM

## 2018-12-29 DIAGNOSIS — N18.9 CHRONIC KIDNEY DISEASE, UNSPECIFIED CKD STAGE: ICD-10-CM

## 2018-12-29 LAB
ALBUMIN SERPL-MCNC: 3.5 G/DL (ref 3.4–5)
ALBUMIN/GLOB SERPL: 1.2 {RATIO} (ref 0.8–1.7)
ALP SERPL-CCNC: 142 U/L (ref 45–117)
ALT SERPL-CCNC: 24 U/L (ref 16–61)
ANION GAP SERPL CALC-SCNC: 6 MMOL/L (ref 3–18)
AST SERPL-CCNC: 28 U/L (ref 15–37)
BASOPHILS # BLD: 0.1 K/UL (ref 0–0.1)
BASOPHILS NFR BLD: 1 % (ref 0–2)
BILIRUB SERPL-MCNC: 0.8 MG/DL (ref 0.2–1)
BNP SERPL-MCNC: 195 PG/ML (ref 0–1800)
BUN SERPL-MCNC: 29 MG/DL (ref 7–18)
BUN/CREAT SERPL: 14 (ref 12–20)
CALCIUM SERPL-MCNC: 8.5 MG/DL (ref 8.5–10.1)
CHLORIDE SERPL-SCNC: 104 MMOL/L (ref 100–108)
CK MB CFR SERPL CALC: NORMAL % (ref 0–4)
CK MB SERPL-MCNC: <1 NG/ML (ref 5–25)
CK SERPL-CCNC: 86 U/L (ref 39–308)
CO2 SERPL-SCNC: 26 MMOL/L (ref 21–32)
CREAT SERPL-MCNC: 2.1 MG/DL (ref 0.6–1.3)
DIFFERENTIAL METHOD BLD: ABNORMAL
EOSINOPHIL # BLD: 0.4 K/UL (ref 0–0.4)
EOSINOPHIL NFR BLD: 4 % (ref 0–5)
ERYTHROCYTE [DISTWIDTH] IN BLOOD BY AUTOMATED COUNT: 14.8 % (ref 11.6–14.5)
GLOBULIN SER CALC-MCNC: 3 G/DL (ref 2–4)
GLUCOSE SERPL-MCNC: 262 MG/DL (ref 74–99)
HCT VFR BLD AUTO: 50.2 % (ref 36–48)
HGB BLD-MCNC: 16.5 G/DL (ref 13–16)
LYMPHOCYTES # BLD: 1.9 K/UL (ref 0.9–3.6)
LYMPHOCYTES NFR BLD: 23 % (ref 21–52)
MAGNESIUM SERPL-MCNC: 2.4 MG/DL (ref 1.6–2.6)
MCH RBC QN AUTO: 28.5 PG (ref 24–34)
MCHC RBC AUTO-ENTMCNC: 32.9 G/DL (ref 31–37)
MCV RBC AUTO: 86.9 FL (ref 74–97)
MONOCYTES # BLD: 1.3 K/UL (ref 0.05–1.2)
MONOCYTES NFR BLD: 15 % (ref 3–10)
NEUTS SEG # BLD: 4.8 K/UL (ref 1.8–8)
NEUTS SEG NFR BLD: 57 % (ref 40–73)
PLATELET # BLD AUTO: 188 K/UL (ref 135–420)
PMV BLD AUTO: 10.7 FL (ref 9.2–11.8)
POTASSIUM SERPL-SCNC: 4.4 MMOL/L (ref 3.5–5.5)
PROT SERPL-MCNC: 6.5 G/DL (ref 6.4–8.2)
RBC # BLD AUTO: 5.78 M/UL (ref 4.7–5.5)
SODIUM SERPL-SCNC: 136 MMOL/L (ref 136–145)
TROPONIN I SERPL-MCNC: <0.02 NG/ML (ref 0–0.04)
WBC # BLD AUTO: 8.3 K/UL (ref 4.6–13.2)

## 2018-12-29 PROCEDURE — 96360 HYDRATION IV INFUSION INIT: CPT

## 2018-12-29 PROCEDURE — 70450 CT HEAD/BRAIN W/O DYE: CPT

## 2018-12-29 PROCEDURE — 99285 EMERGENCY DEPT VISIT HI MDM: CPT

## 2018-12-29 PROCEDURE — 83735 ASSAY OF MAGNESIUM: CPT

## 2018-12-29 PROCEDURE — 82550 ASSAY OF CK (CPK): CPT

## 2018-12-29 PROCEDURE — 71045 X-RAY EXAM CHEST 1 VIEW: CPT

## 2018-12-29 PROCEDURE — 80053 COMPREHEN METABOLIC PANEL: CPT

## 2018-12-29 PROCEDURE — 93005 ELECTROCARDIOGRAM TRACING: CPT

## 2018-12-29 PROCEDURE — 74011250636 HC RX REV CODE- 250/636: Performed by: EMERGENCY MEDICINE

## 2018-12-29 PROCEDURE — 85025 COMPLETE CBC W/AUTO DIFF WBC: CPT

## 2018-12-29 PROCEDURE — 83880 ASSAY OF NATRIURETIC PEPTIDE: CPT

## 2018-12-29 RX ADMIN — SODIUM CHLORIDE 1000 ML: 900 INJECTION, SOLUTION INTRAVENOUS at 20:42

## 2018-12-30 LAB
ANION GAP SERPL CALC-SCNC: 7 MMOL/L (ref 3–18)
ATRIAL RATE: 75 BPM
BUN SERPL-MCNC: 29 MG/DL (ref 7–18)
BUN/CREAT SERPL: 16 (ref 12–20)
CALCIUM SERPL-MCNC: 8.1 MG/DL (ref 8.5–10.1)
CALCULATED P AXIS, ECG09: 63 DEGREES
CALCULATED R AXIS, ECG10: 52 DEGREES
CALCULATED T AXIS, ECG11: 56 DEGREES
CHLORIDE SERPL-SCNC: 107 MMOL/L (ref 100–108)
CO2 SERPL-SCNC: 27 MMOL/L (ref 21–32)
CREAT SERPL-MCNC: 1.78 MG/DL (ref 0.6–1.3)
DIAGNOSIS, 93000: NORMAL
GLUCOSE SERPL-MCNC: 202 MG/DL (ref 74–99)
P-R INTERVAL, ECG05: 164 MS
POTASSIUM SERPL-SCNC: 3.8 MMOL/L (ref 3.5–5.5)
Q-T INTERVAL, ECG07: 392 MS
QRS DURATION, ECG06: 102 MS
QTC CALCULATION (BEZET), ECG08: 437 MS
SODIUM SERPL-SCNC: 141 MMOL/L (ref 136–145)
VENTRICULAR RATE, ECG03: 75 BPM

## 2018-12-30 NOTE — ED PROVIDER NOTES
EMERGENCY DEPARTMENT HISTORY AND PHYSICAL EXAM 
 
Date: 12/29/2018 Patient Name: Jacqueline Hansen History of Presenting Illness Chief Complaint Patient presents with  Dizziness  Extremity Weakness History Provided By: Patient Chief Complaint:  feeling jittery, general weakness, and dizziness Duration:  Hours Timing:  Acute Location: generalized Quality: not reported Severity: N/A Modifying Factors: not reported Associated Symptoms: denies any other associated signs or symptoms Additional History (Context): Jacqueline Hansen is a 82 y.o. male with PMHX HTN, DM, CKD, CAD, COPD, stroke, heart disease, anxiety, and dementia presents to the emergency department via EMS from [address] [place] [address] Rd suddenly feeling jittery, general weakness, and dizziness tonight. Pt states after one of the facility employee gave him his medication w/ water, shortly after is when he began experiencing his sx. Otherwise he has been fine and is not any pain. Usually walks around w/o issues. Pt denies CP, SOB, fever, chills, N/V, abdominal pain, urinary sx, weakness, numbness, back pain, neck pain  and any other sxs or complaints. PCP: Queenie Ureña MD 
 
Current Outpatient Medications Medication Sig Dispense Refill  QUEtiapine (SEROQUEL) 25 mg tablet Take 1 Tab by mouth nightly. 30 Tab 0  
 lidocaine (LIDODERM) 5 % Apply patch to the affected area for 12 hours a day and remove for 12 hours a day. 15 Each 0  
 oseltamivir (TAMIFLU) 30 mg capsule Take 1 Cap by mouth two (2) times a day. Indications: INFLUENZA 5 Cap 0  
 acetaminophen (TYLENOL) 500 mg tablet Take 1 Tab by mouth every six (6) hours as needed. Indications: pain 20 Tab 0  
 aspirin delayed-release 81 mg tablet Take 1 Tab by mouth daily. Indications: Cerebral Thromboembolism Prevention 30 Tab 0  cycloSPORINE (RESTASIS) 0.05 % ophthalmic emulsion Administer 1 Drop to both eyes every twelve (12) hours. Indications: Dry Eye 15 Each 0  
 gabapentin (NEURONTIN) 300 mg capsule Take 1 Cap by mouth daily. Indications: NEUROPATHIC PAIN, Restless Legs Syndrome 30 Cap 0  
 insulin glargine (LANTUS SOLOSTAR U-100 INSULIN) 100 unit/mL (3 mL) inpn 35 Units by SubCUTAneous route nightly. Indications: type 2 diabetes mellitus 1 Pen 3  
 isosorbide mononitrate ER (IMDUR) 30 mg tablet Take 1 Tab by mouth daily. 30 Tab 0  
 metoprolol succinate (TOPROL-XL) 50 mg XL tablet Take 1 Tab by mouth daily. Indications: hypertension 30 Tab 0  
 multivit-min-FA-lycopen-lutein (CENTRUM SILVER) 0.4-300-250 mg-mcg-mcg tab Take 1 tab daily 30 Tab 0  
 Insulin Needles, Disposable, (SHAWN PEN NEEDLE) 32 gauge x 5/32\" ndle Use as directed. 100 Pen Needle 0  
 rosuvastatin (CRESTOR) 10 mg tablet Take 1 Tab by mouth nightly. 30 Tab 0  
 OTHER Incentive spirometry- Use as directed 1 Each 0  
 solifenacin (VESICARE) 5 mg tablet Take 1 Tab by mouth daily. Indications: Bladder Hyperactivity, INCREASED URINARY FREQUENCY, URINARY URGE INCONTINENCE, URINARY URGENCY 30 Tab 0  
 temazepam (RESTORIL) 7.5 mg capsule Take 1 Cap by mouth nightly as needed for Sleep. Max Daily Amount: 7.5 mg. 12 Cap 0  
 furosemide (LASIX) 20 mg tablet Take 1 Tab by mouth daily. Indications: Edema 30 Tab 0  
 guaiFENesin (ROBITUSSIN) 100 mg/5 mL liquid Take 5 mL by mouth every four (4) hours as needed for Cough. 1 Bottle 0  
 ciprofloxacin HCl (CILOXAN) 0.3 % ophthalmic solution Administer 1 Drop to both eyes every four (4) hours (while awake). For 3 more days. End 3/29/2018  Indications: BACTERIAL CONJUNCTIVITIS 2 mL 0  
 albuterol-ipratropium (DUO-NEB) 2.5 mg-0.5 mg/3 ml nebu 3 mL by Nebulization route every six (6) hours as needed. 30 Nebule 0  
 insulin lispro (HUMALOG KWIKPEN INSULIN) 200 unit/mL (3 mL) inpn For Blood Sugar (mg/dL) of:    
Less than 150 =   0 units 150 -199 =   2 units 200 -249 =   4 units 250 -299 =   6 units 300 -349 =   8 units 350 and above =   10 units Check BG then Inject insulin per SS 3 times daily before meals and at bedtime  Indications: type 2 diabetes mellitus 1 Pen 0  Blood-Glucose Meter monitoring kit Use as directed 1 Kit 0  
 lancets 23 gauge misc 1 Box by Does Not Apply route Before breakfast, lunch, and dinner. Use as directed 1 Lancet 0  
 alcohol swabs (ALCOHOL PREP SWABS) padm Use as directed 100 Pad 0  predniSONE (DELTASONE) 10 mg tablet Take 4 tabs for 1 day, then 3 tabs for 3 days, then 2 tabs for 3 days, then 1 tab by mouth for 3 days  Indications: COPD exacerbation 22 Tab 0  
 famotidine (PEPCID) 20 mg tablet Take 1 Tab by mouth two (2) times a day. Indications: PREVENTION OF STRESS ULCER 30 Tab 0  
 COMPOUNDMAX BASE crea 240 g by Does Not Apply route four (4) times daily. Anti-Inflam Meloxicam 0.09% Topirmate 1% Methocarbamol 2%  Lidocaine 2% Prilocaine 2% 240 g 3  
 CALCIUM CARBONATE (CALTRATE 600 PO) Take 1 Tab by mouth daily. Past History Past Medical History: 
Past Medical History:  
Diagnosis Date  Angina  Anxiety  Arthritis  CAD (coronary artery disease) s/p drug-eluting stents to RCA for high-grade stenoses in 2/09  Cardiac catheterization 08/24/2016 Mod calcification. Co-dom. oLM 50-70%. LAD 30%. Dx 30%. Cx 70% focal.  OM 80% focal.  RCA 30%.  Cardiac echocardiogram 08/23/2016 Tech difficult. EF 55-60%. No WMA. Normal diastolic fx. Lipomatous septal hypertrophy.  Cardiac nuclear imaging test, abnormal 08/23/2016 Sm reversible inferior defect concerning for ischemia. Mild inferior hypk. EF 68%. Neg EKG on pharm stress test.  
 Carotid duplex 08/26/2016 Mild <50% CYRUS stenosis. Chronic LICA occlusion. Similar to study of 3/29/16.  Carotid stenosis (HCC)   
 w/ known total occlusion of lt internal carotid artery; followed by pts vascular surgeon  Chronic kidney disease  COPD (chronic obstructive pulmonary disease) (HCC)  Dementia  Depression  Diabetes ([facility] type 2  Dyslipidemia   
 on Crestor; managed by pts PCP  Dyspnea  Heart disease  Hypercholesterolemia  Hypertension  Low back pain  Osteoarthrosis involving multiple sites  Skin cancer (HCC)   
 squamous cell lesions of the skin  Stroke ([facility]  Venous (peripheral) insufficiency Past Surgical History: 
Past Surgical History:  
Procedure Laterality Date  CARDIAC CATHETERIZATION  2016  CORONARY ARTERY ANGIOGRAM  2016  HX ANGIOPLASTY  2009  
 2 stents placed and heart attack during the procedure  HX COLONOSCOPY  10/2003  
 neg; declines f/u  
 HX CORONARY ARTERY BYPASS GRAFT  2016  HX HEART CATHETERIZATION  2013  HX HERNIA REPAIR    
 1970s  HX OTHER SURGICAL    
 groin surgery  HX TONSIL AND ADENOIDECTOMY  IR INTRAVASCULAR ULTRASOUND INITIAL  2016  LV ANGIOGRAPHY  2016 Family History: 
Family History Problem Relation Age of Onset  Parkinsonism Mother  Hypertension Mother  Cancer Father   
     lung  Heart defect Brother Social History: 
Social History Tobacco Use  Smoking status: Former Smoker Years: 2.00 Last attempt to quit: 1955 Years since quittin.0  Smokeless tobacco: Never Used Substance Use Topics  Alcohol use: Yes Comment: drinks weekly couple beers  Drug use: No  
 
 
Allergies: Allergies Allergen Reactions  Amaryl [Glimepiride] Drowsiness  Lipitor [Atorvastatin] Myalgia  Niacin Other (comments) Facial blistering, swelling in face  Pravachol [Pravastatin] Myalgia  Zocor [Simvastatin] Myalgia Review of Systems Review of Systems Constitutional: Negative for chills, fatigue and fever. HENT: Negative. Negative for sore throat. Eyes: Negative. Respiratory: Negative for cough and shortness of breath. Cardiovascular: Negative for chest pain and palpitations. Gastrointestinal: Negative for abdominal pain, nausea and vomiting. Genitourinary: Negative for dysuria. Musculoskeletal: Negative. Skin: Negative. Neurological: Positive for dizziness and weakness (generalized). Negative for light-headedness and headaches. +Jittery Psychiatric/Behavioral: Negative. All other systems reviewed and are negative. Physical Exam  
 
Vitals:  
 12/29/18 1930 12/29/18 1945 12/29/18 2015 12/29/18 2030 BP: 141/72 141/78 152/71 161/76 Pulse: 74 73 70 70 Resp: 20 15 17 15 Temp:      
SpO2: 99% 96% 98% 96% Physical Exam 
 
Constitutional: Elderly  male, no acute distress Head: Normocephalic, Atraumatic Eyes: erythematous lower conjunctiva bilaterally, Pupils are equal, round, and reactive to light, EOMI Neck: Supple, non-tender Cardiovascular: Regular rate and rhythm, no murmurs, rubs, or gallops Chest: Normal work of breathing and chest excursion bilaterally Lungs: Clear to ausculation bilaterally, no wheezes, no rhonchi Abdomen: Soft, non tender, non distended, normoactive bowel sounds Back: No evidence of trauma or deformity Extremities: No evidence of trauma or deformity, no LE edema Skin: Warm and dry, normal cap refill Neuro: Alert and appropriate, CN intact, normal speech, strength and sensation full and symmetric bilaterally,  normal coordination Psychiatric: Normal mood and affect Diagnostic Study Results Labs - Recent Results (from the past 12 hour(s)) CBC WITH AUTOMATED DIFF Collection Time: 12/29/18  7:22 PM  
Result Value Ref Range WBC 8.3 4.6 - 13.2 K/uL  
 RBC 5.78 (H) 4.70 - 5.50 M/uL  
 HGB 16.5 (H) 13.0 - 16.0 g/dL HCT 50.2 (H) 36.0 - 48.0 % MCV 86.9 74.0 - 97.0 FL  
 MCH 28.5 24.0 - 34.0 PG  
 MCHC 32.9 31.0 - 37.0 g/dL  
 RDW 14.8 (H) 11.6 - 14.5 % PLATELET 616 610 - 356 K/uL MPV 10.7 9.2 - 11.8 FL  
 NEUTROPHILS 57 40 - 73 % LYMPHOCYTES 23 21 - 52 % MONOCYTES 15 (H) 3 - 10 % EOSINOPHILS 4 0 - 5 % BASOPHILS 1 0 - 2 %  
 ABS. NEUTROPHILS 4.8 1.8 - 8.0 K/UL  
 ABS. LYMPHOCYTES 1.9 0.9 - 3.6 K/UL  
 ABS. MONOCYTES 1.3 (H) 0.05 - 1.2 K/UL  
 ABS. EOSINOPHILS 0.4 0.0 - 0.4 K/UL  
 ABS. BASOPHILS 0.1 0.0 - 0.1 K/UL  
 DF AUTOMATED METABOLIC PANEL, COMPREHENSIVE Collection Time: 12/29/18  7:22 PM  
Result Value Ref Range Sodium 136 136 - 145 mmol/L Potassium 4.4 3.5 - 5.5 mmol/L Chloride 104 100 - 108 mmol/L  
 CO2 26 21 - 32 mmol/L Anion gap 6 3.0 - 18 mmol/L Glucose 262 (H) 74 - 99 mg/dL BUN 29 (H) 7.0 - 18 MG/DL Creatinine 2.10 (H) 0.6 - 1.3 MG/DL  
 BUN/Creatinine ratio 14 12 - 20 GFR est AA 37 (L) >60 ml/min/1.73m2 GFR est non-AA 30 (L) >60 ml/min/1.73m2 Calcium 8.5 8.5 - 10.1 MG/DL Bilirubin, total 0.8 0.2 - 1.0 MG/DL  
 ALT (SGPT) 24 16 - 61 U/L  
 AST (SGOT) 28 15 - 37 U/L Alk. phosphatase 142 (H) 45 - 117 U/L Protein, total 6.5 6.4 - 8.2 g/dL Albumin 3.5 3.4 - 5.0 g/dL Globulin 3.0 2.0 - 4.0 g/dL A-G Ratio 1.2 0.8 - 1.7 NT-PRO BNP Collection Time: 12/29/18  7:22 PM  
Result Value Ref Range NT pro- 0 - 1,800 PG/ML  
MAGNESIUM Collection Time: 12/29/18  7:22 PM  
Result Value Ref Range Magnesium 2.4 1.6 - 2.6 mg/dL CARDIAC PANEL,(CK, CKMB & TROPONIN) Collection Time: 12/29/18  7:22 PM  
Result Value Ref Range CK 86 39 - 308 U/L  
 CK - MB <1.0 <3.6 ng/ml CK-MB Index  0.0 - 4.0 % CALCULATION NOT PERFORMED WHEN RESULT IS BELOW LINEAR LIMIT Troponin-I, QT <0.02 0.0 - 0.045 NG/ML  
EKG, 12 LEAD, INITIAL Collection Time: 12/29/18  7:29 PM  
Result Value Ref Range Ventricular Rate 75 BPM  
 Atrial Rate 75 BPM  
 P-R Interval 164 ms QRS Duration 102 ms Q-T Interval 392 ms QTC Calculation (Bezet) 437 ms Calculated P Axis 63 degrees Calculated R Axis 52 degrees Calculated T Axis 56 degrees Diagnosis Normal sinus rhythm Nonspecific T wave abnormality Abnormal ECG When compared with ECG of 11-JUL-2018 07:39, No significant change was found METABOLIC PANEL, BASIC Collection Time: 12/29/18 11:53 PM  
Result Value Ref Range Sodium 141 136 - 145 mmol/L Potassium 3.8 3.5 - 5.5 mmol/L Chloride 107 100 - 108 mmol/L  
 CO2 27 21 - 32 mmol/L Anion gap 7 3.0 - 18 mmol/L Glucose 202 (H) 74 - 99 mg/dL BUN 29 (H) 7.0 - 18 MG/DL Creatinine 1.78 (H) 0.6 - 1.3 MG/DL  
 BUN/Creatinine ratio 16 12 - 20 GFR est AA 45 (L) >60 ml/min/1.73m2 GFR est non-AA 37 (L) >60 ml/min/1.73m2 Calcium 8.1 (L) 8.5 - 10.1 MG/DL Radiologic Studies -  
XR CHEST PORT Final Result Impression: 1. Bibasilar opacities are likely atelectasis. CT HEAD WO CONT Final Result Impression: 1. No acute intracranial pathology. 2. Chronic ischemic changes as described above without interval progression  
identified. [facility] CT Results  (Last 48 hours) 12/29/18 2011  CT HEAD WO CONT Final result Impression:  Impression: 1. No acute intracranial pathology. 2. Chronic ischemic changes as described above without interval progression  
identified. [facility] Narrative:  CT brain without enhancement CPT code: 73335 Indication: Generalized weakness and hypotension. Technique: Unenhanced 5 mm collimation axial images obtained from the skull base  
through the vertex. Dose reduction techniques used: Automated exposure control, adjustment of the  
mAs and/or kVp according to patient size, standardized low-dose protocol, and/or  
iterative reconstruction technique. Comparison: July 11, 2018. Findings: There is no intracranial hemorrhage. There is no evidence for mass. Moderate cortical atrophy is present with compensatory ventricular dilatation. The gray-white matter differentiation is acutely preserved. There are moderate  
white matter hypodensities suggesting chronic small vessel ischemic disease. There are multiple old cortical infarcts in the left frontal and left parietal  
lobes. These appear grossly stable. Chronic lacunar infarctions of the basal  
ganglia. No extra-axial fluid collections. The ventricles are symmetric and  
midline in position. Vascular structures are symmetric in attenuation. Basilar  
cisterns are patent. The jeromy and cerebellum are normal.  
   
Sinuses: Clear. Orbits: Normal.  
Calvarium:Normal.  
   
  
  
 
CXR Results  (Last 48 hours) 12/29/18 2112  XR CHEST PORT Final result Impression:  Impression: 1. Bibasilar opacities are likely atelectasis. Narrative:     
Examination: Portable AP chest   
   
History: Generalized weakness Comparison: July 11, 2018 Findings: Bibasilar opacities are seen, left greater than right, and likely  
atelectasis. No pleural effusion or pneumothorax. No definite focal  
consolidation. Heart size is stable. Evidence of prior median sternotomy. Medical Decision Making I am the first provider for this patient. I reviewed the vital signs, available nursing notes, past medical history, past surgical history, family history and social history. Vital Signs-Reviewed the patient's vital signs. Pulse Oximetry Analysis - 95% on RA  
 
EKG interpretation: (Preliminary) 7:31 PM  
NSR. 75 BPM. Qtc 437ms. No STEMI. EKG read by Lori Retana 
 at 7:33 PM. Records Reviewed: Nursing Notes and Old Medical Records Provider Notes:  
82 y.o. male PMHX HTN, DM, CKD, CAD, COPD, stroke, heart disease, anxiety, and dementia presents to the emergency department via EMS from [address] [place] [address] Rd suddenly feeling jittery, general weakness, and dizziness tonight. On exam patient is afebrile, w/ appropriate vital signs.  Non focal on exam, no facial droop and no slurring of speech. Will evaluate for metabolic, infectious process, however will also obtain CT head although intracranial pathology lower on my differential.  
 
Procedures: 
Procedures ED Course:  
7:15 PM Initial assessment performed. The patients presenting problems have been discussed, and they are in agreement with the care plan formulated and outlined with them. I have encouraged them to ask questions as they arise throughout their visit. 10:20 PM Labwork showing concentrated CBC w/ Hgb 16.5, Hct 50.2,  BUN 29 and Cr 2.10, likely indicating dehydration. Will give IVF and rpt BMP. If improved may be dc as does not meet [facility] admission criteria. Will PO challenge. 12:00 AM Rpt BMP improved after IVF. Cr 2.01 improved to 1.78. Urged continued oral hydration. Will DC back to nursing facility. Diagnosis and Disposition DISCHARGE NOTE: 
Tay Benites  results have been reviewed with him. He has been counseled regarding his diagnosis, treatment, and plan. He verbally conveys understanding and agreement of the signs, symptoms, diagnosis, treatment and prognosis and additionally agrees to follow up as discussed. He also agrees with the care-plan and conveys that all of his questions have been answered. I have also provided discharge instructions for him that include: educational information regarding their diagnosis and treatment, and list of reasons why they would want to return to the ED prior to their follow-up appointment, should his condition change. He has been provided with education for proper emergency department utilization. CLINICAL IMPRESSION: 
 
1. Episode of generalized weakness 2. Chronic kidney disease, unspecified CKD stage 3. Dehydration PLAN: 
1. D/C Home 2. Current Discharge Medication List  
  
 
3. Follow-up Information Follow up With Specialties Details Why Contact Info Ronn Bernheim, MD Internal Medicine Call in 2 days Follow up Alissa Sepulvedao [phone] [id number] 
[phone] [facility] EMERGENCY DEPT Emergency Medicine  If symptoms worsen, As needed [address]anna [address]ra 
[phone]  
  
 
____________________________________ Scribe Attestation Lisa Ureña acting as a scribe for and in the presence of Nicole Ramírez* December 29, 2018 at 7:35 PM 
    
Provider Attestation:     
I personally performed the services described in the documentation, reviewed the documentation, as recorded by the scribe in my presence, and it accurately and completely records my words and actions.  December 29, 2018 at 7:35 PM - Nicole Ramírez*

## 2018-12-30 NOTE — DISCHARGE INSTRUCTIONS
Weakness: Care Instructions  Your Care Instructions    Weakness is a lack of physical or muscle strength. You may feel that you need to make extra effort to move your arms, legs, or other muscles. Generalized weakness means that you feel weak in most areas of your body. Another type of weakness may affect just one muscle or group of muscles. You may feel weak and tired after you have done too much activity, such as taking an extra-long hike. This is not a serious problem. It often goes away on its own. Feeling weak can also be caused by medical conditions like thyroid problems, depression, or a virus. Sometimes the cause can be serious. Your doctor may want to do more tests to try to find the cause of the weakness. The doctor has checked you carefully, but problems can develop later. If you notice any problems or new symptoms, get medical treatment right away. Follow-up care is a key part of your treatment and safety. Be sure to make and go to all appointments, and call your doctor if you are having problems. It's also a good idea to know your test results and keep a list of the medicines you take. How can you care for yourself at home? · Rest when you feel tired. · Be safe with medicines. If your doctor prescribed medicine, take it exactly as prescribed. Call your doctor if you think you are having a problem with your medicine. You will get more details on the specific medicines your doctor prescribes. · Do not skip meals. Eating a balanced diet may increase your energy level. · Get some physical activity every day, but do not get too tired. When should you call for help? Call your doctor now or seek immediate medical care if:    · You have new or worse weakness.     · You are dizzy or lightheaded, or you feel like you may faint.    Watch closely for changes in your health, and be sure to contact your doctor if:    · You do not get better as expected. Where can you learn more?   Go to http://da-ahsan.info/. Enter [id number] [id number] in the search box to learn more about \"Weakness: Care Instructions. \"  Current as of: November 20, 2017  Content Version: 11.8  © 6865-4829 [employer]. Care instructions adapted under license by [employer] (which disclaims liability or warranty for this information). If you have questions about a medical condition or this instruction, always ask your healthcare professional. [address] any warranty or liability for your use of this information. Chronic Kidney Disease: Care Instructions  Your Care Instructions    Chronic kidney disease happens when your kidneys don't work as well as they should. Your kidneys have a few important jobs. They remove waste from your blood. This waste leaves your body in your urine. They also balance your body's fluids and chemicals. When your kidneys don't work well, extra waste and fluid can build up. This can poison the body and sometimes cause death. The most common causes of this disease are diabetes and high blood pressure. In some cases, the disease develops in 2 to 3 months. But it usually develops over many years. If you take medicine and make healthy changes to your lifestyle, you may be able to prevent the disease from getting worse. But if your kidney damage gets worse, you may need dialysis or a kidney transplant. Dialysis uses a machine to filter waste from the blood. A transplant is surgery to give you a healthy kidney from another person. Follow-up care is a key part of your treatment and safety. Be sure to make and go to all appointments, and call your doctor if you are having problems. It's also a good idea to know your test results and keep a list of the medicines you take. How can you care for yourself at home?   Treatments and appointments    · Be safe with medicines. Take your medicines exactly as prescribed.  Call your doctor if you have any problems with your medicine. You also may take medicine to control your blood pressure or to treat diabetes. Many people who have diabetes take blood pressure medicine.     · If you have diabetes, do your best to keep your blood sugar in your target range. You may do this by eating healthy food and exercising. You may also take medicines.     · Go to your dialysis appointments if you have this treatment.     · Do not take ibuprofen (Advil, Motrin), naproxen (Aleve), or similar medicines, unless your doctor tells you to. These may make the disease worse.     · Do not take any vitamins, over-the-counter medicines, or herbal products without talking to your doctor first.     · Do not smoke or use other tobacco products. Smoking can reduce blood flow to the kidneys. If you need help quitting, talk to your doctor about stop-smoking programs and medicines. These can increase your chances of quitting for good.     · Do not drink alcohol or use illegal drugs.     · Talk to your doctor about an exercise plan. Exercise helps lower your blood pressure. It also makes you feel better.     · If you have an advance directive, let your doctor know. It may include a living will and a durable power of  for health care. If you don't have one, you may want to prepare one. It lets your doctor and loved ones know your health care wishes if you become unable to speak for yourself. Diet    · Talk to a registered dietitian. He or she can help you make a meal plan that is right for you. Most people with kidney disease need to limit salt (sodium), fluids, and protein. Some also have to limit potassium and phosphorus.     · You may have to give up many foods you like. But try to focus on the fact that this will help you stay healthy for as long as possible.     · If you have a hard time eating enough, talk to your doctor or dietitian about ways to add calories to your diet.     · Your diet may change as your disease changes.  See your doctor for regular testing. And work with a dietitian to change your diet as needed. When should you call for help? Call 911 anytime you think you may need emergency care. For example, call if:    · You passed out (lost consciousness).    Call your doctor now or seek immediate medical care if:    · You have less urine than normal or no urine.     · You have trouble urinating or can urinate only very small amounts.     · You are confused or have trouble thinking clearly.     · You feel weaker or more tired than usual.     · You are very thirsty, lightheaded, or dizzy.     · You have nausea and vomiting.     · You have new swelling of your arms or feet, or your swelling is worse.     · You have blood in your urine.     · You have new or worse trouble breathing.    Watch closely for changes in your health, and be sure to contact your doctor if:    · You have any problems with your medicine or other treatment. Where can you learn more? Go to http://da-ahsan.info/. Enter N276 in the search box to learn more about \"Chronic Kidney Disease: Care Instructions. \"  Current as of: March 15, 2018  Content Version: 11.8  © 6987-2858 [employer]. Care instructions adapted under license by [employer] (which disclaims liability or warranty for this information). If you have questions about a medical condition or this instruction, always ask your healthcare professional. [place] [address] any warranty or liability for your use of this information. Dehydration: Care Instructions  Your Care Instructions  Dehydration happens when your body loses too much fluid. This might happen when you do not drink enough water or you lose large amounts of fluids from your body because of diarrhea, vomiting, or sweating. Severe dehydration can be life-threatening. Water and minerals called electrolytes help put your body fluids back in balance.  Learn the early signs of fluid loss, and drink more fluids to prevent dehydration. Follow-up care is a key part of your treatment and safety. Be sure to make and go to all appointments, and call your doctor if you are having problems. It's also a good idea to know your test results and keep a list of the medicines you take. How can you care for yourself at home? · To prevent dehydration, drink plenty of fluids, enough so that your urine is light yellow or clear like water. Choose water and other caffeine-free clear liquids until you feel better. If you have kidney, heart, or liver disease and have to limit fluids, talk with your doctor before you increase the amount of fluids you drink. · If you do not feel like eating or drinking, try taking small sips of water, sports drinks, or other rehydration drinks. · Get plenty of rest.  To prevent dehydration  · Add more fluids to your diet and daily routine, unless your doctor has told you not to. · During hot weather, drink more fluids. Drink even more fluids if you exercise a lot. Stay away from drinks with alcohol or caffeine. · Watch for the symptoms of dehydration. These include:  ? A dry, sticky mouth. ? Dark yellow urine, and not much of it. ? Dry and sunken eyes. ? Feeling very tired. · Learn what problems can lead to dehydration. These include:  ? Diarrhea, fever, and vomiting. ? Any illness with a fever, such as pneumonia or the flu. ? Activities that cause heavy sweating, such as endurance races and heavy outdoor work in hot or humid weather. ? Alcohol or drug abuse or withdrawal.  ? Certain medicines, such as cold and allergy pills (antihistamines), diet pills (diuretics), and laxatives. ? Certain diseases, such as diabetes, cancer, and heart or kidney disease. When should you call for help? Call 911 anytime you think you may need emergency care.  For example, call if:    · You passed out (lost consciousness).    Call your doctor now or seek immediate medical care if:    · You are confused and cannot think clearly.     · You are dizzy or lightheaded, or you feel like you may faint.     · You have signs of needing more fluids. You have sunken eyes and a dry mouth, and you pass only a little dark urine.     · You cannot keep fluids down.    Watch closely for changes in your health, and be sure to contact your doctor if:    · You are not making tears.     · Your skin is very dry and sags slowly back into place after you pinch it.     · Your mouth and eyes are very dry. Where can you learn more? Go to http://da-ahsan.info/. Enter J950 in the search box to learn more about \"Dehydration: Care Instructions. \"  Current as of: November 20, 2017  Content Version: 11.8  © 9092-7937 [employer]. Care instructions adapted under license by [employer] (which disclaims liability or warranty for this information). If you have questions about a medical condition or this instruction, always ask your healthcare professional. [address] any warranty or liability for your use of this information.

## 2018-12-30 NOTE — ED NOTES
I have reviewed discharge instructions with the patient. The patient verbalized understanding. Patient picked up by Kalyani Valdez.

## 2018-12-31 ENCOUNTER — APPOINTMENT (OUTPATIENT)
Dept: GENERAL RADIOLOGY | Age: 83
End: 2018-12-31
Attending: EMERGENCY MEDICINE
Payer: MEDICARE

## 2018-12-31 ENCOUNTER — HOSPITAL ENCOUNTER (EMERGENCY)
Age: 83
Discharge: HOME | End: 2018-12-31
Attending: EMERGENCY MEDICINE | Admitting: EMERGENCY MEDICINE
Payer: MEDICARE

## 2018-12-31 VITALS
SYSTOLIC BLOOD PRESSURE: 181 MMHG | TEMPERATURE: 97.7 F | DIASTOLIC BLOOD PRESSURE: 84 MMHG | OXYGEN SATURATION: 95 % | HEART RATE: 65 BPM | RESPIRATION RATE: 18 BRPM

## 2018-12-31 DIAGNOSIS — R05.9 COUGH: Primary | ICD-10-CM

## 2018-12-31 DIAGNOSIS — H10.9 CONJUNCTIVITIS OF BOTH EYES, UNSPECIFIED CONJUNCTIVITIS TYPE: ICD-10-CM

## 2018-12-31 LAB
ANION GAP SERPL CALC-SCNC: 9 MMOL/L (ref 3–18)
BASOPHILS # BLD: 0.1 K/UL (ref 0–0.1)
BASOPHILS NFR BLD: 1 % (ref 0–2)
BUN SERPL-MCNC: 20 MG/DL (ref 7–18)
BUN/CREAT SERPL: 15 (ref 12–20)
CALCIUM SERPL-MCNC: 8.5 MG/DL (ref 8.5–10.1)
CHLORIDE SERPL-SCNC: 106 MMOL/L (ref 100–108)
CK SERPL-CCNC: 87 U/L (ref 39–308)
CO2 SERPL-SCNC: 31 MMOL/L (ref 21–32)
CREAT SERPL-MCNC: 1.36 MG/DL (ref 0.6–1.3)
DIFFERENTIAL METHOD BLD: ABNORMAL
EOSINOPHIL # BLD: 0.5 K/UL (ref 0–0.4)
EOSINOPHIL NFR BLD: 5 % (ref 0–5)
ERYTHROCYTE [DISTWIDTH] IN BLOOD BY AUTOMATED COUNT: 14.5 % (ref 11.6–14.5)
FLUAV AG NPH QL IA: NEGATIVE
FLUBV AG NOSE QL IA: NEGATIVE
GLUCOSE SERPL-MCNC: 122 MG/DL (ref 74–99)
HCT VFR BLD AUTO: 49.1 % (ref 36–48)
HGB BLD-MCNC: 16.8 G/DL (ref 13–16)
LYMPHOCYTES # BLD: 2 K/UL (ref 0.9–3.6)
LYMPHOCYTES NFR BLD: 21 % (ref 21–52)
MAGNESIUM SERPL-MCNC: 2.4 MG/DL (ref 1.6–2.6)
MCH RBC QN AUTO: 29.3 PG (ref 24–34)
MCHC RBC AUTO-ENTMCNC: 34.2 G/DL (ref 31–37)
MCV RBC AUTO: 85.7 FL (ref 74–97)
MONOCYTES # BLD: 1.3 K/UL (ref 0.05–1.2)
MONOCYTES NFR BLD: 13 % (ref 3–10)
NEUTS SEG # BLD: 5.9 K/UL (ref 1.8–8)
NEUTS SEG NFR BLD: 60 % (ref 40–73)
PLATELET # BLD AUTO: 167 K/UL (ref 135–420)
PMV BLD AUTO: 10.5 FL (ref 9.2–11.8)
POTASSIUM SERPL-SCNC: 3.8 MMOL/L (ref 3.5–5.5)
RBC # BLD AUTO: 5.73 M/UL (ref 4.7–5.5)
SODIUM SERPL-SCNC: 146 MMOL/L (ref 136–145)
WBC # BLD AUTO: 9.8 K/UL (ref 4.6–13.2)

## 2018-12-31 PROCEDURE — 80048 BASIC METABOLIC PNL TOTAL CA: CPT

## 2018-12-31 PROCEDURE — 99284 EMERGENCY DEPT VISIT MOD MDM: CPT

## 2018-12-31 PROCEDURE — 74011250637 HC RX REV CODE- 250/637: Performed by: EMERGENCY MEDICINE

## 2018-12-31 PROCEDURE — 82550 ASSAY OF CK (CPK): CPT

## 2018-12-31 PROCEDURE — 87804 INFLUENZA ASSAY W/OPTIC: CPT

## 2018-12-31 PROCEDURE — 85025 COMPLETE CBC W/AUTO DIFF WBC: CPT

## 2018-12-31 PROCEDURE — 71046 X-RAY EXAM CHEST 2 VIEWS: CPT

## 2018-12-31 PROCEDURE — 83735 ASSAY OF MAGNESIUM: CPT

## 2018-12-31 RX ORDER — ERYTHROMYCIN 5 MG/G
OINTMENT OPHTHALMIC
Qty: 1 TUBE | Refills: 0 | Status: SHIPPED | OUTPATIENT
Start: 2018-12-31 | End: 2021-05-04

## 2018-12-31 RX ORDER — ERYTHROMYCIN 5 MG/G
OINTMENT OPHTHALMIC
Status: COMPLETED | OUTPATIENT
Start: 2018-12-31 | End: 2018-12-31

## 2018-12-31 RX ADMIN — ERYTHROMYCIN: 5 OINTMENT OPHTHALMIC at 16:42

## 2018-12-31 NOTE — ED PROVIDER NOTES
EMERGENCY DEPARTMENT HISTORY AND PHYSICAL EXAM 
 
3:12 PM 
 
Date: 12/31/2018 Patient Name: Re Gallagher History of Presenting Illness Chief Complaint Patient presents with  Cough Chief Complaint: Generalized myalgias History Provided By: Patient and Transport Additional History (Context): Re Gallagher is a 82 y.o. male with PMHx of CAD, Angina, Hypercholesterolemia, DM, COPD, Stroke, and Dementia who presents from the [address] [place] [address] with generalized myalgias onset 2 weeks ago. Patient reports associated symptoms of a cough (which he states has been improving). Notes chronic arthralgias. Denies other associated symptoms, such as abdominal pain, chest pain, and nausea. Per Transport, the patient was brought to the ED to rule out influenza vs. Pneumonia. States the patient was with his family this weekend, and his family contracted the flu. 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 No other concerns were expressed at this time. PCP: Rivera Ramirez MD 
 
 
 
Duration:  2 weeks Timing:  Constant Location: Generalized Quality: Aching Severity: Patient denies pain Modifying Factors: No modifying factors were reported. Associated Symptoms: Patient reports associated symptoms of a cough (which he states has been improving). Notes chronic arthralgias. Denies other associated symptoms, such as abdominal pain, chest pain, and nausea. Past History Past Medical History: 
Past Medical History:  
Diagnosis Date  Angina  Anxiety  Arthritis  CAD (coronary artery disease) s/p drug-eluting stents to RCA for high-grade stenoses in 2/09  Cardiac catheterization 08/24/2016 Mod calcification. Co-dom. oLM 50-70%. LAD 30%. Dx 30%. Cx 70% focal.  OM 80% focal.  RCA 30%.  Cardiac echocardiogram 08/23/2016 Tech difficult. EF 55-60%. No WMA. Normal diastolic fx. Lipomatous septal hypertrophy.  Cardiac nuclear imaging test, abnormal 2016 Sm reversible inferior defect concerning for ischemia. Mild inferior hypk. EF 68%. Neg EKG on pharm stress test.  
 Carotid duplex 2016 Mild <50% CYRUS stenosis. Chronic LICA occlusion. Similar to study of 3/29/16.  Carotid stenosis ([facility])   
 w/ known total occlusion of lt internal carotid artery; followed by pts vascular surgeon  Chronic kidney disease  COPD (chronic obstructive pulmonary disease) ([facility])  Dementia  Depression  Diabetes ([address] type 2  Dyslipidemia   
 on Crestor; managed by pts PCP  Dyspnea  Heart disease  Hypercholesterolemia  Hypertension  Low back pain  Osteoarthrosis involving multiple sites  Skin cancer ([facility])   
 squamous cell lesions of the skin  Stroke ([address]  Venous (peripheral) insufficiency Past Surgical History: 
Past Surgical History:  
Procedure Laterality Date  CARDIAC CATHETERIZATION  2016  CORONARY ARTERY ANGIOGRAM  2016  HX ANGIOPLASTY    
 2 stents placed and heart attack during the procedure  HX COLONOSCOPY  10/2003  
 neg; declines f/u  
 HX CORONARY ARTERY BYPASS GRAFT  2016  HX HEART CATHETERIZATION  2013  HX HERNIA REPAIR    
 1970s  HX OTHER SURGICAL    
 groin surgery  HX TONSIL AND ADENOIDECTOMY  IR INTRAVASCULAR ULTRASOUND INITIAL  2016  LV ANGIOGRAPHY  2016 Family History: 
Family History Problem Relation Age of Onset  Parkinsonism Mother  Hypertension Mother  Cancer Father   
     lung  Heart defect Brother Social History: 
Social History Tobacco Use  Smoking status: Former Smoker Years: 2.00 Last attempt to quit: 1955 Years since quittin.0  Smokeless tobacco: Never Used Substance Use Topics  Alcohol use: Yes Comment: drinks weekly couple beers  Drug use: No  
 
 
Allergies: Allergies Allergen Reactions  Amaryl [Glimepiride] Drowsiness  Lipitor [Atorvastatin] Myalgia  Niacin Other (comments) Facial blistering, swelling in face  Pravachol [Pravastatin] Myalgia  Zocor [Simvastatin] Myalgia Review of Systems Review of Systems Respiratory: Positive for cough. Cardiovascular: Negative for chest pain. Gastrointestinal: Negative for abdominal pain and nausea. Musculoskeletal: Positive for myalgias (generalized ). Arthralgias: chronic All other systems reviewed and are negative. Physical Exam  
 
Patient Vitals for the past 12 hrs: 
 Temp Pulse Resp BP SpO2  
12/31/18 1515 97.7 °F (36.5 °C) 65 18 173/84 96 % Physical Exam  
Constitutional: He is oriented to person, place, and time. He appears well-developed. HENT:  
Head: Normocephalic and atraumatic. Eyes: EOM are normal. Right conjunctiva is injected. Left conjunctiva is injected. Injected conjunctiva bilaterally including the lower lids, with scant drainage from both eyes. Neck: Normal range of motion. Cardiovascular: Normal heart sounds. Exam reveals no gallop and no friction rub. No murmur heard. Pulmonary/Chest: Effort normal and breath sounds normal. No stridor. Abdominal: Soft. There is no tenderness. Musculoskeletal: Normal range of motion. He exhibits no tenderness. Neurological: He is alert and oriented to person, place, and time. Skin: Skin is warm and dry. He is not diaphoretic. Psychiatric: He has a normal mood and affect. His behavior is normal.  
Nursing note and vitals reviewed. Diagnostic Study Results Labs - Recent Results (from the past 12 hour(s)) CBC WITH AUTOMATED DIFF Collection Time: 12/31/18  4:00 PM  
Result Value Ref Range WBC 9.8 4.6 - 13.2 K/uL  
 RBC 5.73 (H) 4.70 - 5.50 M/uL  
 HGB 16.8 (H) 13.0 - 16.0 g/dL HCT 49.1 (H) 36.0 - 48.0 % MCV 85.7 74.0 - 97.0 FL  
 MCH 29.3 24.0 - 34.0 PG  
 MCHC 34.2 31.0 - 37.0 g/dL  
 RDW 14.5 11.6 - 14.5 % PLATELET 529 238 - 476 K/uL MPV 10.5 9.2 - 11.8 FL  
 NEUTROPHILS 60 40 - 73 % LYMPHOCYTES 21 21 - 52 % MONOCYTES 13 (H) 3 - 10 % EOSINOPHILS 5 0 - 5 % BASOPHILS 1 0 - 2 %  
 ABS. NEUTROPHILS 5.9 1.8 - 8.0 K/UL  
 ABS. LYMPHOCYTES 2.0 0.9 - 3.6 K/UL  
 ABS. MONOCYTES 1.3 (H) 0.05 - 1.2 K/UL  
 ABS. EOSINOPHILS 0.5 (H) 0.0 - 0.4 K/UL  
 ABS. BASOPHILS 0.1 0.0 - 0.1 K/UL  
 DF AUTOMATED METABOLIC PANEL, BASIC Collection Time: 12/31/18  4:00 PM  
Result Value Ref Range Sodium 146 (H) 136 - 145 mmol/L Potassium 3.8 3.5 - 5.5 mmol/L Chloride 106 100 - 108 mmol/L  
 CO2 31 21 - 32 mmol/L Anion gap 9 3.0 - 18 mmol/L Glucose 122 (H) 74 - 99 mg/dL BUN 20 (H) 7.0 - 18 MG/DL Creatinine 1.36 (H) 0.6 - 1.3 MG/DL  
 BUN/Creatinine ratio 15 12 - 20 GFR est AA >60 >60 ml/min/1.73m2 GFR est non-AA 50 (L) >60 ml/min/1.73m2 Calcium 8.5 8.5 - 10.1 MG/DL INFLUENZA A & B AG (RAPID TEST) Collection Time: 12/31/18  4:00 PM  
Result Value Ref Range Influenza A Antigen NEGATIVE  NEG Influenza B Antigen NEGATIVE  NEG    
CK Collection Time: 12/31/18  4:00 PM  
Result Value Ref Range CK 87 39 - 308 U/L  
MAGNESIUM Collection Time: 12/31/18  4:00 PM  
Result Value Ref Range Magnesium 2.4 1.6 - 2.6 mg/dL Radiologic Studies -  
XR CHEST PA LAT Final Result IMPRESSION:  
  
No acute radiographic findings. Medical Decision Making ED Course: Progress Notes, Reevaluation, and Consults: 
 
 
Provider Notes (Medical Decision Making): Based on my history, physical exam, and diagnostic evaluation, the patient appears to have symptoms consistent with cough. The pt came to the c/o  Myalgias and a cough.   In the ED, the pt has normal heart rate, normal oxygen saturation, and a normal respiratory pattern. He does have bilateral conjunctival irritation and drainage without pain. Patient was brought to the ED to rule out Influenza vs. Pneumonia. Patient's flu test was negative and the CXR showed no acute process. His labs show some dehydration but no ANNA which has improved from his last visit. The pt appears to be in no distress and ambulating in the emergency department without difficulty. There was some conjunctivitis noted to bilateral eyes, but the patient states this is not new. Patient will be discharged with Ilotycin and instructions to return if difficulty breathing, not tolerating oral food or fluids, any respiratory distress, or new symptoms. I encouraged follow-up with the primary care physician for repeat exam in 24-48 hours. Current Outpatient Medications Medication Sig Dispense Refill  QUEtiapine (SEROQUEL) 25 mg tablet Take 1 Tab by mouth nightly. 30 Tab 0  
 lidocaine (LIDODERM) 5 % Apply patch to the affected area for 12 hours a day and remove for 12 hours a day. 15 Each 0  
 oseltamivir (TAMIFLU) 30 mg capsule Take 1 Cap by mouth two (2) times a day. Indications: INFLUENZA 5 Cap 0  
 acetaminophen (TYLENOL) 500 mg tablet Take 1 Tab by mouth every six (6) hours as needed. Indications: pain 20 Tab 0  
 aspirin delayed-release 81 mg tablet Take 1 Tab by mouth daily. Indications: Cerebral Thromboembolism Prevention 30 Tab 0  cycloSPORINE (RESTASIS) 0.05 % ophthalmic emulsion Administer 1 Drop to both eyes every twelve (12) hours. Indications: Dry Eye 15 Each 0  
 gabapentin (NEURONTIN) 300 mg capsule Take 1 Cap by mouth daily. Indications: NEUROPATHIC PAIN, Restless Legs Syndrome 30 Cap 0  
 insulin glargine (LANTUS SOLOSTAR U-100 INSULIN) 100 unit/mL (3 mL) inpn 35 Units by SubCUTAneous route nightly. Indications: type 2 diabetes mellitus 1 Pen 3  
 isosorbide mononitrate ER (IMDUR) 30 mg tablet Take 1 Tab by mouth daily.  30 Tab 0  
  metoprolol succinate (TOPROL-XL) 50 mg XL tablet Take 1 Tab by mouth daily. Indications: hypertension 30 Tab 0  
 multivit-min-FA-lycopen-lutein (CENTRUM SILVER) 0.4-300-250 mg-mcg-mcg tab Take 1 tab daily 30 Tab 0  
 Insulin Needles, Disposable, (SHAWN PEN NEEDLE) 32 gauge x 5/32\" ndle Use as directed. 100 Pen Needle 0  
 rosuvastatin (CRESTOR) 10 mg tablet Take 1 Tab by mouth nightly. 30 Tab 0  
 OTHER Incentive spirometry- Use as directed 1 Each 0  
 solifenacin (VESICARE) 5 mg tablet Take 1 Tab by mouth daily. Indications: Bladder Hyperactivity, INCREASED URINARY FREQUENCY, URINARY URGE INCONTINENCE, URINARY URGENCY 30 Tab 0  
 temazepam (RESTORIL) 7.5 mg capsule Take 1 Cap by mouth nightly as needed for Sleep. Max Daily Amount: 7.5 mg. 12 Cap 0  
 furosemide (LASIX) 20 mg tablet Take 1 Tab by mouth daily. Indications: Edema 30 Tab 0  
 guaiFENesin (ROBITUSSIN) 100 mg/5 mL liquid Take 5 mL by mouth every four (4) hours as needed for Cough. 1 Bottle 0  
 ciprofloxacin HCl (CILOXAN) 0.3 % ophthalmic solution Administer 1 Drop to both eyes every four (4) hours (while awake). For 3 more days. End 3/29/2018  Indications: BACTERIAL CONJUNCTIVITIS 2 mL 0  
 albuterol-ipratropium (DUO-NEB) 2.5 mg-0.5 mg/3 ml nebu 3 mL by Nebulization route every six (6) hours as needed. 30 Nebule 0  
 insulin lispro (HUMALOG KWIKPEN INSULIN) 200 unit/mL (3 mL) inpn For Blood Sugar (mg/dL) of:    
Less than 150 =   0 units 150 -199 =   2 units 200 -249 =   4 units 250 -299 =   6 units 300 -349 =   8 units 350 and above =   10 units Check BG then Inject insulin per SS 3 times daily before meals and at bedtime  Indications: type 2 diabetes mellitus 1 Pen 0  Blood-Glucose Meter monitoring kit Use as directed 1 Kit 0  
 lancets 23 gauge misc 1 Box by Does Not Apply route Before breakfast, lunch, and dinner.  Use as directed 1 Lancet 0  
 alcohol swabs (ALCOHOL PREP SWABS) padm Use as directed 100 Pad 0  
  predniSONE (DELTASONE) 10 mg tablet Take 4 tabs for 1 day, then 3 tabs for 3 days, then 2 tabs for 3 days, then 1 tab by mouth for 3 days  Indications: COPD exacerbation 22 Tab 0  
 famotidine (PEPCID) 20 mg tablet Take 1 Tab by mouth two (2) times a day. Indications: PREVENTION OF STRESS ULCER 30 Tab 0  
 COMPOUNDMAX BASE crea 240 g by Does Not Apply route four (4) times daily. Anti-Inflam Meloxicam 0.09% Topirmate 1% Methocarbamol 2%  Lidocaine 2% Prilocaine 2% 240 g 3  
 CALCIUM CARBONATE (CALTRATE 600 PO) Take 1 Tab by mouth daily. Vital Signs-Reviewed the patient's vital signs. Pulse Oximetry Analysis -  96% on room air (Interpretation) Cardiac Monitor: 
Rate: 65bpm 
 
Records Reviewed: Nursing Notes and Triage notes  (Time of Review: 3:12 PM) 
-I am the first provider for this patient. 
-I reviewed the vital signs, available nursing notes, past medical history, past surgical history, family history and social history. Diagnosis Clinical Impression: 1. Cough 2. Conjunctivitis of both eyes, unspecified conjunctivitis type Disposition: Discharged Follow-up Information Follow up With Specialties Details Why Contact Info Anne Marie Bernard MD Internal Medicine Schedule an appointment as soon as possible for a visit  [place] [id number] [phone] [facility] [id number] [phone] [facility] Emergency Medicine Go to If symptoms worsen [address] [place] [id number] 
[phone] Medication List  
  
ASK your doctor about these medications   
acetaminophen 500 mg tablet Commonly known as:  TYLENOL Take 1 Tab by mouth every six (6) hours as needed. Indications: pain 
  
albuterol-ipratropium 2.5 mg-0.5 mg/3 ml Nebu Commonly known as:  DUO-NEB 
3 mL by Nebulization route every six (6) hours as needed. alcohol swabs Padm Commonly known as:  ALCOHOL PREP SWABS Use as directed 
  
aspirin delayed-release 81 mg tablet Take 1 Tab by mouth daily. Indications: Cerebral Thromboembolism Prevention Blood-Glucose Meter monitoring kit Use as directed CALTRATE 600 PO 
  
ciprofloxacin HCl 0.3 % ophthalmic solution Commonly known as:  Erick Bojorquez Administer 1 Drop to both eyes every four (4) hours (while awake). For 3 more days. End 3/29/2018  Indications: BACTERIAL CONJUNCTIVITIS 
  
COMPOUNDMAX BASE Crea Generic drug:  cream base no. 171 (bulk) 240 g by Does Not Apply route four (4) times daily. Anti-Inflam Meloxicam 0.09% Topirmate 1% Methocarbamol 2%  Lidocaine 2% Prilocaine 2% 
  
cycloSPORINE 0.05 % Dpet Commonly known as:  RESTASIS Administer 1 Drop to both eyes every twelve (12) hours. Indications: Dry Eye 
  
famotidine 20 mg tablet Commonly known as:  PEPCID Take 1 Tab by mouth two (2) times a day. Indications: PREVENTION OF STRESS ULCER 
  
furosemide 20 mg tablet Commonly known as:  LASIX Take 1 Tab by mouth daily. Indications: Edema 
  
gabapentin 300 mg capsule Commonly known as:  NEURONTIN Take 1 Cap by mouth daily. Indications: NEUROPATHIC PAIN, Restless Legs Syndrome 
  
guaiFENesin 100 mg/5 mL liquid Commonly known as:  ROBITUSSIN Take 5 mL by mouth every four (4) hours as needed for Cough. insulin glargine 100 unit/mL (3 mL) Inpn Commonly known as:  LANTUS SOLOSTAR U-100 INSULIN 
35 Units by SubCUTAneous route nightly. Indications: type 2 diabetes mellitus 
  
insulin lispro 200 unit/mL (3 mL) Inpn Commonly known as:  HumaLOG KwikPen Insulin For Blood Sugar (mg/dL) of:    
Less than 150 =   0 units 150 -199 =   2 units 200 -249 =   4 units 250 -299 =   6 units 300 -349 =   8 units 350 and above =   10 units Check BG then Inject insulin per SS 3 times daily before meals and at bedtime  Indications: type 2 diabetes mellitus Insulin Needles (Disposable) 32 gauge x 5/32\" Ndle Commonly known as:  Justina Pen Needle Use as directed. isosorbide mononitrate ER 30 mg tablet Commonly known as:  IMDUR Take 1 Tab by mouth daily. lancets 23 gauge Misc 1 Box by Does Not Apply route Before breakfast, lunch, and dinner. Use as directed 
  
lidocaine 5 % Commonly known as:  Estil Finical Apply patch to the affected area for 12 hours a day and remove for 12 hours a day. metoprolol succinate 50 mg XL tablet Commonly known as:  TOPROL-XL Take 1 Tab by mouth daily. Indications: hypertension 
  
multivit-min-FA-lycopen-lutein 0.4-300-250 mg-mcg-mcg Tab Commonly known as:  CENTRUM SILVER Take 1 tab daily 
  
oseltamivir 30 mg capsule Commonly known as:  TAMIFLU Take 1 Cap by mouth two (2) times a day. Indications: INFLUENZA 
  
OTHER Incentive spirometry- Use as directed 
  
predniSONE 10 mg tablet Commonly known as:  Alvie [place] Take 4 tabs for 1 day, then 3 tabs for 3 days, then 2 tabs for 3 days, then 1 tab by mouth for 3 days  Indications: COPD exacerbation QUEtiapine 25 mg tablet Commonly known as:  SEROquel Take 1 Tab by mouth nightly. rosuvastatin 10 mg tablet Commonly known as:  CRESTOR Take 1 Tab by mouth nightly. solifenacin 5 mg tablet Commonly known as:  VESIcare Take 1 Tab by mouth daily. Indications: Bladder Hyperactivity, INCREASED URINARY FREQUENCY, URINARY URGE INCONTINENCE, URINARY URGENCY 
  
temazepam 7.5 mg capsule Commonly known as:  RESTORIL Take 1 Cap by mouth nightly as needed for Sleep. Max Daily Amount: 7.5 mg. 
  
  
 
_______________________________ Attestations: 
Scribe Attestation Yasmeen Sebastian acting as a scribe for and in the presence of Courtney Lovett MD     
December 31, 2018 at 3:12 PM 
    
Provider Attestation:     
I personally performed the services described in the documentation, reviewed the documentation, as recorded by the scribe in my presence, and it accurately and completely records my words and actions. December 31, 2018 at 3:12 PM - Courtney Lovett MD   
_______________________________

## 2018-12-31 NOTE — DISCHARGE INSTRUCTIONS
Your evaluation today showed conjunctivitis and cough. Please follow up with a doctor as discussed. The evaluation and treatment done today requires that you follow up with a physician for re-evaluation. Not following up could result in chronic pain/disability/injury. Medical problems can change over time and symptoms can get worse or new symptoms can develop over time, therefore, it is important that you follow up as we discussed or return immedediately to the ER. Diseases don't read textbooks and there are limitations to our evaluation and testing, so please immediately return to the ER if you have any concerns. Call the ER if you have any questions about what we discussed. Please visit [employer] for discounts on any prescriptions you may have. At the [facility] Emergency Department we are genuinely concerned about your health and comfort. You may be selected to participate in a patient satisfaction survey mailed to your home. We are excited about the opportunity to learn from your experience so we may continue to improve. In striving for the very best we believe good is not good enough, but if you rate us as EXCELLENT in all boxes, we have succeeded. We strive to provide EXCELLENT care to you and your family. We appreciate the opportunity to take care of you, and hope you do well. Pinkeye: Care Instructions  Your Care Instructions    Pinkeye is redness and swelling of the eye surface and the conjunctiva (the lining of the eyelid and the covering of the white part of the eye). Pinkeye is also called conjunctivitis. Pinkeye is often caused by infection with bacteria or a virus. Dry air, allergies, smoke, and chemicals are other common causes. Pinkeye often clears on its own in 7 to 10 days. Antibiotics only help if the pinkeye is caused by bacteria. Pinkeye caused by infection spreads easily.  If an allergy or chemical is causing pinkeye, it will not go away unless you can avoid whatever is causing it. Follow-up care is a key part of your treatment and safety. Be sure to make and go to all appointments, and call your doctor if you are having problems. It's also a good idea to know your test results and keep a list of the medicines you take. How can you care for yourself at home? · Wash your hands often. Always wash them before and after you treat pinkeye or touch your eyes or face. · Use moist cotton or a clean, wet cloth to remove crust. Wipe from the inside corner of the eye to the outside. Use a clean part of the cloth for each wipe. · Put cold or warm wet cloths on your eye a few times a day if the eye hurts. · Do not wear contact lenses or eye makeup until the pinkeye is gone. Throw away any eye makeup you were using when you got pinkeye. Clean your contacts and storage case. If you wear disposable contacts, use a new pair when your eye has cleared and it is safe to wear contacts again. · If the doctor gave you antibiotic ointment or eyedrops, use them as directed. Use the medicine for as long as instructed, even if your eye starts looking better soon. Keep the bottle tip clean, and do not let it touch the eye area. · To put in eyedrops or ointment:  ? Tilt your head back, and pull your lower eyelid down with one finger. ? Drop or squirt the medicine inside the lower lid. ? Close your eye for 30 to 60 seconds to let the drops or ointment move around. ? Do not touch the ointment or dropper tip to your eyelashes or any other surface. · Do not share towels, pillows, or washcloths while you have pinkeye. When should you call for help? Call your doctor now or seek immediate medical care if:    · You have pain in your eye, not just irritation on the surface.     · You have a change in vision or loss of vision.     · You have an increase in discharge from the eye.     · Your eye has not started to improve or begins to get worse within 48 hours after you start using antibiotics.      · Pinkeye lasts longer than 7 days.    Watch closely for changes in your health, and be sure to contact your doctor if you have any problems. Where can you learn more? Go to http://da-ahsan.info/. Enter Y392 in the search box to learn more about \"Pinkeye: Care Instructions. \"  Current as of: November 20, 2017  Content Version: 11.8  © 7045-2823 [employer]. Care instructions adapted under license by [employer] (which disclaims liability or warranty for this information). If you have questions about a medical condition or this instruction, always ask your healthcare professional. [place] [address] any warranty or liability for your use of this information. Cough: Care Instructions  Your Care Instructions    A cough is your body's response to something that bothers your throat or airways. Many things can cause a cough. You might cough because of a cold or the flu, bronchitis, or asthma. Smoking, postnasal drip, allergies, and stomach acid that backs up into your throat also can cause coughs. A cough is a symptom, not a disease. Most coughs stop when the cause, such as a cold, goes away. You can take a few steps at home to cough less and feel better. Follow-up care is a key part of your treatment and safety. Be sure to make and go to all appointments, and call your doctor if you are having problems. It's also a good idea to know your test results and keep a list of the medicines you take. How can you care for yourself at home? · Drink lots of water and other fluids. This helps thin the mucus and soothes a dry or sore throat. Honey or lemon juice in hot water or tea may ease a dry cough. · Take cough medicine as directed by your doctor. · Prop up your head on pillows to help you breathe and ease a dry cough. · Try cough drops to soothe a dry or sore throat. Cough drops don't stop a cough.  Medicine-flavored cough drops are no better than candy-flavored drops or hard candy. · Do not smoke. Avoid secondhand smoke. If you need help quitting, talk to your doctor about stop-smoking programs and medicines. These can increase your chances of quitting for good. When should you call for help? Call 911 anytime you think you may need emergency care. For example, call if:    · You have severe trouble breathing.    Call your doctor now or seek immediate medical care if:    · You cough up blood.     · You have new or worse trouble breathing.     · You have a new or higher fever.     · You have a new rash.    Watch closely for changes in your health, and be sure to contact your doctor if:    · You cough more deeply or more often, especially if you notice more mucus or a change in the color of your mucus.     · You have new symptoms, such as a sore throat, an earache, or sinus pain.     · You do not get better as expected. Where can you learn more? Go to http://da-ahsan.info/. Enter D279 in the search box to learn more about \"Cough: Care Instructions. \"  Current as of: December 6, 2017  Content Version: 11.8  © 7805-2969 [employer]. Care instructions adapted under license by [employer] (which disclaims liability or warranty for this information). If you have questions about a medical condition or this instruction, always ask your healthcare professional. [address] any warranty or liability for your use of this information.

## 2018-12-31 NOTE — ED TRIAGE NOTES
1515:EMS reports resident of  [facility].  Pt sent to hospital for congestion and cough; eval for flu and pneumonia; both bottom eyelids red (normal for pt per staff);  BP 95/62-70 HR- 16- resp; 93% RA

## 2019-03-18 ENCOUNTER — HOSPITAL ENCOUNTER (EMERGENCY)
Age: 84
Discharge: HOME | End: 2019-03-18
Attending: EMERGENCY MEDICINE
Payer: MEDICARE

## 2019-03-18 ENCOUNTER — APPOINTMENT (OUTPATIENT)
Dept: CT IMAGING | Age: 84
End: 2019-03-18
Attending: EMERGENCY MEDICINE
Payer: MEDICARE

## 2019-03-18 VITALS
OXYGEN SATURATION: 94 % | SYSTOLIC BLOOD PRESSURE: 135 MMHG | TEMPERATURE: 97.9 F | HEART RATE: 65 BPM | WEIGHT: 190 LBS | RESPIRATION RATE: 14 BRPM | DIASTOLIC BLOOD PRESSURE: 67 MMHG | BODY MASS INDEX: 27.26 KG/M2

## 2019-03-18 DIAGNOSIS — S09.90XA CLOSED HEAD INJURY, INITIAL ENCOUNTER: Primary | ICD-10-CM

## 2019-03-18 PROCEDURE — 99285 EMERGENCY DEPT VISIT HI MDM: CPT

## 2019-03-18 PROCEDURE — 70450 CT HEAD/BRAIN W/O DYE: CPT

## 2019-03-18 NOTE — ED PROVIDER NOTES
EMERGENCY DEPARTMENT HISTORY AND PHYSICAL EXAM    3:42 AM      Date: 3/18/2019  Patient Name: Agnieszka Reyes    History of Presenting Illness     Chief Complaint   Patient presents with   [facility] Fall         History Provided By: Patient      Additional History (Context): Agnieszka Reeys is a 82 y.o. male with hypertension, hyperlipidemia, renal insufficiency and CKD, CAD who presents for medical evaluation after a fall experienced at his nursing facility onset tonight. The pt states \"I fell out of bed and hit my head\" but denies having any pain, headache, or dizziness. He also denies having experienced LOC from the fall. Nursing staff sent the pt here for concerns of him hitting his head. In the ED, the pt is totally asymptomatic. The patient presents no further medical complaints or concerns to the ED at this time. PCP: June Ravi MD    Chief Complaint: Fall  Duration:  N/A  Timing:  Acute, onset 1 hour ago  Location: Head  Quality: N/A. Pt not having pain  Severity: 1 out of 10  Modifying Factors: None identified  Associated Symptoms: denies any other associated signs or symptoms    Current Outpatient Medications   Medication Sig Dispense Refill    erythromycin (ILOTYCIN) ophthalmic ointment Apply to both lower eyelids four times a day until done. 1 Tube 0    QUEtiapine (SEROQUEL) 25 mg tablet Take 1 Tab by mouth nightly. 30 Tab 0    lidocaine (LIDODERM) 5 % Apply patch to the affected area for 12 hours a day and remove for 12 hours a day. 15 Each 0    oseltamivir (TAMIFLU) 30 mg capsule Take 1 Cap by mouth two (2) times a day. Indications: INFLUENZA 5 Cap 0    acetaminophen (TYLENOL) 500 mg tablet Take 1 Tab by mouth every six (6) hours as needed. Indications: pain 20 Tab 0    aspirin delayed-release 81 mg tablet Take 1 Tab by mouth daily.  Indications: Cerebral Thromboembolism Prevention 30 Tab 0    cycloSPORINE (RESTASIS) 0.05 % ophthalmic emulsion Administer 1 Drop to both eyes every twelve (12) hours. Indications: Dry Eye 15 Each 0    gabapentin (NEURONTIN) 300 mg capsule Take 1 Cap by mouth daily. Indications: NEUROPATHIC PAIN, Restless Legs Syndrome 30 Cap 0    insulin glargine (LANTUS SOLOSTAR U-100 INSULIN) 100 unit/mL (3 mL) inpn 35 Units by SubCUTAneous route nightly. Indications: type 2 diabetes mellitus 1 Pen 3    isosorbide mononitrate ER (IMDUR) 30 mg tablet Take 1 Tab by mouth daily. 30 Tab 0    metoprolol succinate (TOPROL-XL) 50 mg XL tablet Take 1 Tab by mouth daily. Indications: hypertension 30 Tab 0    multivit-min-FA-lycopen-lutein (CENTRUM SILVER) 0.4-300-250 mg-mcg-mcg tab Take 1 tab daily 30 Tab 0    Insulin Needles, Disposable, (SHAWN PEN NEEDLE) 32 gauge x 5/32\" ndle Use as directed. 100 Pen Needle 0    rosuvastatin (CRESTOR) 10 mg tablet Take 1 Tab by mouth nightly. 30 Tab 0    OTHER Incentive spirometry- Use as directed 1 Each 0    solifenacin (VESICARE) 5 mg tablet Take 1 Tab by mouth daily. Indications: Bladder Hyperactivity, INCREASED URINARY FREQUENCY, URINARY URGE INCONTINENCE, URINARY URGENCY 30 Tab 0    temazepam (RESTORIL) 7.5 mg capsule Take 1 Cap by mouth nightly as needed for Sleep. Max Daily Amount: 7.5 mg. 12 Cap 0    furosemide (LASIX) 20 mg tablet Take 1 Tab by mouth daily. Indications: Edema 30 Tab 0    guaiFENesin (ROBITUSSIN) 100 mg/5 mL liquid Take 5 mL by mouth every four (4) hours as needed for Cough. 1 Bottle 0    ciprofloxacin HCl (CILOXAN) 0.3 % ophthalmic solution Administer 1 Drop to both eyes every four (4) hours (while awake). For 3 more days. End 3/29/2018  Indications: BACTERIAL CONJUNCTIVITIS 2 mL 0    albuterol-ipratropium (DUO-NEB) 2.5 mg-0.5 mg/3 ml nebu 3 mL by Nebulization route every six (6) hours as needed.  30 Nebule 0    insulin lispro (HUMALOG KWIKPEN INSULIN) 200 unit/mL (3 mL) inpn For Blood Sugar (mg/dL) of:     Less than 150 =   0 units           150 -199 =   2 units  200 -249 =   4 units  250 -299 =   6 units  300 -349 =   8 units  350 and above =   10 units  Check BG then Inject insulin per SS 3 times daily before meals and at bedtime  Indications: type 2 diabetes mellitus 1 Pen 0    Blood-Glucose Meter monitoring kit Use as directed 1 Kit 0    lancets 23 gauge misc 1 Box by Does Not Apply route Before breakfast, lunch, and dinner. Use as directed 1 Lancet 0    alcohol swabs (ALCOHOL PREP SWABS) padm Use as directed 100 Pad 0    predniSONE (DELTASONE) 10 mg tablet Take 4 tabs for 1 day, then 3 tabs for 3 days, then 2 tabs for 3 days, then 1 tab by mouth for 3 days  Indications: COPD exacerbation 22 Tab 0    famotidine (PEPCID) 20 mg tablet Take 1 Tab by mouth two (2) times a day. Indications: PREVENTION OF STRESS ULCER 30 Tab 0    COMPOUNDMAX BASE crea 240 g by Does Not Apply route four (4) times daily. Anti-Inflam Meloxicam 0.09% Topirmate 1% Methocarbamol 2%  Lidocaine 2% Prilocaine 2% 240 g 3    CALCIUM CARBONATE (CALTRATE 600 PO) Take 1 Tab by mouth daily. Past History     Past Medical History:  Past Medical History:   Diagnosis Date    Angina     Anxiety     Arthritis     CAD (coronary artery disease)     s/p drug-eluting stents to RCA for high-grade stenoses in 2/09    Cardiac catheterization 08/24/2016    Mod calcification. Co-dom. oLM 50-70%. LAD 30%. Dx 30%. Cx 70% focal.  OM 80% focal.  RCA 30%.  Cardiac echocardiogram 08/23/2016    Tech difficult. EF 55-60%. No WMA. Normal diastolic fx. Lipomatous septal hypertrophy.  Cardiac nuclear imaging test, abnormal 08/23/2016    Sm reversible inferior defect concerning for ischemia. Mild inferior hypk. EF 68%. Neg EKG on pharm stress test.    Carotid duplex 08/26/2016    Mild <50% CYRUS stenosis. Chronic LICA occlusion. Similar to study of 3/29/16.     Carotid stenosis (HCC)     w/ known total occlusion of lt internal carotid artery; followed by pts vascular surgeon    Chronic kidney disease     [facility]     Dementia     Depression     Diabetes ([facility] [address]     type 2    Dyslipidemia     on Crestor; managed by pts PCP    Dyspnea     Heart disease     Hypercholesterolemia     Hypertension     Low back pain     Osteoarthrosis involving multiple sites     Skin cancer (HCC)     squamous cell lesions of the skin    Stroke ([facility] [facility]     Venous (peripheral) insufficiency        Past Surgical History:  Past Surgical History:   Procedure Laterality Date    CARDIAC CATHETERIZATION  2016         CORONARY ARTERY ANGIOGRAM  2016         HX ANGIOPLASTY  2009    2 stents placed and heart attack during the procedure    HX COLONOSCOPY  10/2003    neg; declines f/u    HX CORONARY ARTERY BYPASS GRAFT  2016    HX HEART CATHETERIZATION  2013    HX HERNIA REPAIR      1970s    HX OTHER SURGICAL      groin surgery    HX TONSIL AND ADENOIDECTOMY      IR INTRAVASCULAR ULTRASOUND INITIAL  2016         LV ANGIOGRAPHY  2016            Family History:  Family History   Problem Relation Age of Onset    Parkinsonism Mother     Hypertension Mother     Cancer Father         lung    Heart defect Brother        Social History:  Social History     Tobacco Use    Smoking status: Former Smoker     Years: 2.00     Last attempt to quit: 1955     Years since quittin.2    Smokeless tobacco: Never Used   Substance Use Topics    Alcohol use: Yes     Comment: drinks weekly couple beers    Drug use: No       Allergies: Allergies   Allergen Reactions    Amaryl [Glimepiride] Drowsiness    Lipitor [Atorvastatin] Myalgia    Niacin Other (comments)     Facial blistering, swelling in face    Pravachol [Pravastatin] Myalgia    Zocor [Simvastatin] Myalgia         Review of Systems     Review of Systems   Constitutional: Negative for activity change, fatigue and fever. HENT: Negative for congestion and rhinorrhea. Eyes: Negative for visual disturbance.    Respiratory: Negative for shortness of breath. Cardiovascular: Negative for chest pain and palpitations. Gastrointestinal: Negative for abdominal pain, diarrhea, nausea and vomiting. Genitourinary: Negative for dysuria and hematuria. Musculoskeletal: Negative for back pain. Skin: Negative for rash. Neurological: Negative for dizziness, syncope, speech difficulty, weakness, light-headedness, numbness and headaches. All other systems reviewed and are negative. Physical Exam     Visit Vitals  /67   Pulse 65   Temp 97.9 °F (36.6 °C)   Resp 14   Wt 86.2 kg (190 lb)   SpO2 94%   BMI 27.26 kg/m²         Physical Exam   Constitutional: He is oriented to person, place, and time. He appears well-developed. No distress. HENT:   Head: Normocephalic and atraumatic. Right Ear: External ear normal.   Left Ear: External ear normal.   Nose: Nose normal.   Mouth/Throat: Oropharynx is clear and moist.   Eyes: EOM are normal. Pupils are equal, round, and reactive to light. Left eye exhibits discharge. B/l discharge and erythema of lower lid, chronic   Neck: Normal range of motion. Neck supple. No tracheal deviation present. Cardiovascular: Normal rate, regular rhythm and intact distal pulses. Pulmonary/Chest: Effort normal and breath sounds normal. He exhibits no tenderness. Abdominal: Soft. Bowel sounds are normal. He exhibits no distension. There is no tenderness. There is no rebound and no guarding. Musculoskeletal: Normal range of motion. He exhibits no edema or tenderness. Neurological: He is alert and oriented to person, place, and time. No cranial nerve deficit. Coordination normal.   Skin: Skin is warm and dry. Psychiatric: He has a normal mood and affect. His behavior is normal. Judgment and thought content normal.   Nursing note and vitals reviewed. Diagnostic Study Results     Labs -  No results found for this or any previous visit (from the past 12 hour(s)).     Radiologic Studies -   CT HEAD WO CONT   Final Result   IMPRESSION:   No CT evidence for acute intracranial process. .      Mild white matter disease, presumed chronic ischemic. Chronic small infarcts at the left parietal lobe and left occipital lobe. Chronic white matter infarct at the left centrum semiovale. Medical Decision Making     It should be noted that I, Ankur Azul DO will be the provider of record for this patient. I reviewed the vital signs, available nursing notes, past medical history, past surgical history, family history and social history. Vital Signs-Reviewed the patient's vital signs. Pulse Oximetry Analysis -  94% on room air     Cardiac Monitor:  Rate: 65 bpm  Rhythm:  Normal Sinus Rhythm       Records Reviewed: Nursing Notes (Time of Review: 3:42 AM)    ED Course: Progress Notes, Reevaluation, and Consults:  6:03 AM  CT head wnl  No c/o      Provider Notes (Medical Decision Making):   Patient is an 82-year-old male who apparently fell at his facility. Patient has no complaints. No headache. No loss of consciousness. No syncope. He is awake alert and oriented to time person place and president. Head CT within normal limits. Vitals are stable. Patient will be discharged back to this facility. Stable for discharge. Diagnosis     Clinical Impression:   1. Closed head injury, initial encounter        Disposition: D/C    Follow-up Information     Follow up With Specialties Details Why Contact Info    Joselo Ramirez MD [facility] Go in 1 day  [address] [place] [address]  [phone]                Medication List      ASK your doctor about these medications    acetaminophen 500 mg tablet  Commonly known as:  TYLENOL  Take 1 Tab by mouth every six (6) hours as needed. Indications: pain     albuterol-ipratropium 2.5 mg-0.5 mg/3 ml Nebu  Commonly known as:  DUO-NEB  3 mL by Nebulization route every six (6) hours as needed.      alcohol swabs Padm  Commonly known as:  ALCOHOL PREP SWABS  Use as directed     aspirin delayed-release 81 mg tablet  Take 1 Tab by mouth daily. Indications: Cerebral Thromboembolism Prevention     Blood-Glucose Meter monitoring kit  Use as directed     CALTRATE 600 PO     ciprofloxacin HCl 0.3 % ophthalmic solution  Commonly known as:  CILOXAN  Administer 1 Drop to both eyes every four (4) hours (while awake). For 3 more days. End 3/29/2018  Indications: BACTERIAL CONJUNCTIVITIS     COMPOUNDMAX BASE Crea  Generic drug:  cream base no. 171 (bulk)  240 g by Does Not Apply route four (4) times daily. Anti-Inflam Meloxicam 0.09% Topirmate 1% Methocarbamol 2%  Lidocaine 2% Prilocaine 2%     cycloSPORINE 0.05 % Dpet  Commonly known as:  RESTASIS  Administer 1 Drop to both eyes every twelve (12) hours. Indications: Dry Eye     erythromycin ophthalmic ointment  Commonly known as:  ILOTYCIN  Apply to both lower eyelids four times a day until done. famotidine 20 mg tablet  Commonly known as:  PEPCID  Take 1 Tab by mouth two (2) times a day. Indications: PREVENTION OF STRESS ULCER     furosemide 20 mg tablet  Commonly known as:  LASIX  Take 1 Tab by mouth daily. Indications: Edema     gabapentin 300 mg capsule  Commonly known as:  NEURONTIN  Take 1 Cap by mouth daily. Indications: NEUROPATHIC PAIN, Restless Legs Syndrome     guaiFENesin 100 mg/5 mL liquid  Commonly known as:  ROBITUSSIN  Take 5 mL by mouth every four (4) hours as needed for Cough. insulin glargine 100 unit/mL (3 mL) Inpn  Commonly known as:  LANTUS SOLOSTAR U-100 INSULIN  35 Units by SubCUTAneous route nightly.  Indications: type 2 diabetes mellitus     insulin lispro 200 unit/mL (3 mL) Inpn  Commonly known as:  H[employer] KwikPen Insulin  For Blood Sugar (mg/dL) of:     Less than 150 =   0 units           150 -199 =   2 units  200 -249 =   4 units  250 -299 =   6 units  300 -349 =   8 units  350 and above =   10 units  Check BG then Inject insulin per SS 3 times daily before meals and at bedtime  Indications: type 2 diabetes mellitus     Insulin Needles (Disposable) 32 gauge x 5/32\" Ndle  Commonly known as:  Justina Pen Needle  Use as directed. isosorbide mononitrate ER 30 mg tablet  Commonly known as:  IMDUR  Take 1 Tab by mouth daily. lancets 23 gauge Misc  1 Box by Does Not Apply route Before breakfast, lunch, and dinner. Use as directed     lidocaine 5 %  Commonly known as:  LIDODERM  Apply patch to the affected area for 12 hours a day and remove for 12 hours a day. metoprolol succinate 50 mg XL tablet  Commonly known as:  TOPROL-XL  Take 1 Tab by mouth daily. Indications: hypertension     multivit-min-FA-lycopen-lutein 0.4-300-250 mg-mcg-mcg Tab  Commonly known as:  CENTRUM SILVER  Take 1 tab daily     oseltamivir 30 mg capsule  Commonly known as:  TAMIFLU  Take 1 Cap by mouth two (2) times a day. Indications: INFLUENZA     OTHER  Incentive spirometry- Use as directed     predniSONE 10 mg tablet  Commonly known as:  DELTASONE  Take 4 tabs for 1 day, then 3 tabs for 3 days, then 2 tabs for 3 days, then 1 tab by mouth for 3 days  Indications: COPD exacerbation     QUEtiapine 25 mg tablet  Commonly known as:  SEROquel  Take 1 Tab by mouth nightly. rosuvastatin 10 mg tablet  Commonly known as:  CRESTOR  Take 1 Tab by mouth nightly. solifenacin 5 mg tablet  Commonly known as:  VESIcare  Take 1 Tab by mouth daily. Indications: Bladder Hyperactivity, INCREASED URINARY FREQUENCY, URINARY URGE INCONTINENCE, URINARY URGENCY     temazepam 7.5 mg capsule  Commonly known as:  RESTORIL  Take 1 Cap by mouth nightly as needed for Sleep.  Max Daily Amount: 7.5 mg.          _______________________________       Scribe Attestation     Sukhjinder Arambula acting as a scribe for and in the presence of Rod Zhaos, DO      March 18, 2019 at 4:02 AM       Provider Attestation:      I personally performed the services described in the documentation, reviewed the documentation, as recorded by the scribe in my presence, and it accurately and completely records my words and actions.  March 18, 2019 at 4:02 AM - Julio CABRAL DO        _______________________________

## 2019-03-18 NOTE — ED TRIAGE NOTES
Patient presents to the ED for evaluation after a fall. Per medic, \"Patient is coming from [facility]. RAYNA mariscal fell and hit his head. No LOC. The nursing home just wants him checked out. \"

## 2019-03-18 NOTE — DISCHARGE INSTRUCTIONS
Patient Education        Learning About a Closed Head Injury  What is a closed head injury? A closed head injury happens when your head gets hit hard. The strong force of the blow causes your brain to shake in your skull. This movement can cause the brain to bruise, swell, or tear. Sometimes nerves or blood vessels also get damaged. This can cause bleeding in or around the brain. A concussion is a type of closed head injury. What are the symptoms? If you have a mild concussion, you may have a mild headache or feel \"not quite right. \" These symptoms are common. They usually go away over a few days to 4 weeks. But sometimes after a concussion, you feel like you can't function as well as before the injury. And you have new symptoms. This is called postconcussive syndrome. You may:  · Find it harder to solve problems, think, concentrate, or remember. · Have headaches. · Have changes in your sleep patterns, such as not being able to sleep or sleeping all the time. · Have changes in your personality. · Not be interested in your usual activities. · Feel angry or anxious without a clear reason. · Lose your sense of taste or smell. · Be dizzy, lightheaded, or unsteady. It may be hard to stand or walk. How is a closed head injury treated? Any person who may have a concussion needs to see a doctor. Some people have to stay in the hospital to be watched. Others can go home safely. If you go home, follow your doctor's instructions. He or she will tell you if you need someone to watch you closely for the next 24 hours or longer. Rest is the best treatment. Get plenty of sleep at night. And try to rest during the day. · Avoid activities that are physically or mentally demanding. These include housework, exercise, and schoolwork. And don't play video games, send text messages, or use the computer. You may need to change your school or work schedule to be able to avoid these activities.   · Ask your doctor when it's okay to drive, ride a bike, or operate machinery. · Take an over-the-counter pain medicine, such as acetaminophen (Tylenol), ibuprofen (Advil, Motrin), or naproxen (Aleve). Be safe with medicines. Read and follow all instructions on the label. · Check with your doctor before you use any other medicines for pain. · Do not drink alcohol or use illegal drugs. They can slow recovery. They can also increase your risk of getting a second head injury. Follow-up care is a key part of your treatment and safety. Be sure to make and go to all appointments, and call your doctor if you are having problems. It's also a good idea to know your test results and keep a list of the medicines you take. Where can you learn more? Go to http://da-ahsan.info/. Enter E235 in the search box to learn more about \"Learning About a Closed Head Injury. \"  Current as of: Nathaly 3, 2018  Content Version: 11.9  © 5149-6869 [employer]. Care instructions adapted under license by [employer] (which disclaims liability or warranty for this information). If you have questions about a medical condition or this instruction, always ask your healthcare professional. [address] any warranty or liability for your use of this information.

## 2019-05-11 ENCOUNTER — APPOINTMENT (OUTPATIENT)
Dept: CT IMAGING | Age: 84
End: 2019-05-11
Attending: EMERGENCY MEDICINE
Payer: MEDICARE

## 2019-05-11 ENCOUNTER — HOSPITAL ENCOUNTER (EMERGENCY)
Age: 84
Discharge: NURSING, REHAB OR LONG-TERM CARE | End: 2019-05-11
Attending: EMERGENCY MEDICINE
Payer: MEDICARE

## 2019-05-11 ENCOUNTER — APPOINTMENT (OUTPATIENT)
Dept: GENERAL RADIOLOGY | Age: 84
End: 2019-05-11
Attending: EMERGENCY MEDICINE
Payer: MEDICARE

## 2019-05-11 VITALS
HEART RATE: 61 BPM | TEMPERATURE: 98.3 F | DIASTOLIC BLOOD PRESSURE: 69 MMHG | SYSTOLIC BLOOD PRESSURE: 166 MMHG | OXYGEN SATURATION: 98 % | RESPIRATION RATE: 13 BRPM
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
## 2019-05-11 LAB
ALBUMIN SERPL-MCNC: 3.3 G/DL (ref 3.4–5)
ALBUMIN/GLOB SERPL: 1.1 {RATIO} (ref 0.8–1.7)
ALP SERPL-CCNC: 136 U/L (ref 45–117)
ALT SERPL-CCNC: 30 U/L (ref 16–61)
ANION GAP SERPL CALC-SCNC: 5 MMOL/L (ref 3–18)
APPEARANCE UR: CLEAR
AST SERPL-CCNC: 44 U/L (ref 15–37)
ATRIAL RATE: 71 BPM
BASOPHILS # BLD: 0.1 K/UL (ref 0–0.06)
BASOPHILS NFR BLD: 1 % (ref 0–3)
BILIRUB SERPL-MCNC: 0.9 MG/DL (ref 0.2–1)
BILIRUB UR QL: NEGATIVE
BUN SERPL-MCNC: 19 MG/DL (ref 7–18)
BUN/CREAT SERPL: 13 (ref 12–20)
CALCIUM SERPL-MCNC: 8.6 MG/DL (ref 8.5–10.1)
CALCULATED P AXIS, ECG09: 60 DEGREES
CALCULATED R AXIS, ECG10: 56 DEGREES
CALCULATED T AXIS, ECG11: 93 DEGREES
CHLORIDE SERPL-SCNC: 105 MMOL/L (ref 100–108)
CO2 SERPL-SCNC: 32 MMOL/L (ref 21–32)
COLOR UR: YELLOW
CREAT SERPL-MCNC: 1.49 MG/DL (ref 0.6–1.3)
DIAGNOSIS, 93000: NORMAL
DIFFERENTIAL METHOD BLD: ABNORMAL
EOSINOPHIL # BLD: 0.3 K/UL (ref 0–0.4)
EOSINOPHIL NFR BLD: 4 % (ref 0–5)
ERYTHROCYTE [DISTWIDTH] IN BLOOD BY AUTOMATED COUNT: 13.4 % (ref 11.6–14.5)
GLOBULIN SER CALC-MCNC: 2.9 G/DL (ref 2–4)
GLUCOSE BLD STRIP.AUTO-MCNC: 218 MG/DL (ref 70–110)
GLUCOSE SERPL-MCNC: 237 MG/DL (ref 74–99)
GLUCOSE UR STRIP.AUTO-MCNC: >1000 MG/DL
HCT VFR BLD AUTO: 50.3 % (ref 36–48)
HGB BLD-MCNC: 16.8 G/DL (ref 13–16)
HGB UR QL STRIP: NEGATIVE
KETONES UR QL STRIP.AUTO: NEGATIVE MG/DL
LEUKOCYTE ESTERASE UR QL STRIP.AUTO: NEGATIVE
LYMPHOCYTES # BLD: 1.7 K/UL (ref 0.8–3.5)
LYMPHOCYTES NFR BLD: 24 % (ref 20–51)
MCH RBC QN AUTO: 29.7 PG (ref 24–34)
MCHC RBC AUTO-ENTMCNC: 33.4 G/DL (ref 31–37)
MCV RBC AUTO: 89 FL (ref 74–97)
MONOCYTES # BLD: 0.6 K/UL (ref 0–1)
MONOCYTES NFR BLD: 9 % (ref 2–9)
NEUTS SEG # BLD: 4.2 K/UL (ref 1.8–8)
NEUTS SEG NFR BLD: 59 % (ref 42–75)
NITRITE UR QL STRIP.AUTO: NEGATIVE
OTHER CELLS NFR BLD MANUAL: 3 %
P-R INTERVAL, ECG05: 162 MS
PH UR STRIP: 7.5 [PH] (ref 5–8)
PLATELET # BLD AUTO: 182 K/UL (ref 135–420)
PLATELET COMMENTS,PCOM: ABNORMAL
PMV BLD AUTO: 10.7 FL (ref 9.2–11.8)
POTASSIUM SERPL-SCNC: 4.9 MMOL/L (ref 3.5–5.5)
PROT SERPL-MCNC: 6.2 G/DL (ref 6.4–8.2)
PROT UR STRIP-MCNC: NEGATIVE MG/DL
Q-T INTERVAL, ECG07: 382 MS
QRS DURATION, ECG06: 94 MS
QTC CALCULATION (BEZET), ECG08: 415 MS
RBC # BLD AUTO: 5.65 M/UL (ref 4.7–5.5)
RBC MORPH BLD: ABNORMAL
SODIUM SERPL-SCNC: 142 MMOL/L (ref 136–145)
SP GR UR REFRACTOMETRY: 1.01 (ref 1–1.03)
UROBILINOGEN UR QL STRIP.AUTO: 1 EU/DL (ref 0.2–1)
VENTRICULAR RATE, ECG03: 71 BPM
WBC # BLD AUTO: 7.1 K/UL (ref 4.6–13.2)

## 2019-05-11 PROCEDURE — 74011250636 HC RX REV CODE- 250/636: Performed by: EMERGENCY MEDICINE

## 2019-05-11 PROCEDURE — 99285 EMERGENCY DEPT VISIT HI MDM: CPT

## 2019-05-11 PROCEDURE — 70450 CT HEAD/BRAIN W/O DYE: CPT

## 2019-05-11 PROCEDURE — 82962 GLUCOSE BLOOD TEST: CPT

## 2019-05-11 PROCEDURE — 80053 COMPREHEN METABOLIC PANEL: CPT

## 2019-05-11 PROCEDURE — 71045 X-RAY EXAM CHEST 1 VIEW: CPT

## 2019-05-11 PROCEDURE — 96360 HYDRATION IV INFUSION INIT: CPT

## 2019-05-11 PROCEDURE — 93005 ELECTROCARDIOGRAM TRACING: CPT

## 2019-05-11 PROCEDURE — 85025 COMPLETE CBC W/AUTO DIFF WBC: CPT

## 2019-05-11 PROCEDURE — 81003 URINALYSIS AUTO W/O SCOPE: CPT

## 2019-05-11 RX ADMIN — SODIUM CHLORIDE 500 ML: 900 INJECTION, SOLUTION INTRAVENOUS at 18:27

## 2019-05-11 NOTE — DISCHARGE INSTRUCTIONS
Patient Education        Preventing Falls: Care Instructions  Your Care Instructions    Getting around your home safely can be a challenge if you have injuries or health problems that make it easy for you to fall. Loose rugs and furniture in walkways are among the dangers for many older people who have problems walking or who have poor eyesight. People who have conditions such as arthritis, osteoporosis, or dementia also have to be careful not to fall. You can make your home safer with a few simple measures. Follow-up care is a key part of your treatment and safety. Be sure to make and go to all appointments, and call your doctor if you are having problems. It's also a good idea to know your test results and keep a list of the medicines you take. How can you care for yourself at home? Taking care of yourself  · You may get dizzy if you do not drink enough water. To prevent dehydration, drink plenty of fluids, enough so that your urine is light yellow or clear like water. Choose water and other caffeine-free clear liquids. If you have kidney, heart, or liver disease and have to limit fluids, talk with your doctor before you increase the amount of fluids you drink. · Exercise regularly to improve your strength, muscle tone, and balance. Walk if you can. Swimming may be a good choice if you cannot walk easily. · Have your vision and hearing checked each year or any time you notice a change. If you have trouble seeing and hearing, you might not be able to avoid objects and could lose your balance. · Know the side effects of the medicines you take. Ask your doctor or pharmacist whether the medicines you take can affect your balance. Sleeping pills or sedatives can affect your balance. · Limit the amount of alcohol you drink. Alcohol can impair your balance and other senses. · Ask your doctor whether calluses or corns on your feet need to be removed.  If you wear loose-fitting shoes because of calluses or corns, you can lose your balance and fall. · Talk to your doctor if you have numbness in your feet. Preventing falls at home  · Remove raised doorway thresholds, throw rugs, and clutter. Repair loose carpet or raised areas in the floor. · Move furniture and electrical cords to keep them out of walking paths. · Use nonskid floor wax, and wipe up spills right away, especially on ceramic tile floors. · If you use a walker or cane, put rubber tips on it. If you use crutches, clean the bottoms of them regularly with an abrasive pad, such as steel wool. · Keep your house well lit, especially [employer], and outside walkways. Use night-lights in areas such as hallways and bathrooms. Add extra light switches or use remote switches (such as switches that go on or off when you clap your hands) to make it easier to turn lights on if you have to get up during the night. · Install sturdy handrails on stairways. · Move items in your cabinets so that the things you use a lot are on the lower shelves (about waist level). · Keep a cordless phone and a flashlight with new batteries by your bed. If possible, put a phone in each of the main rooms of your house, or carry a cell phone in case you fall and cannot reach a phone. Or, you can wear a device around your neck or wrist. You push a button that sends a signal for help. · Wear low-heeled shoes that fit well and give your feet good support. Use footwear with nonskid soles. Check the heels and soles of your shoes for wear. Repair or replace worn heels or soles. · Do not wear socks without shoes on wood floors. · Walk on the grass when the sidewalks are slippery. If you live in an area that gets snow and ice in the winter, sprinkle salt on slippery steps and sidewalks. Preventing falls in the bath  · Install grab bars and nonskid mats inside and outside your shower or tub and near the toilet and sinks. · Use shower chairs and bath benches.   · Use a hand-held shower head that will allow you to sit while showering. · Get into a tub or shower by putting the weaker leg in first. Get out of a tub or shower with your strong side first.  · Repair loose toilet seats and consider installing a raised toilet seat to make getting on and off the toilet easier. · Keep your bathroom door unlocked while you are in the shower. Where can you learn more? Go to http://da-ahsan.info/. Enter 0476 [id number] in the search box to learn more about \"Preventing Falls: Care Instructions. \"  Current as of: March 16, 2018  Content Version: 11.8  © 4624-1209 [employer]. Care instructions adapted under license by [employer] (which disclaims liability or warranty for this information). If you have questions about a medical condition or this instruction, always ask your healthcare professional. [address] any warranty or liability for your use of this information.

## 2019-05-11 NOTE — ED PROVIDER NOTES
EMERGENCY DEPARTMENT HISTORY AND PHYSICAL EXAM 
 
3:29 PM 
 
 
Date: 5/11/2019 Patient Name: Migdalia Saleem History of Presenting Illness Chief Complaint Patient presents with  Fall History Provided By: Patient Additional History (Context): Migdalia Saleem is a 82 y.o. male with hypercholesterolemia, diabetes, dyslipidemia, hypertension, CKD, CAD, COPD, stroke, cancer, depression, and dementia who presents with acute left arm pain. Pt states he fell this morning and landed on his left side. He states he \"passed out\" on the edge of the bed and fell. He is unsure of head trauma. Associated symptoms include LOC, left shoulder pain, neck pain, and left hip pain. His pain is exacerbated with movement. He denies abdominal pain and back pain. No other concerns or symptoms at this time. PCP: Clay Garcia MD 
 
Chief Complaint: Arm Pain Duration:  \"today\" Timing:  Acute Location: L arm Quality: Aching Severity: N/A Modifying Factors: exacerbated with movement Associated Symptoms: left shoulder pain, left hip pain, neck pain, LOC Current Outpatient Medications Medication Sig Dispense Refill  erythromycin (ILOTYCIN) ophthalmic ointment Apply to both lower eyelids four times a day until done. 1 Tube 0  
 QUEtiapine (SEROQUEL) 25 mg tablet Take 1 Tab by mouth nightly. 30 Tab 0  
 lidocaine (LIDODERM) 5 % Apply patch to the affected area for 12 hours a day and remove for 12 hours a day. 15 Each 0  
 oseltamivir (TAMIFLU) 30 mg capsule Take 1 Cap by mouth two (2) times a day. Indications: INFLUENZA 5 Cap 0  
 acetaminophen (TYLENOL) 500 mg tablet Take 1 Tab by mouth every six (6) hours as needed. Indications: pain 20 Tab 0  
 aspirin delayed-release 81 mg tablet Take 1 Tab by mouth daily. Indications: Cerebral Thromboembolism Prevention 30 Tab 0  cycloSPORINE (RESTASIS) 0.05 % ophthalmic emulsion Administer 1 Drop to both eyes every twelve (12) hours. Indications: Dry Eye 15 Each 0  
 gabapentin (NEURONTIN) 300 mg capsule Take 1 Cap by mouth daily. Indications: NEUROPATHIC PAIN, Restless Legs Syndrome 30 Cap 0  
 insulin glargine (LANTUS SOLOSTAR U-100 INSULIN) 100 unit/mL (3 mL) inpn 35 Units by SubCUTAneous route nightly. Indications: type 2 diabetes mellitus 1 Pen 3  
 isosorbide mononitrate ER (IMDUR) 30 mg tablet Take 1 Tab by mouth daily. 30 Tab 0  
 metoprolol succinate (TOPROL-XL) 50 mg XL tablet Take 1 Tab by mouth daily. Indications: hypertension 30 Tab 0  
 multivit-min-FA-lycopen-lutein (CENTRUM SILVER) 0.4-300-250 mg-mcg-mcg tab Take 1 tab daily 30 Tab 0  
 Insulin Needles, Disposable, (SHAWN PEN NEEDLE) 32 gauge x 5/32\" ndle Use as directed. 100 Pen Needle 0  
 rosuvastatin (CRESTOR) 10 mg tablet Take 1 Tab by mouth nightly. 30 Tab 0  
 OTHER Incentive spirometry- Use as directed 1 Each 0  
 solifenacin (VESICARE) 5 mg tablet Take 1 Tab by mouth daily. Indications: Bladder Hyperactivity, INCREASED URINARY FREQUENCY, URINARY URGE INCONTINENCE, URINARY URGENCY 30 Tab 0  
 temazepam (RESTORIL) 7.5 mg capsule Take 1 Cap by mouth nightly as needed for Sleep. Max Daily Amount: 7.5 mg. 12 Cap 0  
 furosemide (LASIX) 20 mg tablet Take 1 Tab by mouth daily. Indications: Edema 30 Tab 0  
 guaiFENesin (ROBITUSSIN) 100 mg/5 mL liquid Take 5 mL by mouth every four (4) hours as needed for Cough. 1 Bottle 0  
 ciprofloxacin HCl (CILOXAN) 0.3 % ophthalmic solution Administer 1 Drop to both eyes every four (4) hours (while awake). For 3 more days. End 3/29/2018  Indications: BACTERIAL CONJUNCTIVITIS 2 mL 0  
 albuterol-ipratropium (DUO-NEB) 2.5 mg-0.5 mg/3 ml nebu 3 mL by Nebulization route every six (6) hours as needed. 30 Nebule 0  
 insulin lispro (HUMALOG KWIKPEN INSULIN) 200 unit/mL (3 mL) inpn For Blood Sugar (mg/dL) of:    
Less than 150 =   0 units 150 -199 =   2 units 200 -249 =   4 units 250 -299 =   6 units 300 -349 =   8 units 350 and above =   10 units Check BG then Inject insulin per SS 3 times daily before meals and at bedtime  Indications: type 2 diabetes mellitus 1 Pen 0  Blood-Glucose Meter monitoring kit Use as directed 1 Kit 0  
 lancets 23 gauge misc 1 Box by Does Not Apply route Before breakfast, lunch, and dinner. Use as directed 1 Lancet 0  
 alcohol swabs (ALCOHOL PREP SWABS) padm Use as directed 100 Pad 0  predniSONE (DELTASONE) 10 mg tablet Take 4 tabs for 1 day, then 3 tabs for 3 days, then 2 tabs for 3 days, then 1 tab by mouth for 3 days  Indications: COPD exacerbation 22 Tab 0  
 famotidine (PEPCID) 20 mg tablet Take 1 Tab by mouth two (2) times a day. Indications: PREVENTION OF STRESS ULCER 30 Tab 0  
 COMPOUNDMAX BASE crea 240 g by Does Not Apply route four (4) times daily. Anti-Inflam Meloxicam 0.09% Topirmate 1% Methocarbamol 2%  Lidocaine 2% Prilocaine 2% 240 g 3  
 CALCIUM CARBONATE (CALTRATE 600 PO) Take 1 Tab by mouth daily. Past History Past Medical History: 
Past Medical History:  
Diagnosis Date  Angina  Anxiety  Arthritis  CAD (coronary artery disease) s/p drug-eluting stents to RCA for high-grade stenoses in 2/09  Cardiac catheterization 08/24/2016 Mod calcification. Co-dom. oLM 50-70%. LAD 30%. Dx 30%. Cx 70% focal.  OM 80% focal.  RCA 30%.  Cardiac echocardiogram 08/23/2016 Tech difficult. EF 55-60%. No WMA. Normal diastolic fx. Lipomatous septal hypertrophy.  Cardiac nuclear imaging test, abnormal 08/23/2016 Sm reversible inferior defect concerning for ischemia. Mild inferior hypk. EF 68%. Neg EKG on pharm stress test.  
 Carotid duplex 08/26/2016 Mild <50% CYRUS stenosis. Chronic LICA occlusion. Similar to study of 3/29/16.  Carotid stenosis (HCC)   
 w/ known total occlusion of lt internal carotid artery; followed by pts vascular surgeon  Chronic kidney disease  COPD (chronic obstructive pulmonary disease) (HCC)  Dementia  Depression  Diabetes ([facility] [address] type 2  Dyslipidemia   
 on Crestor; managed by pts PCP  Dyspnea  Heart disease  Hypercholesterolemia  Hypertension  Low back pain  Osteoarthrosis involving multiple sites  Skin cancer (HCC)   
 squamous cell lesions of the skin  Stroke ([facility] [address]  Venous (peripheral) insufficiency Past Surgical History: 
Past Surgical History:  
Procedure Laterality Date  CARDIAC CATHETERIZATION  2016  CORONARY ARTERY ANGIOGRAM  2016  HX ANGIOPLASTY  2009  
 2 stents placed and heart attack during the procedure  HX COLONOSCOPY  10/2003  
 neg; declines f/u  
 HX CORONARY ARTERY BYPASS GRAFT  2016  HX HEART CATHETERIZATION  2013  HX HERNIA REPAIR    
 1970s  HX OTHER SURGICAL    
 groin surgery  HX TONSIL AND ADENOIDECTOMY  IR INTRAVASCULAR ULTRASOUND INITIAL  2016  LV ANGIOGRAPHY  2016 Family History: 
Family History Problem Relation Age of Onset  Parkinsonism Mother  Hypertension Mother  Cancer Father   
     lung  Heart defect Brother Social History: 
Social History Tobacco Use  Smoking status: Former Smoker Years: 2.00 Last attempt to quit: 1955 Years since quittin.4  Smokeless tobacco: Never Used Substance Use Topics  Alcohol use: Yes Comment: drinks weekly couple beers  Drug use: No  
 
 
Allergies: Allergies Allergen Reactions  Amaryl [Glimepiride] Drowsiness  Lipitor [Atorvastatin] Myalgia  Niacin Other (comments) Facial blistering, swelling in face  Pravachol [Pravastatin] Myalgia  Zocor [Simvastatin] Myalgia Review of Systems Review of Systems Constitutional: Negative for activity change, fatigue and fever. HENT: Negative for congestion and rhinorrhea. Eyes: Negative for visual disturbance. Respiratory: Negative for shortness of breath. Cardiovascular: Negative for chest pain and palpitations. Gastrointestinal: Negative for abdominal pain, diarrhea, nausea and vomiting. Genitourinary: Negative for dysuria and hematuria. Musculoskeletal: Positive for arthralgias (L shoulder, L hip) and myalgias (L arm pain). Negative for back pain. Skin: Negative for rash. Neurological: Positive for syncope. Negative for dizziness, weakness and light-headedness. All other systems reviewed and are negative. Physical Exam  
 
Visit Vitals /69 Pulse 61 Temp 98.3 °F (36.8 °C) Resp 13 SpO2 98% Physical Exam  
Constitutional: He appears well-developed and well-nourished. No distress. HENT:  
Head: Normocephalic and atraumatic. Eyes: Left eye discharge: .now. Right conjunctiva is injected. Left conjunctiva is injected. Pulmonary/Chest: Effort normal and breath sounds normal. No respiratory distress. He has no wheezes. He has no rales. Abdominal: Soft. Bowel sounds are normal. He exhibits no distension. There is no tenderness. Musculoskeletal: Normal range of motion. He exhibits tenderness. Tenderness to palpation over bilateral shoulders, left greater than right Neurological: He is alert. Skin: Skin is warm and dry. Psychiatric: He has a normal mood and affect. His behavior is normal.  
Nursing note and vitals reviewed. Diagnostic Study Results Labs - Recent Results (from the past 12 hour(s)) GLUCOSE, POC Collection Time: 05/11/19  3:31 PM  
Result Value Ref Range Glucose (POC) 218 (H) 70 - 110 mg/dL EKG, 12 LEAD, INITIAL Collection Time: 05/11/19  3:40 PM  
Result Value Ref Range Ventricular Rate 71 BPM  
 Atrial Rate 71 BPM  
 P-R Interval 162 ms QRS Duration 94 ms Q-T Interval 382 ms QTC Calculation (Bezet) 415 ms Calculated P Axis 60 degrees Calculated R Axis 56 degrees Calculated T Axis 93 degrees Diagnosis Normal sinus rhythm Possible Left atrial enlargement Nonspecific ST and T wave abnormality Abnormal ECG When compared with ECG of 29-DEC-2018 19:29, No significant change was found Confirmed by Marly Sánchez ([id number]) on 5/11/2019 4:27:06 PM 
  
CBC WITH AUTOMATED DIFF Collection Time: 05/11/19  4:15 PM  
Result Value Ref Range WBC 7.1 4.6 - 13.2 K/uL  
 RBC 5.65 (H) 4.70 - 5.50 M/uL  
 HGB 16.8 (H) 13.0 - 16.0 g/dL HCT 50.3 (H) 36.0 - 48.0 % MCV 89.0 74.0 - 97.0 FL  
 MCH 29.7 24.0 - 34.0 PG  
 MCHC 33.4 31.0 - 37.0 g/dL  
 RDW 13.4 11.6 - 14.5 % PLATELET 666 422 - 445 K/uL MPV 10.7 9.2 - 11.8 FL  
 NEUTROPHILS 59 42 - 75 % LYMPHOCYTES 24 20 - 51 % MONOCYTES 9 2 - 9 % EOSINOPHILS 4 0 - 5 % BASOPHILS 1 0 - 3 % OTHER CELL 3 (H) 0    
 ABS. NEUTROPHILS 4.2 1.8 - 8.0 K/UL  
 ABS. LYMPHOCYTES 1.7 0.8 - 3.5 K/UL  
 ABS. MONOCYTES 0.6 0 - 1.0 K/UL  
 ABS. EOSINOPHILS 0.3 0.0 - 0.4 K/UL  
 ABS. BASOPHILS 0.1 (H) 0.0 - 0.06 K/UL  
 DF MANUAL PLATELET COMMENTS ADEQUATE PLATELETS    
 RBC COMMENTS NORMOCYTIC, NORMOCHROMIC METABOLIC PANEL, COMPREHENSIVE Collection Time: 05/11/19  4:15 PM  
Result Value Ref Range Sodium 142 136 - 145 mmol/L Potassium 4.9 3.5 - 5.5 mmol/L Chloride 105 100 - 108 mmol/L  
 CO2 32 21 - 32 mmol/L Anion gap 5 3.0 - 18 mmol/L Glucose 237 (H) 74 - 99 mg/dL BUN 19 (H) 7.0 - 18 MG/DL Creatinine 1.49 (H) 0.6 - 1.3 MG/DL  
 BUN/Creatinine ratio 13 12 - 20 GFR est AA 55 (L) >60 ml/min/1.73m2 GFR est non-AA 45 (L) >60 ml/min/1.73m2 Calcium 8.6 8.5 - 10.1 MG/DL Bilirubin, total 0.9 0.2 - 1.0 MG/DL  
 ALT (SGPT) 30 16 - 61 U/L  
 AST (SGOT) 44 (H) 15 - 37 U/L Alk. phosphatase 136 (H) 45 - 117 U/L Protein, total 6.2 (L) 6.4 - 8.2 g/dL Albumin 3.3 (L) 3.4 - 5.0 g/dL Globulin 2.9 2.0 - 4.0 g/dL A-G Ratio 1.1 0.8 - 1.7 URINALYSIS W/ RFLX MICROSCOPIC  
 Collection Time: 05/11/19  6:30 PM  
Result Value Ref Range Color YELLOW Appearance CLEAR Specific gravity 1.015 1.005 - 1.030    
 pH (UA) 7.5 5.0 - 8.0 Protein NEGATIVE  NEG mg/dL Glucose >1,000 (A) NEG mg/dL Ketone NEGATIVE  NEG mg/dL Bilirubin NEGATIVE  NEG Blood NEGATIVE  NEG Urobilinogen 1.0 0.2 - 1.0 EU/dL Nitrites NEGATIVE  NEG Leukocyte Esterase NEGATIVE  NEG Radiologic Studies -  
CT HEAD WO CONT Final Result IMPRESSION:  
  
1. No evidence of acute intracranial process 2. Atrophy and chronic small vessel ischemic disease. 3.  Small chronic remote left-sided infarcts as above XR CHEST PORT Final Result IMPRESSION:    
1. Nothing definitely acute Medical Decision Making It should be noted that I, Pj All, DO will be the provider of record for this patient. I reviewed the vital signs, available nursing notes, past medical history, past surgical history, family history and social history. Vital Signs-Reviewed the patient's vital signs. Pulse Oximetry Analysis -  99% on room air , nml 
 
Cardiac Monitor: 
Rate: 62 BPM 
 
EKG: Interpreted by the EP. Time Interpreted: 15:40 Rate: 71 BPM 
 Rhythm: Normal Sinus Rhythm Interpretation: No acute changes Records Reviewed: Nursing Notes and Old Medical Records (Time of Review: 3:29 PM) ED Course: Progress Notes, Reevaluation, and Consults: 
7:13 PM 
No acute findings on blood work or imaging. Patient will be discharged back to his facility Provider Notes (Medical Decision Making):  
Patient with fall which she has often. No acute findings on blood, urine, or imaging. Patient with full range of motion and no evidence for traumatic injury. He will be discharged back to his facility. Stable for discharge. NAD. AVSS. Diagnosis Clinical Impression: 1. Fall, initial encounter Disposition: D/C 
 
 Follow-up Information Follow up With Specialties Details Why Contact Info Helder Buchanan MD Internal Medicine Schedule an appointment as soon as possible for a visit  [address] [place] [address] [phone] [facility] [id number] [phone] Patient's Medications Start Taking No medications on file Continue Taking ACETAMINOPHEN (TYLENOL) 500 MG TABLET    Take 1 Tab by mouth every six (6) hours as needed. Indications: pain ALBUTEROL-IPRATROPIUM (DUO-NEB) 2.5 MG-0.5 MG/3 ML NEBU    3 mL by Nebulization route every six (6) hours as needed. ALCOHOL SWABS (ALCOHOL PREP SWABS) PADM    Use as directed ASPIRIN DELAYED-RELEASE 81 MG TABLET    Take 1 Tab by mouth daily. Indications: Cerebral Thromboembolism Prevention BLOOD-GLUCOSE METER MONITORING KIT    Use as directed CALCIUM CARBONATE (CALTRATE 600 PO)    Take 1 Tab by mouth daily. CIPROFLOXACIN HCL (CILOXAN) 0.3 % OPHTHALMIC SOLUTION    Administer 1 Drop to both eyes every four (4) hours (while awake). For 3 more days. End 3/29/2018  Indications: BACTERIAL CONJUNCTIVITIS  
 COMPOUNDMAX BASE CREA    240 g by Does Not Apply route four (4) times daily. Anti-Inflam Meloxicam 0.09% Topirmate 1% Methocarbamol 2%  Lidocaine 2% Prilocaine 2% CYCLOSPORINE (RESTASIS) 0.05 % OPHTHALMIC EMULSION    Administer 1 Drop to both eyes every twelve (12) hours. Indications: Dry Eye  
 ERYTHROMYCIN (ILOTYCIN) OPHTHALMIC OINTMENT    Apply to both lower eyelids four times a day until done. FAMOTIDINE (PEPCID) 20 MG TABLET    Take 1 Tab by mouth two (2) times a day. Indications: PREVENTION OF STRESS ULCER  
 FUROSEMIDE (LASIX) 20 MG TABLET    Take 1 Tab by mouth daily. Indications: Edema GABAPENTIN (NEURONTIN) 300 MG CAPSULE    Take 1 Cap by mouth daily. Indications: NEUROPATHIC PAIN, Restless Legs Syndrome GUAIFENESIN (ROBITUSSIN) 100 MG/5 ML LIQUID    Take 5 mL by mouth every four (4) hours as needed for Cough. INSULIN GLARGINE (LANTUS SOLOSTAR U-100 INSULIN) 100 UNIT/ML (3 ML) INPN    35 Units by SubCUTAneous route nightly. Indications: type 2 diabetes mellitus INSULIN LISPRO (HUMALOG KWIKPEN INSULIN) 200 UNIT/ML (3 ML) INPN    For Blood Sugar (mg/dL) of:    
Less than 150 =   0 units 150 -199 =   2 units 200 -249 =   4 units 250 -299 =   6 units 300 -349 =   8 units 350 and above =   10 units Check BG then Inject insulin per SS 3 times daily before meals and at bedtime  Indications: type 2 diabetes mellitus INSULIN NEEDLES, DISPOSABLE, (SHAWN PEN NEEDLE) 32 GAUGE X 5/32\" NDLE    Use as directed. ISOSORBIDE MONONITRATE ER (IMDUR) 30 MG TABLET    Take 1 Tab by mouth daily. LANCETS 23 GAUGE MISC    1 Box by Does Not Apply route Before breakfast, lunch, and dinner. Use as directed LIDOCAINE (LIDODERM) 5 %    Apply patch to the affected area for 12 hours a day and remove for 12 hours a day. METOPROLOL SUCCINATE (TOPROL-XL) 50 MG XL TABLET    Take 1 Tab by mouth daily. Indications: hypertension MULTIVIT-MIN-FA-LYCOPEN-LUTEIN (CENTRUM SILVER) 0.4-300-250 MG-MCG-MCG TAB    Take 1 tab daily OSELTAMIVIR (TAMIFLU) 30 MG CAPSULE    Take 1 Cap by mouth two (2) times a day. Indications: INFLUENZA  
 OTHER    Incentive spirometry- Use as directed PREDNISONE (DELTASONE) 10 MG TABLET    Take 4 tabs for 1 day, then 3 tabs for 3 days, then 2 tabs for 3 days, then 1 tab by mouth for 3 days  Indications: COPD exacerbation QUETIAPINE (SEROQUEL) 25 MG TABLET    Take 1 Tab by mouth nightly. ROSUVASTATIN (CRESTOR) 10 MG TABLET    Take 1 Tab by mouth nightly. SOLIFENACIN (VESICARE) 5 MG TABLET    Take 1 Tab by mouth daily. Indications: Bladder Hyperactivity, INCREASED URINARY FREQUENCY, URINARY URGE INCONTINENCE, URINARY URGENCY  
 TEMAZEPAM (RESTORIL) 7.5 MG CAPSULE    Take 1 Cap by mouth nightly as needed for Sleep. Max Daily Amount: 7.5 mg. These Medications have changed No medications on file Stop Taking No medications on file  
 
_______________________________ Scribe Attestation Alyx Boyle acting as a scribe for and in the presence of Michelle Ferrer, [address] [place] [address] [place] [facility], DO May 11, 2019 at 7:13 PM 
    
Provider Attestation:     
I personally performed the services described in the documentation, reviewed the documentation, as recorded by the scribe in my presence, and it accurately and completely records my words and actions. May 11, 2019 at 7:13 PM - Lou CABRAL DO  
 
 
_______________________________

## 2019-07-29 ENCOUNTER — HOSPITAL ENCOUNTER (EMERGENCY)
Age: 84
Discharge: HOME | End: 2019-07-30
Attending: EMERGENCY MEDICINE
Payer: MEDICARE

## 2019-07-29 ENCOUNTER — APPOINTMENT (OUTPATIENT)
Dept: CT IMAGING | Age: 84
End: 2019-07-29
Attending: STUDENT IN AN ORGANIZED HEALTH CARE EDUCATION/TRAINING PROGRAM
Payer: MEDICARE
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
## 2019-07-29 LAB
ANION GAP SERPL CALC-SCNC: 4 MMOL/L (ref 3–18)
BASOPHILS # BLD: 0 K/UL (ref 0–0.1)
BASOPHILS NFR BLD: 0 % (ref 0–2)
BUN SERPL-MCNC: 33 MG/DL (ref 7–18)
BUN/CREAT SERPL: 17 (ref 12–20)
CALCIUM SERPL-MCNC: 8.7 MG/DL (ref 8.5–10.1)
CHLORIDE SERPL-SCNC: 99 MMOL/L (ref 100–111)
CO2 SERPL-SCNC: 35 MMOL/L (ref 21–32)
CREAT SERPL-MCNC: 1.97 MG/DL (ref 0.6–1.3)
DIFFERENTIAL METHOD BLD: ABNORMAL
EOSINOPHIL # BLD: 0.3 K/UL (ref 0–0.4)
EOSINOPHIL NFR BLD: 3 % (ref 0–5)
ERYTHROCYTE [DISTWIDTH] IN BLOOD BY AUTOMATED COUNT: 13.3 % (ref 11.6–14.5)
GLUCOSE SERPL-MCNC: 171 MG/DL (ref 74–99)
HCT VFR BLD AUTO: 53.6 % (ref 36–48)
HGB BLD-MCNC: 17.9 G/DL (ref 13–16)
LYMPHOCYTES # BLD: 2.4 K/UL (ref 0.9–3.6)
LYMPHOCYTES NFR BLD: 25 % (ref 21–52)
MCH RBC QN AUTO: 29.6 PG (ref 24–34)
MCHC RBC AUTO-ENTMCNC: 33.4 G/DL (ref 31–37)
MCV RBC AUTO: 88.6 FL (ref 74–97)
MONOCYTES # BLD: 1.3 K/UL (ref 0.05–1.2)
MONOCYTES NFR BLD: 13 % (ref 3–10)
NEUTS SEG # BLD: 5.8 K/UL (ref 1.8–8)
NEUTS SEG NFR BLD: 59 % (ref 40–73)
PLATELET # BLD AUTO: 207 K/UL (ref 135–420)
PMV BLD AUTO: 11 FL (ref 9.2–11.8)
POTASSIUM SERPL-SCNC: 3.5 MMOL/L (ref 3.5–5.5)
RBC # BLD AUTO: 6.05 M/UL (ref 4.7–5.5)
SODIUM SERPL-SCNC: 138 MMOL/L (ref 136–145)
WBC # BLD AUTO: 9.9 K/UL (ref 4.6–13.2)

## 2019-07-29 PROCEDURE — 99284 EMERGENCY DEPT VISIT MOD MDM: CPT

## 2019-07-29 PROCEDURE — 72125 CT NECK SPINE W/O DYE: CPT

## 2019-07-29 PROCEDURE — 85025 COMPLETE CBC W/AUTO DIFF WBC: CPT

## 2019-07-29 PROCEDURE — 70450 CT HEAD/BRAIN W/O DYE: CPT

## 2019-07-29 PROCEDURE — 80048 BASIC METABOLIC PNL TOTAL CA: CPT

## 2019-07-30 LAB
APPEARANCE UR: CLEAR
BILIRUB UR QL: NEGATIVE
COLOR UR: YELLOW
GLUCOSE UR STRIP.AUTO-MCNC: NEGATIVE MG/DL
HGB UR QL STRIP: NEGATIVE
KETONES UR QL STRIP.AUTO: NEGATIVE MG/DL
LEUKOCYTE ESTERASE UR QL STRIP.AUTO: NEGATIVE
NITRITE UR QL STRIP.AUTO: NEGATIVE
PH UR STRIP: 6 [PH] (ref 5–8)
PROT UR STRIP-MCNC: NEGATIVE MG/DL
SP GR UR REFRACTOMETRY: 1.01 (ref 1–1.03)
UROBILINOGEN UR QL STRIP.AUTO: 0.2 EU/DL (ref 0.2–1)

## 2019-07-30 PROCEDURE — 81003 URINALYSIS AUTO W/O SCOPE: CPT

## 2019-07-30 RX ORDER — ACETAMINOPHEN 325 MG/1
650 TABLET ORAL
Qty: 20 TAB | Refills: 0 | Status: ON HOLD | OUTPATIENT
Start: 2019-07-30 | End: 2019-12-10

## 2019-07-30 NOTE — ED TRIAGE NOTES
Pt arrived to ED via EMS. Staff found pt sitting on floor, unsure if he fell off the bed or sat down on the floor.  HX: dementia
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
## 2019-07-30 NOTE — ED NOTES
21-year-old male nursing home resident history of dementia who presents status post fall.   Currently with no complaints    CT head and cervical spine negative for acute process  Labs appear stable from prior     Outpatient follow-up with PCP

## 2019-10-05 ENCOUNTER — APPOINTMENT (OUTPATIENT)
Dept: GENERAL RADIOLOGY | Age: 84
End: 2019-10-05
Attending: EMERGENCY MEDICINE
Payer: MEDICARE

## 2019-10-05 ENCOUNTER — APPOINTMENT (OUTPATIENT)
Dept: CT IMAGING | Age: 84
End: 2019-10-05
Attending: EMERGENCY MEDICINE
Payer: MEDICARE

## 2019-10-05 ENCOUNTER — HOSPITAL ENCOUNTER (EMERGENCY)
Age: 84
Discharge: NURSING, REHAB OR LONG-TERM CARE | End: 2019-10-05
Attending: EMERGENCY MEDICINE
Payer: MEDICARE

## 2019-10-05 VITALS
HEIGHT: 70 IN | BODY MASS INDEX: 21.47 KG/M2 | TEMPERATURE: 97.8 F | DIASTOLIC BLOOD PRESSURE: 78 MMHG | SYSTOLIC BLOOD PRESSURE: 130 MMHG | WEIGHT: 150 LBS | OXYGEN SATURATION: 95 % | HEART RATE: 75 BPM | RESPIRATION RATE: 15 BRPM
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
## 2019-10-05 LAB
ALBUMIN SERPL-MCNC: 3.8 G/DL (ref 3.4–5)
ALBUMIN/GLOB SERPL: 1.3 {RATIO} (ref 0.8–1.7)
ALP SERPL-CCNC: 157 U/L (ref 45–117)
ALT SERPL-CCNC: 28 U/L (ref 16–61)
ANION GAP SERPL CALC-SCNC: 6 MMOL/L (ref 3–18)
AST SERPL-CCNC: 27 U/L (ref 10–38)
BASOPHILS # BLD: 0.1 K/UL (ref 0–0.1)
BASOPHILS NFR BLD: 1 % (ref 0–2)
BILIRUB SERPL-MCNC: 0.6 MG/DL (ref 0.2–1)
BUN SERPL-MCNC: 38 MG/DL (ref 7–18)
BUN/CREAT SERPL: 20 (ref 12–20)
CALCIUM SERPL-MCNC: 9.3 MG/DL (ref 8.5–10.1)
CHLORIDE SERPL-SCNC: 97 MMOL/L (ref 100–111)
CO2 SERPL-SCNC: 34 MMOL/L (ref 21–32)
CREAT SERPL-MCNC: 1.9 MG/DL (ref 0.6–1.3)
DIFFERENTIAL METHOD BLD: ABNORMAL
EOSINOPHIL # BLD: 0.3 K/UL (ref 0–0.4)
EOSINOPHIL NFR BLD: 3 % (ref 0–5)
ERYTHROCYTE [DISTWIDTH] IN BLOOD BY AUTOMATED COUNT: 13.1 % (ref 11.6–14.5)
GLOBULIN SER CALC-MCNC: 2.9 G/DL (ref 2–4)
GLUCOSE SERPL-MCNC: 314 MG/DL (ref 74–99)
HCT VFR BLD AUTO: 51.5 % (ref 36–48)
HGB BLD-MCNC: 17.8 G/DL (ref 13–16)
LYMPHOCYTES # BLD: 3.3 K/UL (ref 0.9–3.6)
LYMPHOCYTES NFR BLD: 26 % (ref 21–52)
MCH RBC QN AUTO: 30.1 PG (ref 24–34)
MCHC RBC AUTO-ENTMCNC: 34.6 G/DL (ref 31–37)
MCV RBC AUTO: 87 FL (ref 74–97)
MONOCYTES # BLD: 1.5 K/UL (ref 0.05–1.2)
MONOCYTES NFR BLD: 12 % (ref 3–10)
NEUTS SEG # BLD: 7.2 K/UL (ref 1.8–8)
NEUTS SEG NFR BLD: 58 % (ref 40–73)
PLATELET # BLD AUTO: 203 K/UL (ref 135–420)
PMV BLD AUTO: 11.8 FL (ref 9.2–11.8)
POTASSIUM SERPL-SCNC: 3.4 MMOL/L (ref 3.5–5.5)
PROT SERPL-MCNC: 6.7 G/DL (ref 6.4–8.2)
RBC # BLD AUTO: 5.92 M/UL (ref 4.7–5.5)
SODIUM SERPL-SCNC: 137 MMOL/L (ref 136–145)
TROPONIN I SERPL-MCNC: <0.02 NG/ML (ref 0–0.04)
WBC # BLD AUTO: 12.4 K/UL (ref 4.6–13.2)

## 2019-10-05 PROCEDURE — 99283 EMERGENCY DEPT VISIT LOW MDM: CPT

## 2019-10-05 PROCEDURE — 74011636637 HC RX REV CODE- 636/637: Performed by: EMERGENCY MEDICINE

## 2019-10-05 PROCEDURE — 85025 COMPLETE CBC W/AUTO DIFF WBC: CPT

## 2019-10-05 PROCEDURE — 70450 CT HEAD/BRAIN W/O DYE: CPT

## 2019-10-05 PROCEDURE — 72125 CT NECK SPINE W/O DYE: CPT

## 2019-10-05 PROCEDURE — 93005 ELECTROCARDIOGRAM TRACING: CPT

## 2019-10-05 PROCEDURE — 80053 COMPREHEN METABOLIC PANEL: CPT

## 2019-10-05 PROCEDURE — 71045 X-RAY EXAM CHEST 1 VIEW: CPT

## 2019-10-05 PROCEDURE — 72170 X-RAY EXAM OF PELVIS: CPT

## 2019-10-05 PROCEDURE — 84484 ASSAY OF TROPONIN QUANT: CPT

## 2019-10-05 RX ADMIN — INSULIN HUMAN 5 UNITS: 100 INJECTION, SOLUTION PARENTERAL at 18:42

## 2019-10-05 NOTE — DISCHARGE INSTRUCTIONS
Patient Education        Preventing Falls: Care Instructions  Your Care Instructions    Getting around your home safely can be a challenge if you have injuries or health problems that make it easy for you to fall. Loose rugs and furniture in walkways are among the dangers for many older people who have problems walking or who have poor eyesight. People who have conditions such as arthritis, osteoporosis, or dementia also have to be careful not to fall. You can make your home safer with a few simple measures. Follow-up care is a key part of your treatment and safety. Be sure to make and go to all appointments, and call your doctor if you are having problems. It's also a good idea to know your test results and keep a list of the medicines you take. How can you care for yourself at home? Taking care of yourself  · You may get dizzy if you do not drink enough water. To prevent dehydration, drink plenty of fluids, enough so that your urine is light yellow or clear like water. Choose water and other caffeine-free clear liquids. If you have kidney, heart, or liver disease and have to limit fluids, talk with your doctor before you increase the amount of fluids you drink. · Exercise regularly to improve your strength, muscle tone, and balance. Walk if you can. Swimming may be a good choice if you cannot walk easily. · Have your vision and hearing checked each year or any time you notice a change. If you have trouble seeing and hearing, you might not be able to avoid objects and could lose your balance. · Know the side effects of the medicines you take. Ask your doctor or pharmacist whether the medicines you take can affect your balance. Sleeping pills or sedatives can affect your balance. · Limit the amount of alcohol you drink. Alcohol can impair your balance and other senses. · Ask your doctor whether calluses or corns on your feet need to be removed.  If you wear loose-fitting shoes because of calluses or corns, you can lose your balance and fall. · Talk to your doctor if you have numbness in your feet. Preventing falls at home  · Remove raised doorway thresholds, throw rugs, and clutter. Repair loose carpet or raised areas in the floor. · Move furniture and electrical cords to keep them out of walking paths. · Use nonskid floor wax, and wipe up spills right away, especially on ceramic tile floors. · If you use a walker or cane, put rubber tips on it. If you use crutches, clean the bottoms of them regularly with an abrasive pad, such as steel wool. · Keep your house well lit, especially Krystin Hopping, and outside walkways. Use night-lights in areas such as hallways and bathrooms. Add extra light switches or use remote switches (such as switches that go on or off when you clap your hands) to make it easier to turn lights on if you have to get up during the night. · Install sturdy handrails on stairways. · Move items in your cabinets so that the things you use a lot are on the lower shelves (about waist level). · Keep a cordless phone and a flashlight with new batteries by your bed. If possible, put a phone in each of the main rooms of your house, or carry a cell phone in case you fall and cannot reach a phone. Or, you can wear a device around your neck or wrist. You push a button that sends a signal for help. · Wear low-heeled shoes that fit well and give your feet good support. Use footwear with nonskid soles. Check the heels and soles of your shoes for wear. Repair or replace worn heels or soles. · Do not wear socks without shoes on wood floors. · Walk on the grass when the sidewalks are slippery. If you live in an area that gets snow and ice in the winter, sprinkle salt on slippery steps and sidewalks. Preventing falls in the bath  · Install grab bars and nonskid mats inside and outside your shower or tub and near the toilet and sinks. · Use shower chairs and bath benches.   · Use a hand-held shower head that will allow you to sit while showering. · Get into a tub or shower by putting the weaker leg in first. Get out of a tub or shower with your strong side first.  · Repair loose toilet seats and consider installing a raised toilet seat to make getting on and off the toilet easier. · Keep your bathroom door unlocked while you are in the shower. Where can you learn more? Go to http://da-ahsan.info/. Enter [id number] [id number] [phone] in the search box to learn more about \"Preventing Falls: Care Instructions. \"  Current as of: March 16, 2018  Content Version: 11.8  © 3764-1743 [employer], [employer]. Care instructions adapted under license by [employer] (which disclaims liability or warranty for this information). If you have questions about a medical condition or this instruction, always ask your healthcare professional. [address] any warranty or liability for your use of this information.

## 2019-10-05 NOTE — ED NOTES
I have reviewed discharge instructions with the patient. The patient verbalized understanding.  Transport set up for patient

## 2019-10-05 NOTE — ED NOTES
Patient found in [address] [place] [address] on floor; patient states he has pain left upper side of head; denied pain with EMS. Ambulates with walker. Per EMS, patient vitals are normal.  Found on floor next to bed by [address] [place] [address] staff. Patient states he does not remember what happened.

## 2019-10-05 NOTE — ED TRIAGE NOTES
Patient arrived by EMS due to fall at [facility]. Patient states he has a slight headache right top side of head. Patient denies injury otherwise.

## 2019-10-05 NOTE — ED PROVIDER NOTES
EMERGENCY DEPARTMENT HISTORY AND PHYSICAL EXAM    3:40 PM  Date: 10/5/2019  Patient Name: Gamaliel Garcia    History of Presenting Illness     Chief Complaint   Patient presents with    Fall    Headache     right side front head       History Provided By: patient    HPI: Gamaliel Garcia is a 82 y.o. male with past medical history of mild dementia, CAD, diabetes, HTN, HLD presents from nursing home after a fall. Patient is AAO x2 (baseline). Patient states that he was trying to readjust something in bed and he fell from bed he does not remember any other details. Denies LOC. He normally mobilize with a walker is able to move all extremities. He states that he has headache. Frontal, 4/10 in severity, no radiation, constant. PCP: None    Past History     Past Medical History:  Past Medical History:   Diagnosis Date    Angina     Anxiety     Arthritis     CAD (coronary artery disease)     s/p drug-eluting stents to RCA for high-grade stenoses in 2/09    Cardiac catheterization 08/24/2016    Mod calcification. Co-dom. oLM 50-70%. LAD 30%. Dx 30%. Cx 70% focal.  OM 80% focal.  RCA 30%.  Cardiac echocardiogram 08/23/2016    Tech difficult. EF 55-60%. No WMA. Normal diastolic fx. Lipomatous septal hypertrophy.  Cardiac nuclear imaging test, abnormal 08/23/2016    Sm reversible inferior defect concerning for ischemia. Mild inferior hypk. EF 68%. Neg EKG on pharm stress test.    Carotid duplex 08/26/2016    Mild <50% CYRUS stenosis. Chronic LICA occlusion. Similar to study of 3/29/16.     Carotid stenosis (HCC)     w/ known total occlusion of lt internal carotid artery; followed by pts vascular surgeon    Chronic kidney disease     COPD (chronic obstructive pulmonary disease) ([facility]     Dementia     Depression     Diabetes ([facility] [address]     type 2    Dyslipidemia     on Crestor; managed by pts PCP    Dyspnea     Heart disease     Hypercholesterolemia     Hypertension     Low back pain     Osteoarthrosis involving multiple sites     Skin cancer ([facility]     squamous cell lesions of the skin    Stroke ([facility]     Venous (peripheral) insufficiency        Past Surgical History:  Past Surgical History:   Procedure Laterality Date    CARDIAC CATHETERIZATION  2016         CORONARY ARTERY ANGIOGRAM  2016         HX ANGIOPLASTY  2009    2 stents placed and heart attack during the procedure    HX COLONOSCOPY  10/2003    neg; declines f/u    HX CORONARY ARTERY BYPASS GRAFT  2016    HX HEART CATHETERIZATION  2013    HX HERNIA REPAIR      1970s    HX OTHER SURGICAL      groin surgery    HX TONSIL AND ADENOIDECTOMY      IR INTRAVASCULAR ULTRASOUND INITIAL  2016         LV ANGIOGRAPHY  2016            Family History:  Family History   Problem Relation Age of Onset    Parkinsonism Mother     Hypertension Mother     Cancer Father         lung    Heart defect Brother        Social History:  Social History     Tobacco Use    Smoking status: Former Smoker     Years: 2.00     Last attempt to quit: 1955     Years since quittin.8    Smokeless tobacco: Never Used   Substance Use Topics    Alcohol use: Yes     Comment: drinks weekly couple beers    Drug use: No       Allergies: Allergies   Allergen Reactions    Amaryl [Glimepiride] Drowsiness    Lipitor [Atorvastatin] Myalgia    Niacin Other (comments)     Facial blistering, swelling in face    Pravachol [Pravastatin] Myalgia    Zocor [Simvastatin] Myalgia       Review of Systems   Review of Systems   Constitutional: Negative for activity change, appetite change and chills. HENT: Negative for congestion, ear discharge, ear pain and sore throat. Eyes: Negative for photophobia and pain. Respiratory: Negative for cough and choking. Cardiovascular: Negative for palpitations and leg swelling. Gastrointestinal: Negative for anal bleeding and rectal pain.    Endocrine: Negative for polydipsia and polyuria. Genitourinary: Negative for genital sores and urgency. Musculoskeletal: Negative for arthralgias and myalgias. Neurological: Negative for dizziness, seizures and speech difficulty. Psychiatric/Behavioral: Negative for hallucinations, self-injury and suicidal ideas. Physical Exam     Patient Vitals for the past 12 hrs:   Temp Pulse Resp BP SpO2   10/05/19 1530     95 %   10/05/19 1526 97.8 °F (36.6 °C) 75 15 130/78 97 %       Physical Exam   Constitutional: He appears well-developed and well-nourished. HENT:   Head: Normocephalic and atraumatic. Eyes: Right eye exhibits no discharge. Left eye exhibits no discharge. Neck: Normal range of motion. Neck supple. Cardiovascular: Normal rate, regular rhythm, normal heart sounds and intact distal pulses. No murmur heard. Pulmonary/Chest: Effort normal and breath sounds normal. No stridor. No respiratory distress. He has no wheezes. He has no rales. He exhibits no tenderness. Abdominal: Soft. Bowel sounds are normal. He exhibits no distension. There is no tenderness. There is no rebound and no guarding. Musculoskeletal: Normal range of motion. Neurological: He is alert. AO*2   Skin: Skin is warm and dry. Psychiatric: He has a normal mood and affect. Nursing note and vitals reviewed. Diagnostic Study Results     Labs -  Recent Results (from the past 12 hour(s))   CBC WITH AUTOMATED DIFF    Collection Time: 10/05/19  4:12 PM   Result Value Ref Range    WBC 12.4 4.6 - 13.2 K/uL    RBC 5.92 (H) 4.70 - 5.50 M/uL    HGB 17.8 (H) 13.0 - 16.0 g/dL    HCT 51.5 (H) 36.0 - 48.0 %    MCV 87.0 74.0 - 97.0 FL    MCH 30.1 24.0 - 34.0 PG    MCHC 34.6 31.0 - 37.0 g/dL    RDW 13.1 11.6 - 14.5 %    PLATELET 489 923 - 048 K/uL    MPV 11.8 9.2 - 11.8 FL    NEUTROPHILS 58 40 - 73 %    LYMPHOCYTES 26 21 - 52 %    MONOCYTES 12 (H) 3 - 10 %    EOSINOPHILS 3 0 - 5 %    BASOPHILS 1 0 - 2 %    ABS. NEUTROPHILS 7.2 1.8 - 8.0 K/UL    ABS. LYMPHOCYTES 3.3 0.9 - 3.6 K/UL    ABS. MONOCYTES 1.5 (H) 0.05 - 1.2 K/UL    ABS. EOSINOPHILS 0.3 0.0 - 0.4 K/UL    ABS. BASOPHILS 0.1 0.0 - 0.1 K/UL    DF AUTOMATED     METABOLIC PANEL, COMPREHENSIVE    Collection Time: 10/05/19  4:12 PM   Result Value Ref Range    Sodium 137 136 - 145 mmol/L    Potassium 3.4 (L) 3.5 - 5.5 mmol/L    Chloride 97 (L) 100 - 111 mmol/L    CO2 34 (H) 21 - 32 mmol/L    Anion gap 6 3.0 - 18 mmol/L    Glucose 314 (H) 74 - 99 mg/dL    BUN 38 (H) 7.0 - 18 MG/DL    Creatinine 1.90 (H) 0.6 - 1.3 MG/DL    BUN/Creatinine ratio 20 12 - 20      GFR est AA 41 (L) >60 ml/min/1.73m2    GFR est non-AA 34 (L) >60 ml/min/1.73m2    Calcium 9.3 8.5 - 10.1 MG/DL    Bilirubin, total 0.6 0.2 - 1.0 MG/DL    ALT (SGPT) 28 16 - 61 U/L    AST (SGOT) 27 10 - 38 U/L    Alk. phosphatase 157 (H) 45 - 117 U/L    Protein, total 6.7 6.4 - 8.2 g/dL    Albumin 3.8 3.4 - 5.0 g/dL    Globulin 2.9 2.0 - 4.0 g/dL    A-G Ratio 1.3 0.8 - 1.7     TROPONIN I    Collection Time: 10/05/19  4:12 PM   Result Value Ref Range    Troponin-I, QT <0.02 0.0 - 0.045 NG/ML   EKG, 12 LEAD, INITIAL    Collection Time: 10/05/19  4:43 PM   Result Value Ref Range    Ventricular Rate 69 BPM    Atrial Rate 69 BPM    P-R Interval 166 ms    QRS Duration 108 ms    Q-T Interval 422 ms    QTC Calculation (Bezet) 452 ms    Calculated P Axis 68 degrees    Calculated R Axis 53 degrees    Calculated T Axis 66 degrees    Diagnosis       Normal sinus rhythm  Normal ECG  When compared with ECG of 11-MAY-2019 15:40,  Nonspecific T wave abnormality no longer evident in Inferior leads         Radiologic Studies -   Ct Head Wo Cont    Result Date: 10/5/2019  IMPRESSION: No acute findings or change to prior. Chronic left sided infarcts. Other chronic senescent changes. Ct Spine Cerv Wo Cont    Result Date: 10/5/2019  IMPRESSION: 1. No CT signs of cervical spine trauma.  Stable exam. 2. 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 Stable additional degenerative changes, as discussed above. 3. Stable small nodule versus scar in the right lung apex. Xr Pelv 1 Or 2 V    Result Date: 10/5/2019  IMPRESSION: 1. Acetabular protrusio and degenerative changes of the hips. 2.  No evidence of acute fracture or dislocation. Xr Chest Port    Result Date: 10/5/2019  Impression: 1. No acute infiltrate or effusion. Medical Decision Making     ED Course: Progress Notes, Reevaluation, and Consults:    3:40 PM Initial assessment performed. The patients presenting problems have been discussed, and they/their family are in agreement with the care plan formulated and outlined with them. I have encouraged them to ask questions as they arise throughout their visit. Provider Notes (Medical Decision Making):   Patient presents after a fall. Mental status at baseline. Able to mobilize all extremities. Head CT cervical spine, chest x-ray pelvis no acute fracture or abnormality. Patient feels well and himself labs reviewed patient received insulin for high glucose, patient states he missed the dose. Patient safe to be discharged to nursing home. Vital Signs-Reviewed the patient's vital signs. Reviewed pt's pulse ox reading. EKG: Interpreted by the EP. Time Interpreted: 16:43   Rate: 69   Rhythm: NSE   Interpretation:no ST changes      Records Reviewed: Old Medical Records (Time of Review: 3:40 PM)  -I am the first provider for this patient.  -I reviewed the vital signs, available nursing notes, past medical history, past surgical history, family history and social history. Current Outpatient Medications   Medication Sig Dispense Refill    acetaminophen (TYLENOL) 325 mg tablet Take 2 Tabs by mouth every four (4) hours as needed for Pain. 20 Tab 0    erythromycin (ILOTYCIN) ophthalmic ointment Apply to both lower eyelids four times a day until done.  1 Tube 0    QUEtiapine (SEROQUEL) 25 mg tablet Take 1 Tab by mouth nightly. 30 Tab 0    lidocaine (LIDODERM) 5 % Apply patch to the affected area for 12 hours a day and remove for 12 hours a day. 15 Each 0    oseltamivir (TAMIFLU) 30 mg capsule Take 1 Cap by mouth two (2) times a day. Indications: INFLUENZA 5 Cap 0    acetaminophen (TYLENOL) 500 mg tablet Take 1 Tab by mouth every six (6) hours as needed. Indications: pain 20 Tab 0    aspirin delayed-release 81 mg tablet Take 1 Tab by mouth daily. Indications: Cerebral Thromboembolism Prevention 30 Tab 0    cycloSPORINE (RESTASIS) 0.05 % ophthalmic emulsion Administer 1 Drop to both eyes every twelve (12) hours. Indications: Dry Eye 15 Each 0    gabapentin (NEURONTIN) 300 mg capsule Take 1 Cap by mouth daily. Indications: NEUROPATHIC PAIN, Restless Legs Syndrome 30 Cap 0    insulin glargine (LANTUS SOLOSTAR U-100 INSULIN) 100 unit/mL (3 mL) inpn 35 Units by SubCUTAneous route nightly. Indications: type 2 diabetes mellitus 1 Pen 3    isosorbide mononitrate ER (IMDUR) 30 mg tablet Take 1 Tab by mouth daily. 30 Tab 0    metoprolol succinate (TOPROL-XL) 50 mg XL tablet Take 1 Tab by mouth daily. Indications: hypertension 30 Tab 0    multivit-min-FA-lycopen-lutein (CENTRUM SILVER) 0.4-300-250 mg-mcg-mcg tab Take 1 tab daily 30 Tab 0    Insulin Needles, Disposable, (SHAWN PEN NEEDLE) 32 gauge x 5/32\" ndle Use as directed. 100 Pen Needle 0    rosuvastatin (CRESTOR) 10 mg tablet Take 1 Tab by mouth nightly. 30 Tab 0    OTHER Incentive spirometry- Use as directed 1 Each 0    solifenacin (VESICARE) 5 mg tablet Take 1 Tab by mouth daily. Indications: Bladder Hyperactivity, INCREASED URINARY FREQUENCY, URINARY URGE INCONTINENCE, URINARY URGENCY 30 Tab 0    temazepam (RESTORIL) 7.5 mg capsule Take 1 Cap by mouth nightly as needed for Sleep. Max Daily Amount: 7.5 mg. 12 Cap 0    furosemide (LASIX) 20 mg tablet Take 1 Tab by mouth daily.  Indications: Edema 30 Tab 0    guaiFENesin (ROBITUSSIN) 100 mg/5 mL liquid Take 5 mL by mouth every four (4) hours as needed for Cough. 1 Bottle 0    ciprofloxacin HCl (CILOXAN) 0.3 % ophthalmic solution Administer 1 Drop to both eyes every four (4) hours (while awake). For 3 more days. End 3/29/2018  Indications: BACTERIAL CONJUNCTIVITIS 2 mL 0    albuterol-ipratropium (DUO-NEB) 2.5 mg-0.5 mg/3 ml nebu 3 mL by Nebulization route every six (6) hours as needed. 30 Nebule 0    insulin lispro (HUMALOG KWIKPEN INSULIN) 200 unit/mL (3 mL) inpn For Blood Sugar (mg/dL) of:     Less than 150 =   0 units           150 -199 =   2 units  200 -249 =   4 units  250 -299 =   6 units  300 -349 =   8 units  350 and above =   10 units  Check BG then Inject insulin per SS 3 times daily before meals and at bedtime  Indications: type 2 diabetes mellitus 1 Pen 0    Blood-Glucose Meter monitoring kit Use as directed 1 Kit 0    lancets 23 gauge misc 1 Box by Does Not Apply route Before breakfast, lunch, and dinner. Use as directed 1 Lancet 0    alcohol swabs (ALCOHOL PREP SWABS) padm Use as directed 100 Pad 0    predniSONE (DELTASONE) 10 mg tablet Take 4 tabs for 1 day, then 3 tabs for 3 days, then 2 tabs for 3 days, then 1 tab by mouth for 3 days  Indications: COPD exacerbation 22 Tab 0    famotidine (PEPCID) 20 mg tablet Take 1 Tab by mouth two (2) times a day. Indications: PREVENTION OF STRESS ULCER 30 Tab 0    COMPOUNDMAX BASE crea 240 g by Does Not Apply route four (4) times daily. Anti-Inflam Meloxicam 0.09% Topirmate 1% Methocarbamol 2%  Lidocaine 2% Prilocaine 2% 240 g 3    CALCIUM CARBONATE (CALTRATE 600 PO) Take 1 Tab by mouth daily. Clinical Impression     Clinical Impression: fall    Disposition: discharge      DISCHARGE NOTE:   Pt has been reexamined. Patient has no new complaints, changes, or physical findings. Care plan outlined and precautions discussed. Results were reviewed with the patient.  All medications were reviewed with the patient; will d/c home with PMD f/u. All of pt's questions and concerns were addressed. Patient was instructed and agrees to follow up with PMD, as well as to return to the ED upon further deterioration. Patient is ready to go home. This note was dictated utilizing voice recognition software which may lead to typographical errors. I apologize in advance if the situation occurs. If questions arise please do not hesitate to contact me or call our department. This note was dictated utilizing voice recognition software which may lead to typographical errors. I apologize in advance if the situation occurs. If questions arise please do not hesitate to contact me or call our department.     Lucretia Doan MD  3:40 PM

## 2019-10-06 LAB
ATRIAL RATE: 69 BPM
CALCULATED P AXIS, ECG09: 68 DEGREES
CALCULATED R AXIS, ECG10: 53 DEGREES
CALCULATED T AXIS, ECG11: 66 DEGREES
DIAGNOSIS, 93000: NORMAL
P-R INTERVAL, ECG05: 166 MS
Q-T INTERVAL, ECG07: 422 MS
QRS DURATION, ECG06: 108 MS
QTC CALCULATION (BEZET), ECG08: 452 MS
VENTRICULAR RATE, ECG03: 69 BPM

## 2019-10-10 ENCOUNTER — APPOINTMENT (OUTPATIENT)
Dept: GENERAL RADIOLOGY | Age: 84
End: 2019-10-10
Attending: STUDENT IN AN ORGANIZED HEALTH CARE EDUCATION/TRAINING PROGRAM
Payer: MEDICARE

## 2019-10-10 ENCOUNTER — HOSPITAL ENCOUNTER (EMERGENCY)
Age: 84
Discharge: HOME | End: 2019-10-11
Attending: EMERGENCY MEDICINE
Payer: MEDICARE

## 2019-10-10 ENCOUNTER — APPOINTMENT (OUTPATIENT)
Dept: CT IMAGING | Age: 84
End: 2019-10-10
Attending: STUDENT IN AN ORGANIZED HEALTH CARE EDUCATION/TRAINING PROGRAM
Payer: MEDICARE

## 2019-10-10 DIAGNOSIS — R81 GLYCOSURIA: ICD-10-CM
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
## 2019-10-10 DIAGNOSIS — N18.9 CHRONIC KIDNEY DISEASE, UNSPECIFIED CKD STAGE: ICD-10-CM

## 2019-10-10 DIAGNOSIS — D75.1 POLYCYTHEMIA: ICD-10-CM

## 2019-10-10 LAB
ATRIAL RATE: 68 BPM
CALCULATED P AXIS, ECG09: 70 DEGREES
CALCULATED R AXIS, ECG10: 54 DEGREES
CALCULATED T AXIS, ECG11: 71 DEGREES
DIAGNOSIS, 93000: NORMAL
P-R INTERVAL, ECG05: 160 MS
Q-T INTERVAL, ECG07: 408 MS
QRS DURATION, ECG06: 98 MS
QTC CALCULATION (BEZET), ECG08: 433 MS
VENTRICULAR RATE, ECG03: 68 BPM

## 2019-10-10 PROCEDURE — 72125 CT NECK SPINE W/O DYE: CPT

## 2019-10-10 PROCEDURE — 99284 EMERGENCY DEPT VISIT MOD MDM: CPT

## 2019-10-10 PROCEDURE — 93005 ELECTROCARDIOGRAM TRACING: CPT

## 2019-10-10 PROCEDURE — 70450 CT HEAD/BRAIN W/O DYE: CPT

## 2019-10-11 ENCOUNTER — APPOINTMENT (OUTPATIENT)
Dept: GENERAL RADIOLOGY | Age: 84
End: 2019-10-11
Attending: STUDENT IN AN ORGANIZED HEALTH CARE EDUCATION/TRAINING PROGRAM
Payer: MEDICARE

## 2019-10-11 VITALS
TEMPERATURE: 97.9 F | SYSTOLIC BLOOD PRESSURE: 137 MMHG | WEIGHT: 150 LBS | DIASTOLIC BLOOD PRESSURE: 60 MMHG | RESPIRATION RATE: 13 BRPM | OXYGEN SATURATION: 96 % | HEART RATE: 62 BPM | BODY MASS INDEX: 21.52 KG/M2

## 2019-10-11 LAB
ALBUMIN SERPL-MCNC: 3.7 G/DL (ref 3.4–5)
ALBUMIN/GLOB SERPL: 1.1 {RATIO} (ref 0.8–1.7)
ALP SERPL-CCNC: 152 U/L (ref 45–117)
ALT SERPL-CCNC: 31 U/L (ref 16–61)
ANION GAP SERPL CALC-SCNC: 3 MMOL/L (ref 3–18)
APPEARANCE UR: CLEAR
AST SERPL-CCNC: 23 U/L (ref 10–38)
BASOPHILS # BLD: 0.1 K/UL (ref 0–0.1)
BASOPHILS NFR BLD: 1 % (ref 0–2)
BILIRUB SERPL-MCNC: 0.5 MG/DL (ref 0.2–1)
BILIRUB UR QL: NEGATIVE
BUN SERPL-MCNC: 28 MG/DL (ref 7–18)
BUN/CREAT SERPL: 16 (ref 12–20)
CALCIUM SERPL-MCNC: 8.9 MG/DL (ref 8.5–10.1)
CHLORIDE SERPL-SCNC: 105 MMOL/L (ref 100–111)
CK MB CFR SERPL CALC: NORMAL % (ref 0–4)
CK MB SERPL-MCNC: <1 NG/ML (ref 5–25)
CK SERPL-CCNC: 67 U/L (ref 39–308)
CO2 SERPL-SCNC: 36 MMOL/L (ref 21–32)
COLOR UR: YELLOW
CREAT SERPL-MCNC: 1.7 MG/DL (ref 0.6–1.3)
DIFFERENTIAL METHOD BLD: ABNORMAL
EOSINOPHIL # BLD: 0.3 K/UL (ref 0–0.4)
EOSINOPHIL NFR BLD: 4 % (ref 0–5)
ERYTHROCYTE [DISTWIDTH] IN BLOOD BY AUTOMATED COUNT: 13.6 % (ref 11.6–14.5)
GLOBULIN SER CALC-MCNC: 3.3 G/DL (ref 2–4)
GLUCOSE SERPL-MCNC: 221 MG/DL (ref 74–99)
GLUCOSE UR STRIP.AUTO-MCNC: >1000 MG/DL
HCT VFR BLD AUTO: 53.6 % (ref 36–48)
HGB BLD-MCNC: 18 G/DL (ref 13–16)
HGB UR QL STRIP: NEGATIVE
KETONES UR QL STRIP.AUTO: NEGATIVE MG/DL
LEUKOCYTE ESTERASE UR QL STRIP.AUTO: NEGATIVE
LYMPHOCYTES # BLD: 2.5 K/UL (ref 0.9–3.6)
LYMPHOCYTES NFR BLD: 28 % (ref 21–52)
MCH RBC QN AUTO: 29.9 PG (ref 24–34)
MCHC RBC AUTO-ENTMCNC: 33.6 G/DL (ref 31–37)
MCV RBC AUTO: 88.9 FL (ref 74–97)
MONOCYTES # BLD: 1.2 K/UL (ref 0.05–1.2)
MONOCYTES NFR BLD: 13 % (ref 3–10)
NEUTS SEG # BLD: 4.9 K/UL (ref 1.8–8)
NEUTS SEG NFR BLD: 54 % (ref 40–73)
NITRITE UR QL STRIP.AUTO: NEGATIVE
PH UR STRIP: 5.5 [PH] (ref 5–8)
PLATELET # BLD AUTO: 192 K/UL (ref 135–420)
PMV BLD AUTO: 11.3 FL (ref 9.2–11.8)
POTASSIUM SERPL-SCNC: 3.6 MMOL/L (ref 3.5–5.5)
PROT SERPL-MCNC: 7 G/DL (ref 6.4–8.2)
PROT UR STRIP-MCNC: NEGATIVE MG/DL
RBC # BLD AUTO: 6.03 M/UL (ref 4.7–5.5)
SODIUM SERPL-SCNC: 144 MMOL/L (ref 136–145)
SP GR UR REFRACTOMETRY: >1.03 (ref 1–1.03)
TROPONIN I SERPL-MCNC: <0.02 NG/ML (ref 0–0.04)
UROBILINOGEN UR QL STRIP.AUTO: 1 EU/DL (ref 0.2–1)
WBC # BLD AUTO: 8.9 K/UL (ref 4.6–13.2)

## 2019-10-11 PROCEDURE — 82550 ASSAY OF CK (CPK): CPT

## 2019-10-11 PROCEDURE — 72100 X-RAY EXAM L-S SPINE 2/3 VWS: CPT

## 2019-10-11 PROCEDURE — 85025 COMPLETE CBC W/AUTO DIFF WBC: CPT

## 2019-10-11 PROCEDURE — 71045 X-RAY EXAM CHEST 1 VIEW: CPT

## 2019-10-11 PROCEDURE — 80053 COMPREHEN METABOLIC PANEL: CPT

## 2019-10-11 PROCEDURE — 72170 X-RAY EXAM OF PELVIS: CPT

## 2019-10-11 PROCEDURE — 81003 URINALYSIS AUTO W/O SCOPE: CPT

## 2019-10-11 NOTE — ED PROVIDER NOTES
EMERGENCY DEPARTMENT HISTORY AND PHYSICAL EXAM    11:34 PM    Date: 10/10/2019  Patient Name: Rj Tate. History of Presenting Illness     Chief Complaint   Patient presents with    Fall     History Provided By: Patient and EMS  Location/Duration/Severity/Modifying factors   Patient is an 83 yo male with PMH mild dementia, CAD, DMT2, HTN/HLD, CKD, COPD, CVA, depression who presents via EMS to [facility] for fall. Patient is a resident of [facility] ([facility]) and was in his room this evening. Patient states he tripped because the room was dark and fall onto his right hip. Fall was unwitnessed and patient is a questionable historian. Per patient, he was found approximately 1 hour later and had been unable to get himself off of the floor. Per EMS report of [facility] staff, he was oriented at baseline. Patient denies any LOC or prodromal symptoms of CP, dyspnea, visual changes prior to his fall. He states his only concern is a R hip pain described as a burning sensation. The pain radiates down the back of his leg intermittently. Exacerbating factors of his back pain include positional changes onto his R hip. He was unable to identify any remitting factors. Patient states he has chronic urinary incontinence but he denies any fecal incontinence or saddle anesthesia. He denies any other joint pain. Of note, patient has been evaluated four times in the past six months for falls in this ED: 03/18/2019, 05/11/2019, 07/29/2019, 10/05/2019. PCP: None    Current Outpatient Medications   Medication Sig Dispense Refill    acetaminophen (TYLENOL) 325 mg tablet Take 2 Tabs by mouth every four (4) hours as needed for Pain. 20 Tab 0    erythromycin (ILOTYCIN) ophthalmic ointment Apply to both lower eyelids four times a day until done. 1 Tube 0    QUEtiapine (SEROQUEL) 25 mg tablet Take 1 Tab by mouth nightly.  30 Tab 0    lidocaine (LIDODERM) 5 % Apply patch to the affected area for 12 hours a day and remove for 12 hours a day. 15 Each 0    oseltamivir (TAMIFLU) 30 mg capsule Take 1 Cap by mouth two (2) times a day. Indications: INFLUENZA 5 Cap 0    acetaminophen (TYLENOL) 500 mg tablet Take 1 Tab by mouth every six (6) hours as needed. Indications: pain 20 Tab 0    aspirin delayed-release 81 mg tablet Take 1 Tab by mouth daily. Indications: Cerebral Thromboembolism Prevention 30 Tab 0    cycloSPORINE (RESTASIS) 0.05 % ophthalmic emulsion Administer 1 Drop to both eyes every twelve (12) hours. Indications: Dry Eye 15 Each 0    gabapentin (NEURONTIN) 300 mg capsule Take 1 Cap by mouth daily. Indications: NEUROPATHIC PAIN, Restless Legs Syndrome 30 Cap 0    insulin glargine (LANTUS SOLOSTAR U-100 INSULIN) 100 unit/mL (3 mL) inpn 35 Units by SubCUTAneous route nightly. Indications: type 2 diabetes mellitus 1 Pen 3    isosorbide mononitrate ER (IMDUR) 30 mg tablet Take 1 Tab by mouth daily. 30 Tab 0    metoprolol succinate (TOPROL-XL) 50 mg XL tablet Take 1 Tab by mouth daily. Indications: hypertension 30 Tab 0    multivit-min-FA-lycopen-lutein (CENTRUM SILVER) 0.4-300-250 mg-mcg-mcg tab Take 1 tab daily 30 Tab 0    Insulin Needles, Disposable, (SHAWN PEN NEEDLE) 32 gauge x 5/32\" ndle Use as directed. 100 Pen Needle 0    rosuvastatin (CRESTOR) 10 mg tablet Take 1 Tab by mouth nightly. 30 Tab 0    OTHER Incentive spirometry- Use as directed 1 Each 0    solifenacin (VESICARE) 5 mg tablet Take 1 Tab by mouth daily. Indications: Bladder Hyperactivity, INCREASED URINARY FREQUENCY, URINARY URGE INCONTINENCE, URINARY URGENCY 30 Tab 0    temazepam (RESTORIL) 7.5 mg capsule Take 1 Cap by mouth nightly as needed for Sleep. Max Daily Amount: 7.5 mg. 12 Cap 0    furosemide (LASIX) 20 mg tablet Take 1 Tab by mouth daily. Indications: Edema 30 Tab 0    guaiFENesin (ROBITUSSIN) 100 mg/5 mL liquid Take 5 mL by mouth every four (4) hours as needed for Cough.  1 Bottle 0    ciprofloxacin HCl (CILOXAN) 0.3 % ophthalmic solution Administer 1 Drop to both eyes every four (4) hours (while awake). For 3 more days. End 3/29/2018  Indications: BACTERIAL CONJUNCTIVITIS 2 mL 0    albuterol-ipratropium (DUO-NEB) 2.5 mg-0.5 mg/3 ml nebu 3 mL by Nebulization route every six (6) hours as needed. 30 Nebule 0    insulin lispro (HUMALOG KWIKPEN INSULIN) 200 unit/mL (3 mL) inpn For Blood Sugar (mg/dL) of:     Less than 150 =   0 units           150 -199 =   2 units  200 -249 =   4 units  250 -299 =   6 units  300 -349 =   8 units  350 and above =   10 units  Check BG then Inject insulin per SS 3 times daily before meals and at bedtime  Indications: type 2 diabetes mellitus 1 Pen 0    Blood-Glucose Meter monitoring kit Use as directed 1 Kit 0    lancets 23 gauge misc 1 Box by Does Not Apply route Before breakfast, lunch, and dinner. Use as directed 1 Lancet 0    alcohol swabs (ALCOHOL PREP SWABS) padm Use as directed 100 Pad 0    predniSONE (DELTASONE) 10 mg tablet Take 4 tabs for 1 day, then 3 tabs for 3 days, then 2 tabs for 3 days, then 1 tab by mouth for 3 days  Indications: COPD exacerbation 22 Tab 0    famotidine (PEPCID) 20 mg tablet Take 1 Tab by mouth two (2) times a day. Indications: PREVENTION OF STRESS ULCER 30 Tab 0    COMPOUNDMAX BASE crea 240 g by Does Not Apply route four (4) times daily. Anti-Inflam Meloxicam 0.09% Topirmate 1% Methocarbamol 2%  Lidocaine 2% Prilocaine 2% 240 g 3    CALCIUM CARBONATE (CALTRATE 600 PO) Take 1 Tab by mouth daily. Past History     Past Medical History:  Past Medical History:   Diagnosis Date    Angina     Anxiety     Arthritis     CAD (coronary artery disease)     s/p drug-eluting stents to RCA for high-grade stenoses in 2/09    Cardiac catheterization 08/24/2016    Mod calcification. Co-dom. oLM 50-70%. LAD 30%. Dx 30%. Cx 70% focal.  OM 80% focal.  RCA 30%.  Cardiac echocardiogram 08/23/2016    Tech difficult. EF 55-60%. No WMA. Normal diastolic fx. Lipomatous septal hypertrophy.  Cardiac nuclear imaging test, abnormal 2016    Sm reversible inferior defect concerning for ischemia. Mild inferior hypk. EF 68%. Neg EKG on pharm stress test.    Carotid duplex 2016    Mild <50% CYRUS stenosis. Chronic LICA occlusion. Similar to study of 3/29/16.  Carotid stenosis (HCC)     w/ known total occlusion of lt internal carotid artery; followed by pts vascular surgeon    Chronic kidney disease     COPD (chronic obstructive pulmonary disease) ([address]     Dementia ([address]     Depression     Diabetes ([address]     type 2    Dyslipidemia     on Crestor; managed by pts PCP    Dyspnea     Heart disease     Hypercholesterolemia     Hypertension     Low back pain     Osteoarthrosis involving multiple sites     Skin cancer ([address]     squamous cell lesions of the skin    Stroke ([address]     Venous (peripheral) insufficiency      Past Surgical History:  Past Surgical History:   Procedure Laterality Date    CARDIAC CATHETERIZATION  2016         CORONARY ARTERY ANGIOGRAM  2016         HX ANGIOPLASTY  2009    2 stents placed and heart attack during the procedure    HX COLONOSCOPY  10/2003    neg; declines f/u    HX CORONARY ARTERY BYPASS GRAFT  2016    HX HEART CATHETERIZATION  2013    HX HERNIA REPAIR      1970s    HX OTHER SURGICAL      groin surgery    HX TONSIL AND ADENOIDECTOMY      IR INTRAVASCULAR ULTRASOUND INITIAL  2016         LV ANGIOGRAPHY  2016          Family History:  Family History   Problem Relation Age of Onset    Parkinsonism Mother     Hypertension Mother     Cancer Father         lung    Heart defect Brother      Social History:  Social History     Tobacco Use    Smoking status: Former Smoker     Years: 2.00     Last attempt to quit: 1955     Years since quittin.8    Smokeless tobacco: Never Used   Substance Use Topics    Alcohol use: Yes     Comment: drinks weekly couple beers    Drug use: No     Allergies:   Allergies Allergen Reactions    Amaryl [Glimepiride] Drowsiness    Lipitor [Atorvastatin] Myalgia    Niacin Other (comments)     Facial blistering, swelling in face    Pravachol [Pravastatin] Myalgia    Zocor [Simvastatin] Myalgia     Review of Systems     Review of Systems   Constitutional: Negative for activity change, chills, fatigue and fever. HENT: Negative for rhinorrhea, sinus pressure and sinus pain. Eyes: Positive for discharge and redness (eyelid redness b/l). Negative for pain and visual disturbance. Respiratory: Negative for cough, chest tightness, shortness of breath and wheezing. Cardiovascular: Positive for leg swelling. Negative for chest pain and palpitations. Gastrointestinal: Negative for abdominal pain, constipation, diarrhea, nausea and vomiting. Genitourinary: Negative for dysuria and hematuria. Urinary incontinence    Musculoskeletal: Positive for arthralgias, back pain, gait problem and myalgias. Negative for joint swelling, neck pain and neck stiffness. Skin: Positive for rash. Negative for color change and wound. Neurological: Negative for dizziness, tremors, seizures, syncope, speech difficulty, weakness, light-headedness, numbness and headaches. Psychiatric/Behavioral: Positive for confusion. Negative for agitation. The patient is not nervous/anxious. +Delusions     Physical Exam     Visit Vitals  /74   Pulse 75   Temp 97.9 °F (36.6 °C)   Resp 15   SpO2 95%     Physical Exam   Constitutional: He appears well-developed and well-nourished. No distress. HENT:   Head: Normocephalic and atraumatic. Mouth/Throat: Oropharynx is clear and moist. No oropharyngeal exudate. Eyes: Pupils are equal, round, and reactive to light. Right eye exhibits discharge. Left eye exhibits discharge. Right conjunctiva is not injected. Left conjunctiva is not injected. Neck: Normal range of motion. Neck supple. No JVD present. No tracheal deviation present.  No thyromegaly present. Cardiovascular: Normal rate, regular rhythm, normal heart sounds and intact distal pulses. Exam reveals no gallop and no friction rub. No murmur heard. Pulmonary/Chest: Effort normal and breath sounds normal. No stridor. No respiratory distress. He has no wheezes. He has no rales. He exhibits no tenderness. Abdominal: Soft. Bowel sounds are normal. He exhibits no distension and no mass. There is no tenderness. There is no rebound and no guarding. FAST negative   Genitourinary: Penis normal.   Genitourinary Comments: FAST negative;  deferred   Musculoskeletal: Normal range of motion. He exhibits edema (RLE>LLE, no calf difference). He exhibits no tenderness or deformity. Lymphadenopathy:     He has no cervical adenopathy. Neurological: He is alert. Oriented to person and place, responds \"October 8, 2021\" to date   Skin: Skin is warm and dry. Rash (chronic erythematous dermatitis, noninfectious RLE, not warm, no drainage, nonfluctuant, nontender) noted. He is not diaphoretic. There is erythema (RLE). No pallor. Psychiatric: He has a normal mood and affect.  His behavior is normal.   +Delusions (states he heard the animals in the woods coming for him)     Diagnostic Study Results     Labs -  Recent Results (from the past 12 hour(s))   EKG, 12 LEAD, INITIAL    Collection Time: 10/10/19 10:57 PM   Result Value Ref Range    Ventricular Rate 68 BPM    Atrial Rate 68 BPM    P-R Interval 160 ms    QRS Duration 98 ms    Q-T Interval 408 ms    QTC Calculation (Bezet) 433 ms    Calculated P Axis 70 degrees    Calculated R Axis 54 degrees    Calculated T Axis 71 degrees    Diagnosis       Normal sinus rhythm  Possible Left atrial enlargement  Junctional ST depression, probably abnormal  Abnormal ECG  When compared with ECG of 05-OCT-2019 16:43,  No significant change was found     CBC WITH AUTOMATED DIFF    Collection Time: 10/11/19 12:50 AM   Result Value Ref Range    WBC 8.9 4.6 - 13.2 K/uL    RBC 6.03 (H) 4.70 - 5.50 M/uL    HGB 18.0 (H) 13.0 - 16.0 g/dL    HCT 53.6 (H) 36.0 - 48.0 %    MCV 88.9 74.0 - 97.0 FL    MCH 29.9 24.0 - 34.0 PG    MCHC 33.6 31.0 - 37.0 g/dL    RDW 13.6 11.6 - 14.5 %    PLATELET 203 502 - 132 K/uL    MPV 11.3 9.2 - 11.8 FL    NEUTROPHILS 54 40 - 73 %    LYMPHOCYTES 28 21 - 52 %    MONOCYTES 13 (H) 3 - 10 %    EOSINOPHILS 4 0 - 5 %    BASOPHILS 1 0 - 2 %    ABS. NEUTROPHILS 4.9 1.8 - 8.0 K/UL    ABS. LYMPHOCYTES 2.5 0.9 - 3.6 K/UL    ABS. MONOCYTES 1.2 0.05 - 1.2 K/UL    ABS. EOSINOPHILS 0.3 0.0 - 0.4 K/UL    ABS. BASOPHILS 0.1 0.0 - 0.1 K/UL    DF AUTOMATED     METABOLIC PANEL, COMPREHENSIVE    Collection Time: 10/11/19 12:50 AM   Result Value Ref Range    Sodium 144 136 - 145 mmol/L    Potassium 3.6 3.5 - 5.5 mmol/L    Chloride 105 100 - 111 mmol/L    CO2 36 (H) 21 - 32 mmol/L    Anion gap 3 3.0 - 18 mmol/L    Glucose 221 (H) 74 - 99 mg/dL    BUN 28 (H) 7.0 - 18 MG/DL    Creatinine 1.70 (H) 0.6 - 1.3 MG/DL    BUN/Creatinine ratio 16 12 - 20      GFR est AA 47 (L) >60 ml/min/1.73m2    GFR est non-AA 39 (L) >60 ml/min/1.73m2    Calcium 8.9 8.5 - 10.1 MG/DL    Bilirubin, total 0.5 0.2 - 1.0 MG/DL    ALT (SGPT) 31 16 - 61 U/L    AST (SGOT) 23 10 - 38 U/L    Alk.  phosphatase 152 (H) 45 - 117 U/L    Protein, total 7.0 6.4 - 8.2 g/dL    Albumin 3.7 3.4 - 5.0 g/dL    Globulin 3.3 2.0 - 4.0 g/dL    A-G Ratio 1.1 0.8 - 1.7     CARDIAC PANEL,(CK, CKMB & TROPONIN)    Collection Time: 10/11/19 12:50 AM   Result Value Ref Range    CK 67 39 - 308 U/L    CK - MB <1.0 <3.6 ng/ml    CK-MB Index  0.0 - 4.0 %     CALCULATION NOT PERFORMED WHEN RESULT IS BELOW LINEAR LIMIT    Troponin-I, QT <0.02 0.0 - 0.045 NG/ML   URINALYSIS W/ RFLX MICROSCOPIC    Collection Time: 10/11/19 12:51 AM   Result Value Ref Range    Color YELLOW      Appearance CLEAR      Specific gravity >1.030 (H) 1.005 - 1.030    pH (UA) 5.5 5.0 - 8.0      Protein NEGATIVE  NEG mg/dL    Glucose >1,000 (A) NEG mg/dL    Ketone NEGATIVE  NEG mg/dL    Bilirubin NEGATIVE  NEG      Blood NEGATIVE  NEG      Urobilinogen 1.0 0.2 - 1.0 EU/dL    Nitrites NEGATIVE  NEG      Leukocyte Esterase NEGATIVE  NEG         Radiologic Studies -   CT HEAD WO CONT   Final Result   IMPRESSION:      No acute brain abnormality. Chronic left parietal and occipital lobe infarcts. Chronic left corona radiata infarct. Stable atrophy and senescent white matter changes. CT SPINE CERV WO CONT   Final Result   IMPRESSION:      No acute fracture or pathologic subluxation. 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 XR CHEST PORT    (Results Pending)   XR PELV 1 OR 2 V    (Results Pending)   XR SPINE LUMB 2 OR 3 V    (Results Pending)   By my interpretations, no acute cardiopulmonary process. No gross pelvic or lumbar fractures. Medical Decision Making   I am the first provider for this patient. I reviewed the vital signs, available nursing notes, past medical history, past surgical history, family history and social history. Vital Signs-Reviewed the patient's vital signs. EKG by my interpretation NSR, rate 68, NAD, LA 68, QRS 98, QTc 433, no T wave inversions or ST elevations/depressions    Records Reviewed: Nursing Notes, Old Medical Records, Previous electrocardiograms, Previous Radiology Studies and Previous Laboratory Studies (Time of Review: 11:34 PM)    ED Course: Progress Notes, Reevaluation, and Consults:  2300 Patient seen and examined at bedside. Initial labs/orders reviewed. FAST scan completed, negative. 2355 CT head reviewed, by my interpretation no acute intracranial pathology, noted encephalomalacia seen on previous head CTs    0104 Patient seen and examined at bedside. Watching television and comfortable. Denies pain.  Labworks and imaging reviewed with Dr. Crystal Shah, will d/c back to SNF after passing ambulation trial.    0157 Patient passed walking trial with rolling walker. At baseline, d/c to SNF. Provider Notes (Medical Decision Making):   MDM  Number of Diagnoses or Management Options  Chronic kidney disease, unspecified CKD stage:   Fall, initial encounter:   Glycosuria:   Polycythemia:   Diagnosis management comments:   81 yo male with PMH mild dementia, CAD, DMT2, HTN/HLD, CKD, COPD, CVA, depression who presents following a fall earlier this evening. Patient states he had a mechanical fall in his dark room. Questionable historian but also states he was laying on the ground for an hour before he was found down but denies LOC. Vital signs on presentation unremarkable. Per SNF staff via EMS, oriented at baseline. On physical exam, he had a negative FAST scan and no step off sign or reproducible joint tenderness. Good ROM in all extremities which he freely moves. polycythemia (hgb 18). Chemistries showed evidence of CKD (improved compared to historical findings), a mildly elevated alk phos (152), hyperglycemia (221) and a negative troponin. Straight cath significant for glycosuria but otherwise negative. EKG was largely unremarkable. He had the following imaging studies which showed no acute pathologies: CT head, CT c-spine, CXR, pelvis xray, lumbar fx. Significant findings from his imaging include known encephalomalacia and stable DISH and OPL at C2-C3. Of note, this is the fifth ED visit in the past ~6 months for fall. He will be discharged back to [facility]. Amount and/or Complexity of Data Reviewed  Clinical lab tests: ordered and reviewed  Tests in the radiology section of CPT®: ordered and reviewed  Tests in the medicine section of CPT®: ordered and reviewed  Discuss the patient with other providers: yes Jam Lechuga (ED Attending))  Independent visualization of images, tracings, or specimens: yes      Diagnosis     Clinical Impression:   1. Fall, initial encounter    2. Polycythemia    3. Glycosuria    4.  Chronic kidney disease, unspecified CKD stage      Disposition: Return to SNF ([facility])    Follow-up Information     Follow up With Specialties Details Why Contact Info    Primary care physician  Schedule an appointment as soon as possible for a visit in 2 days For ED follow-up with primary care physician            Patient's Medications   Start Taking    No medications on file   Continue Taking    ACETAMINOPHEN (TYLENOL) 325 MG TABLET    Take 2 Tabs by mouth every four (4) hours as needed for Pain. ACETAMINOPHEN (TYLENOL) 500 MG TABLET    Take 1 Tab by mouth every six (6) hours as needed. Indications: pain    ALBUTEROL-IPRATROPIUM (DUO-NEB) 2.5 MG-0.5 MG/3 ML NEBU    3 mL by Nebulization route every six (6) hours as needed. ALCOHOL SWABS (ALCOHOL PREP SWABS) PADM    Use as directed    ASPIRIN DELAYED-RELEASE 81 MG TABLET    Take 1 Tab by mouth daily. Indications: Cerebral Thromboembolism Prevention    BLOOD-GLUCOSE METER MONITORING KIT    Use as directed    CALCIUM CARBONATE (CALTRATE 600 PO)    Take 1 Tab by mouth daily. CIPROFLOXACIN HCL (CILOXAN) 0.3 % OPHTHALMIC SOLUTION    Administer 1 Drop to both eyes every four (4) hours (while awake). For 3 more days. End 3/29/2018  Indications: BACTERIAL CONJUNCTIVITIS    COMPOUNDMAX BASE CREA    240 g by Does Not Apply route four (4) times daily. Anti-Inflam Meloxicam 0.09% Topirmate 1% Methocarbamol 2%  Lidocaine 2% Prilocaine 2%    CYCLOSPORINE (RESTASIS) 0.05 % OPHTHALMIC EMULSION    Administer 1 Drop to both eyes every twelve (12) hours. Indications: Dry Eye    ERYTHROMYCIN (ILOTYCIN) OPHTHALMIC OINTMENT    Apply to both lower eyelids four times a day until done. FAMOTIDINE (PEPCID) 20 MG TABLET    Take 1 Tab by mouth two (2) times a day. Indications: PREVENTION OF STRESS ULCER    FUROSEMIDE (LASIX) 20 MG TABLET    Take 1 Tab by mouth daily. Indications: Edema    GABAPENTIN (NEURONTIN) 300 MG CAPSULE    Take 1 Cap by mouth daily.  Indications: NEUROPATHIC PAIN, Restless Legs Syndrome    GUAIFENESIN (ROBITUSSIN) 100 MG/5 ML LIQUID    Take 5 mL by mouth every four (4) hours as needed for Cough. INSULIN GLARGINE (LANTUS SOLOSTAR U-100 INSULIN) 100 UNIT/ML (3 ML) INPN    35 Units by SubCUTAneous route nightly. Indications: type 2 diabetes mellitus    INSULIN LISPRO (HUMALOG KWIKPEN INSULIN) 200 UNIT/ML (3 ML) INPN    For Blood Sugar (mg/dL) of:     Less than 150 =   0 units           150 -199 =   2 units  200 -249 =   4 units  250 -299 =   6 units  300 -349 =   8 units  350 and above =   10 units  Check BG then Inject insulin per SS 3 times daily before meals and at bedtime  Indications: type 2 diabetes mellitus    INSULIN NEEDLES, DISPOSABLE, (SHAWN PEN NEEDLE) 32 GAUGE X 5/32\" NDLE    Use as directed. ISOSORBIDE MONONITRATE ER (IMDUR) 30 MG TABLET    Take 1 Tab by mouth daily. LANCETS 23 GAUGE MISC    1 Box by Does Not Apply route Before breakfast, lunch, and dinner. Use as directed    LIDOCAINE (LIDODERM) 5 %    Apply patch to the affected area for 12 hours a day and remove for 12 hours a day. METOPROLOL SUCCINATE (TOPROL-XL) 50 MG XL TABLET    Take 1 Tab by mouth daily. Indications: hypertension    MULTIVIT-MIN-FA-LYCOPEN-LUTEIN (CENTRUM SILVER) 0.4-300-250 MG-MCG-MCG TAB    Take 1 tab daily    OSELTAMIVIR (TAMIFLU) 30 MG CAPSULE    Take 1 Cap by mouth two (2) times a day. Indications: INFLUENZA    OTHER    Incentive spirometry- Use as directed    PREDNISONE (DELTASONE) 10 MG TABLET    Take 4 tabs for 1 day, then 3 tabs for 3 days, then 2 tabs for 3 days, then 1 tab by mouth for 3 days  Indications: COPD exacerbation    QUETIAPINE (SEROQUEL) 25 MG TABLET    Take 1 Tab by mouth nightly. ROSUVASTATIN (CRESTOR) 10 MG TABLET    Take 1 Tab by mouth nightly. SOLIFENACIN (VESICARE) 5 MG TABLET    Take 1 Tab by mouth daily.  Indications: Bladder Hyperactivity, INCREASED URINARY FREQUENCY, URINARY URGE INCONTINENCE, URINARY URGENCY    TEMAZEPAM (RESTORIL) 7.5 MG CAPSULE Take 1 Cap by mouth nightly as needed for Sleep. Max Daily Amount: 7.5 mg.    These Medications have changed    No medications on file   Stop Taking    No medications on file     Paul Ortez DO, PGY-2   [facility]   October 11, 2019, 11:34 PM

## 2019-10-11 NOTE — ED NOTES
TRANSFER - OUT REPORT:    Verbal report given to MARIBELL Hays on Caitlin Specking.  being transferred to [facility] for routine progression of care       Report consisted of patients Situation, Background, Assessment and   Recommendations(SBAR). Information from the following report(s) SBAR and ED Summary was reviewed with the receiving nurse. Lines:   Peripheral IV 10/11/19 Right Antecubital (Active)   Site Assessment Clean, dry, & intact 10/11/2019  1:35 AM   Phlebitis Assessment 0 10/11/2019  1:35 AM   Infiltration Assessment 0 10/11/2019  1:35 AM   Dressing Status Clean, dry, & intact 10/11/2019  1:35 AM   Dressing Type Transparent 10/11/2019  1:35 AM   Hub Color/Line Status Blue 10/11/2019  1:35 AM        Opportunity for questions and clarification was provided.       Patient transported with:  Pt shea

## 2019-11-06 ENCOUNTER — APPOINTMENT (OUTPATIENT)
Dept: CT IMAGING | Age: 84
End: 2019-11-06
Attending: STUDENT IN AN ORGANIZED HEALTH CARE EDUCATION/TRAINING PROGRAM
Payer: MEDICARE

## 2019-11-06 ENCOUNTER — HOSPITAL ENCOUNTER (EMERGENCY)
Age: 84
Discharge: NURSING, REHAB OR LONG-TERM CARE | End: 2019-11-07
Attending: EMERGENCY MEDICINE
Payer: MEDICARE
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
## 2019-11-06 PROCEDURE — 72125 CT NECK SPINE W/O DYE: CPT

## 2019-11-06 PROCEDURE — 70450 CT HEAD/BRAIN W/O DYE: CPT

## 2019-11-06 PROCEDURE — 99284 EMERGENCY DEPT VISIT MOD MDM: CPT

## 2019-11-07 VITALS
SYSTOLIC BLOOD PRESSURE: 166 MMHG | HEART RATE: 62 BPM | DIASTOLIC BLOOD PRESSURE: 81 MMHG | OXYGEN SATURATION: 98 % | TEMPERATURE: 97.6 F | RESPIRATION RATE: 18 BRPM

## 2019-11-07 NOTE — ED PROVIDER NOTES
EMERGENCY DEPARTMENT HISTORY AND PHYSICAL EXAM    10:20 PM      Date: 11/6/2019  Patient Name: Gamaliel Garcia. History of Presenting Illness     Chief Complaint   Patient presents with    Fall         History Provided By: Patient  Location/Duration/Severity/Modifying factors   Gamaliel Garcia is a 82year old male with history of anxiety, OA, CAD s/p stent, CKD, COPD, Dementia, Depression, DM, HLD, HTN, PVD and CVA who presents with fall. Per note, EMS brought patient from [address] [place] [address] after slipping from wheelchair to carpeted floor and he was complaining of neck pain. Patient reports that he was watching TV when he slipped from chair and he did not remember where he was at that moment. Per [address] [place] [address], patient had unwitnessed fall and was found at the end of his bed with his walker on top of him around 9pm. He said that the back of neck was hurting. Per SNF, patient had no LOC and was at mental baseline. They noted no new bruises and endorsed that the patient had poor balance and ataxic gait at baseline. Patient uses rolling walker at baseline. PCP: None    Current Outpatient Medications   Medication Sig Dispense Refill    acetaminophen (TYLENOL) 325 mg tablet Take 2 Tabs by mouth every four (4) hours as needed for Pain. 20 Tab 0    erythromycin (ILOTYCIN) ophthalmic ointment Apply to both lower eyelids four times a day until done. 1 Tube 0    QUEtiapine (SEROQUEL) 25 mg tablet Take 1 Tab by mouth nightly. 30 Tab 0    lidocaine (LIDODERM) 5 % Apply patch to the affected area for 12 hours a day and remove for 12 hours a day. 15 Each 0    oseltamivir (TAMIFLU) 30 mg capsule Take 1 Cap by mouth two (2) times a day. Indications: INFLUENZA 5 Cap 0    acetaminophen (TYLENOL) 500 mg tablet Take 1 Tab by mouth every six (6) hours as needed. Indications: pain 20 Tab 0    aspirin delayed-release 81 mg tablet Take 1 Tab by mouth daily.  Indications: Cerebral Thromboembolism Prevention 30 Tab 0    cycloSPORINE (RESTASIS) 0.05 % ophthalmic emulsion Administer 1 Drop to both eyes every twelve (12) hours. Indications: Dry Eye 15 Each 0    gabapentin (NEURONTIN) 300 mg capsule Take 1 Cap by mouth daily. Indications: NEUROPATHIC PAIN, Restless Legs Syndrome 30 Cap 0    insulin glargine (LANTUS SOLOSTAR U-100 INSULIN) 100 unit/mL (3 mL) inpn 35 Units by SubCUTAneous route nightly. Indications: type 2 diabetes mellitus 1 Pen 3    isosorbide mononitrate ER (IMDUR) 30 mg tablet Take 1 Tab by mouth daily. 30 Tab 0    metoprolol succinate (TOPROL-XL) 50 mg XL tablet Take 1 Tab by mouth daily. Indications: hypertension 30 Tab 0    multivit-min-FA-lycopen-lutein (CENTRUM SILVER) 0.4-300-250 mg-mcg-mcg tab Take 1 tab daily 30 Tab 0    Insulin Needles, Disposable, (SHAWN PEN NEEDLE) 32 gauge x 5/32\" ndle Use as directed. 100 Pen Needle 0    rosuvastatin (CRESTOR) 10 mg tablet Take 1 Tab by mouth nightly. 30 Tab 0    OTHER Incentive spirometry- Use as directed 1 Each 0    solifenacin (VESICARE) 5 mg tablet Take 1 Tab by mouth daily. Indications: Bladder Hyperactivity, INCREASED URINARY FREQUENCY, URINARY URGE INCONTINENCE, URINARY URGENCY 30 Tab 0    temazepam (RESTORIL) 7.5 mg capsule Take 1 Cap by mouth nightly as needed for Sleep. Max Daily Amount: 7.5 mg. 12 Cap 0    furosemide (LASIX) 20 mg tablet Take 1 Tab by mouth daily. Indications: Edema 30 Tab 0    guaiFENesin (ROBITUSSIN) 100 mg/5 mL liquid Take 5 mL by mouth every four (4) hours as needed for Cough. 1 Bottle 0    ciprofloxacin HCl (CILOXAN) 0.3 % ophthalmic solution Administer 1 Drop to both eyes every four (4) hours (while awake). For 3 more days. End 3/29/2018  Indications: BACTERIAL CONJUNCTIVITIS 2 mL 0    albuterol-ipratropium (DUO-NEB) 2.5 mg-0.5 mg/3 ml nebu 3 mL by Nebulization route every six (6) hours as needed.  30 Nebule 0    insulin lispro (HUMALOG KWIKPEN INSULIN) 200 unit/mL (3 mL) inpn For Blood Sugar (mg/dL) of:     Less than 150 = 0 units           150 -199 =   2 units  200 -249 =   4 units  250 -299 =   6 units  300 -349 =   8 units  350 and above =   10 units  Check BG then Inject insulin per SS 3 times daily before meals and at bedtime  Indications: type 2 diabetes mellitus 1 Pen 0    Blood-Glucose Meter monitoring kit Use as directed 1 Kit 0    lancets 23 gauge misc 1 Box by Does Not Apply route Before breakfast, lunch, and dinner. Use as directed 1 Lancet 0    alcohol swabs (ALCOHOL PREP SWABS) padm Use as directed 100 Pad 0    predniSONE (DELTASONE) 10 mg tablet Take 4 tabs for 1 day, then 3 tabs for 3 days, then 2 tabs for 3 days, then 1 tab by mouth for 3 days  Indications: COPD exacerbation 22 Tab 0    famotidine (PEPCID) 20 mg tablet Take 1 Tab by mouth two (2) times a day. Indications: PREVENTION OF STRESS ULCER 30 Tab 0    COMPOUNDMAX BASE crea 240 g by Does Not Apply route four (4) times daily. Anti-Inflam Meloxicam 0.09% Topirmate 1% Methocarbamol 2%  Lidocaine 2% Prilocaine 2% 240 g 3    CALCIUM CARBONATE (CALTRATE 600 PO) Take 1 Tab by mouth daily. Past History     Past Medical History:  Past Medical History:   Diagnosis Date    Angina     Anxiety     Arthritis     CAD (coronary artery disease)     s/p drug-eluting stents to RCA for high-grade stenoses in 2/09    Cardiac catheterization 08/24/2016    Mod calcification. Co-dom. oLM 50-70%. LAD 30%. Dx 30%. Cx 70% focal.  OM 80% focal.  RCA 30%.  Cardiac echocardiogram 08/23/2016    Tech difficult. EF 55-60%. No WMA. Normal diastolic fx. Lipomatous septal hypertrophy.  Cardiac nuclear imaging test, abnormal 08/23/2016    Sm reversible inferior defect concerning for ischemia. Mild inferior hypk. EF 68%. Neg EKG on pharm stress test.    Carotid duplex 08/26/2016    Mild <50% CYRUS stenosis. Chronic LICA occlusion. Similar to study of 3/29/16.     Carotid stenosis (HCC)     w/ known total occlusion of lt internal carotid artery; followed by pts vascular surgeon    Chronic kidney disease     [facility]ch[facility]     Dementia [facility]     Depression     Diabetes ([facility]     type 2    Dyslipidemia     on Crestor; managed by pts PCP    Dyspnea     Heart disease     Hypercholesterolemia     Hypertension     Low back pain     Osteoarthrosis involving multiple sites     Skin cancer ([facility]     squamous cell lesions of the skin    Stroke ([facility]     Venous (peripheral) insufficiency        Past Surgical History:  Past Surgical History:   Procedure Laterality Date    CARDIAC CATHETERIZATION  2016         CORONARY ARTERY ANGIOGRAM  2016         HX ANGIOPLASTY  2009    2 stents placed and heart attack during the procedure    HX COLONOSCOPY  10/2003    neg; declines f/u    HX CORONARY ARTERY BYPASS GRAFT  2016    HX HEART CATHETERIZATION  2013    HX HERNIA REPAIR      1970s    HX OTHER SURGICAL      groin surgery    HX TONSIL AND ADENOIDECTOMY      IR INTRAVASCULAR ULTRASOUND INITIAL  2016         LV ANGIOGRAPHY  2016            Family History:  Family History   Problem Relation Age of Onset    Parkinsonism Mother     Hypertension Mother     Cancer Father         lung    Heart defect Brother        Social History:  Social History     Tobacco Use    Smoking status: Former Smoker     Years: 2.00     Last attempt to quit: 1955     Years since quittin.8    Smokeless tobacco: Never Used   Substance Use Topics    Alcohol use: Yes     Comment: drinks weekly couple beers    Drug use: No       Allergies: Allergies   Allergen Reactions    Amaryl [Glimepiride] Drowsiness    Lipitor [Atorvastatin] Myalgia    Niacin Other (comments)     Facial blistering, swelling in face    Pravachol [Pravastatin] Myalgia    Zocor [Simvastatin] Myalgia         Review of Systems       Review of Systems   Constitutional: Negative for chills and fever. Eyes: Positive for discharge and redness. Cardiovascular: Positive for leg swelling. Gastrointestinal: Negative for abdominal pain, constipation and diarrhea. Genitourinary: Negative for dysuria and flank pain. Musculoskeletal: Positive for gait problem and neck pain. Negative for arthralgias and myalgias. Skin: Positive for rash. Negative for pallor. Neurological: Negative for dizziness and headaches. Psychiatric/Behavioral: Positive for confusion. Negative for agitation. Physical Exam     Visit Vitals  /71 (BP 1 Location: Right arm)   Pulse 64   Temp 97.4 °F (36.3 °C)   Resp 18   SpO2 97%         Physical Exam   Constitutional: He appears well-developed and well-nourished. No distress. HENT:   Head: Normocephalic and atraumatic. Mouth/Throat: Oropharynx is clear and moist.   Eyes: Conjunctivae and EOM are normal. No scleral icterus. Neck: Normal range of motion. Neck supple. No tracheal deviation present. Cardiovascular: Normal rate, normal heart sounds and intact distal pulses. Pulmonary/Chest: Effort normal and breath sounds normal.   Abdominal: Soft. Bowel sounds are normal.   Musculoskeletal: Normal range of motion. He exhibits edema. Neurological: He is alert. Skin: Skin is warm and dry. Rash (chronic erythematous dermatitis, noninfectious RLE, not warm, no drainage, nonfluctuant, nontender) noted. He is not diaphoretic. Diagnostic Study Results     Labs -  No results found for this or any previous visit (from the past 12 hour(s)). Radiologic Studies -   CT HEAD WO CONT   Final Result   IMPRESSION:                  1.  No acute intracranial process. 2.  Chronic small vessel ischemic changes. 3.  No significant interval change. CT SPINE CERV WO CONT   Final Result   IMPRESSION:      1. No acute fracture or subluxation. 2.  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   Combined with additional degenerative changes,   there is evidence of long segment central canal narrowing and multilevel neural   foraminal narrowing. No significant overall interval change. Medical Decision Making   I am the first provider for this patient. I reviewed the vital signs, available nursing notes, past medical history, past surgical history, family history and social history. Vital Signs-Reviewed the patient's vital signs. Records Reviewed: Old Medical Records (Time of Review: 10:20 PM)    ED Course: Progress Notes, Reevaluation, and Consults:     10:37 pm Discussed patient with SNF. Ordered CT Head and C-spine. Patient seen at bedside and resting comfortably. Provider Notes (Medical Decision Making): Fall, initial encounter  Most likely due to ataxic gait. Recommend fall precautions and increased supervision. MDM  82year old male with history of anxiety, OA, CAD s/p stent, CKD, COPD, Dementia, Depression, DM, HLD, HTN, PVD and CVA who presents with fall. Patient poor historian. Patient found lying on the floor in his room around 9 pm complaining of neck pain. Patient exam was unremarkable. CT Head and C spine showed no acute changes. Of note,  this is the sixth ED visit in the past ~6 months for fall. He will be discharged back to [facility]. Diagnosis     Clinical Impression:   1. Fall, initial encounter        Disposition: SNF    Follow-up Information     Follow up With Specialties Details Why Contact Info    [facility] EMERGENCY DEPT Emergency Medicine  If symptoms worsen [address] [place] [address] [phone]           Patient's Medications   Start Taking    No medications on file   Continue Taking    ACETAMINOPHEN (TYLENOL) 325 MG TABLET    Take 2 Tabs by mouth every four (4) hours as needed for Pain. ACETAMINOPHEN (TYLENOL) 500 MG TABLET    Take 1 Tab by mouth every six (6) hours as needed.  Indications: pain    ALBUTEROL-IPRATROPIUM (DUO-NEB) 2.5 MG-0.5 MG/3 ML NEBU    3 mL by Nebulization route every six (6) hours as needed. ALCOHOL SWABS (ALCOHOL PREP SWABS) PADM    Use as directed    ASPIRIN DELAYED-RELEASE 81 MG TABLET    Take 1 Tab by mouth daily. Indications: Cerebral Thromboembolism Prevention    BLOOD-GLUCOSE METER MONITORING KIT    Use as directed    CALCIUM CARBONATE (CALTRATE 600 PO)    Take 1 Tab by mouth daily. CIPROFLOXACIN HCL (CILOXAN) 0.3 % OPHTHALMIC SOLUTION    Administer 1 Drop to both eyes every four (4) hours (while awake). For 3 more days. End 3/29/2018  Indications: BACTERIAL CONJUNCTIVITIS    COMPOUNDMAX BASE CREA    240 g by Does Not Apply route four (4) times daily. Anti-Inflam Meloxicam 0.09% Topirmate 1% Methocarbamol 2%  Lidocaine 2% Prilocaine 2%    CYCLOSPORINE (RESTASIS) 0.05 % OPHTHALMIC EMULSION    Administer 1 Drop to both eyes every twelve (12) hours. Indications: Dry Eye    ERYTHROMYCIN (ILOTYCIN) OPHTHALMIC OINTMENT    Apply to both lower eyelids four times a day until done. FAMOTIDINE (PEPCID) 20 MG TABLET    Take 1 Tab by mouth two (2) times a day. Indications: PREVENTION OF STRESS ULCER    FUROSEMIDE (LASIX) 20 MG TABLET    Take 1 Tab by mouth daily. Indications: Edema    GABAPENTIN (NEURONTIN) 300 MG CAPSULE    Take 1 Cap by mouth daily. Indications: NEUROPATHIC PAIN, Restless Legs Syndrome    GUAIFENESIN (ROBITUSSIN) 100 MG/5 ML LIQUID    Take 5 mL by mouth every four (4) hours as needed for Cough. INSULIN GLARGINE (LANTUS SOLOSTAR U-100 INSULIN) 100 UNIT/ML (3 ML) INPN    35 Units by SubCUTAneous route nightly.  Indications: type 2 diabetes mellitus    INSULIN LISPRO (HUMALOG KWIKPEN INSULIN) 200 UNIT/ML (3 ML) INPN    For Blood Sugar (mg/dL) of:     Less than 150 =   0 units           150 -199 =   2 units  200 -249 =   4 units  250 -299 =   6 units  300 -349 =   8 units  350 and above =   10 units  Check BG then Inject insulin per SS 3 times daily before meals and at bedtime  Indications: type 2 diabetes mellitus    INSULIN NEEDLES, DISPOSABLE, (SHAWN PEN NEEDLE) 32 GAUGE X 5/32\" NDLE    Use as directed. ISOSORBIDE MONONITRATE ER (IMDUR) 30 MG TABLET    Take 1 Tab by mouth daily. LANCETS 23 GAUGE MISC    1 Box by Does Not Apply route Before breakfast, lunch, and dinner. Use as directed    LIDOCAINE (LIDODERM) 5 %    Apply patch to the affected area for 12 hours a day and remove for 12 hours a day. METOPROLOL SUCCINATE (TOPROL-XL) 50 MG XL TABLET    Take 1 Tab by mouth daily. Indications: hypertension    MULTIVIT-MIN-FA-LYCOPEN-LUTEIN (CENTRUM SILVER) 0.4-300-250 MG-MCG-MCG TAB    Take 1 tab daily    OSELTAMIVIR (TAMIFLU) 30 MG CAPSULE    Take 1 Cap by mouth two (2) times a day. Indications: INFLUENZA    OTHER    Incentive spirometry- Use as directed    PREDNISONE (DELTASONE) 10 MG TABLET    Take 4 tabs for 1 day, then 3 tabs for 3 days, then 2 tabs for 3 days, then 1 tab by mouth for 3 days  Indications: COPD exacerbation    QUETIAPINE (SEROQUEL) 25 MG TABLET    Take 1 Tab by mouth nightly. ROSUVASTATIN (CRESTOR) 10 MG TABLET    Take 1 Tab by mouth nightly. SOLIFENACIN (VESICARE) 5 MG TABLET    Take 1 Tab by mouth daily. Indications: Bladder Hyperactivity, INCREASED URINARY FREQUENCY, URINARY URGE INCONTINENCE, URINARY URGENCY    TEMAZEPAM (RESTORIL) 7.5 MG CAPSULE    Take 1 Cap by mouth nightly as needed for Sleep. Max Daily Amount: 7.5 mg. These Medications have changed    No medications on file   Stop Taking    No medications on file     Disclaimer: Sections of this note are dictated using utilizing voice recognition software. Minor typographical errors may be present. If questions arise, please do not hesitate to contact me or call our department.       Buddy Baldwin MD   [address] Medicine  PGY-2  11/06/19 11:48 PM  Pager: [phone]

## 2019-11-07 NOTE — ED NOTES
Discharge instructions phoned to [facility]. Gave report to [facility]. Report inculded results and discharge vital signs. Patient being taken to facility by [facility].

## 2019-11-07 NOTE — ED TRIAGE NOTES
Pateint brought in by medic from [facility] after slipping from wheelchair to carpeted floor. Per medic pt's only complaint is neck pain per medic.      On arrival pt alert to person only

## 2019-12-09 ENCOUNTER — HOSPITAL ENCOUNTER (INPATIENT)
Age: 84
LOS: 3 days | Discharge: HOME | DRG: 312 | End: 2019-12-12
Attending: EMERGENCY MEDICINE | Admitting: HOSPITALIST
Payer: MEDICARE

## 2019-12-09 ENCOUNTER — APPOINTMENT (OUTPATIENT)
Dept: GENERAL RADIOLOGY | Age: 84
DRG: 312 | End: 2019-12-09
Attending: EMERGENCY MEDICINE
Payer: MEDICARE

## 2019-12-09 ENCOUNTER — APPOINTMENT (OUTPATIENT)
Dept: CT IMAGING | Age: 84
DRG: 312 | End: 2019-12-09
Attending: EMERGENCY MEDICINE
Payer: MEDICARE

## 2019-12-09 DIAGNOSIS — R56.9 NEW ONSET SEIZURE (HCC): Primary | ICD-10-CM

## 2019-12-09 LAB
ALBUMIN SERPL-MCNC: 4.2 G/DL (ref 3.4–5)
ALBUMIN/GLOB SERPL: 1.1 {RATIO} (ref 0.8–1.7)
ALP SERPL-CCNC: 162 U/L (ref 45–117)
ALT SERPL-CCNC: 31 U/L (ref 16–61)
AMMONIA PLAS-SCNC: 13 UMOL/L (ref 11–32)
ANION GAP SERPL CALC-SCNC: 5 MMOL/L (ref 3–18)
APPEARANCE UR: CLEAR
AST SERPL-CCNC: 23 U/L (ref 10–38)
BASOPHILS # BLD: 0.1 K/UL (ref 0–0.06)
BASOPHILS NFR BLD: 1 % (ref 0–3)
BILIRUB SERPL-MCNC: 1 MG/DL (ref 0.2–1)
BILIRUB UR QL: NEGATIVE
BUN SERPL-MCNC: 36 MG/DL (ref 7–18)
BUN/CREAT SERPL: 17 (ref 12–20)
CALCIUM SERPL-MCNC: 9.4 MG/DL (ref 8.5–10.1)
CHLORIDE SERPL-SCNC: 94 MMOL/L (ref 100–111)
CK MB CFR SERPL CALC: NORMAL % (ref 0–4)
CK MB SERPL-MCNC: <1 NG/ML (ref 5–25)
CK SERPL-CCNC: 102 U/L (ref 39–308)
CO2 SERPL-SCNC: 37 MMOL/L (ref 21–32)
COLOR UR: YELLOW
CREAT SERPL-MCNC: 2.15 MG/DL (ref 0.6–1.3)
DIFFERENTIAL METHOD BLD: ABNORMAL
EOSINOPHIL # BLD: 0.2 K/UL (ref 0–0.4)
EOSINOPHIL NFR BLD: 2 % (ref 0–5)
ERYTHROCYTE [DISTWIDTH] IN BLOOD BY AUTOMATED COUNT: 13.5 % (ref 11.6–14.5)
GLOBULIN SER CALC-MCNC: 3.9 G/DL (ref 2–4)
GLUCOSE BLD STRIP.AUTO-MCNC: 128 MG/DL (ref 70–110)
GLUCOSE SERPL-MCNC: 195 MG/DL (ref 74–99)
GLUCOSE UR STRIP.AUTO-MCNC: 250 MG/DL
HCT VFR BLD AUTO: 55.5 % (ref 36–48)
HGB BLD-MCNC: 18.8 G/DL (ref 13–16)
HGB UR QL STRIP: NEGATIVE
KETONES UR QL STRIP.AUTO: NEGATIVE MG/DL
LEUKOCYTE ESTERASE UR QL STRIP.AUTO: NEGATIVE
LYMPHOCYTES # BLD: 2.7 K/UL (ref 0.8–3.5)
LYMPHOCYTES NFR BLD: 30 % (ref 20–51)
MCH RBC QN AUTO: 29.9 PG (ref 24–34)
MCHC RBC AUTO-ENTMCNC: 33.9 G/DL (ref 31–37)
MCV RBC AUTO: 88.4 FL (ref 74–97)
MONOCYTES # BLD: 0.6 K/UL (ref 0–1)
MONOCYTES NFR BLD: 7 % (ref 2–9)
NEUTS SEG # BLD: 5.5 K/UL (ref 1.8–8)
NEUTS SEG NFR BLD: 60 % (ref 42–75)
NITRITE UR QL STRIP.AUTO: NEGATIVE
PH UR STRIP: 5 [PH] (ref 5–8)
PLATELET # BLD AUTO: 196 K/UL (ref 135–420)
PLATELET COMMENTS,PCOM: ABNORMAL
PMV BLD AUTO: 11.1 FL (ref 9.2–11.8)
POTASSIUM SERPL-SCNC: 3.4 MMOL/L (ref 3.5–5.5)
PROT SERPL-MCNC: 8.1 G/DL (ref 6.4–8.2)
PROT UR STRIP-MCNC: NEGATIVE MG/DL
RBC # BLD AUTO: 6.28 M/UL (ref 4.7–5.5)
RBC MORPH BLD: ABNORMAL
SODIUM SERPL-SCNC: 136 MMOL/L (ref 136–145)
SP GR UR REFRACTOMETRY: 1.02 (ref 1–1.03)
TROPONIN I SERPL-MCNC: <0.02 NG/ML (ref 0–0.04)
TSH SERPL DL<=0.05 MIU/L-ACNC: 0.95 UIU/ML (ref 0.36–3.74)
UROBILINOGEN UR QL STRIP.AUTO: 1 EU/DL (ref 0.2–1)
WBC # BLD AUTO: 9.1 K/UL (ref 4.6–13.2)

## 2019-12-09 PROCEDURE — 87086 URINE CULTURE/COLONY COUNT: CPT

## 2019-12-09 PROCEDURE — 71045 X-RAY EXAM CHEST 1 VIEW: CPT

## 2019-12-09 PROCEDURE — 99285 EMERGENCY DEPT VISIT HI MDM: CPT

## 2019-12-09 PROCEDURE — 82550 ASSAY OF CK (CPK): CPT

## 2019-12-09 PROCEDURE — 70450 CT HEAD/BRAIN W/O DYE: CPT

## 2019-12-09 PROCEDURE — 80053 COMPREHEN METABOLIC PANEL: CPT

## 2019-12-09 PROCEDURE — 84443 ASSAY THYROID STIM HORMONE: CPT

## 2019-12-09 PROCEDURE — 82962 GLUCOSE BLOOD TEST: CPT

## 2019-12-09 PROCEDURE — 87186 SC STD MICRODIL/AGAR DIL: CPT

## 2019-12-09 PROCEDURE — 65660000000 HC RM CCU STEPDOWN

## 2019-12-09 PROCEDURE — 81003 URINALYSIS AUTO W/O SCOPE: CPT

## 2019-12-09 PROCEDURE — 87077 CULTURE AEROBIC IDENTIFY: CPT

## 2019-12-09 PROCEDURE — 82140 ASSAY OF AMMONIA: CPT

## 2019-12-09 PROCEDURE — 87070 CULTURE OTHR SPECIMN AEROBIC: CPT

## 2019-12-09 PROCEDURE — 85025 COMPLETE CBC W/AUTO DIFF WBC: CPT

## 2019-12-09 PROCEDURE — 65270000029 HC RM PRIVATE

## 2019-12-09 PROCEDURE — 93005 ELECTROCARDIOGRAM TRACING: CPT

## 2019-12-09 RX ORDER — HEPARIN SODIUM 5000 [USP'U]/ML
5000 INJECTION, SOLUTION INTRAVENOUS; SUBCUTANEOUS EVERY 8 HOURS
Status: DISCONTINUED | OUTPATIENT
Start: 2019-12-09 | End: 2019-12-12 | Stop reason: HOSPADM

## 2019-12-09 RX ORDER — SODIUM CHLORIDE 0.9 % (FLUSH) 0.9 %
5-40 SYRINGE (ML) INJECTION AS NEEDED
Status: DISCONTINUED | OUTPATIENT
Start: 2019-12-09 | End: 2019-12-12 | Stop reason: HOSPADM

## 2019-12-09 RX ORDER — HEPARIN SODIUM 5000 [USP'U]/ML
5000 INJECTION, SOLUTION INTRAVENOUS; SUBCUTANEOUS EVERY 8 HOURS
Status: DISCONTINUED | OUTPATIENT
Start: 2019-12-09 | End: 2019-12-09

## 2019-12-09 RX ORDER — SODIUM CHLORIDE 0.9 % (FLUSH) 0.9 %
5-40 SYRINGE (ML) INJECTION EVERY 8 HOURS
Status: DISCONTINUED | OUTPATIENT
Start: 2019-12-09 | End: 2019-12-12 | Stop reason: HOSPADM

## 2019-12-09 NOTE — ED TRIAGE NOTES
Pt arrived via [facility] after finding pt unresponsive.  Per staff last known well was between 4-36 hours

## 2019-12-09 NOTE — ED PROVIDER NOTES
EMERGENCY DEPARTMENT HISTORY AND PHYSICAL EXAM  This was created with voice recognition software and transcription errors may be present. 6:19 PM  Date: 12/9/2019  Patient Name: Vanesa Esparza. History of Presenting Illness     Chief Complaint:    History Provided By:     HPI: Vanesa Esparza. is a 82 y.o. male with a past medical history of angina anxiety coronary disease carotid stenosis COPD dementia depression diabetes heart disease stroke who comes from the memory care unit for altered mental status. Patient's last known well time is sometime yesterday there was a note around 4:00 today stating he was normal but at the same time there is a note stating he was altered somewhat unreliable. Patient seen on arrival vitals are remarkably normal except for hypertension blood glucose 270s    PCP: None      Past History     Past Medical History:  Past Medical History:   Diagnosis Date    Angina     Anxiety     Arthritis     CAD (coronary artery disease)     s/p drug-eluting stents to RCA for high-grade stenoses in 2/09    Cardiac catheterization 08/24/2016    Mod calcification. Co-dom. oLM 50-70%. LAD 30%. Dx 30%. Cx 70% focal.  OM 80% focal.  RCA 30%.  Cardiac echocardiogram 08/23/2016    Tech difficult. EF 55-60%. No WMA. Normal diastolic fx. Lipomatous septal hypertrophy.  Cardiac nuclear imaging test, abnormal 08/23/2016    Sm reversible inferior defect concerning for ischemia. Mild inferior hypk. EF 68%. Neg EKG on pharm stress test.    Carotid duplex 08/26/2016    Mild <50% CYRUS stenosis. Chronic LICA occlusion. Similar to study of 3/29/16.     Carotid stenosis (HCC)     w/ known total occlusion of lt internal carotid artery; followed by pts vascular surgeon    Chronic kidney disease     COPD (chronic obstructive pulmonary disease) ([facility]     Dementia ([facility]     Depression     Diabetes ([facility]     type 2    Dyslipidemia     on Crestor; managed by pts PCP    Dyspnea  Heart disease     Hypercholesterolemia     Hypertension     Low back pain     Osteoarthrosis involving multiple sites     Skin cancer (HCC)     squamous cell lesions of the skin    [facility]     Venous (peripheral) insufficiency        Past Surgical History:  Past Surgical History:   Procedure Laterality Date    CARDIAC CATHETERIZATION  2016         CORONARY ARTERY ANGIOGRAM  2016         HX ANGIOPLASTY  2009    2 stents placed and heart attack during the procedure    HX COLONOSCOPY  10/2003    neg; declines f/u    HX CORONARY ARTERY BYPASS GRAFT  2016    HX HEART CATHETERIZATION  2013    HX HERNIA REPAIR      1970s    HX OTHER SURGICAL      groin surgery    HX TONSIL AND ADENOIDECTOMY      IR INTRAVASCULAR ULTRASOUND INITIAL  2016         LV ANGIOGRAPHY  2016            Family History:  Family History   Problem Relation Age of Onset    Parkinsonism Mother     Hypertension Mother     Cancer Father         lung    Heart defect Brother        Social History:  Social History     Tobacco Use    Smoking status: Former Smoker     Years: 2.00     Last attempt to quit: 1955     Years since quittin.9    Smokeless tobacco: Never Used   Substance Use Topics    Alcohol use: Yes     Comment: drinks weekly couple beers    Drug use: No       Allergies: Allergies   Allergen Reactions    Amaryl [Glimepiride] Drowsiness    Lipitor [Atorvastatin] Myalgia    Niacin Other (comments)     Facial blistering, swelling in face    Pravachol [Pravastatin] Myalgia    Zocor [Simvastatin] Myalgia       Review of Systems     Review of Systems   Constitutional: Negative for chills and fever. HENT: Negative for congestion. Respiratory: Negative for apnea, cough and choking. Cardiovascular: Negative for chest pain. All other systems reviewed and are negative. 10 point review of systems otherwise negative unless noted in HPI.     Physical Exam       Physical Exam  Constitutional:       Appearance: He is well-developed. Comments: Pinpoint pupils; Eyes moving/tracknig  Awake but unresponsive. HENT:      Head: Normocephalic and atraumatic. Neck:      Musculoskeletal: Normal range of motion and neck supple. Cardiovascular:      Rate and Rhythm: Normal rate and regular rhythm. Heart sounds: Normal heart sounds. No murmur. No friction rub. Pulmonary:      Effort: Pulmonary effort is normal. No respiratory distress. Breath sounds: Normal breath sounds. No wheezing. Abdominal:      General: There is no distension. Palpations: Abdomen is soft. Tenderness: There is no tenderness. There is no guarding or rebound. Musculoskeletal: Normal range of motion. Skin:     General: Skin is warm and dry. Neurological:      Mental Status: He is oriented to person, place, and time. Psychiatric:         Behavior: Behavior normal.         Thought Content: Thought content normal.         Diagnostic Study Results     Vital Signs Blood pressure (!) 150/91, pulse 69, temperature 97.7 °F (36.5 °C), resp. rate 14, SpO2 97 %. EKG: EKG shows sinus at 70 with normal axis normal intervals there is no ST elevation or depression no hypertrophy  Labs: Hemoglobin is 18 and 55 this is chronically elevated chemistry shows a mild ANNA otherwise unremarkable, bicarb is elevated at baseline  Imaging: ct head neg    Medical Decision Making     ED Course: Progress Notes, Reevaluation, and Consults:      Provider Notes (Medical Decision Making):     8:10 PM patient is now awake and alert he does not remember getting here. Initially he was unresponsive pinpoint pupils his eyes tracks some but he was not following commands he was laying in bed staring up at the ceiling. He is now awake and alert and talking. He did have a mild tremor in his right arm it may have been a seizure. Discussed with tele-neurology who agreed that the most likely etiology.   They recommend admission for EEG and MRI and no anticonvulsants at this time       Diagnosis     Clinical Impression: No diagnosis found. Disposition:    Patient's Medications   Start Taking    No medications on file   Continue Taking    ACETAMINOPHEN (TYLENOL) 325 MG TABLET    Take 2 Tabs by mouth every four (4) hours as needed for Pain. ACETAMINOPHEN (TYLENOL) 500 MG TABLET    Take 1 Tab by mouth every six (6) hours as needed. Indications: pain    ALBUTEROL-IPRATROPIUM (DUO-NEB) 2.5 MG-0.5 MG/3 ML NEBU    3 mL by Nebulization route every six (6) hours as needed. ALCOHOL SWABS (ALCOHOL PREP SWABS) PADM    Use as directed    ASPIRIN DELAYED-RELEASE 81 MG TABLET    Take 1 Tab by mouth daily. Indications: Cerebral Thromboembolism Prevention    BLOOD-GLUCOSE METER MONITORING KIT    Use as directed    CALCIUM CARBONATE (CALTRATE 600 PO)    Take 1 Tab by mouth daily. CIPROFLOXACIN HCL (CILOXAN) 0.3 % OPHTHALMIC SOLUTION    Administer 1 Drop to both eyes every four (4) hours (while awake). For 3 more days. End 3/29/2018  Indications: BACTERIAL CONJUNCTIVITIS    COMPOUNDMAX BASE CREA    240 g by Does Not Apply route four (4) times daily. Anti-Inflam Meloxicam 0.09% Topirmate 1% Methocarbamol 2%  Lidocaine 2% Prilocaine 2%    CYCLOSPORINE (RESTASIS) 0.05 % OPHTHALMIC EMULSION    Administer 1 Drop to both eyes every twelve (12) hours. Indications: Dry Eye    ERYTHROMYCIN (ILOTYCIN) OPHTHALMIC OINTMENT    Apply to both lower eyelids four times a day until done. FAMOTIDINE (PEPCID) 20 MG TABLET    Take 1 Tab by mouth two (2) times a day. Indications: PREVENTION OF STRESS ULCER    FUROSEMIDE (LASIX) 20 MG TABLET    Take 1 Tab by mouth daily. Indications: Edema    GABAPENTIN (NEURONTIN) 300 MG CAPSULE    Take 1 Cap by mouth daily. Indications: NEUROPATHIC PAIN, Restless Legs Syndrome    GUAIFENESIN (ROBITUSSIN) 100 MG/5 ML LIQUID    Take 5 mL by mouth every four (4) hours as needed for Cough.     INSULIN GLARGINE (LANTUS SOLOSTAR U-100 INSULIN) 100 UNIT/ML (3 ML) INPN    35 Units by SubCUTAneous route nightly. Indications: type 2 diabetes mellitus    INSULIN LISPRO (HUMALOG KWIKPEN INSULIN) 200 UNIT/ML (3 ML) INPN    For Blood Sugar (mg/dL) of:     Less than 150 =   0 units           150 -199 =   2 units  200 -249 =   4 units  250 -299 =   6 units  300 -349 =   8 units  350 and above =   10 units  Check BG then Inject insulin per SS 3 times daily before meals and at bedtime  Indications: type 2 diabetes mellitus    INSULIN NEEDLES, DISPOSABLE, (SHAWN PEN NEEDLE) 32 GAUGE X 5/32\" NDLE    Use as directed. ISOSORBIDE MONONITRATE ER (IMDUR) 30 MG TABLET    Take 1 Tab by mouth daily. LANCETS 23 GAUGE MISC    1 Box by Does Not Apply route Before breakfast, lunch, and dinner. Use as directed    LIDOCAINE (LIDODERM) 5 %    Apply patch to the affected area for 12 hours a day and remove for 12 hours a day. METOPROLOL SUCCINATE (TOPROL-XL) 50 MG XL TABLET    Take 1 Tab by mouth daily. Indications: hypertension    MULTIVIT-MIN-FA-LYCOPEN-LUTEIN (CENTRUM SILVER) 0.4-300-250 MG-MCG-MCG TAB    Take 1 tab daily    OSELTAMIVIR (TAMIFLU) 30 MG CAPSULE    Take 1 Cap by mouth two (2) times a day. Indications: INFLUENZA    OTHER    Incentive spirometry- Use as directed    PREDNISONE (DELTASONE) 10 MG TABLET    Take 4 tabs for 1 day, then 3 tabs for 3 days, then 2 tabs for 3 days, then 1 tab by mouth for 3 days  Indications: COPD exacerbation    QUETIAPINE (SEROQUEL) 25 MG TABLET    Take 1 Tab by mouth nightly. ROSUVASTATIN (CRESTOR) 10 MG TABLET    Take 1 Tab by mouth nightly. SOLIFENACIN (VESICARE) 5 MG TABLET    Take 1 Tab by mouth daily. Indications: Bladder Hyperactivity, INCREASED URINARY FREQUENCY, URINARY URGE INCONTINENCE, URINARY URGENCY    TEMAZEPAM (RESTORIL) 7.5 MG CAPSULE    Take 1 Cap by mouth nightly as needed for Sleep. Max Daily Amount: 7.5 mg.    These Medications have changed    No medications on file   Stop Taking    No medications on file

## 2019-12-10 ENCOUNTER — APPOINTMENT (OUTPATIENT)
Dept: MRI IMAGING | Age: 84
DRG: 312 | End: 2019-12-10
Attending: HOSPITALIST
Payer: MEDICARE

## 2019-12-10 ENCOUNTER — APPOINTMENT (OUTPATIENT)
Dept: GENERAL RADIOLOGY | Age: 84
DRG: 312 | End: 2019-12-10
Attending: HOSPITALIST
Payer: MEDICARE

## 2019-12-10 PROBLEM — S02.2XXS FRACTURE OF NASAL BONES, SEQUELA: Status: ACTIVE | Noted: 2019-12-10

## 2019-12-10 PROBLEM — L03.115 CELLULITIS OF RIGHT LOWER EXTREMITY: Status: ACTIVE | Noted: 2017-10-24

## 2019-12-10 PROBLEM — J34.2 DEVIATED NASAL SEPTUM: Status: ACTIVE | Noted: 2017-06-28

## 2019-12-10 PROBLEM — D75.1 POLYCYTHEMIA: Status: ACTIVE | Noted: 2019-12-09

## 2019-12-10 LAB
ANION GAP SERPL CALC-SCNC: 7 MMOL/L (ref 3–18)
ATRIAL RATE: 70 BPM
BASOPHILS # BLD: 0.1 K/UL (ref 0–0.1)
BASOPHILS NFR BLD: 1 % (ref 0–2)
BUN SERPL-MCNC: 32 MG/DL (ref 7–18)
BUN/CREAT SERPL: 17 (ref 12–20)
CALCIUM SERPL-MCNC: 9.2 MG/DL (ref 8.5–10.1)
CALCULATED P AXIS, ECG09: 65 DEGREES
CALCULATED R AXIS, ECG10: 47 DEGREES
CALCULATED T AXIS, ECG11: 59 DEGREES
CHLORIDE SERPL-SCNC: 94 MMOL/L (ref 100–111)
CK MB CFR SERPL CALC: NORMAL % (ref 0–4)
CK MB SERPL-MCNC: <1 NG/ML (ref 5–25)
CK SERPL-CCNC: 156 U/L (ref 39–308)
CO2 SERPL-SCNC: 37 MMOL/L (ref 21–32)
CREAT SERPL-MCNC: 1.87 MG/DL (ref 0.6–1.3)
DIAGNOSIS, 93000: NORMAL
DIFFERENTIAL METHOD BLD: ABNORMAL
EOSINOPHIL # BLD: 0.3 K/UL (ref 0–0.4)
EOSINOPHIL NFR BLD: 3 % (ref 0–5)
ERYTHROCYTE [DISTWIDTH] IN BLOOD BY AUTOMATED COUNT: 13.4 % (ref 11.6–14.5)
EST. AVERAGE GLUCOSE BLD GHB EST-MCNC: 177 MG/DL
GLUCOSE BLD STRIP.AUTO-MCNC: 175 MG/DL (ref 70–110)
GLUCOSE BLD STRIP.AUTO-MCNC: 210 MG/DL (ref 70–110)
GLUCOSE SERPL-MCNC: 253 MG/DL (ref 74–99)
HBA1C MFR BLD: 7.8 % (ref 4.2–5.6)
HCT VFR BLD AUTO: 56 % (ref 36–48)
HGB BLD-MCNC: 18.4 G/DL (ref 13–16)
LACTATE SERPL-SCNC: 2.7 MMOL/L (ref 0.4–2)
LYMPHOCYTES # BLD: 2.5 K/UL (ref 0.9–3.6)
LYMPHOCYTES NFR BLD: 28 % (ref 21–52)
MAGNESIUM SERPL-MCNC: 2.5 MG/DL (ref 1.6–2.6)
MCH RBC QN AUTO: 29.3 PG (ref 24–34)
MCHC RBC AUTO-ENTMCNC: 32.9 G/DL (ref 31–37)
MCV RBC AUTO: 89.3 FL (ref 74–97)
MONOCYTES # BLD: 1.1 K/UL (ref 0.05–1.2)
MONOCYTES NFR BLD: 12 % (ref 3–10)
NEUTS SEG # BLD: 4.9 K/UL (ref 1.8–8)
NEUTS SEG NFR BLD: 56 % (ref 40–73)
P-R INTERVAL, ECG05: 168 MS
PHOSPHATE SERPL-MCNC: 2.5 MG/DL (ref 2.5–4.9)
PLATELET # BLD AUTO: 206 K/UL (ref 135–420)
PMV BLD AUTO: 11.1 FL (ref 9.2–11.8)
POTASSIUM SERPL-SCNC: 3.4 MMOL/L (ref 3.5–5.5)
Q-T INTERVAL, ECG07: 414 MS
QRS DURATION, ECG06: 98 MS
QTC CALCULATION (BEZET), ECG08: 447 MS
RBC # BLD AUTO: 6.27 M/UL (ref 4.7–5.5)
SODIUM SERPL-SCNC: 138 MMOL/L (ref 136–145)
TROPONIN I SERPL-MCNC: <0.02 NG/ML (ref 0–0.04)
VENTRICULAR RATE, ECG03: 70 BPM
WBC # BLD AUTO: 8.8 K/UL (ref 4.6–13.2)

## 2019-12-10 PROCEDURE — 74011636637 HC RX REV CODE- 636/637: Performed by: HOSPITALIST

## 2019-12-10 PROCEDURE — 74011250636 HC RX REV CODE- 250/636: Performed by: HOSPITALIST

## 2019-12-10 PROCEDURE — 82962 GLUCOSE BLOOD TEST: CPT

## 2019-12-10 PROCEDURE — 83735 ASSAY OF MAGNESIUM: CPT

## 2019-12-10 PROCEDURE — 36415 COLL VENOUS BLD VENIPUNCTURE: CPT

## 2019-12-10 PROCEDURE — 83036 HEMOGLOBIN GLYCOSYLATED A1C: CPT

## 2019-12-10 PROCEDURE — 70551 MRI BRAIN STEM W/O DYE: CPT

## 2019-12-10 PROCEDURE — 65660000000 HC RM CCU STEPDOWN

## 2019-12-10 PROCEDURE — 74018 RADEX ABDOMEN 1 VIEW: CPT

## 2019-12-10 PROCEDURE — 74011000250 HC RX REV CODE- 250: Performed by: HOSPITALIST

## 2019-12-10 PROCEDURE — 82550 ASSAY OF CK (CPK): CPT

## 2019-12-10 PROCEDURE — 74011250637 HC RX REV CODE- 250/637: Performed by: HOSPITALIST

## 2019-12-10 PROCEDURE — 85025 COMPLETE CBC W/AUTO DIFF WBC: CPT

## 2019-12-10 PROCEDURE — 80048 BASIC METABOLIC PNL TOTAL CA: CPT

## 2019-12-10 PROCEDURE — 84100 ASSAY OF PHOSPHORUS: CPT

## 2019-12-10 PROCEDURE — 70030 X-RAY EYE FOR FOREIGN BODY: CPT

## 2019-12-10 PROCEDURE — 95816 EEG AWAKE AND DROWSY: CPT | Performed by: PSYCHIATRY & NEUROLOGY

## 2019-12-10 PROCEDURE — 83605 ASSAY OF LACTIC ACID: CPT

## 2019-12-10 RX ORDER — FAMOTIDINE 20 MG/1
20 TABLET, FILM COATED ORAL DAILY
Status: DISCONTINUED | OUTPATIENT
Start: 2019-12-10 | End: 2019-12-12 | Stop reason: HOSPADM

## 2019-12-10 RX ORDER — ONDANSETRON 2 MG/ML
4 INJECTION INTRAMUSCULAR; INTRAVENOUS
Status: DISCONTINUED | OUTPATIENT
Start: 2019-12-10 | End: 2019-12-12 | Stop reason: HOSPADM

## 2019-12-10 RX ORDER — VANCOMYCIN HYDROCHLORIDE
1250
Status: DISCONTINUED | OUTPATIENT
Start: 2019-12-12 | End: 2019-12-12 | Stop reason: HOSPADM

## 2019-12-10 RX ORDER — INSULIN LISPRO 100 [IU]/ML
INJECTION, SOLUTION INTRAVENOUS; SUBCUTANEOUS
Status: DISCONTINUED | OUTPATIENT
Start: 2019-12-10 | End: 2019-12-12 | Stop reason: HOSPADM

## 2019-12-10 RX ORDER — FUROSEMIDE 20 MG/1
20 TABLET ORAL DAILY
Status: DISCONTINUED | OUTPATIENT
Start: 2019-12-10 | End: 2019-12-12 | Stop reason: HOSPADM

## 2019-12-10 RX ORDER — HYDRALAZINE HYDROCHLORIDE 20 MG/ML
10 INJECTION INTRAMUSCULAR; INTRAVENOUS
Status: DISCONTINUED | OUTPATIENT
Start: 2019-12-10 | End: 2019-12-12 | Stop reason: HOSPADM

## 2019-12-10 RX ORDER — POTASSIUM CHLORIDE 20 MEQ/1
20 TABLET, EXTENDED RELEASE ORAL 2 TIMES DAILY
Status: DISCONTINUED | OUTPATIENT
Start: 2019-12-10 | End: 2019-12-11

## 2019-12-10 RX ORDER — ERYTHROMYCIN 5 MG/G
OINTMENT OPHTHALMIC EVERY 8 HOURS
Status: DISCONTINUED | OUTPATIENT
Start: 2019-12-10 | End: 2019-12-12 | Stop reason: HOSPADM

## 2019-12-10 RX ORDER — DEXTROSE 50 % IN WATER (D50W) INTRAVENOUS SYRINGE
25-50 AS NEEDED
Status: DISCONTINUED | OUTPATIENT
Start: 2019-12-10 | End: 2019-12-12 | Stop reason: HOSPADM

## 2019-12-10 RX ORDER — ACETAMINOPHEN 325 MG/1
650 TABLET ORAL
Status: DISCONTINUED | OUTPATIENT
Start: 2019-12-10 | End: 2019-12-12 | Stop reason: HOSPADM

## 2019-12-10 RX ORDER — EZETIMIBE 10 MG/1
1 TABLET ORAL
Status: ON HOLD | COMMUNITY
Start: 2019-10-17 | End: 2021-05-26 | Stop reason: SDUPTHER

## 2019-12-10 RX ORDER — DIPHENHYDRAMINE HYDROCHLORIDE 50 MG/ML
25 INJECTION, SOLUTION INTRAMUSCULAR; INTRAVENOUS
Status: DISCONTINUED | OUTPATIENT
Start: 2019-12-10 | End: 2019-12-12 | Stop reason: HOSPADM

## 2019-12-10 RX ORDER — ISOSORBIDE MONONITRATE 30 MG/1
30 TABLET, EXTENDED RELEASE ORAL DAILY
Status: DISCONTINUED | OUTPATIENT
Start: 2019-12-10 | End: 2019-12-12 | Stop reason: HOSPADM

## 2019-12-10 RX ORDER — GABAPENTIN 300 MG/1
300 CAPSULE ORAL DAILY
Status: DISCONTINUED | OUTPATIENT
Start: 2019-12-10 | End: 2019-12-12 | Stop reason: HOSPADM

## 2019-12-10 RX ORDER — EZETIMIBE 10 MG/1
10 TABLET ORAL
Status: DISCONTINUED | OUTPATIENT
Start: 2019-12-10 | End: 2019-12-12 | Stop reason: HOSPADM

## 2019-12-10 RX ORDER — CYCLOSPORINE 0.5 MG/ML
1 EMULSION OPHTHALMIC EVERY 12 HOURS
Status: DISCONTINUED | OUTPATIENT
Start: 2019-12-10 | End: 2019-12-12 | Stop reason: HOSPADM

## 2019-12-10 RX ORDER — VANCOMYCIN 2 GRAM/500 ML IN 0.9 % SODIUM CHLORIDE INTRAVENOUS
2000 ONCE
Status: COMPLETED | OUTPATIENT
Start: 2019-12-10 | End: 2019-12-11

## 2019-12-10 RX ORDER — MAGNESIUM SULFATE 100 %
4 CRYSTALS MISCELLANEOUS AS NEEDED
Status: DISCONTINUED | OUTPATIENT
Start: 2019-12-10 | End: 2019-12-12 | Stop reason: HOSPADM

## 2019-12-10 RX ORDER — SODIUM CHLORIDE 9 MG/ML
75 INJECTION, SOLUTION INTRAVENOUS CONTINUOUS
Status: DISCONTINUED | OUTPATIENT
Start: 2019-12-10 | End: 2019-12-12 | Stop reason: HOSPADM

## 2019-12-10 RX ADMIN — INSULIN LISPRO 4 UNITS: 100 INJECTION, SOLUTION INTRAVENOUS; SUBCUTANEOUS at 16:42

## 2019-12-10 RX ADMIN — CEFTRIAXONE SODIUM 1 G: 1 INJECTION, POWDER, FOR SOLUTION INTRAMUSCULAR; INTRAVENOUS at 16:43

## 2019-12-10 RX ADMIN — HEPARIN SODIUM 5000 UNITS: 5000 INJECTION INTRAVENOUS; SUBCUTANEOUS at 23:32

## 2019-12-10 RX ADMIN — ONDANSETRON 4 MG: 2 INJECTION INTRAMUSCULAR; INTRAVENOUS at 23:01

## 2019-12-10 RX ADMIN — HEPARIN SODIUM 5000 UNITS: 5000 INJECTION INTRAVENOUS; SUBCUTANEOUS at 16:52

## 2019-12-10 RX ADMIN — POTASSIUM CHLORIDE 20 MEQ: 1500 TABLET, EXTENDED RELEASE ORAL at 16:43

## 2019-12-10 RX ADMIN — ISOSORBIDE MONONITRATE 30 MG: 30 TABLET, EXTENDED RELEASE ORAL at 16:43

## 2019-12-10 RX ADMIN — Medication 10 ML: at 05:57

## 2019-12-10 RX ADMIN — GABAPENTIN 300 MG: 300 CAPSULE ORAL at 16:43

## 2019-12-10 RX ADMIN — MULTIPLE VITAMINS W/ MINERALS TAB 1 TABLET: TAB at 16:43

## 2019-12-10 RX ADMIN — FUROSEMIDE 20 MG: 20 TABLET ORAL at 16:43

## 2019-12-10 RX ADMIN — HYDRALAZINE HYDROCHLORIDE 10 MG: 20 INJECTION INTRAMUSCULAR; INTRAVENOUS at 21:07

## 2019-12-10 RX ADMIN — ERYTHROMYCIN: 5 OINTMENT OPHTHALMIC at 21:39

## 2019-12-10 RX ADMIN — EZETIMIBE 10 MG: 10 TABLET ORAL at 21:37

## 2019-12-10 RX ADMIN — Medication 10 ML: at 21:49

## 2019-12-10 RX ADMIN — VANCOMYCIN HYDROCHLORIDE 2000 MG: 10 INJECTION, POWDER, LYOPHILIZED, FOR SOLUTION INTRAVENOUS at 16:47

## 2019-12-10 RX ADMIN — INSULIN LISPRO 2 UNITS: 100 INJECTION, SOLUTION INTRAVENOUS; SUBCUTANEOUS at 21:37

## 2019-12-10 RX ADMIN — Medication 10 ML: at 16:44

## 2019-12-10 RX ADMIN — CEFTRIAXONE SODIUM 1 G: 1 INJECTION, POWDER, FOR SOLUTION INTRAMUSCULAR; INTRAVENOUS at 23:32

## 2019-12-10 RX ADMIN — CYCLOSPORINE 1 DROP: 0.5 EMULSION OPHTHALMIC at 21:39

## 2019-12-10 RX ADMIN — SODIUM CHLORIDE 75 ML/HR: 900 INJECTION, SOLUTION INTRAVENOUS at 16:41

## 2019-12-10 RX ADMIN — FAMOTIDINE 20 MG: 20 TABLET ORAL at 16:43

## 2019-12-10 NOTE — PROGRESS NOTES
MRI Screening form needs to be filled out and faxed to [phone] MRI can be scheduled. If unable to obtain information from pt, [facility] needs to be contacted.  If pt is claustro or will need pain meds, please have ordered in advance in order to facilitate exam.

## 2019-12-10 NOTE — CONSULTS
Consult acknowledged, undergoing MRI brain and EEG this afternoon, will do full consultation after review in am.

## 2019-12-10 NOTE — ROUTINE PROCESS
Bedside and Verbal shift change report given to Jessica Elias RN (oncoming nurse) by Maren De Los Santos RN (offgoing nurse). Report included the following information SBAR, Kardex and Recent Results.

## 2019-12-10 NOTE — DIABETES MGMT
GLYCEMIC CONTROL PLAN OF CARE    Assessment/Recommendations:  Blood glucose elevated above targets  Consider addition of lantus insulin 15 units daily   Addition of no concentrated sweets to current cardiac diet    Most recent blood glucose values: 195, 128, 253 mg/dL    Current A1C of 7.8% is equivalent to average blood glucose of 177 mg/dl over the past 2-3 months.     Current hospital diabetes medications:   Correctional Lispro insulin 4 times daily ACHS    Home diabetes medications:  Levemir insulin 60 units daily   Lispro insulin per sliding scale    Diet:  Cardiac    Education:  ____Refer to Diabetes Education Record             __x__Education not indicated at this time      Flavia Kelly RD, CDE

## 2019-12-10 NOTE — ED NOTES
Updated Dacia Princeer at [address] [place] [address] of patient being admitted to hospital.

## 2019-12-10 NOTE — ED NOTES
TRANSFER - OUT REPORT:    Verbal report given to Jarad Patel RN(name) on Serene Young.  being transferred to (unit) for routine progression of care       Report consisted of patients Situation, Background, Assessment and   Recommendations(SBAR). Information from the following report(s) SBAR, ED Summary, Intake/Output, MAR and Recent Results was reviewed with the receiving nurse. Lines:   Peripheral IV 12/09/19 Right Antecubital (Active)   Site Assessment Clean, dry, & intact 12/9/2019  5:46 PM   Phlebitis Assessment 0 12/9/2019  5:46 PM   Infiltration Assessment 0 12/9/2019  5:46 PM   Dressing Status Clean, dry, & intact 12/9/2019  5:46 PM   Dressing Type 4 X 4 12/9/2019  5:46 PM   Action Taken Blood drawn 12/9/2019  5:46 PM        Opportunity for questions and clarification was provided.       Patient transported with:   [employer]

## 2019-12-10 NOTE — PROGRESS NOTES
Reason for Admission:   New onset seizure ([facility] [R56.9]               RRAT Score:     36             Resources/supports as identified by patient/family:       Top Challenges facing patient (as identified by patient/family and CM): Finances/Medication cost?     non  Transportation      s  Support system or lack thereof?   family  Living arrangements? Lives at [facility]     Self-care/ADLs/Cognition? Dependent        Current Advanced Directive/Advance Care Plan:   no                          Plan for utilizing home health:    no                      Likelihood of readmission:   HIGH    Transition of Care Plan:                    Initial assessment completed with patient's daughter Ada Lima [phone]. Cognitive status of patient:  Alert and oriented    Face sheet information confirmed:  yes. The patient designates his daughter Mayela Corbett to participate in his discharge plan and to receive any needed information. This patient lives in [facility] Patient is not able to navigate steps as needed. Prior to hospitalization, patient was considered to be independent with ADLs/IADLS : no . If not independent,  patient needs assist with : bathing, food preparation and cooking    Patient has a current ACP document on file: no  [facility] transportation will be available to transport patient home upon discharge. The patient already has Amandeep Stoner W/MARIUSZ medical equipment available in the home at [facility] as needed    Patient is not currently active with home health. Patient has not stayed in a skilled nursing facility or rehab. Was  stay within last 60 days : no. This patient is on dialysis :no   Currently, the discharge plan is Assisted Living. Per pt's daughter Mayela Corbett, pt will be going back to [facility] when discharged. The patient's medications administered by [facility]    Patient's current insurance is [employer], [address] [place] [address].       Care Management Interventions  PCP Verified by CM: No(per pt and his daughter, doctors do see pt at the assisted living)  Palliative Care Criteria Met (RRAT>21 & CHF Dx)?: No  Mode of Transport at Discharge: S  Transition of Care Consult (CM Consult): Discharge Planning, Assisted Living  Physical Therapy Consult: No  Occupational Therapy Consult: No  Speech Therapy Consult: No  Current Support Network: Assisted Living  Confirm Follow Up Transport: Family  Plan discussed with Pt/Family/Caregiver: Yes  Discharge Location  Discharge Placement: Assisted Living        RAMONITA Tomas RN  Care Management  Pager: [phone]

## 2019-12-10 NOTE — PROGRESS NOTES
TRANSFER - IN REPORT:    Verbal report received from JOSÉ ANTONIO Ratliff RN on [place] katia.  being received from the Emergency Dept. for routine progression of care      Report consisted of patients Situation, Background, Assessment and   Recommendations(SBAR). Information from the following report(s) SBAR was reviewed with the receiving nurse. Opportunity for questions and clarification was provided. Assessment completed upon patients arrival to unit and care assumed. 2354: Assumed patient care from JOSÉ ANTONIO Ratliff RN. Patient is alert and oriented to person, place, time and situation. Respiratory status Is stable on room air. Vital signs are stable. MEWS score is a one. Patient kierra any pain, discomfort, nausea vomiting dizziness or anxiety. White board and fall card is updated. Bed is locked and in lowest position. Call bell, water and personal belongings are within reach. Patient has no questions, comments or concerns after bedside shift report. 0700: Patient had an uneventful shift. Respiratory status, vital signs and MEWS score remained stable. Patient was resting quietly with no signs of distress noted. Bed locked and in lowest position. Call bell water and personal belongings were within reach. Patient had no questions, comments or concerns after bedside shift report.  Bedside report given to Soy Faria R.N.

## 2019-12-10 NOTE — PROGRESS NOTES
[facility]  [facility]    Patient: Pam Womack. Age: 82 y.o. : 1935 MR#: [id number] SSN: [id number]  Date/Time: 12/10/2019     Subjective:     Patient seen and evaluated, lying in bed, no acute distress. Noted events from memory care unit where patient was found to be unresponsive on the floorcurrently appears to be at baseline         Assessment/Plan:     1. New onset seizure -neurology consulted, MRI brainreport pending  2. History of vascular dementia and memory loss  3. Cellulitis right lower extremitycontinue IV antibiotics, vancomycin and Rocephin  4. History of chronic kidney diseasestage III, monitor creatinine levels  5. Lactic acidosislikely secondary to cellulitis right lower extremity, continue IV antibiotics  6. Polycythemia with elevated hemoglobin levelsmonitor, will try to find out more from family  7. Poor oral intake with dehydrationIV fluids  8. Resume PTA medications  DVT prophylaxisheparin  Full code  Daughter brought in [facility] paperwork, will review regarding the patient's CODE STATUS. Case discussed with:  []Patient  [x]Family  []Nursing  []Case Management  DVT Prophylaxis:  []Lovenox  [x]Hep SQ  []SCDs  []Coumadin   []On Heparin gtt    Objective:   VS:   Visit Vitals  BP (!) 190/92 (BP 1 Location: Right arm, BP Patient Position: Sitting) Comment: Will notified the nurse   Pulse 72   Temp 97.3 °F (36.3 °C)   Resp 18   Ht 5' 10\" (1.778 m)   Wt 86.2 kg (190 lb)   SpO2 96%   BMI 27.26 kg/m²      Tmax/24hrs: Temp (24hrs), Av.8 °F (36.6 °C), Min:97.3 °F (36.3 °C), Max:98.7 °F (37.1 °C)  IOBRIEF    Intake/Output Summary (Last 24 hours) at 12/10/2019 1557  Last data filed at 12/10/2019 0831  Gross per 24 hour   Intake 360 ml   Output 300 ml   Net 60 ml       General:  Alert, cooperative, no acute distress    HEENT: PERRLA, anicteric sclerae. Pulmonary:  CTA Bilaterally. No Wheezing/Rhonchi/Rales.   Cardiovascular: Regular rate and Rhythm. GI:  Soft, Non distended, Non tender. + Bowel sounds. Extremities:  R LE cellulitis   Neurologic: Alert and oriented X 3. No acute neuro deficits.   Additional:    Medications:   Current Facility-Administered Medications   Medication Dose Route Frequency    cycloSPORINE (RESTASIS) 0.05 % ophthalmic emulsion 1 Drop  1 Drop Both Eyes Q12H    erythromycin (ILOTYCIN) 5 mg/gram (0.5 %) ophthalmic ointment   Both Eyes Q8H    ezetimibe (ZETIA) tablet 10 mg  10 mg Oral QHS    famotidine (PEPCID) tablet 20 mg  20 mg Oral DAILY    furosemide (LASIX) tablet 20 mg  20 mg Oral DAILY    gabapentin (NEURONTIN) capsule 300 mg  300 mg Oral DAILY    isosorbide mononitrate ER (IMDUR) tablet 30 mg  30 mg Oral DAILY    multivitamin, tx-iron-ca-min (THERA-M w/ IRON) tablet 1 Tab  1 Tab Oral DAILY    insulin lispro (HUMALOG) injection   SubCUTAneous AC&HS    glucose chewable tablet 16 g  4 Tab Oral PRN    glucagon (GLUCAGEN) injection 1 mg  1 mg IntraMUSCular PRN    dextrose (D50W) injection syrg 12.5-25 g  25-50 mL IntraVENous PRN    ondansetron (ZOFRAN) injection 4 mg  4 mg IntraVENous Q4H PRN    diphenhydrAMINE (BENADRYL) injection 25 mg  25 mg IntraVENous Q6H PRN    acetaminophen (TYLENOL) tablet 650 mg  650 mg Oral Q6H PRN    cefTRIAXone (ROCEPHIN) 1 g in sterile water (preservative free) 10 mL IV syringe  1 g IntraVENous Q12H    [START ON 12/11/2019] lactobacillus sp. 50 billion cpu (BIO-K PLUS) capsule 1 Cap  1 Cap Oral DAILY    0.9% sodium chloride infusion  75 mL/hr IntraVENous CONTINUOUS    potassium chloride (K-DUR, KLOR-CON) SR tablet 20 mEq  20 mEq Oral BID    vancomycin (VANCOCIN) 2000 mg in  ml infusion  2,000 mg IntraVENous ONCE    [START ON 12/12/2019] vancomycin (VANCOCIN) 1250 mg in  ml infusion  1,250 mg IntraVENous Q36H    sodium chloride (NS) flush 5-40 mL  5-40 mL IntraVENous Q8H    sodium chloride (NS) flush 5-40 mL  5-40 mL IntraVENous PRN    heparin (porcine) injection 5,000 Units  5,000 Units SubCUTAneous Q8H       Labs:    Recent Results (from the past 48 hour(s))   CBC WITH AUTOMATED DIFF    Collection Time: 12/09/19  5:44 PM   Result Value Ref Range    WBC 9.1 4.6 - 13.2 K/uL    RBC 6.28 (H) 4.70 - 5.50 M/uL    HGB 18.8 (H) 13.0 - 16.0 g/dL    HCT 55.5 (HH) 36.0 - 48.0 %    MCV 88.4 74.0 - 97.0 FL    MCH 29.9 24.0 - 34.0 PG    MCHC 33.9 31.0 - 37.0 g/dL    RDW 13.5 11.6 - 14.5 %    PLATELET 473 502 - 639 K/uL    MPV 11.1 9.2 - 11.8 FL    NEUTROPHILS 60 42 - 75 %    LYMPHOCYTES 30 20 - 51 %    MONOCYTES 7 2 - 9 %    EOSINOPHILS 2 0 - 5 %    BASOPHILS 1 0 - 3 %    ABS. NEUTROPHILS 5.5 1.8 - 8.0 K/UL    ABS. LYMPHOCYTES 2.7 0.8 - 3.5 K/UL    ABS. MONOCYTES 0.6 0 - 1.0 K/UL    ABS. EOSINOPHILS 0.2 0.0 - 0.4 K/UL    ABS. BASOPHILS 0.1 (H) 0.0 - 0.06 K/UL    DF AUTOMATED      PLATELET COMMENTS ADEQUATE PLATELETS      RBC COMMENTS NORMOCYTIC, NORMOCHROMIC     METABOLIC PANEL, COMPREHENSIVE    Collection Time: 12/09/19  5:44 PM   Result Value Ref Range    Sodium 136 136 - 145 mmol/L    Potassium 3.4 (L) 3.5 - 5.5 mmol/L    Chloride 94 (L) 100 - 111 mmol/L    CO2 37 (H) 21 - 32 mmol/L    Anion gap 5 3.0 - 18 mmol/L    Glucose 195 (H) 74 - 99 mg/dL    BUN 36 (H) 7.0 - 18 MG/DL    Creatinine 2.15 (H) 0.6 - 1.3 MG/DL    BUN/Creatinine ratio 17 12 - 20      GFR est AA 36 (L) >60 ml/min/1.73m2    GFR est non-AA 30 (L) >60 ml/min/1.73m2    Calcium 9.4 8.5 - 10.1 MG/DL    Bilirubin, total 1.0 0.2 - 1.0 MG/DL    ALT (SGPT) 31 16 - 61 U/L    AST (SGOT) 23 10 - 38 U/L    Alk.  phosphatase 162 (H) 45 - 117 U/L    Protein, total 8.1 6.4 - 8.2 g/dL    Albumin 4.2 3.4 - 5.0 g/dL    Globulin 3.9 2.0 - 4.0 g/dL    A-G Ratio 1.1 0.8 - 1.7     CARDIAC PANEL,(CK, CKMB & TROPONIN)    Collection Time: 12/09/19  5:44 PM   Result Value Ref Range     39 - 308 U/L    CK - MB <1.0 <3.6 ng/ml    CK-MB Index  0.0 - 4.0 %     CALCULATION NOT PERFORMED WHEN RESULT IS BELOW LINEAR LIMIT    Troponin-I, QT <0.02 0.0 - 0.045 NG/ML   TSH 3RD GENERATION    Collection Time: 12/09/19  5:44 PM   Result Value Ref Range    TSH 0.95 0.36 - 3.74 uIU/mL   EKG, 12 LEAD, INITIAL    Collection Time: 12/09/19  5:47 PM   Result Value Ref Range    Ventricular Rate 70 BPM    Atrial Rate 70 BPM    P-R Interval 168 ms    QRS Duration 98 ms    Q-T Interval 414 ms    QTC Calculation (Bezet) 447 ms    Calculated P Axis 65 degrees    Calculated R Axis 47 degrees    Calculated T Axis 59 degrees    Diagnosis       Normal sinus rhythm  Minimal voltage criteria for LVH, may be normal variant  Borderline ECG  When compared with ECG of 10-OCT-2019 22:57,  No significant change was found     URINALYSIS W/ RFLX MICROSCOPIC    Collection Time: 12/09/19  5:57 PM   Result Value Ref Range    Color YELLOW      Appearance CLEAR      Specific gravity 1.017 1.005 - 1.030      pH (UA) 5.0 5.0 - 8.0      Protein NEGATIVE  NEG mg/dL    Glucose 250 (A) NEG mg/dL    Ketone NEGATIVE  NEG mg/dL    Bilirubin NEGATIVE  NEG      Blood NEGATIVE  NEG      Urobilinogen 1.0 0.2 - 1.0 EU/dL    Nitrites NEGATIVE  NEG      Leukocyte Esterase NEGATIVE  NEG     CULTURE, URINE    Collection Time: 12/09/19  5:57 PM   Result Value Ref Range    Special Requests: NO SPECIAL REQUESTS      Culture result: NO GROWTH AFTER 16 HOURS     AMMONIA    Collection Time: 12/09/19  7:54 PM   Result Value Ref Range    Ammonia 13 11 - 32 UMOL/L   GLUCOSE, POC    Collection Time: 12/09/19  9:38 PM   Result Value Ref Range    Glucose (POC) 128 (H) 70 - 110 mg/dL   CULTURE, WOUND W GRAM STAIN    Collection Time: 12/09/19 11:00 PM   Result Value Ref Range    Special Requests: NO SPECIAL REQUESTS      GRAM STAIN MODERATE WBC'S      GRAM STAIN NO ORGANISMS SEEN      Culture result: CULTURE IN PROGRESS,FURTHER UPDATES TO FOLLOW     HEMOGLOBIN A1C WITH EAG    Collection Time: 12/10/19 11:40 AM   Result Value Ref Range    Hemoglobin A1c 7.8 (H) 4.2 - 5.6 %    Est. average glucose 509 mg/dL   METABOLIC PANEL, BASIC    Collection Time: 12/10/19 11:40 AM   Result Value Ref Range    Sodium 138 136 - 145 mmol/L    Potassium 3.4 (L) 3.5 - 5.5 mmol/L    Chloride 94 (L) 100 - 111 mmol/L    CO2 37 (H) 21 - 32 mmol/L    Anion gap 7 3.0 - 18 mmol/L    Glucose 253 (H) 74 - 99 mg/dL    BUN 32 (H) 7.0 - 18 MG/DL    Creatinine 1.87 (H) 0.6 - 1.3 MG/DL    BUN/Creatinine ratio 17 12 - 20      GFR est AA 42 (L) >60 ml/min/1.73m2    GFR est non-AA 35 (L) >60 ml/min/1.73m2    Calcium 9.2 8.5 - 10.1 MG/DL   MAGNESIUM    Collection Time: 12/10/19 11:40 AM   Result Value Ref Range    Magnesium 2.5 1.6 - 2.6 mg/dL   PHOSPHORUS    Collection Time: 12/10/19 11:40 AM   Result Value Ref Range    Phosphorus 2.5 2.5 - 4.9 MG/DL   CBC WITH AUTOMATED DIFF    Collection Time: 12/10/19 11:40 AM   Result Value Ref Range    WBC 8.8 4.6 - 13.2 K/uL    RBC 6.27 (H) 4.70 - 5.50 M/uL    HGB 18.4 (H) 13.0 - 16.0 g/dL    HCT 56.0 (HH) 36.0 - 48.0 %    MCV 89.3 74.0 - 97.0 FL    MCH 29.3 24.0 - 34.0 PG    MCHC 32.9 31.0 - 37.0 g/dL    RDW 13.4 11.6 - 14.5 %    PLATELET 112 832 - 460 K/uL    MPV 11.1 9.2 - 11.8 FL    NEUTROPHILS 56 40 - 73 %    LYMPHOCYTES 28 21 - 52 %    MONOCYTES 12 (H) 3 - 10 %    EOSINOPHILS 3 0 - 5 %    BASOPHILS 1 0 - 2 %    ABS. NEUTROPHILS 4.9 1.8 - 8.0 K/UL    ABS. LYMPHOCYTES 2.5 0.9 - 3.6 K/UL    ABS. MONOCYTES 1.1 0.05 - 1.2 K/UL    ABS. EOSINOPHILS 0.3 0.0 - 0.4 K/UL    ABS.  BASOPHILS 0.1 0.0 - 0.1 K/UL    DF AUTOMATED     LACTIC ACID    Collection Time: 12/10/19 11:40 AM   Result Value Ref Range    Lactic acid 2.7 (HH) 0.4 - 2.0 MMOL/L   CARDIAC PANEL,(CK, CKMB & TROPONIN)    Collection Time: 12/10/19 11:40 AM   Result Value Ref Range     39 - 308 U/L    CK - MB <1.0 <3.6 ng/ml    CK-MB Index  0.0 - 4.0 %     CALCULATION NOT PERFORMED WHEN RESULT IS BELOW LINEAR LIMIT    Troponin-I, QT <0.02 0.0 - 0.045 NG/ML       Signed By: Dewayne Gomez MD     December 10, 2019

## 2019-12-10 NOTE — PROGRESS NOTES
conducted an initial consultation and Spiritual Assessment for Suhail Cnatu, who is a 82 y. o.,male. Patients Primary Language is: [place]. According to the patients EMR Judaism Affiliation is: [facility].     The reason the Patient came to the hospital is:   Patient Active Problem List    Diagnosis Date Noted    Fracture of nasal bones, sequela 12/10/2019    New onset seizure ([address] 12/09/2019    Polycythemia 12/09/2019    Flu 03/22/2018    Wheezing 03/22/2018    Open wound 10/24/2017    Cellulitis of right lower extremity 10/24/2017    Poorly controlled diabetes mellitus ([address] 10/24/2017    Deviated nasal septum 06/28/2017    Enuresis 02/27/2017    Mixed hyperlipidemia 01/24/2017    Type 2 diabetes mellitus without complication, with long-term current use of insulin ([address] 10/24/2016    S/P CABG x 2 10/24/2016    CAD (coronary artery disease) 08/29/2016    COPD (chronic obstructive pulmonary disease) ([address] 08/29/2016    Type 2 diabetes mellitus with stage 3 chronic kidney disease ([address] 07/20/2016    Venous (peripheral) insufficiency     Osteoarthrosis involving multiple sites     Low back pain     CHI (closed head injury) 05/11/2015    CKD (chronic kidney disease) stage 3, GFR 30-59 ml/min ([address] 05/20/2013    HTN (hypertension) 11/19/2012    Atherosclerosis of native arteries of the extremities with intermittent claudication 11/19/2012    Carotid artery occlusion without infarction 11/19/2012    DM (diabetes mellitus) ([address] 11/19/2012    Spondylosis 11/19/2012    Spondylosis 11/14/2012    Hypercholesterolemia         The  provided the following Interventions:  Initiated a relationship of care and support. Explored issues of cristina, spirituality and/or Synagogue needs while hospitalized. Listened empathically. Provided chaplaincy education. Dicussed AMD with patient and daughter  Offered prayer and assurance of continued prayers on patient's behalf.    Chart reviewed. The following outcomes were achieved:  Patient shared some information about their medical narrative and spiritual journey/beliefs. Patient processed feeling about current hospitalization. Patient expressed gratitude for the 's visit. Assessment:  Patient did not indicate any spiritual or Latter day issues which require Spiritual Care Services interventions at this time. Patient does not have any Latter day/cultural needs that will affect patients preferences in health care. Plan:  Chaplains will continue to follow and will provide pastoral care on an as needed or requested basis.  recommends bedside caregivers page  on duty if patient shows signs of acute spiritual or emotional distress.     [address] [facility] Department  [phone]

## 2019-12-10 NOTE — H&P
History & Physical    Patient: Mingo Sen MRN: [id number]  CSN: [id number]    YOB: 1935  Age: 82 y.o. Sex: male      DOA: 12/9/2019       HPI:     Mingo Sen is a 82 y.o. male with the medical history below who resides at [address] [place] [address] at [facility]. On 12/9/2019, the patient was found unresponsive. EMS was called. Last well seen was 4 to 6 hours. He presented to [facility] ER. In the ED he remained unresponsive with pinpoint pupils and eyes tracking as awake, but not responsive. /91, HR 69, afebrile, pulse ox 97%, blood glucose 270s. EKG unremarkable. Mild tremor right arm. Tele Neuro was consulted and at time of consult, patient became awake and conversant. Possibly seizure with prolonged post ictal period: MRI and EEG recommended and no anti-convulsants for now. Patient was admitted to the Neurology Telemetry Unit. Past Medical History:   Diagnosis Date    Anxiety     Arthritis     CAD (coronary artery disease)     s/p drug-eluting stents to RCA for high-grade stenoses in 2/09    Carotid duplex 08/26/2016    Mild <50% CYRUS stenosis. Chronic LICA occlusion. Similar to study of 3/29/16.  Carotid stenosis (HCC)     w/ known total occlusion of lt internal carotid artery; followed by pts vascular surgeon    Chronic kidney disease     COPD (chronic obstructive pulmonary disease) ([address]     Dementia ([address]     Depression     Diabetes ([facility]     type 2    Dyslipidemia     on Crestor; managed by pts PCP    Dyspnea     Hypercholesterolemia     Hypertension     Low back pain     Osteoarthrosis involving multiple sites     Skin cancer ([address]     squamous cell lesions of the skin    Stroke ([facility]     Venous (peripheral) insufficiency        Past Surgical History:   Procedure Laterality Date    CARDIAC CATHETERIZATION  08/24/2016    Mod calcification. Co-dom. oLM 50-70%. LAD 30%. Dx 30%. Cx 70% focal.  OM 80% focal.  RCA 30%.       HX ANGIOPLASTY     2 stents placed and heart attack during the procedure    HX COLONOSCOPY  10/2003    neg; declines f/u    HX CORONARY ARTERY BYPASS GRAFT  2016     LIMA to LAD and SVG to OM1    HX HEART CATHETERIZATION  2013    HX HERNIA REPAIR      1970s    HX TONSIL AND ADENOIDECTOMY         Family History   Problem Relation Age of Onset    Parkinsonism Mother     Hypertension Mother     Cancer Father         lung    Heart defect Brother        Social History     Socioeconomic History    Marital status:      Spouse name: Not on file    Number of children: Not on file    Years of education: Not on file    Highest education level: Not on file   Tobacco Use    Smoking status: Former Smoker     Years: 2.00     Last attempt to quit: 1955     Years since quittin.9    Smokeless tobacco: Never Used   Substance and Sexual Activity    Alcohol use: Yes     Frequency: 2-4 times a month     Comment: drinks weekly couple beers    Drug use: No    Sexual activity: Never       Prior to Admission medications    Medication Sig Start Date End Date Taking? Authorizing Provider   acetaminophen (TYLENOL) 325 mg tablet Take 2 Tabs by mouth every four (4) hours as needed for Pain. 19   Verna-Oscar Self MD   erythromycin (ILOTYCIN) ophthalmic ointment Apply to both lower eyelids four times a day until done. 18   Sheryl Quintanilla MD   QUEtiapine (SEROQUEL) 25 mg tablet Take 1 Tab by mouth nightly. 18   Karyna Queen, DO   lidocaine (LIDODERM) 5 % Apply patch to the affected area for 12 hours a day and remove for 12 hours a day. 18   Karyna Queen, DO   oseltamivir (TAMIFLU) 30 mg capsule Take 1 Cap by mouth two (2) times a day. Indications: INFLUENZA 3/27/18   Darcus Breslow B, NP   acetaminophen (TYLENOL) 500 mg tablet Take 1 Tab by mouth every six (6) hours as needed. Indications: pain 3/26/18   Bentley Hawthorne, PA-C   aspirin delayed-release 81 mg tablet Take 1 Tab by mouth daily. Indications: Cerebral Thromboembolism Prevention 3/27/18   Jacob Hawthorne PA-C   cycloSPORINE (RESTASIS) 0.05 % ophthalmic emulsion Administer 1 Drop to both eyes every twelve (12) hours. Indications: Dry Eye 3/26/18   Jacob Hawthorne PA-C   gabapentin (NEURONTIN) 300 mg capsule Take 1 Cap by mouth daily. Indications: NEUROPATHIC PAIN, Restless Legs Syndrome 3/27/18   Jacob Hawthorne PA-C   insulin glargine (LANTUS SOLOSTAR U-100 INSULIN) 100 unit/mL (3 mL) inpn 35 Units by SubCUTAneous route nightly. Indications: type 2 diabetes mellitus 3/26/18   Jacob Hawthorne PA-C   isosorbide mononitrate ER (IMDUR) 30 mg tablet Take 1 Tab by mouth daily. 3/27/18   BalwinderhoJacob PA-C   metoprolol succinate (TOPROL-XL) 50 mg XL tablet Take 1 Tab by mouth daily. Indications: hypertension 3/27/18   Jacob Hawthorne PA-C   multivit-min-FA-lycopen-lutein (CENTRUM SILVER) 0.4-300-250 mg-mcg-mcg tab Take 1 tab daily 3/26/18   Jacob Hawthorne PA-C   Insulin Needles, Disposable, (SHAWN PEN NEEDLE) 32 gauge x 5/32\" ndle Use as directed. 3/26/18   Jacob Hawthorne PA-C   rosuvastatin (CRESTOR) 10 mg tablet Take 1 Tab by mouth nightly. 3/26/18   Jacob Hawthorne PA-C   OTHER Incentive spirometry- Use as directed 3/26/18   Jacob Hawthorne PA-C   solifenacin (VESICARE) 5 mg tablet Take 1 Tab by mouth daily. Indications: Bladder Hyperactivity, INCREASED URINARY FREQUENCY, URINARY URGE INCONTINENCE, URINARY URGENCY 3/27/18   Jacob Hawthorne PA-C   temazepam (RESTORIL) 7.5 mg capsule Take 1 Cap by mouth nightly as needed for Sleep. Max Daily Amount: 7.5 mg. 3/26/18   Jacob Hawthorne PA-C   furosemide (LASIX) 20 mg tablet Take 1 Tab by mouth daily. Indications: Edema 3/26/18   Jacob Hawthorne PA-C   guaiFENesin (ROBITUSSIN) 100 mg/5 mL liquid Take 5 mL by mouth every four (4) hours as needed for Cough.  3/26/18   Jacob Hawthorne PA-C   ciprofloxacin HCl (CILOXAN) 0.3 % ophthalmic solution Administer 1 Drop to both eyes every four (4) hours (while awake). For 3 more days. End 3/29/2018  Indications: BACTERIAL CONJUNCTIVITIS 3/26/18   Mecca Hawthorne PA-C   albuterol-ipratropium (DUO-NEB) 2.5 mg-0.5 mg/3 ml nebu 3 mL by Nebulization route every six (6) hours as needed. 3/26/18   Mecca Hawthorne PA-C   insulin lispro (HUMALOG KWIKPEN INSULIN) 200 unit/mL (3 mL) inpn For Blood Sugar (mg/dL) of:     Less than 150 =   0 units           150 -199 =   2 units  200 -249 =   4 units  250 -299 =   6 units  300 -349 =   8 units  350 and above =   10 units  Check BG then Inject insulin per SS 3 times daily before meals and at bedtime  Indications: type 2 diabetes mellitus 3/26/18   Mecca Hawthorne PA-C   Blood-Glucose Meter monitoring kit Use as directed 3/26/18   Mecca Hawthorne PA-C   lancets 23 gauge misc 1 Box by Does Not Apply route Before breakfast, lunch, and dinner. Use as directed 3/26/18   Mecca Hawthorne PA-C   alcohol swabs (ALCOHOL PREP SWABS) padm Use as directed 3/26/18   Mecca Hawthorne PA-C   predniSONE (DELTASONE) 10 mg tablet Take 4 tabs for 1 day, then 3 tabs for 3 days, then 2 tabs for 3 days, then 1 tab by mouth for 3 days  Indications: COPD exacerbation 3/26/18   Mecca Hawthorne PA-C   famotidine (PEPCID) 20 mg tablet Take 1 Tab by mouth two (2) times a day. Indications: PREVENTION OF STRESS ULCER 3/26/18   Mecca Hawthorne PA-C   COMPOUNDMAX BASE crea 240 g by Does Not Apply route four (4) times daily. Anti-Inflam Meloxicam 0.09% Topirmate 1% Methocarbamol 2%  Lidocaine 2% Prilocaine 2% 2/17/16   Chayo Love C, NP   CALCIUM CARBONATE (CALTRATE 600 PO) Take 1 Tab by mouth daily.     Provider, Historical       Allergies   Allergen Reactions    Amaryl [Glimepiride] Drowsiness    Lipitor [Atorvastatin] Myalgia    Niacin Other (comments)     Facial blistering, swelling in face    Pravachol [Pravastatin] Myalgia    Zocor [Simvastatin] Myalgia     Review of Systems  A 12 point review of systems was performed and unremarkable except as stated in HPI         Physical Exam:      Visit Vitals  BP (!) 137/102   Pulse 63   Temp 98.7 °F (37.1 °C)   Resp 17   SpO2 96%       Physical Exam:  GENERAL: alert, cooperative, no distress  HEENT head atraumatic, LORIE, conjunctiva injected, yellow debris over upper eyelashes, no lower eyelashes and granulomatous margin of lower lids, actinic changes over face with scar left mid face, pharynx clear, no tongue lacs noted  Neck supple  Chest lungs clear, HR reg 63  Abdomen soft, BS+, non tender  Extremities moving all 4 extremities well in bed, several dark crusted oval skin lesions 2 to 3 cm over arms and legs with largest below left knee and on right shin: right calf has dense erythema asymmetric to left with areas of weeping serous drainage over bed. Left 4th toe with crusted lesion    Lab/Data Review:  Labs: Results:       Chemistry Recent Labs     12/09/19 1744   *      K 3.4*   CL 94*   CO2 37*   BUN 36*   CREA 2.15*   CA 9.4   AGAP 5   BUCR 17   *   TP 8.1   ALB 4.2   GLOB 3.9   AGRAT 1.1      CBC w/Diff Recent Labs     12/09/19 1744   WBC 9.1   RBC 6.28*   HGB 18.8*   HCT 55.5*      GRANS 60   LYMPH 30   EOS 2      Coagulation No results for input(s): PTP, INR, APTT, INREXT in the last 72 hours. Iron/Ferritin No results for input(s): IRON in the last 72 hours. No lab exists for component: TIBCCALC   BNP No results for input(s): BNPP in the last 72 hours.    Cardiac Enzymes Recent Labs     12/09/19 1744      CKND1 CALCULATION NOT PERFORMED WHEN RESULT IS BELOW LINEAR LIMIT      Liver Enzymes Recent Labs     12/09/19 1744   TP 8.1   ALB 4.2   *   SGOT 23      Thyroid Studies Lab Results   Component Value Date/Time    TSH 0.95 12/09/2019 05:44 PM          All Micro Results     Procedure Component Value Units Date/Time    CULTURE, Shawn Greet STAIN [[id number]] Collected:  12/09/19 2300    Order Status:  Completed Specimen: Wound from Leg Updated:  12/09/19 2323    CULTURE, URINE [[id number]] Collected:  12/09/19 1757    Order Status:  Completed Specimen:  Clean catch Updated:  12/09/19 1759          Imaging Reviewed:  CT Results (most recent):  Results from Hospital Encounter encounter on 12/09/19   CT HEAD WO CONT    Narrative EXAMINATION: CT head without contrast    INDICATION: Altered level of consciousness, unresponsive    COMPARISON: 11/6/2019    TECHNIQUE: CT head performed without contrast, with multiplanar reformations. All CT scans at this facility are performed using dose optimization technique as  appropriate to a performed exam, to include automated exposure control,  adjustment of the mA and/or kV according to patient size (including appropriate  matching first site specific examinations), or use of iterative reconstruction  technique. FINDINGS:    No focal mass effect or shift of midline structures. Mild cerebral cortical  atrophy. Ventricles are normal in size and configuration. No acute intracranial  hemorrhage identified. Mild burden of periventricular white matter  low-attenuation. Probable small remote infarcts in the left parieto-occipital  cortex, high left parietal cortex, and left frontal subcortex, similar to prior. Calvarium intact. Imaged paranasal sinuses and mastoids appear well-aerated. Superficial soft tissues unremarkable. Impression IMPRESSION:    No clearly acute findings. Probable small remote infarcts in the left hemisphere similar to prior as  described. -Mild amount of periventricular low-attenuation, likely chronic microvascular  ischemic change.          Assessment:   Principal Problem:    New onset seizure ([facility] (12/9/2019)    Active Problems:    HTN (hypertension) (11/19/2012)      DM (diabetes mellitus) ([address] (11/19/2012)      CKD (chronic kidney disease) stage 3, GFR 30-59 ml/min ([address] (5/20/2013)      Venous (peripheral) insufficiency ()      Cellulitis of right lower extremity (10/24/2017)      Polycythemia (12/9/2019)          Plan:   Admit to telemetry  Culture right calf weeping ulceration sent  Empiric antibiotics pending cultures   Consider possibly squamous cell lesions at left calf and right lower leg   if cultures negative and lesions not improved  MRI brain  Consult Neurology in AM  Parenteral fluids  Monitor renal function    Full Code      Harper Tyson DO  12/9/2019, 11:38 PM

## 2019-12-11 LAB
ANION GAP SERPL CALC-SCNC: 11 MMOL/L (ref 3–18)
BACTERIA SPEC CULT: NORMAL
BUN SERPL-MCNC: 31 MG/DL (ref 7–18)
BUN/CREAT SERPL: 16 (ref 12–20)
CALCIUM SERPL-MCNC: 8.7 MG/DL (ref 8.5–10.1)
CHLORIDE SERPL-SCNC: 98 MMOL/L (ref 100–111)
CO2 SERPL-SCNC: 24 MMOL/L (ref 21–32)
CREAT SERPL-MCNC: 1.9 MG/DL (ref 0.6–1.3)
GLUCOSE BLD STRIP.AUTO-MCNC: 220 MG/DL (ref 70–110)
GLUCOSE BLD STRIP.AUTO-MCNC: 224 MG/DL (ref 70–110)
GLUCOSE BLD STRIP.AUTO-MCNC: 258 MG/DL (ref 70–110)
GLUCOSE BLD STRIP.AUTO-MCNC: 276 MG/DL (ref 70–110)
GLUCOSE BLD STRIP.AUTO-MCNC: 300 MG/DL (ref 70–110)
GLUCOSE SERPL-MCNC: 225 MG/DL (ref 74–99)
POTASSIUM SERPL-SCNC: 4.5 MMOL/L (ref 3.5–5.5)
SERVICE CMNT-IMP: NORMAL
SODIUM SERPL-SCNC: 133 MMOL/L (ref 136–145)

## 2019-12-11 PROCEDURE — 74011636637 HC RX REV CODE- 636/637: Performed by: HOSPITALIST

## 2019-12-11 PROCEDURE — 74011250636 HC RX REV CODE- 250/636: Performed by: HOSPITALIST

## 2019-12-11 PROCEDURE — 74011000250 HC RX REV CODE- 250: Performed by: HOSPITALIST

## 2019-12-11 PROCEDURE — 65660000000 HC RM CCU STEPDOWN

## 2019-12-11 PROCEDURE — 82962 GLUCOSE BLOOD TEST: CPT

## 2019-12-11 PROCEDURE — 74011250637 HC RX REV CODE- 250/637: Performed by: HOSPITALIST

## 2019-12-11 PROCEDURE — 80048 BASIC METABOLIC PNL TOTAL CA: CPT

## 2019-12-11 RX ORDER — INSULIN GLARGINE 100 [IU]/ML
10 INJECTION, SOLUTION SUBCUTANEOUS DAILY
Status: DISCONTINUED | OUTPATIENT
Start: 2019-12-11 | End: 2019-12-12 | Stop reason: HOSPADM

## 2019-12-11 RX ADMIN — ERYTHROMYCIN: 5 OINTMENT OPHTHALMIC at 21:47

## 2019-12-11 RX ADMIN — GABAPENTIN 300 MG: 300 CAPSULE ORAL at 08:22

## 2019-12-11 RX ADMIN — CYCLOSPORINE 1 DROP: 0.5 EMULSION OPHTHALMIC at 08:20

## 2019-12-11 RX ADMIN — HEPARIN SODIUM 5000 UNITS: 5000 INJECTION INTRAVENOUS; SUBCUTANEOUS at 08:21

## 2019-12-11 RX ADMIN — CYCLOSPORINE 1 DROP: 0.5 EMULSION OPHTHALMIC at 21:00

## 2019-12-11 RX ADMIN — HEPARIN SODIUM 5000 UNITS: 5000 INJECTION INTRAVENOUS; SUBCUTANEOUS at 16:54

## 2019-12-11 RX ADMIN — Medication 10 ML: at 21:48

## 2019-12-11 RX ADMIN — MULTIPLE VITAMINS W/ MINERALS TAB 1 TABLET: TAB at 08:22

## 2019-12-11 RX ADMIN — ISOSORBIDE MONONITRATE 30 MG: 30 TABLET, EXTENDED RELEASE ORAL at 08:22

## 2019-12-11 RX ADMIN — INSULIN LISPRO 9 UNITS: 100 INJECTION, SOLUTION INTRAVENOUS; SUBCUTANEOUS at 16:53

## 2019-12-11 RX ADMIN — WATER 1 G: 1 INJECTION INTRAMUSCULAR; INTRAVENOUS; SUBCUTANEOUS at 16:54

## 2019-12-11 RX ADMIN — EZETIMIBE 10 MG: 10 TABLET ORAL at 21:47

## 2019-12-11 RX ADMIN — POTASSIUM CHLORIDE 20 MEQ: 1500 TABLET, EXTENDED RELEASE ORAL at 08:22

## 2019-12-11 RX ADMIN — Medication 10 ML: at 06:25

## 2019-12-11 RX ADMIN — Medication 10 ML: at 14:00

## 2019-12-11 RX ADMIN — INSULIN GLARGINE 10 UNITS: 100 INJECTION, SOLUTION SUBCUTANEOUS at 12:26

## 2019-12-11 RX ADMIN — Medication 1 CAPSULE: at 08:22

## 2019-12-11 RX ADMIN — FUROSEMIDE 20 MG: 20 TABLET ORAL at 08:22

## 2019-12-11 RX ADMIN — INSULIN LISPRO 12 UNITS: 100 INJECTION, SOLUTION INTRAVENOUS; SUBCUTANEOUS at 12:26

## 2019-12-11 RX ADMIN — FAMOTIDINE 20 MG: 20 TABLET ORAL at 08:22

## 2019-12-11 RX ADMIN — INSULIN LISPRO 6 UNITS: 100 INJECTION, SOLUTION INTRAVENOUS; SUBCUTANEOUS at 21:48

## 2019-12-11 RX ADMIN — ERYTHROMYCIN: 5 OINTMENT OPHTHALMIC at 14:00

## 2019-12-11 RX ADMIN — INSULIN LISPRO 4 UNITS: 100 INJECTION, SOLUTION INTRAVENOUS; SUBCUTANEOUS at 08:21

## 2019-12-11 NOTE — CONSULTS
Neurology   Consultation Note        Patient: Reddy Middleton. MRN: [id number]  SSN: [id number]    YOB: 1935  Age: 82 y.o. Sex: male        ID: 60-year-old right-handed male with history to include stroke, hypertension, hyperlipidemia, diabetes, vascular dementia, COPD, depression, CKD, CAD, carotid artery disease, anxiety, polycythemia, squamous cell carcinoma, and arthritis  CC: presents with unresponsiveness. HPI: The patient presented to the ED on 12/9/2019 via EMS from the nursing facility where he resides after being found unresponsive. The last known well time was unclear. Vital signs were normal on arrival except for hypertension and his blood glucose was in the 270s. He had a mild tremor of the right arm. He was seen by tele-neurology. He started becoming more awake and conversant. It was believed that he may have had a seizure with a prolonged postictal period. An MRI of the brain and EEG were recommended. MRI of the brain was without acute findings but reported stable pattern of remote ischemic findings and chronically occluded left ICA. Urine culture is NGSF. He is in normal sinus rhythm. MRA from 2012 for syncope reported chronic occlusion left cavernous ICA. He is on no AEDs except is on gabapentin 300 mg daily for neuropathic pain. He was at [facility] in October 2017 for new onset seizure; per notes, he had 2 seizure events in which he was acting strange for a couple of days and then had an episode where he had extension of his arms and had seizure-like behavior. He was on Wellbutrin for a while at that point. EEG at the time showed diffuse mild slowing. Wellbutrin was discontinued then. He was seen this morning with no family at bedside at the time. Currently he feels well. He reports no warning prior to his episode. He reports he was walking down the hallway at the time when it occurred and said he was walking down the hallway at the [employer] where he works.   He endorses lightheadedness previous with episodes. He is on antibiotics for cellulitis of the right lower extremity. He is on cefazolin and vancomycin. Social history: Denies smoking, alcohol, or drug use. He lives at [facility]. Family history: Reports his dad  from lung cancer and his mother from cardiac disease. Past Medical History:   Diagnosis Date    Anxiety     Arthritis     CAD (coronary artery disease)     s/p drug-eluting stents to RCA for high-grade stenoses in     Carotid duplex 2016    Mild <50% CYRUS stenosis. Chronic LICA occlusion. Similar to study of 3/29/16.  Carotid stenosis (HCC)     w/ known total occlusion of lt internal carotid artery; followed by pts vascular surgeon    Chronic kidney disease     [facility]ch[facility]     Dementia [facility]     Depression     Diabetes ([facility] [address]     type 2    Dyslipidemia     on Crestor; managed by pts PCP    Dyspnea     Hypercholesterolemia     Hypertension     Low back pain     Osteoarthrosis involving multiple sites     Polycythemia 2019    Skin cancer ([facility] [address] [facility]     squamous cell lesions of the skin    Stroke ([facility]     Venous (peripheral) insufficiency         Past Surgical History:   Procedure Laterality Date    CARDIAC CATHETERIZATION  2016    Mod calcification. Co-dom. oLM 50-70%. LAD 30%. Dx 30%. Cx 70% focal.  OM 80% focal.  RCA 30%.       HX ANGIOPLASTY      2 stents placed and heart attack during the procedure    HX COLONOSCOPY  10/2003    neg; declines f/u    HX CORONARY ARTERY BYPASS GRAFT  2016     LIMA to LAD and SVG to OM1    HX HEART CATHETERIZATION  2013    HX HERNIA REPAIR          HX TONSIL AND ADENOIDECTOMY         Social History     Socioeconomic History    Marital status:      Spouse name: Not on file    Number of children: Not on file    Years of education: Not on file    Highest education level: Not on file Occupational History    Not on file   Social Needs    Financial resource strain: Not on file    Food insecurity:     Worry: Not on file     Inability: Not on file    Transportation needs:     Medical: Not on file     Non-medical: Not on file   Tobacco Use    Smoking status: Former Smoker     Years: 2.00     Last attempt to quit: 1955     Years since quittin.9    Smokeless tobacco: Never Used   Substance and Sexual Activity    Alcohol use: Yes     Frequency: 2-4 times a month     Comment: drinks weekly couple beers    Drug use: No    Sexual activity: Never   Lifestyle    Physical activity:     Days per week: Not on file     Minutes per session: Not on file    Stress: Not on file   Relationships    Social connections:     Talks on phone: Not on file     Gets together: Not on file     Attends Orthodox service: Not on file     Active member of club or organization: Not on file     Attends meetings of clubs or organizations: Not on file     Relationship status: Not on file    Intimate partner violence:     Fear of current or ex partner: Not on file     Emotionally abused: Not on file     Physically abused: Not on file     Forced sexual activity: Not on file   Other Topics Concern    Not on file   Social History Narrative    Not on file        Family History   Problem Relation Age of Onset    Parkinsonism Mother     Hypertension Mother     Cancer Father         lung    Heart defect Brother         Allergies   Allergen Reactions    Amaryl [Glimepiride] Drowsiness    Lipitor [Atorvastatin] Myalgia    Niacin Other (comments)     Facial blistering, swelling in face    Pravachol [Pravastatin] Myalgia    Zocor [Simvastatin] Myalgia        Current Facility-Administered Medications   Medication Dose Route Frequency    cycloSPORINE (RESTASIS) 0.05 % ophthalmic emulsion 1 Drop  1 Drop Both Eyes Q12H    erythromycin (ILOTYCIN) 5 mg/gram (0.5 %) ophthalmic ointment   Both Eyes Q8H    ezetimibe (ZETIA) tablet 10 mg  10 mg Oral QHS    famotidine (PEPCID) tablet 20 mg  20 mg Oral DAILY    furosemide (LASIX) tablet 20 mg  20 mg Oral DAILY    gabapentin (NEURONTIN) capsule 300 mg  300 mg Oral DAILY    isosorbide mononitrate ER (IMDUR) tablet 30 mg  30 mg Oral DAILY    multivitamin, tx-iron-ca-min (THERA-M w/ IRON) tablet 1 Tab  1 Tab Oral DAILY    insulin lispro (HUMALOG) injection   SubCUTAneous AC&HS    glucose chewable tablet 16 g  4 Tab Oral PRN    glucagon (GLUCAGEN) injection 1 mg  1 mg IntraMUSCular PRN    dextrose (D50W) injection syrg 12.5-25 g  25-50 mL IntraVENous PRN    ondansetron (ZOFRAN) injection 4 mg  4 mg IntraVENous Q4H PRN    diphenhydrAMINE (BENADRYL) injection 25 mg  25 mg IntraVENous Q6H PRN    acetaminophen (TYLENOL) tablet 650 mg  650 mg Oral Q6H PRN    cefTRIAXone (ROCEPHIN) 1 g in sterile water (preservative free) 10 mL IV syringe  1 g IntraVENous Q12H    lactobacillus sp. 50 billion cpu (BIO-K PLUS) capsule 1 Cap  1 Cap Oral DAILY    0.9% sodium chloride infusion  75 mL/hr IntraVENous CONTINUOUS    [START ON 12/12/2019] vancomycin (VANCOCIN) 1250 mg in  ml infusion  1,250 mg IntraVENous Q36H    hydrALAZINE (APRESOLINE) 20 mg/mL injection 10 mg  10 mg IntraVENous Q6H PRN    sodium chloride (NS) flush 5-40 mL  5-40 mL IntraVENous Q8H    sodium chloride (NS) flush 5-40 mL  5-40 mL IntraVENous PRN    heparin (porcine) injection 5,000 Units  5,000 Units SubCUTAneous Q8H        Review of Systems:   As above otherwise 11 point review of systems negative including:  Constitutional: no fever or chills  Skin: denies rash or itching  HENT:  denies tinnitus, hearing loss  Eyes: denies diplopia , vision loss  Respiratory: denies shortness of breath  Cardiovascular: denies chest pain, dyspnea on exertion  Gastrointestinal: denies nausea, vomiting, diarrhea, constipation  Genitourinary: denies incontinence  Musculoskeletal: denies joint pain. +RLE edema.     Endocrine: denies weight change  Hematology: denies easy bruising or bleeding   Neurological: as above in HPI        Data:  Recent Results (from the past 24 hour(s))   HEMOGLOBIN A1C WITH EAG    Collection Time: 12/10/19 11:40 AM   Result Value Ref Range    Hemoglobin A1c 7.8 (H) 4.2 - 5.6 %    Est. average glucose 768 mg/dL   METABOLIC PANEL, BASIC    Collection Time: 12/10/19 11:40 AM   Result Value Ref Range    Sodium 138 136 - 145 mmol/L    Potassium 3.4 (L) 3.5 - 5.5 mmol/L    Chloride 94 (L) 100 - 111 mmol/L    CO2 37 (H) 21 - 32 mmol/L    Anion gap 7 3.0 - 18 mmol/L    Glucose 253 (H) 74 - 99 mg/dL    BUN 32 (H) 7.0 - 18 MG/DL    Creatinine 1.87 (H) 0.6 - 1.3 MG/DL    BUN/Creatinine ratio 17 12 - 20      GFR est AA 42 (L) >60 ml/min/1.73m2    GFR est non-AA 35 (L) >60 ml/min/1.73m2    Calcium 9.2 8.5 - 10.1 MG/DL   MAGNESIUM    Collection Time: 12/10/19 11:40 AM   Result Value Ref Range    Magnesium 2.5 1.6 - 2.6 mg/dL   PHOSPHORUS    Collection Time: 12/10/19 11:40 AM   Result Value Ref Range    Phosphorus 2.5 2.5 - 4.9 MG/DL   CBC WITH AUTOMATED DIFF    Collection Time: 12/10/19 11:40 AM   Result Value Ref Range    WBC 8.8 4.6 - 13.2 K/uL    RBC 6.27 (H) 4.70 - 5.50 M/uL    HGB 18.4 (H) 13.0 - 16.0 g/dL    HCT 56.0 (HH) 36.0 - 48.0 %    MCV 89.3 74.0 - 97.0 FL    MCH 29.3 24.0 - 34.0 PG    MCHC 32.9 31.0 - 37.0 g/dL    RDW 13.4 11.6 - 14.5 %    PLATELET 913 180 - 307 K/uL    MPV 11.1 9.2 - 11.8 FL    NEUTROPHILS 56 40 - 73 %    LYMPHOCYTES 28 21 - 52 %    MONOCYTES 12 (H) 3 - 10 %    EOSINOPHILS 3 0 - 5 %    BASOPHILS 1 0 - 2 %    ABS. NEUTROPHILS 4.9 1.8 - 8.0 K/UL    ABS. LYMPHOCYTES 2.5 0.9 - 3.6 K/UL    ABS. MONOCYTES 1.1 0.05 - 1.2 K/UL    ABS. EOSINOPHILS 0.3 0.0 - 0.4 K/UL    ABS.  BASOPHILS 0.1 0.0 - 0.1 K/UL    DF AUTOMATED     LACTIC ACID    Collection Time: 12/10/19 11:40 AM   Result Value Ref Range    Lactic acid 2.7 (HH) 0.4 - 2.0 MMOL/L   CARDIAC PANEL,(CK, CKMB & TROPONIN)    Collection Time: 12/10/19 11:40 AM   Result Value Ref Range     39 - 308 U/L    CK - MB <1.0 <3.6 ng/ml    CK-MB Index  0.0 - 4.0 %     CALCULATION NOT PERFORMED WHEN RESULT IS BELOW LINEAR LIMIT    Troponin-I, QT <0.02 0.0 - 0.045 NG/ML   GLUCOSE, POC    Collection Time: 12/10/19  3:59 PM   Result Value Ref Range    Glucose (POC) 210 (H) 70 - 110 mg/dL   GLUCOSE, POC    Collection Time: 12/10/19  9:10 PM   Result Value Ref Range    Glucose (POC) 175 (H) 70 - 494 mg/dL   METABOLIC PANEL, BASIC    Collection Time: 12/11/19  3:50 AM   Result Value Ref Range    Sodium 133 (L) 136 - 145 mmol/L    Potassium 4.5 3.5 - 5.5 mmol/L    Chloride 98 (L) 100 - 111 mmol/L    CO2 24 21 - 32 mmol/L    Anion gap 11 3.0 - 18 mmol/L    Glucose 225 (H) 74 - 99 mg/dL    BUN 31 (H) 7.0 - 18 MG/DL    Creatinine 1.90 (H) 0.6 - 1.3 MG/DL    BUN/Creatinine ratio 16 12 - 20      GFR est AA 41 (L) >60 ml/min/1.73m2    GFR est non-AA 34 (L) >60 ml/min/1.73m2    Calcium 8.7 8.5 - 10.1 MG/DL   GLUCOSE, POC    Collection Time: 12/11/19  6:30 AM   Result Value Ref Range    Glucose (POC) 258 (H) 70 - 110 mg/dL   GLUCOSE, POC    Collection Time: 12/11/19  7:44 AM   Result Value Ref Range    Glucose (POC) 220 (H) 70 - 110 mg/dL              Vital Signs:  Patient Vitals for the past 24 hrs:   Temp Pulse Resp BP SpO2   12/11/19 0746 97.3 °F (36.3 °C) 85 18 150/74 94 %   12/11/19 0400 97.7 °F (36.5 °C) 96 18 162/89 96 %   12/11/19 0000 97.9 °F (36.6 °C) 92 18 158/90 95 %   12/10/19 2000 97.8 °F (36.6 °C) 80 19 192/63 96 %   12/10/19 1600 97.8 °F (36.6 °C) 75 18 193/75 95 %   12/10/19 1158 97.3 °F (36.3 °C) 72 18 (!) 190/92 96 %         PHYSICAL EXAMINATION:      VITAL SIGNS:    Visit Vitals  /74 (BP 1 Location: Left arm, BP Patient Position: At rest)   Pulse 85   Temp 97.3 °F (36.3 °C)   Resp 18   Ht 5' 10\" (1.778 m)   Wt 86.2 kg (190 lb)   SpO2 94%   BMI 27.26 kg/m²       GENERAL: The patient is well developed, well nourished, and in no apparent distress. EXTREMITIES: No clubbing or cyanosis is identified. Muscle tone is normal.  Erythema and abrasions to RLE, also edema to the area. HEAD:   Ear, nose, and throat appear to be without trauma. The patient is normocephalic. Appears to have conjunctivitis. +systolic murmur over right sternal border, RRR. NEUROLOGIC EXAMINATION    MENTAL STATUS: The patient is awake, alert, and oriented x to self, month, year. States place as nursing home. Fund of knowledge is inadequate. Speech is fluent and memory appears to be impaired. Telling multiple stories but unreliable. Says he knows the president and he calls him sometimes. Speech is clear. CRANIAL NERVES: II  Visual fields are full to confrontation. Pupils are both 3 mm and briskly reactive to light and accommodation. III, IV, VI  Extraocular movements are intact and there is no nystagmus. V  Facial sensation is intact to light touch. VII  Face is symmetrical.   VIII - Hearing is present. IX, X, [address] rises symmetrically. Gag is present. Tongue is in the midline. XI - Shoulder shrugging and head turning intact  MOTOR:  The patient is 5/5 in all four limbs without any drift. Fine finger movements are symmetrical.  Isolated motor group testing reveals no focal abnormalities. Tone is normal.  Sensory examination is intact to light touch. Reflexes are 2+ and symmetrical. Plantars are down going. Cerebellar examination reveals no gross ataxia or dysmetria. Gait exam deferred. CT HEAD WO CONT 12/9/2019:  Result Information     Status: Final result (Exam End: 12/9/2019 18:34) Provider Status: Open   Study Result     EXAMINATION: CT head without contrast     INDICATION: Altered level of consciousness, unresponsive     COMPARISON: 11/6/2019     TECHNIQUE: CT head performed without contrast, with multiplanar reformations.   All CT scans at this facility are performed using dose optimization technique as  appropriate to a performed exam, to include automated exposure control,  adjustment of the mA and/or kV according to patient size (including appropriate  matching first site specific examinations), or use of iterative reconstruction  technique.     FINDINGS:     No focal mass effect or shift of midline structures. Mild cerebral cortical  atrophy. Ventricles are normal in size and configuration. No acute intracranial  hemorrhage identified. Mild burden of periventricular white matter  low-attenuation. Probable small remote infarcts in the left parieto-occipital  cortex, high left parietal cortex, and left frontal subcortex, similar to prior.     Calvarium intact. Imaged paranasal sinuses and mastoids appear well-aerated. Superficial soft tissues unremarkable.     IMPRESSION  IMPRESSION:     No clearly acute findings.     Probable small remote infarcts in the left hemisphere similar to prior as  described. -Mild amount of periventricular low-attenuation, likely chronic microvascular  ischemic change. MRI BRAIN WO CONT 12/10/2019:  Result Information     Status: Final result (Exam End: 12/10/2019 18:27) Provider Status: Open   Study Result     Brain MR without contrast     History: Found unresponsive, possible seizure     Comparison: Current and prior head CT; previous MRI August 2018     Technique: Multisequence multiplanar MR imaging acquired through the brain. Includes diffusion imaging and heme sensitive imaging.     Contrast used: None     Findings:      Parenchyma: Diffusion imaging normal. No acute infarction. Same pattern of small  regions of remote lacunar infarct at the left periventricular, left parietal,  and left occipital lobe areas. Mild amount of chronic periventricular white  matter disease with T2 and FLAIR signal increase also unchanged.  No acute  hemorrhage.      CSF spaces: Ventricles and cisterns remain midline in position     IAC regions: Unremarkable     Parasellar region: Unremarkable     Vasculature: Redemonstration of absent flow void of the left skull base carotid  artery. Other flow voids unremarkable.     Cervicomedullary junction: Patent     Orbits: Unremarkable     Paranasal sinuses: Clear            IMPRESSION  IMPRESSION:     1. No acute intracranial hemorrhage or territorial infarct. No mass effect.      2. Stable pattern of remote ischemic findings and chronically occluded left ICA.     Thank you for enabling us to participate in the care of this patient.          Assessment/Plan: This is an 57-year-old male with history to include stroke, hypertension, hyperlipidemia, diabetes, vascular dementia, COPD, depression, CKD, CAD, carotid artery disease, anxiety, polycythemia, squamous cell carcinoma, and arthritis. Considerations include syncopal spell versus seizure. He is currently feeling well. MRI showed no acute intracranial abnormalities. He will have an EEG which is pending. There was an episode suspicious for seizure back in 2017 per notes which was in the setting of Wellbutrin but no further seizure history noted. Will await the EEG.       Chloé Ruvalcaba NP

## 2019-12-11 NOTE — DIABETES MGMT
GLYCEMIC CONTROL PLAN OF CARE     Assessment/Recommendations:  Blood glucose elevated above targets, Lantus insulin being added, continue to adjust as needed  Correctional Lispro advanced to the very resistant scale  Continue inpatient monitoring and intervention     Most recent blood glucose values:  12/10/2019 15:59 12/10/2019 21:10 12/11/2019 06:30 12/11/2019 07:44   210 (H) 175 (H) 258 (H) 220 (H)     Current A1C of 7.8% is equivalent to average blood glucose of 177 mg/dl over the past 2-3 months.s.     Current hospital diabetes medications:   Correctional Lispro insulin 4 times daily ACHS    Previous day's insulin requirements:   6 units of Lispro insulin     Home diabetes medications:  Levemir insulin 60 units daily   Lispro insulin per sliding scale     Diet:  Cardiac, no concentrated sweets     Education:  ____Refer to Diabetes Education Record             __x__Education not indicated at this time      Carolina Davis RD, CDE

## 2019-12-11 NOTE — ROUTINE PROCESS
Pt has old surgical scar in med chest area. He has 2 ulcers on his right calf , the right calf is red from the top of the foot to the mid calf. He has 1 to 2 plus edema in the right foot. The ulcer is on the back and side of his right calf. He has a pink area in his mid back and a small skin tear on his left isthmus area. In addition, he has several scabs on his lower extremities along with very dry skin.

## 2019-12-11 NOTE — PROGRESS NOTES
0745: Received report from Saadia Alcantara RN using SBAR. 1130: MD brought to this RN's attention that pt had bloody toe on L foot. Toe was intact during AM shift assessment. Upon inspection, pt with skin tear between big toe and neighboring toe on the neighboring toe. Per pt \"I have tiny bugs crawling through my veins and one got out and was coming out from between my toes but I caught it by the tail. \" Visual Inspection shows no parasitic entities on pt's body. Pt also pointing at clock saying \"They're about to blow this whole ship out of the water & those people need to stay out of that corridor. It's gonna blow this whole thing up. \" MD aware of hallucinations/delusions. Toe cleaned with dermawound cleanser and wrapped with gauze to prevent further picking at skin tear. 1910: Bedside shift change report given to Saadia Alcantara (oncoming nurse) by Margette Bernheim (offgoing nurse). Report included the following information SBAR.

## 2019-12-11 NOTE — ROUTINE PROCESS
Bedside shift change report given to Cate Carrizales (oncoming nurse) by Leyla Dickens (offgoing nurse). Report included the following information SBAR, Kardex, Intake/Output, MAR and Recent Results.

## 2019-12-11 NOTE — CONSULTS
Patient seen and briefly examined. Full consultation with Sarah Stinson NP to follow up. Essentially the patient who is a resident of a local assisted living facility he was walking and had a syncopal spell. He apparently recovered quickly and there were no descriptions of convulsions, incontinence, tongue biting to suspect this was specifically a primary CNS event/seizure. He has been back to his baseline. MRI of the brain showed mild amount of chronic periventricular white matter disease and a chronically occluded left internal carotid artery without acute changes. An EEG is pending. Will make further recommendations after his completed, but agree with neurologist recommendations to hold off on antiepileptics given lack of corroboration of a seizure disorder. Anticipate he may go back home later this afternoon.

## 2019-12-11 NOTE — PROGRESS NOTES
[facility] Hospitalist Group  Progress Note    Patient: Gamaliel Garcia. Age: 82 y.o. : 1935 MR#: [id number] SSN: [id number]  Date/Time: 2019     Subjective:     Patient seen and evaluated, lying in bed, no acute distress. Noted events from memory care unit where patient was found to be unresponsive on the floorcurrently appears to be at baseline  He has severe dementia          Assessment/Plan:     1. New onset seizure -neurology consulted, MRI brainreviewed , Per Neuro unclear event that happened - ? Syncope - ok to discharge to memory care unit   2. History of vascular dementia and memory loss  3. Cellulitis right lower extremitycontinue IV antibiotics, vancomycin and Rocephin, redness has improved   4. History of chronic kidney diseasestage III, monitor creatinine levels  5. Lactic acidosislikely secondary to cellulitis right lower extremity, continue IV antibiotics  6. Polycythemia with elevated hemoglobin levelsmonitor  7. Poor oral intake with dehydrationIV fluids  8. Resume PTA medications  DVT prophylaxisheparin  Full code  Daughter brought in [address] [place] [address] paperwork, currently;y on chart           Case discussed with:  []Patient  [x]Family  []Nursing  []Case Management  DVT Prophylaxis:  []Lovenox  [x]Hep SQ  []SCDs  []Coumadin   []On Heparin gtt    Objective:   VS:   Visit Vitals  /79 (BP 1 Location: Left arm, BP Patient Position: At rest)   Pulse 97   Temp 97.5 °F (36.4 °C)   Resp 18   Ht 5' 10\" (1.778 m)   Wt 86.2 kg (190 lb)   SpO2 98%   BMI 27.26 kg/m²      Tmax/24hrs: Temp (24hrs), Av.7 °F (36.5 °C), Min:97.3 °F (36.3 °C), Max:97.9 °F (36.6 °C)  IOBRIEF    Intake/Output Summary (Last 24 hours) at 2019 1400  Last data filed at 2019 1301  Gross per 24 hour   Intake    Output 1650 ml   Net -1650 ml       General:  Alert, cooperative, no acute distress    HEENT: PERRLA, anicteric sclerae. Pulmonary:  CTA Bilaterally.  No Wheezing/Rhonchi/Rales. Cardiovascular: Regular rate and Rhythm. GI:  Soft, Non distended, Non tender. + Bowel sounds. Extremities:  R LE cellulitis   Neurologic: Alert and oriented X 3. No acute neuro deficits.   Additional:    Medications:   Current Facility-Administered Medications   Medication Dose Route Frequency    insulin glargine (LANTUS) injection 10 Units  10 Units SubCUTAneous DAILY    ceFAZolin (ANCEF) 1 g in sterile water (preservative free) 10 mL IV syringe  1 g IntraVENous Q12H    cycloSPORINE (RESTASIS) 0.05 % ophthalmic emulsion 1 Drop  1 Drop Both Eyes Q12H    erythromycin (ILOTYCIN) 5 mg/gram (0.5 %) ophthalmic ointment   Both Eyes Q8H    ezetimibe (ZETIA) tablet 10 mg  10 mg Oral QHS    famotidine (PEPCID) tablet 20 mg  20 mg Oral DAILY    furosemide (LASIX) tablet 20 mg  20 mg Oral DAILY    gabapentin (NEURONTIN) capsule 300 mg  300 mg Oral DAILY    isosorbide mononitrate ER (IMDUR) tablet 30 mg  30 mg Oral DAILY    multivitamin, tx-iron-ca-min (THERA-M w/ IRON) tablet 1 Tab  1 Tab Oral DAILY    insulin lispro (HUMALOG) injection   SubCUTAneous AC&HS    glucose chewable tablet 16 g  4 Tab Oral PRN    glucagon (GLUCAGEN) injection 1 mg  1 mg IntraMUSCular PRN    dextrose (D50W) injection syrg 12.5-25 g  25-50 mL IntraVENous PRN    ondansetron (ZOFRAN) injection 4 mg  4 mg IntraVENous Q4H PRN    diphenhydrAMINE (BENADRYL) injection 25 mg  25 mg IntraVENous Q6H PRN    acetaminophen (TYLENOL) tablet 650 mg  650 mg Oral Q6H PRN    lactobacillus sp. 50 billion cpu (BIO-K PLUS) capsule 1 Cap  1 Cap Oral DAILY    0.9% sodium chloride infusion  75 mL/hr IntraVENous CONTINUOUS    [START ON 12/12/2019] vancomycin (VANCOCIN) 1250 mg in  ml infusion  1,250 mg IntraVENous Q36H    hydrALAZINE (APRESOLINE) 20 mg/mL injection 10 mg  10 mg IntraVENous Q6H PRN    sodium chloride (NS) flush 5-40 mL  5-40 mL IntraVENous Q8H    sodium chloride (NS) flush 5-40 mL  5-40 mL IntraVENous PRN  heparin (porcine) injection 5,000 Units  5,000 Units SubCUTAneous Q8H       Labs:    Recent Results (from the past 48 hour(s))   CBC WITH AUTOMATED DIFF    Collection Time: 12/09/19  5:44 PM   Result Value Ref Range    WBC 9.1 4.6 - 13.2 K/uL    RBC 6.28 (H) 4.70 - 5.50 M/uL    HGB 18.8 (H) 13.0 - 16.0 g/dL    HCT 55.5 (HH) 36.0 - 48.0 %    MCV 88.4 74.0 - 97.0 FL    MCH 29.9 24.0 - 34.0 PG    MCHC 33.9 31.0 - 37.0 g/dL    RDW 13.5 11.6 - 14.5 %    PLATELET 412 190 - 901 K/uL    MPV 11.1 9.2 - 11.8 FL    NEUTROPHILS 60 42 - 75 %    LYMPHOCYTES 30 20 - 51 %    MONOCYTES 7 2 - 9 %    EOSINOPHILS 2 0 - 5 %    BASOPHILS 1 0 - 3 %    ABS. NEUTROPHILS 5.5 1.8 - 8.0 K/UL    ABS. LYMPHOCYTES 2.7 0.8 - 3.5 K/UL    ABS. MONOCYTES 0.6 0 - 1.0 K/UL    ABS. EOSINOPHILS 0.2 0.0 - 0.4 K/UL    ABS. BASOPHILS 0.1 (H) 0.0 - 0.06 K/UL    DF AUTOMATED      PLATELET COMMENTS ADEQUATE PLATELETS      RBC COMMENTS NORMOCYTIC, NORMOCHROMIC     METABOLIC PANEL, COMPREHENSIVE    Collection Time: 12/09/19  5:44 PM   Result Value Ref Range    Sodium 136 136 - 145 mmol/L    Potassium 3.4 (L) 3.5 - 5.5 mmol/L    Chloride 94 (L) 100 - 111 mmol/L    CO2 37 (H) 21 - 32 mmol/L    Anion gap 5 3.0 - 18 mmol/L    Glucose 195 (H) 74 - 99 mg/dL    BUN 36 (H) 7.0 - 18 MG/DL    Creatinine 2.15 (H) 0.6 - 1.3 MG/DL    BUN/Creatinine ratio 17 12 - 20      GFR est AA 36 (L) >60 ml/min/1.73m2    GFR est non-AA 30 (L) >60 ml/min/1.73m2    Calcium 9.4 8.5 - 10.1 MG/DL    Bilirubin, total 1.0 0.2 - 1.0 MG/DL    ALT (SGPT) 31 16 - 61 U/L    AST (SGOT) 23 10 - 38 U/L    Alk.  phosphatase 162 (H) 45 - 117 U/L    Protein, total 8.1 6.4 - 8.2 g/dL    Albumin 4.2 3.4 - 5.0 g/dL    Globulin 3.9 2.0 - 4.0 g/dL    A-G Ratio 1.1 0.8 - 1.7     CARDIAC PANEL,(CK, CKMB & TROPONIN)    Collection Time: 12/09/19  5:44 PM   Result Value Ref Range     39 - 308 U/L    CK - MB <1.0 <3.6 ng/ml    CK-MB Index  0.0 - 4.0 %     CALCULATION NOT PERFORMED WHEN RESULT IS BELOW LINEAR LIMIT    Troponin-I, QT <0.02 0.0 - 0.045 NG/ML   TSH 3RD GENERATION    Collection Time: 12/09/19  5:44 PM   Result Value Ref Range    TSH 0.95 0.36 - 3.74 uIU/mL   EKG, 12 LEAD, INITIAL    Collection Time: 12/09/19  5:47 PM   Result Value Ref Range    Ventricular Rate 70 BPM    Atrial Rate 70 BPM    P-R Interval 168 ms    QRS Duration 98 ms    Q-T Interval 414 ms    QTC Calculation (Bezet) 447 ms    Calculated P Axis 65 degrees    Calculated R Axis 47 degrees    Calculated T Axis 59 degrees    Diagnosis       Normal sinus rhythm  Minimal voltage criteria for LVH, may be normal variant  Nonspecific T wave abnormality  Borderline ECG  When compared with ECG of 10-OCT-2019 22:57,  No significant change was found  Confirmed by Sravani Hutchins MD, ----- ([id number]) on 12/10/2019 6:43:11 PM     URINALYSIS W/ RFLX MICROSCOPIC    Collection Time: 12/09/19  5:57 PM   Result Value Ref Range    Color YELLOW      Appearance CLEAR      Specific gravity 1.017 1.005 - 1.030      pH (UA) 5.0 5.0 - 8.0      Protein NEGATIVE  NEG mg/dL    Glucose 250 (A) NEG mg/dL    Ketone NEGATIVE  NEG mg/dL    Bilirubin NEGATIVE  NEG      Blood NEGATIVE  NEG      Urobilinogen 1.0 0.2 - 1.0 EU/dL    Nitrites NEGATIVE  NEG      Leukocyte Esterase NEGATIVE  NEG     CULTURE, URINE    Collection Time: 12/09/19  5:57 PM   Result Value Ref Range    Special Requests: NO SPECIAL REQUESTS      Culture result: NO GROWTH 2 DAYS     AMMONIA    Collection Time: 12/09/19  7:54 PM   Result Value Ref Range    Ammonia 13 11 - 32 UMOL/L   GLUCOSE, POC    Collection Time: 12/09/19  9:38 PM   Result Value Ref Range    Glucose (POC) 128 (H) 70 - 110 mg/dL   CULTURE, WOUND W GRAM STAIN    Collection Time: 12/09/19 11:00 PM   Result Value Ref Range    Special Requests: NO SPECIAL REQUESTS      GRAM STAIN MODERATE WBC'S      GRAM STAIN NO ORGANISMS SEEN      Culture result: RARE STAPHYLOCOCCUS SPECIES (A)      Culture result: CULTURE IN PROGRESS,FURTHER UPDATES TO FOLLOW      Culture result: RARE POSSIBLE STREPTOCOCCI, ALPHA HEMOLYTIC (A)      Culture result: FEW DIPHTHEROIDS (TWO MORPHOTYPES) (A)     HEMOGLOBIN A1C WITH EAG    Collection Time: 12/10/19 11:40 AM   Result Value Ref Range    Hemoglobin A1c 7.8 (H) 4.2 - 5.6 %    Est. average glucose 385 mg/dL   METABOLIC PANEL, BASIC    Collection Time: 12/10/19 11:40 AM   Result Value Ref Range    Sodium 138 136 - 145 mmol/L    Potassium 3.4 (L) 3.5 - 5.5 mmol/L    Chloride 94 (L) 100 - 111 mmol/L    CO2 37 (H) 21 - 32 mmol/L    Anion gap 7 3.0 - 18 mmol/L    Glucose 253 (H) 74 - 99 mg/dL    BUN 32 (H) 7.0 - 18 MG/DL    Creatinine 1.87 (H) 0.6 - 1.3 MG/DL    BUN/Creatinine ratio 17 12 - 20      GFR est AA 42 (L) >60 ml/min/1.73m2    GFR est non-AA 35 (L) >60 ml/min/1.73m2    Calcium 9.2 8.5 - 10.1 MG/DL   MAGNESIUM    Collection Time: 12/10/19 11:40 AM   Result Value Ref Range    Magnesium 2.5 1.6 - 2.6 mg/dL   PHOSPHORUS    Collection Time: 12/10/19 11:40 AM   Result Value Ref Range    Phosphorus 2.5 2.5 - 4.9 MG/DL   CBC WITH AUTOMATED DIFF    Collection Time: 12/10/19 11:40 AM   Result Value Ref Range    WBC 8.8 4.6 - 13.2 K/uL    RBC 6.27 (H) 4.70 - 5.50 M/uL    HGB 18.4 (H) 13.0 - 16.0 g/dL    HCT 56.0 (HH) 36.0 - 48.0 %    MCV 89.3 74.0 - 97.0 FL    MCH 29.3 24.0 - 34.0 PG    MCHC 32.9 31.0 - 37.0 g/dL    RDW 13.4 11.6 - 14.5 %    PLATELET 879 633 - 019 K/uL    MPV 11.1 9.2 - 11.8 FL    NEUTROPHILS 56 40 - 73 %    LYMPHOCYTES 28 21 - 52 %    MONOCYTES 12 (H) 3 - 10 %    EOSINOPHILS 3 0 - 5 %    BASOPHILS 1 0 - 2 %    ABS. NEUTROPHILS 4.9 1.8 - 8.0 K/UL    ABS. LYMPHOCYTES 2.5 0.9 - 3.6 K/UL    ABS. MONOCYTES 1.1 0.05 - 1.2 K/UL    ABS. EOSINOPHILS 0.3 0.0 - 0.4 K/UL    ABS.  BASOPHILS 0.1 0.0 - 0.1 K/UL    DF AUTOMATED     LACTIC ACID    Collection Time: 12/10/19 11:40 AM   Result Value Ref Range    Lactic acid 2.7 (HH) 0.4 - 2.0 MMOL/L   CARDIAC PANEL,(CK, CKMB & TROPONIN)    Collection Time: 12/10/19 11:40 AM   Result Value Ref Range     39 - 308 U/L    CK - MB <1.0 <3.6 ng/ml    CK-MB Index  0.0 - 4.0 %     CALCULATION NOT PERFORMED WHEN RESULT IS BELOW LINEAR LIMIT    Troponin-I, QT <0.02 0.0 - 0.045 NG/ML   GLUCOSE, POC    Collection Time: 12/10/19  3:59 PM   Result Value Ref Range    Glucose (POC) 210 (H) 70 - 110 mg/dL   GLUCOSE, POC    Collection Time: 12/10/19  9:10 PM   Result Value Ref Range    Glucose (POC) 175 (H) 70 - 938 mg/dL   METABOLIC PANEL, BASIC    Collection Time: 12/11/19  3:50 AM   Result Value Ref Range    Sodium 133 (L) 136 - 145 mmol/L    Potassium 4.5 3.5 - 5.5 mmol/L    Chloride 98 (L) 100 - 111 mmol/L    CO2 24 21 - 32 mmol/L    Anion gap 11 3.0 - 18 mmol/L    Glucose 225 (H) 74 - 99 mg/dL    BUN 31 (H) 7.0 - 18 MG/DL    Creatinine 1.90 (H) 0.6 - 1.3 MG/DL    BUN/Creatinine ratio 16 12 - 20      GFR est AA 41 (L) >60 ml/min/1.73m2    GFR est non-AA 34 (L) >60 ml/min/1.73m2    Calcium 8.7 8.5 - 10.1 MG/DL   GLUCOSE, POC    Collection Time: 12/11/19  6:30 AM   Result Value Ref Range    Glucose (POC) 258 (H) 70 - 110 mg/dL   GLUCOSE, POC    Collection Time: 12/11/19  7:44 AM   Result Value Ref Range    Glucose (POC) 220 (H) 70 - 110 mg/dL   GLUCOSE, POC    Collection Time: 12/11/19 11:37 AM   Result Value Ref Range    Glucose (POC) 300 (H) 70 - 110 mg/dL       Signed By: Lila Kenney MD     December 11, 2019

## 2019-12-12 VITALS
TEMPERATURE: 98.2 F | DIASTOLIC BLOOD PRESSURE: 84 MMHG | WEIGHT: 195.1 LBS | BODY MASS INDEX: 27.93 KG/M2 | SYSTOLIC BLOOD PRESSURE: 189 MMHG | HEART RATE: 88 BPM | RESPIRATION RATE: 20 BRPM | HEIGHT: 70 IN | OXYGEN SATURATION: 96 %

## 2019-12-12 LAB
ANION GAP SERPL CALC-SCNC: 7 MMOL/L (ref 3–18)
BUN SERPL-MCNC: 26 MG/DL (ref 7–18)
BUN/CREAT SERPL: 16 (ref 12–20)
CALCIUM SERPL-MCNC: 8.3 MG/DL (ref 8.5–10.1)
CHLORIDE SERPL-SCNC: 98 MMOL/L (ref 100–111)
CO2 SERPL-SCNC: 32 MMOL/L (ref 21–32)
CREAT SERPL-MCNC: 1.65 MG/DL (ref 0.6–1.3)
GLUCOSE BLD STRIP.AUTO-MCNC: 220 MG/DL (ref 70–110)
GLUCOSE BLD STRIP.AUTO-MCNC: 254 MG/DL (ref 70–110)
GLUCOSE SERPL-MCNC: 199 MG/DL (ref 74–99)
POTASSIUM SERPL-SCNC: 3.9 MMOL/L (ref 3.5–5.5)
SODIUM SERPL-SCNC: 137 MMOL/L (ref 136–145)

## 2019-12-12 PROCEDURE — 74011250636 HC RX REV CODE- 250/636: Performed by: HOSPITALIST

## 2019-12-12 PROCEDURE — 74011636637 HC RX REV CODE- 636/637: Performed by: HOSPITALIST

## 2019-12-12 PROCEDURE — 36415 COLL VENOUS BLD VENIPUNCTURE: CPT

## 2019-12-12 PROCEDURE — 80048 BASIC METABOLIC PNL TOTAL CA: CPT

## 2019-12-12 PROCEDURE — 74011000250 HC RX REV CODE- 250: Performed by: HOSPITALIST

## 2019-12-12 PROCEDURE — 82962 GLUCOSE BLOOD TEST: CPT

## 2019-12-12 PROCEDURE — 74011250637 HC RX REV CODE- 250/637: Performed by: HOSPITALIST

## 2019-12-12 RX ORDER — CEPHALEXIN 500 MG/1
500 CAPSULE ORAL 4 TIMES DAILY
Qty: 28 CAP | Refills: 0 | Status: SHIPPED | OUTPATIENT
Start: 2019-12-12 | End: 2019-12-19

## 2019-12-12 RX ADMIN — WATER 1 G: 1 INJECTION INTRAMUSCULAR; INTRAVENOUS; SUBCUTANEOUS at 14:30

## 2019-12-12 RX ADMIN — INSULIN LISPRO 9 UNITS: 100 INJECTION, SOLUTION INTRAVENOUS; SUBCUTANEOUS at 11:41

## 2019-12-12 RX ADMIN — Medication 10 ML: at 06:38

## 2019-12-12 RX ADMIN — INSULIN LISPRO 6 UNITS: 100 INJECTION, SOLUTION INTRAVENOUS; SUBCUTANEOUS at 09:09

## 2019-12-12 RX ADMIN — MULTIPLE VITAMINS W/ MINERALS TAB 1 TABLET: TAB at 09:08

## 2019-12-12 RX ADMIN — ISOSORBIDE MONONITRATE 30 MG: 30 TABLET, EXTENDED RELEASE ORAL at 09:08

## 2019-12-12 RX ADMIN — GABAPENTIN 300 MG: 300 CAPSULE ORAL at 09:08

## 2019-12-12 RX ADMIN — CYCLOSPORINE 1 DROP: 0.5 EMULSION OPHTHALMIC at 09:09

## 2019-12-12 RX ADMIN — INSULIN GLARGINE 10 UNITS: 100 INJECTION, SOLUTION SUBCUTANEOUS at 09:09

## 2019-12-12 RX ADMIN — ERYTHROMYCIN: 5 OINTMENT OPHTHALMIC at 14:31

## 2019-12-12 RX ADMIN — ERYTHROMYCIN: 5 OINTMENT OPHTHALMIC at 06:38

## 2019-12-12 RX ADMIN — HEPARIN SODIUM 5000 UNITS: 5000 INJECTION INTRAVENOUS; SUBCUTANEOUS at 00:21

## 2019-12-12 RX ADMIN — FUROSEMIDE 20 MG: 20 TABLET ORAL at 09:08

## 2019-12-12 RX ADMIN — WATER 1 G: 1 INJECTION INTRAMUSCULAR; INTRAVENOUS; SUBCUTANEOUS at 02:21

## 2019-12-12 RX ADMIN — HEPARIN SODIUM 5000 UNITS: 5000 INJECTION INTRAVENOUS; SUBCUTANEOUS at 09:09

## 2019-12-12 RX ADMIN — FAMOTIDINE 20 MG: 20 TABLET ORAL at 09:08

## 2019-12-12 RX ADMIN — VANCOMYCIN HYDROCHLORIDE 1250 MG: 10 INJECTION, POWDER, LYOPHILIZED, FOR SOLUTION INTRAVENOUS at 02:21

## 2019-12-12 RX ADMIN — Medication 1 CAPSULE: at 09:08

## 2019-12-12 RX ADMIN — HYDRALAZINE HYDROCHLORIDE 10 MG: 20 INJECTION INTRAMUSCULAR; INTRAVENOUS at 14:30

## 2019-12-12 RX ADMIN — Medication 10 ML: at 14:31

## 2019-12-12 NOTE — ROUTINE PROCESS
Interdisciplinary Round Note Patient Information: 
 Reddy Middleton. 
 250/74 Reason for Admission: New onset seizure ([address] [R56.9] Attending Provider:   Lisette Keane MD 
Primary Care Physician: 
     None None Estimated discharge date:  12/12/19 Hospital day: 3 
[de-identified] 2d 7h 
RRAT Score: High Risk  Angelique Floresy Total Score 3 Has Seen PCP in Last 6 Months (Yes=3, No=0)  
 5 Pt. Coverage (Medicare=5 , Medicaid, or Self-Pay=4) 28 Charlson Comorbidity Score (Age + Comorbid Conditions) Criteria that do not apply:  
 . Living with Significant Other. Assisted Living. LTAC. SNF. or  
Rehab Patient Length of Stay (>5 days = 3) IP Visits Last 12 Months (1-3=4, 4=9, >4=11) normal sinus rhythm No 
n/a Other n/a Not needed Lines, Drains, & Airways None IV Antibiotics:   
Current Antimicrobial Therapy (168h ago, onward) Ordered     Start Stop  
 12/10/19 1157  vancomycin (VANCOCIN) 1250 mg in  ml infusion  1,250 mg,   IntraVENous,   EVERY 36 HOURS    
 12/12/19 0200 --  
 12/12/19 1117  cephALEXin (KEFLEX) 500 mg capsule  500 mg,   Oral,   4 TIMES DAILY    
 12/12/19 0000 12/19/19 2359  
 12/11/19 1146  ceFAZolin (ANCEF) 1 g in sterile water (preservative free) 10 mL IV syringe  1 g,   IntraVENous,   EVERY 12 HOURS    
 12/11/19 1400 12/16/19 1359 GI Prophylaxis: GI Prophylaxis: no 
 Type: n/a      Recent Glucose Results:  
Lab Results Component Value Date/Time  (H) 12/12/2019 01:26 AM  
 GLUCPOC 254 (H) 12/12/2019 11:32 AM  
 GLUCPOC 220 (H) 12/12/2019 06:09 AM  
 GLUCPOC 224 (H) 12/11/2019 09:33 PM  
  
Activity Level: Activity Level: Up with Assistance Needs assistance with ADLs: no 
 
 
 Goals for Today: discharge Recommendations:  
Discharge Disposition: [facility] Needs for Discharge:  IDR Team: Dr. Love Galeas, RN Recommendations from IDR team: discharge Other Notes:

## 2019-12-12 NOTE — PROGRESS NOTES
Verified with Asmita Dela Cruz that patient may return to [facility]. Informed of time of transport for 315pm    Nursing staff informed.   CALL report to [facility], [facility] Management  [phone]

## 2019-12-12 NOTE — PROGRESS NOTES
Discharge summary and two new prescriptions faxed to [facility] at [address] [place] [address], [address] [place] [address]  [phone]

## 2019-12-12 NOTE — DISCHARGE SUMMARY
Discharge Summary    Patient: Droa Mackey MRN: [id number]  CSN: [id number]    YOB: 1935  Age: 82 y.o. Sex: male    DOA: 12/9/2019 LOS:  LOS: 3 days   Discharge Date:      Admission Diagnoses: New onset seizure ([facility] [address] [R56.9]    Discharge Diagnoses:    Syncope unclear etiology  History of polycythemia  History of vascular dementia  Cellulitis right lower extremity  Lactic acidosis secondary to cellulitis  Chronic kidney disease stage III  History of GERD  History of type 2 diabetes  History of hyperlipidemia    Discharge Condition: Stable    Consults: Neurology    PHYSICAL EXAM  Visit Vitals  /81 (BP 1 Location: Right arm, BP Patient Position: At rest;Supine)   Pulse 76   Temp 97.6 °F (36.4 °C)   Resp 16   Ht 5' 10\" (1.778 m)   Wt 88.5 kg (195 lb 1.6 oz)   SpO2 95%   BMI 27.99 kg/m²       General: Alert, cooperative, no acute distress    HEENT: PERRLA, EOMI. Anicteric sclerae. Lungs:  CTA Bilaterally. No Wheezing/Rhonchi/Rales. Heart:  Regular rate and Rhythm. Abdomen: Soft, Non distended, Non tender. + Bowel sounds. Extremities: No edema/ cyanosis/ clubbing  Psych:   Good insight. Not anxious or agitated. Neurologic:  Alert and awake, vascular dementia                              HPI:  oDra Mackey is a 82 y.o. male with the medical history below who resides at [address] [place] [address] at [facility]. On 12/9/2019, the patient was found unresponsive. EMS was called. Last well seen was 4 to 6 hours. He presented to [facility] ER. In the ED he remained unresponsive with pinpoint pupils and eyes tracking as awake, but not responsive. /91, HR 69, afebrile, pulse ox 97%, blood glucose 270s. EKG unremarkable. Mild tremor right arm. Tele Neuro was consulted and at time of consult, patient became awake and conversant. Possibly seizure with prolonged post ictal period: MRI and EEG recommended and no anti-convulsants for now.  Patient was admitted to the Neurology Telemetry Unit.      Hospital Course:   82-year-old male with a significant history of vascular dementia, resident of memory care unit at [facility] presented to the emergency room after the patient was found unresponsive on the floor. In the ER the patient was unresponsive with pinpoint pupils and eyes tracking is awake. Telemetry neurology was consulted and at the time of consult the patient was awake and conversant. The patient was admitted to the hospital with the working diagnosis of syncope versus seizure and neurology was consulted for further evaluation. The patient underwent a CT of the head which did not show any acute infarct. This was followed by an MRI of the brain which also did not show any acute infarct. The patient has been at his baseline since his hospital admission and has not had any further episodes of syncope/unresponsiveness. He was also noted to have a right lower extremity cellulitis with redness and scab formation. It appears that the patient has been pulling at his skin which is causing it to bleed and then causing scab formation. He was started on IV antibiotics namely vancomycin and Rocephin and the redness in his leg has improved. Wound culture grew out staph simulans and is currently sensitive to Keflex. The patient is being discharged on Keflex for 7 days. He will also get probiotics. The patient is being discharged back to [address] [place] [address] today and is advised to follow-up with his primary care physician in 2 weeks. Imaging:  CT head   IMPRESSION:     No clearly acute findings.     Probable small remote infarcts in the left hemisphere similar to prior as  described. -Mild amount of periventricular low-attenuation, likely chronic microvascular  ischemic change. MRI Brain  IMPRESSION:     1. No acute intracranial hemorrhage or territorial infarct.  No mass effect.      2. Stable pattern of remote ischemic findings and chronically occluded left ICA.     Thank you for enabling us to participate in the care of this patient.       Procedures:   EEG        Discharge Medications:     Current Discharge Medication List      START taking these medications    Details   lactobacillus sp. 50 billion cpu (BIO-K PLUS) 50 billion cell -375 mg cap capsule Take 1 Cap by mouth daily. Qty: 30 Cap, Refills: 0      cephALEXin (KEFLEX) 500 mg capsule Take 1 Cap by mouth four (4) times daily for 7 days. Qty: 28 Cap, Refills: 0         CONTINUE these medications which have NOT CHANGED    Details   insulin detemir U-100 (LEVEMIR U-100 INSULIN) 100 unit/mL injection 60 Units by SubCUTAneous route daily. Indications: type 2 diabetes mellitus      aspirin delayed-release 81 mg tablet Take 1 Tab by mouth daily. Indications: Cerebral Thromboembolism Prevention  Qty: 30 Tab, Refills: 0      cycloSPORINE (RESTASIS) 0.05 % ophthalmic emulsion Administer 1 Drop to both eyes every twelve (12) hours. Indications: Dry Eye  Qty: 15 Each, Refills: 0      gabapentin (NEURONTIN) 300 mg capsule Take 1 Cap by mouth daily. Indications: NEUROPATHIC PAIN, Restless Legs Syndrome  Qty: 30 Cap, Refills: 0      furosemide (LASIX) 20 mg tablet Take 1 Tab by mouth daily. Indications: Edema  Qty: 30 Tab, Refills: 0      famotidine (PEPCID) 20 mg tablet Take 1 Tab by mouth two (2) times a day. Indications: PREVENTION OF STRESS ULCER  Qty: 30 Tab, Refills: 0      ezetimibe (ZETIA) 10 mg tablet Take 1 Tab by mouth nightly. erythromycin (ILOTYCIN) ophthalmic ointment Apply to both lower eyelids four times a day until done. Qty: 1 Tube, Refills: 0      isosorbide mononitrate ER (IMDUR) 30 mg tablet Take 1 Tab by mouth daily.   Qty: 30 Tab, Refills: 0    Associated Diagnoses: Essential hypertension      multivit-min-FA-lycopen-lutein (CENTRUM SILVER) 0.4-300-250 mg-mcg-mcg tab Take 1 tab daily  Qty: 30 Tab, Refills: 0      OTHER Incentive spirometry- Use as directed  Qty: 1 Each, Refills: 0    Associated Diagnoses: Influenza B insulin lispro (HUMALOG KWIKPEN INSULIN) 200 unit/mL (3 mL) inpn For Blood Sugar (mg/dL) of:     Less than 150 =   0 units           150 -199 =   2 units  200 -249 =   4 units  250 -299 =   6 units  300 -349 =   8 units  350 and above =   10 units  Check BG then Inject insulin per SS 3 times daily before meals and at bedtime  Indications: type 2 diabetes mellitus  Qty: 1 Pen, Refills: 0      CALCIUM CARBONATE (CALTRATE 600 PO) Take 1 Tab by mouth daily.          STOP taking these medications       QUEtiapine (SEROQUEL) 25 mg tablet Comments:   Reason for Stopping:               Current Facility-Administered Medications:     insulin glargine (LANTUS) injection 10 Units, 10 Units, SubCUTAneous, DAILY, True Jones MD, 10 Units at 12/12/19 0909    ceFAZolin (ANCEF) 1 g in sterile water (preservative free) 10 mL IV syringe, 1 g, IntraVENous, Q12H, Raenette Long A, DO, 1 g at 12/12/19 0221    cycloSPORINE (RESTASIS) 0.05 % ophthalmic emulsion 1 Drop, 1 Drop, Both Eyes, Q12H, Raenette Long A, DO, 1 Drop at 12/12/19 0909    erythromycin (ILOTYCIN) 5 mg/gram (0.5 %) ophthalmic ointment, , Both Eyes, Q8H, Alexa Macbeth A, DO    ezetimibe (ZETIA) tablet 10 mg, 10 mg, Oral, QHS, Alissa Simental A, DO, 10 mg at 12/11/19 2147    famotidine (PEPCID) tablet 20 mg, 20 mg, Oral, DAILY, Raenette Long A, DO, 20 mg at 12/12/19 0908    furosemide (LASIX) tablet 20 mg, 20 mg, Oral, DAILY, Raenette Long A, DO, 20 mg at 12/12/19 0908    gabapentin (NEURONTIN) capsule 300 mg, 300 mg, Oral, DAILY, Raenette Long A, DO, 300 mg at 12/12/19 0908    isosorbide mononitrate ER (IMDUR) tablet 30 mg, 30 mg, Oral, DAILY, Raenette Long A, DO, 30 mg at 12/12/19 0908    multivitamin, tx-iron-ca-min (THERA-M w/ IRON) tablet 1 Tab, 1 Tab, Oral, DAILY, Ambar CABRAL DO, 1 Tab at 12/12/19 0908    insulin lispro (HUMALOG) injection, , SubCUTAneous, AC&HS, True Jones MD, 6 Units at 12/12/19 0909    glucose chewable tablet 16 g, 4 Tab, Oral, PRN, Dewane Sages, DO    glucagon (GLUCAGEN) injection 1 mg, 1 mg, IntraMUSCular, PRN, Dewane Sages, DO    dextrose (D50W) injection syrg 12.5-25 g, 25-50 mL, IntraVENous, PRN, Dewane Sages, DO    [facility]ron [facility]) injection 4 mg, 4 mg, IntraVENous, Q4H PRN, Christal Oneill A, DO, 4 mg at 12/10/19 2301    diphenhydrAMINE (BENADRYL) injection 25 mg, 25 mg, IntraVENous, Q6H PRN, Dewane Sages, DO    acetaminophen (TYLENOL) tablet 650 mg, 650 mg, Oral, Q6H PRN, Christal Oneill A, DO    lactobacillus sp. 50 billion cpu (BIO-K PLUS) capsule 1 Cap, 1 Cap, Oral, DAILY, Dewane Sages, DO, 1 Cap at 12/12/19 0908    0.9% sodium chloride infusion, 75 mL/hr, IntraVENous, CONTINUOUS, Christal Oneill A, DO, Stopped at 12/11/19 1351    vancomycin (VANCOCIN) 1250 mg in  ml infusion, 1,250 mg, IntraVENous, Q36H, Christal Oneill A, DO, Last Rate: 166.7 mL/hr at 12/12/19 0221, 1,250 mg at 12/12/19 0221    hydrALAZINE (APRESOLINE) 20 mg/mL injection 10 mg, 10 mg, IntraVENous, Q6H PRN, Alexia Vasquez MD, 10 mg at 12/10/19 2107    sodium chloride (NS) flush 5-40 mL, 5-40 mL, IntraVENous, Q8H, Christal Oneill A, DO, 10 mL at 12/12/19 4094    sodium chloride (NS) flush 5-40 mL, 5-40 mL, IntraVENous, PRN, Christal Oneill A, DO    heparin (porcine) injection 5,000 Units, 5,000 Units, SubCUTAneous, Q8H, Dewane Sages, DO, 5,000 Units at 12/12/19 2939  · It is important that you take the medication exactly as they are prescribed. · Keep your medication in the bottles provided by the pharmacist and keep a list of the medication names, dosages, and times to be taken in your wallet. · Do not take other medications without consulting your doctor.          Minutes spent on discharge: 45 minutes spent coordinating this discharge (review instructions/follow-up, prescriptions, preparing report for sign off)    Kym Petty MD  12/12/2019 11:17 AM

## 2019-12-12 NOTE — PROGRESS NOTES
Problem: Falls - Risk of  Goal: *Absence of Falls  Description  Document Kale Chambers Fall Risk and appropriate interventions in the flowsheet. Outcome: Progressing Towards Goal  Note: Fall Risk Interventions:  Mobility Interventions: Bed/chair exit alarm, Communicate number of staff needed for ambulation/transfer, OT consult for ADLs, Patient to call before getting OOB, PT Consult for mobility concerns, PT Consult for assist device competence, Utilize walker, cane, or other assistive device    Mentation Interventions: Adequate sleep, hydration, pain control, Bed/chair exit alarm, Door open when patient unattended, More frequent rounding    Medication Interventions: Bed/chair exit alarm, Patient to call before getting OOB, Teach patient to arise slowly    Elimination Interventions: Bed/chair exit alarm, Call light in reach, Patient to call for help with toileting needs, Toileting schedule/hourly rounds, Urinal in reach    History of Falls Interventions: Bed/chair exit alarm         Problem: Patient Education: Go to Patient Education Activity  Goal: Patient/Family Education  Outcome: Progressing Towards Goal     Problem: Diabetes Self-Management  Goal: *Disease process and treatment process  Description  Define diabetes and identify own type of diabetes; list 3 options for treating diabetes. Outcome: Progressing Towards Goal  Goal: *Incorporating nutritional management into lifestyle  Description  Describe effect of type, amount and timing of food on blood glucose; list 3 methods for planning meals. Outcome: Progressing Towards Goal  Goal: *Incorporating physical activity into lifestyle  Description  State effect of exercise on blood glucose levels. Outcome: Progressing Towards Goal  Goal: *Developing strategies to promote health/change behavior  Description  Define the ABC's of diabetes; identify appropriate screenings, schedule and personal plan for screenings.   Outcome: Progressing Towards Goal  Goal: *Using medications safely  Description  State effect of diabetes medications on diabetes; name diabetes medication taking, action and side effects. Outcome: Progressing Towards Goal  Goal: *Monitoring blood glucose, interpreting and using results  Description  Identify recommended blood glucose targets  and personal targets. Outcome: Progressing Towards Goal  Goal: *Prevention, detection, treatment of acute complications  Description  List symptoms of hyper- and hypoglycemia; describe how to treat low blood sugar and actions for lowering  high blood glucose level. Outcome: Progressing Towards Goal  Goal: *Prevention, detection and treatment of chronic complications  Description  Define the natural course of diabetes and describe the relationship of blood glucose levels to long term complications of diabetes. Outcome: Progressing Towards Goal  Goal: *Developing strategies to address psychosocial issues  Description  Describe feelings about living with diabetes; identify support needed and support network  Outcome: Progressing Towards Goal  Goal: *Insulin pump training  Outcome: Progressing Towards Goal  Goal: *Sick day guidelines  Outcome: Progressing Towards Goal  Goal: *Patient Specific Goal (EDIT GOAL, INSERT TEXT)  Outcome: Progressing Towards Goal     Problem: Patient Education: Go to Patient Education Activity  Goal: Patient/Family Education  Outcome: Progressing Towards Goal     Problem: Pressure Injury - Risk of  Goal: *Prevention of pressure injury  Description  Document Zen Scale and appropriate interventions in the flowsheet.   Outcome: Progressing Towards Goal  Note: Pressure Injury Interventions:  Sensory Interventions: Assess changes in LOC, Assess need for specialty bed, Avoid rigorous massage over bony prominences, Discuss PT/OT consult with provider, Keep linens dry and wrinkle-free, Maintain/enhance activity level, Minimize linen layers, Monitor skin under medical devices, Pressure redistribution bed/mattress (bed type), Turn and reposition approx. every two hours (pillows and wedges if needed)    Moisture Interventions: Absorbent underpads, Internal/External urinary devices, Limit adult briefs, Minimize layers    Activity Interventions: Assess need for specialty bed, Increase time out of bed, Pressure redistribution bed/mattress(bed type), PT/OT evaluation    Mobility Interventions: Assess need for specialty bed, Pressure redistribution bed/mattress (bed type), PT/OT evaluation, Turn and reposition approx.  every two hours(pillow and wedges)    Nutrition Interventions: Document food/fluid/supplement intake, Discuss nutritional consult with provider, Offer support with meals,snacks and hydration    Friction and Shear Interventions: Apply protective barrier, creams and emollients, Lift sheet, Lift team/patient mobility team, Minimize layers, Transferring/repositioning devices                Problem: Patient Education: Go to Patient Education Activity  Goal: Patient/Family Education  Outcome: Progressing Towards Goal

## 2019-12-12 NOTE — PROCEDURES
[address] [place] [address]   EEG    Name:  Fiona Clarke  MR#:   [id number]  :  1935  ACCOUNT #:  [de-identified]  DATE OF SERVICE:  2019    EEG Number:  Severino Santana     HISTORY:  An 31-year-old male with a syncopal spell. EEG REPORT:  This is a 16-channel routine EEG done using the international 10-20 electrode placement system. Most of the recording is greatly obscured by movement as well as what appears to be electrical artifact. The predominant background consists of 6-7 Hz diffuse, symmetric, and somewhat irregular activity without attenuation with eye opening. There were no abnormal photoparoxysmal responses. There were periods of bilateral 4-5 Hz slowing seen mostly frontally. No epileptiform abnormalities were observed. Clear electrocochleographic sleep was not seen. IMPRESSION:  This EEG shows mild slowing, which is consistent with a mild encephalopathy.       Alex Bright MD      [id number]  D:  2019 8:50  T:  2019 13:51  JOB #:  [id number]

## 2019-12-12 NOTE — PROGRESS NOTES
Call placed [address]. Spoke with Lisa at [facility] [address]. Patient can return today. Will need discharge summary and new prescription faxed to [phone]. Spoke with daughter Kyle Retana and informed of discharge plan. Agreeable to discharge today. Requesting medical transport. Daughter states she does not need to be informed of discharge time.     Kennedi Flannery [facility]  Care Management  [phone]

## 2019-12-12 NOTE — DISCHARGE INSTRUCTIONS
DISCHARGE SUMMARY from Nurse    PATIENT INSTRUCTIONS:    After general anesthesia or intravenous sedation, for 24 hours or while taking prescription Narcotics:  · Limit your activities  · Do not drive and operate hazardous machinery  · Do not make important personal or business decisions  · Do  not drink alcoholic beverages  · If you have not urinated within 8 hours after discharge, please contact your surgeon on call. Report the following to your surgeon:  · Excessive pain, swelling, redness or odor of or around the surgical area  · Temperature over 100.5  · Nausea and vomiting lasting longer than 4 hours or if unable to take medications  · Any signs of decreased circulation or nerve impairment to extremity: change in color, persistent  numbness, tingling, coldness or increase pain  · Any questions    What to do at Home:  Recommended activity: Activity as tolerated. If you experience any of the following symptoms dizziness/fainting, another seizure, chest pain, fever, or worsening symptoms, please follow up with your doctor or go to the nearest emergency room. *  Please give a list of your current medications to your Primary Care Provider. *  Please update this list whenever your medications are discontinued, doses are      changed, or new medications (including over-the-counter products) are added. *  Please carry medication information at all times in case of emergency situations. These are general instructions for a healthy lifestyle:    No smoking/ No tobacco products/ Avoid exposure to second hand smoke  Surgeon General's Warning:  Quitting smoking now greatly reduces serious risk to your health.     Obesity, smoking, and sedentary lifestyle greatly increases your risk for illness    A healthy diet, regular physical exercise & weight monitoring are important for maintaining a healthy lifestyle    You may be retaining fluid if you have a history of heart failure or if you experience any of the following symptoms:  Weight gain of 3 pounds or more overnight or 5 pounds in a week, increased swelling in our hands or feet or shortness of breath while lying flat in bed. Please call your doctor as soon as you notice any of these symptoms; do not wait until your next office visit. The discharge information has been reviewed with the patient. The patient verbalized understanding, but will need reinforcement. Discharge medications reviewed with the patient and appropriate educational materials and side effects teaching were provided. ___________________________________________________________________________________________________________________________________         Probiotic (lactobacillus Probiotic Blend, Culturelle, Adult Probiotic, Bifidonate) - (By mouth)   Why this medicine is used: May increase the number of healthy bacteria in your stomach and intestines. Common side effects:  · Mild diarrhea, constipation, nausea, vomiting  · Mild gas or cramps  © 2017 [facility] Information is for End User's use only and may not be sold, redistributed or otherwise used for commercial purposes. Cephalexin (Bio-Cef, Keflex) - (By mouth)   Why this medicine is used:   Treats infections. Contact a nurse or doctor right away if you have:  · Blistering, peeling, or red skin rash  · Severe or bloody diarrhea     Common side effects:  · Mild diarrhea or nausea  © 2017 [address] is for End User's use only and may not be sold, redistributed or otherwise used for commercial purposes.

## 2019-12-12 NOTE — DIABETES MGMT
GLYCEMIC CONTROL PLAN OF CARE     Assessment/Recommendations:  Blood glucose elevated above targets, recommend increasing Lantus insulin to 20 units daily. Continue to adjust daily to reach glucose targets. Most recent blood glucose values:  12/11/2019 11:37 12/11/2019 16:22 12/11/2019 21:33 12/12/2019 06:09   300 (H) 276 (H) 224 (H) 220 (H)     Current A1C of 7.8% is equivalent to average blood glucose of 177 mg/dl over the past 2-3 months. s.     Current hospital diabetes medications:   Lantus insulin 10 units daily   Correctional Lispro insulin 4 times daily ACHS (very resistant scale)     Previous day's insulin requirements:   10 units of Lantus insulin   31 units of Lispro insulin      Home diabetes medications:  Levemir insulin 60 units daily   Lispro insulin per sliding scale     Diet:  Cardiac, no concentrated sweets     Education:  ____Refer to Diabetes Education Record             _x__Education not indicated at this time      Shannon Ashley RD, CDE

## 2019-12-12 NOTE — PROGRESS NOTES
2019 1:09 PM    SSN: [id number]    Subjective:   83 y/o male admitted with a syncopal event. No further spells. Pt at baseline. MRI of the brain showed mild amount of chronic periventricular white matter disease and a chronically occluded left internal carotid artery without acute changes. An EEG showed mild slowing.      Social History     Socioeconomic History    Marital status:      Spouse name: Not on file    Number of children: Not on file    Years of education: Not on file    Highest education level: Not on file   Occupational History    Not on file   Social Needs    Financial resource strain: Not on file    Food insecurity:     Worry: Not on file     Inability: Not on file    Transportation needs:     Medical: Not on file     Non-medical: Not on file   Tobacco Use    Smoking status: Former Smoker     Years: 2.00     Last attempt to quit: 1955     Years since quittin.9    Smokeless tobacco: Never Used   Substance and Sexual Activity    Alcohol use: Yes     Frequency: 2-4 times a month     Comment: drinks weekly couple beers    Drug use: No    Sexual activity: Never   Lifestyle    Physical activity:     Days per week: Not on file     Minutes per session: Not on file    Stress: Not on file   Relationships    Social connections:     Talks on phone: Not on file     Gets together: Not on file     Attends Hindu service: Not on file     Active member of club or organization: Not on file     Attends meetings of clubs or organizations: Not on file     Relationship status: Not on file    Intimate partner violence:     Fear of current or ex partner: Not on file     Emotionally abused: Not on file     Physically abused: Not on file     Forced sexual activity: Not on file   Other Topics Concern    Not on file   Social History Narrative    Not on file       Family History   Problem Relation Age of Onset    Parkinsonism Mother     Hypertension Mother    Maude Whittaker Cancer Father         lung    Heart defect Brother        Current Facility-Administered Medications   Medication Dose Route Frequency Provider Last Rate Last Dose    insulin glargine (LANTUS) injection 10 Units  10 Units SubCUTAneous DAILY Rocky Bueno MD   10 Units at 12/12/19 0909    ceFAZolin (ANCEF) 1 g in sterile water (preservative free) 10 mL IV syringe  1 g IntraVENous Q12H Saurabh Blamer A, DO   1 g at 12/12/19 0221    cycloSPORINE (RESTASIS) 0.05 % ophthalmic emulsion 1 Drop  1 Drop Both Eyes Q12H Saurabh Blamer A, DO   1 Drop at 12/12/19 8136    erythromycin (ILOTYCIN) 5 mg/gram (0.5 %) ophthalmic ointment   Both Eyes Q8H Saurabh Blamer A, DO        ezetimibe (ZETIA) tablet 10 mg  10 mg Oral QHS Saurabh Blamer A, DO   10 mg at 12/11/19 2147    famotidine (PEPCID) tablet 20 mg  20 mg Oral DAILY Saurabh Blamer A, DO   20 mg at 12/12/19 4926    furosemide (LASIX) tablet 20 mg  20 mg Oral DAILY Saurabh Blamer A, DO   20 mg at 12/12/19 8450    gabapentin (NEURONTIN) capsule 300 mg  300 mg Oral DAILY Saurabh Blamer A, DO   300 mg at 12/12/19 5860    isosorbide mononitrate ER (IMDUR) tablet 30 mg  30 mg Oral DAILY Saurabh Blamer A, DO   30 mg at 12/12/19 0908    multivitamin, tx-iron-ca-min (THERA-M w/ IRON) tablet 1 Tab  1 Tab Oral DAILY Saurabh Blamer A, DO   1 Tab at 12/12/19 0908    insulin lispro (HUMALOG) injection   SubCUTAneous AC&HS Rocky Bueno MD   9 Units at 12/12/19 1141    glucose chewable tablet 16 g  4 Tab Oral PRN Jaguar Cotton, DO        glucagon (GLUCAGEN) injection 1 mg  1 mg IntraMUSCular PRN Jaguar Cotton, DO        dextrose (D50W) injection syrg 12.5-25 g  25-50 mL IntraVENous PRN Jaguar Cotton, DO        [facility]ron [facility]) injection 4 mg  4 mg IntraVENous Q4H PRN Saurabh Blamer A, DO   4 mg at 12/10/19 2301    diphenhydrAMINE (BENADRYL) injection 25 mg  25 mg IntraVENous Q6H PRN Jaguar Cotton DO        acetaminophen (TYLENOL) tablet 650 mg  650 mg Oral Q6H PRN Drema Number A, DO        lactobacillus sp. 50 billion cpu (BIO-K PLUS) capsule 1 Cap  1 Cap Oral DAILY Majo Tuttle, DO   1 Cap at 12/12/19 0908    0.9% sodium chloride infusion  75 mL/hr IntraVENous CONTINUOUS Drema Number A, DO   Stopped at 12/11/19 1351    vancomycin (VANCOCIN) 1250 mg in  ml infusion  1,250 mg IntraVENous Q36H Drema Number A, .7 mL/hr at 12/12/19 0221 1,250 mg at 12/12/19 0221    hydrALAZINE (APRESOLINE) 20 mg/mL injection 10 mg  10 mg IntraVENous Q6H PRN Cuate Chester MD   10 mg at 12/10/19 2107    sodium chloride (NS) flush 5-40 mL  5-40 mL IntraVENous Q8H Drema Number A, DO   10 mL at 12/12/19 4103    sodium chloride (NS) flush 5-40 mL  5-40 mL IntraVENous PRN Clofaith Tuttle, DO        heparin (porcine) injection 5,000 Units  5,000 Units SubCUTAneous Q8H Majo Tuttle, DO   5,000 Units at 12/12/19 3484       Past Medical History:   Diagnosis Date    Anxiety     Arthritis     CAD (coronary artery disease)     s/p drug-eluting stents to RCA for high-grade stenoses in 2/09    Carotid duplex 08/26/2016    Mild <50% CYRUS stenosis. Chronic LICA occlusion. Similar to study of 3/29/16.  Carotid stenosis (HCC)     w/ known total occlusion of lt internal carotid artery; followed by pts vascular surgeon    Chronic kidney disease     COPD (chronic obstructive pulmonary disease) ([address]     Dementia ([address]     Depression     Diabetes ([address]     type 2    Dyslipidemia     on Crestor; managed by pts PCP    Dyspnea     Hypercholesterolemia     Hypertension     Low back pain     Osteoarthrosis involving multiple sites     Polycythemia 12/9/2019    Skin cancer ([address]     squamous cell lesions of the skin    Stroke ([address]     Venous (peripheral) insufficiency        Past Surgical History:   Procedure Laterality Date    CARDIAC CATHETERIZATION  08/24/2016    Mod calcification. Co-dom. oLM 50-70%. LAD 30%. Dx 30%. Cx 70% focal.  OM 80% focal.  RCA 30%.  HX ANGIOPLASTY  2009    2 stents placed and heart attack during the procedure    HX COLONOSCOPY  10/2003    neg; declines f/u    HX CORONARY ARTERY BYPASS GRAFT  09/2016     LIMA to LAD and SVG to OM1    HX HEART CATHETERIZATION  5/2013    HX HERNIA REPAIR      1970s    HX TONSIL AND ADENOIDECTOMY         Allergies   Allergen Reactions    Amaryl [Glimepiride] Drowsiness    Lipitor [Atorvastatin] Myalgia    Niacin Other (comments)     Facial blistering, swelling in face    Pravachol [Pravastatin] Myalgia    Zocor [Simvastatin] Myalgia       Vital signs:    Visit Vitals  /84 (BP 1 Location: Right arm, BP Patient Position: Supine; At rest)   Pulse 88   Temp 98.2 °F (36.8 °C)   Resp 20   Ht 5' 10\" (1.778 m)   Wt 88.5 kg (195 lb 1.6 oz)   SpO2 96%   BMI 27.99 kg/m²       Review of Systems:   GENERAL: Denies fever or fatigue  CARDIAC: No CP or SOB  PULMONARY: No cough of SOB  MUSCULOSKELETAL: No new joint pain  NEURO: SEE HPI      EXAM: Alert, in NAD. Heart is regular. Oriented x3, EOM's are full, PERRL, no facial asymmetries. Strength and tone are normal, symmetric           Assessment/Plan: Syncope, unclear etiology, features not highly suggestive of epilepsy/seizure to justify anticonvulsants after EEG and MRI results. Counseled patient about results, recommended observation. He can see neuro as needed, ok to D/H from neuro standpoint. PLEASE NOTE:   Portions of this document may have been produced using voice recognition software. Unrecognized errors in transcription may be present. This note will not be viewable in [address]

## 2019-12-12 NOTE — PROGRESS NOTES
Per Adelso CÁRDENAS) called [facility] schedule 3:15 pm transport to [facility] with Zay Kellogg. Informed Adelso Delcid of transportation conversation and arrangements.

## 2019-12-12 NOTE — ROUTINE PROCESS
Bedside shift change report given to Eda5 Merari Wang (oncoming nurse) by Crystal Cho RN (offgoing nurse). Report included the following information SBAR, Kardex, Intake/Output, MAR and Recent Results.

## 2019-12-13 LAB
BACTERIA SPEC CULT: ABNORMAL
GRAM STN SPEC: ABNORMAL
GRAM STN SPEC: ABNORMAL
SERVICE CMNT-IMP: ABNORMAL

## 2020-01-31 ENCOUNTER — HOSPITAL ENCOUNTER (EMERGENCY)
Age: 85
Discharge: HOME | End: 2020-01-31
Attending: EMERGENCY MEDICINE
Payer: MEDICARE

## 2020-01-31 VITALS
RESPIRATION RATE: 21 BRPM | SYSTOLIC BLOOD PRESSURE: 154 MMHG | DIASTOLIC BLOOD PRESSURE: 104 MMHG | HEART RATE: 70 BPM | OXYGEN SATURATION: 99 %
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
## 2020-01-31 DIAGNOSIS — G89.29 OTHER CHRONIC PAIN: Primary | ICD-10-CM

## 2020-01-31 LAB — GLUCOSE BLD STRIP.AUTO-MCNC: 128 MG/DL (ref 70–110)

## 2020-01-31 PROCEDURE — 82962 GLUCOSE BLOOD TEST: CPT

## 2020-01-31 PROCEDURE — 99284 EMERGENCY DEPT VISIT MOD MDM: CPT

## 2020-01-31 PROCEDURE — 74011250637 HC RX REV CODE- 250/637: Performed by: EMERGENCY MEDICINE

## 2020-01-31 RX ORDER — ACETAMINOPHEN 500 MG
1000 TABLET ORAL ONCE
Status: COMPLETED | OUTPATIENT
Start: 2020-01-31 | End: 2020-01-31

## 2020-01-31 RX ORDER — LIDOCAINE 50 MG/G
PATCH TOPICAL
Qty: 30 EACH | Refills: 0 | Status: SHIPPED | OUTPATIENT
Start: 2020-01-31 | End: 2021-05-27

## 2020-01-31 RX ORDER — DICLOFENAC SODIUM 10 MG/G
2 GEL TOPICAL 4 TIMES DAILY
Qty: 100 G | Refills: 0 | Status: SHIPPED | OUTPATIENT
Start: 2020-01-31 | End: 2021-05-27

## 2020-01-31 RX ORDER — IBUPROFEN 400 MG/1
800 TABLET ORAL ONCE
Status: COMPLETED | OUTPATIENT
Start: 2020-01-31 | End: 2020-01-31

## 2020-01-31 RX ADMIN — ACETAMINOPHEN 1000 MG: 500 TABLET ORAL at 02:39

## 2020-01-31 RX ADMIN — IBUPROFEN 800 MG: 400 TABLET, FILM COATED ORAL at 02:39

## 2020-01-31 NOTE — ED PROVIDER NOTES
EMERGENCY DEPARTMENT HISTORY AND PHYSICAL EXAM    2:45 AM      Date: 1/31/2020  Patient Name: Peter Regalado. History of Presenting Illness     Chief Complaint   Patient presents with    Fall         History Provided By: Patient      Additional History (Context): Peter Fuentes is a 82 y.o. male with hypercholesterolemia, diabetes, dyslipidemia, hypertension, CKD, CAD, COPD, stroke, cancer, depression, and dementia  who presents with fall. Suffers from dementia but able to recount his fall, slid out of soft recliner and onto carpeted floor, about 6\". Able to walk after. Pain all over, lower back, all chronic, nothing new. No LOC, no neck pain    PCP: None    Current Outpatient Medications   Medication Sig Dispense Refill    lidocaine (LIDODERM) 5 % Apply patch to the affected area for 12 hours a day and remove for 12 hours a day. 30 Each 0    diclofenac (VOLTAREN) 1 % gel Apply 2 g to affected area four (4) times daily. 100 g 0    lactobacillus sp. 50 billion cpu (BIO-K PLUS) 50 billion cell -375 mg cap capsule Take 1 Cap by mouth daily. 30 Cap 0    ezetimibe (ZETIA) 10 mg tablet Take 1 Tab by mouth nightly.  insulin detemir U-100 (LEVEMIR U-100 INSULIN) 100 unit/mL injection 60 Units by SubCUTAneous route daily. Indications: type 2 diabetes mellitus      erythromycin (ILOTYCIN) ophthalmic ointment Apply to both lower eyelids four times a day until done. 1 Tube 0    aspirin delayed-release 81 mg tablet Take 1 Tab by mouth daily. Indications: Cerebral Thromboembolism Prevention 30 Tab 0    cycloSPORINE (RESTASIS) 0.05 % ophthalmic emulsion Administer 1 Drop to both eyes every twelve (12) hours. Indications: Dry Eye 15 Each 0    gabapentin (NEURONTIN) 300 mg capsule Take 1 Cap by mouth daily. Indications: NEUROPATHIC PAIN, Restless Legs Syndrome 30 Cap 0    isosorbide mononitrate ER (IMDUR) 30 mg tablet Take 1 Tab by mouth daily.  30 Tab 0    multivit-min-FA-lycopen-lutein (CENTRUM SILVER) 0.4-300-250 mg-mcg-mcg tab Take 1 tab daily 30 Tab 0    OTHER Incentive spirometry- Use as directed 1 Each 0    furosemide (LASIX) 20 mg tablet Take 1 Tab by mouth daily. Indications: Edema 30 Tab 0    insulin lispro (HUMALOG KWIKPEN INSULIN) 200 unit/mL (3 mL) inpn For Blood Sugar (mg/dL) of:     Less than 150 =   0 units           150 -199 =   2 units  200 -249 =   4 units  250 -299 =   6 units  300 -349 =   8 units  350 and above =   10 units  Check BG then Inject insulin per SS 3 times daily before meals and at bedtime  Indications: type 2 diabetes mellitus 1 Pen 0    famotidine (PEPCID) 20 mg tablet Take 1 Tab by mouth two (2) times a day. Indications: PREVENTION OF STRESS ULCER 30 Tab 0    CALCIUM CARBONATE (CALTRATE 600 PO) Take 1 Tab by mouth daily. Past History     Past Medical History:  Past Medical History:   Diagnosis Date    Anxiety     Arthritis     CAD (coronary artery disease)     s/p drug-eluting stents to RCA for high-grade stenoses in 2/09    Carotid duplex 08/26/2016    Mild <50% CYRUS stenosis. Chronic LICA occlusion. Similar to study of 3/29/16.  Carotid stenosis (HCC)     w/ known total occlusion of lt internal carotid artery; followed by pts vascular surgeon    Chronic kidney disease     [facility]ch[facility]     Dementia [facility]     Depression     Diabetes ([facility]     type 2    Dyslipidemia     on Crestor; managed by pts PCP    Dyspnea     Hypercholesterolemia     Hypertension     Low back pain     Osteoarthrosis involving multiple sites     Polycythemia 12/9/2019    Skin cancer ([address]     squamous cell lesions of the skin    Stroke ([facility]     Venous (peripheral) insufficiency        Past Surgical History:  Past Surgical History:   Procedure Laterality Date    CARDIAC CATHETERIZATION  08/24/2016    Mod calcification. Co-dom. oLM 50-70%. LAD 30%. Dx 30%. Cx 70% focal.  OM 80% focal.  RCA 30%.       HX ANGIOPLASTY  2009 2 stents placed and heart attack during the procedure    HX COLONOSCOPY  10/2003    neg; declines f/u    HX CORONARY ARTERY BYPASS GRAFT  2016     LIMA to LAD and SVG to OM1    HX HEART CATHETERIZATION  2013    HX HERNIA REPAIR      1970s    HX TONSIL AND ADENOIDECTOMY         Family History:  Family History   Problem Relation Age of Onset   Lange Parkinsonism Mother     Hypertension Mother     Cancer Father         lung    Heart defect Brother        Social History:  Social History     Tobacco Use    Smoking status: Former Smoker     Years: 2.00     Last attempt to quit: 1955     Years since quittin.4    Smokeless tobacco: Never Used   Substance Use Topics    Alcohol use: Yes     Frequency: 2-4 times a month     Comment: drinks weekly couple beers    Drug use: No       Allergies: Allergies   Allergen Reactions    Amaryl [Glimepiride] Drowsiness    Lipitor [Atorvastatin] Myalgia    Niacin Other (comments)     Facial blistering, swelling in face    Pravachol [Pravastatin] Myalgia    Zocor [Simvastatin] Myalgia         Review of Systems       Review of Systems   Constitutional: Negative. Negative for activity change, chills and fever. HENT: Negative. Negative for ear pain, hearing loss and sore throat. Eyes: Negative. Negative for pain and visual disturbance. Respiratory: Negative. Negative for cough, chest tightness and shortness of breath. Cardiovascular: Negative. Negative for chest pain and leg swelling. Gastrointestinal: Negative. Negative for abdominal distention and abdominal pain. Genitourinary: Negative. Negative for dysuria and hematuria. Musculoskeletal: Positive for arthralgias and myalgias. Skin: Negative. Negative for rash. Neurological: Negative. Negative for dizziness and headaches. Psychiatric/Behavioral: Negative. Negative for agitation and behavioral problems.          Physical Exam     Visit Vitals  BP (!) 154/104   Pulse 70   Resp 21   SpO2 99%         Physical Exam  Constitutional:       Appearance: He is well-developed. HENT:      Head: Normocephalic and atraumatic. Eyes:      General:         Right eye: No discharge. Left eye: No discharge. Pupils: Pupils are equal, round, and reactive to light. Neck:      Musculoskeletal: Normal range of motion and neck supple. Vascular: No JVD. Trachea: No tracheal deviation. Cardiovascular:      Rate and Rhythm: Normal rate and regular rhythm. Heart sounds: Normal heart sounds. No murmur. Pulmonary:      Effort: Pulmonary effort is normal. No respiratory distress. Breath sounds: Normal breath sounds. No wheezing or rales. Abdominal:      General: Bowel sounds are normal. There is no distension. Palpations: Abdomen is soft. Tenderness: There is no abdominal tenderness. There is no rebound. Musculoskeletal: Normal range of motion. General: No swelling, tenderness, deformity or signs of injury. Comments: 1+ pitting edema bilat LE   Skin:     General: Skin is warm and dry. Findings: No erythema or rash. Neurological:      Mental Status: He is alert and oriented to person, place, and time. Cranial Nerves: No cranial nerve deficit. Comments: 5/5 strength UE/LE, 5/5 sensation UE/LE     Psychiatric:         Behavior: Behavior normal.           Diagnostic Study Results     Labs -  Recent Results (from the past 12 hour(s))   GLUCOSE, POC    Collection Time: 01/31/20  2:42 AM   Result Value Ref Range    Glucose (POC) 128 (H) 70 - 110 mg/dL       Radiologic Studies -   No orders to display         Medical Decision Making   I am the first provider for this patient. I reviewed the vital signs, available nursing notes, past medical history, past surgical history, family history and social history. Vital Signs-Reviewed the patient's vital signs.     Records Reviewed: Nursing Notes and Old Medical Records      Provider Notes (Medical Decision Making):     MDM  Number of Diagnoses or Management Options  Fall from ground level: Other chronic pain:   Diagnosis management comments: Differential Diagnosis: fracture, sprain, chronic pain    Pt fell 6\" from soft thing to soft thing, did not hit head, no LOC, no ttp on exam, no midline ttp on back, no indication for imaging, labs. Treat pain with topical nsaids at home    Reevaluation: Safe for d/c, careful return precautions given. Pt/family comfortable with plan    Trinity Sosa MD          Diagnosis     Clinical Impression:   1. Other chronic pain    2. Fall from ground level        Disposition: home    Follow-up Information     Follow up With Specialties Details Why Contact Info    [facility] EMERGENCY DEPT Emergency Medicine  As needed, If symptoms worsen [address] [facility]  [address] [facility] [address] [facility] In 3 days  [address] [address] Mario Mosley, PT Physical Therapy In 1 week  [address] [id number] [phone]             Patient's Medications   Start Taking    DICLOFENAC (VOLTAREN) 1 % GEL    Apply 2 g to affected area four (4) times daily. LIDOCAINE (LIDODERM) 5 %    Apply patch to the affected area for 12 hours a day and remove for 12 hours a day. Continue Taking    ASPIRIN DELAYED-RELEASE 81 MG TABLET    Take 1 Tab by mouth daily. Indications: Cerebral Thromboembolism Prevention    CALCIUM CARBONATE (CALTRATE 600 PO)    Take 1 Tab by mouth daily. CYCLOSPORINE (RESTASIS) 0.05 % OPHTHALMIC EMULSION    Administer 1 Drop to both eyes every twelve (12) hours. Indications: Dry Eye    ERYTHROMYCIN (ILOTYCIN) OPHTHALMIC OINTMENT    Apply to both lower eyelids four times a day until done. EZETIMIBE (ZETIA) 10 MG TABLET    Take 1 Tab by mouth nightly. FAMOTIDINE (PEPCID) 20 MG TABLET    Take 1 Tab by mouth two (2) times a day. Indications: PREVENTION OF STRESS ULCER    FUROSEMIDE (LASIX) 20 MG TABLET    Take 1 Tab by mouth daily. Indications: Edema    GABAPENTIN (NEURONTIN) 300 MG CAPSULE    Take 1 Cap by mouth daily. Indications: NEUROPATHIC PAIN, Restless Legs Syndrome    INSULIN DETEMIR U-100 (LEVEMIR U-100 INSULIN) 100 UNIT/ML INJECTION    60 Units by SubCUTAneous route daily. Indications: type 2 diabetes mellitus    INSULIN LISPRO (HUMALOG KWIKPEN INSULIN) 200 UNIT/ML (3 ML) INPN    For Blood Sugar (mg/dL) of:     Less than 150 =   0 units           150 -199 =   2 units  200 -249 =   4 units  250 -299 =   6 units  300 -349 =   8 units  350 and above =   10 units  Check BG then Inject insulin per SS 3 times daily before meals and at bedtime  Indications: type 2 diabetes mellitus    ISOSORBIDE MONONITRATE ER (IMDUR) 30 MG TABLET    Take 1 Tab by mouth daily. LACTOBACILLUS SP. 50 BILLION CPU (BIO-K PLUS) 50 BILLION CELL -375 MG CAP CAPSULE    Take 1 Cap by mouth daily. MULTIVIT-MIN-FA-LYCOPEN-LUTEIN (CENTRUM SILVER) 0.4-300-250 MG-MCG-MCG TAB    Take 1 tab daily    OTHER    Incentive spirometry- Use as directed   These Medications have changed    No medications on file   Stop Taking    No medications on file     _______________________________    Please note that this dictation was completed with [employer], the computer voice recognition software. Quite often unanticipated grammatical, syntax, homophones, and other interpretive errors are inadvertently transcribed by the computer software. Please disregard these errors. Please excuse any errors that have escaped final proofreading.

## 2020-01-31 NOTE — ED TRIAGE NOTES
Pt brought by medic. Pt slid out of recliner and onto floor. Pt c/o of overall aches and pains. From [facility]. Per ems report patient is on 3 blood thinners.

## 2020-01-31 NOTE — DISCHARGE INSTRUCTIONS
Patient Education        Chronic Pain: Care Instructions  Your Care Instructions    Chronic pain is pain that lasts a long time (months or even years) and may or may not have a clear cause. It is different from acute pain, which usually does have a clear cause--like an injury or illness--and gets better over time. Chronic pain:  · Lasts over time but may vary from day to day. · Does not go away despite efforts to end it. · May disrupt your sleep and lead to fatigue. · May cause depression or anxiety. · May make your muscles tense, causing more pain. · Can disrupt your work, hobbies, home life, and relationships with friends and family. Chronic pain is a very real condition. It is not just in your head. Treatment can help and usually includes several methods used together, such as medicines, physical therapy, exercise, and other treatments. Learning how to relax and changing negative thought patterns can also help you cope. Chronic pain is complex. Taking an active role in your treatment will help you better manage your pain. Tell your doctor if you have trouble dealing with your pain. You may have to try several things before you find what works best for you. Follow-up care is a key part of your treatment and safety. Be sure to make and go to all appointments, and call your doctor if you are having problems. It's also a good idea to know your test results and keep a list of the medicines you take. How can you care for yourself at home? · Pace yourself. Break up large jobs into smaller tasks. Save harder tasks for days when you have less pain, or go back and forth between hard tasks and easier ones. Take rest breaks. · Relax, and reduce stress. Relaxation techniques such as deep breathing or meditation can help. · Keep moving. Gentle, daily exercise can help reduce pain over the long run. Try low- or no-impact exercises such as walking, swimming, and stationary biking.  Do stretches to stay flexible. · Try heat, cold packs, and massage. · Get enough sleep. Chronic pain can make you tired and drain your energy. Talk with your doctor if you have trouble sleeping because of pain. · Think positive. Your thoughts can affect your pain level. Do things that you enjoy to distract yourself when you have pain instead of focusing on the pain. See a movie, read a book, listen to music, or spend time with a friend. · If you think you are depressed, talk to your doctor about treatment. · Keep a daily pain diary. Record how your moods, thoughts, sleep patterns, activities, and medicine affect your pain. You may find that your pain is worse during or after certain activities or when you are feeling a certain emotion. Having a record of your pain can help you and your doctor find the best ways to treat your pain. · Take pain medicines exactly as directed. ? If the doctor gave you a prescription medicine for pain, take it as prescribed. ? If you are not taking a prescription pain medicine, ask your doctor if you can take an over-the-counter medicine. Reducing constipation caused by pain medicine  · Include fruits, vegetables, beans, and whole grains in your diet each day. These foods are high in fiber. · Drink plenty of fluids, enough so that your urine is light yellow or clear like water. If you have kidney, heart, or liver disease and have to limit fluids, talk with your doctor before you increase the amount of fluids you drink. · If your doctor recommends it, get more exercise. Walking is a good choice. Bit by bit, increase the amount you walk every day. Try for at least 30 minutes on most days of the week. · Schedule time each day for a bowel movement. A daily routine may help. Take your time and do not strain when having a bowel movement. When should you call for help?   Call your doctor now or seek immediate medical care if:    · Your pain gets worse or is out of control.     · You feel down or blue, or you do not enjoy things like you once did. You may be depressed, which is common in people with chronic pain. Depression can be treated.     · You have vomiting or cramps for more than 2 hours.    Watch closely for changes in your health, and be sure to contact your doctor if:    · You cannot sleep because of pain.     · You are very worried or anxious about your pain.     · You have trouble taking your pain medicine.     · You have any concerns about your pain medicine.     · You have trouble with bowel movements, such as:  ? No bowel movement in 3 days. ? Blood in the anal area, in your stool, or on the toilet paper. ? Diarrhea for more than 24 hours. Where can you learn more? Go to http://da-ahsan.info/. Enter N004 in the search box to learn more about \"Chronic Pain: Care Instructions. \"  Current as of: March 28, 2019  Content Version: 12.2  © 9859-4679 [employer]. Care instructions adapted under license by [employer] (which disclaims liability or warranty for this information). If you have questions about a medical condition or this instruction, always ask your healthcare professional. [place] [address] any warranty or liability for your use of this information.

## 2020-09-02 ENCOUNTER — APPOINTMENT (OUTPATIENT)
Dept: CT IMAGING | Age: 85
End: 2020-09-02
Attending: NURSE PRACTITIONER
Payer: MEDICARE

## 2020-09-02 ENCOUNTER — HOSPITAL ENCOUNTER (EMERGENCY)
Age: 85
Discharge: HOME | End: 2020-09-03
Attending: EMERGENCY MEDICINE | Admitting: EMERGENCY MEDICINE
Payer: MEDICARE
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
## 2020-09-02 DIAGNOSIS — S00.01XA ABRASION OF SCALP, INITIAL ENCOUNTER: ICD-10-CM

## 2020-09-02 DIAGNOSIS — S09.90XA CLOSED HEAD INJURY, INITIAL ENCOUNTER: Primary | ICD-10-CM

## 2020-09-02 DIAGNOSIS — H10.33 ACUTE BACTERIAL CONJUNCTIVITIS OF BOTH EYES: ICD-10-CM

## 2020-09-02 PROCEDURE — 70450 CT HEAD/BRAIN W/O DYE: CPT

## 2020-09-02 PROCEDURE — 99284 EMERGENCY DEPT VISIT MOD MDM: CPT

## 2020-09-02 PROCEDURE — 72125 CT NECK SPINE W/O DYE: CPT

## 2020-09-02 PROCEDURE — 90471 IMMUNIZATION ADMIN: CPT

## 2020-09-03 VITALS
RESPIRATION RATE: 17 BRPM | DIASTOLIC BLOOD PRESSURE: 78 MMHG | HEART RATE: 84 BPM | OXYGEN SATURATION: 96 % | TEMPERATURE: 97.9 F | SYSTOLIC BLOOD PRESSURE: 168 MMHG

## 2020-09-03 PROCEDURE — 90471 IMMUNIZATION ADMIN: CPT

## 2020-09-03 PROCEDURE — 90714 TD VACC NO PRESV 7 YRS+ IM: CPT | Performed by: NURSE PRACTITIONER

## 2020-09-03 PROCEDURE — 74011250636 HC RX REV CODE- 250/636: Performed by: NURSE PRACTITIONER

## 2020-09-03 RX ORDER — ERYTHROMYCIN 5 MG/G
OINTMENT OPHTHALMIC
Qty: 3.5 G | Refills: 0 | Status: SHIPPED | OUTPATIENT
Start: 2020-09-03 | End: 2021-05-04

## 2020-09-03 RX ADMIN — TETANUS AND DIPHTHERIA TOXOIDS ADSORBED 0.5 ML: 2; 2 INJECTION INTRAMUSCULAR at 02:20

## 2020-09-03 NOTE — ED TRIAGE NOTES
Patient brought in by medic after falling at his assisted living facility. Pt has skin tear to top of scalp.  Pt has no complaints at this time

## 2020-09-03 NOTE — ED PROVIDER NOTES
EMERGENCY DEPARTMENT HISTORY AND PHYSICAL EXAM    11:40 PM      Date: 9/2/2020  Patient Name: Gil Ramírez. History of Presenting Illness     Chief Complaint   Patient presents with    Fall         History Provided By: Patient    Additional History (Context): Gil Shepard is a 82 y.o. male with past medical history significant for anxiety, arthritis, CAD, chronic kidney disease, COPD, dementia, depression, diabetes, hypertension, and stroke who presents with EMS from [facility] for a head injury with abrasion after a fall. Per nursing staff at the facility, the fall was unwitnessed. Patient fell from standing. Patient offers no complaints and denies any headache, dizziness, visual changes and there was no vomiting after the incident. Patient takes a baby aspirin daily but no other blood thinners. PCP: None    Current Facility-Administered Medications   Medication Dose Route Frequency Provider Last Rate Last Dose    tetanus-diphtheria toxoids-Td 2-2 Lf unit/0.5 mL injection 0.5 mL  0.5 mL IntraMUSCular ONCE Serg Easley FNP         Current Outpatient Medications   Medication Sig Dispense Refill    erythromycin (ILOTYCIN) ophthalmic ointment Apply 1/2 inch ribbon OS every 6 hours x 7 days 3.5 g 0    lidocaine (LIDODERM) 5 % Apply patch to the affected area for 12 hours a day and remove for 12 hours a day. 30 Each 0    diclofenac (VOLTAREN) 1 % gel Apply 2 g to affected area four (4) times daily. 100 g 0    lactobacillus sp. 50 billion cpu (BIO-K PLUS) 50 billion cell -375 mg cap capsule Take 1 Cap by mouth daily. 30 Cap 0    ezetimibe (ZETIA) 10 mg tablet Take 1 Tab by mouth nightly.  insulin detemir U-100 (LEVEMIR U-100 INSULIN) 100 unit/mL injection 60 Units by SubCUTAneous route daily. Indications: type 2 diabetes mellitus      erythromycin (ILOTYCIN) ophthalmic ointment Apply to both lower eyelids four times a day until done.  1 Tube 0    aspirin delayed-release 81 mg tablet Take 1 Tab by mouth daily. Indications: Cerebral Thromboembolism Prevention 30 Tab 0    cycloSPORINE (RESTASIS) 0.05 % ophthalmic emulsion Administer 1 Drop to both eyes every twelve (12) hours. Indications: Dry Eye 15 Each 0    gabapentin (NEURONTIN) 300 mg capsule Take 1 Cap by mouth daily. Indications: NEUROPATHIC PAIN, Restless Legs Syndrome 30 Cap 0    isosorbide mononitrate ER (IMDUR) 30 mg tablet Take 1 Tab by mouth daily. 30 Tab 0    multivit-min-FA-lycopen-lutein (CENTRUM SILVER) 0.4-300-250 mg-mcg-mcg tab Take 1 tab daily 30 Tab 0    OTHER Incentive spirometry- Use as directed 1 Each 0    furosemide (LASIX) 20 mg tablet Take 1 Tab by mouth daily. Indications: Edema 30 Tab 0    insulin lispro (HUMALOG KWIKPEN INSULIN) 200 unit/mL (3 mL) inpn For Blood Sugar (mg/dL) of:     Less than 150 =   0 units           150 -199 =   2 units  200 -249 =   4 units  250 -299 =   6 units  300 -349 =   8 units  350 and above =   10 units  Check BG then Inject insulin per SS 3 times daily before meals and at bedtime  Indications: type 2 diabetes mellitus 1 Pen 0    famotidine (PEPCID) 20 mg tablet Take 1 Tab by mouth two (2) times a day. Indications: PREVENTION OF STRESS ULCER 30 Tab 0    CALCIUM CARBONATE (CALTRATE 600 PO) Take 1 Tab by mouth daily. Past History     Past Medical History:  Past Medical History:   Diagnosis Date    Anxiety     Arthritis     CAD (coronary artery disease)     s/p drug-eluting stents to RCA for high-grade stenoses in 2/09    Carotid duplex 08/26/2016    Mild <50% CYRUS stenosis. Chronic LICA occlusion. Similar to study of 3/29/16.     Carotid stenosis (HCC)     w/ known total occlusion of lt internal carotid artery; followed by pts vascular surgeon    Chronic kidney disease     COPD (chronic obstructive pulmonary disease) ([address]     [facility]     Depression     Diabetes [facility] [address]     type 2    Dyslipidemia     on Crestor; managed by pts PCP    Dyspnea     Hypercholesterolemia     Hypertension     Low back pain     Osteoarthrosis involving multiple sites     Polycythemia 2019    Skin cancer (HCC)     squamous cell lesions of the skin    Stroke ([facility] [address]     Venous (peripheral) insufficiency        Past Surgical History:  Past Surgical History:   Procedure Laterality Date    CARDIAC CATHETERIZATION  2016    Mod calcification. Co-dom. oLM 50-70%. LAD 30%. Dx 30%. Cx 70% focal.  OM 80% focal.  RCA 30%.  HX ANGIOPLASTY      2 stents placed and heart attack during the procedure    HX COLONOSCOPY  10/2003    neg; declines f/u    HX CORONARY ARTERY BYPASS GRAFT  2016     LIMA to LAD and SVG to OM1    HX HEART CATHETERIZATION  2013    HX HERNIA REPAIR      1970s    HX TONSIL AND ADENOIDECTOMY         Family History:  Family History   Problem Relation Age of Onset   [address] [facility] [address] Parkinsonism Mother     Hypertension Mother     Cancer Father         lung    Heart defect Brother        Social History:  Social History     Tobacco Use    Smoking status: Former Smoker     Years: 2.00     Last attempt to quit: 1955     Years since quittin.7    Smokeless tobacco: Never Used   Substance Use Topics    Alcohol use: Yes     Frequency: 2-4 times a month     Comment: drinks weekly couple beers    Drug use: No       Allergies: Allergies   Allergen Reactions    Amaryl [Glimepiride] Drowsiness    Lipitor [Atorvastatin] Myalgia    Niacin Other (comments)     Facial blistering, swelling in face    Pravachol [Pravastatin] Myalgia    Zocor [Simvastatin] Myalgia         Review of Systems       Review of Systems   Constitutional: Negative. Negative for chills and fever. Positive for head injury   HENT: Negative. Negative for congestion, ear pain and rhinorrhea. Eyes: Negative. Negative for pain and redness. Respiratory: Negative. Negative for cough and shortness of breath. Cardiovascular: Negative.   Negative for chest pain, palpitations and leg swelling. Gastrointestinal: Negative. Negative for abdominal pain, constipation, diarrhea, nausea and vomiting. Genitourinary: Negative. Negative for dysuria, frequency, hematuria and urgency. Musculoskeletal: Negative. Negative for back pain, gait problem, joint swelling and neck pain. Skin: Positive for wound (Scalp abrasion). Negative for rash. Neurological: Negative. Negative for dizziness, seizures, speech difficulty, weakness, light-headedness and headaches. Hematological: Negative for adenopathy. Does not bruise/bleed easily. All other systems reviewed and are negative. Physical Exam     Visit Vitals  /87 (BP 1 Location: Right arm)   Pulse 88   Temp 97.9 °F (36.6 °C)   Resp 17   SpO2 96%         Physical Exam  Vitals signs and nursing note reviewed. Constitutional:       General: He is not in acute distress. Appearance: Normal appearance. He is normal weight. He is not ill-appearing, toxic-appearing or diaphoretic. HENT:      Head: Normocephalic. Comments: 2 cm scalp abrasion with skin tear to the superior right side  Eyes:      General: Vision grossly intact. Gaze aligned appropriately. Right eye: Discharge present. Left eye: Discharge present. Extraocular Movements: Extraocular movements intact. Conjunctiva/sclera: Conjunctivae normal.      Right eye: Right conjunctiva is not injected. No chemosis or hemorrhage. Left eye: Left conjunctiva is not injected. No chemosis or hemorrhage. Pupils: Pupils are equal, round, and reactive to light. Neck:      Musculoskeletal: Full passive range of motion without pain, normal range of motion and neck supple. No pain with movement, spinous process tenderness or muscular tenderness. Cardiovascular:      Rate and Rhythm: Normal rate and regular rhythm. Pulses: Normal pulses. Heart sounds: Normal heart sounds. No murmur. No friction rub.  No gallop. Pulmonary:      Effort: Pulmonary effort is normal. No respiratory distress. Breath sounds: Normal breath sounds. No stridor. No wheezing, rhonchi or rales. Chest:      Chest wall: No tenderness. Comments: Midline sternotomy scar  Abdominal:      General: Abdomen is flat. Palpations: Abdomen is soft. Tenderness: There is no abdominal tenderness. Musculoskeletal: Normal range of motion. Lymphadenopathy:      Cervical: No cervical adenopathy. Skin:     General: Skin is warm and dry. Capillary Refill: Capillary refill takes less than 2 seconds. Findings: Abrasion present. Neurological:      General: No focal deficit present. Mental Status: He is alert. GCS: GCS eye subscore is 4. GCS verbal subscore is 5. GCS motor subscore is 6. Diagnostic Study Results     Labs -  No results found for this or any previous visit (from the past 12 hour(s)). Radiologic Studies -   CT HEAD WO CONT   Final Result   Impression:      1. No acute intracranial pathology. 2. Old left occipital infarct and moderate sequela of presumed chronic small   vessel ischemic disease. CT SPINE CERV WO CONT   Final Result   Impression:      1. No acute fracture or subluxation. Chronic changes of DISH with degenerative   change as well. Stable thyroid nodules. Thank you for this referral.            Medical Decision Making   I am the first provider for this patient. I reviewed available nursing notes, past medical history, past surgical history, family history and social history. Vital Signs-Reviewed the patient's vital signs. Records Reviewed: Nursing Notes and Old Medical Records (Time of Review: 11:40 PM)    ED Course: Progress Notes, Reevaluation, and Consults:  11:40 PM  Initial assessment performed. The patients presenting problems have been discussed, and they/their family are in agreement with the care plan formulated and outlined with them.  I have encouraged them to ask questions as they arise throughout their visit. Provider Notes (Medical Decision Making):     Patient is an 82-year-old male who presents by EMS from his nursing home after an unwitnessed fall. He does have a superficial scalp abrasion with a skin tear to the right superior scalp which does not require any wound repair. He does have history of dementia but is awake, alert, and oriented to person, place, and situation. He was unsure of the year but knew that María Conklin was president. Will obtain appropriate studies to evaluate patient's complaints and treat symptomatically. Will disposition after reassessment assuming no clinical change or worsening and appropriate response to symptomatic treatment. Unable to clear C-spine with Nexus of CT head and C-spine were ordered. CT head shows no acute abnormality. CT C-spine shows no acute fracture. Patient's tetanus shot was updated. He has been calm, cooperative, and alert throughout his ER stay. He will be discharged back to Nursing Home. He will also be sent home with erythromycin ophthalmic ointment for bilateral bacterial conjunctivitis. ER precautions discussed and he is to follow-up with his PCP in 1 to 2 days. Diagnosis     Clinical Impression:   1. Closed head injury, initial encounter    2. Abrasion of scalp, initial encounter    3. Fall, initial encounter    4. Acute bacterial conjunctivitis of both eyes        Disposition: Discharged home back to sending facility    DISCHARGE NOTE:     Patient has been reexamined. Patient has no new complaints, changes, or physical findings. Care plan outlined and precautions discussed. Results of CT scans were reviewed with the patient. All medications were reviewed with the patient; will discharge home with erythromycin abdominal ointment. All of patient's questions and concerns were addressed.  Patient was instructed and agrees to follow up with PCP, as well as to return to the ED upon further deterioration. Patient is ready to go home. Follow-up Information     Follow up With Specialties Details Why Kraig Lewis  Schedule an appointment as soon as possible for a visit Follow-up from the Emergency Department [address] [facility] [address]  [phone]    [facility] EMERGENCY DEPT Emergency Medicine  As needed, If symptoms worsen [address] [facility] [address]  [phone]           Current Discharge Medication List      START taking these medications    Details   !! erythromycin (ILOTYCIN) ophthalmic ointment Apply 1/2 inch ribbon OS every 6 hours x 7 days  Qty: 3.5 g, Refills: 0       !! - Potential duplicate medications found. Please discuss with provider. CONTINUE these medications which have NOT CHANGED    Details   lidocaine (LIDODERM) 5 % Apply patch to the affected area for 12 hours a day and remove for 12 hours a day. Qty: 30 Each, Refills: 0      diclofenac (VOLTAREN) 1 % gel Apply 2 g to affected area four (4) times daily. Qty: 100 g, Refills: 0      lactobacillus sp. 50 billion cpu (BIO-K PLUS) 50 billion cell -375 mg cap capsule Take 1 Cap by mouth daily. Qty: 30 Cap, Refills: 0      ezetimibe (ZETIA) 10 mg tablet Take 1 Tab by mouth nightly. insulin detemir U-100 (LEVEMIR U-100 INSULIN) 100 unit/mL injection 60 Units by SubCUTAneous route daily. Indications: type 2 diabetes mellitus      !! erythromycin (ILOTYCIN) ophthalmic ointment Apply to both lower eyelids four times a day until done. Qty: 1 Tube, Refills: 0      aspirin delayed-release 81 mg tablet Take 1 Tab by mouth daily. Indications: Cerebral Thromboembolism Prevention  Qty: 30 Tab, Refills: 0      cycloSPORINE (RESTASIS) 0.05 % ophthalmic emulsion Administer 1 Drop to both eyes every twelve (12) hours. Indications: Dry Eye  Qty: 15 Each, Refills: 0      gabapentin (NEURONTIN) 300 mg capsule Take 1 Cap by mouth daily.  Indications: NEUROPATHIC PAIN, Restless Legs Syndrome  Qty: 30 Cap, Refills: 0      isosorbide mononitrate ER (IMDUR) 30 mg tablet Take 1 Tab by mouth daily. Qty: 30 Tab, Refills: 0    Associated Diagnoses: Essential hypertension      multivit-min-FA-lycopen-lutein (CENTRUM SILVER) 0.4-300-250 mg-mcg-mcg tab Take 1 tab daily  Qty: 30 Tab, Refills: 0      OTHER Incentive spirometry- Use as directed  Qty: 1 Each, Refills: 0    Associated Diagnoses: Influenza B      furosemide (LASIX) 20 mg tablet Take 1 Tab by mouth daily. Indications: Edema  Qty: 30 Tab, Refills: 0      insulin lispro (HUMALOG KWIKPEN INSULIN) 200 unit/mL (3 mL) inpn For Blood Sugar (mg/dL) of:     Less than 150 =   0 units           150 -199 =   2 units  200 -249 =   4 units  250 -299 =   6 units  300 -349 =   8 units  350 and above =   10 units  Check BG then Inject insulin per SS 3 times daily before meals and at bedtime  Indications: type 2 diabetes mellitus  Qty: 1 Pen, Refills: 0      famotidine (PEPCID) 20 mg tablet Take 1 Tab by mouth two (2) times a day. Indications: PREVENTION OF STRESS ULCER  Qty: 30 Tab, Refills: 0      CALCIUM CARBONATE (CALTRATE 600 PO) Take 1 Tab by mouth daily. !! - Potential duplicate medications found. Please discuss with provider. Dictation disclaimer:  Please note that this dictation was completed with [employer], the computer voice recognition software. Quite often unanticipated grammatical, syntax, homophones, and other interpretive errors are inadvertently transcribed by the computer software. Please disregard these errors. Please excuse any errors that have escaped final proofreading.

## 2020-09-03 NOTE — DISCHARGE INSTRUCTIONS
Patient Education        Learning About a Closed Head Injury  What is a closed head injury? A closed head injury happens when your head gets hit hard. The strong force of the blow causes your brain to shake in your skull. This movement can cause the brain to bruise, swell, or tear. Sometimes nerves or blood vessels also get damaged. This can cause bleeding in or around the brain. A concussion is a type of closed head injury. What are the symptoms? If you have a mild concussion, you may have a mild headache or feel \"not quite right. \" These symptoms are common. They usually go away over a few days to 4 weeks. But sometimes after a concussion, you feel like you can't function as well as before the injury. And you have new symptoms. This is called postconcussive syndrome. You may:  · Find it harder to solve problems, think, concentrate, or remember. · Have headaches. · Have changes in your sleep patterns, such as not being able to sleep or sleeping all the time. · Have changes in your personality. · Not be interested in your usual activities. · Feel angry or anxious without a clear reason. · Lose your sense of taste or smell. · Be dizzy, lightheaded, or unsteady. It may be hard to stand or walk. How is a closed head injury treated? Any person who may have a concussion needs to see a doctor. Some people have to stay in the hospital to be watched. Others can go home safely. If you go home, follow your doctor's instructions. He or she will tell you if you need someone to watch you closely for the next 24 hours or longer. Rest is the best treatment. Get plenty of sleep at night. And try to rest during the day. · Avoid activities that are physically or mentally demanding. These include housework, exercise, and schoolwork. And don't play video games, send text messages, or use the computer. You may need to change your school or work schedule to be able to avoid these activities.   · Ask your doctor when it's okay to drive, ride a bike, or operate machinery. · Take an over-the-counter pain medicine, such as acetaminophen (Tylenol), ibuprofen (Advil, Motrin), or naproxen (Aleve). Be safe with medicines. Read and follow all instructions on the label. · Check with your doctor before you use any other medicines for pain. · Do not drink alcohol or use illegal drugs. They can slow recovery. They can also increase your risk of getting a second head injury. Follow-up care is a key part of your treatment and safety. Be sure to make and go to all appointments, and call your doctor if you are having problems. It's also a good idea to know your test results and keep a list of the medicines you take. Where can you learn more? Go to http://da[employer] Payments Companyahsan.info/  Enter E235 in the search box to learn more about \"Learning About a Closed Head Injury. \"  Current as of: November 20, 2019               Content Version: 12.5  © 0098-1192 [employer]. Care instructions adapted under license by [employer] (which disclaims liability or warranty for this information). If you have questions about a medical condition or this instruction, always ask your healthcare professional. [address] any warranty or liability for your use of this information. Patient Education        Skin Tears: Care Instructions  Your Care Instructions  As we get older, our skin gets drier and more fragile. Sometimes this can cause the outer layers of skin to split and tear open. Skin tears are treated in different ways. In some cases, doctors use pieces of tape called Steri-Strips to pull the skin together and help it heal. Other times, it's best to leave the tear open and cover it with a special wound-care bandage. Skin tears are usually not serious. They usually heal in a few weeks. But how long you take to heal depends on your body and the type of tear you have.  Sometimes the torn piece of skin is used to protect the wound while it heals. But that piece of skin does not heal. It may fall off on its own. Or the doctor may remove it. As your tear heals, it's important to keep it clean to help prevent infection. The doctor has checked you carefully, but problems can develop later. If you notice any problems or new symptoms, get medical treatment right away. Follow-up care is a key part of your treatment and safety. Be sure to make and go to all appointments, and call your doctor if you are having problems. It's also a good idea to know your test results and keep a list of the medicines you take. How can you care for yourself at home? · If you have pain, ask your doctor if you can take an over-the-counter pain medicine, such as acetaminophen (Tylenol), ibuprofen (Advil, Motrin), or naproxen (Aleve). Be safe with medicines. Read and follow all instructions on the label. · If you have a bandage, follow your doctor's instructions for changing it. · If you have Steri-Strips, leave them on until they fall off. · Follow your doctor's instructions about bathing. · Gently wash the skin tear with plain water 2 times a day. Do not rub the area. · Let the area air dry. Or you can pat it carefully with a soft towel. When should you call for help? Call your doctor now or seek immediate medical care if:  · You have signs of infection, such as:  ? Increased pain, swelling, warmth, or redness around the tear. ? Red streaks leading from the tear. ? Pus draining from the tear. ? A fever. · The tear starts to bleed a lot. Small amounts of blood are normal.  Watch closely for changes in your health, and be sure to contact your doctor if:  · You do not get better as expected. Where can you learn more? Go to http://da-ahsan.info/  Enter R095 in the search box to learn more about \"Skin Tears: Care Instructions. \"  Current as of: June 26, 2019               Content Version: 12.5  © 5555-8007 [employer]. Care instructions adapted under license by [employer] (which disclaims liability or warranty for this information). If you have questions about a medical condition or this instruction, always ask your healthcare professional. [place] [address] any warranty or liability for your use of this information.

## 2020-12-11 ENCOUNTER — APPOINTMENT (OUTPATIENT)
Dept: CT IMAGING | Age: 85
End: 2020-12-11
Attending: PHYSICIAN ASSISTANT
Payer: MEDICARE

## 2020-12-11 ENCOUNTER — HOSPITAL ENCOUNTER (EMERGENCY)
Age: 85
Discharge: NURSING, REHAB OR LONG-TERM CARE | End: 2020-12-11
Attending: EMERGENCY MEDICINE
Payer: MEDICARE

## 2020-12-11 VITALS
HEART RATE: 63 BPM | SYSTOLIC BLOOD PRESSURE: 169 MMHG | BODY MASS INDEX: 23.68 KG/M2 | OXYGEN SATURATION: 98 % | WEIGHT: 165 LBS | TEMPERATURE: 98.2 F | DIASTOLIC BLOOD PRESSURE: 62 MMHG | RESPIRATION RATE: 16 BRPM
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
## 2020-12-11 LAB
ALBUMIN SERPL-MCNC: 3.2 G/DL (ref 3.4–5)
ALBUMIN/GLOB SERPL: 0.9 {RATIO} (ref 0.8–1.7)
ALP SERPL-CCNC: 142 U/L (ref 45–117)
ALT SERPL-CCNC: 16 U/L (ref 16–61)
ANION GAP SERPL CALC-SCNC: 1 MMOL/L (ref 3–18)
AST SERPL-CCNC: 15 U/L (ref 10–38)
BASOPHILS # BLD: 0 K/UL (ref 0–0.06)
BASOPHILS NFR BLD: 0 % (ref 0–3)
BILIRUB SERPL-MCNC: 0.8 MG/DL (ref 0.2–1)
BUN SERPL-MCNC: 21 MG/DL (ref 7–18)
BUN/CREAT SERPL: 13 (ref 12–20)
CALCIUM SERPL-MCNC: 8.7 MG/DL (ref 8.5–10.1)
CHLORIDE SERPL-SCNC: 108 MMOL/L (ref 100–111)
CK MB CFR SERPL CALC: NORMAL % (ref 0–4)
CK MB SERPL-MCNC: <1 NG/ML (ref 5–25)
CK SERPL-CCNC: 63 U/L (ref 39–308)
CO2 SERPL-SCNC: 34 MMOL/L (ref 21–32)
CREAT SERPL-MCNC: 1.61 MG/DL (ref 0.6–1.3)
DIFFERENTIAL METHOD BLD: ABNORMAL
EOSINOPHIL # BLD: 1.3 K/UL (ref 0–0.4)
EOSINOPHIL NFR BLD: 13 % (ref 0–5)
ERYTHROCYTE [DISTWIDTH] IN BLOOD BY AUTOMATED COUNT: 14 % (ref 11.6–14.5)
GLOBULIN SER CALC-MCNC: 3.4 G/DL (ref 2–4)
GLUCOSE SERPL-MCNC: 82 MG/DL (ref 74–99)
HCT VFR BLD AUTO: 46.7 % (ref 36–48)
HGB BLD-MCNC: 15 G/DL (ref 13–16)
LYMPHOCYTES # BLD: 2.3 K/UL (ref 0.8–3.5)
LYMPHOCYTES NFR BLD: 23 % (ref 20–51)
MCH RBC QN AUTO: 28.7 PG (ref 24–34)
MCHC RBC AUTO-ENTMCNC: 32.1 G/DL (ref 31–37)
MCV RBC AUTO: 89.5 FL (ref 74–97)
MONOCYTES # BLD: 0.8 K/UL (ref 0–1)
MONOCYTES NFR BLD: 8 % (ref 2–9)
NEUTS SEG # BLD: 5.6 K/UL (ref 1.8–8)
NEUTS SEG NFR BLD: 56 % (ref 42–75)
PLATELET # BLD AUTO: 203 K/UL (ref 135–420)
PLATELET COMMENTS,PCOM: ABNORMAL
PMV BLD AUTO: 10.7 FL (ref 9.2–11.8)
POTASSIUM SERPL-SCNC: 4.1 MMOL/L (ref 3.5–5.5)
PROT SERPL-MCNC: 6.6 G/DL (ref 6.4–8.2)
RBC # BLD AUTO: 5.22 M/UL (ref 4.7–5.5)
RBC MORPH BLD: ABNORMAL
SODIUM SERPL-SCNC: 143 MMOL/L (ref 136–145)
TROPONIN I SERPL-MCNC: <0.02 NG/ML (ref 0–0.04)
WBC # BLD AUTO: 10 K/UL (ref 4.6–13.2)

## 2020-12-11 PROCEDURE — 99284 EMERGENCY DEPT VISIT MOD MDM: CPT

## 2020-12-11 PROCEDURE — 70450 CT HEAD/BRAIN W/O DYE: CPT

## 2020-12-11 PROCEDURE — 93005 ELECTROCARDIOGRAM TRACING: CPT

## 2020-12-11 PROCEDURE — 85025 COMPLETE CBC W/AUTO DIFF WBC: CPT

## 2020-12-11 PROCEDURE — 82550 ASSAY OF CK (CPK): CPT

## 2020-12-11 PROCEDURE — 80053 COMPREHEN METABOLIC PANEL: CPT

## 2020-12-11 PROCEDURE — 72125 CT NECK SPINE W/O DYE: CPT

## 2020-12-11 NOTE — ED PROVIDER NOTES
EMERGENCY DEPARTMENT HISTORY AND PHYSICAL EXAM    5:30 AM      Date: 12/11/2020  Patient Name: Oscar Grover. History of Presenting Illness     Chief Complaint   Patient presents with    Fall         History Provided By: Patient and EMS  Location/Duration/Severity/Modifying factors   This is a 21-year-old male with a PMH significant for anxiety, arthritis, CAD, CKD, COPD, dementia, depression, diabetes, HTN, and CVA who presents via EMS from [address] [place] [address] s/p multiple falls. Per EMS, the fall was unwitnessed. Patient reports his first fall was when trying to get out of his wheelchair to use the bathroom. The patient was able to crawl over and grab his call light. After waiting on the floor for approximately 1 hour the patient tried to stand and fell again. The patient reports he landed on his left elbow and hit his head. Patient takes a baby aspirin daily but no other blood thinners. PCP: None    Current Outpatient Medications   Medication Sig Dispense Refill    erythromycin (ILOTYCIN) ophthalmic ointment Apply 1/2 inch ribbon OS every 6 hours x 7 days 3.5 g 0    lidocaine (LIDODERM) 5 % Apply patch to the affected area for 12 hours a day and remove for 12 hours a day. 30 Each 0    diclofenac (VOLTAREN) 1 % gel Apply 2 g to affected area four (4) times daily. 100 g 0    lactobacillus sp. 50 billion cpu (BIO-K PLUS) 50 billion cell -375 mg cap capsule Take 1 Cap by mouth daily. 30 Cap 0    ezetimibe (ZETIA) 10 mg tablet Take 1 Tab by mouth nightly.  insulin detemir U-100 (LEVEMIR U-100 INSULIN) 100 unit/mL injection 60 Units by SubCUTAneous route daily. Indications: type 2 diabetes mellitus      erythromycin (ILOTYCIN) ophthalmic ointment Apply to both lower eyelids four times a day until done. 1 Tube 0    aspirin delayed-release 81 mg tablet Take 1 Tab by mouth daily.  Indications: Cerebral Thromboembolism Prevention 30 Tab 0    cycloSPORINE (RESTASIS) 0.05 % ophthalmic emulsion Administer 1 Drop to both eyes every twelve (12) hours. Indications: Dry Eye 15 Each 0    gabapentin (NEURONTIN) 300 mg capsule Take 1 Cap by mouth daily. Indications: NEUROPATHIC PAIN, Restless Legs Syndrome 30 Cap 0    isosorbide mononitrate ER (IMDUR) 30 mg tablet Take 1 Tab by mouth daily. 30 Tab 0    multivit-min-FA-lycopen-lutein (CENTRUM SILVER) 0.4-300-250 mg-mcg-mcg tab Take 1 tab daily 30 Tab 0    OTHER Incentive spirometry- Use as directed 1 Each 0    furosemide (LASIX) 20 mg tablet Take 1 Tab by mouth daily. Indications: Edema 30 Tab 0    insulin lispro (HUMALOG KWIKPEN INSULIN) 200 unit/mL (3 mL) inpn For Blood Sugar (mg/dL) of:     Less than 150 =   0 units           150 -199 =   2 units  200 -249 =   4 units  250 -299 =   6 units  300 -349 =   8 units  350 and above =   10 units  Check BG then Inject insulin per SS 3 times daily before meals and at bedtime  Indications: type 2 diabetes mellitus 1 Pen 0    famotidine (PEPCID) 20 mg tablet Take 1 Tab by mouth two (2) times a day. Indications: PREVENTION OF STRESS ULCER 30 Tab 0    CALCIUM CARBONATE (CALTRATE 600 PO) Take 1 Tab by mouth daily. Past History     Past Medical History:  Past Medical History:   Diagnosis Date    Anxiety     Arthritis     CAD (coronary artery disease)     s/p drug-eluting stents to RCA for high-grade stenoses in 2/09    Carotid duplex 08/26/2016    Mild <50% CYRUS stenosis. Chronic LICA occlusion. Similar to study of 3/29/16.     Carotid stenosis (HCC)     w/ known total occlusion of lt internal carotid artery; followed by pts vascular surgeon    Chronic kidney disease     [facility]     Dementia [facility]     Depression     Diabetes ([facility]     type 2    Dyslipidemia     on Crestor; managed by pts PCP    Dyspnea     Hypercholesterolemia     Hypertension     Low back pain     Osteoarthrosis involving multiple sites     Polycythemia 12/9/2019    Skin cancer ([facility]     squamous cell lesions of the skin    Stroke [facility])     Venous (peripheral) insufficiency        Past Surgical History:  Past Surgical History:   Procedure Laterality Date    CARDIAC CATHETERIZATION  2016    Mod calcification. Co-dom. oLM 50-70%. LAD 30%. Dx 30%. Cx 70% focal.  OM 80% focal.  RCA 30%.  HX ANGIOPLASTY      2 stents placed and heart attack during the procedure    HX COLONOSCOPY  10/2003    neg; declines f/u    HX CORONARY ARTERY BYPASS GRAFT  2016     LIMA to LAD and SVG to OM1    HX HEART CATHETERIZATION  2013    HX HERNIA REPAIR      1970s    HX TONSIL AND ADENOIDECTOMY         Family History:  Family History   Problem Relation Age of Onset   Lau.Zarate Parkinsonism Mother     Hypertension Mother     Cancer Father         lung    Heart defect Brother        Social History:  Social History     Tobacco Use    Smoking status: Former Smoker     Years: 2.00     Last attempt to quit: 1955     Years since quittin.9    Smokeless tobacco: Never Used   Substance Use Topics    Alcohol use: Yes     Frequency: 2-4 times a month     Comment: drinks weekly couple beers    Drug use: No       Allergies: Allergies   Allergen Reactions    Amaryl [Glimepiride] Drowsiness    Lipitor [Atorvastatin] Myalgia    Niacin Other (comments)     Facial blistering, swelling in face    Pravachol [Pravastatin] Myalgia    Zocor [Simvastatin] Myalgia         Review of Systems     Review of Systems   Constitutional: Negative for activity change, chills, fatigue and fever. HENT: Negative for ear pain, facial swelling, nosebleeds and trouble swallowing. Eyes: Negative for pain and visual disturbance. Respiratory: Negative for cough, chest tightness, shortness of breath and wheezing. Cardiovascular: Negative for chest pain, palpitations and leg swelling. Gastrointestinal: Negative for abdominal pain, constipation, diarrhea, nausea and vomiting.    Genitourinary: Negative for dysuria, flank pain and hematuria. Musculoskeletal: Positive for gait problem. Negative for back pain, neck pain and neck stiffness. Skin: Positive for wound. Neurological: Negative for dizziness, syncope, speech difficulty, weakness, light-headedness, numbness and headaches. Psychiatric/Behavioral: Negative for confusion. The patient is not nervous/anxious. Physical Exam   There were no vitals taken for this visit. Physical Exam  Vitals signs and nursing note reviewed. Constitutional:       General: He is not in acute distress. Appearance: He is normal weight. He is not ill-appearing. HENT:      Head: Normocephalic and atraumatic. Right Ear: External ear normal.      Left Ear: External ear normal.      Nose: Nose normal.      Mouth/Throat:      Pharynx: Oropharynx is clear. Eyes:      Extraocular Movements: Extraocular movements intact. Pupils: Pupils are equal, round, and reactive to light. Neck:      Musculoskeletal: Normal range of motion and neck supple. No neck rigidity or muscular tenderness. Cardiovascular:      Rate and Rhythm: Normal rate and regular rhythm. Pulses: Normal pulses. Heart sounds: Normal heart sounds. Pulmonary:      Effort: Pulmonary effort is normal.      Breath sounds: Normal breath sounds. Abdominal:      Palpations: Abdomen is soft. Tenderness: There is no abdominal tenderness. There is no right CVA tenderness, left CVA tenderness, guarding or rebound. Musculoskeletal: Normal range of motion. General: Signs of injury present. No swelling, tenderness or deformity. Comments: 2 cm abrasion to left elbow, nonbleeding. Skin:     General: Skin is warm and dry. Capillary Refill: Capillary refill takes less than 2 seconds. Neurological:      Mental Status: He is alert. Mental status is at baseline. Motor: Weakness present.       Gait: Gait abnormal.           Diagnostic Study Results     Labs -  Recent Results (from the past 12 hour(s))   CBC WITH AUTOMATED DIFF    Collection Time: 12/11/20  6:39 AM   Result Value Ref Range    WBC 10.0 4.6 - 13.2 K/uL    RBC 5.22 4.70 - 5.50 M/uL    HGB 15.0 13.0 - 16.0 g/dL    HCT 46.7 36.0 - 48.0 %    MCV 89.5 74.0 - 97.0 FL    MCH 28.7 24.0 - 34.0 PG    MCHC 32.1 31.0 - 37.0 g/dL    RDW 14.0 11.6 - 14.5 %    PLATELET 997 519 - 905 K/uL    MPV 10.7 9.2 - 11.8 FL    NEUTROPHILS PENDING %    LYMPHOCYTES PENDING %    MONOCYTES PENDING %    EOSINOPHILS PENDING %    BASOPHILS PENDING %    ABS. NEUTROPHILS PENDING K/UL    ABS. LYMPHOCYTES PENDING K/UL    ABS. MONOCYTES PENDING K/UL    ABS. EOSINOPHILS PENDING K/UL    ABS. BASOPHILS PENDING K/UL    DF PENDING    METABOLIC PANEL, COMPREHENSIVE    Collection Time: 12/11/20  6:39 AM   Result Value Ref Range    Sodium 143 136 - 145 mmol/L    Potassium 4.1 3.5 - 5.5 mmol/L    Chloride 108 100 - 111 mmol/L    CO2 34 (H) 21 - 32 mmol/L    Anion gap 1 (L) 3.0 - 18 mmol/L    Glucose 82 74 - 99 mg/dL    BUN 21 (H) 7.0 - 18 MG/DL    Creatinine 1.61 (H) 0.6 - 1.3 MG/DL    BUN/Creatinine ratio 13 12 - 20      GFR est AA 50 (L) >60 ml/min/1.73m2    GFR est non-AA 41 (L) >60 ml/min/1.73m2    Calcium 8.7 8.5 - 10.1 MG/DL    Bilirubin, total 0.8 0.2 - 1.0 MG/DL    ALT (SGPT) 16 16 - 61 U/L    AST (SGOT) 15 10 - 38 U/L    Alk. phosphatase 142 (H) 45 - 117 U/L    Protein, total 6.6 6.4 - 8.2 g/dL    Albumin 3.2 (L) 3.4 - 5.0 g/dL    Globulin 3.4 2.0 - 4.0 g/dL    A-G Ratio 0.9 0.8 - 1.7     CARDIAC PANEL,(CK, CKMB & TROPONIN)    Collection Time: 12/11/20  6:39 AM   Result Value Ref Range    CK - MB <1.0 <3.6 ng/ml    CK-MB Index  0.0 - 4.0 %     CALCULATION NOT PERFORMED WHEN RESULT IS BELOW LINEAR LIMIT    CK 63 39 - 308 U/L    Troponin-I, QT <0.02 0.0 - 0.045 NG/ML       Radiologic Studies -   CT HEAD WO CONT   Final Result   Impression:      1. No acute intracranial pathology. 2. Small contusion to the right parietal scalp.    3. Old infarcts without interval change. CT SPINE CERV WO CONT   Final Result   Impression:      1. No acute fracture or subluxation. Chronic changes of DISH. Thank you for this referral.            Medical Decision Making   I am the first provider for this patient. I reviewed the vital signs, available nursing notes, past medical history, past surgical history, family history and social history. Vital Signs-Reviewed the patient's vital signs. EKG: Interpreted by Dr. Claudia Alexandre and myself. Rate 69, , QRS 90, QTc 445. Normal sinus rhythm with no signs of acute ischemic changes. Records Reviewed: Nursing Notes and Old Medical Records (Time of Review: 5:30 AM)    ED Course: Progress Notes, Reevaluation, and Consults:  0642-patient reevaluated and resting comfortably without any complaints. Provider Notes (Medical Decision Making):   46-year-old male presents with history and exam consistent with likely mechanical fall without evidence of significant intracranial injury at this time. Initial considerations in this patient included intracranial hemorrhages including subarachnoid, subdural epidural hemorrhages, brain contusions, delayed intracranial hemorrhages, cervical spine fractures and dislocations, spinal cord injuries, musculoskeletal injuries, syncope from cardiac etiologies including dysrhythmia and other neurologic etiologies including cerebrovascular accident (CVA) and transient ischemic attack (TIA), and fall syndromes (history of recurrent falls) among others. Patient presented after 2 hours after a fall at assisted living facility described by the patient as tripping consistent with likely mechanical fall (non-syncopal fall from standing). ? A 12-lead EKG was obtained with no evidence of dysrhythmia, ischemia or infarction.  The patient denied current use of anticoagulants [specifically Warfarin (Coumadin)/Rivaroxaban (Xarelto)/Apixaban (Eliquis)/Dabigatran (Pradaxa)/Enoxaparin (Lovenox) but did report use of aspirin 81 mg.? Patient was noted to have a neurological exam with no evidence of focal deficits. ? CT scans of the head and cervical spine were obtained with no evidence of significant intracranial hemorrhage and no evidence of acute spinal injuries. Patient noted to have abrasion to left elbow no active bleeding. Patient noted no pain on physical exam.  Labs were obtained in the evaluation of this patient and interpreted by Dr. Xiang Henderson myself. CBC showed no leukocytosis or anemia. BMP was unremarkable for any acute abnormalities. Cardiac panel was unremarkable. The patient was questioned on fall history with prior falls and discussion of the importance of close follow up with primary care provider to discuss what can be done to decrease fall risk. ? Prior to discharge, we discussed return precautions, specifically emphasizing the signs of delayed intracranial hemorrhage, and close follow up with primary care provider in the next 2 days to discuss ways to decrease fall risk      Diagnosis     Clinical Impression:   1. Accident due to mechanical fall without injury, initial encounter        Disposition: Discharged home to follow-up with PCP in 2 days. Follow-up Information     Follow up With Specialties Details Why [address]  In 2 days  Brie Harrison [address]  [address]  [phone]           Patient's Medications   Start Taking    No medications on file   Continue Taking    ASPIRIN DELAYED-RELEASE 81 MG TABLET    Take 1 Tab by mouth daily. Indications: Cerebral Thromboembolism Prevention    CALCIUM CARBONATE (CALTRATE 600 PO)    Take 1 Tab by mouth daily. CYCLOSPORINE (RESTASIS) 0.05 % OPHTHALMIC EMULSION    Administer 1 Drop to both eyes every twelve (12) hours. Indications: Dry Eye    DICLOFENAC (VOLTAREN) 1 % GEL    Apply 2 g to affected area four (4) times daily.     ERYTHROMYCIN (ILOTYCIN) OPHTHALMIC OINTMENT Apply to both lower eyelids four times a day until done. ERYTHROMYCIN (ILOTYCIN) OPHTHALMIC OINTMENT    Apply 1/2 inch ribbon OS every 6 hours x 7 days    EZETIMIBE (ZETIA) 10 MG TABLET    Take 1 Tab by mouth nightly. FAMOTIDINE (PEPCID) 20 MG TABLET    Take 1 Tab by mouth two (2) times a day. Indications: PREVENTION OF STRESS ULCER    FUROSEMIDE (LASIX) 20 MG TABLET    Take 1 Tab by mouth daily. Indications: Edema    GABAPENTIN (NEURONTIN) 300 MG CAPSULE    Take 1 Cap by mouth daily. Indications: NEUROPATHIC PAIN, Restless Legs Syndrome    INSULIN DETEMIR U-100 (LEVEMIR U-100 INSULIN) 100 UNIT/ML INJECTION    60 Units by SubCUTAneous route daily. Indications: type 2 diabetes mellitus    INSULIN LISPRO (HUMALOG KWIKPEN INSULIN) 200 UNIT/ML (3 ML) INPN    For Blood Sugar (mg/dL) of:     Less than 150 =   0 units           150 -199 =   2 units  200 -249 =   4 units  250 -299 =   6 units  300 -349 =   8 units  350 and above =   10 units  Check BG then Inject insulin per SS 3 times daily before meals and at bedtime  Indications: type 2 diabetes mellitus    ISOSORBIDE MONONITRATE ER (IMDUR) 30 MG TABLET    Take 1 Tab by mouth daily. LACTOBACILLUS SP. 50 BILLION CPU (BIO-K PLUS) 50 BILLION CELL -375 MG CAP CAPSULE    Take 1 Cap by mouth daily. LIDOCAINE (LIDODERM) 5 %    Apply patch to the affected area for 12 hours a day and remove for 12 hours a day. MULTIVIT-MIN-FA-LYCOPEN-LUTEIN (CENTRUM SILVER) 0.4-300-250 MG-MCG-MCG TAB    Take 1 tab daily    OTHER    Incentive spirometry- Use as directed   These Medications have changed    No medications on file   Stop Taking    No medications on file     Disclaimer: Sections of this note are dictated using utilizing voice recognition software. Minor typographical errors may be present. If questions arise, please do not hesitate to contact me or call our department. Eloise Monique.  [address] [facility], [facility]  Emergency Medicine Resident, PGY-1

## 2020-12-11 NOTE — DISCHARGE INSTRUCTIONS
Based on our clinical examination and testing, we feel that you are safe for discharge home today with the following precautions. Please note that if your condition changes, we would like to see you again. You may return at any time if you have further concerns. Following up with your primary care and/or specialist as indicated is paramount for your overall health and wellness. Return to ER immediately if having severe pain, persistent fevers >100.4F, difficulty breathing, persistent vomiting or vomiting blood, altered mentation or confusion, or any other concerning symptoms. Please follow up as indicated below, and take medications as directed by pharmacy on prescription bottle and as indicated verbally to you here in the ER. Thank you for visiting our Emergency Department today. Please let us know if there is anything else that we can help you with today.

## 2020-12-11 NOTE — ED NOTES
Bedside shift report received from Christine Daly.GE. Assumed care of patient. Received patient resting on stretcher. Awake, alert, oriented to person and place only. Awaiting transport back to [facility]. Explanation of wait provided to the patient.

## 2020-12-11 NOTE — ED NOTES
Pt handed off to transporters of [facility] back to [facility]. Report given to transport medics. IV left AC removed/catheter tip removed. Pt vital signs documented in flowsheet. Per Errol Bush no EMTALA is required for this patient's transport.

## 2020-12-12 LAB
ATRIAL RATE: 69 BPM
CALCULATED P AXIS, ECG09: 74 DEGREES
CALCULATED R AXIS, ECG10: 52 DEGREES
CALCULATED T AXIS, ECG11: 72 DEGREES
DIAGNOSIS, 93000: NORMAL
P-R INTERVAL, ECG05: 164 MS
Q-T INTERVAL, ECG07: 416 MS
QRS DURATION, ECG06: 90 MS
QTC CALCULATION (BEZET), ECG08: 445 MS
VENTRICULAR RATE, ECG03: 69 BPM

## 2021-04-14 ENCOUNTER — HOSPITAL ENCOUNTER (EMERGENCY)
Age: 86
Discharge: HOME | End: 2021-04-14
Attending: EMERGENCY MEDICINE
Payer: MEDICARE

## 2021-04-14 ENCOUNTER — APPOINTMENT (OUTPATIENT)
Dept: CT IMAGING | Age: 86
End: 2021-04-14
Attending: EMERGENCY MEDICINE
Payer: MEDICARE

## 2021-04-14 VITALS
RESPIRATION RATE: 15 BRPM | OXYGEN SATURATION: 96 % | TEMPERATURE: 98 F | DIASTOLIC BLOOD PRESSURE: 99 MMHG | HEART RATE: 73 BPM | SYSTOLIC BLOOD PRESSURE: 180 MMHG

## 2021-04-14 DIAGNOSIS — H01.006 BLEPHARITIS OF BOTH EYES, UNSPECIFIED EYELID, UNSPECIFIED TYPE: ICD-10-CM

## 2021-04-14 DIAGNOSIS — R40.4 TRANSIENT ALTERATION OF AWARENESS: ICD-10-CM

## 2021-04-14 DIAGNOSIS — N30.00 ACUTE CYSTITIS WITHOUT HEMATURIA: Primary | ICD-10-CM

## 2021-04-14 DIAGNOSIS — H01.003 BLEPHARITIS OF BOTH EYES, UNSPECIFIED EYELID, UNSPECIFIED TYPE: ICD-10-CM

## 2021-04-14 LAB
ALBUMIN SERPL-MCNC: 3.2 G/DL (ref 3.4–5)
ALBUMIN/GLOB SERPL: 0.7 {RATIO} (ref 0.8–1.7)
ALP SERPL-CCNC: 146 U/L (ref 45–117)
ALT SERPL-CCNC: 38 U/L (ref 16–61)
ANION GAP SERPL CALC-SCNC: 5 MMOL/L (ref 3–18)
APPEARANCE UR: ABNORMAL
AST SERPL-CCNC: 24 U/L (ref 10–38)
BACTERIA URNS QL MICRO: ABNORMAL /HPF
BASOPHILS # BLD: 0.1 K/UL (ref 0–0.1)
BASOPHILS NFR BLD: 1 % (ref 0–2)
BILIRUB SERPL-MCNC: 0.9 MG/DL (ref 0.2–1)
BILIRUB UR QL: NEGATIVE
BUN SERPL-MCNC: 20 MG/DL (ref 7–18)
BUN/CREAT SERPL: 12 (ref 12–20)
CALCIUM SERPL-MCNC: 9.4 MG/DL (ref 8.5–10.1)
CHLORIDE SERPL-SCNC: 103 MMOL/L (ref 100–111)
CK MB CFR SERPL CALC: ABNORMAL % (ref 0–4)
CK MB SERPL-MCNC: <1 NG/ML (ref 5–25)
CK SERPL-CCNC: 37 U/L (ref 39–308)
CO2 SERPL-SCNC: 33 MMOL/L (ref 21–32)
COLOR UR: YELLOW
CREAT SERPL-MCNC: 1.63 MG/DL (ref 0.6–1.3)
DIFFERENTIAL METHOD BLD: ABNORMAL
EOSINOPHIL # BLD: 0.2 K/UL (ref 0–0.4)
EOSINOPHIL NFR BLD: 1 % (ref 0–5)
EPITH CASTS URNS QL MICRO: ABNORMAL /LPF (ref 0–5)
ERYTHROCYTE [DISTWIDTH] IN BLOOD BY AUTOMATED COUNT: 13.5 % (ref 11.6–14.5)
GLOBULIN SER CALC-MCNC: 4.8 G/DL (ref 2–4)
GLUCOSE BLD STRIP.AUTO-MCNC: 155 MG/DL (ref 70–110)
GLUCOSE SERPL-MCNC: 137 MG/DL (ref 74–99)
GLUCOSE UR STRIP.AUTO-MCNC: NEGATIVE MG/DL
HCT VFR BLD AUTO: 50.5 % (ref 36–48)
HGB BLD-MCNC: 16 G/DL (ref 13–16)
HGB UR QL STRIP: NEGATIVE
KETONES UR QL STRIP.AUTO: NEGATIVE MG/DL
LACTATE BLD-SCNC: 1.92 MMOL/L (ref 0.4–2)
LEUKOCYTE ESTERASE UR QL STRIP.AUTO: ABNORMAL
LYMPHOCYTES # BLD: 3 K/UL (ref 0.9–3.6)
LYMPHOCYTES NFR BLD: 24 % (ref 21–52)
MCH RBC QN AUTO: 28.4 PG (ref 24–34)
MCHC RBC AUTO-ENTMCNC: 31.7 G/DL (ref 31–37)
MCV RBC AUTO: 89.5 FL (ref 74–97)
MONOCYTES # BLD: 1 K/UL (ref 0.05–1.2)
MONOCYTES NFR BLD: 8 % (ref 3–10)
MUCOUS THREADS URNS QL MICRO: ABNORMAL /LPF
NEUTS SEG # BLD: 8.1 K/UL (ref 1.8–8)
NEUTS SEG NFR BLD: 66 % (ref 40–73)
NITRITE UR QL STRIP.AUTO: NEGATIVE
PH UR STRIP: 6.5 [PH] (ref 5–8)
PLATELET # BLD AUTO: 366 K/UL (ref 135–420)
PMV BLD AUTO: 9.8 FL (ref 9.2–11.8)
POTASSIUM SERPL-SCNC: 3.7 MMOL/L (ref 3.5–5.5)
PROT SERPL-MCNC: 8 G/DL (ref 6.4–8.2)
PROT UR STRIP-MCNC: NEGATIVE MG/DL
RBC # BLD AUTO: 5.64 M/UL (ref 4.35–5.65)
RBC #/AREA URNS HPF: ABNORMAL /HPF (ref 0–5)
SODIUM SERPL-SCNC: 141 MMOL/L (ref 136–145)
SP GR UR REFRACTOMETRY: 1.01 (ref 1–1.03)
TROPONIN I SERPL-MCNC: <0.02 NG/ML (ref 0–0.04)
UROBILINOGEN UR QL STRIP.AUTO: 1 EU/DL (ref 0.2–1)
WBC # BLD AUTO: 12.4 K/UL (ref 4.6–13.2)
WBC URNS QL MICRO: ABNORMAL /HPF (ref 0–4)

## 2021-04-14 PROCEDURE — 74011000250 HC RX REV CODE- 250: Performed by: EMERGENCY MEDICINE

## 2021-04-14 PROCEDURE — 70450 CT HEAD/BRAIN W/O DYE: CPT

## 2021-04-14 PROCEDURE — 74011250636 HC RX REV CODE- 250/636: Performed by: EMERGENCY MEDICINE

## 2021-04-14 PROCEDURE — 80053 COMPREHEN METABOLIC PANEL: CPT

## 2021-04-14 PROCEDURE — 82962 GLUCOSE BLOOD TEST: CPT

## 2021-04-14 PROCEDURE — 85025 COMPLETE CBC W/AUTO DIFF WBC: CPT

## 2021-04-14 PROCEDURE — 99285 EMERGENCY DEPT VISIT HI MDM: CPT

## 2021-04-14 PROCEDURE — 96374 THER/PROPH/DIAG INJ IV PUSH: CPT

## 2021-04-14 PROCEDURE — 81001 URINALYSIS AUTO W/SCOPE: CPT

## 2021-04-14 PROCEDURE — 87077 CULTURE AEROBIC IDENTIFY: CPT

## 2021-04-14 PROCEDURE — 87086 URINE CULTURE/COLONY COUNT: CPT

## 2021-04-14 PROCEDURE — 83605 ASSAY OF LACTIC ACID: CPT

## 2021-04-14 PROCEDURE — 93005 ELECTROCARDIOGRAM TRACING: CPT

## 2021-04-14 PROCEDURE — 87186 SC STD MICRODIL/AGAR DIL: CPT

## 2021-04-14 PROCEDURE — 84484 ASSAY OF TROPONIN QUANT: CPT

## 2021-04-14 RX ORDER — CEFPODOXIME PROXETIL 200 MG/1
200 TABLET, FILM COATED ORAL 2 TIMES DAILY
Qty: 14 TAB | Refills: 0 | Status: SHIPPED | OUTPATIENT
Start: 2021-04-14 | End: 2021-04-21

## 2021-04-14 RX ADMIN — WATER 1 G: 1 INJECTION INTRAMUSCULAR; INTRAVENOUS; SUBCUTANEOUS at 16:32

## 2021-04-14 NOTE — ED NOTES
In to assist with patient check in, patient's depends soaked, patient cleaned and changed, bed changed and warm blanket given to patient, patient placed on monitor, NSR noted, patient with old abrasion to left forearm, patient stated that he fell 4 days ago, no sighs if infection noted at this time, patient alert and oriented x 4, POC discussed with patient

## 2021-04-14 NOTE — ED NOTES
[facility] was notified of patient returning back to facility.

## 2021-04-14 NOTE — ED TRIAGE NOTES
Per EMS \" The patient came from [address] with AMS. Patient has a hx of DM.  when EMS was called; 197 upon EMS arrival and 155 upon ED arrival.  Patient is AxOx1-2. Patient responds to verbal stimuli and follows commands. Patient has thick, purulent discharge from both eyes. Patient also has reddened conjunctiva. \"

## 2021-04-14 NOTE — ED PROVIDER NOTES
EMERGENCY DEPARTMENT HISTORY AND PHYSICAL EXAM  This was created with voice recognition software and transcription errors may be present. 1:44 PM  Date: 4/14/2021  Patient Name: Kaushik Linares. History of Presenting Illness     Chief Complaint:    History Provided By:     HPI: Kaushik Duran is a 82 y.o. male past medical history of anxiety arthritis coronary disease carotid duplex carotid stenosis chronic kidney disease COPD dementia depression diabetes dyslipidemia high cholesterol stroke and dementia who presents for reportedly altered mental status. Patient was sent in from nursing facility in [place]. He is awake and alert without complaint states \"I feel pretty good right now\". He is oriented to self and location but does not know the year she is limited based on the patient's dementia will call the nursing home    1:51 PM D/w [facility] : They state the patient was sent in because seen by the NP and was acting less responsive than normal not eating sitting with his head in his hands and not knowing what to do. He does have a history of seizures certainly may been postictal at the time but he is certainly back to normal at this time. Overall etiology is unclear he is in the dementia unit at [facility]     PCP: None      Past History     Past Medical History:  Past Medical History:   Diagnosis Date    Anxiety     Arthritis     CAD (coronary artery disease)     s/p drug-eluting stents to RCA for high-grade stenoses in 2/09    Carotid duplex 08/26/2016    Mild <50% CYRUS stenosis. Chronic LICA occlusion. Similar to study of 3/29/16.     Carotid stenosis (HCC)     w/ known total occlusion of lt internal carotid artery; followed by pts vascular surgeon    Chronic kidney disease     COPD (chronic obstructive pulmonary disease) ([address]     Dementia ([address]     Depression     Diabetes ([address]     type 2    Dyslipidemia     on Crestor; managed by pts PCP    Dyspnea     Hypercholesterolemia     Hypertension     Low back pain     Osteoarthrosis involving multiple sites     Polycythemia 2019    Skin cancer (HCC)     squamous cell lesions of the skin    [facility]     Venous (peripheral) insufficiency        Past Surgical History:  Past Surgical History:   Procedure Laterality Date    CARDIAC CATHETERIZATION  2016    Mod calcification. Co-dom. oLM 50-70%. LAD 30%. Dx 30%. Cx 70% focal.  OM 80% focal.  RCA 30%.  HX ANGIOPLASTY      2 stents placed and heart attack during the procedure    HX COLONOSCOPY  10/2003    neg; declines f/u    HX CORONARY ARTERY BYPASS GRAFT  2016     LIMA to LAD and SVG to OM1    HX HEART CATHETERIZATION  2013    HX HERNIA REPAIR      1970s    HX TONSIL AND ADENOIDECTOMY         Family History:  Family History   Problem Relation Age of Onset   [facility] Parkinsonism Mother     Hypertension Mother     Cancer Father         lung    Heart defect Brother        Social History:  Social History     Tobacco Use    Smoking status: Former Smoker     Years: 2.00     Quit date: 1955     Years since quittin.3    Smokeless tobacco: Never Used   Substance Use Topics    Alcohol use: Yes     Frequency: 2-4 times a month     Comment: drinks weekly couple beers    Drug use: No       Allergies: Allergies   Allergen Reactions    Amaryl [Glimepiride] Drowsiness    Lipitor [Atorvastatin] Myalgia    Niacin Other (comments)     Facial blistering, swelling in face    Pravachol [Pravastatin] Myalgia    Zocor [Simvastatin] Myalgia       Review of Systems     Review of Systems   Constitutional: Negative for activity change. HENT: Negative for congestion. Respiratory: Negative for apnea. All other systems reviewed and are negative. 10 point review of systems otherwise negative unless noted in HPI. Physical Exam       Physical Exam  Constitutional:       Appearance: He is well-developed.    HENT:      Head: Normocephalic and atraumatic. Eyes:      Pupils: Pupils are equal, round, and reactive to light. Neck:      Musculoskeletal: Normal range of motion and neck supple. Cardiovascular:      Rate and Rhythm: Normal rate and regular rhythm. Heart sounds: Normal heart sounds. No murmur. No friction rub. Pulmonary:      Effort: Pulmonary effort is normal. No respiratory distress. Breath sounds: Normal breath sounds. No wheezing. Abdominal:      General: There is no distension. Palpations: Abdomen is soft. Tenderness: There is no abdominal tenderness. There is no guarding or rebound. Musculoskeletal: Normal range of motion. Skin:     General: Skin is warm and dry. Neurological:      Mental Status: He is alert. Comments: Oriented x2 knows self and knows he is at [facility] view   Psychiatric:         Behavior: Behavior normal.         Thought Content: Thought content normal.         Diagnostic Study Results     Vital Signs  EKG: EKG shows sinus at 79 normal axis normal intervals there is no ST elevation or depression no hypertrophy. Labs: cBC unremarkable chemistry unremarkable baseline ckd  Imaging: IMPRESSION                1.  No acute intracranial abnormalities. 2.  Stable small encephalomalacic foci in the left high parietal and occipital  region. Additional stable tiny left centrum semiovale bowel region white matter  encephalomalacic focus. 3.  Stable mild to moderate white matter hypodensity suggestive of chronic  microvascular ischemic changes. Medical Decision Making     ED Course: Progress Notes, Reevaluation, and Consults:      Provider Notes (Medical Decision Making): This is a very pleasant 82-year-old resenting for reportedly high blood sugar and altered mental status. History of dementia and is oriented x2 now without complaint. Will discuss with the nursing home. Blood glucose reportedly 220s in the field now 155. Labs show a urinary infection.   Patient is well-appearing give a gram of ceftriaxone here likely follow-up outpatient antibiotics. Called the contact numbers for the family at home and mobile left messages as I was unable to reach them. 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 Treatment with hygiene not necessarily antibiotics at this time     cr cl is 36-7. No abx adjustement necessary. 5:26 PM d/w daughter. Agrees with plan. Diagnosis     Clinical Impression: No diagnosis found. Disposition:    Patient's Medications   Start Taking    No medications on file   Continue Taking    ASPIRIN DELAYED-RELEASE 81 MG TABLET    Take 1 Tab by mouth daily. Indications: Cerebral Thromboembolism Prevention    CALCIUM CARBONATE (CALTRATE 600 PO)    Take 1 Tab by mouth daily. CYCLOSPORINE (RESTASIS) 0.05 % OPHTHALMIC EMULSION    Administer 1 Drop to both eyes every twelve (12) hours. Indications: Dry Eye    DICLOFENAC (VOLTAREN) 1 % GEL    Apply 2 g to affected area four (4) times daily. ERYTHROMYCIN (ILOTYCIN) OPHTHALMIC OINTMENT    Apply to both lower eyelids four times a day until done. ERYTHROMYCIN (ILOTYCIN) OPHTHALMIC OINTMENT    Apply 1/2 inch ribbon OS every 6 hours x 7 days    EZETIMIBE (ZETIA) 10 MG TABLET    Take 1 Tab by mouth nightly. FAMOTIDINE (PEPCID) 20 MG TABLET    Take 1 Tab by mouth two (2) times a day. Indications: PREVENTION OF STRESS ULCER    FUROSEMIDE (LASIX) 20 MG TABLET    Take 1 Tab by mouth daily. Indications: Edema    GABAPENTIN (NEURONTIN) 300 MG CAPSULE    Take 1 Cap by mouth daily. Indications: NEUROPATHIC PAIN, Restless Legs Syndrome    INSULIN DETEMIR U-100 (LEVEMIR U-100 INSULIN) 100 UNIT/ML INJECTION    60 Units by SubCUTAneous route daily.  Indications: type 2 diabetes mellitus    INSULIN LISPRO (HUMALOG KWIKPEN INSULIN) 200 UNIT/ML (3 ML) INPN    For Blood Sugar (mg/dL) of:     Less than 150 =   0 units           150 -199 =   2 units  200 -249 =   4 units  250 -299 =   6 units  300 -349 =   8 units  350 and above =   10 units  Check BG then Inject insulin per SS 3 times daily before meals and at bedtime  Indications: type 2 diabetes mellitus    ISOSORBIDE MONONITRATE ER (IMDUR) 30 MG TABLET    Take 1 Tab by mouth daily. LACTOBACILLUS SP. 50 BILLION CPU (BIO-K PLUS) 50 BILLION CELL -375 MG CAP CAPSULE    Take 1 Cap by mouth daily. LIDOCAINE (LIDODERM) 5 %    Apply patch to the affected area for 12 hours a day and remove for 12 hours a day.     MULTIVIT-MIN-FA-LYCOPEN-LUTEIN (CENTRUM SILVER) 0.4-300-250 MG-MCG-MCG TAB    Take 1 tab daily    OTHER    Incentive spirometry- Use as directed   These Medications have changed    No medications on file   Stop Taking    No medications on file

## 2021-04-15 LAB
ATRIAL RATE: 79 BPM
CALCULATED P AXIS, ECG09: 58 DEGREES
CALCULATED R AXIS, ECG10: 46 DEGREES
CALCULATED T AXIS, ECG11: 77 DEGREES
DIAGNOSIS, 93000: NORMAL
P-R INTERVAL, ECG05: 160 MS
Q-T INTERVAL, ECG07: 382 MS
QRS DURATION, ECG06: 88 MS
QTC CALCULATION (BEZET), ECG08: 438 MS
VENTRICULAR RATE, ECG03: 79 BPM

## 2021-04-16 LAB
BACTERIA SPEC CULT: ABNORMAL
BACTERIA SPEC CULT: ABNORMAL
CC UR VC: ABNORMAL
SERVICE CMNT-IMP: ABNORMAL

## 2021-04-23 ENCOUNTER — HOSPITAL ENCOUNTER (INPATIENT)
Dept: ULTRASOUND IMAGING | Age: 86
Discharge: HOME | DRG: 872 | End: 2021-04-23
Attending: EMERGENCY MEDICINE
Payer: MEDICARE

## 2021-04-23 ENCOUNTER — APPOINTMENT (OUTPATIENT)
Dept: CT IMAGING | Age: 86
DRG: 872 | End: 2021-04-23
Attending: EMERGENCY MEDICINE
Payer: MEDICARE

## 2021-04-23 ENCOUNTER — APPOINTMENT (OUTPATIENT)
Dept: GENERAL RADIOLOGY | Age: 86
DRG: 872 | End: 2021-04-23
Attending: EMERGENCY MEDICINE
Payer: MEDICARE

## 2021-04-23 ENCOUNTER — HOSPITAL ENCOUNTER (INPATIENT)
Age: 86
LOS: 11 days | Discharge: HOME HEALTH | DRG: 872 | End: 2021-05-04
Attending: EMERGENCY MEDICINE | Admitting: HOSPITALIST
Payer: MEDICARE

## 2021-04-23 DIAGNOSIS — Z71.89 GOALS OF CARE, COUNSELING/DISCUSSION: ICD-10-CM

## 2021-04-23 DIAGNOSIS — K81.0 ACUTE CHOLECYSTITIS: ICD-10-CM

## 2021-04-23 DIAGNOSIS — D72.829 LEUKOCYTOSIS, UNSPECIFIED TYPE: Primary | ICD-10-CM

## 2021-04-23 DIAGNOSIS — R53.81 DEBILITY: ICD-10-CM

## 2021-04-23 DIAGNOSIS — F03.90 DEMENTIA WITHOUT BEHAVIORAL DISTURBANCE, UNSPECIFIED DEMENTIA TYPE: ICD-10-CM

## 2021-04-23 LAB
ALBUMIN SERPL-MCNC: 2.4 G/DL (ref 3.4–5)
ALBUMIN/GLOB SERPL: 0.5 {RATIO} (ref 0.8–1.7)
ALP SERPL-CCNC: 151 U/L (ref 45–117)
ALT SERPL-CCNC: 47 U/L (ref 16–61)
ANION GAP SERPL CALC-SCNC: 5 MMOL/L (ref 3–18)
APPEARANCE UR: CLEAR
APTT PPP: 41 SEC (ref 23–36.4)
AST SERPL-CCNC: 50 U/L (ref 10–38)
BACTERIA URNS QL MICRO: ABNORMAL /HPF
BASOPHILS # BLD: 0.1 K/UL (ref 0–0.1)
BASOPHILS NFR BLD: 0 % (ref 0–2)
BILIRUB SERPL-MCNC: 1.5 MG/DL (ref 0.2–1)
BILIRUB UR QL: NEGATIVE
BUN SERPL-MCNC: 37 MG/DL (ref 7–18)
BUN/CREAT SERPL: 21 (ref 12–20)
CALCIUM SERPL-MCNC: 9.1 MG/DL (ref 8.5–10.1)
CHLORIDE SERPL-SCNC: 104 MMOL/L (ref 100–111)
CK MB CFR SERPL CALC: 1.1 % (ref 0–4)
CK MB SERPL-MCNC: 1.2 NG/ML (ref 5–25)
CK SERPL-CCNC: 107 U/L (ref 39–308)
CO2 SERPL-SCNC: 32 MMOL/L (ref 21–32)
COLOR UR: ABNORMAL
CREAT SERPL-MCNC: 1.76 MG/DL (ref 0.6–1.3)
DIFFERENTIAL METHOD BLD: ABNORMAL
EOSINOPHIL # BLD: 0 K/UL (ref 0–0.4)
EOSINOPHIL NFR BLD: 0 % (ref 0–5)
EPITH CASTS URNS QL MICRO: ABNORMAL /LPF (ref 0–5)
ERYTHROCYTE [DISTWIDTH] IN BLOOD BY AUTOMATED COUNT: 14.1 % (ref 11.6–14.5)
EST. AVERAGE GLUCOSE BLD GHB EST-MCNC: 157 MG/DL
GLOBULIN SER CALC-MCNC: 4.7 G/DL (ref 2–4)
GLUCOSE BLD STRIP.AUTO-MCNC: 41 MG/DL (ref 70–110)
GLUCOSE SERPL-MCNC: 110 MG/DL (ref 74–99)
GLUCOSE UR STRIP.AUTO-MCNC: NEGATIVE MG/DL
HBA1C MFR BLD: 7.1 % (ref 4.2–5.6)
HCT VFR BLD AUTO: 48.3 % (ref 36–48)
HGB BLD-MCNC: 15.1 G/DL (ref 13–16)
HGB UR QL STRIP: NEGATIVE
KETONES UR QL STRIP.AUTO: NEGATIVE MG/DL
LEUKOCYTE ESTERASE UR QL STRIP.AUTO: ABNORMAL
LIPASE SERPL-CCNC: 53 U/L (ref 73–393)
LYMPHOCYTES # BLD: 1.4 K/UL (ref 0.9–3.6)
LYMPHOCYTES NFR BLD: 7 % (ref 21–52)
MCH RBC QN AUTO: 28 PG (ref 24–34)
MCHC RBC AUTO-ENTMCNC: 31.3 G/DL (ref 31–37)
MCV RBC AUTO: 89.6 FL (ref 74–97)
MONOCYTES # BLD: 1.9 K/UL (ref 0.05–1.2)
MONOCYTES NFR BLD: 10 % (ref 3–10)
NEUTS SEG # BLD: 16.8 K/UL (ref 1.8–8)
NEUTS SEG NFR BLD: 83 % (ref 40–73)
NITRITE UR QL STRIP.AUTO: NEGATIVE
PH UR STRIP: 5.5 [PH] (ref 5–8)
PLATELET # BLD AUTO: 325 K/UL (ref 135–420)
PMV BLD AUTO: 10.7 FL (ref 9.2–11.8)
POTASSIUM SERPL-SCNC: 3.7 MMOL/L (ref 3.5–5.5)
PROT SERPL-MCNC: 7.1 G/DL (ref 6.4–8.2)
PROT UR STRIP-MCNC: 30 MG/DL
RBC # BLD AUTO: 5.39 M/UL (ref 4.35–5.65)
RBC #/AREA URNS HPF: ABNORMAL /HPF (ref 0–5)
SODIUM SERPL-SCNC: 141 MMOL/L (ref 136–145)
SP GR UR REFRACTOMETRY: 1.01 (ref 1–1.03)
TROPONIN I SERPL-MCNC: <0.02 NG/ML (ref 0–0.04)
UROBILINOGEN UR QL STRIP.AUTO: 1 EU/DL (ref 0.2–1)
WBC # BLD AUTO: 20.3 K/UL (ref 4.6–13.2)
WBC URNS QL MICRO: ABNORMAL /HPF (ref 0–5)

## 2021-04-23 PROCEDURE — 74011250637 HC RX REV CODE- 250/637: Performed by: NURSE PRACTITIONER

## 2021-04-23 PROCEDURE — 74011250636 HC RX REV CODE- 250/636: Performed by: NURSE PRACTITIONER

## 2021-04-23 PROCEDURE — 80053 COMPREHEN METABOLIC PANEL: CPT

## 2021-04-23 PROCEDURE — 99284 EMERGENCY DEPT VISIT MOD MDM: CPT

## 2021-04-23 PROCEDURE — 74011000250 HC RX REV CODE- 250: Performed by: NURSE PRACTITIONER

## 2021-04-23 PROCEDURE — 74011000258 HC RX REV CODE- 258: Performed by: EMERGENCY MEDICINE

## 2021-04-23 PROCEDURE — 83690 ASSAY OF LIPASE: CPT

## 2021-04-23 PROCEDURE — 71045 X-RAY EXAM CHEST 1 VIEW: CPT

## 2021-04-23 PROCEDURE — 65270000029 HC RM PRIVATE

## 2021-04-23 PROCEDURE — APPSS60 APP SPLIT SHARED TIME 46-60 MINUTES: Performed by: NURSE PRACTITIONER

## 2021-04-23 PROCEDURE — 74011250636 HC RX REV CODE- 250/636: Performed by: EMERGENCY MEDICINE

## 2021-04-23 PROCEDURE — 99223 1ST HOSP IP/OBS HIGH 75: CPT | Performed by: INTERNAL MEDICINE

## 2021-04-23 PROCEDURE — 87040 BLOOD CULTURE FOR BACTERIA: CPT

## 2021-04-23 PROCEDURE — 51798 US URINE CAPACITY MEASURE: CPT

## 2021-04-23 PROCEDURE — 83036 HEMOGLOBIN GLYCOSYLATED A1C: CPT

## 2021-04-23 PROCEDURE — 82962 GLUCOSE BLOOD TEST: CPT

## 2021-04-23 PROCEDURE — 74011000258 HC RX REV CODE- 258: Performed by: NURSE PRACTITIONER

## 2021-04-23 PROCEDURE — 85730 THROMBOPLASTIN TIME PARTIAL: CPT

## 2021-04-23 PROCEDURE — 85025 COMPLETE CBC W/AUTO DIFF WBC: CPT

## 2021-04-23 PROCEDURE — 81001 URINALYSIS AUTO W/SCOPE: CPT

## 2021-04-23 PROCEDURE — 76705 ECHO EXAM OF ABDOMEN: CPT

## 2021-04-23 PROCEDURE — 74176 CT ABD & PELVIS W/O CONTRAST: CPT

## 2021-04-23 PROCEDURE — 82553 CREATINE MB FRACTION: CPT

## 2021-04-23 RX ORDER — SODIUM CHLORIDE 0.9 % (FLUSH) 0.9 %
5-40 SYRINGE (ML) INJECTION AS NEEDED
Status: DISCONTINUED | OUTPATIENT
Start: 2021-04-23 | End: 2021-05-04 | Stop reason: HOSPADM

## 2021-04-23 RX ORDER — METOPROLOL SUCCINATE 50 MG/1
50 TABLET, EXTENDED RELEASE ORAL DAILY
Status: DISCONTINUED | OUTPATIENT
Start: 2021-04-24 | End: 2021-05-04 | Stop reason: HOSPADM

## 2021-04-23 RX ORDER — TEMAZEPAM 7.5 MG/1
7.5 CAPSULE ORAL
COMMUNITY
End: 2021-05-27

## 2021-04-23 RX ORDER — ERYTHROMYCIN 5 MG/G
OINTMENT OPHTHALMIC EVERY 6 HOURS
Status: DISPENSED | OUTPATIENT
Start: 2021-04-23 | End: 2021-04-25

## 2021-04-23 RX ORDER — NALOXONE HYDROCHLORIDE 0.4 MG/ML
0.4 INJECTION, SOLUTION INTRAMUSCULAR; INTRAVENOUS; SUBCUTANEOUS AS NEEDED
Status: DISCONTINUED | OUTPATIENT
Start: 2021-04-23 | End: 2021-05-04 | Stop reason: HOSPADM

## 2021-04-23 RX ORDER — SODIUM CHLORIDE 0.9 % (FLUSH) 0.9 %
5-40 SYRINGE (ML) INJECTION EVERY 8 HOURS
Status: DISCONTINUED | OUTPATIENT
Start: 2021-04-23 | End: 2021-05-04 | Stop reason: HOSPADM

## 2021-04-23 RX ORDER — CYCLOSPORINE 0.5 MG/ML
1 EMULSION OPHTHALMIC EVERY 12 HOURS
Status: DISCONTINUED | OUTPATIENT
Start: 2021-04-23 | End: 2021-05-04 | Stop reason: HOSPADM

## 2021-04-23 RX ORDER — ENOXAPARIN SODIUM 100 MG/ML
40 INJECTION SUBCUTANEOUS DAILY
Status: DISCONTINUED | OUTPATIENT
Start: 2021-04-24 | End: 2021-05-04 | Stop reason: HOSPADM

## 2021-04-23 RX ORDER — INSULIN LISPRO 100 [IU]/ML
INJECTION, SOLUTION INTRAVENOUS; SUBCUTANEOUS EVERY 6 HOURS
Status: DISCONTINUED | OUTPATIENT
Start: 2021-04-23 | End: 2021-04-26

## 2021-04-23 RX ORDER — QUETIAPINE FUMARATE 50 MG/1
50 TABLET, FILM COATED ORAL 2 TIMES DAILY
COMMUNITY
End: 2021-05-27

## 2021-04-23 RX ORDER — ACETAMINOPHEN 325 MG/1
650 TABLET ORAL
Status: DISCONTINUED | OUTPATIENT
Start: 2021-04-23 | End: 2021-05-04 | Stop reason: HOSPADM

## 2021-04-23 RX ORDER — DEXTROSE 50 % IN WATER (D50W) INTRAVENOUS SYRINGE
25-50 AS NEEDED
Status: DISCONTINUED | OUTPATIENT
Start: 2021-04-23 | End: 2021-05-04 | Stop reason: HOSPADM

## 2021-04-23 RX ORDER — DEXTROSE MONOHYDRATE AND SODIUM CHLORIDE 5; .45 G/100ML; G/100ML
50 INJECTION, SOLUTION INTRAVENOUS CONTINUOUS
Status: DISCONTINUED | OUTPATIENT
Start: 2021-04-23 | End: 2021-05-02

## 2021-04-23 RX ORDER — POLYETHYLENE GLYCOL 3350 17 G/17G
17 POWDER, FOR SOLUTION ORAL DAILY PRN
Status: DISCONTINUED | OUTPATIENT
Start: 2021-04-23 | End: 2021-05-04 | Stop reason: HOSPADM

## 2021-04-23 RX ORDER — PROMETHAZINE HYDROCHLORIDE 12.5 MG/1
12.5 TABLET ORAL
Status: DISCONTINUED | OUTPATIENT
Start: 2021-04-23 | End: 2021-04-30

## 2021-04-23 RX ORDER — ACETAMINOPHEN 650 MG/1
650 SUPPOSITORY RECTAL
Status: DISCONTINUED | OUTPATIENT
Start: 2021-04-23 | End: 2021-05-04 | Stop reason: HOSPADM

## 2021-04-23 RX ORDER — QUETIAPINE FUMARATE 25 MG/1
50 TABLET, FILM COATED ORAL 2 TIMES DAILY
Status: DISCONTINUED | OUTPATIENT
Start: 2021-04-23 | End: 2021-05-04 | Stop reason: HOSPADM

## 2021-04-23 RX ORDER — MEMANTINE HYDROCHLORIDE 10 MG/1
10 TABLET ORAL DAILY
Status: DISCONTINUED | OUTPATIENT
Start: 2021-04-24 | End: 2021-05-04 | Stop reason: HOSPADM

## 2021-04-23 RX ORDER — MAGNESIUM SULFATE 100 %
4 CRYSTALS MISCELLANEOUS AS NEEDED
Status: DISCONTINUED | OUTPATIENT
Start: 2021-04-23 | End: 2021-05-04 | Stop reason: HOSPADM

## 2021-04-23 RX ORDER — ISOSORBIDE MONONITRATE 30 MG/1
30 TABLET, EXTENDED RELEASE ORAL DAILY
Status: DISCONTINUED | OUTPATIENT
Start: 2021-04-24 | End: 2021-05-04 | Stop reason: HOSPADM

## 2021-04-23 RX ORDER — MORPHINE SULFATE 2 MG/ML
1 INJECTION, SOLUTION INTRAMUSCULAR; INTRAVENOUS
Status: DISCONTINUED | OUTPATIENT
Start: 2021-04-23 | End: 2021-04-30

## 2021-04-23 RX ORDER — METOPROLOL TARTRATE 50 MG/1
50 TABLET ORAL 2 TIMES DAILY
COMMUNITY
End: 2021-04-23

## 2021-04-23 RX ORDER — ASPIRIN 81 MG/1
81 TABLET ORAL DAILY
Status: DISCONTINUED | OUTPATIENT
Start: 2021-04-24 | End: 2021-05-04 | Stop reason: HOSPADM

## 2021-04-23 RX ORDER — MEMANTINE HYDROCHLORIDE 10 MG/1
10 TABLET ORAL DAILY
Status: ON HOLD | COMMUNITY
End: 2021-05-26 | Stop reason: SDUPTHER

## 2021-04-23 RX ORDER — TEMAZEPAM 7.5 MG/1
7.5 CAPSULE ORAL
Status: DISCONTINUED | OUTPATIENT
Start: 2021-04-23 | End: 2021-05-04 | Stop reason: HOSPADM

## 2021-04-23 RX ORDER — METOPROLOL SUCCINATE 50 MG/1
50 TABLET, EXTENDED RELEASE ORAL DAILY
Status: ON HOLD | COMMUNITY
End: 2021-05-26 | Stop reason: SDUPTHER

## 2021-04-23 RX ORDER — GABAPENTIN 300 MG/1
300 CAPSULE ORAL DAILY
Status: DISCONTINUED | OUTPATIENT
Start: 2021-04-24 | End: 2021-05-04 | Stop reason: HOSPADM

## 2021-04-23 RX ORDER — ONDANSETRON 2 MG/ML
4 INJECTION INTRAMUSCULAR; INTRAVENOUS
Status: DISCONTINUED | OUTPATIENT
Start: 2021-04-23 | End: 2021-05-04 | Stop reason: HOSPADM

## 2021-04-23 RX ADMIN — ERYTHROMYCIN: 5 OINTMENT OPHTHALMIC at 23:22

## 2021-04-23 RX ADMIN — DEXTROSE MONOHYDRATE 25 G: 25 INJECTION, SOLUTION INTRAVENOUS at 23:47

## 2021-04-23 RX ADMIN — TEMAZEPAM 7.5 MG: 7.5 CAPSULE ORAL at 23:22

## 2021-04-23 RX ADMIN — QUETIAPINE FUMARATE 50 MG: 25 TABLET ORAL at 23:22

## 2021-04-23 RX ADMIN — Medication 10 ML: at 23:23

## 2021-04-23 RX ADMIN — PIPERACILLIN SODIUM AND TAZOBACTAM SODIUM 4.5 G: 4; .5 INJECTION, POWDER, LYOPHILIZED, FOR SOLUTION INTRAVENOUS at 18:31

## 2021-04-23 RX ADMIN — PIPERACILLIN AND TAZOBACTAM 2.25 G: 2; .25 INJECTION, POWDER, LYOPHILIZED, FOR SOLUTION INTRAVENOUS at 23:15

## 2021-04-23 RX ADMIN — DEXTROSE MONOHYDRATE AND SODIUM CHLORIDE 50 ML/HR: 5; .45 INJECTION, SOLUTION INTRAVENOUS at 23:12

## 2021-04-23 NOTE — ED PROVIDER NOTES
EMERGENCY DEPARTMENT HISTORY AND PHYSICAL EXAM    1:07 PM      Date: 4/23/2021  Patient Name: Shante Glaser. History of Presenting Illness     No chief complaint on file. History Provided By: EMS and Nursing Home/SNF/Rehab Center    Chief Complaint: elevated wbc  Duration:  N/A  Timing:  N/A  Location: none  Quality: NA  Severity: N/A  Modifying Factors: none  Associated Symptoms: denies any other associated signs or symptoms      Additional History (Context): Shante Avitia is a 82 y.o. male with Dementia, CAD, CKD, diabetes, PVD, COPD who presents with elevated white blood cell count. Patient presents from [facility] Reportedly had labs drawn yesterday showing a white count of 26.6. Patient reports some mild right lower abdominal pain. Remainder of history is somewhat limited due to his baseline dementia. PCP: None    Current Facility-Administered Medications   Medication Dose Route Frequency Provider Last Rate Last Admin    piperacillin-tazobactam (ZOSYN) 4.5 g in 0.9% sodium chloride (MBP/ADV) 100 mL MBP  4.5 g IntraVENous NOW Yoel Graves, DO         Current Outpatient Medications   Medication Sig Dispense Refill    erythromycin (ILOTYCIN) ophthalmic ointment Apply 1/2 inch ribbon OS every 6 hours x 7 days 3.5 g 0    lidocaine (LIDODERM) 5 % Apply patch to the affected area for 12 hours a day and remove for 12 hours a day. 30 Each 0    diclofenac (VOLTAREN) 1 % gel Apply 2 g to affected area four (4) times daily. 100 g 0    lactobacillus sp. 50 billion cpu (BIO-K PLUS) 50 billion cell -375 mg cap capsule Take 1 Cap by mouth daily. 30 Cap 0    ezetimibe (ZETIA) 10 mg tablet Take 1 Tab by mouth nightly.  insulin detemir U-100 (LEVEMIR U-100 INSULIN) 100 unit/mL injection 60 Units by SubCUTAneous route daily. Indications: type 2 diabetes mellitus      erythromycin (ILOTYCIN) ophthalmic ointment Apply to both lower eyelids four times a day until done.  1 Tube 0    aspirin delayed-release 81 mg tablet Take 1 Tab by mouth daily. Indications: Cerebral Thromboembolism Prevention 30 Tab 0    cycloSPORINE (RESTASIS) 0.05 % ophthalmic emulsion Administer 1 Drop to both eyes every twelve (12) hours. Indications: Dry Eye 15 Each 0    gabapentin (NEURONTIN) 300 mg capsule Take 1 Cap by mouth daily. Indications: NEUROPATHIC PAIN, Restless Legs Syndrome 30 Cap 0    isosorbide mononitrate ER (IMDUR) 30 mg tablet Take 1 Tab by mouth daily. 30 Tab 0    multivit-min-FA-lycopen-lutein (CENTRUM SILVER) 0.4-300-250 mg-mcg-mcg tab Take 1 tab daily 30 Tab 0    OTHER Incentive spirometry- Use as directed 1 Each 0    furosemide (LASIX) 20 mg tablet Take 1 Tab by mouth daily. Indications: Edema 30 Tab 0    insulin lispro (HUMALOG KWIKPEN INSULIN) 200 unit/mL (3 mL) inpn For Blood Sugar (mg/dL) of:     Less than 150 =   0 units           150 -199 =   2 units  200 -249 =   4 units  250 -299 =   6 units  300 -349 =   8 units  350 and above =   10 units  Check BG then Inject insulin per SS 3 times daily before meals and at bedtime  Indications: type 2 diabetes mellitus 1 Pen 0    famotidine (PEPCID) 20 mg tablet Take 1 Tab by mouth two (2) times a day. Indications: PREVENTION OF STRESS ULCER 30 Tab 0    CALCIUM CARBONATE (CALTRATE 600 PO) Take 1 Tab by mouth daily. Past History     Past Medical History:  Past Medical History:   Diagnosis Date    Anxiety     Arthritis     CAD (coronary artery disease)     s/p drug-eluting stents to RCA for high-grade stenoses in 2/09    Carotid duplex 08/26/2016    Mild <50% CYRUS stenosis. Chronic LICA occlusion. Similar to study of 3/29/16.     Carotid stenosis (HCC)     w/ known total occlusion of lt internal carotid artery; followed by pts vascular surgeon    Chronic kidney disease     COPD (chronic obstructive pulmonary disease) ([address]     D[facility]     Depression     Diabetes ([facility]     type 2    Dyslipidemia     on Crestor; managed by pts PCP    Dyspnea     Hypercholesterolemia     Hypertension     Low back pain     Osteoarthrosis involving multiple sites     Polycythemia 2019    Skin cancer (HCC)     squamous cell lesions of the skin    Stroke ([facility]     Venous (peripheral) insufficiency        Past Surgical History:  Past Surgical History:   Procedure Laterality Date    CARDIAC CATHETERIZATION  2016    Mod calcification. Co-dom. oLM 50-70%. LAD 30%. Dx 30%. Cx 70% focal.  OM 80% focal.  RCA 30%.  HX ANGIOPLASTY      2 stents placed and heart attack during the procedure    HX COLONOSCOPY  10/2003    neg; declines f/u    HX CORONARY ARTERY BYPASS GRAFT  2016     LIMA to LAD and SVG to OM1    HX HEART CATHETERIZATION  2013    HX HERNIA REPAIR      1970s    HX TONSIL AND ADENOIDECTOMY         Family History:  Family History   Problem Relation Age of Onset   [facility] Parkinsonism Mother     Hypertension Mother     Cancer Father         lung    Heart defect Brother        Social History:  Social History     Tobacco Use    Smoking status: Former Smoker     Years: 2.00     Quit date: 1955     Years since quittin.3    Smokeless tobacco: Never Used   Substance Use Topics    Alcohol use: Yes     Frequency: 2-4 times a month     Comment: drinks weekly couple beers    Drug use: No       Allergies:   Allergies   Allergen Reactions    Amaryl [Glimepiride] Drowsiness    Lipitor [Atorvastatin] Myalgia    Niacin Other (comments)     Facial blistering, swelling in face    Pravachol [Pravastatin] Myalgia    Zocor [Simvastatin] Myalgia         Review of Systems       Review of Systems   Unable to perform ROS: Dementia         Physical Exam     Visit Vitals  /76 (BP 1 Location: Left upper arm, BP Patient Position: At rest;Supine)   Pulse 96   Temp 98 °F (36.7 °C)   Resp 22   Ht 6' 2\" (1.88 m)   Wt 82.7 kg (182 lb 5 oz)   SpO2 95%   BMI 23.41 kg/m²         Physical Exam  Constitutional: Appearance: He is well-developed. HENT:      Head: Normocephalic and atraumatic. Eyes:      Comments: Bilateral conjunctiva injected with mild yellow drainage   Neck:      Musculoskeletal: Neck supple. Vascular: No JVD. Cardiovascular:      Rate and Rhythm: Normal rate and regular rhythm. Pulmonary:      Effort: Pulmonary effort is normal. No respiratory distress. Breath sounds: Normal breath sounds. Abdominal:      General: There is no distension. Palpations: Abdomen is soft. Tenderness: There is abdominal tenderness (Mild right lower). There is no guarding or rebound. Musculoskeletal:      Comments: No joint tenderness   Skin:     General: Skin is warm and dry. Findings: No erythema. Neurological:      Mental Status: He is alert. Mental status is at baseline. Comments: Oriented to self, unable to state location, stated year was 1985   Psychiatric:      Comments: Pleasant, cooperative, moving all extremities           Diagnostic Study Results     Labs -  Recent Results (from the past 12 hour(s))   CBC WITH AUTOMATED DIFF    Collection Time: 04/23/21 12:45 PM   Result Value Ref Range    WBC 20.3 (H) 4.6 - 13.2 K/uL    RBC 5.39 4.35 - 5.65 M/uL    HGB 15.1 13.0 - 16.0 g/dL    HCT 48.3 (H) 36.0 - 48.0 %    MCV 89.6 74.0 - 97.0 FL    MCH 28.0 24.0 - 34.0 PG    MCHC 31.3 31.0 - 37.0 g/dL    RDW 14.1 11.6 - 14.5 %    PLATELET 456 205 - 044 K/uL    MPV 10.7 9.2 - 11.8 FL    NEUTROPHILS 83 (H) 40 - 73 %    LYMPHOCYTES 7 (L) 21 - 52 %    MONOCYTES 10 3 - 10 %    EOSINOPHILS 0 0 - 5 %    BASOPHILS 0 0 - 2 %    ABS. NEUTROPHILS 16.8 (H) 1.8 - 8.0 K/UL    ABS. LYMPHOCYTES 1.4 0.9 - 3.6 K/UL    ABS. MONOCYTES 1.9 (H) 0.05 - 1.2 K/UL    ABS. EOSINOPHILS 0.0 0.0 - 0.4 K/UL    ABS.  BASOPHILS 0.1 0.0 - 0.1 K/UL    DF AUTOMATED     METABOLIC PANEL, COMPREHENSIVE    Collection Time: 04/23/21 12:45 PM   Result Value Ref Range    Sodium 141 136 - 145 mmol/L    Potassium 3.7 3.5 - 5.5 mmol/L Chloride 104 100 - 111 mmol/L    CO2 32 21 - 32 mmol/L    Anion gap 5 3.0 - 18 mmol/L    Glucose 110 (H) 74 - 99 mg/dL    BUN 37 (H) 7.0 - 18 MG/DL    Creatinine 1.76 (H) 0.6 - 1.3 MG/DL    BUN/Creatinine ratio 21 (H) 12 - 20      GFR est AA 45 (L) >60 ml/min/1.73m2    GFR est non-AA 37 (L) >60 ml/min/1.73m2    Calcium 9.1 8.5 - 10.1 MG/DL    Bilirubin, total 1.5 (H) 0.2 - 1.0 MG/DL    ALT (SGPT) 47 16 - 61 U/L    AST (SGOT) 50 (H) 10 - 38 U/L    Alk. phosphatase 151 (H) 45 - 117 U/L    Protein, total 7.1 6.4 - 8.2 g/dL    Albumin 2.4 (L) 3.4 - 5.0 g/dL    Globulin 4.7 (H) 2.0 - 4.0 g/dL    A-G Ratio 0.5 (L) 0.8 - 1.7     CARDIAC PANEL,(CK, CKMB & TROPONIN)    Collection Time: 04/23/21 12:45 PM   Result Value Ref Range    CK - MB 1.2 <3.6 ng/ml    CK-MB Index 1.1 0.0 - 4.0 %     39 - 308 U/L    Troponin-I, QT <0.02 0.0 - 0.045 NG/ML   LIPASE    Collection Time: 04/23/21 12:45 PM   Result Value Ref Range    Lipase 53 (L) 73 - 393 U/L   PTT    Collection Time: 04/23/21 12:45 PM   Result Value Ref Range    aPTT 41.0 (H) 23.0 - 36.4 SEC       Radiologic Studies -   CT ABD PELV WO CONT   Final Result    Findings concerning for acute cholecystitis. Consider correlation with   ultrasound and/or nuclear medicine hepatobiliary scan. XR CHEST PORT   Final Result      Streaky bibasilar opacities, similar to prior exam.      US ABD LTD    (Results Pending)         Medical Decision Making   I am the first provider for this patient. I reviewed the vital signs, available nursing notes, past medical history, past surgical history, family history and social history. Vital Signs-Reviewed the patient's vital signs.     Pulse Oximetry Analysis -  95 on room air (Interpretation)nl     Cardiac Monitor:  Rate: 96  Rhythm:  Normal Sinus Rhythm       Records Reviewed: Nursing Notes and Old Medical Records (Time of Review: 1:07 PM)    ED Course: Progress Notes, Reevaluation, and Consults:      Provider Notes (Medical Decision Making): 24-year-old male presenting from nursing home for evaluation of elevated white blood cell count. Screen for sepsis, chest x-ray, UA, check basic labs. May require CT abdomen due to mild tenderness on exam but awaiting lab results. No chart review does show patient was recently treated and seen in the ED on 414 ultimately diagnosed with a UTI. Patient was treated appropriately per urine culture. Patient noted to still have moderate leukocytosis will has improved somewhat, but does have some mild elevation LFTs and plan for noncontrast CT to better evaluate and rule out any infectious process. 5:39 PM  Consult with Dr. Gabe Jordan, gen surgery. Recommends admission for IV antibiotics, at this time would not be a surgical candidate. Also request right upper quadrant ultrasound. 5:50 PM  Consult with Dr. Angel Craig, hospitalist, accepts patient for admission      For Hospitalized Patients:    1. Hospitalization Decision Time:  The decision to hospitalize the patient was made by Dr. Angel Craig at 550PM on 4/23/2021    2. Aspirin: Aspirin was not given because the patient did not present with a stroke at the time of their Emergency Department evaluation    Diagnosis     Clinical Impression:   1.  Leukocytosis, unspecified type        Disposition: admission

## 2021-04-23 NOTE — ED TRIAGE NOTES
Per medic: [id number] [facility] called stating that patient had some abnormal labs.  Patient currently with out complaints

## 2021-04-23 NOTE — H&P
History & Physical    Patient: Nader Pritchard. MRN: [id number]  [facility]: [id number]    YOB: 1935  Age: 82 y.o. Sex: male      DOA: 4/23/2021    Chief Complaint: Elevated WBC, RLQ Abdominal pain         HPI:     Nader Pritchard. is a 82 y.o.  male with hx of dementia, CAD, CKD, T2DM, PVD, COPD who presented to the ED from [facility] with elevated WBC. He had labs done yesterday with WBC showing 26.6K. Pt is also c/o RLQ abdominal pain. He was seen in the ED on 4/14 and diagnosed with UTI and discharged with abx. Work up in the ED note stable vital signs, WBC 20.3, BUN 37, creatinine 1.76, total bilirubin 1.5, albumin 2.4, ALT 47, AST 50, Alk Phos 151. CXR streaky bibasilar opacities, similar to prior exam. CT a/p wo contrast concerning for acute cholecystitis- gallbladder distended with pericholecystic inflammation. Surgery consulted and recommends admission for IV abx, not a surgical candidate. Pt started on IV Zosyn. We are asked to admit for further management of care. Pt laying on stretcher in hallway in the ED, he is a/o to self at present. He denies any abdominal pain and no guarding or grimacing noted on abdominal exam.  Tachycardia noted on monitor with oxygen sats 92-93% on RA. Spoke with daughter Kaushik Glynn who is POA (listed on paperwork from [facility]) and updated on hospitalization. She would like to discuss code status with palliative care team but as of now wishes to proceed with FULL code. Past Medical History:   Diagnosis Date    Anxiety     Arthritis     CAD (coronary artery disease)     s/p drug-eluting stents to RCA for high-grade stenoses in 2/09    Carotid duplex 08/26/2016    Mild <50% CYRUS stenosis. Chronic LICA occlusion. Similar to study of 3/29/16.     Carotid stenosis (HCC)     w/ known total occlusion of lt internal carotid artery; followed by pts vascular surgeon    Chronic kidney disease     [facility] obstructive [facility]) [facility]     Dementia ([facility]     Depression     Diabetes ([facility]     type 2    Dyslipidemia     on Crestor; managed by pts PCP    Dyspnea     Hypercholesterolemia     Hypertension     Low back pain     Osteoarthrosis involving multiple sites     Polycythemia 2019    Skin cancer (HCC)     squamous cell lesions of the skin    St[facility] ([facility]     Venous (peripheral) insufficiency        Past Surgical History:   Procedure Laterality Date    CARDIAC CATHETERIZATION  2016    Mod calcification. Co-dom. oLM 50-70%. LAD 30%. Dx 30%. Cx 70% focal.  OM 80% focal.  RCA 30%.  HX ANGIOPLASTY      2 stents placed and heart attack during the procedure    HX COLONOSCOPY  10/2003    neg; declines f/u    HX CORONARY ARTERY BYPASS GRAFT  2016     LIMA to LAD and SVG to OM1    HX HEART CATHETERIZATION  2013    HX HERNIA REPAIR      1970s    HX TONSIL AND ADENOIDECTOMY         Family History   Problem Relation Age of Onset    Parkinsonism Mother     Hypertension Mother     Cancer Father         lung    Heart defect Brother        Social History     Socioeconomic History    Marital status:      Spouse name: Not on file    Number of children: Not on file    Years of education: Not on file    Highest education level: Not on file   Tobacco Use    Smoking status: Former Smoker     Years: 2.00     Quit date: 1955     Years since quittin.3    Smokeless tobacco: Never Used   Substance and Sexual Activity    Alcohol use: Yes     Frequency: 2-4 times a month     Comment: drinks weekly couple beers    Drug use: No    Sexual activity: Never       Prior to Admission medications    Medication Sig Start Date End Date Taking?  Authorizing Provider   erythromycin (ILOTYCIN) ophthalmic ointment Apply 1/2 inch ribbon OS every 6 hours x 7 days 9/3/20   RHONA Batista   lidocaine (LIDODERM) 5 % Apply patch to the affected area for 12 hours a day and remove for 12 hours a day. 1/31/20   Yue Handy MD   diclofenac (VOLTAREN) 1 % gel Apply 2 g to affected area four (4) times daily. 1/31/20   Mckayla Vicente MD   lactobacillus sp. 50 billion cpu (BIO-K PLUS) 50 billion cell -375 mg cap capsule Take 1 Cap by mouth daily. 12/13/19   Ethan Dan MD   ezetimibe (ZETIA) 10 mg tablet Take 1 Tab by mouth nightly. 10/17/19   Provider, Historical   insulin detemir U-100 (LEVEMIR U-100 INSULIN) 100 unit/mL injection 60 Units by SubCUTAneous route daily. Indications: type 2 diabetes mellitus    Provider, Historical   erythromycin (ILOTYCIN) ophthalmic ointment Apply to both lower eyelids four times a day until done. 12/31/18   Gabby Gilbert MD   aspirin delayed-release 81 mg tablet Take 1 Tab by mouth daily. Indications: Cerebral Thromboembolism Prevention 3/27/18   Karen Hawthorne PA-C   cycloSPORINE (RESTASIS) 0.05 % ophthalmic emulsion Administer 1 Drop to both eyes every twelve (12) hours. Indications: Dry Eye 3/26/18   Karen Hawthorne PA-C   gabapentin (NEURONTIN) 300 mg capsule Take 1 Cap by mouth daily. Indications: NEUROPATHIC PAIN, Restless Legs Syndrome 3/27/18   Karen Hawthorne PA-C   isosorbide mononitrate ER (IMDUR) 30 mg tablet Take 1 Tab by mouth daily. 3/27/18   Karen Hawthorne PA-C   multivit-min-FA-lycopen-lutein (CENTRUM SILVER) 0.4-300-250 mg-mcg-mcg tab Take 1 tab daily 3/26/18   Karen Hawthorne PA-C   OTHER Incentive spirometry- Use as directed 3/26/18   Karen Hawthorne PA-C   furosemide (LASIX) 20 mg tablet Take 1 Tab by mouth daily.  Indications: Edema 3/26/18   Karen Hawthorne PA-C   insulin lispro (HUMALOG KWIKPEN INSULIN) 200 unit/mL (3 mL) inpn For Blood Sugar (mg/dL) of:     Less than 150 =   0 units           150 -199 =   2 units  200 -249 =   4 units  250 -299 =   6 units  300 -349 =   8 units  350 and above =   10 units  Check BG then Inject insulin per SS 3 times daily before meals and at bedtime  Indications: type 2 diabetes mellitus 3/26/18   Mounika Hawthorne PA-C   famotidine (PEPCID) 20 mg tablet Take 1 Tab by mouth two (2) times a day. Indications: PREVENTION OF STRESS ULCER 3/26/18   Mounika Hawthorne PA-C   CALCIUM CARBONATE (CALTRATE 600 PO) Take 1 Tab by mouth daily. Provider, Historical       Allergies   Allergen Reactions    Amaryl [Glimepiride] Drowsiness    Lipitor [Atorvastatin] Myalgia    Niacin Other (comments)     Facial blistering, swelling in face    Pravachol [Pravastatin] Myalgia    Zocor [Simvastatin] Myalgia         Unable to obtain accurate Review of Systems due to hx dementia        Physical Exam:     Physical Exam:  Visit Vitals  /76 (BP 1 Location: Left upper arm, BP Patient Position: At rest;Supine)   Pulse 96   Temp 98 °F (36.7 °C)   Resp 22   Ht 6' 2\" (1.88 m)   Wt 82.7 kg (182 lb 5 oz)   SpO2 95%   BMI 23.41 kg/m²           Temp (24hrs), Av °F (36.7 °C), Min:98 °F (36.7 °C), Max:98 °F (36.7 °C)    No intake/output data recorded. No intake/output data recorded. General:  Alert, cooperative, no distress, appears stated age. Head: Normocephalic, without obvious abnormality, atraumatic. Eyes:  Conjunctivae/corneas yellow discharge bilateral lower conjunctival sac. PERRL, EOMs intact. Nose: Nares normal. No drainage or sinus tenderness. Neck: Supple, symmetrical, trachea midline, no adenopathy, thyroid: no enlargement, no carotid bruit and no JVD. Lungs:   Diminished breath sounds bilaterally. Heart:  Tachycardia, S1, S2 normal.     Abdomen: Soft, non-tender. Bowel sounds normal.    Extremities: Extremities normal, atraumatic, no cyanosis or edema. Pulses: 2+ and symmetric all extremities. Skin:  No rashes or lesions   Neurologic: AAOxself, No focal motor or sensory deficit. Labs Reviewed: All lab results for the last 24 hours reviewed.   Recent Results (from the past 24 hour(s))   CBC WITH AUTOMATED DIFF    Collection Time: 21 12:45 PM   Result Value Ref Range    WBC 20.3 (H) 4.6 - 13.2 K/uL    RBC 5.39 4.35 - 5.65 M/uL    HGB 15.1 13.0 - 16.0 g/dL    HCT 48.3 (H) 36.0 - 48.0 %    MCV 89.6 74.0 - 97.0 FL    MCH 28.0 24.0 - 34.0 PG    MCHC 31.3 31.0 - 37.0 g/dL    RDW 14.1 11.6 - 14.5 %    PLATELET 711 922 - 459 K/uL    MPV 10.7 9.2 - 11.8 FL    NEUTROPHILS 83 (H) 40 - 73 %    LYMPHOCYTES 7 (L) 21 - 52 %    MONOCYTES 10 3 - 10 %    EOSINOPHILS 0 0 - 5 %    BASOPHILS 0 0 - 2 %    ABS. NEUTROPHILS 16.8 (H) 1.8 - 8.0 K/UL    ABS. LYMPHOCYTES 1.4 0.9 - 3.6 K/UL    ABS. MONOCYTES 1.9 (H) 0.05 - 1.2 K/UL    ABS. EOSINOPHILS 0.0 0.0 - 0.4 K/UL    ABS. BASOPHILS 0.1 0.0 - 0.1 K/UL    DF AUTOMATED     METABOLIC PANEL, COMPREHENSIVE    Collection Time: 04/23/21 12:45 PM   Result Value Ref Range    Sodium 141 136 - 145 mmol/L    Potassium 3.7 3.5 - 5.5 mmol/L    Chloride 104 100 - 111 mmol/L    CO2 32 21 - 32 mmol/L    Anion gap 5 3.0 - 18 mmol/L    Glucose 110 (H) 74 - 99 mg/dL    BUN 37 (H) 7.0 - 18 MG/DL    Creatinine 1.76 (H) 0.6 - 1.3 MG/DL    BUN/Creatinine ratio 21 (H) 12 - 20      GFR est AA 45 (L) >60 ml/min/1.73m2    GFR est non-AA 37 (L) >60 ml/min/1.73m2    Calcium 9.1 8.5 - 10.1 MG/DL    Bilirubin, total 1.5 (H) 0.2 - 1.0 MG/DL    ALT (SGPT) 47 16 - 61 U/L    AST (SGOT) 50 (H) 10 - 38 U/L    Alk.  phosphatase 151 (H) 45 - 117 U/L    Protein, total 7.1 6.4 - 8.2 g/dL    Albumin 2.4 (L) 3.4 - 5.0 g/dL    Globulin 4.7 (H) 2.0 - 4.0 g/dL    A-G Ratio 0.5 (L) 0.8 - 1.7     CARDIAC PANEL,(CK, CKMB & TROPONIN)    Collection Time: 04/23/21 12:45 PM   Result Value Ref Range    CK - MB 1.2 <3.6 ng/ml    CK-MB Index 1.1 0.0 - 4.0 %     39 - 308 U/L    Troponin-I, QT <0.02 0.0 - 0.045 NG/ML   LIPASE    Collection Time: 04/23/21 12:45 PM   Result Value Ref Range    Lipase 53 (L) 73 - 393 U/L   PTT    Collection Time: 04/23/21 12:45 PM   Result Value Ref Range    aPTT 41.0 (H) 23.0 - 36.4 SEC        XR Results (most recent):  Results from Hospital Encounter encounter on 04/23/21   XR CHEST PORT    Narrative EXAM: XR CHEST PORT    INDICATION: fever    COMPARISON: December 9, 2019    FINDINGS: A portable AP radiograph of the chest was obtained at 1320 hours. The  patient is on a cardiac monitor. Streaky bibasilar densities, similar to prior  exam.. Stable sequela previous cardiothoracic intervention. .  No acute bone  findings. .       Impression Streaky bibasilar opacities, similar to prior exam.         CT Results  (Last 48 hours)               04/23/21 1519  CT ABD PELV WO CONT Final result    Impression:   Findings concerning for acute cholecystitis. Consider correlation with   ultrasound and/or nuclear medicine hepatobiliary scan. Narrative:  EXAM: CT of the Abdomen and Pelvis without IV contrast       CLINICAL INDICATION:  abd pain, leukocytosis,       TECHNIQUE: CT of the abdomen and pelvis. Sagittal and coronal reformations       All CT scans at this facility are performed using dose optimization technique as   appropriate to a performed exam, to include automated exposure control,   adjustment of the mA and/or kV according to patient size (including appropriate   matching for site specific examination) or use of iterative reconstruction   technique. IV CONTRAST: None       ENTERIC CONTRAST: None       COMPARISON: None       FINDINGS:    Limitations: The evaluation of the solid organs is limited due to the lack of IV   contrast.       Lower Chest: Mild atelectasis in the lung bases are noted. No consolidation or   effusion identified. The heart and pericardium are unremarkable. Peritoneum: No free air appreciated. No free fluid present. No fluid collections   present. Liver: No focal lesion appreciated. Biliary/Gallbladder: No intrahepatic or extrahepatic biliary ductal dilatation   appreciated. The gallbladder is distended with pericholecystic inflammation. Spleen: Unremarkable. Pancreas: No focal lesion appreciated.  The pancreatic duct is unremarkable. No   peripancreatic inflammation or adenopathy. : The adrenal glands are unremarkable. No urolithiasis. No perinephric fat   stranding appreciated. No hydroureteronephrosis seen. No acute pathology in the   bladder. GI: The stomach is unremarkable. The small bowel is without evidence of   obstruction. No small bowel wall thickening. The mesentery is without   inflammation or adenopathy. The appendix is unremarkable. No pericolonic   inflammation or wall thickening appreciated. Aorta and retroperitoneum: No aortic aneurysm seen. No periaortic adenopathy   seen. No fluid collections in the retroperitoneum appreciated. The IVC is   flattened. Abdominal wall/Inguinal: Small amount of fluid is noted in the inguinal tracts   bilaterally. Musculoskeletal: Multilevel degenerative disc disease and facet arthropathy. Bilateral hip osteoporosis noted                   Procedures/imaging: see electronic medical records for all procedures/Xrays and details which were not copied into this note but were reviewed prior to creation of Plan      Assessment/Plan     81 y/o male with hx of vascular dementia, HTN, T2DM, seizure disorder who presented to the ED today due to abnormal labs, WBC 26K yesterday. Work up in the ED with leukocytosis, CT a/p w/o contrast suspicious for acute cholecystitis. General surgery consulted Dr. Gregory Alvarado and recommends admission for IVFs and IV abx- pt is not a surgical candidate at this time. RUQ U/s pending. Assessment  1. Acute cholecystitis  2. Leukocytosis secondary to #1  3. Elevated LFTs, mild, secondary to #1  4. CKD III  5. Hypertension  6. T2DM  7. Hyperlipidemia  8. Vascular dementia  9. Seizure disorder  10. Bilateral conjunctivitis        Plan  1. Gen surgery consulted-  Not surgical candidate. IVFs and IV abx. Pain control, Narcan. RUQ U/s pending- follow results. 2. NPO, IVFs. IV Zosyn q6h.  Follow blood cultures. 3. Monitor accuchecks q6h, correctional SSI. Check A1C.  4. Erythromycin ointment bilateral eyes q6h x 7 days  5. Monitor daily labs  6. Monitor vital signs, weight per unit protocol  7. PT, OT eval and treat  8. Fall precautions  9. Consult CM to assist w/ dc planning  10. Consult palliative care- daughter wishes to discuss code status further        Diet: NPO, IVFs  DVT/GI Prophylaxis: Lovenox  Code Status: FULL      Contact: abdulkadir You Room  [phone] // [phone]. Spoke with daughter at 6:43pm to update on admission and plan of care. All questions answered. Disclaimer: Sections of this note are dictated using utilizing voice recognition software. Minor typographical errors may be present. If questions arise, please do not hesitate to contact me or call our department.         Taqueria Yo, NP-C  [facility]  pager [phone]

## 2021-04-23 NOTE — PROGRESS NOTES
Reason for Renal Dosing:  Prescriber consult    Patient clinical status and labs ordered/reviewed. Pt Weight Weight: 82.7 kg (182 lb 5 oz)   Serum Creatinine Lab Results   Component Value Date/Time    Creatinine 1.76 (H) 04/23/2021 12:45 PM       Creatinine Clearance Estimated Creatinine Clearance: 37 mL/min (A) (based on SCr of 1.76 mg/dL (H)). BUN Lab Results   Component Value Date/Time    BUN 37 (H) 04/23/2021 12:45 PM       WBC Lab Results   Component Value Date/Time    WBC 20.3 (H) 04/23/2021 12:45 PM      Temperature Temp: 98 °F (36.7 °C)   HR Pulse (Heart Rate): 96     BP BP: 126/76           Drug type: Antibiotic indicated for acute cholecystitis      Drug/dose: Zosyn 2.25 g IV q6 hours. Next dose due 4/23 at 23:00. Continue to monitor.     Signed Kaleb Oro PHARMD  Date 4/23/2021  Time 6:44 PM

## 2021-04-24 LAB
ALBUMIN SERPL-MCNC: 2.4 G/DL (ref 3.4–5)
ALBUMIN/GLOB SERPL: 0.5 {RATIO} (ref 0.8–1.7)
ALP SERPL-CCNC: 151 U/L (ref 45–117)
ALT SERPL-CCNC: 61 U/L (ref 16–61)
ANION GAP SERPL CALC-SCNC: 3 MMOL/L (ref 3–18)
AST SERPL-CCNC: 57 U/L (ref 10–38)
BASOPHILS # BLD: 0 K/UL (ref 0–0.1)
BASOPHILS NFR BLD: 0 % (ref 0–2)
BILIRUB SERPL-MCNC: 1.3 MG/DL (ref 0.2–1)
BUN SERPL-MCNC: 31 MG/DL (ref 7–18)
BUN/CREAT SERPL: 20 (ref 12–20)
CALCIUM SERPL-MCNC: 8.8 MG/DL (ref 8.5–10.1)
CHLORIDE SERPL-SCNC: 109 MMOL/L (ref 100–111)
CO2 SERPL-SCNC: 34 MMOL/L (ref 21–32)
CREAT SERPL-MCNC: 1.56 MG/DL (ref 0.6–1.3)
DIFFERENTIAL METHOD BLD: ABNORMAL
EOSINOPHIL # BLD: 0 K/UL (ref 0–0.4)
EOSINOPHIL NFR BLD: 0 % (ref 0–5)
ERYTHROCYTE [DISTWIDTH] IN BLOOD BY AUTOMATED COUNT: 14 % (ref 11.6–14.5)
GLOBULIN SER CALC-MCNC: 5 G/DL (ref 2–4)
GLUCOSE BLD STRIP.AUTO-MCNC: 112 MG/DL (ref 70–110)
GLUCOSE BLD STRIP.AUTO-MCNC: 141 MG/DL (ref 70–110)
GLUCOSE BLD STRIP.AUTO-MCNC: 160 MG/DL (ref 70–110)
GLUCOSE BLD STRIP.AUTO-MCNC: 203 MG/DL (ref 70–110)
GLUCOSE BLD STRIP.AUTO-MCNC: 68 MG/DL (ref 70–110)
GLUCOSE SERPL-MCNC: 47 MG/DL (ref 74–99)
HCT VFR BLD AUTO: 47.8 % (ref 36–48)
HGB BLD-MCNC: 15.3 G/DL (ref 13–16)
LYMPHOCYTES # BLD: 1.8 K/UL (ref 0.9–3.6)
LYMPHOCYTES NFR BLD: 9 % (ref 21–52)
MCH RBC QN AUTO: 28.4 PG (ref 24–34)
MCHC RBC AUTO-ENTMCNC: 32 G/DL (ref 31–37)
MCV RBC AUTO: 88.7 FL (ref 74–97)
MONOCYTES # BLD: 1.6 K/UL (ref 0.05–1.2)
MONOCYTES NFR BLD: 8 % (ref 3–10)
NEUTS SEG # BLD: 16.7 K/UL (ref 1.8–8)
NEUTS SEG NFR BLD: 83 % (ref 40–73)
PLATELET # BLD AUTO: 338 K/UL (ref 135–420)
PLATELET COMMENTS,PCOM: ABNORMAL
PMV BLD AUTO: 9.9 FL (ref 9.2–11.8)
POTASSIUM SERPL-SCNC: 3.9 MMOL/L (ref 3.5–5.5)
PROT SERPL-MCNC: 7.4 G/DL (ref 6.4–8.2)
RBC # BLD AUTO: 5.39 M/UL (ref 4.35–5.65)
RBC MORPH BLD: ABNORMAL
SODIUM SERPL-SCNC: 146 MMOL/L (ref 136–145)
WBC # BLD AUTO: 20.1 K/UL (ref 4.6–13.2)

## 2021-04-24 PROCEDURE — 99232 SBSQ HOSP IP/OBS MODERATE 35: CPT | Performed by: INTERNAL MEDICINE

## 2021-04-24 PROCEDURE — 74011250636 HC RX REV CODE- 250/636: Performed by: NURSE PRACTITIONER

## 2021-04-24 PROCEDURE — 74011000250 HC RX REV CODE- 250: Performed by: NURSE PRACTITIONER

## 2021-04-24 PROCEDURE — 80053 COMPREHEN METABOLIC PANEL: CPT

## 2021-04-24 PROCEDURE — 65270000029 HC RM PRIVATE

## 2021-04-24 PROCEDURE — 74011000258 HC RX REV CODE- 258: Performed by: NURSE PRACTITIONER

## 2021-04-24 PROCEDURE — 99231 SBSQ HOSP IP/OBS SF/LOW 25: CPT | Performed by: SURGERY

## 2021-04-24 PROCEDURE — 97162 PT EVAL MOD COMPLEX 30 MIN: CPT

## 2021-04-24 PROCEDURE — 82962 GLUCOSE BLOOD TEST: CPT

## 2021-04-24 PROCEDURE — 85025 COMPLETE CBC W/AUTO DIFF WBC: CPT

## 2021-04-24 PROCEDURE — 74011250637 HC RX REV CODE- 250/637: Performed by: NURSE PRACTITIONER

## 2021-04-24 PROCEDURE — 2709999900 HC NON-CHARGEABLE SUPPLY

## 2021-04-24 RX ADMIN — DEXTROSE MONOHYDRATE AND SODIUM CHLORIDE 50 ML/HR: 5; .45 INJECTION, SOLUTION INTRAVENOUS at 12:29

## 2021-04-24 RX ADMIN — PIPERACILLIN AND TAZOBACTAM 2.25 G: 2; .25 INJECTION, POWDER, LYOPHILIZED, FOR SOLUTION INTRAVENOUS at 18:00

## 2021-04-24 RX ADMIN — ENOXAPARIN SODIUM 40 MG: 40 INJECTION SUBCUTANEOUS at 12:02

## 2021-04-24 RX ADMIN — PIPERACILLIN AND TAZOBACTAM 2.25 G: 2; .25 INJECTION, POWDER, LYOPHILIZED, FOR SOLUTION INTRAVENOUS at 22:48

## 2021-04-24 RX ADMIN — Medication 1 TABLET: at 11:33

## 2021-04-24 RX ADMIN — ERYTHROMYCIN: 5 OINTMENT OPHTHALMIC at 22:48

## 2021-04-24 RX ADMIN — PIPERACILLIN AND TAZOBACTAM 2.25 G: 2; .25 INJECTION, POWDER, LYOPHILIZED, FOR SOLUTION INTRAVENOUS at 11:56

## 2021-04-24 RX ADMIN — QUETIAPINE FUMARATE 50 MG: 25 TABLET ORAL at 17:57

## 2021-04-24 RX ADMIN — MORPHINE SULFATE 1 MG: 2 INJECTION, SOLUTION INTRAMUSCULAR; INTRAVENOUS at 19:48

## 2021-04-24 RX ADMIN — DEXTROSE MONOHYDRATE 25 G: 25 INJECTION, SOLUTION INTRAVENOUS at 17:43

## 2021-04-24 RX ADMIN — TEMAZEPAM 7.5 MG: 7.5 CAPSULE ORAL at 21:20

## 2021-04-24 RX ADMIN — DEXTROSE MONOHYDRATE 25 G: 25 INJECTION, SOLUTION INTRAVENOUS at 11:00

## 2021-04-24 NOTE — PROGRESS NOTES
Admit Date: 2021  Date of Service: 2021    Reason for follow-up: elevated WBC      Assessment:         Acute cholecystitis: surgery consulted- not candidate for surgery per Sx consult (Dr. Franchesca Drummond)   -  US with acute cholecystitis   -  CT abd: Findings concerning for acute cholecystitis  Leukocytosis secondary to #1  Elevated LFTs, mild, secondary to #1  CKD III:  At baseline  Hypertension: controlled  T2DM:  HbA1c 7.1  Hyperlipidemia  Vascular dementia  Seizure disorder  Bilateral conjunctivitis    Plan:   CBC, CMP  F/u blood cx from  - NGTD  Continue Zosyn for now  SSI with accuchecks  Continue ASA, gabapentin, imdur, namenda, toprol, seroquel, restoril  Palliative Care consult    Current Antibtiocs:   Zosyn    Lines:   Peripheral    Case discussed with:  [x]Patient  []Family  [x]Nursing  []Case Management  DVT Prophylaxis:  [x]Lovenox  []Hep SQ  []SCDs  []Coumadin   []On Heparin gtt    I have independently examined the patient and reviewed all lab studies and imgaing as well as review of nursing notes and physican notes from the past 24 hours. Eb Villanueva D.O. Pager [phone]      Allergies   Allergen Reactions    Amaryl [Glimepiride] Drowsiness    Lipitor [Atorvastatin] Myalgia    Niacin Other (comments)     Facial blistering, swelling in face    Pravachol [Pravastatin] Myalgia    Zocor [Simvastatin] Myalgia           Subjective:      Pt seen and examined in hallway. Confused. States that he has some RUQ pain. Per nursing, has been refusing medications, does not want to eat. Refusing most interventions.      Objective:        Visit Vitals  /70 (BP 1 Location: Right arm, BP Patient Position: At rest)   Pulse 81   Temp 97 °F (36.1 °C)   Resp 14   Ht 6' 2\" (1.88 m)   Wt 82.7 kg (182 lb 5 oz)   SpO2 96%   BMI 23.41 kg/m²     Temp (24hrs), Av.3 °F (36.3 °C), Min:97 °F (36.1 °C), Max:98 °F (36.7 °C)        General:   awake alert and oriented to person, ill-appearing   Skin: no rashes or skin lesions noted on limited exam, dry and warm, scattered bruises   HEENT:  No scleral icterus or pallor; oral mucosa moist, lips dry and cracked   Lymph Nodes:   not assessed today   Lungs:   non, labored; bilaterally clear to aspiration- no crackles wheezes rales or rhonchi   Heart:  RRR, s1 and s2; no murmurs rubs or gallops; no edema, + pedal pulses   Abdomen:  soft, non-distended, active bowel sounds, mild tenderness to RUQ   Genitourinary:  deferred   Extremities:   average muscle tone; no contractures, no joint effusions   Neurologic:  No gross focal motor or sensory abnormalities; CN 2-12 intact; Follows commands. Psychiatric:  Pleasantly confused       Labs: Results:   Chemistry Recent Labs     04/23/21  1245   *      K 3.7      CO2 32   BUN 37*   CREA 1.76*   CA 9.1   AGAP 5   BUCR 21*   *   TP 7.1   ALB 2.4*   GLOB 4.7*   AGRAT 0.5*      CBC w/Diff Recent Labs     04/23/21  1245   WBC 20.3*   RBC 5.39   HGB 15.1   HCT 48.3*      GRANS 83*   LYMPH 7*   EOS 0        No results found for: SDES Lab Results   Component Value Date/Time    Culture result: NO GROWTH AFTER 13 HOURS 04/23/2021 02:49 PM    Culture result: NO GROWTH AFTER 13 HOURS 04/23/2021 02:39 PM    Culture result: PROTEUS MIRABILIS (A) 04/14/2021 01:40 PM    Culture result: (A) 04/14/2021 01:40 PM     AEROCOCCUS URINAE A. URINAE HAS BEEN DESCRIBED AS SUSCEPTIBLE TO PENICILLIN, AMOXICILLIN, PIPERACILLIN, CEFIPIME, RIFAMPIN AND NITROFURANTOIN, BUT RESISTANT TO SULFONAMIDES.     Culture result: RARE STAPHYLOCOCCUS SIMULANS (A) 12/09/2019 11:00 PM    Culture result: RARE STAPHYLOCOCCUS WARNERI (A) 12/09/2019 11:00 PM    Culture result: RARE STREPTOCOCCUS VIRIDANS (A) 12/09/2019 11:00 PM    Culture result: FEW DIPHTHEROIDS (TWO MORPHOTYPES) (A) 12/09/2019 11:00 PM        Results     Procedure Component Value Units Date/Time    CULTURE, BLOOD [[id number]] Collected: 04/23/21 1449    Order Status: Completed Specimen: Blood Updated: 04/24/21 0631     Special Requests: NO SPECIAL REQUESTS        Culture result: NO GROWTH AFTER 13 HOURS       CULTURE, BLOOD [[id number]] Collected: 04/23/21 1439    Order Status: Completed Specimen: Blood Updated: 04/24/21 0631     Special Requests: NO SPECIAL REQUESTS        Culture result: NO GROWTH AFTER 13 HOURS       CULTURE, URINE [[id number]]  (Abnormal)  (Susceptibility) Collected: 04/14/21 1340    Order Status: Completed Specimen: Urine from Clean catch Updated: 04/16/21 1620     Special Requests: NO SPECIAL REQUESTS        [place] Count --        >100,000  COLONIES/mL       Culture result: PROTEUS MIRABILIS               AEROCOCCUS URINAE A. URINAE HAS BEEN DESCRIBED AS SUSCEPTIBLE TO PENICILLIN, AMOXICILLIN, PIPERACILLIN, CEFIPIME, RIFAMPIN AND NITROFURANTOIN, BUT RESISTANT TO SULFONAMIDES. Susceptibility      Proteus mirabilis     VILMA     Amikacin ($) Susceptible     Ampicillin ($) Susceptible     Ampicillin/sulbactam ($) Susceptible     Cefazolin ($) Susceptible     Cefepime ($$) Susceptible     Cefoxitin Susceptible     Ceftazidime ($) Susceptible     Ceftriaxone ($) Susceptible     Ciprofloxacin ($) Susceptible     Gentamicin ($) Susceptible     Levofloxacin ($) Susceptible     Meropenem ($$) Susceptible     Nitrofurantoin Resistant     Piperacillin/Tazobac ($) Susceptible     Tobramycin ($) Susceptible     Trimeth/Sulfa Susceptible                          Imaging:     Ct Abd Pelv Wo Cont    Result Date: 4/23/2021   Findings concerning for acute cholecystitis. Consider correlation with ultrasound and/or nuclear medicine hepatobiliary scan. [facility]    Result Date: 4/23/2021  1. Findings concerning for acute cholecystitis. 2.  Fatty infiltration of the liver. 3.  Chronic medical renal disease.     Xr Chest Port    Result Date: 4/23/2021  Streaky bibasilar opacities, similar to prior exam.

## 2021-04-24 NOTE — PROGRESS NOTES
Problem: Mobility Impaired (Adult and Pediatric)  Goal: *Acute Goals and Plan of Care (Insert Text)  Description: Physical Therapy Goals  Initiated 4/24/2021 and to be accomplished within 7 day(s)  1. Patient will move from supine to sit and sit to supine , scoot up and down, and roll side to side in bed with moderate assistance . 2.  Patient will transfer from bed to chair and chair to bed with maximal assistance using the least restrictive device. 3.  Patient will perform sit to stand with moderate assistance . PLOF: Per EMR review, pt resides at [facility], has hx of falls. Dementia at baseline    PHYSICAL THERAPY EVALUATION    Patient: Deepika Cole (82 y.o. male)  Date: 4/24/2021  Primary Diagnosis: Leukocytosis [D72.829]  Acute cholecystitis [K81.0]        Precautions: Dementia, falls risk       PLOF: Per EMR review, pt resides in [facility]; unable to obtain extensive PLOF 2/2 dementia dx at baseline. ASSESSMENT :  Based on the objective data described below, the patient presents with significantly impaired functional mobility, cognitive deficits, falls risk. Patient will benefit from skilled intervention to address the above impairments.   Patient's rehabilitation potential is considered to be Fair  Factors which may influence rehabilitation potential include:   []         None noted  [x]         Mental ability/status  [x]         Medical condition  [x]         Home/family situation and support systems  [x]         Safety awareness  [x]         Pain tolerance/management  []         Other:      PLAN :  Recommendations and Planned Interventions:   [x]           Bed Mobility Training             [x]    Neuromuscular Re-Education  [x]           Transfer Training                   []    Orthotic/Prosthetic Training  [x]           Gait Training                          [x]    Modalities  [x]           Therapeutic Exercises           [x]    Edema Management/Control  [x] Therapeutic Activities            [x]    Family Training/Education  [x]           Patient Education  []           Other (comment):    Frequency/Duration: Patient will be followed by physical therapy 3 times a week to address goals. Discharge Recommendations: [facility]  Further Equipment Recommendations for Discharge: N/A     SUBJECTIVE:   Patient stated [place]     OBJECTIVE DATA SUMMARY:     Past Medical History:   Diagnosis Date    Anxiety     Arthritis     CAD (coronary artery disease)     s/p drug-eluting stents to RCA for high-grade stenoses in 2/09    Carotid duplex 08/26/2016    Mild <50% CYRUS stenosis. Chronic LICA occlusion. Similar to study of 3/29/16. Carotid stenosis (HCC)     w/ known total occlusion of lt internal carotid artery; followed by pts vascular surgeon    Chronic kidney disease     COPD (chronic obstructive pulmonary disease) ([facility] [address]     Dementia ([facility] [address]     Depression     Diabetes ([facility] [address]     type 2    Dyslipidemia     on Crestor; managed by pts PCP    Dyspnea     Hypercholesterolemia     Hypertension     Low back pain     Osteoarthrosis involving multiple sites     Polycythemia 12/9/2019    Skin cancer ([facility] [address]     squamous cell lesions of the skin    Stroke ([facility] [address]     Venous (peripheral) insufficiency      Past Surgical History:   Procedure Laterality Date    CARDIAC CATHETERIZATION  08/24/2016    Mod calcification. Co-dom. oLM 50-70%. LAD 30%. Dx 30%. Cx 70% focal.  OM 80% focal.  RCA 30%.       HX ANGIOPLASTY  2009    2 stents placed and heart attack during the procedure    HX COLONOSCOPY  10/2003    neg; declines f/u    HX CORONARY ARTERY BYPASS GRAFT  09/2016     LIMA to LAD and SVG to OM1    HX HEART CATHETERIZATION  5/2013    HX HERNIA REPAIR      1970s    HX TONSIL AND ADENOIDECTOMY       Barriers to Learning/Limitations: yes;  emotional, cognitive, sensory deficits-vision/hearing/speech, and altered mental status (i.e.Sedation, Confusion)  Compensate with: Visual Cues, Verbal Cues, Tactile Cues, and Kinesthetic Cues  Home Situation:     Strength:     Unable to formally assess 2/2 pt inability to follow commands - able to perform heel slides, ankle pumps, bilat UE raise to approx 90 deg FF     Functional Mobility:  Bed Mobility:  Rolling: Total assistance; Other (comment)(Pt unable to perform despite multiple verbal and tactile cue)     Transfers:  Sit to Stand: Other (comment)(Unable to assess 2/2 inability to follow commands)  Stand to Sit: Other (comment)(Unable to assess 2/2 inability to follow commands)        Balance:   Sitting: (Unable to assess 2/2 inability to follow commands)  Standing: (Unable to assess 2/2 inability to follow commands)    Ambulation/Gait Training:     Gait Description (WDL): Exceptions to WDL(Unable to assess 2/2 inability to follow commands)    Pain:  Pain level pre-treatment: Unable to quantify, specified LE/10   Pain level post-treatment: Unable to quantify, specified LE/10   Pain Intervention(s) : Medication (see MAR); Rest, Ice, Repositioning  Response to intervention: Nurse notified, See doc flow    Activity Tolerance:   Poor  Please refer to the flowsheet for vital signs taken during this treatment. After treatment:   []         Patient left in no apparent distress sitting up in chair  [x]         Patient left in no apparent distress in bed  []         Call bell left within reach  [x]         Nursing notified  []         Caregiver present  []         Bed alarm activated  []         SCDs applied    COMMUNICATION/EDUCATION:   [x]         Role of Physical Therapy in the acute care setting. [x]         Fall prevention education was provided and the patient/caregiver indicated understanding. []         Patient/family have participated as able in goal setting and plan of care. []         Patient/family agree to work toward stated goals and plan of care.   []         Patient understands intent and goals of therapy, but is neutral about his/her participation. [x]         Patient is unable to participate in goal setting/plan of care: ongoing with therapy staff.  []         Other:     Thank you for this referral.  Shawna Angelucci, PT   Time Calculation: 8 mins    Eval Complexity: History: HIGH Complexity :3+ comorbidities / personal factors will impact the outcome/ POC Exam:MEDIUM Complexity : 3 Standardized tests and measures addressing body structure, function, activity limitation and / or participation in recreation  Presentation: MEDIUM Complexity : Evolving with changing characteristics  Clinical Decision Making:Medium Complexity    Overall Complexity:MEDIUM

## 2021-04-24 NOTE — CONSULTS
Consult    Patient: Deepika Cole MRN: [id number]  SSN: [id number]    YOB: 1935  Age: 82 y.o. Sex: male      Subjective:      Deepika Cole is a 82 y.o. male who is being seen for acute cholecystitis. Patient with severe dementia, CAD, CKD, T2DM, PVD, COPD was sent to the ED because he had an elevated white count of 26 at his facility. Patient was recently seen in the ED over a week ago because he was noted to not be eating and to be more lethargic than usual.  Patient is oriented to himself only. He denies any abdominal pain or discomfort at this time. His vital signs are stable. I am unable to obtain any other relevant history from the patient due to his dementia.     Allergies   Allergen Reactions    Amaryl [Glimepiride] Drowsiness    Lipitor [Atorvastatin] Myalgia    Niacin Other (comments)     Facial blistering, swelling in face    Pravachol [Pravastatin] Myalgia    Zocor [Simvastatin] Myalgia     Current Facility-Administered Medications   Medication Dose Route Frequency Provider Last Rate Last Admin    sodium chloride (NS) flush 5-40 mL  5-40 mL IntraVENous Q8H Ethelle Dubin S, NP   10 mL at 04/23/21 2323    sodium chloride (NS) flush 5-40 mL  5-40 mL IntraVENous PRN Margo Venegas NP        acetaminophen (TYLENOL) tablet 650 mg  650 mg Oral Q6H PRN Margo Venegas NP        Or    acetaminophen (TYLENOL) suppository 650 mg  650 mg Rectal Q6H PRN Margo Venegas NP        polyethylene glycol (MIRALAX) packet 17 g  17 g Oral DAILY PRN Margo Venegas NP        promethazine (PHENERGAN) tablet 12.5 mg  12.5 mg Oral Q6H PRN Margo Venegas NP        Or    ondansetron (ZOFRAN) injection 4 mg  4 mg IntraVENous Q6H PRN Margo Venegas NP        enoxaparin (LOVENOX) injection 40 mg  40 mg SubCUTAneous DAILY Margo Venegas NP        insulin lispro (HUMALOG) injection   SubCUTAneous Q6H Margo Venegas NP   Stopped at 04/24/21 0000    glucose chewable tablet 16 g  4 Tab Oral PRN Virgie Ko NP        glucagon (GLUCAGEN) injection 1 mg  1 mg IntraMUSCular PRN Virgie Ko NP        dextrose (D50W) injection syrg 12.5-25 g  25-50 mL IntraVENous PRN Bj MCKEON NP   25 g at 04/24/21 1100    dextrose 5 % - 0.45% NaCl infusion  50 mL/hr IntraVENous CONTINUOUS Bj MCKEON NP 50 mL/hr at 04/23/21 2312 50 mL/hr at 04/23/21 2312    piperacillin-tazobactam (ZOSYN) 2.25 g in 0.9% sodium chloride (MBP/ADV) 50 mL MBP  2.25 g IntraVENous Q6H Bj MCKEON  mL/hr at 04/24/21 1156 2.25 g at 04/24/21 1156    morphine injection 1 mg  1 mg IntraVENous Q4H PRN Virgie Ko NP        naloxone [facility]) injection 0.4 mg  0.4 mg IntraVENous PRN Virgie Ko NP        aspirin delayed-release tablet 81 mg  81 mg Oral DAILY Bj MCKEON NP   81 mg at 04/24/21 1133    cycloSPORINE (RESTASIS) 0.05 % ophthalmic emulsion 1 Drop  1 Drop Both Eyes Q12H Virgie Ko NP        erythromycin (ILOTYCIN) 5 mg/gram (0.5 %) ophthalmic ointment   Both Eyes Q6H Virgie Ko NP   Given at 04/23/21 2322    isosorbide mononitrate ER (IMDUR) tablet 30 mg  30 mg Oral DAILY Virgie Ko NP        memantine (NAMENDA) tablet 10 mg  10 mg Oral DAILY Virgie Ko NP        metoprolol succinate (TOPROL-XL) XL tablet 50 mg  50 mg Oral DAILY Bj MCKEON NP   50 mg at 04/24/21 1133    multivitamin, tx-iron-ca-min (THERA-M w/ IRON) tablet 1 Tab  1 Tab Oral DAILY Bj MCKEON, NP   1 Tab at 04/24/21 1133    QUEtiapine (SEROquel) tablet 50 mg  50 mg Oral BID Bj MCKEON, NP   50 mg at 04/23/21 2322    temazepam (RESTORIL) capsule 7.5 mg  7.5 mg Oral QHS Bj MCKEON, KATHY   7.5 mg at 04/23/21 5382    gabapentin (NEURONTIN) capsule 300 mg  300 mg Oral DAILY Bj MCKEON NP   300 mg at 04/24/21 1133     Current Outpatient Medications   Medication Sig Dispense Refill    temazepam (RESTORIL) 7.5 mg capsule Take 7.5 mg by mouth nightly.       QUEtiapine (SEROqueL) 50 mg tablet Take 50 mg by mouth two (2) times a day.  memantine (Namenda) 10 mg tablet Take 10 mg by mouth daily.  metoprolol succinate (Toprol XL) 50 mg XL tablet Take 50 mg by mouth daily.  erythromycin (ILOTYCIN) ophthalmic ointment Apply 1/2 inch ribbon OS every 6 hours x 7 days 3.5 g 0    diclofenac (VOLTAREN) 1 % gel Apply 2 g to affected area four (4) times daily. 100 g 0    ezetimibe (ZETIA) 10 mg tablet Take 1 Tab by mouth nightly.  insulin detemir U-100 (LEVEMIR U-100 INSULIN) 100 unit/mL injection 60 Units by SubCUTAneous route daily. Indications: type 2 diabetes mellitus      erythromycin (ILOTYCIN) ophthalmic ointment Apply to both lower eyelids four times a day until done. 1 Tube 0    aspirin delayed-release 81 mg tablet Take 1 Tab by mouth daily. Indications: Cerebral Thromboembolism Prevention 30 Tab 0    cycloSPORINE (RESTASIS) 0.05 % ophthalmic emulsion Administer 1 Drop to both eyes every twelve (12) hours. Indications: Dry Eye 15 Each 0    gabapentin (NEURONTIN) 300 mg capsule Take 1 Cap by mouth daily. Indications: NEUROPATHIC PAIN, Restless Legs Syndrome 30 Cap 0    isosorbide mononitrate ER (IMDUR) 30 mg tablet Take 1 Tab by mouth daily. 30 Tab 0    multivit-min-FA-lycopen-lutein (CENTRUM SILVER) 0.4-300-250 mg-mcg-mcg tab Take 1 tab daily 30 Tab 0    furosemide (LASIX) 20 mg tablet Take 1 Tab by mouth daily. Indications: Edema 30 Tab 0    famotidine (PEPCID) 20 mg tablet Take 1 Tab by mouth two (2) times a day. Indications: PREVENTION OF STRESS ULCER 30 Tab 0    CALCIUM CARBONATE (CALTRATE 600 PO) Take 1 Tab by mouth daily.  lidocaine (LIDODERM) 5 % Apply patch to the affected area for 12 hours a day and remove for 12 hours a day. 30 Each 0    lactobacillus sp. 50 billion cpu (BIO-K PLUS) 50 billion cell -375 mg cap capsule Take 1 Cap by mouth daily.  30 Cap 0    OTHER Incentive spirometry- Use as directed 1 Each 0     Past Medical History:   Diagnosis Date    Anxiety     Arthritis     CAD (coronary artery disease)     s/p drug-eluting stents to RCA for high-grade stenoses in     Carotid duplex 2016    Mild <50% CYRUS stenosis. Chronic LICA occlusion. Similar to study of 3/29/16.  Carotid stenosis (HCC)     w/ known total occlusion of lt internal carotid artery; followed by pts vascular surgeon    Chronic kidney disease     [facility]) ([facility] [address]     Dementia ([facility] [address]     Depression     Diabetes ([facility] [address]     type 2    Dyslipidemia     on Crestor; managed by pts PCP    Dyspnea     Hypercholesterolemia     Hypertension     Low back pain     Osteoarthrosis involving multiple sites     Polycythemia 2019    Skin cancer ([facility] [address]     squamous cell lesions of the skin    Stroke ([facility] [address]     Venous (peripheral) insufficiency      Past Surgical History:   Procedure Laterality Date    CARDIAC CATHETERIZATION  2016    Mod calcification. Co-dom. oLM 50-70%. LAD 30%. Dx 30%. Cx 70% focal.  OM 80% focal.  RCA 30%.  HX ANGIOPLASTY      2 stents placed and heart attack during the procedure    HX COLONOSCOPY  10/2003    neg; declines f/u    HX CORONARY ARTERY BYPASS GRAFT  2016     LIMA to LAD and SVG to OM1    HX HEART CATHETERIZATION  2013    HX HERNIA REPAIR      1970s    HX TONSIL AND ADENOIDECTOMY        Social History     Tobacco Use    Smoking status: Former Smoker     Years: 2.00     Quit date: 1955     Years since quittin.3    Smokeless tobacco: Never Used   Substance Use Topics    Alcohol use: Yes     Frequency: 2-4 times a month     Comment: drinks weekly couple beers     Family History   Problem Relation Age of Onset    Parkinsonism Mother     Hypertension Mother     Cancer Father         lung    Heart defect Brother            Review of Systems:    General: Denies fevers, chills, night sweats, fatigue, weight loss, or weight gain.     HEENT: Denies changes in auditory or visual acuity, recurrent pharyngitis, epistaxis, chronic rhinorrhea, vertigo    Respiratory: Denies increasing shortness of breath, productive cough, hemoptysis    Cardiac: Denies known history of cardiac disease, heart murmur, palpitations    GI: Denies dysphagia, recurrent emesis, hematemesis, changes in bowel habits, hematochezia, melena    : Denies hematuria frequency urgency dysuria    Musculoskeletal: Denies fractures, dislocations    Neurologic: Denies history of CVA, paralysis paresthesias, recurrent cephalgia, seizures    Endocrine: Denies polyuria, polydipsia, polyphagia, heat and cold intolerance    Lymph/heme: Denies a history of malignancy, anemia, bruising, blood transfusions    Integumentary: Negative for dermatitis     Objective:     Vitals:    04/23/21 1248 04/23/21 2000 04/23/21 2326   BP: 126/76 134/71 132/70   Pulse: 96 82 81   Resp: 22 16 14   Temp: 98 °F (36.7 °C) 97 °F (36.1 °C) 97 °F (36.1 °C)   SpO2: 95% 96% 96%   Weight: 82.7 kg (182 lb 5 oz)     Height: 6' 2\" (1.88 m)          General: no acute distress, nontoxic in appearance. Head: Normocephalic, atraumatic  Mouth: Clear, no overt lesions, oral mucosa is pink and moist.  Neck: Supple, no masses, no adenopathy or carotid bruits, trachea midline  Resp: Clear to auscultation bilaterally, no wheezing, rhonchi, or rales, excursions normal and symmetrical.  Cardio: Regular rate and rhythm, no murmurs, clicks, gallops, or rubs. Abdomen: soft, nontender, nondistended, normoactive bowel sounds, no hernias. Extremities: Warm, well perfused, no tenderness or swelling, normal gait/station, without edema or varicosities  Neuro: Sensation and strength grossly intact and symmetrical.  Psych: Alert and oriented to person, place, and time. Assessment:   Patient is a 82-year-old male presenting with acute cholecystitis. His imaging and labs were personally reviewed. Patient with a white count of 20.   Total bilirubin of 1.3.  Mildly elevated LFTs. Patient is noted to have a distended gallbladder with some stranding around the gallbladder on CAT scan. He had a follow-up ultrasound that showed gallbladder wall thickening. Patient does not have any pain on physical exam time. His vitals are stable and afebrile. It is impossible to determine the duration of his acute cholecystitis. It is possible that patient started having symptoms last week when he was first seen in the ED for decreased eating and more lethargy. He was diagnosed with a UTI at the time. If he has had acute cholecystitis for more than one week, there would be a lot of inflammation at this time, making surgery higher risk. Patient also has severe dementia with multiple medical comorbidities. He is very deconditioned and does not appear to have great reserve. I tried calling his daughter to discuss overall goals of care for the patient. However she did not  her cell phone or home phone number. I do not believe this patient is a surgical candidate at all due to overall state. Plan:   Recommend continuing with IV Zosyn antibiotics to treat his acute cholecystitis. Continue to trend his WBC. If his WBC does not decrease over the weekend, will consider IR percutaneous cholecystostomy tube placement next week. This may be definitive management for this patient. Agree with palliative medicine consult to discuss goals of care with the family.     Signed By: Darvin Blackwell MD     April 24, 2021

## 2021-04-24 NOTE — ROUTINE PROCESS
Patient admitted to the floor from ER. He is alert,confused. No acute distress noted. Bed alarm set.

## 2021-04-24 NOTE — PROGRESS NOTES
Assumed care of patient from [facility] Last saw Dr. Byers on 5/15/18  No upcoming appt.   Last refill 11/12/18 #30 no refills  Ok to refill once?

## 2021-04-24 NOTE — ROUTINE PROCESS
Bedside and Verbal shift change report given to GE Arnold (oncoming nurse) by He Mullen (offgoing nurse). Report included the following information SBAR, Kardex, Intake/Output, MAR and Recent Results.

## 2021-04-24 NOTE — ED NOTES
Patient adult brief changed, tolerated well. Patient noted with x3 stage 1 blanchable areas on his coccyx and buttocks.

## 2021-04-25 LAB
ALBUMIN SERPL-MCNC: 2.2 G/DL (ref 3.4–5)
ALBUMIN/GLOB SERPL: 0.5 {RATIO} (ref 0.8–1.7)
ALP SERPL-CCNC: 166 U/L (ref 45–117)
ALT SERPL-CCNC: 60 U/L (ref 16–61)
ANION GAP SERPL CALC-SCNC: 8 MMOL/L (ref 3–18)
AST SERPL-CCNC: 66 U/L (ref 10–38)
BASOPHILS # BLD: 0 K/UL (ref 0–0.1)
BASOPHILS NFR BLD: 0 % (ref 0–2)
BILIRUB SERPL-MCNC: 1.5 MG/DL (ref 0.2–1)
BUN SERPL-MCNC: 21 MG/DL (ref 7–18)
BUN/CREAT SERPL: 14 (ref 12–20)
CALCIUM SERPL-MCNC: 8.3 MG/DL (ref 8.5–10.1)
CHLORIDE SERPL-SCNC: 107 MMOL/L (ref 100–111)
CO2 SERPL-SCNC: 25 MMOL/L (ref 21–32)
CREAT SERPL-MCNC: 1.47 MG/DL (ref 0.6–1.3)
DIFFERENTIAL METHOD BLD: ABNORMAL
EOSINOPHIL # BLD: 0.3 K/UL (ref 0–0.4)
EOSINOPHIL NFR BLD: 2 % (ref 0–5)
ERYTHROCYTE [DISTWIDTH] IN BLOOD BY AUTOMATED COUNT: 14.1 % (ref 11.6–14.5)
GLOBULIN SER CALC-MCNC: 4.6 G/DL (ref 2–4)
GLUCOSE BLD STRIP.AUTO-MCNC: 127 MG/DL (ref 70–110)
GLUCOSE BLD STRIP.AUTO-MCNC: 144 MG/DL (ref 70–110)
GLUCOSE BLD STRIP.AUTO-MCNC: 149 MG/DL (ref 70–110)
GLUCOSE BLD STRIP.AUTO-MCNC: 192 MG/DL (ref 70–110)
GLUCOSE SERPL-MCNC: 121 MG/DL (ref 74–99)
HCT VFR BLD AUTO: 45.9 % (ref 36–48)
HGB BLD-MCNC: 14.3 G/DL (ref 13–16)
LYMPHOCYTES # BLD: 2.1 K/UL (ref 0.9–3.6)
LYMPHOCYTES NFR BLD: 13 % (ref 21–52)
MCH RBC QN AUTO: 28 PG (ref 24–34)
MCHC RBC AUTO-ENTMCNC: 31.2 G/DL (ref 31–37)
MCV RBC AUTO: 89.8 FL (ref 74–97)
MONOCYTES # BLD: 1.4 K/UL (ref 0.05–1.2)
MONOCYTES NFR BLD: 9 % (ref 3–10)
NEUTS SEG # BLD: 12 K/UL (ref 1.8–8)
NEUTS SEG NFR BLD: 76 % (ref 40–73)
PLATELET # BLD AUTO: 275 K/UL (ref 135–420)
PLATELET COMMENTS,PCOM: ABNORMAL
PMV BLD AUTO: 11 FL (ref 9.2–11.8)
POTASSIUM SERPL-SCNC: 4 MMOL/L (ref 3.5–5.5)
PROT SERPL-MCNC: 6.8 G/DL (ref 6.4–8.2)
RBC # BLD AUTO: 5.11 M/UL (ref 4.35–5.65)
RBC MORPH BLD: ABNORMAL
SODIUM SERPL-SCNC: 140 MMOL/L (ref 136–145)
WBC # BLD AUTO: 15.8 K/UL (ref 4.6–13.2)

## 2021-04-25 PROCEDURE — 74011250637 HC RX REV CODE- 250/637: Performed by: NURSE PRACTITIONER

## 2021-04-25 PROCEDURE — 82962 GLUCOSE BLOOD TEST: CPT

## 2021-04-25 PROCEDURE — 74011000250 HC RX REV CODE- 250: Performed by: NURSE PRACTITIONER

## 2021-04-25 PROCEDURE — 74011000258 HC RX REV CODE- 258: Performed by: NURSE PRACTITIONER

## 2021-04-25 PROCEDURE — 99232 SBSQ HOSP IP/OBS MODERATE 35: CPT | Performed by: SURGERY

## 2021-04-25 PROCEDURE — 85025 COMPLETE CBC W/AUTO DIFF WBC: CPT

## 2021-04-25 PROCEDURE — 2709999900 HC NON-CHARGEABLE SUPPLY

## 2021-04-25 PROCEDURE — 74011250636 HC RX REV CODE- 250/636: Performed by: HOSPITALIST

## 2021-04-25 PROCEDURE — 74011000258 HC RX REV CODE- 258: Performed by: HOSPITALIST

## 2021-04-25 PROCEDURE — 74011250636 HC RX REV CODE- 250/636: Performed by: NURSE PRACTITIONER

## 2021-04-25 PROCEDURE — 65270000029 HC RM PRIVATE

## 2021-04-25 PROCEDURE — 74011636637 HC RX REV CODE- 636/637: Performed by: NURSE PRACTITIONER

## 2021-04-25 PROCEDURE — 36415 COLL VENOUS BLD VENIPUNCTURE: CPT

## 2021-04-25 PROCEDURE — 80053 COMPREHEN METABOLIC PANEL: CPT

## 2021-04-25 RX ADMIN — MEMANTINE HYDROCHLORIDE 10 MG: 10 TABLET ORAL at 08:49

## 2021-04-25 RX ADMIN — PIPERACILLIN AND TAZOBACTAM 2.25 G: 2; .25 INJECTION, POWDER, LYOPHILIZED, FOR SOLUTION INTRAVENOUS at 04:45

## 2021-04-25 RX ADMIN — CYCLOSPORINE 1 DROP: 0.5 EMULSION OPHTHALMIC at 22:05

## 2021-04-25 RX ADMIN — METOPROLOL SUCCINATE 50 MG: 50 TABLET, EXTENDED RELEASE ORAL at 08:49

## 2021-04-25 RX ADMIN — TEMAZEPAM 7.5 MG: 7.5 CAPSULE ORAL at 22:05

## 2021-04-25 RX ADMIN — QUETIAPINE FUMARATE 50 MG: 25 TABLET ORAL at 08:49

## 2021-04-25 RX ADMIN — INSULIN LISPRO 2 UNITS: 100 INJECTION, SOLUTION INTRAVENOUS; SUBCUTANEOUS at 11:47

## 2021-04-25 RX ADMIN — Medication 1 TABLET: at 08:51

## 2021-04-25 RX ADMIN — QUETIAPINE FUMARATE 50 MG: 25 TABLET ORAL at 18:09

## 2021-04-25 RX ADMIN — CYCLOSPORINE 1 DROP: 0.5 EMULSION OPHTHALMIC at 09:05

## 2021-04-25 RX ADMIN — PIPERACILLIN AND TAZOBACTAM 3.38 G: 3; .375 INJECTION, POWDER, LYOPHILIZED, FOR SOLUTION INTRAVENOUS at 11:52

## 2021-04-25 RX ADMIN — PIPERACILLIN AND TAZOBACTAM 3.38 G: 3; .375 INJECTION, POWDER, LYOPHILIZED, FOR SOLUTION INTRAVENOUS at 23:36

## 2021-04-25 RX ADMIN — ISOSORBIDE MONONITRATE 30 MG: 30 TABLET ORAL at 08:51

## 2021-04-25 RX ADMIN — DEXTROSE MONOHYDRATE AND SODIUM CHLORIDE 50 ML/HR: 5; .45 INJECTION, SOLUTION INTRAVENOUS at 23:36

## 2021-04-25 RX ADMIN — ERYTHROMYCIN: 5 OINTMENT OPHTHALMIC at 05:02

## 2021-04-25 RX ADMIN — ENOXAPARIN SODIUM 40 MG: 40 INJECTION SUBCUTANEOUS at 08:51

## 2021-04-25 RX ADMIN — PIPERACILLIN AND TAZOBACTAM 3.38 G: 3; .375 INJECTION, POWDER, LYOPHILIZED, FOR SOLUTION INTRAVENOUS at 18:09

## 2021-04-25 RX ADMIN — GABAPENTIN 300 MG: 300 CAPSULE ORAL at 08:52

## 2021-04-25 RX ADMIN — Medication 81 MG: at 08:51

## 2021-04-25 NOTE — PROGRESS NOTES
visited with the family of Christina aMynard, who is a 82 y. o.,male because he was sleeping at he time of my visit. The  provided the following Interventions:  Initiated a relationship of care and support as I listened to Mr. Hiram Lima share concerns related to her father and planning for his health care. Discussed the scope of an Advance Directive with her and provided an empty form to read. Offered prayers on her and patient's behalf. Plan:  Chaplains will continue to follow and will provide pastoral care on an as needed/requested basis.  recommends bedside caregivers page  on duty if patient shows signs of acute spiritual or emotional distress.       [address] [place]   [phone]

## 2021-04-25 NOTE — PROGRESS NOTES
PROGRESS NOTE    Name: Amandeep Jackson MRN: [id number]   : 1935     Date: 2021 Admission Date: 2021     Hospital Day: 3    Subjective:  Patient remains comfortable and sleeping in bed this morning. No abdominal pain. No nausea vomiting. Remains confused. Objective:    Vital Signs:  Visit Vitals  BP (!) 199/87 (BP 1 Location: Left upper arm, BP Patient Position: At rest)   Pulse 91   Temp 98.4 °F (36.9 °C)   Resp 18   Ht 6' 2\" (1.88 m)   Wt 82.7 kg (182 lb 5 oz)   SpO2 98%   BMI 23.41 kg/m²       Physical Exam:    General: in no apparent distress, well developed and well nourished, non-toxic, and oriented to self   HEENT: Normal, PERRLA, EOMI,   Neck: No abnormally enlarged lymph nodes.    Chest: normal   Lungs: normal air entry   Heart: Regular rate and rhythm or S1S2 present   Abdomen: abdomen is soft without significant tenderness, masses, organomegaly or guarding   Extremity: negative   Neuro: alert   Skin: Skin color, texture, turgor normal. No rashes or lesions    Data Review:  Recent Results (from the past 24 hour(s))   GLUCOSE, POC    Collection Time: 21 11:29 AM   Result Value Ref Range    Glucose (POC) 203 (H) 70 - 110 mg/dL   GLUCOSE, POC    Collection Time: 21  5:32 PM   Result Value Ref Range    Glucose (POC) 68 (L) 70 - 110 mg/dL   GLUCOSE, POC    Collection Time: 21  6:08 PM   Result Value Ref Range    Glucose (POC) 112 (H) 70 - 110 mg/dL   GLUCOSE, POC    Collection Time: 21  9:22 PM   Result Value Ref Range    Glucose (POC) 141 (H) 70 - 165 mg/dL   METABOLIC PANEL, COMPREHENSIVE    Collection Time: 21  2:13 AM   Result Value Ref Range    Sodium 140 136 - 145 mmol/L    Potassium 4.0 3.5 - 5.5 mmol/L    Chloride 107 100 - 111 mmol/L    CO2 25 21 - 32 mmol/L    Anion gap 8 3.0 - 18 mmol/L    Glucose 121 (H) 74 - 99 mg/dL    BUN 21 (H) 7.0 - 18 MG/DL    Creatinine 1.47 (H) 0.6 - 1.3 MG/DL    BUN/Creatinine ratio 14 12 - 20      GFR est AA 55 (L) >60 ml/min/1.73m2    GFR est non-AA 46 (L) >60 ml/min/1.73m2    Calcium 8.3 (L) 8.5 - 10.1 MG/DL    Bilirubin, total 1.5 (H) 0.2 - 1.0 MG/DL    ALT (SGPT) 60 16 - 61 U/L    AST (SGOT) 66 (H) 10 - 38 U/L    Alk. phosphatase 166 (H) 45 - 117 U/L    Protein, total 6.8 6.4 - 8.2 g/dL    Albumin 2.2 (L) 3.4 - 5.0 g/dL    Globulin 4.6 (H) 2.0 - 4.0 g/dL    A-G Ratio 0.5 (L) 0.8 - 1.7     CBC WITH AUTOMATED DIFF    Collection Time: 04/25/21  2:13 AM   Result Value Ref Range    WBC 15.8 (H) 4.6 - 13.2 K/uL    RBC 5.11 4.35 - 5.65 M/uL    HGB 14.3 13.0 - 16.0 g/dL    HCT 45.9 36.0 - 48.0 %    MCV 89.8 74.0 - 97.0 FL    MCH 28.0 24.0 - 34.0 PG    MCHC 31.2 31.0 - 37.0 g/dL    RDW 14.1 11.6 - 14.5 %    PLATELET 159 472 - 220 K/uL    MPV 11.0 9.2 - 11.8 FL    NEUTROPHILS 76 (H) 40 - 73 %    LYMPHOCYTES 13 (L) 21 - 52 %    MONOCYTES 9 3 - 10 %    EOSINOPHILS 2 0 - 5 %    BASOPHILS 0 0 - 2 %    ABS. NEUTROPHILS 12.0 (H) 1.8 - 8.0 K/UL    ABS. LYMPHOCYTES 2.1 0.9 - 3.6 K/UL    ABS. MONOCYTES 1.4 (H) 0.05 - 1.2 K/UL    ABS. EOSINOPHILS 0.3 0.0 - 0.4 K/UL    ABS. BASOPHILS 0.0 0.0 - 0.1 K/UL    DF MANUAL      PLATELET COMMENTS ADEQUATE PLATELETS      RBC COMMENTS NORMOCYTIC, NORMOCHROMIC     GLUCOSE, POC    Collection Time: 04/25/21  5:38 AM   Result Value Ref Range    Glucose (POC) 149 (H) 70 - 110 mg/dL       Current Medications:  No current facility-administered medications on file prior to encounter. Current Outpatient Medications on File Prior to Encounter   Medication Sig Dispense Refill    temazepam (RESTORIL) 7.5 mg capsule Take 7.5 mg by mouth nightly.  QUEtiapine (SEROqueL) 50 mg tablet Take 50 mg by mouth two (2) times a day.  memantine (Namenda) 10 mg tablet Take 10 mg by mouth daily.  metoprolol succinate (Toprol XL) 50 mg XL tablet Take 50 mg by mouth daily.       erythromycin (ILOTYCIN) ophthalmic ointment Apply 1/2 inch ribbon OS every 6 hours x 7 days 3.5 g 0    diclofenac (VOLTAREN) 1 % gel Apply 2 g to affected area four (4) times daily. 100 g 0    ezetimibe (ZETIA) 10 mg tablet Take 1 Tab by mouth nightly.  insulin detemir U-100 (LEVEMIR U-100 INSULIN) 100 unit/mL injection 60 Units by SubCUTAneous route daily. Indications: type 2 diabetes mellitus      erythromycin (ILOTYCIN) ophthalmic ointment Apply to both lower eyelids four times a day until done. 1 Tube 0    aspirin delayed-release 81 mg tablet Take 1 Tab by mouth daily. Indications: Cerebral Thromboembolism Prevention 30 Tab 0    cycloSPORINE (RESTASIS) 0.05 % ophthalmic emulsion Administer 1 Drop to both eyes every twelve (12) hours. Indications: Dry Eye 15 Each 0    gabapentin (NEURONTIN) 300 mg capsule Take 1 Cap by mouth daily. Indications: NEUROPATHIC PAIN, Restless Legs Syndrome 30 Cap 0    isosorbide mononitrate ER (IMDUR) 30 mg tablet Take 1 Tab by mouth daily. 30 Tab 0    multivit-min-FA-lycopen-lutein (CENTRUM SILVER) 0.4-300-250 mg-mcg-mcg tab Take 1 tab daily 30 Tab 0    furosemide (LASIX) 20 mg tablet Take 1 Tab by mouth daily. Indications: Edema 30 Tab 0    famotidine (PEPCID) 20 mg tablet Take 1 Tab by mouth two (2) times a day. Indications: PREVENTION OF STRESS ULCER 30 Tab 0    CALCIUM CARBONATE (CALTRATE 600 PO) Take 1 Tab by mouth daily.  lidocaine (LIDODERM) 5 % Apply patch to the affected area for 12 hours a day and remove for 12 hours a day. 30 Each 0    lactobacillus sp. 50 billion cpu (BIO-K PLUS) 50 billion cell -375 mg cap capsule Take 1 Cap by mouth daily. 30 Cap 0    OTHER Incentive spirometry- Use as directed 1 Each 0       Chart and notes reviewed. Data reviewed. I have evaluated and examined the patient. IMPRESSION:   · 82-year-old male with dementia and multiple medical comorbidities presenting with acute cholecystitis. Doing well no acute issues. White count decreased to 15.8 this morning.       PLAN:/DISCUSION:   · Continue with IV Zosyn antibiotics  · Continue to trend WBC  · Can start on a clear liquid diet  · If patient continues to have improving WBC then we will continue with conservative management with IV antibiotics for his acute cholecystitis.         Gallo Zaidi MD

## 2021-04-25 NOTE — ROUTINE PROCESS
End of Shift Note Bedside and verbal shift change report given to Ayleen Phan RN (On coming nurse) by Liv Torre RN (Off going nurse). Report included the following information:  
   --Procedure Summary 
   --MAR, 
   --Recent Results --Med Rec Status SBAR Recommendations: bed alarm Issues for Provider to address [placement Activity This Shift [x] Bed Rest Order 
 [] Refused 
 [] Dangled  
 [] TDWB Ambulating: 
   [] Bathroom [] BSC [] Room/Hallway Up in Chair for meals []Yes [] No  
Voiding       [x] Yes  [] No 
Kim          [] Yes  [] No 
Incontinent [x] Yes  [] No 
 
DUE TO VOID POUR        [] Yes [] No 
Purewick    [] Yes [] No 
New Onset [] Yes [] No Straight Cath   []Yes  [] No 
Condom Cath  [] Yes [] No 
MD Called      [] Yes  [] No  
Blood Sugars Managed [x]Yes [] No   
Bowels Moved [] Yes [x] No 
 
Incontinent     [] Yes [] No Passed Gas []Yes [x] No 
 
New Onset  []Yes [] No 
  
 
 MD Called []Yes  [] No 
  
CHG Bath Done Before Surgery After Surgery  
  
[] Yes  [x] No 
[] Yes  [x] No   
  
Drain Removed [] Yes  [] No [x] N/A Dressing Changed [] Yes   [] No [x] N/A Nausea/Vomiting [] Yes   [x] No    
Ice Packs Changed [] Yes   [] No  [x] N/A Incentive Spirometer  [] Yes  [x] No     
SCD Pumps On Ankle Pumping  [] Yes   [x] No  
  
[] Yes   [x] No    
  
Telemetry Monitoring [] Yes   [x] No   Rhythm

## 2021-04-25 NOTE — ROUTINE PROCESS
Bedside and Verbal shift change report given to GE Arnold (oncoming nurse) by Delman Prader (offgoing nurse). Report included the following information SBAR, Kardex, Intake/Output, MAR and Recent Results.

## 2021-04-26 LAB
ALBUMIN SERPL-MCNC: 2.2 G/DL (ref 3.4–5)
ALBUMIN/GLOB SERPL: 0.6 {RATIO} (ref 0.8–1.7)
ALP SERPL-CCNC: 154 U/L (ref 45–117)
ALT SERPL-CCNC: 49 U/L (ref 16–61)
ANION GAP SERPL CALC-SCNC: 7 MMOL/L (ref 3–18)
AST SERPL-CCNC: 38 U/L (ref 10–38)
BASOPHILS # BLD: 0.1 K/UL (ref 0–0.1)
BASOPHILS NFR BLD: 1 % (ref 0–2)
BILIRUB SERPL-MCNC: 1.6 MG/DL (ref 0.2–1)
BUN SERPL-MCNC: 15 MG/DL (ref 7–18)
BUN/CREAT SERPL: 12 (ref 12–20)
CALCIUM SERPL-MCNC: 7.5 MG/DL (ref 8.5–10.1)
CHLORIDE SERPL-SCNC: 106 MMOL/L (ref 100–111)
CO2 SERPL-SCNC: 28 MMOL/L (ref 21–32)
CREAT SERPL-MCNC: 1.3 MG/DL (ref 0.6–1.3)
DIFFERENTIAL METHOD BLD: ABNORMAL
EOSINOPHIL # BLD: 0.3 K/UL (ref 0–0.4)
EOSINOPHIL NFR BLD: 2 % (ref 0–5)
ERYTHROCYTE [DISTWIDTH] IN BLOOD BY AUTOMATED COUNT: 13.7 % (ref 11.6–14.5)
GLOBULIN SER CALC-MCNC: 3.4 G/DL (ref 2–4)
GLUCOSE BLD STRIP.AUTO-MCNC: 122 MG/DL (ref 70–110)
GLUCOSE BLD STRIP.AUTO-MCNC: 122 MG/DL (ref 70–110)
GLUCOSE BLD STRIP.AUTO-MCNC: 172 MG/DL (ref 70–110)
GLUCOSE BLD STRIP.AUTO-MCNC: 182 MG/DL (ref 70–110)
GLUCOSE SERPL-MCNC: 144 MG/DL (ref 74–99)
HCT VFR BLD AUTO: 43.8 % (ref 36–48)
HGB BLD-MCNC: 13.7 G/DL (ref 13–16)
LYMPHOCYTES # BLD: 2.3 K/UL (ref 0.9–3.6)
LYMPHOCYTES NFR BLD: 13 % (ref 21–52)
MCH RBC QN AUTO: 28.1 PG (ref 24–34)
MCHC RBC AUTO-ENTMCNC: 31.3 G/DL (ref 31–37)
MCV RBC AUTO: 89.9 FL (ref 74–97)
MONOCYTES # BLD: 1.8 K/UL (ref 0.05–1.2)
MONOCYTES NFR BLD: 11 % (ref 3–10)
NEUTS SEG # BLD: 12.2 K/UL (ref 1.8–8)
NEUTS SEG NFR BLD: 72 % (ref 40–73)
PLATELET # BLD AUTO: 301 K/UL (ref 135–420)
PMV BLD AUTO: 10.2 FL (ref 9.2–11.8)
POTASSIUM SERPL-SCNC: 3.6 MMOL/L (ref 3.5–5.5)
PROT SERPL-MCNC: 5.6 G/DL (ref 6.4–8.2)
RBC # BLD AUTO: 4.87 M/UL (ref 4.35–5.65)
SODIUM SERPL-SCNC: 141 MMOL/L (ref 136–145)
WBC # BLD AUTO: 16.8 K/UL (ref 4.6–13.2)

## 2021-04-26 PROCEDURE — 74011250636 HC RX REV CODE- 250/636: Performed by: NURSE PRACTITIONER

## 2021-04-26 PROCEDURE — 74011000250 HC RX REV CODE- 250: Performed by: NURSE PRACTITIONER

## 2021-04-26 PROCEDURE — 76450000000

## 2021-04-26 PROCEDURE — 80053 COMPREHEN METABOLIC PANEL: CPT

## 2021-04-26 PROCEDURE — 74011250636 HC RX REV CODE- 250/636: Performed by: HOSPITALIST

## 2021-04-26 PROCEDURE — 74011636637 HC RX REV CODE- 636/637: Performed by: HOSPITALIST

## 2021-04-26 PROCEDURE — 85025 COMPLETE CBC W/AUTO DIFF WBC: CPT

## 2021-04-26 PROCEDURE — 77030040831 HC BAG URINE DRNG MDII -A

## 2021-04-26 PROCEDURE — 82962 GLUCOSE BLOOD TEST: CPT

## 2021-04-26 PROCEDURE — 97535 SELF CARE MNGMENT TRAINING: CPT

## 2021-04-26 PROCEDURE — 36415 COLL VENOUS BLD VENIPUNCTURE: CPT

## 2021-04-26 PROCEDURE — 74011636637 HC RX REV CODE- 636/637: Performed by: NURSE PRACTITIONER

## 2021-04-26 PROCEDURE — 74011000258 HC RX REV CODE- 258: Performed by: HOSPITALIST

## 2021-04-26 PROCEDURE — 97166 OT EVAL MOD COMPLEX 45 MIN: CPT

## 2021-04-26 PROCEDURE — 65270000029 HC RM PRIVATE

## 2021-04-26 PROCEDURE — 74011250637 HC RX REV CODE- 250/637: Performed by: NURSE PRACTITIONER

## 2021-04-26 PROCEDURE — 99222 1ST HOSP IP/OBS MODERATE 55: CPT | Performed by: NURSE PRACTITIONER

## 2021-04-26 PROCEDURE — 2709999900 HC NON-CHARGEABLE SUPPLY

## 2021-04-26 RX ORDER — INSULIN LISPRO 100 [IU]/ML
INJECTION, SOLUTION INTRAVENOUS; SUBCUTANEOUS
Status: DISCONTINUED | OUTPATIENT
Start: 2021-04-26 | End: 2021-05-04 | Stop reason: HOSPADM

## 2021-04-26 RX ADMIN — Medication 1 TABLET: at 10:19

## 2021-04-26 RX ADMIN — ISOSORBIDE MONONITRATE 30 MG: 30 TABLET ORAL at 10:19

## 2021-04-26 RX ADMIN — PIPERACILLIN AND TAZOBACTAM 3.38 G: 3; .375 INJECTION, POWDER, LYOPHILIZED, FOR SOLUTION INTRAVENOUS at 12:59

## 2021-04-26 RX ADMIN — CYCLOSPORINE 1 DROP: 0.5 EMULSION OPHTHALMIC at 22:24

## 2021-04-26 RX ADMIN — CYCLOSPORINE 1 DROP: 0.5 EMULSION OPHTHALMIC at 10:36

## 2021-04-26 RX ADMIN — QUETIAPINE FUMARATE 50 MG: 25 TABLET ORAL at 10:19

## 2021-04-26 RX ADMIN — ENOXAPARIN SODIUM 40 MG: 40 INJECTION SUBCUTANEOUS at 10:19

## 2021-04-26 RX ADMIN — GABAPENTIN 300 MG: 300 CAPSULE ORAL at 10:19

## 2021-04-26 RX ADMIN — Medication 10 ML: at 22:25

## 2021-04-26 RX ADMIN — DEXTROSE MONOHYDRATE AND SODIUM CHLORIDE 50 ML/HR: 5; .45 INJECTION, SOLUTION INTRAVENOUS at 17:34

## 2021-04-26 RX ADMIN — TEMAZEPAM 7.5 MG: 7.5 CAPSULE ORAL at 22:24

## 2021-04-26 RX ADMIN — Medication 81 MG: at 10:19

## 2021-04-26 RX ADMIN — QUETIAPINE FUMARATE 50 MG: 25 TABLET ORAL at 17:34

## 2021-04-26 RX ADMIN — PIPERACILLIN AND TAZOBACTAM 3.38 G: 3; .375 INJECTION, POWDER, LYOPHILIZED, FOR SOLUTION INTRAVENOUS at 17:34

## 2021-04-26 RX ADMIN — INSULIN LISPRO 2 UNITS: 100 INJECTION, SOLUTION INTRAVENOUS; SUBCUTANEOUS at 22:28

## 2021-04-26 RX ADMIN — INSULIN LISPRO 2 UNITS: 100 INJECTION, SOLUTION INTRAVENOUS; SUBCUTANEOUS at 05:56

## 2021-04-26 RX ADMIN — Medication 10 ML: at 13:02

## 2021-04-26 RX ADMIN — PIPERACILLIN AND TAZOBACTAM 3.38 G: 3; .375 INJECTION, POWDER, LYOPHILIZED, FOR SOLUTION INTRAVENOUS at 05:56

## 2021-04-26 RX ADMIN — MEMANTINE HYDROCHLORIDE 10 MG: 10 TABLET ORAL at 10:19

## 2021-04-26 RX ADMIN — METOPROLOL SUCCINATE 50 MG: 50 TABLET, EXTENDED RELEASE ORAL at 10:19

## 2021-04-26 NOTE — PROGRESS NOTES
Problem: Self Care Deficits Care Plan (Adult)  Goal: *Acute Goals and Plan of Care (Insert Text)  Description:  Occupational Therapy Goals  Initiated 4/26/2021 within 7 day(s). 1.  Patient will perform UB dressing with moderate assistance . 2.  Patient will perform lower body dressing with maximal assistance. 3.  Patient will perform grooming with moderate assistance . 4. Patient will perform toilet transfers with maximal assistance. 5.  Patient will perform all aspects of toileting with maximal assistance. 6.  Patient will participate in upper extremity therapeutic exercise/activities with minimal assistance/contact guard assist for 3-5 minutes. 7.  Patient will utilize energy conservation techniques during functional activities with verbal, visual, and tactile cues. Prior Level of Function: Pt is a resident at [facility] per chart review. Pt was previously receiving help for self-care tasks. Pt poor historian and unable to explain what he was receiving help with prior to hospital admission. Outcome: Progressing Towards Goal    OCCUPATIONAL THERAPY EVALUATION    Patient: Manuel Amin (82 y.o. male)  Date: 4/26/2021  Primary Diagnosis: Leukocytosis [D72.829]  Acute cholecystitis [K81.0]        Precautions: Falls, Pressure       ASSESSMENT :  Pt cleared by nursing for OT eval. Pt found semi-reclined in bed sleeping. Pt able to be woken and agreeable to OT eval at this time. Based on the objective data described below, the patient presents with decreased memory, decreased strength, decreased ROM, decreased endurance/activity tolerance, decreased functional independence in self-care skills, decreased command following. Pt able to open eyes upon tactile stimulus and attempted to answer questions regarding PLOF, but unable to clearly answer questions.  Pt demonstrated difficulty following directions in order to sit EOB with this therapist. Pt found to be soiled and requiring a linen change. Pt started to get more agitated with lines/tubes when attempting to roll side-to-side. This therapist requested help of nursing/CNA to assist with bed linen change. Pt required total A to roll side to side to complete change as well as perform hygiene. Pt able to initiate movement of arms to help change hospital gown, but not further ability d/t drowsiness and dec command following. Total A x2 to scoot up in the bed. Pt will benefit from 3-day trial for OT in order to assess any changes in functional status with self-care skills. Patient will benefit from skilled intervention to address the above impairments. Patient's rehabilitation potential is considered to be Fair  Factors which may influence rehabilitation potential include:   []             None noted  [x]             Mental ability/status  [x]             Medical condition  [x]             Home/family situation and support systems  [x]             Safety awareness  []             Pain tolerance/management  []             Other:      PLAN :  Recommendations and Planned Interventions:   [x]               Self Care Training                  [x]      Therapeutic Activities  [x]               Functional Mobility Training   [x]      Cognitive Retraining  [x]               Therapeutic Exercises           [x]      Endurance Activities  [x]               Balance Training                    [x]      Neuromuscular Re-Education  []               Visual/Perceptual Training     [x]      Home Safety Training  [x]               Patient Education                   [x]      Family Training/Education  []               Other (comment):    Frequency/Duration: Patient will be followed by occupational therapy 1-2 times per day/4-7 days per week to address goals. Discharge Recommendations: [facility] in care as necessary  Further Equipment Recommendations for Discharge: N/A     SUBJECTIVE:   Patient stated  Yes, Nazario Traylor was here.     OBJECTIVE DATA SUMMARY:     Past Medical History:   Diagnosis Date    Anxiety     Arthritis     CAD (coronary artery disease)     s/p drug-eluting stents to RCA for high-grade stenoses in 2/09    Carotid duplex 08/26/2016    Mild <50% CYRUS stenosis. Chronic LICA occlusion. Similar to study of 3/29/16.  Carotid stenosis (HCC)     w/ known total occlusion of lt internal carotid artery; followed by pts vascular surgeon    Chronic kidney disease     [facility]     Dementia [facility]     Depression     Diabetes ([facility]     type 2    Dyslipidemia     on Crestor; managed by pts PCP    Dyspnea     Hypercholesterolemia     Hypertension     Low back pain     Osteoarthrosis involving multiple sites     Polycythemia 12/9/2019    Skin cancer ([facility]     squamous cell lesions of the skin    Stroke ([facility]     Venous (peripheral) insufficiency      Past Surgical History:   Procedure Laterality Date    CARDIAC CATHETERIZATION  08/24/2016    Mod calcification. Co-dom. oLM 50-70%. LAD 30%. Dx 30%. Cx 70% focal.  OM 80% focal.  RCA 30%.  HX ANGIOPLASTY  2009    2 stents placed and heart attack during the procedure    HX COLONOSCOPY  10/2003    neg; declines f/u    HX CORONARY ARTERY BYPASS GRAFT  09/2016     LIMA to LAD and SVG to OM1    HX HEART CATHETERIZATION  5/2013    HX HERNIA REPAIR      1970s    HX TONSIL AND ADENOIDECTOMY       Barriers to Learning/Limitations: yes;  cognitive and altered mental status (i.e.Sedation, Confusion)  Compensate with: visual, verbal, tactile, kinesthetic cues/model    Home Situation:   Home Situation  Home Environment: Assisted living([facility])  [x]  Right hand dominant   []  Left hand dominant    Cognitive/Behavioral Status:  Neurologic State: Confused;Drowsy; Eyes open to stimulus  Orientation Level: Oriented to place; Disoriented to place; Disoriented to situation;Disoriented to time  Cognition: Poor safety awareness;Decreased attention/concentration(Limited command following)       Skin: In tact  Edema: None to note in UE    Vision/Perceptual:        Acuity: Within Defined Limits         Coordination: BUE     Fine Motor Skills-Upper: (Unable to assess d/t poor command following)      Balance:  Sitting: (Unable to assess d/t poor command following)    Strength: BUE  Grossly assessed d/t difficulty following commands - Decreased    Tone & Sensation: BUE  Grossly assessed d/t difficulty following commands - Decreased, WFL    Range of Motion: BUE  Grossly assessed d/t difficulty following commands - Decreased, WFL       Functional Mobility and Transfers for ADLs:  Bed Mobility:  Rolling: Total assistance to roll side to side in bed for linen changes     Transfers:   NT d/t poor command following and safety concern    ADL Assessment:   Feeding: Total assistance    Oral Facial Hygiene/Grooming: Total assistance    Bathing: Total assistance    Upper Body Dressing: Maximum assistance(Initiation of moving arms only)    Lower Body Dressing: Total assistance    Toileting: Total assistance     ADL Intervention:    Upper Body Dressing Assistance  Dressing Assistance: Maximum assistance  Hospital Gown: Maximum assistance    Lower Body Dressing Assistance  Dressing Assistance: Total assistance(dependent)    Toileting  Toileting Assistance: Total assistance(dependent)  Bladder Hygiene: Total assistance (dependent)  Bowel Hygiene: Total assistance (dependent)    Pain:  Pain level pre-treatment: -/10 - pt unable to provide number d/t confusion  Pain level post-treatment: -/10   Pain Intervention(s): Medication (see MAR); Rest, Ice, Repositioning   Response to intervention: Nurse notified, See doc flow    Activity Tolerance:   Poor    Please refer to the flowsheet for vital signs taken during this treatment.   After treatment:   [] Patient left in no apparent distress sitting up in chair  [x] Patient left in no apparent distress in bed  [x] Call bell left within reach  [x] Nursing notified  [] Caregiver present  [x] Bed alarm activated    COMMUNICATION/EDUCATION:   [x] Role of Occupational Therapy in the acute care setting  [] Home safety education was provided and the patient/caregiver indicated understanding. [] Patient/family have participated as able in goal setting and plan of care. [] Patient/family agree to work toward stated goals and plan of care. [] Patient understands intent and goals of therapy, but is neutral about his/her participation. [x] Patient is unable to participate in goal setting and plan of care. Thank you for this referral.  Maxine Mackenzie OTD, OTR/L  Time Calculation: 27 mins    Eval Complexity: History: MEDIUM Complexity : Expanded review of history including physical, cognitive and psychosocial  history ; Examination: MEDIUM Complexity : 3-5 performance deficits relating to physical, cognitive , or psychosocial skils that result in activity limitations and / or participation restrictions; Decision Making:MEDIUM Complexity : Patient may present with comorbidities that affect occupational performnce.  Miniml to moderate modification of tasks or assistance (eg, physical or verbal ) with assesment(s) is necessary to enable patient to complete evaluation

## 2021-04-26 NOTE — PROGRESS NOTES
Progress Note      Patient: Viral Herring. Sex: male          DOA: 4/23/2021       YOB: 1935      Age:  82 y.o.        LOS:  LOS: 3 days             CHIEF COMPLAINT:  Cholecystitis in the setting of dementia    Subjective:     Patient reports that he does not have pain    Objective:      Visit Vitals  BP (!) 185/88   Pulse 74   Temp 96.9 °F (36.1 °C)   Resp 16   Ht 6' 2\" (1.88 m)   Wt 82.7 kg (182 lb 5 oz)   SpO2 95%   BMI 23.41 kg/m²       Physical Exam:  Gen:  No distress, no complaint  Lungs:  Clear bilaterally, no wheeze or rhonchi  Heart:  Regular rate and rhythm, no murmurs or gallops  Abdomen:  Soft, non-tender, normal bowel sounds        Lab/Data Reviewed:  BMP:   Lab Results   Component Value Date/Time     04/26/2021 01:45 AM    K 3.6 04/26/2021 01:45 AM     04/26/2021 01:45 AM    CO2 28 04/26/2021 01:45 AM    AGAP 7 04/26/2021 01:45 AM     (H) 04/26/2021 01:45 AM    BUN 15 04/26/2021 01:45 AM    CREA 1.30 04/26/2021 01:45 AM    GFRAA >60 04/26/2021 01:45 AM    GFRNA 53 (L) 04/26/2021 01:45 AM     CBC:   Lab Results   Component Value Date/Time    WBC 16.8 (H) 04/26/2021 01:45 AM    HGB 13.7 04/26/2021 01:45 AM    HCT 43.8 04/26/2021 01:45 AM     04/26/2021 01:45 AM           Assessment/Plan     Principal Problem:    Acute cholecystitis (4/23/2021)    Active Problems:    HTN (hypertension) (11/19/2012)      DM (diabetes mellitus) ([facility] (11/19/2012)      CKD (chronic kidney disease) stage 3, GFR 30-59 ml/min ([facility]) (5/20/2013)      CAD (coronary artery disease) (8/29/2016)      COPD (chronic obstructive pulmonary disease) ([facility] (8/29/2016)      Mixed hyperlipidemia (1/24/2017)      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 Working toward disposition. Will discuss with care management.

## 2021-04-26 NOTE — PROGRESS NOTES
Bedside and Verbal shift change report given to Francisco Javier RN (oncoming nurse) by Radha Chandler RN (offgoing nurse). Report included the following information SBAR and Kardex.

## 2021-04-26 NOTE — CONSULTS
Palliative Medicine  DR. ENNIS'S [facility] [place]: [phone]  [facility]: [address] [place] [address] Way: [phone]    Patient Name: Ute Leal YOB: 1935    Date of Initial Consult: 4/26/2021   Reason for Consult: goals of care   Requesting Provider: Ms Gabychanda Wes. NP   Primary Care Physician: None      SUMMARY:   Ute Leal is a 82y.o. year old with a past history of dementia, CAD, CKD, T2DM, PVD and COPD , who was admitted on 4/23/2021 from [facility]  with a diagnosis of acute cholecystitis . Current medical issues leading to Palliative Medicine involvement include: 82year old gentleman who is a resident of [facility] with dementia presents with  RLQ abdominal pain found to have cholecystitis. Palliative medicine is consulted for goals of care discussions. PALLIATIVE DIAGNOSES:   1. Goals of care   2. Dementia  3. Acute cholecystitis   4. Debility        PLAN:   1. Goals of care Mr Sil Harvey seen along with Ms Abraham Queen RN. He would alert to his name, try to answer simple questions, but at time we saw not able. We told us he was in pain but not able to localize. He is not able to participate in his medical decisions. There is an AMD on file naming his wife Liya Bro as MPOA and daughter Nazario Traylor as secondary MPOA. We spoke with Nazario Harrellsilvina via phone today. Her mother Kellen Todd) has passed, Nazario Traylor is MPOA. Discussed with Nazario Traylor valear of MPOA as directed in his AMD. Goals of care discussed including benefits and burdens, unfortunately this efforts will not cure or reverse dementia and if he were to survive cardiac arrest would likely be much more functionally debilitated. Nazario Traylor shared she would not wish to subject her Dad to these efforts. Goals of care DNR/DNI. Nazario Traylor will plan to come into tomorrow to see her Dad, we plan on meeting for POST Completion. Goals of care DNR/DNI limited interventions. Attending and BSRN aware of goals of care change.    2. Dementia verbal but not able to completely answer questions. Not able to participate in his medical decisions. FAST of 6 C.   3. Cholecystitis seen by surgery, on IVAB conservative treatment at present  4. Debility Lives at [address] [place] [address] needs assistance with ADL's. Per daughter wheelchair bound. PT/ OT on board per notes needs max cues for bed mobility. Incontinent of stool. PPS of 50 indicating considerable debility  5. Initial consult note routed to primary continuity provider  6. Communicated plan of care with: Palliative IDT, patient       Patient/Health Care Proxy Stated Goals: Prolong life      TREATMENT PREFERENCES:   Code Status: DNR/DNI     Advance Care Planning:  [] The [facility] Interdisciplinary Team has updated the ACP Navigator with [address] and Patient Capacity    Primary Decision Maker ([address]): daughter Nisha Santiago AMD on file   [employer] which is named as primary as since passed    Medical Interventions: Limited additional interventions         Other:  As far as possible, the palliative care team has discussed with patient / health care proxy about goals of care / treatment preferences for patient. HISTORY:     History obtained from: chart and daughter     CHIEF COMPLAINT: cholecystitis     HPI/SUBJECTIVE:    The patient is:   [] Verbal and participatory  [x] Non-participatory due to:  Dementia   Please see summary   4/26/2021   Alert but confused.      Clinical Pain Assessment (nonverbal scale for nonverbal patients): Clinical Pain Assessment  Severity: 0          Duration: for how long has pt been experiencing pain (e.g., 2 days, 1 month, years)  Frequency: how often pain is an issue (e.g., several times per day, once every few days, constant)     FUNCTIONAL ASSESSMENT:     Palliative Performance Scale (PPS):  PPS: 50    ECOG  ECOG Status : Completely disabled     PSYCHOSOCIAL/SPIRITUAL SCREENING:      Any spiritual / Ep[employer]opalian concerns: unable to assess for patient   [] Yes /  [] No    Caregiver Burnout:  [] Yes /  [x] No /  [] No Caregiver Present      Anticipatory grief assessment: unable to assess for patient   [] Normal  / [] Maladaptive        REVIEW OF SYSTEMS:     Positive and pertinent negative findings in ROS are noted above in HPI. The following systems were [] reviewed / [x] unable to be reviewed as noted in HPI  Other findings are noted below. Systems: constitutional, ears/nose/mouth/throat, respiratory, gastrointestinal, genitourinary, musculoskeletal, integumentary, neurologic, psychiatric, endocrine. Positive findings noted below. Modified ESAS Completed by: provider   Fatigue: 5       Pain: 0           Dyspnea: 0           Stool Occurrence(s): 0        PHYSICAL EXAM:     Wt Readings from Last 3 Encounters:   04/23/21 82.7 kg (182 lb 5 oz)   12/11/20 74.8 kg (165 lb)   12/12/19 88.5 kg (195 lb 1.6 oz)     Blood pressure (!) 185/88, pulse 74, temperature 96.9 °F (36.1 °C), resp. rate 16, height 6' 2\" (1.88 m), weight 82.7 kg (182 lb 5 oz), SpO2 95 %. Pain:  Pain Scale 1: Numeric (0 - 10)  Pain Intensity 1: 0     Pain Location 1: Generalized        Pain Intervention(s) 1: Medication (see MAR)  Last bowel movement: none recorded     Constitutional: well developed gentleman who is lying in bed in NAD   Cardiovascular: RRR  Respiratory: respirations not labored  Skin: warm and dry   Neurologic: alert, confused did not follow commands for me.  Verbal but not able to completely answer questions       HISTORY:     Principal Problem:    Acute cholecystitis (4/23/2021)    Active Problems:    HTN (hypertension) (11/19/2012)      DM (diabetes mellitus) ([facility] (11/19/2012)      CKD (chronic kidney disease) stage 3, GFR 30-59 ml/min ([facility]) (5/20/2013)      CAD (coronary artery disease) (8/29/2016)      COPD (chronic obstructive pulmonary disease) ([facility] (8/29/2016)      S/P CABG x 2 (10/24/2016)      Mixed hyperlipidemia (1/24/2017)      Past Medical History:   Diagnosis Date    Anxiety     Arthritis     CAD (coronary artery disease)     s/p drug-eluting stents to RCA for high-grade stenoses in     Carotid duplex 2016    Mild <50% CYRUS stenosis. Chronic LICA occlusion. Similar to study of 3/29/16.  Carotid stenosis (HCC)     w/ known total occlusion of lt internal carotid artery; followed by pts vascular surgeon    Chronic kidney disease     [facility]) [facility]     Dementia ([facility]     Depression     Diabetes ([facility]     type 2    Dyslipidemia     on Crestor; managed by pts PCP    Dyspnea     Hypercholesterolemia     Hypertension     Low back pain     Osteoarthrosis involving multiple sites     Polycythemia 2019    Skin cancer ([facility]     squamous cell lesions of the skin    Stroke ([facility]     Venous (peripheral) insufficiency       Past Surgical History:   Procedure Laterality Date    CARDIAC CATHETERIZATION  2016    Mod calcification. Co-dom. oLM 50-70%. LAD 30%. Dx 30%. Cx 70% focal.  OM 80% focal.  RCA 30%.  HX ANGIOPLASTY      2 stents placed and heart attack during the procedure    HX COLONOSCOPY  10/2003    neg; declines f/u    HX CORONARY ARTERY BYPASS GRAFT  2016     LIMA to LAD and SVG to OM1    HX HEART CATHETERIZATION  2013    HX HERNIA REPAIR      1970s    HX TONSIL AND ADENOIDECTOMY        Family History   Problem Relation Age of Onset    Parkinsonism Mother     Hypertension Mother     Cancer Father         lung    Heart defect Brother      History reviewed, no pertinent family history.   Social History     Tobacco Use    Smoking status: Former Smoker     Years: 2.00     Quit date: 1955     Years since quittin.2    Smokeless tobacco: Never Used   Substance Use Topics    Alcohol use: Yes     Frequency: 2-4 times a month     Comment: drinks weekly couple beers     Allergies   Allergen Reactions    Amaryl [Glimepiride] Drowsiness    Lipitor [Atorvastatin] Myalgia    Niacin Other (comments)     Facial blistering, swelling in face    Pravachol [Pravastatin] Myalgia    Zocor [Simvastatin] Myalgia      Current Facility-Administered Medications   Medication Dose Route Frequency    piperacillin-tazobactam (ZOSYN) 3.375 g in 0.9% sodium chloride (MBP/ADV) 100 mL MBP  3.375 g IntraVENous Q6H    sodium chloride (NS) flush 5-40 mL  5-40 mL IntraVENous Q8H    sodium chloride (NS) flush 5-40 mL  5-40 mL IntraVENous PRN    acetaminophen (TYLENOL) tablet 650 mg  650 mg Oral Q6H PRN    Or    acetaminophen (TYLENOL) suppository 650 mg  650 mg Rectal Q6H PRN    polyethylene glycol (MIRALAX) packet 17 g  17 g Oral DAILY PRN    promethazine (PHENERGAN) tablet 12.5 mg  12.5 mg Oral Q6H PRN    Or    ondansetron (ZOFRAN) injection 4 mg  4 mg IntraVENous Q6H PRN    enoxaparin (LOVENOX) injection 40 mg  40 mg SubCUTAneous DAILY    insulin lispro (HUMALOG) injection   SubCUTAneous Q6H    glucose chewable tablet 16 g  4 Tab Oral PRN    glucagon (GLUCAGEN) injection 1 mg  1 mg IntraMUSCular PRN    dextrose (D50W) injection syrg 12.5-25 g  25-50 mL IntraVENous PRN    dextrose 5 % - 0.45% NaCl infusion  50 mL/hr IntraVENous CONTINUOUS    morphine injection 1 mg  1 mg IntraVENous Q4H PRN    naloxone (NARCAN) injection 0.4 mg  0.4 mg IntraVENous PRN    aspirin delayed-release tablet 81 mg  81 mg Oral DAILY    cycloSPORINE (RESTASIS) 0.05 % ophthalmic emulsion 1 Drop  1 Drop Both Eyes Q12H    isosorbide mononitrate ER (IMDUR) tablet 30 mg  30 mg Oral DAILY    memantine (NAMENDA) tablet 10 mg  10 mg Oral DAILY    metoprolol succinate (TOPROL-XL) XL tablet 50 mg  50 mg Oral DAILY    multivitamin, tx-iron-ca-min (THERA-M w/ IRON) tablet 1 Tab  1 Tab Oral DAILY    QUEtiapine (SEROquel) tablet 50 mg  50 mg Oral BID    temazepam (RESTORIL) capsule 7.5 mg  7.5 mg Oral QHS    gabapentin (NEURONTIN) capsule 300 mg  300 mg Oral DAILY        LAB AND IMAGING FINDINGS:     Lab Results Component Value Date/Time    WBC 16.8 (H) 04/26/2021 01:45 AM    HGB 13.7 04/26/2021 01:45 AM    PLATELET 572 65/98/9237 01:45 AM     Lab Results   Component Value Date/Time    Sodium 141 04/26/2021 01:45 AM    Potassium 3.6 04/26/2021 01:45 AM    Chloride 106 04/26/2021 01:45 AM    CO2 28 04/26/2021 01:45 AM    BUN 15 04/26/2021 01:45 AM    Creatinine 1.30 04/26/2021 01:45 AM    Calcium 7.5 (L) 04/26/2021 01:45 AM    Magnesium 2.5 12/10/2019 11:40 AM    Phosphorus 2.5 12/10/2019 11:40 AM      Lab Results   Component Value Date/Time    Alk. phosphatase 154 (H) 04/26/2021 01:45 AM    Protein, total 5.6 (L) 04/26/2021 01:45 AM    Albumin 2.2 (L) 04/26/2021 01:45 AM    Globulin 3.4 04/26/2021 01:45 AM     Lab Results   Component Value Date/Time    INR 1.1 10/23/2017 08:05 PM    Prothrombin time 13.4 10/23/2017 08:05 PM    aPTT 41.0 (H) 04/23/2021 12:45 PM      No results found for: IRON, FE, TIBC, IBCT, PSAT, FERR   No results found for: PH, PCO2, PO2  No components found for: [place]   Lab Results   Component Value Date/Time     04/23/2021 12:45 PM    CK - MB 1.2 04/23/2021 12:45 PM              Total time: 50 minutes  Counseling / coordination time, spent as noted above:   > 50% counseling / coordination: yes Yovany    Prolonged service was provided for  []30 min   []75 min in face to face time in the presence of the patient, spent as noted above.   Time Start:   Time End:

## 2021-04-26 NOTE — ACP (ADVANCE CARE PLANNING)
Advanced Steps Advance Care Planning Conversation      Date of conversation: 2021   [facility]   Participants:  Pt spouse is . [x] Patient    [x] Healthcare agent (already designated in existing ACP document)    Name: Jaimee Munoz     Relationship to Patient:daughter     Phone number: [phone]     Advanced Steps® ACP Facilitator: Dominique Jorge RN, Aroldo Tay NP       Conversation Topics     Nader Pritchard. is a 82y.o. year old with a past history of dementia, CAD, CKD, T2DM, PVD and COPD , who was admitted on 2021 from [facility]  with a diagnosis of acute cholecystitis . Mr Nye Loop seen along at bedside with Aroldo Tay NP and this writer. He awaked to verbal stimuli and name. Not able to answer simple questions. Stated he had pain but could pinpoint where. He was alert x 1. Pt is . Pt has Medical POA on file, It names his spouse as primary and his daughter, Jaimee Munoz as surrogate. Call placed to pt's daughter who confirmed that spouse is , making her primary health care decision maker. Discussed with [address] [place] [address] valera of MPOA as directed in his AMD. Goals of care discussed including benefits and burdens, unfortunately this efforts will not cure or reverse dementia and if he were to survive cardiac arrest would likely be much more functionally debilitated. [address] [place] [address] shared she would not wish to subject her Dad to these efforts. Goals of care DNR/DNI. [address] [place] [address] will plan to come into tomorrow to see her Dad, we plan on meeting for POST Completion. Goals of care DNR/DNI limited interventions. Attending and BSRN aware of goals of care change.      Cardiopulmonary Resuscitation        Order Elected for CPR:  []  Attempt Resuscitation [x]  Do Not Attempt Resuscitation    When NOT in Cardiopulmonary Arrest, Order Elected:      [] Comfort Measures  [x] Limited Additional Interventions  [] Full Interventions    Artificially Administered Nutrition, Order Elected:  NOT ADDRESSED  [] No Feeding Tube   [] Feeding Tube for a defined trial period  [] Feeding Tube long-term if indicated    The following was provided (check all that apply):      Healthcare Decision Information:   [x] Help with Breathing Facts   [] Tube Feeding Facts   [x] CPR Facts      [x] Review of existing Advance Directive  [] Assistance with Completion of New Advance Directive   [x] Review of [place] POST Form       Meeting Outcomes: If ACP discussion not completed, last interview topic discussed: Plan to meet with daughter to complete POST form tomorrow 4/27.       Follow-up plan:     [x] Schedule follow-up conversation to continue planning   [] Referred individual to Provider for additional questions/concerns   [x] Advised patient/agent/surrogate to review completed POST form and update if needed with changes in condition, patient preferences or care setting     [] This note routed to one or more involved healthcare providers    Patrice YANEZN, RN, [facility]  Palliative Medicine Inpatient RN  Palliative COPE Line: [phone]

## 2021-04-27 LAB
ANION GAP SERPL CALC-SCNC: 5 MMOL/L (ref 3–18)
BASOPHILS # BLD: 0 K/UL (ref 0–0.1)
BASOPHILS NFR BLD: 0 % (ref 0–2)
BUN SERPL-MCNC: 10 MG/DL (ref 7–18)
BUN/CREAT SERPL: 9 (ref 12–20)
CALCIUM SERPL-MCNC: 7.7 MG/DL (ref 8.5–10.1)
CHLORIDE SERPL-SCNC: 106 MMOL/L (ref 100–111)
CO2 SERPL-SCNC: 28 MMOL/L (ref 21–32)
CREAT SERPL-MCNC: 1.08 MG/DL (ref 0.6–1.3)
DIFFERENTIAL METHOD BLD: ABNORMAL
EOSINOPHIL # BLD: 0 K/UL (ref 0–0.4)
EOSINOPHIL NFR BLD: 0 % (ref 0–5)
ERYTHROCYTE [DISTWIDTH] IN BLOOD BY AUTOMATED COUNT: 13.2 % (ref 11.6–14.5)
GLUCOSE BLD STRIP.AUTO-MCNC: 115 MG/DL (ref 70–110)
GLUCOSE BLD STRIP.AUTO-MCNC: 150 MG/DL (ref 70–110)
GLUCOSE BLD STRIP.AUTO-MCNC: 160 MG/DL (ref 70–110)
GLUCOSE BLD STRIP.AUTO-MCNC: 95 MG/DL (ref 70–110)
GLUCOSE SERPL-MCNC: 125 MG/DL (ref 74–99)
HCT VFR BLD AUTO: 44.2 % (ref 36–48)
HGB BLD-MCNC: 14.4 G/DL (ref 13–16)
LYMPHOCYTES # BLD: 1.9 K/UL (ref 0.9–3.6)
LYMPHOCYTES NFR BLD: 12 % (ref 21–52)
MCH RBC QN AUTO: 28.5 PG (ref 24–34)
MCHC RBC AUTO-ENTMCNC: 32.6 G/DL (ref 31–37)
MCV RBC AUTO: 87.5 FL (ref 74–97)
MONOCYTES # BLD: 1.1 K/UL (ref 0.05–1.2)
MONOCYTES NFR BLD: 7 % (ref 3–10)
NEUTS SEG # BLD: 12.7 K/UL (ref 1.8–8)
NEUTS SEG NFR BLD: 81 % (ref 40–73)
PLATELET # BLD AUTO: 320 K/UL (ref 135–420)
PLATELET COMMENTS,PCOM: ABNORMAL
PMV BLD AUTO: 10.1 FL (ref 9.2–11.8)
POTASSIUM SERPL-SCNC: 3.1 MMOL/L (ref 3.5–5.5)
RBC # BLD AUTO: 5.05 M/UL (ref 4.35–5.65)
RBC MORPH BLD: ABNORMAL
SODIUM SERPL-SCNC: 139 MMOL/L (ref 136–145)
WBC # BLD AUTO: 15.7 K/UL (ref 4.6–13.2)

## 2021-04-27 PROCEDURE — 80048 BASIC METABOLIC PNL TOTAL CA: CPT

## 2021-04-27 PROCEDURE — 97535 SELF CARE MNGMENT TRAINING: CPT

## 2021-04-27 PROCEDURE — 99231 SBSQ HOSP IP/OBS SF/LOW 25: CPT | Performed by: NURSE PRACTITIONER

## 2021-04-27 PROCEDURE — 74011250637 HC RX REV CODE- 250/637: Performed by: NURSE PRACTITIONER

## 2021-04-27 PROCEDURE — 99232 SBSQ HOSP IP/OBS MODERATE 35: CPT | Performed by: SURGERY

## 2021-04-27 PROCEDURE — 74011000258 HC RX REV CODE- 258: Performed by: HOSPITALIST

## 2021-04-27 PROCEDURE — 82962 GLUCOSE BLOOD TEST: CPT

## 2021-04-27 PROCEDURE — 97530 THERAPEUTIC ACTIVITIES: CPT

## 2021-04-27 PROCEDURE — 65270000029 HC RM PRIVATE

## 2021-04-27 PROCEDURE — 74011636637 HC RX REV CODE- 636/637: Performed by: HOSPITALIST

## 2021-04-27 PROCEDURE — 36415 COLL VENOUS BLD VENIPUNCTURE: CPT

## 2021-04-27 PROCEDURE — 74011250636 HC RX REV CODE- 250/636: Performed by: HOSPITALIST

## 2021-04-27 PROCEDURE — 77030040393 HC DRSG OPTIFOAM GENT MDII -B

## 2021-04-27 PROCEDURE — 85025 COMPLETE CBC W/AUTO DIFF WBC: CPT

## 2021-04-27 PROCEDURE — 2709999900 HC NON-CHARGEABLE SUPPLY

## 2021-04-27 PROCEDURE — 74011000250 HC RX REV CODE- 250: Performed by: NURSE PRACTITIONER

## 2021-04-27 PROCEDURE — 74011250636 HC RX REV CODE- 250/636: Performed by: NURSE PRACTITIONER

## 2021-04-27 RX ADMIN — Medication 10 ML: at 16:22

## 2021-04-27 RX ADMIN — QUETIAPINE FUMARATE 50 MG: 25 TABLET ORAL at 08:57

## 2021-04-27 RX ADMIN — METOPROLOL SUCCINATE 50 MG: 50 TABLET, EXTENDED RELEASE ORAL at 08:57

## 2021-04-27 RX ADMIN — GABAPENTIN 300 MG: 300 CAPSULE ORAL at 08:57

## 2021-04-27 RX ADMIN — ISOSORBIDE MONONITRATE 30 MG: 30 TABLET ORAL at 08:57

## 2021-04-27 RX ADMIN — Medication 81 MG: at 08:57

## 2021-04-27 RX ADMIN — QUETIAPINE FUMARATE 50 MG: 25 TABLET ORAL at 17:42

## 2021-04-27 RX ADMIN — TEMAZEPAM 7.5 MG: 7.5 CAPSULE ORAL at 21:19

## 2021-04-27 RX ADMIN — PIPERACILLIN AND TAZOBACTAM 3.38 G: 3; .375 INJECTION, POWDER, LYOPHILIZED, FOR SOLUTION INTRAVENOUS at 05:20

## 2021-04-27 RX ADMIN — PIPERACILLIN AND TAZOBACTAM 3.38 G: 3; .375 INJECTION, POWDER, LYOPHILIZED, FOR SOLUTION INTRAVENOUS at 12:50

## 2021-04-27 RX ADMIN — PIPERACILLIN AND TAZOBACTAM 3.38 G: 3; .375 INJECTION, POWDER, LYOPHILIZED, FOR SOLUTION INTRAVENOUS at 17:41

## 2021-04-27 RX ADMIN — Medication 10 ML: at 21:19

## 2021-04-27 RX ADMIN — Medication 1 TABLET: at 08:57

## 2021-04-27 RX ADMIN — CYCLOSPORINE 1 DROP: 0.5 EMULSION OPHTHALMIC at 08:58

## 2021-04-27 RX ADMIN — ENOXAPARIN SODIUM 40 MG: 40 INJECTION SUBCUTANEOUS at 08:57

## 2021-04-27 RX ADMIN — CYCLOSPORINE 1 DROP: 0.5 EMULSION OPHTHALMIC at 21:19

## 2021-04-27 RX ADMIN — PIPERACILLIN AND TAZOBACTAM 3.38 G: 3; .375 INJECTION, POWDER, LYOPHILIZED, FOR SOLUTION INTRAVENOUS at 00:29

## 2021-04-27 RX ADMIN — INSULIN LISPRO 2 UNITS: 100 INJECTION, SOLUTION INTRAVENOUS; SUBCUTANEOUS at 12:57

## 2021-04-27 RX ADMIN — Medication 10 ML: at 05:20

## 2021-04-27 RX ADMIN — DEXTROSE MONOHYDRATE AND SODIUM CHLORIDE 50 ML/HR: 5; .45 INJECTION, SOLUTION INTRAVENOUS at 12:50

## 2021-04-27 RX ADMIN — MEMANTINE HYDROCHLORIDE 10 MG: 10 TABLET ORAL at 08:57

## 2021-04-27 NOTE — PROGRESS NOTES
Problem: Diabetes Self-Management  Goal: *Disease process and treatment process  Description: Define diabetes and identify own type of diabetes; list 3 options for treating diabetes. Outcome: Progressing Towards Goal  Goal: *Incorporating nutritional management into lifestyle  Description: Describe effect of type, amount and timing of food on blood glucose; list 3 methods for planning meals. Outcome: Progressing Towards Goal  Goal: *Incorporating physical activity into lifestyle  Description: State effect of exercise on blood glucose levels. Outcome: Progressing Towards Goal  Goal: *Developing strategies to promote health/change behavior  Description: Define the ABC's of diabetes; identify appropriate screenings, schedule and personal plan for screenings. Outcome: Progressing Towards Goal  Goal: *Using medications safely  Description: State effect of diabetes medications on diabetes; name diabetes medication taking, action and side effects. Outcome: Progressing Towards Goal  Goal: *Monitoring blood glucose, interpreting and using results  Description: Identify recommended blood glucose targets  and personal targets. Outcome: Progressing Towards Goal  Goal: *Prevention, detection, treatment of acute complications  Description: List symptoms of hyper- and hypoglycemia; describe how to treat low blood sugar and actions for lowering  high blood glucose level. Outcome: Progressing Towards Goal  Goal: *Prevention, detection and treatment of chronic complications  Description: Define the natural course of diabetes and describe the relationship of blood glucose levels to long term complications of diabetes.   Outcome: Progressing Towards Goal  Goal: *Developing strategies to address psychosocial issues  Description: Describe feelings about living with diabetes; identify support needed and support network  Outcome: Progressing Towards Goal  Goal: *Insulin pump training  Outcome: Progressing Towards Goal  Goal: *Sick day guidelines  Outcome: Progressing Towards Goal  Goal: *Patient Specific Goal (EDIT GOAL, INSERT TEXT)  Outcome: Progressing Towards Goal     Problem: Patient Education: Go to Patient Education Activity  Goal: Patient/Family Education  Outcome: Progressing Towards Goal     Problem: Patient Education: Go to Patient Education Activity  Goal: Patient/Family Education  Outcome: Progressing Towards Goal     Problem: Falls - Risk of  Goal: *Absence of Falls  Description: Document Polina Jose Fall Risk and appropriate interventions in the flowsheet. Outcome: Progressing Towards Goal  Note: Fall Risk Interventions:  Mobility Interventions: Bed/chair exit alarm, Communicate number of staff needed for ambulation/transfer, Patient to call before getting OOB, PT Consult for mobility concerns, PT Consult for assist device competence, Utilize walker, cane, or other assistive device, Utilize gait belt for transfers/ambulation    Mentation Interventions: Adequate sleep, hydration, pain control, Bed/chair exit alarm, Door open when patient unattended, More frequent rounding, Reorient patient    Medication Interventions: Bed/chair exit alarm, Patient to call before getting OOB, Teach patient to arise slowly, Utilize gait belt for transfers/ambulation    Elimination Interventions: Bed/chair exit alarm, Call light in reach, Patient to call for help with toileting needs              Problem: Patient Education: Go to Patient Education Activity  Goal: Patient/Family Education  Outcome: Progressing Towards Goal     Problem: Pressure Injury - Risk of  Goal: *Prevention of pressure injury  Description: Document Zen Scale and appropriate interventions in the flowsheet.   Outcome: Progressing Towards Goal  Note: Pressure Injury Interventions:       Moisture Interventions: Absorbent underpads, Apply protective barrier, creams and emollients, Internal/External urinary devices, Moisture barrier    Activity Interventions: Increase time out of bed, Pressure redistribution bed/mattress(bed type), PT/OT evaluation    Mobility Interventions: HOB 30 degrees or less    Nutrition Interventions: Document food/fluid/supplement intake    Friction and Shear Interventions: Lift team/patient mobility team                Problem: Patient Education: Go to Patient Education Activity  Goal: Patient/Family Education  Outcome: Progressing Towards Goal     Problem: Pain  Goal: *Control of Pain  Outcome: Progressing Towards Goal     Problem: Patient Education: Go to Patient Education Activity  Goal: Patient/Family Education  Outcome: Progressing Towards Goal     Problem: Nausea/Vomiting (Adult)  Goal: *Absence of nausea/vomiting  Outcome: Progressing Towards Goal     Problem: Patient Education: Go to Patient Education Activity  Goal: Patient/Family Education  Outcome: Progressing Towards Goal     Problem: Patient Education: Go to Patient Education Activity  Goal: Patient/Family Education  Outcome: Progressing Towards Goal

## 2021-04-27 NOTE — PROGRESS NOTES
Problem: Mobility Impaired (Adult and Pediatric)  Goal: *Acute Goals and Plan of Care (Insert Text)  Description: Physical Therapy Goals  Initiated 4/24/2021 and to be accomplished within 7 day(s)  1. Patient will move from supine to sit and sit to supine , scoot up and down, and roll side to side in bed with moderate assistance . 2.  Patient will transfer from bed to chair and chair to bed with maximal assistance using the least restrictive device. 3.  Patient will perform sit to stand with moderate assistance . PLOF: Per EMR review, pt resides at [facility], has hx of falls. Dementia at baseline  Outcome: Progressing Towards Goal     PHYSICAL THERAPY TREATMENT    Patient: Irene Story. (82 y.o. male)  Date: 4/27/2021  Diagnosis: Leukocytosis [D72.829]  Acute cholecystitis [K81.0] Acute cholecystitis       Precautions: Fall, Skin    ASSESSMENT:  Pt found supine in bed, in NAD, willing to work with PT. Red and watery eyes noted throughout PT session. He is pleasantly confused and only oriented to his name. Pt initially rolled to eat side with modA and attempted to sit on EOB. [address] for supine-sit. Once sitting, pt initially leaning posteriorly, but he was able to correct himself with mod cueing to lean forward. Pt sat on Eob for 10 minutes and performed exercises per flow sheet. No reports of dizziness/lightheadedness. Pt leaning back by end of 10 minutes and was assisted back supine with modA. Pt able to assist with scooting up towards [facility]. He was left sleeping sidelying on his R side with HOB elevated, call bell nearby, bed alarm on. Progression toward goals:   []      Improving appropriately and progressing toward goals  [x]      Improving slowly and progressing toward goals  []      Not making progress toward goals and plan of care will be adjusted     PLAN:  Patient continues to benefit from skilled intervention to address the above impairments.   Continue treatment per established plan of care.  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     OBJECTIVE DATA SUMMARY:   Critical Behavior:  Neurologic State: Alert, Confused  Orientation Level: Oriented to person, Disoriented to place, Disoriented to situation, Disoriented to time  Cognition: Follows commands  Safety/Judgement: Fall prevention, Insight into deficits  Functional Mobility Training:  Bed Mobility:  Rolling: Moderate assistance  Supine to Sit: Moderate assistance; Additional time;Assist x1  Sit to Supine: Moderate assistance;Assist x1;Additional time  Scooting: Moderate assistance    Balance:  Sitting: Impaired; With support  Sitting - Static: Fair (occasional)  Sitting - Dynamic: Poor (constant support)   Therapeutic Exercises:   Pt performed exercises per flow sheet sitting on EOB      EXERCISE   Sets   Reps   Active Active Assist   Passive Self ROM   Comments   Ankle Pumps 1 15  [x] [] [] []    Quad Sets/Glut Sets    [] [] [] [] Hold for 5 secs   Hamstring Sets   [] [] [] []    Short Arc Quads   [] [] [] []    Heel Slides   [] [] [] []    Straight Leg Raises   [] [] [] []    Hip Add   [] [] [] [] Hold for 5 secs, w/ pillow squeeze   Long Arc Quads 1 10 [x] [] [] []    Seated Marching   [] [] [] []    Standing Marching   [] [] [] []       [] [] [] []        Pain:  Pain level pre-treatment: 0/10  Pain level post-treatment: 0/10   Pain Intervention(s): Medication (see MAR); Rest, Repositioning   Response to intervention: Nurse notified, See doc flow    Activity Tolerance:   Pt tolerated mobility well today  Please refer to the flowsheet for vital signs taken during this treatment.   After treatment:   [] Patient left in no apparent distress sitting up in chair  [x] Patient left in no apparent distress in bed  [x] Call bell left within reach  [x] Nursing notified  [] Caregiver present  [x] Bed alarm activated  [] SCDs applied      COMMUNICATION/EDUCATION:   [x]         Role of Physical Therapy in the acute care setting. [x]         Fall prevention education was provided and the patient/caregiver indicated understanding. [x]         Patient/family have participated as able in working toward goals and plan of care. []         Patient/family agree to work toward stated goals and plan of care. []         Patient understands intent and goals of therapy, but is neutral about his/her participation.   []         Patient is unable to participate in stated goals/plan of care: ongoing with therapy staff.  []         Other:        Anju Manzanares   Time Calculation: 15 mins

## 2021-04-27 NOTE — PROGRESS NOTES
Problem: Self Care Deficits Care Plan (Adult)  Goal: *Acute Goals and Plan of Care (Insert Text)  Description:  Occupational Therapy Goals  Initiated 4/26/2021 within 7 day(s). 1.  Patient will perform UB dressing with moderate assistance . 2.  Patient will perform lower body dressing with maximal assistance. 3.  Patient will perform grooming with moderate assistance . 4. Patient will perform toilet transfers with maximal assistance. 5.  Patient will perform all aspects of toileting with maximal assistance. 6.  Patient will participate in upper extremity therapeutic exercise/activities with minimal assistance/contact guard assist for 3-5 minutes. 7.  Patient will utilize energy conservation techniques during functional activities with verbal, visual, and tactile cues. Prior Level of Function: Pt is a resident at [facility] per chart review. Pt was previously receiving help for self-care tasks. Pt poor historian and unable to explain what he was receiving help with prior to hospital admission. Outcome: Not Progressing Towards Goal    OCCUPATIONAL THERAPY TREATMENT    Patient: Sheila Barone. (82 y.o. male)  Date: 4/27/2021  Diagnosis: Leukocytosis [D72.829]  Acute cholecystitis [K81.0] Acute cholecystitis       Precautions: Fall, Skin    Chart, occupational therapy assessment, plan of care, and goals were reviewed. ASSESSMENT:  OT second attempt treatment. Cleared by nursing. Daughter present in room upon arrival. Pt found semi-reclined in bed with R side lean. Pt asleep, but demonstrated ability to open eyes x2, and then quickly closing. Attempted PROM to UE, however pt very resistant and unwilling to relax to allow for PROM. Provided education to pt daughter at this time regarding OT care thus far. Educated daughter on encouraging pt to move arms into new positions when aroused and sitting more upright when present with pt.      Daughter stated that pt PLOF prior to hospital stay was in a w/c, able to self-propel with arms and legs. Pt received help with bathing, dressing while at [employer]. Continue with 3-day trial to assess level of functional skill. Progression toward goals:  []          Improving appropriately and progressing toward goals  [x]          Improving slowly and progressing toward goals  []          Not making progress toward goals and plan of care will be adjusted     PLAN:  Patient continues to benefit from skilled intervention to address the above impairments. Continue treatment per established plan of care. Discharge Recommendations:  [facility]/Assisted Living with 24/7 care  Further Equipment Recommendations for Discharge:  N/A - dependent upon location of d/c     SUBJECTIVE:   Patient stated nothing at this time. Pt kept eyes closed and demonstrated resistance to therapy attempt. OBJECTIVE DATA SUMMARY:   Cognitive/Behavioral Status:  Neurologic State: Asleep, drowsy, unable to wake up  Not willing to follow commands; resistant     ADL Intervention:    Attempted PROM to UE in order to main ROM, however pt very resistant and unwilling to relax to allow for PROM. Provided education to pt daughter at this time regarding OT care thus far. Educated daughter on encouraging pt to move arms into new positions when aroused and sitting more upright when present with pt. Cognitive Retraining  Safety/Judgement: Fall prevention; Insight into deficits      Pain:  Pain level pre-treatment: 0/10   Pain level post-treatment: 0/10  Pain Intervention(s): Medication (see MAR); Rest, Ice, Repositioning   Response to intervention: Nurse notified, See doc flow    Activity Tolerance:    Poor    Please refer to the flowsheet for vital signs taken during this treatment.   After treatment:   []  Patient left in no apparent distress sitting up in chair  [x]  Patient left in no apparent distress in bed  [x]  Call bell left within reach  [x]  Nursing notified  [x]  Family present  [x]  Bed alarm activated    COMMUNICATION/EDUCATION:   [x] Role of Occupational Therapy in the acute care setting  [x] Home safety education was provided and the patient/caregiver indicated understanding. [] Patient/family have participated as able in working towards goals and plan of care. [x] Patient/family agree to work toward stated goals and plan of care. [] Patient understands intent and goals of therapy, but is neutral about his/her participation. [x] Patient is unable to participate in goal setting and plan of care.       Thank you for this referral.  GUILLE Reyes, OTR/L  Time Calculation: 8 mins

## 2021-04-27 NOTE — PROGRESS NOTES
1906: Patient in bed awake,alert ,pleasantly confused,no signs of distress, no need at this time,call bell and valuables within reach. 0600 In  bed resting quietly,no other concern at this time,call bell and valuables within reach. Patient Vitals for the past 12 hrs:   Temp Pulse Resp BP SpO2   04/28/21 0448    (!) 156/85    04/28/21 0318 98.9 °F (37.2 °C) 87 20 (!) 178/90 99 %   04/27/21 2033 97.8 °F (36.6 °C) 83 20 (!) 163/75 95 %     0715: Bedside and Verbal shift change report given to GE Avina (oncoming nurse) by Georgia Lam (offgoing nurse). Report included the following information SBAR, Kardex, MAR and Recent Results.

## 2021-04-27 NOTE — PROGRESS NOTES
Problem: Diabetes Self-Management  Goal: *Disease process and treatment process  Description: Define diabetes and identify own type of diabetes; list 3 options for treating diabetes. Outcome: Progressing Towards Goal  Goal: *Incorporating nutritional management into lifestyle  Description: Describe effect of type, amount and timing of food on blood glucose; list 3 methods for planning meals. Outcome: Progressing Towards Goal  Goal: *Incorporating physical activity into lifestyle  Description: State effect of exercise on blood glucose levels. Outcome: Progressing Towards Goal  Goal: *Developing strategies to promote health/change behavior  Description: Define the ABC's of diabetes; identify appropriate screenings, schedule and personal plan for screenings. Outcome: Progressing Towards Goal  Goal: *Using medications safely  Description: State effect of diabetes medications on diabetes; name diabetes medication taking, action and side effects. Outcome: Progressing Towards Goal  Goal: *Monitoring blood glucose, interpreting and using results  Description: Identify recommended blood glucose targets  and personal targets. Outcome: Progressing Towards Goal  Goal: *Prevention, detection, treatment of acute complications  Description: List symptoms of hyper- and hypoglycemia; describe how to treat low blood sugar and actions for lowering  high blood glucose level. Outcome: Progressing Towards Goal  Goal: *Prevention, detection and treatment of chronic complications  Description: Define the natural course of diabetes and describe the relationship of blood glucose levels to long term complications of diabetes.   Outcome: Progressing Towards Goal  Goal: *Developing strategies to address psychosocial issues  Description: Describe feelings about living with diabetes; identify support needed and support network  Outcome: Progressing Towards Goal  Goal: *Insulin pump training  Outcome: Progressing Towards Goal  Goal: *Sick day guidelines  Outcome: Progressing Towards Goal  Goal: *Patient Specific Goal (EDIT GOAL, INSERT TEXT)  Outcome: Progressing Towards Goal     Problem: Patient Education: Go to Patient Education Activity  Goal: Patient/Family Education  Outcome: Progressing Towards Goal     Problem: Patient Education: Go to Patient Education Activity  Goal: Patient/Family Education  Outcome: Progressing Towards Goal     Problem: Falls - Risk of  Goal: *Absence of Falls  Description: Document Gold Shafer Fall Risk and appropriate interventions in the flowsheet. Outcome: Progressing Towards Goal  Note: Fall Risk Interventions:  Mobility Interventions: Bed/chair exit alarm, Patient to call before getting OOB, PT Consult for mobility concerns, PT Consult for assist device competence    Mentation Interventions: Adequate sleep, hydration, pain control, Bed/chair exit alarm, Door open when patient unattended, More frequent rounding, Reorient patient, Room close to nurse's station    Medication Interventions: Patient to call before getting OOB, Teach patient to arise slowly, Utilize gait belt for transfers/ambulation    Elimination Interventions: Bed/chair exit alarm, Call light in reach, Patient to call for help with toileting needs, Urinal in reach              Problem: Patient Education: Go to Patient Education Activity  Goal: Patient/Family Education  Outcome: Progressing Towards Goal     Problem: Pressure Injury - Risk of  Goal: *Prevention of pressure injury  Description: Document Zen Scale and appropriate interventions in the flowsheet.   Outcome: Progressing Towards Goal  Note: Pressure Injury Interventions:  Sensory Interventions: Assess changes in LOC, Keep linens dry and wrinkle-free    Moisture Interventions: Absorbent underpads, Apply protective barrier, creams and emollients, Moisture barrier    Activity Interventions: Increase time out of bed, Pressure redistribution bed/mattress(bed type)    Mobility Interventions: HOB 30 degrees or less, Pressure redistribution bed/mattress (bed type)    Nutrition Interventions: Document food/fluid/supplement intake    Friction and Shear Interventions: Lift team/patient mobility team                Problem: Patient Education: Go to Patient Education Activity  Goal: Patient/Family Education  Outcome: Progressing Towards Goal     Problem: Pain  Goal: *Control of Pain  Outcome: Progressing Towards Goal     Problem: Patient Education: Go to Patient Education Activity  Goal: Patient/Family Education  Outcome: Progressing Towards Goal     Problem: Nausea/Vomiting (Adult)  Goal: *Absence of nausea/vomiting  Outcome: Progressing Towards Goal     Problem: Patient Education: Go to Patient Education Activity  Goal: Patient/Family Education  Outcome: Progressing Towards Goal     Problem: Patient Education: Go to Patient Education Activity  Goal: Patient/Family Education  Outcome: Progressing Towards Goal

## 2021-04-27 NOTE — PROGRESS NOTES
1930: Patient in bed awake,alert  and pleasantly confused,able to answer yes /no at times,no signs of distress,denies any pain,bed low and locked,call bell within reach. 2218: Patient  Has a BM x1 total bed changed done,resting now. 0600: Patient has been removing  his condom cath cath x3, place a new one,total bed changed done,no other concern at this time,call bell and valuables within reach. Patient Vitals for the past 12 hrs:   Temp Pulse Resp BP SpO2   04/27/21 0306 97.6 °F (36.4 °C) 85 16 (!) 165/89    04/27/21 0105 97.3 °F (36.3 °C) 80 16 (!) 159/90 96 %   04/26/21 1930 97.3 °F (36.3 °C) 82 16 (!) 175/86 96 %     0705: Bedside and Verbal shift change report given to GE Mckeon (oncoming nurse) by Bertha Khan (offgoing nurse). Report included the following information SBAR, Kardex, MAR and Recent Results.

## 2021-04-27 NOTE — PROGRESS NOTES
Progress Note      Patient: Deepika Morin.                Sex: male          DOA: 4/23/2021       YOB: 1935      Age:  82 y.o.        LOS:  LOS: 4 days             CHIEF COMPLAINT:  Cholecystitis in the setting of debility and dementia    Subjective:     Patient is less alert today compared with yesterday    Objective:      Visit Vitals  /74 (BP 1 Location: Left upper arm, BP Patient Position: At rest)   Pulse 85   Temp 98 °F (36.7 °C)   Resp 20   Ht 6' 2\" (1.88 m)   Wt 82.7 kg (182 lb 5 oz)   SpO2 96%   BMI 23.41 kg/m²       Physical Exam:  Gen:  No distress, no complaint  Lungs:  Clear bilaterally, no wheeze or rhonchi  Heart:  Regular rate and rhythm, no murmurs or gallops  Abdomen:  Soft, non-tender, normal bowel sounds        Lab/Data Reviewed:  BMP:   Lab Results   Component Value Date/Time     04/27/2021 04:35 AM    K 3.1 (L) 04/27/2021 04:35 AM     04/27/2021 04:35 AM    CO2 28 04/27/2021 04:35 AM    AGAP 5 04/27/2021 04:35 AM     (H) 04/27/2021 04:35 AM    BUN 10 04/27/2021 04:35 AM    CREA 1.08 04/27/2021 04:35 AM    GFRAA >60 04/27/2021 04:35 AM    GFRNA >60 04/27/2021 04:35 AM     CBC:   Lab Results   Component Value Date/Time    WBC 15.7 (H) 04/27/2021 04:35 AM    HGB 14.4 04/27/2021 04:35 AM    HCT 44.2 04/27/2021 04:35 AM     04/27/2021 04:35 AM           Assessment/Plan     Principal Problem:    Acute cholecystitis (4/23/2021)    Active Problems:    HTN (hypertension) (11/19/2012)      DM (diabetes mellitus) ([facility] (11/19/2012)      CKD (chronic kidney disease) stage 3, GFR 30-59 ml/min (HCC) (5/20/2013)      CAD (coronary artery disease) (8/29/2016)      COPD (chronic obstructive pulmonary disease) ([facility] (8/29/2016)      Mixed hyperlipidemia (1/24/2017)      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

## 2021-04-27 NOTE — PROGRESS NOTES
PROGRESS NOTE    Name: Caitlin Patrick MRN: [id number]   : 1935     Date: 2021 Admission Date: 2021     Hospital Day: 5    Subjective:  Patient remains lethargic. No pain. Had long discussion with daughter Mayela Corbett who was in the room. Code status was changed to DNR/DNI yesterday. Objective:    Vital Signs:  Visit Vitals  /74 (BP 1 Location: Left upper arm, BP Patient Position: At rest)   Pulse 85   Temp 98 °F (36.7 °C)   Resp 20   Ht 6' 2\" (1.88 m)   Wt 82.7 kg (182 lb 5 oz)   SpO2 96%   BMI 23.41 kg/m²       Physical Exam:    General: in no apparent distress, well developed and well nourished, non-toxic   HEENT: Normal,    Chest: normal   Lungs: normal air entry   Heart: Regular rate and rhythm or S1S2 present   Abdomen: abdomen is soft without significant tenderness   Extremity: negative   Neuro: alert   Skin: Skin color, texture, turgor normal. No rashes or lesions    Data Review:  Recent Results (from the past 24 hour(s))   GLUCOSE, POC    Collection Time: 21  4:18 PM   Result Value Ref Range    Glucose (POC) 122 (H) 70 - 110 mg/dL   GLUCOSE, POC    Collection Time: 21 10:26 PM   Result Value Ref Range    Glucose (POC) 172 (H) 70 - 110 mg/dL   CBC WITH AUTOMATED DIFF    Collection Time: 21  4:35 AM   Result Value Ref Range    WBC 15.7 (H) 4.6 - 13.2 K/uL    RBC 5.05 4.35 - 5.65 M/uL    HGB 14.4 13.0 - 16.0 g/dL    HCT 44.2 36.0 - 48.0 %    MCV 87.5 74.0 - 97.0 FL    MCH 28.5 24.0 - 34.0 PG    MCHC 32.6 31.0 - 37.0 g/dL    RDW 13.2 11.6 - 14.5 %    PLATELET 512 046 - 737 K/uL    MPV 10.1 9.2 - 11.8 FL    NEUTROPHILS 81 (H) 40 - 73 %    LYMPHOCYTES 12 (L) 21 - 52 %    MONOCYTES 7 3 - 10 %    EOSINOPHILS 0 0 - 5 %    BASOPHILS 0 0 - 2 %    ABS. NEUTROPHILS 12.7 (H) 1.8 - 8.0 K/UL    ABS. LYMPHOCYTES 1.9 0.9 - 3.6 K/UL    ABS. MONOCYTES 1.1 0.05 - 1.2 K/UL    ABS. EOSINOPHILS 0.0 0.0 - 0.4 K/UL    ABS.  BASOPHILS 0.0 0.0 - 0.1 K/UL    DF AUTOMATED      PLATELET COMMENTS ADEQUATE PLATELETS      RBC COMMENTS NORMOCYTIC, NORMOCHROMIC     METABOLIC PANEL, BASIC    Collection Time: 04/27/21  4:35 AM   Result Value Ref Range    Sodium 139 136 - 145 mmol/L    Potassium 3.1 (L) 3.5 - 5.5 mmol/L    Chloride 106 100 - 111 mmol/L    CO2 28 21 - 32 mmol/L    Anion gap 5 3.0 - 18 mmol/L    Glucose 125 (H) 74 - 99 mg/dL    BUN 10 7.0 - 18 MG/DL    Creatinine 1.08 0.6 - 1.3 MG/DL    BUN/Creatinine ratio 9 (L) 12 - 20      GFR est AA >60 >60 ml/min/1.73m2    GFR est non-AA >60 >60 ml/min/1.73m2    Calcium 7.7 (L) 8.5 - 10.1 MG/DL   GLUCOSE, POC    Collection Time: 04/27/21  5:55 AM   Result Value Ref Range    Glucose (POC) 115 (H) 70 - 110 mg/dL   GLUCOSE, POC    Collection Time: 04/27/21 11:08 AM   Result Value Ref Range    Glucose (POC) 160 (H) 70 - 110 mg/dL       Current Medications:  No current facility-administered medications on file prior to encounter. Current Outpatient Medications on File Prior to Encounter   Medication Sig Dispense Refill    temazepam (RESTORIL) 7.5 mg capsule Take 7.5 mg by mouth nightly.  QUEtiapine (SEROqueL) 50 mg tablet Take 50 mg by mouth two (2) times a day.  memantine (Namenda) 10 mg tablet Take 10 mg by mouth daily.  metoprolol succinate (Toprol XL) 50 mg XL tablet Take 50 mg by mouth daily.  erythromycin (ILOTYCIN) ophthalmic ointment Apply 1/2 inch ribbon OS every 6 hours x 7 days 3.5 g 0    diclofenac (VOLTAREN) 1 % gel Apply 2 g to affected area four (4) times daily. 100 g 0    ezetimibe (ZETIA) 10 mg tablet Take 1 Tab by mouth nightly.  insulin detemir U-100 (LEVEMIR U-100 INSULIN) 100 unit/mL injection 60 Units by SubCUTAneous route daily. Indications: type 2 diabetes mellitus      erythromycin (ILOTYCIN) ophthalmic ointment Apply to both lower eyelids four times a day until done. 1 Tube 0    aspirin delayed-release 81 mg tablet Take 1 Tab by mouth daily.  Indications: Cerebral Thromboembolism Prevention 30 Tab 0    cycloSPORINE (RESTASIS) 0.05 % ophthalmic emulsion Administer 1 Drop to both eyes every twelve (12) hours. Indications: Dry Eye 15 Each 0    gabapentin (NEURONTIN) 300 mg capsule Take 1 Cap by mouth daily. Indications: NEUROPATHIC PAIN, Restless Legs Syndrome 30 Cap 0    isosorbide mononitrate ER (IMDUR) 30 mg tablet Take 1 Tab by mouth daily. 30 Tab 0    multivit-min-FA-lycopen-lutein (CENTRUM SILVER) 0.4-300-250 mg-mcg-mcg tab Take 1 tab daily 30 Tab 0    furosemide (LASIX) 20 mg tablet Take 1 Tab by mouth daily. Indications: Edema 30 Tab 0    famotidine (PEPCID) 20 mg tablet Take 1 Tab by mouth two (2) times a day. Indications: PREVENTION OF STRESS ULCER 30 Tab 0    CALCIUM CARBONATE (CALTRATE 600 PO) Take 1 Tab by mouth daily.  lidocaine (LIDODERM) 5 % Apply patch to the affected area for 12 hours a day and remove for 12 hours a day. 30 Each 0    lactobacillus sp. 50 billion cpu (BIO-K PLUS) 50 billion cell -375 mg cap capsule Take 1 Cap by mouth daily. 30 Cap 0    OTHER Incentive spirometry- Use as directed 1 Each 0       Chart and notes reviewed. Data reviewed. I have evaluated and examined the patient. IMPRESSION:   · 81 y/o male presented with acute cholecystitis. Not a suitable surgical candidate. Severe dementia. · Conservative IVABX treatment at this time       PLAN:/DISCUSION:   · WBC 15 today. · Will continue with IV abx and trend CBC.    · May need IR perc ruthann ifwbc remains elevated   · Clear liquids, can advance diet as tolerated         Selwyn Carey MD

## 2021-04-27 NOTE — PROGRESS NOTES
Palliative Medicine     Call from patient's daughter, Poncho Chavis  stating she was at bedside. Palliative team members visit room shortly after that to find daughter left the area. Pt was resting in bed and was not disturbed. Conferenced with [facility], provided her with department contact information to provide to daughter when she returns. Palliative will plan to attempt contact with daughter for completion of POST form. Goals of care DNR/DNI limited interventions.      Jennie YANEZN, RN, [facility]  Palliative Medicine Inpatient RN  [facility]   Palliative [facility] Line: [phone])

## 2021-04-27 NOTE — PROGRESS NOTES
OT attempt treatment at 1124. Cleared by nursing. Pt found semi-reclined in bed with R side lean. Pt asleep. Attempt to wake pt with verbal and tactile cues. Pt opened eyes x1 after additional cuing, but unable to keep eyes open. Pt asleep. Will try and f/u later as time permits. Thank you.     Nadja Gustafson, OTD, OTR/L

## 2021-04-27 NOTE — PROGRESS NOTES
I will be out of the Hospital on the morning of 4/27/21. While I am out of the Hospital, Dr Bony Carranza will be cover this patient. I will be back and will see the patient and provide for their care on my return. In the meantime, please call Dr Bony Carranza on 4/27/21 if you have questions.

## 2021-04-27 NOTE — CONSULTS
[address] [facility]: [phone]  [facility] [phone]     Patient Name: Antonio Calderon. YOB: 1935    Date of Follow-up Visit: 4/27/2021   Reason for Consult: goals of care   Requesting Provider: Ms Van Le. NP   Primary Care Physician: None      SUMMARY:   Antonio Dominguez is a 82y.o. year old with a past history of dementia, CAD, CKD, T2DM, PVD and COPD , who was admitted on 4/23/2021 from [facility]  with a diagnosis of acute cholecystitis . Current medical issues leading to Palliative Medicine involvement include: 82year old gentleman who is a resident of [facility] with dementia presents with  RLQ abdominal pain found to have cholecystitis. Palliative medicine is consulted for goals of care discussions. 4/27/2021:  Lying in bed, awake, alert, unable to answer orientation questions correctly. Per BSRN, has been pulling condom catheter off. Ate >50% of breakfast per BSRN. Denies pain or shortness of breath when asked. PALLIATIVE DIAGNOSES:   1. Goals of care   2. Dementia  3. Acute cholecystitis   4. Debility        PLAN:   4/27/2021:  Pt seen at bedside along with [facility]. He was awake and alerted to his name and was able to talk; however, he could only correctly tell me the name of his daughter which is Erich Mari. He was not oriented to place, time, the POTUS and could not participate in a conversation about his medical decisions. Per BSRN, he did have a good appetite for breakfast this morning and ate more than 50% of his breakfast.  No family present in room at time of morning visit. Goals of care are DNR/DNI, limited interventions. Addendum - 1440:  Daughter, Erich Mari, called to share she was visiting her father in his room. Palliative team members went to meet with Yovany to complete a POST form after goals of care conversation was held yesterday.   Erich Mari was not in room when palliative team members arrived to meet with her.  Will continue to follow-up for POST form completion. Please see below for previous conversations with the palliative medicine team:    1. Goals of care Mr Carloz Jefferson seen along with Ms Adrian Le RN. He would alert to his name, try to answer simple questions, but at time we saw not able. We told us he was in pain but not able to localize. He is not able to participate in his medical decisions. There is an AMD on file naming his wife Gurinder Navas as MPOA and daughter Safia Ferreira as secondary MPOA. We spoke with Safia Ferreira via phone today. Her mother Rodolfo Villegas) has passed, Safia Ferreira is MPOA. Discussed with Safia Ferreira valera of MPOA as directed in his AMD. Goals of care discussed including benefits and burdens, unfortunately this efforts will not cure or reverse dementia and if he were to survive cardiac arrest would likely be much more functionally debilitated. Safia Ferreira shared she would not wish to subject her Dad to these efforts. Goals of care DNR/DNI. Safia Ferreira will plan to come into tomorrow to see her Dad, we plan on meeting for POST Completion. Goals of care DNR/DNI limited interventions. Attending and BSRN aware of goals of care change. 2. Dementia verbal but not able to completely answer questions. Not able to participate in his medical decisions. FAST of 6 C.   3. Cholecystitis seen by surgery, on IVAB conservative treatment at present  4. Debility Lives at [facility] needs assistance with ADL's. Per daughter wheelchair bound. PT/ OT on board per notes needs max cues for bed mobility. Incontinent of stool. PPS of 50 indicating considerable debility  5. Initial consult note routed to primary continuity provider  6.  Communicated plan of care with: Palliative IDT, patient       Patient/Health Care Proxy Stated Goals: Prolong life      TREATMENT PREFERENCES:   Code Status: DNR/DNI     Advance Care Planning:  [] The [facility] Interdisciplinary Team has updated the ACP Navigator with [address] and Patient Capacity    Primary Decision Maker ([address]): daughter Alirio Zambrano   AMD on file   Jill Ruiz which is named as primary as since passed    Medical Interventions: Limited additional interventions         Other:  As far as possible, the palliative care team has discussed with patient / health care proxy about goals of care / treatment preferences for patient. HISTORY:     History obtained from: chart and daughter     CHIEF COMPLAINT: cholecystitis     HPI/SUBJECTIVE:    The patient is:   [] Verbal and participatory  [x] Non-participatory due to:  Dementia   Please see summary   4/26/2021   Alert but confused. Clinical Pain Assessment (nonverbal scale for nonverbal patients): Clinical Pain Assessment  Severity: 0          Duration: for how long has pt been experiencing pain (e.g., 2 days, 1 month, years)  Frequency: how often pain is an issue (e.g., several times per day, once every few days, constant)     FUNCTIONAL ASSESSMENT:     Palliative Performance Scale (PPS):  PPS: 50    ECOG  ECOG Status : Completely disabled     PSYCHOSOCIAL/SPIRITUAL SCREENING:      Any spiritual / Pentecostalism concerns: unable to assess for patient   [] Yes /  [] No    Caregiver Burnout:  [] Yes /  [x] No /  [] No Caregiver Present      Anticipatory grief assessment: unable to assess for patient   [] Normal  / [] Maladaptive        REVIEW OF SYSTEMS:     Positive and pertinent negative findings in ROS are noted above in HPI. The following systems were [] reviewed / [x] unable to be reviewed as noted in HPI  Other findings are noted below. Systems: constitutional, ears/nose/mouth/throat, respiratory, gastrointestinal, genitourinary, musculoskeletal, integumentary, neurologic, psychiatric, endocrine. Positive findings noted below.   Modified ESAS Completed by: provider   Fatigue: 3       Pain: 0           Dyspnea: 0           Stool Occurrence(s): 1        PHYSICAL EXAM:     Wt Readings from Last 3 Encounters:   04/23/21 82.7 kg (182 lb 5 oz) 12/11/20 74.8 kg (165 lb)   12/12/19 88.5 kg (195 lb 1.6 oz)     Blood pressure 133/74, pulse 85, temperature 98 °F (36.7 °C), resp. rate 20, height 6' 2\" (1.88 m), weight 82.7 kg (182 lb 5 oz), SpO2 96 %. Pain:  Pain Scale 1: Numeric (0 - 10)  Pain Intensity 1: 0     Pain Location 1: Generalized        Pain Intervention(s) 1: Medication (see MAR)  Last bowel movement: 4/26/2021    Constitutional: lying in bed, NAD, well-nourished, good appetite for breakfast  Cardiovascular: RRR  Respiratory: respirations not labored  Skin: warm and dry   Neurologic: awake, alert, confused. Did not follow commands for me. Verbal but unable to completely answer questions       HISTORY:     Principal Problem:    Acute cholecystitis (4/23/2021)    Active Problems:    HTN (hypertension) (11/19/2012)      DM (diabetes mellitus) ([address] (11/19/2012)      CKD (chronic kidney disease) stage 3, GFR 30-59 ml/min ([facility]) (5/20/2013)      CAD (coronary artery disease) (8/29/2016)      COPD (chronic obstructive pulmonary disease) ([address] (8/29/2016)      S/P CABG x 2 (10/24/2016)      Mixed hyperlipidemia (1/24/2017)      Past Medical History:   Diagnosis Date    Anxiety     Arthritis     CAD (coronary artery disease)     s/p drug-eluting stents to RCA for high-grade stenoses in 2/09    Carotid duplex 08/26/2016    Mild <50% CYRUS stenosis. Chronic LICA occlusion. Similar to study of 3/29/16.     Carotid stenosis (HCC)     w/ known total occlusion of lt internal carotid artery; followed by pts vascular surgeon    Chronic kidney disease     COPD (chronic obstructive pulmonary disease) ([address]     Dementia ([address]     Depression     Diabetes ([address]     type 2    Dyslipidemia     on Crestor; managed by pts PCP    Dyspnea     Hypercholesterolemia     Hypertension     Low back pain     Osteoarthrosis involving multiple sites     Polycythemia 12/9/2019    Skin cancer ([address]     squamous cell lesions of the skin    Stroke ([address]     Venous (peripheral) insufficiency       Past Surgical History:   Procedure Laterality Date    CARDIAC CATHETERIZATION  2016    Mod calcification. Co-dom. oLM 50-70%. LAD 30%. Dx 30%. Cx 70% focal.  OM 80% focal.  RCA 30%.  HX ANGIOPLASTY      2 stents placed and heart attack during the procedure    HX COLONOSCOPY  10/2003    neg; declines f/u    HX CORONARY ARTERY BYPASS GRAFT  2016     LIMA to LAD and SVG to OM1    HX HEART CATHETERIZATION  2013    HX HERNIA REPAIR      1970s    HX TONSIL AND ADENOIDECTOMY        Family History   Problem Relation Age of Onset    Parkinsonism Mother     Hypertension Mother     Cancer Father         lung    Heart defect Brother      History reviewed, no pertinent family history.   Social History     Tobacco Use    Smoking status: Former Smoker     Years: 2.00     Quit date: 1955     Years since quittin.2    Smokeless tobacco: Never Used   Substance Use Topics    Alcohol use: Yes     Frequency: 2-4 times a month     Comment: drinks weekly couple beers     Allergies   Allergen Reactions    Amaryl [Glimepiride] Drowsiness    Lipitor [Atorvastatin] Myalgia    Niacin Other (comments)     Facial blistering, swelling in face    Pravachol [Pravastatin] Myalgia    Zocor [Simvastatin] Myalgia      Current Facility-Administered Medications   Medication Dose Route Frequency    insulin lispro (HUMALOG) injection   SubCUTAneous AC&HS    piperacillin-tazobactam (ZOSYN) 3.375 g in 0.9% sodium chloride (MBP/ADV) 100 mL MBP  3.375 g IntraVENous Q6H    sodium chloride (NS) flush 5-40 mL  5-40 mL IntraVENous Q8H    sodium chloride (NS) flush 5-40 mL  5-40 mL IntraVENous PRN    acetaminophen (TYLENOL) tablet 650 mg  650 mg Oral Q6H PRN    Or    acetaminophen (TYLENOL) suppository 650 mg  650 mg Rectal Q6H PRN    polyethylene glycol (MIRALAX) packet 17 g  17 g Oral DAILY PRN    promethazine (PHENERGAN) tablet 12.5 mg  12.5 mg Oral Q6H PRN    Or    ondansetron (ZOFRAN) injection 4 mg  4 mg IntraVENous Q6H PRN    enoxaparin (LOVENOX) injection 40 mg  40 mg SubCUTAneous DAILY    glucose chewable tablet 16 g  4 Tab Oral PRN    glucagon (GLUCAGEN) injection 1 mg  1 mg IntraMUSCular PRN    dextrose (D50W) injection syrg 12.5-25 g  25-50 mL IntraVENous PRN    dextrose 5 % - 0.45% NaCl infusion  50 mL/hr IntraVENous CONTINUOUS    morphine injection 1 mg  1 mg IntraVENous Q4H PRN    naloxone (NARCAN) injection 0.4 mg  0.4 mg IntraVENous PRN    aspirin delayed-release tablet 81 mg  81 mg Oral DAILY    cycloSPORINE (RESTASIS) 0.05 % ophthalmic emulsion 1 Drop  1 Drop Both Eyes Q12H    isosorbide mononitrate ER (IMDUR) tablet 30 mg  30 mg Oral DAILY    memantine (NAMENDA) tablet 10 mg  10 mg Oral DAILY    metoprolol succinate (TOPROL-XL) XL tablet 50 mg  50 mg Oral DAILY    multivitamin, tx-iron-ca-min (THERA-M w/ IRON) tablet 1 Tab  1 Tab Oral DAILY    QUEtiapine (SEROquel) tablet 50 mg  50 mg Oral BID    temazepam (RESTORIL) capsule 7.5 mg  7.5 mg Oral QHS    gabapentin (NEURONTIN) capsule 300 mg  300 mg Oral DAILY        LAB AND IMAGING FINDINGS:     Lab Results   Component Value Date/Time    WBC 15.7 (H) 04/27/2021 04:35 AM    HGB 14.4 04/27/2021 04:35 AM    PLATELET 228 15/03/1307 04:35 AM     Lab Results   Component Value Date/Time    Sodium 139 04/27/2021 04:35 AM    Potassium 3.1 (L) 04/27/2021 04:35 AM    Chloride 106 04/27/2021 04:35 AM    CO2 28 04/27/2021 04:35 AM    BUN 10 04/27/2021 04:35 AM    Creatinine 1.08 04/27/2021 04:35 AM    Calcium 7.7 (L) 04/27/2021 04:35 AM    Magnesium 2.5 12/10/2019 11:40 AM    Phosphorus 2.5 12/10/2019 11:40 AM      Lab Results   Component Value Date/Time    Alk.  phosphatase 154 (H) 04/26/2021 01:45 AM    Protein, total 5.6 (L) 04/26/2021 01:45 AM    Albumin 2.2 (L) 04/26/2021 01:45 AM    Globulin 3.4 04/26/2021 01:45 AM     Lab Results   Component Value Date/Time    INR 1.1 10/23/2017 08:05 PM    Prothrombin time 13.4 10/23/2017 08:05 PM    aPTT 41.0 (H) 04/23/2021 12:45 PM      No results found for: IRON, FE, TIBC, IBCT, PSAT, FERR   No results found for: PH, PCO2, PO2  No components found for: [place]   Lab Results   Component Value Date/Time     04/23/2021 12:45 PM    CK - MB 1.2 04/23/2021 12:45 PM              Total time:  15 minutes     > 50% counseling / coordination: yes     Prolonged service was provided for  []30 min   []75 min in face to face time in the presence of the patient, spent as noted above.   Time Start:   Time End:

## 2021-04-28 LAB
ANION GAP SERPL CALC-SCNC: 5 MMOL/L (ref 3–18)
BASOPHILS # BLD: 0.1 K/UL (ref 0–0.1)
BASOPHILS NFR BLD: 1 % (ref 0–2)
BUN SERPL-MCNC: 9 MG/DL (ref 7–18)
BUN/CREAT SERPL: 8 (ref 12–20)
CALCIUM SERPL-MCNC: 8.2 MG/DL (ref 8.5–10.1)
CHLORIDE SERPL-SCNC: 107 MMOL/L (ref 100–111)
CO2 SERPL-SCNC: 28 MMOL/L (ref 21–32)
CREAT SERPL-MCNC: 1.15 MG/DL (ref 0.6–1.3)
DIFFERENTIAL METHOD BLD: ABNORMAL
EOSINOPHIL # BLD: 0.3 K/UL (ref 0–0.4)
EOSINOPHIL NFR BLD: 2 % (ref 0–5)
ERYTHROCYTE [DISTWIDTH] IN BLOOD BY AUTOMATED COUNT: 13.2 % (ref 11.6–14.5)
GLUCOSE BLD STRIP.AUTO-MCNC: 105 MG/DL (ref 70–110)
GLUCOSE BLD STRIP.AUTO-MCNC: 112 MG/DL (ref 70–110)
GLUCOSE BLD STRIP.AUTO-MCNC: 152 MG/DL (ref 70–110)
GLUCOSE BLD STRIP.AUTO-MCNC: 199 MG/DL (ref 70–110)
GLUCOSE SERPL-MCNC: 111 MG/DL (ref 74–99)
HCT VFR BLD AUTO: 45.6 % (ref 36–48)
HGB BLD-MCNC: 14.9 G/DL (ref 13–16)
LYMPHOCYTES # BLD: 1.8 K/UL (ref 0.9–3.6)
LYMPHOCYTES NFR BLD: 14 % (ref 21–52)
MCH RBC QN AUTO: 28.4 PG (ref 24–34)
MCHC RBC AUTO-ENTMCNC: 32.7 G/DL (ref 31–37)
MCV RBC AUTO: 87 FL (ref 74–97)
MONOCYTES # BLD: 1.4 K/UL (ref 0.05–1.2)
MONOCYTES NFR BLD: 11 % (ref 3–10)
NEUTS SEG # BLD: 9.4 K/UL (ref 1.8–8)
NEUTS SEG NFR BLD: 72 % (ref 40–73)
PLATELET # BLD AUTO: 353 K/UL (ref 135–420)
PMV BLD AUTO: 9.9 FL (ref 9.2–11.8)
POTASSIUM SERPL-SCNC: 3.1 MMOL/L (ref 3.5–5.5)
RBC # BLD AUTO: 5.24 M/UL (ref 4.35–5.65)
SODIUM SERPL-SCNC: 140 MMOL/L (ref 136–145)
WBC # BLD AUTO: 13.1 K/UL (ref 4.6–13.2)

## 2021-04-28 PROCEDURE — 36415 COLL VENOUS BLD VENIPUNCTURE: CPT

## 2021-04-28 PROCEDURE — 74011250637 HC RX REV CODE- 250/637: Performed by: HOSPITALIST

## 2021-04-28 PROCEDURE — 74011000258 HC RX REV CODE- 258: Performed by: HOSPITALIST

## 2021-04-28 PROCEDURE — 65270000029 HC RM PRIVATE

## 2021-04-28 PROCEDURE — 74011250636 HC RX REV CODE- 250/636: Performed by: NURSE PRACTITIONER

## 2021-04-28 PROCEDURE — 99231 SBSQ HOSP IP/OBS SF/LOW 25: CPT | Performed by: SURGERY

## 2021-04-28 PROCEDURE — 74011250637 HC RX REV CODE- 250/637: Performed by: NURSE PRACTITIONER

## 2021-04-28 PROCEDURE — 82962 GLUCOSE BLOOD TEST: CPT

## 2021-04-28 PROCEDURE — 2709999900 HC NON-CHARGEABLE SUPPLY

## 2021-04-28 PROCEDURE — 97530 THERAPEUTIC ACTIVITIES: CPT

## 2021-04-28 PROCEDURE — 97116 GAIT TRAINING THERAPY: CPT

## 2021-04-28 PROCEDURE — 80048 BASIC METABOLIC PNL TOTAL CA: CPT

## 2021-04-28 PROCEDURE — 74011636637 HC RX REV CODE- 636/637: Performed by: HOSPITALIST

## 2021-04-28 PROCEDURE — 85025 COMPLETE CBC W/AUTO DIFF WBC: CPT

## 2021-04-28 PROCEDURE — 74011250636 HC RX REV CODE- 250/636: Performed by: HOSPITALIST

## 2021-04-28 PROCEDURE — 99231 SBSQ HOSP IP/OBS SF/LOW 25: CPT | Performed by: NURSE PRACTITIONER

## 2021-04-28 RX ORDER — POTASSIUM CHLORIDE 20 MEQ/1
20 TABLET, EXTENDED RELEASE ORAL
Status: DISPENSED | OUTPATIENT
Start: 2021-04-28 | End: 2021-04-28

## 2021-04-28 RX ORDER — CLONIDINE HYDROCHLORIDE 0.1 MG/1
0.1 TABLET ORAL
Status: COMPLETED | OUTPATIENT
Start: 2021-04-28 | End: 2021-04-28

## 2021-04-28 RX ORDER — CLONIDINE HYDROCHLORIDE 0.1 MG/1
0.1 TABLET ORAL 2 TIMES DAILY
Status: DISCONTINUED | OUTPATIENT
Start: 2021-04-28 | End: 2021-05-04 | Stop reason: HOSPADM

## 2021-04-28 RX ADMIN — POTASSIUM CHLORIDE 20 MEQ: 1500 TABLET, EXTENDED RELEASE ORAL at 18:08

## 2021-04-28 RX ADMIN — PIPERACILLIN AND TAZOBACTAM 3.38 G: 3; .375 INJECTION, POWDER, LYOPHILIZED, FOR SOLUTION INTRAVENOUS at 00:30

## 2021-04-28 RX ADMIN — CLONIDINE HYDROCHLORIDE 0.1 MG: 0.1 TABLET ORAL at 09:50

## 2021-04-28 RX ADMIN — Medication 10 ML: at 23:06

## 2021-04-28 RX ADMIN — PIPERACILLIN AND TAZOBACTAM 3.38 G: 3; .375 INJECTION, POWDER, LYOPHILIZED, FOR SOLUTION INTRAVENOUS at 05:47

## 2021-04-28 RX ADMIN — ISOSORBIDE MONONITRATE 30 MG: 30 TABLET ORAL at 09:50

## 2021-04-28 RX ADMIN — CLONIDINE HYDROCHLORIDE 0.1 MG: 0.1 TABLET ORAL at 18:08

## 2021-04-28 RX ADMIN — INSULIN LISPRO 2 UNITS: 100 INJECTION, SOLUTION INTRAVENOUS; SUBCUTANEOUS at 12:55

## 2021-04-28 RX ADMIN — ENOXAPARIN SODIUM 40 MG: 40 INJECTION SUBCUTANEOUS at 09:50

## 2021-04-28 RX ADMIN — Medication 81 MG: at 09:50

## 2021-04-28 RX ADMIN — GABAPENTIN 300 MG: 300 CAPSULE ORAL at 09:50

## 2021-04-28 RX ADMIN — CYCLOSPORINE 1 DROP: 0.5 EMULSION OPHTHALMIC at 09:53

## 2021-04-28 RX ADMIN — INSULIN LISPRO 2 UNITS: 100 INJECTION, SOLUTION INTRAVENOUS; SUBCUTANEOUS at 23:05

## 2021-04-28 RX ADMIN — QUETIAPINE FUMARATE 50 MG: 25 TABLET ORAL at 18:07

## 2021-04-28 RX ADMIN — CYCLOSPORINE 1 DROP: 0.5 EMULSION OPHTHALMIC at 20:15

## 2021-04-28 RX ADMIN — PIPERACILLIN AND TAZOBACTAM 3.38 G: 3; .375 INJECTION, POWDER, LYOPHILIZED, FOR SOLUTION INTRAVENOUS at 12:58

## 2021-04-28 RX ADMIN — Medication 10 ML: at 05:47

## 2021-04-28 RX ADMIN — MEMANTINE HYDROCHLORIDE 10 MG: 10 TABLET ORAL at 09:50

## 2021-04-28 RX ADMIN — PIPERACILLIN AND TAZOBACTAM 3.38 G: 3; .375 INJECTION, POWDER, LYOPHILIZED, FOR SOLUTION INTRAVENOUS at 23:08

## 2021-04-28 RX ADMIN — POTASSIUM CHLORIDE 20 MEQ: 1500 TABLET, EXTENDED RELEASE ORAL at 13:15

## 2021-04-28 RX ADMIN — METOPROLOL SUCCINATE 50 MG: 50 TABLET, EXTENDED RELEASE ORAL at 09:50

## 2021-04-28 RX ADMIN — PIPERACILLIN AND TAZOBACTAM 3.38 G: 3; .375 INJECTION, POWDER, LYOPHILIZED, FOR SOLUTION INTRAVENOUS at 18:08

## 2021-04-28 RX ADMIN — QUETIAPINE FUMARATE 50 MG: 25 TABLET ORAL at 09:50

## 2021-04-28 RX ADMIN — Medication 1 TABLET: at 09:50

## 2021-04-28 NOTE — PROGRESS NOTES
Reason for Admission:  Leukocytosis [D72.829]  Acute cholecystitis [K81.0]                 RUR Score:    23%            Plan for utilizing home health:    [facility]                       Likelihood of Readmission:   Moderate                         Do you (patient/family) have any concerns for transition/discharge?  no    Transition of Care Plan:       Initial assessment completed with patient's daughter. Cognitive status of patient: disoriented. Face sheet information confirmed:  yes. The patient's daughter to participate in his discharge plan and to receive any needed information. This patient lives in an [facility], [address] [place] [address]. Patient is not able to navigate steps as needed. Prior to hospitalization, patient was considered to be independent with ADLs/IADLS : no . If not independent,  patient needs assist with : dressing, bathing, food preparation, cooking, toileting, grooming and self feeding    Patient has a current ACP document on file: no      Healthcare Decision Maker:   Primary Decision Maker: Jonnathan Samuel - Daughter - [phone]    Click here to complete [address] including selection of the Healthcare Decision Maker Relationship (ie \"Primary\")    The patient will need medical transport upon discharge. The patient already has Genny Lowery W/MARIUSZ, Shower chair, [facility] medical equipment available in the home. Patient is currently active with home health. If active, agency name is [facility] , receiving PT/OT/SN. Patient has not stayed in a skilled nursing facility or rehab. This patient is on dialysis :no    List of available Home Health agencies were provided and reviewed with the patient prior to discharge. Freedom of choice signed: yes, for [facility]. Currently, the discharge plan is Assisted Living with HH. The patient states that he can obtain his medications from the pharmacy, and take his medications as directed.     Patient's current insurance is medicare and [facility]      Care Management Interventions  PCP Verified by CM: Yes(lesly GROVES)  Mode of Transport at Discharge: BLS  Transition of Care Consult (CM Consult): Discharge Planning, [address]: No  Reason Outside [place]: Patient already serviced by other home care/hospice agency  Discharge Durable Medical Equipment: No  Physical Therapy Consult: Yes  Occupational Therapy Consult: Yes  Speech Therapy Consult: No  Current Support Network: Assisted Living  Confirm Follow Up Transport: Other (see comment)(medical transport)  The Patient and/or Patient Representative was Provided with a Choice of Provider and Agrees with the Discharge Plan?: Yes  Name of the Patient Representative Who was Provided with a Choice of Provider and Agrees with the Discharge Plan: cuba cremisael  Freedom of Choice List was Provided with Basic Dialogue that Supports the Patient's Individualized Plan of Care/Goals, Treatment Preferences and Shares the Quality Data Associated with the Providers?: Yes  Discharge Location  Discharge Placement: Assisted Living(with [facility])        Shira Douglass RN, BSN   Care Management  [phone]

## 2021-04-28 NOTE — PROGRESS NOTES
Comprehensive Nutrition Assessment    Type and Reason for Visit: Initial, RD nutrition re-screen/LOS    Nutrition Recommendations/Plan:   - Advance to diabetic soft solid diet and add supplements: Glucerna Shake TID. Nutrition Assessment:  Patient confused, awake and alert with clear lunch tray sitting uneaten at bedside. Not a suitable surgical candidate and able to advance diet as tolerated per surgery. Malnutrition Assessment:  Malnutrition Status: At risk for malnutrition (specify)(dementia)      Nutrition History and Allergies: Past medical history of dementia, CAD, CKD, T2DM, PVD and COPD presented with acute cholecystitis. Unable to obtain meal intake or weight history PTA due to mental status/dementia. NKFA. Estimated Daily Nutrient Needs:  Energy (kcal): 8561-3169; Weight Used for Energy Requirements: Admission  Protein (g): 66-98; Weight Used for Protein Requirements: Admission  Fluid (ml/day): 9791-2215; Method Used for Fluid Requirements: 1 ml/kcal    Nutrition Related Findings:  Last BM 4/27      Wounds:  None       Current Nutrition Therapies:  DIET CLEAR LIQUID     Anthropometric Measures:  · Height:  6' 2\" (188 cm)  · Current Body Wt:  82.7 kg (182 lb 5.1 oz)   · Admission Body Wt:  182 lb 5.1 oz    · Ideal Body Wt:  190 lbs:  96 %   · BMI Category:  Normal weight (BMI 18.5-24. 9)       Nutrition Diagnosis:   · Inadequate oral intake related to early satiety, altered GI function as evidenced by NPO or clear liquid status due to medical condition, intake 0-25%      Nutrition Interventions:   Food and/or Nutrient Delivery: Modify current diet, Start oral nutrition supplement  Nutrition Education and Counseling: Education not indicated  Coordination of Nutrition Care: Continue to monitor while inpatient, Coordination of community care    Goals:  PO nutrition intake will meet >75% of patient estimated nutritional needs within the next 7 days       Nutrition Monitoring and Evaluation: Behavioral-Environmental Outcomes: None identified  Food/Nutrient Intake Outcomes: Diet advancement/tolerance, Food and nutrient intake, Supplement intake  Physical Signs/Symptoms Outcomes: Biochemical data, Chewing or swallowing, GI status, Nutrition focused physical findings, Meal time behavior    Discharge Planning:     Too soon to determine     Electronically signed by Deedee Dolan RD, [address]  on 4/28/2021 at 2:27 PM    Contact: [phone]

## 2021-04-28 NOTE — PROGRESS NOTES
PROGRESS NOTE    Name: Amandeep Jackson MRN: [id number]   : 1935     Date: 2021 Admission Date: 2021     Hospital Day: 6    Subjective:  Patient resting in bed. No changes   Objective:    Vital Signs:  Visit Vitals  BP (!) 197/92   Pulse 80   Temp 97.1 °F (36.2 °C)   Resp 18   Ht 6' 2\" (1.88 m)   Wt 82.7 kg (182 lb 5 oz)   SpO2 94%   BMI 23.41 kg/m²       Physical Exam:    General: in no apparent distress, fatigued   HEENT: Normal   Chest: normal   Lungs: normal air entry   Heart: Regular rate and rhythm or S1S2 present   Abdomen: abdomen is soft without significant tenderness   Extremity: negative   Neuro: alert   Skin: Skin color, texture, turgor normal. No rashes or lesions    Data Review:  Recent Results (from the past 24 hour(s))   GLUCOSE, POC    Collection Time: 21 11:08 AM   Result Value Ref Range    Glucose (POC) 160 (H) 70 - 110 mg/dL   GLUCOSE, POC    Collection Time: 21  5:43 PM   Result Value Ref Range    Glucose (POC) 95 70 - 110 mg/dL   GLUCOSE, POC    Collection Time: 21  9:22 PM   Result Value Ref Range    Glucose (POC) 150 (H) 70 - 110 mg/dL   CBC WITH AUTOMATED DIFF    Collection Time: 21  3:55 AM   Result Value Ref Range    WBC 13.1 4.6 - 13.2 K/uL    RBC 5.24 4.35 - 5.65 M/uL    HGB 14.9 13.0 - 16.0 g/dL    HCT 45.6 36.0 - 48.0 %    MCV 87.0 74.0 - 97.0 FL    MCH 28.4 24.0 - 34.0 PG    MCHC 32.7 31.0 - 37.0 g/dL    RDW 13.2 11.6 - 14.5 %    PLATELET 969 637 - 445 K/uL    MPV 9.9 9.2 - 11.8 FL    NEUTROPHILS 72 40 - 73 %    LYMPHOCYTES 14 (L) 21 - 52 %    MONOCYTES 11 (H) 3 - 10 %    EOSINOPHILS 2 0 - 5 %    BASOPHILS 1 0 - 2 %    ABS. NEUTROPHILS 9.4 (H) 1.8 - 8.0 K/UL    ABS. LYMPHOCYTES 1.8 0.9 - 3.6 K/UL    ABS. MONOCYTES 1.4 (H) 0.05 - 1.2 K/UL    ABS. EOSINOPHILS 0.3 0.0 - 0.4 K/UL    ABS.  BASOPHILS 0.1 0.0 - 0.1 K/UL    DF AUTOMATED     METABOLIC PANEL, BASIC    Collection Time: 21  3:55 AM   Result Value Ref Range    Sodium 140 136 - 145 mmol/L    Potassium 3.1 (L) 3.5 - 5.5 mmol/L    Chloride 107 100 - 111 mmol/L    CO2 28 21 - 32 mmol/L    Anion gap 5 3.0 - 18 mmol/L    Glucose 111 (H) 74 - 99 mg/dL    BUN 9 7.0 - 18 MG/DL    Creatinine 1.15 0.6 - 1.3 MG/DL    BUN/Creatinine ratio 8 (L) 12 - 20      GFR est AA >60 >60 ml/min/1.73m2    GFR est non-AA >60 >60 ml/min/1.73m2    Calcium 8.2 (L) 8.5 - 10.1 MG/DL   GLUCOSE, POC    Collection Time: 04/28/21  5:20 AM   Result Value Ref Range    Glucose (POC) 105 70 - 110 mg/dL       Current Medications:  No current facility-administered medications on file prior to encounter. Current Outpatient Medications on File Prior to Encounter   Medication Sig Dispense Refill    temazepam (RESTORIL) 7.5 mg capsule Take 7.5 mg by mouth nightly.  QUEtiapine (SEROqueL) 50 mg tablet Take 50 mg by mouth two (2) times a day.  memantine (Namenda) 10 mg tablet Take 10 mg by mouth daily.  metoprolol succinate (Toprol XL) 50 mg XL tablet Take 50 mg by mouth daily.  erythromycin (ILOTYCIN) ophthalmic ointment Apply 1/2 inch ribbon OS every 6 hours x 7 days 3.5 g 0    diclofenac (VOLTAREN) 1 % gel Apply 2 g to affected area four (4) times daily. 100 g 0    ezetimibe (ZETIA) 10 mg tablet Take 1 Tab by mouth nightly.  insulin detemir U-100 (LEVEMIR U-100 INSULIN) 100 unit/mL injection 60 Units by SubCUTAneous route daily. Indications: type 2 diabetes mellitus      erythromycin (ILOTYCIN) ophthalmic ointment Apply to both lower eyelids four times a day until done. 1 Tube 0    aspirin delayed-release 81 mg tablet Take 1 Tab by mouth daily. Indications: Cerebral Thromboembolism Prevention 30 Tab 0    cycloSPORINE (RESTASIS) 0.05 % ophthalmic emulsion Administer 1 Drop to both eyes every twelve (12) hours. Indications: Dry Eye 15 Each 0    gabapentin (NEURONTIN) 300 mg capsule Take 1 Cap by mouth daily.  Indications: NEUROPATHIC PAIN, Restless Legs Syndrome 30 Cap 0    isosorbide mononitrate ER (IMDUR) 30 mg tablet Take 1 Tab by mouth daily. 30 Tab 0    multivit-min-FA-lycopen-lutein (CENTRUM SILVER) 0.4-300-250 mg-mcg-mcg tab Take 1 tab daily 30 Tab 0    furosemide (LASIX) 20 mg tablet Take 1 Tab by mouth daily. Indications: Edema 30 Tab 0    famotidine (PEPCID) 20 mg tablet Take 1 Tab by mouth two (2) times a day. Indications: PREVENTION OF STRESS ULCER 30 Tab 0    CALCIUM CARBONATE (CALTRATE 600 PO) Take 1 Tab by mouth daily.  lidocaine (LIDODERM) 5 % Apply patch to the affected area for 12 hours a day and remove for 12 hours a day. 30 Each 0    lactobacillus sp. 50 billion cpu (BIO-K PLUS) 50 billion cell -375 mg cap capsule Take 1 Cap by mouth daily. 30 Cap 0    OTHER Incentive spirometry- Use as directed 1 Each 0       Chart and notes reviewed. Data reviewed. I have evaluated and examined the patient. IMPRESSION:   · 83 y/o male presented with acute cholecystitis. Not a suitable surgical candidate. Severe dementia.    · Conservative IVABX treatment at this time       PLAN:/DISCUSION:   · WBC normal today   · Continue with IV abx   · Can advance diet as tolerated        Gallo Zaidi MD

## 2021-04-28 NOTE — PROGRESS NOTES
Progress Note      Patient: Lambert Restrepo. Sex: male          DOA: 4/23/2021       YOB: 1935      Age:  82 y.o.        LOS:  LOS: 5 days             CHIEF COMPLAINT:  Cholecystitis    Subjective:     Patient is awake and talking  He feels relatively good    Objective:      Visit Vitals  BP (!) 161/83 (BP 1 Location: Left upper arm, BP Patient Position: At rest)   Pulse 79   Temp 97.9 °F (36.6 °C)   Resp 16   Ht 6' 2\" (1.88 m)   Wt 82.7 kg (182 lb 5 oz)   SpO2 96%   BMI 23.41 kg/m²       Physical Exam:  Gen:  Patient is in no distress. No complaint  HEENT and Neck:  PERRL, No cervical tenderness or masses  Lungs:  Clear bilaterally. No rales, no wheeze. Normal effort. Heart:  Regular Rate and Rhythm. No murmur or gallop. No JVD. No edema.   Abdomen:  Soft, non tender, no masses, no Hepatosplenomegally  Extremities:  No digital clubbing, no cyanosis  Neuro:  Alert and oriented, Normal affect, Cranial nerves intact, No sensory deficits        Lab/Data Reviewed:  BMP:   Lab Results   Component Value Date/Time     04/28/2021 03:55 AM    K 3.1 (L) 04/28/2021 03:55 AM     04/28/2021 03:55 AM    CO2 28 04/28/2021 03:55 AM    AGAP 5 04/28/2021 03:55 AM     (H) 04/28/2021 03:55 AM    BUN 9 04/28/2021 03:55 AM    CREA 1.15 04/28/2021 03:55 AM    GFRAA >60 04/28/2021 03:55 AM    GFRNA >60 04/28/2021 03:55 AM     CBC:   Lab Results   Component Value Date/Time    WBC 13.1 04/28/2021 03:55 AM    HGB 14.9 04/28/2021 03:55 AM    HCT 45.6 04/28/2021 03:55 AM     04/28/2021 03:55 AM           Assessment/Plan     Principal Problem:    Acute cholecystitis (4/23/2021)    Active Problems:    HTN (hypertension) (11/19/2012)      DM (diabetes mellitus) ([facility] (11/19/2012)      CKD (chronic kidney disease) stage 3, GFR 30-59 ml/min ([facility]) (5/20/2013)      CAD (coronary artery disease) (8/29/2016)      COPD (chronic obstructive pulmonary disease) [facility] (8/29/2016)      Mixed hyperlipidemia (1/24/2017)      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

## 2021-04-28 NOTE — PROGRESS NOTES
[address] [facility]: [phone]  [facility]: [phone]     Patient Name: Amandeep Jackson YOB: 1935    Date of Follow-up Visit: 4/28/2021   Reason for Consult: goals of care   Requesting Provider: Ms Jesika Joel. NP   Primary Care Physician: None      SUMMARY:   Amandeep Jackson is a 82y.o. year old with a past history of dementia, CAD, CKD, T2DM, PVD and COPD , who was admitted on 4/23/2021 from [facility]  with a diagnosis of acute cholecystitis . Current medical issues leading to Palliative Medicine involvement include: 82year old gentleman who is a resident of [facility] with dementia presents with  RLQ abdominal pain found to have cholecystitis. Palliative medicine is consulted for goals of care discussions. 4/28/2021:  Lying in bed, awake, alert, talking incoherently and did not appear to see me until I spoke to him. His speech was unintelligible at times. Denies shortness of breath. When asked if he was having pain, he was able to point to the right side of his abdomen. 4/27/2021:  Lying in bed, awake, alert, unable to answer orientation questions correctly. Per BSRN, has been pulling condom catheter off. Ate >50% of breakfast per BSRN. Denies pain or shortness of breath when asked. PALLIATIVE DIAGNOSES:   1. Goals of care   2. Dementia  3. Acute cholecystitis   4. Debility        PLAN:   4/28/2021:  Pt seen at bedside. Awake and alert but not oriented. Appears comfortable. Pt was talking with no one in the room at the time of my visit. He did not appear to see me until I called his name. He was speaking incoherently and at times his speech was unintelligible. Denied shortness of breath. When asked about pain, he was able to point to the right side of his abdomen  but was unable to further describe or rate his pain. Call placed to daughter, Sudhakar López, regarding completion of POST form.   Tashajorge alberto Dawn shared that she would be visiting the hospital tomorrow, 4/29/21. Meeting set for palliative to meet with Poncho Chavis in her father's room at 1pm tomorrow, 4/29/21 for POST form completion. Goals of care are DNR/DNI, limited interventions. Please see below for previous conversations with the palliative medicine team:    4/27/2021:  Pt seen at bedside along with [facility]. He was awake and alerted to his name and was able to talk; however, he could only correctly tell me the name of his daughter which is Poncho Chavis. He was not oriented to place, time, the POTUS and could not participate in a conversation about his medical decisions. Per BSRN, he did have a good appetite for breakfast this morning and ate more than 50% of his breakfast.  No family present in room at time of morning visit. Goals of care are DNR/DNI, limited interventions. Addendum - 1440:  Daughter, Poncho Chavis, called to share she was visiting her father in his room. Palliative team members went to meet with Yovany to complete a POST form after goals of care conversation was held yesterday. Poncho Chavis was not in room when palliative team members arrived to meet with her. Will continue to follow-up for POST form completion. 1. Goals of care Mr Nina Lord seen along with Ms Reno Dunlap RN. He would alert to his name, try to answer simple questions, but at time we saw not able. We told us he was in pain but not able to localize. He is not able to participate in his medical decisions. There is an AMD on file naming his wife Sridevi Olguin as MPOA and daughter Poncho Chavis as secondary MPOA. We spoke with Poncho Chavis via phone today. Her mother Sadie Mendoza has passed, Poncho Chavis is MPOA. Discussed with Poncho Chavis valera of MPOA as directed in his AMD. Goals of care discussed including benefits and burdens, unfortunately this efforts will not cure or reverse dementia and if he were to survive cardiac arrest would likely be much more functionally debilitated.  Poncho Chavis shared she would not wish to subject her Dad to these efforts. Goals of care DNR/DNI. Nazario Traylor will plan to come into tomorrow to see her Dad, we plan on meeting for POST Completion. Goals of care DNR/DNI limited interventions. Attending and BSRN aware of goals of care change. 2. Dementia verbal but not able to completely answer questions. Not able to participate in his medical decisions. FAST of 6 C.   3. Cholecystitis seen by surgery, on IVAB conservative treatment at present  4. Debility Lives at [facility] needs assistance with ADL's. Per daughter wheelchair bound. PT/ OT on board per notes needs max cues for bed mobility. Incontinent of stool. PPS of 50 indicating considerable debility  5. Initial consult note routed to primary continuity provider  6. Communicated plan of care with: Palliative IDT, patient       Patient/Health Care Proxy Stated Goals: Prolong life      TREATMENT PREFERENCES:   Code Status: DNR/DNI     Advance Care Planning:  [] [facility] Interdisciplinary Team has updated the ACP Navigator with [address] and Patient Capacity    Primary Decision Maker ([address]): daughter Gia Salgado   AMD on file   Xiomy Carlson which is named as primary as since passed    Medical Interventions: Limited additional interventions         Other:  As far as possible, the palliative care team has discussed with patient / health care proxy about goals of care / treatment preferences for patient. HISTORY:     History obtained from: chart and daughter     CHIEF COMPLAINT: cholecystitis     HPI/SUBJECTIVE:    The patient is:   [] Verbal and participatory  [x] Non-participatory due to:  Dementia   Please see summary   4/26/2021   Alert but confused.      Clinical Pain Assessment (nonverbal scale for nonverbal patients): Clinical Pain Assessment  Severity: 1          Duration: for how long has pt been experiencing pain (e.g., 2 days, 1 month, years)  Frequency: how often pain is an issue (e.g., several times per day, once every few days, constant)     FUNCTIONAL ASSESSMENT:     Palliative Performance Scale (PPS):  PPS: 50    ECOG  ECOG Status : Completely disabled     PSYCHOSOCIAL/SPIRITUAL SCREENING:      Any spiritual / Presybeterian concerns: unable to assess for patient   [] Yes /  [] No    Caregiver Burnout:  [] Yes /  [x] No /  [] No Caregiver Present      Anticipatory grief assessment: unable to assess for patient   [] Normal  / [] Maladaptive        REVIEW OF SYSTEMS:     Positive and pertinent negative findings in ROS are noted above in HPI. The following systems were [] reviewed / [x] unable to be reviewed as noted in HPI  Other findings are noted below. Systems: constitutional, ears/nose/mouth/throat, respiratory, gastrointestinal, genitourinary, musculoskeletal, integumentary, neurologic, psychiatric, endocrine. Positive findings noted below. Modified ESAS Completed by: provider   Fatigue: 3       Pain: 1           Dyspnea: 0     Constipation: No     Stool Occurrence(s): 1        PHYSICAL EXAM:     Wt Readings from Last 3 Encounters:   04/23/21 82.7 kg (182 lb 5 oz)   12/11/20 74.8 kg (165 lb)   12/12/19 88.5 kg (195 lb 1.6 oz)     Blood pressure (!) 161/83, pulse 79, temperature 97.9 °F (36.6 °C), resp. rate 16, height 6' 2\" (1.88 m), weight 82.7 kg (182 lb 5 oz), SpO2 96 %. Pain:  Pain Scale 1: Adult Nonverbal Pain Scale  Pain Intensity 1: 0     Pain Location 1: Generalized        Pain Intervention(s) 1: Medication (see MAR)  Last bowel movement: 4/27/2021    Constitutional: lying in bed, NAD, well-nourished, alert and talking incoherently, confused  Cardiovascular: RRR  Respiratory: respirations not labored  Skin: warm and dry   Neurologic: awake, alert, confused. Followed simple commands.  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 Chronic LICA occlusion. Similar to study of 3/29/16. 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; followed by pts vascular surgeon    Chronic kidney disease     COPD (chronic obstructive pulmonary disease) ([address]     Dementia ([address]     Depression     Diabetes ([address]     type 2    Dyslipidemia     on Crestor; managed by pts PCP    Dyspnea     Hypercholesterolemia     Hypertension     Low back pain     Osteoarthrosis involving multiple sites     Polycythemia 12/9/2019    Skin cancer ([address]     squamous cell lesions of the skin    Stroke ([address]     Venous (peripheral) insufficiency       Past Surgical History:   Procedure Laterality Date    CARDIAC CATHETERIZATION  08/24/2016    Mod calcification. Co-dom. oLM 50-70%. LAD 30%. Dx 30%. Cx 70% focal.  OM 80% focal.  RCA 30%.       HX ANGIOPLASTY  2009    2 stents placed and heart attack during the procedure    HX COLONOSCOPY  10/2003    neg; declines f/u    HX CORONARY ARTERY BYPASS GRAFT  09/2016     LIMA to LAD and SVG to OM1    HX HEART CATHETERIZATION  5/2013    HX HERNIA REPAIR      1970s    HX TONSIL AND ADENOIDECTOMY        Family History   Problem Relation Age of Onset    Parkinsonism Mother     Hypertension Mother     Cancer Father         lung    Heart defect Brother      History reviewed, no pertinent family history.   Social History     Tobacco Use    Smoking status: Former Smoker     Years: 2.00     Quit date: 1955     Years since quittin.2    Smokeless tobacco: Never Used   Substance Use Topics    Alcohol use: Yes     Frequency: 2-4 times a month     Comment: drinks weekly couple beers     Allergies   Allergen Reactions    Amaryl [Glimepiride] Drowsiness    Lipitor [Atorvastatin] Myalgia    Niacin Other (comments)     Facial blistering, swelling in face    Pravachol [Pravastatin] Myalgia    Zocor [Simvastatin] Myalgia      Current Facility-Administered Medications   Medication Dose Route Frequency    cloNIDine HCL (CATAPRES) tablet 0.1 mg  0.1 mg Oral BID    potassium chloride (K-DUR, KLOR-CON) SR tablet 20 mEq  20 mEq Oral Q2H    insulin lispro (HUMALOG) injection   SubCUTAneous AC&HS    piperacillin-tazobactam (ZOSYN) 3.375 g in 0.9% sodium chloride (MBP/ADV) 100 mL MBP  3.375 g IntraVENous Q6H    sodium chloride (NS) flush 5-40 mL  5-40 mL IntraVENous Q8H    sodium chloride (NS) flush 5-40 mL  5-40 mL IntraVENous PRN    acetaminophen (TYLENOL) tablet 650 mg  650 mg Oral Q6H PRN    Or    acetaminophen (TYLENOL) suppository 650 mg  650 mg Rectal Q6H PRN    polyethylene glycol (MIRALAX) packet 17 g  17 g Oral DAILY PRN    promethazine (PHENERGAN) tablet 12.5 mg  12.5 mg Oral Q6H PRN    Or    ondansetron (ZOFRAN) injection 4 mg  4 mg IntraVENous Q6H PRN    enoxaparin (LOVENOX) injection 40 mg  40 mg SubCUTAneous DAILY    glucose chewable tablet 16 g  4 Tab Oral PRN    glucagon (GLUCAGEN) injection 1 mg  1 mg IntraMUSCular PRN    dextrose (D50W) injection syrg 12.5-25 g  25-50 mL IntraVENous PRN    dextrose 5 % - 0.45% NaCl infusion  50 mL/hr IntraVENous CONTINUOUS    morphine injection 1 mg  1 mg IntraVENous Q4H PRN    naloxone (NARCAN) injection 0.4 mg  0.4 mg IntraVENous PRN    aspirin delayed-release tablet 81 mg  81 mg Oral DAILY    cycloSPORINE (RESTASIS) 0.05 % ophthalmic emulsion 1 Drop  1 Drop Both Eyes Q12H    isosorbide mononitrate ER (IMDUR) tablet 30 mg  30 mg Oral DAILY    memantine (NAMENDA) tablet 10 mg  10 mg Oral DAILY    metoprolol succinate (TOPROL-XL) XL tablet 50 mg  50 mg Oral DAILY    multivitamin, tx-iron-ca-min (THERA-M w/ IRON) tablet 1 Tab  1 Tab Oral DAILY    QUEtiapine (SEROquel) tablet 50 mg  50 mg Oral BID    temazepam (RESTORIL) capsule 7.5 mg  7.5 mg Oral QHS    gabapentin (NEURONTIN) capsule 300 mg  300 mg Oral DAILY        LAB AND IMAGING FINDINGS:     Lab Results   Component Value Date/Time    WBC 13.1 04/28/2021 03:55 AM    HGB 14.9 04/28/2021 03:55 AM    PLATELET 562 39/02/2209 03:55 AM     Lab Results   Component Value Date/Time    Sodium 140 04/28/2021 03:55 AM    Potassium 3.1 (L) 04/28/2021 03:55 AM    Chloride 107 04/28/2021 03:55 AM    CO2 28 04/28/2021 03:55 AM    BUN 9 04/28/2021 03:55 AM    Creatinine 1.15 04/28/2021 03:55 AM    Calcium 8.2 (L) 04/28/2021 03:55 AM    Magnesium 2.5 12/10/2019 11:40 AM    Phosphorus 2.5 12/10/2019 11:40 AM      Lab Results   Component Value Date/Time    Alk. phosphatase 154 (H) 04/26/2021 01:45 AM    Protein, total 5.6 (L) 04/26/2021 01:45 AM    Albumin 2.2 (L) 04/26/2021 01:45 AM    Globulin 3.4 04/26/2021 01:45 AM     Lab Results   Component Value Date/Time    INR 1.1 10/23/2017 08:05 PM    Prothrombin time 13.4 10/23/2017 08:05 PM    aPTT 41.0 (H) 04/23/2021 12:45 PM      No results found for: IRON, FE, TIBC, IBCT, PSAT, FERR   No results found for: PH, PCO2, PO2  No components found for: [place]   Lab Results   Component Value Date/Time     04/23/2021 12:45 PM    CK - MB 1.2 04/23/2021 12:45 PM              Total time:  15 minutes     > 50% counseling / coordination: yes with daughter, Abilio Louis    Prolonged service was provided for  []30 min   []75 min in face to face time in the presence of the patient, spent as noted above.   Time Start:   Time End:

## 2021-04-28 NOTE — ROUTINE PROCESS
Patient's BP is 197/92, paged MD. Gela Tipton with MD regarding patient's BP, MD ordered clonidine now and BID for patient. I asked MD if he wanted to put any parameters for patient's BP, MD stated, \"no, use your nursing judgement, if the patient's BP is high call me back. \"

## 2021-04-28 NOTE — PROGRESS NOTES
Problem: Mobility Impaired (Adult and Pediatric)  Goal: *Acute Goals and Plan of Care (Insert Text)  Description: Physical Therapy Goals  Initiated 4/24/2021 and to be accomplished within 7 day(s)  1. Patient will move from supine to sit and sit to supine , scoot up and down, and roll side to side in bed with moderate assistance . 2.  Patient will transfer from bed to chair and chair to bed with maximal assistance using the least restrictive device. 3.  Patient will perform sit to stand with moderate assistance . PLOF: Per EMR review, pt resides at [facility], has hx of falls. Dementia at baseline  Outcome: Progressing Towards Goal     PHYSICAL THERAPY TREATMENT    Patient: Antonio Dominguez (82 y.o. male)  Date: 4/28/2021  Diagnosis: Leukocytosis [D72.829]  Acute cholecystitis [K81.0] Acute cholecystitis       Precautions: Fall, Skin    ASSESSMENT:  Pt found supine in bed, in NAD, willing to work with PT. He reports no pain. Pt rolled onto his R side with Chayo and pushed up to sitting with modAx1. Pt found to be soiled of urine, pad replaced. Once sitting, he demonstrates improve seated balance today, though still tends to lean posterior especially with LAQ's. Pt able to stand with modA and RW. He performed x 5 ft of side steps along EOB before needing to sit down. Pt stood again with RW and performed another 5 ft, requiring increased breaks between bouts of gait. He returned back supine with modA and was able to scoot himself up towards [address]. RN in room at end of session to give pt meds. Pt was left with HOB elevated, call bell nearby, bed alarm on, all needs met. Progression toward goals:   []      Improving appropriately and progressing toward goals  [x]      Improving slowly and progressing toward goals  []      Not making progress toward goals and plan of care will be adjusted     PLAN:  Patient continues to benefit from skilled intervention to address the above impairments.   Continue treatment per established plan of care. Discharge Recommendations:  Rehab  Further Equipment Recommendations for Discharge:  N/A     SUBJECTIVE:   Patient stated Melrose Snellen was born here! [place]    OBJECTIVE DATA SUMMARY:   Critical Behavior:  Neurologic State: Alert, Confused  Orientation Level: Oriented to person, Disoriented to place, Disoriented to situation, Disoriented to time  Cognition: Follows commands  Safety/Judgement: Fall prevention  Functional Mobility Training:  Bed Mobility:  Rolling: Minimum assistance  Supine to Sit: Moderate assistance  Sit to Supine: Moderate assistance  Scooting: Contact guard assistance         Transfers:  Sit to Stand: Moderate assistance  Stand to Sit: Contact guard assistance    Balance:  Sitting: Impaired; With support  Sitting - Static: Fair (occasional)  Sitting - Dynamic: Fair (occasional)  Standing: Impaired; With support  Standing - Static: Fair  Standing - Dynamic : Fair   Ambulation/Gait Training:  Distance (ft): 5 Feet (ft)  Assistive Device: Walker, rolling  Ambulation - Level of Assistance: Minimal assistance        Gait Abnormalities: Decreased step clearance        Base of Support: Center of gravity altered     Speed/Chanel: Slow;Shuffled  Step Length: Left shortened;Right shortened   Therapeutic Exercises:   Pt performed x10 LAQ's in sitting, cueing to lean forward      Pain:  Pain level pre-treatment: 0/10  Pain level post-treatment: 0/10   Pain Intervention(s): Medication (see MAR); Rest, Repositioning   Response to intervention: Nurse notified, See doc flow    Activity Tolerance:   Pt tolerated mobility well  Please refer to the flowsheet for vital signs taken during this treatment.   After treatment:   [] Patient left in no apparent distress sitting up in chair  [x] Patient left in no apparent distress in bed  [x] Call bell left within reach  [x] Nursing notified  [x] Caregiver present  [x] Bed alarm activated  [] SCDs applied      COMMUNICATION/EDUCATION:   [x] Role of Physical Therapy in the acute care setting. [x]         Fall prevention education was provided and the patient/caregiver indicated understanding. [x]         Patient/family have participated as able in working toward goals and plan of care. []         Patient/family agree to work toward stated goals and plan of care. []         Patient understands intent and goals of therapy, but is neutral about his/her participation.   []         Patient is unable to participate in stated goals/plan of care: ongoing with therapy staff.  []         Other:        Jabari Velásquez   Time Calculation: 32 mins

## 2021-04-29 LAB
ANION GAP SERPL CALC-SCNC: 4 MMOL/L (ref 3–18)
BACTERIA SPEC CULT: NORMAL
BACTERIA SPEC CULT: NORMAL
BASOPHILS # BLD: 0.1 K/UL (ref 0–0.1)
BASOPHILS NFR BLD: 1 % (ref 0–2)
BUN SERPL-MCNC: 10 MG/DL (ref 7–18)
BUN/CREAT SERPL: 8 (ref 12–20)
CALCIUM SERPL-MCNC: 8.2 MG/DL (ref 8.5–10.1)
CHLORIDE SERPL-SCNC: 106 MMOL/L (ref 100–111)
CO2 SERPL-SCNC: 29 MMOL/L (ref 21–32)
CREAT SERPL-MCNC: 1.29 MG/DL (ref 0.6–1.3)
DIFFERENTIAL METHOD BLD: ABNORMAL
EOSINOPHIL # BLD: 0.2 K/UL (ref 0–0.4)
EOSINOPHIL NFR BLD: 2 % (ref 0–5)
ERYTHROCYTE [DISTWIDTH] IN BLOOD BY AUTOMATED COUNT: 13.3 % (ref 11.6–14.5)
GLUCOSE BLD STRIP.AUTO-MCNC: 113 MG/DL (ref 70–110)
GLUCOSE BLD STRIP.AUTO-MCNC: 124 MG/DL (ref 70–110)
GLUCOSE BLD STRIP.AUTO-MCNC: 151 MG/DL (ref 70–110)
GLUCOSE BLD STRIP.AUTO-MCNC: 173 MG/DL (ref 70–110)
GLUCOSE SERPL-MCNC: 127 MG/DL (ref 74–99)
HCT VFR BLD AUTO: 43.3 % (ref 36–48)
HGB BLD-MCNC: 14.3 G/DL (ref 13–16)
LYMPHOCYTES # BLD: 1.7 K/UL (ref 0.9–3.6)
LYMPHOCYTES NFR BLD: 12 % (ref 21–52)
MCH RBC QN AUTO: 28.9 PG (ref 24–34)
MCHC RBC AUTO-ENTMCNC: 33 G/DL (ref 31–37)
MCV RBC AUTO: 87.7 FL (ref 74–97)
MONOCYTES # BLD: 1.5 K/UL (ref 0.05–1.2)
MONOCYTES NFR BLD: 10 % (ref 3–10)
NEUTS SEG # BLD: 11.2 K/UL (ref 1.8–8)
NEUTS SEG NFR BLD: 76 % (ref 40–73)
PLATELET # BLD AUTO: 352 K/UL (ref 135–420)
PMV BLD AUTO: 9.8 FL (ref 9.2–11.8)
POTASSIUM SERPL-SCNC: 3.6 MMOL/L (ref 3.5–5.5)
RBC # BLD AUTO: 4.94 M/UL (ref 4.35–5.65)
SERVICE CMNT-IMP: NORMAL
SERVICE CMNT-IMP: NORMAL
SODIUM SERPL-SCNC: 139 MMOL/L (ref 136–145)
WBC # BLD AUTO: 14.9 K/UL (ref 4.6–13.2)

## 2021-04-29 PROCEDURE — 74011000258 HC RX REV CODE- 258: Performed by: HOSPITALIST

## 2021-04-29 PROCEDURE — 74011250636 HC RX REV CODE- 250/636: Performed by: NURSE PRACTITIONER

## 2021-04-29 PROCEDURE — 2709999900 HC NON-CHARGEABLE SUPPLY

## 2021-04-29 PROCEDURE — 65270000029 HC RM PRIVATE

## 2021-04-29 PROCEDURE — 85025 COMPLETE CBC W/AUTO DIFF WBC: CPT

## 2021-04-29 PROCEDURE — 36415 COLL VENOUS BLD VENIPUNCTURE: CPT

## 2021-04-29 PROCEDURE — 97530 THERAPEUTIC ACTIVITIES: CPT

## 2021-04-29 PROCEDURE — 74011636637 HC RX REV CODE- 636/637: Performed by: HOSPITALIST

## 2021-04-29 PROCEDURE — 80048 BASIC METABOLIC PNL TOTAL CA: CPT

## 2021-04-29 PROCEDURE — 74011250637 HC RX REV CODE- 250/637: Performed by: HOSPITALIST

## 2021-04-29 PROCEDURE — 97535 SELF CARE MNGMENT TRAINING: CPT

## 2021-04-29 PROCEDURE — 74011250636 HC RX REV CODE- 250/636: Performed by: HOSPITALIST

## 2021-04-29 PROCEDURE — 82962 GLUCOSE BLOOD TEST: CPT

## 2021-04-29 PROCEDURE — 74011250637 HC RX REV CODE- 250/637: Performed by: NURSE PRACTITIONER

## 2021-04-29 PROCEDURE — 74011000250 HC RX REV CODE- 250: Performed by: NURSE PRACTITIONER

## 2021-04-29 PROCEDURE — 99233 SBSQ HOSP IP/OBS HIGH 50: CPT | Performed by: NURSE PRACTITIONER

## 2021-04-29 RX ADMIN — CLONIDINE HYDROCHLORIDE 0.1 MG: 0.1 TABLET ORAL at 19:15

## 2021-04-29 RX ADMIN — PIPERACILLIN AND TAZOBACTAM 3.38 G: 3; .375 INJECTION, POWDER, LYOPHILIZED, FOR SOLUTION INTRAVENOUS at 19:15

## 2021-04-29 RX ADMIN — GABAPENTIN 300 MG: 300 CAPSULE ORAL at 09:36

## 2021-04-29 RX ADMIN — CYCLOSPORINE 1 DROP: 0.5 EMULSION OPHTHALMIC at 21:46

## 2021-04-29 RX ADMIN — PIPERACILLIN AND TAZOBACTAM 3.38 G: 3; .375 INJECTION, POWDER, LYOPHILIZED, FOR SOLUTION INTRAVENOUS at 05:43

## 2021-04-29 RX ADMIN — TEMAZEPAM 7.5 MG: 7.5 CAPSULE ORAL at 21:47

## 2021-04-29 RX ADMIN — MEMANTINE HYDROCHLORIDE 10 MG: 10 TABLET ORAL at 09:36

## 2021-04-29 RX ADMIN — CLONIDINE HYDROCHLORIDE 0.1 MG: 0.1 TABLET ORAL at 06:59

## 2021-04-29 RX ADMIN — METOPROLOL SUCCINATE 50 MG: 50 TABLET, EXTENDED RELEASE ORAL at 09:36

## 2021-04-29 RX ADMIN — Medication 10 ML: at 05:44

## 2021-04-29 RX ADMIN — ENOXAPARIN SODIUM 40 MG: 40 INJECTION SUBCUTANEOUS at 09:35

## 2021-04-29 RX ADMIN — QUETIAPINE FUMARATE 50 MG: 25 TABLET ORAL at 19:15

## 2021-04-29 RX ADMIN — QUETIAPINE FUMARATE 50 MG: 25 TABLET ORAL at 09:36

## 2021-04-29 RX ADMIN — Medication 81 MG: at 09:36

## 2021-04-29 RX ADMIN — DEXTROSE MONOHYDRATE AND SODIUM CHLORIDE 50 ML/HR: 5; .45 INJECTION, SOLUTION INTRAVENOUS at 20:29

## 2021-04-29 RX ADMIN — INSULIN LISPRO 2 UNITS: 100 INJECTION, SOLUTION INTRAVENOUS; SUBCUTANEOUS at 12:37

## 2021-04-29 RX ADMIN — Medication 1 TABLET: at 09:36

## 2021-04-29 RX ADMIN — ISOSORBIDE MONONITRATE 30 MG: 30 TABLET ORAL at 09:36

## 2021-04-29 RX ADMIN — PIPERACILLIN AND TAZOBACTAM 3.38 G: 3; .375 INJECTION, POWDER, LYOPHILIZED, FOR SOLUTION INTRAVENOUS at 12:36

## 2021-04-29 NOTE — PROGRESS NOTES
Palliative Medicine  DR. ENNIS'S [facility] [place]: [phone] ([phone]  [facility]: [address] [place] [address] Way: [phone]    Patient Name: Juana Ziegler. YOB: 1935    Date of follow up 4/29/2021   Reason for Consult: goals of care   Requesting Provider: Ms Sheila Gaffney. NP   Primary Care Physician: None      SUMMARY:   Juana Enrique is a 82y.o. year old with a past history of dementia, CAD, CKD, T2DM, PVD and COPD , who was admitted on 4/23/2021 from [facility]  with a diagnosis of acute cholecystitis . Current medical issues leading to Palliative Medicine involvement include: 82year old gentleman who is a resident of [facility] with dementia presents with  RLQ abdominal pain found to have cholecystitis. Palliative medicine is consulted for goals of care discussions. 4/29/2021 much more alert and talking. Eating. POST completed by daughter today. PALLIATIVE DIAGNOSES:   1. Goals of care   2. Dementia  3. Acute cholecystitis   4. Debility        PLAN:   1. 4/29/2021 Mr Sheila Gaffney is much more alert and talking today. He denies pain. Able to feed himself lunch. He was working with OT when we saw. His daughter Minh Garner at bedside. We have previously discussed goals of care, benefits and burdens Minh Garner did not wish to subject him to these efforts. She is agreeable to completing POST form today. Goals of care DNR/DNI limited interventions. We also discussed benefits and burdens of feeding tubes, daughter did not wish for any feeding tubes, Briefly discussed hospice in the event of decline. DNR/DNI limited interventions, no feeding tubes. POST form is on file. ( please see notes  below per palliative team)   2. Goals of care Mr Sheila Gaffney seen along with Ms Logan Minaya RN. He would alert to his name, try to answer simple questions, but at time we saw not able. We told us he was in pain but not able to localize. He is not able to participate in his medical decisions.  There is an AMD on file naming his wife Elsy De Los Santos as MPOA and daughter Erich Mari as secondary MPOA. We spoke with Erich Lipscombeker via phone today. Her mother Sergio Huber) has passed, Erich Mari is MPOA. Discussed with Erich Mrai valera of MPOA as directed in his AMD. Goals of care discussed including benefits and burdens, unfortunately this efforts will not cure or reverse dementia and if he were to survive cardiac arrest would likely be much more functionally debilitated. Erich Lipscombeker shared she would not wish to subject her Dad to these efforts. Goals of care DNR/DNI. Erich Mari will plan to come into tomorrow to see her Dad, we plan on meeting for POST Completion. Goals of care DNR/DNI limited interventions. Attending and BSRN aware of goals of care change. 3. Dementia verbal but not able to completely answer questions. Not able to participate in his medical decisions. FAST of 6 C.   4. Cholecystitis seen by surgery, on IVAB conservative treatment at present  5. Debility Lives at [facility] needs assistance with ADL's. Per daughter wheelchair bound. PT/ OT on board per notes needs max cues for bed mobility. Incontinent of stool. PPS of 50 indicating considerable debility  6. Initial consult note routed to primary continuity provider  7. Communicated plan of care with: Palliative IDT, patient       Patient/Health Care Proxy Stated Goals: Prolong life      TREATMENT PREFERENCES:   Code Status: DNR/DNI     Advance Care Planning:  [] The [facility] Interdisciplinary Team has updated the ACP Navigator with [address] and Patient Capacity    Primary Decision Maker ([address]): daughter Doreen Balderrama   AMD on file   Britta Ochoa which is named as primary as since passed    Medical Interventions: Limited additional interventions   Artificially Administered Nutrition: No feeding tube     Other:  As far as possible, the palliative care team has discussed with patient / health care proxy about goals of care / treatment preferences for patient.      HISTORY: History obtained from: chart and daughter     CHIEF COMPLAINT: cholecystitis     HPI/SUBJECTIVE:    The patient is:   [] Verbal and participatory  [x] Non-participatory due to:  Dementia   Please see summary   4/26/2021   Alert but confused. Clinical Pain Assessment (nonverbal scale for nonverbal patients): Clinical Pain Assessment  Severity: 0          Duration: for how long has pt been experiencing pain (e.g., 2 days, 1 month, years)  Frequency: how often pain is an issue (e.g., several times per day, once every few days, constant)     FUNCTIONAL ASSESSMENT:     Palliative Performance Scale (PPS):  PPS: 50    ECOG  ECOG Status : Completely disabled     PSYCHOSOCIAL/SPIRITUAL SCREENING:      Any spiritual / Amish concerns: unable to assess for patient   [] Yes /  [] No    Caregiver Burnout:  [] Yes /  [x] No /  [] No Caregiver Present      Anticipatory grief assessment: unable to assess for patient   [] Normal  / [] Maladaptive        REVIEW OF SYSTEMS:     Positive and pertinent negative findings in ROS are noted above in HPI. The following systems were [] reviewed / [x] unable to be reviewed as noted in HPI  Other findings are noted below. Systems: constitutional, ears/nose/mouth/throat, respiratory, gastrointestinal, genitourinary, musculoskeletal, integumentary, neurologic, psychiatric, endocrine. Positive findings noted below. Modified ESAS Completed by: provider   Fatigue: 5 Drowsiness: 0     Pain: 0           Dyspnea: 0     Constipation: No     Stool Occurrence(s): 1        PHYSICAL EXAM:     Wt Readings from Last 3 Encounters:   04/23/21 82.7 kg (182 lb 5 oz)   12/11/20 74.8 kg (165 lb)   12/12/19 88.5 kg (195 lb 1.6 oz)     Blood pressure (!) 159/76, pulse 76, temperature 97.8 °F (36.6 °C), resp. rate 18, height 6' 2\" (1.88 m), weight 82.7 kg (182 lb 5 oz), SpO2 96 %.   Pain:  Pain Scale 1: Visual  Pain Intensity 1: 0     Pain Location 1: Generalized        Pain Intervention(s) 1: Medication (see MAR)  Last bowel movement: none recorded     Constitutional: sitting up in bed alert talking laughing   Cardiovascular: RRR  Respiratory: breathing not labored   Skin: warm and dry  Neurologic: alert, talking still a bit confused but feeding himself        HISTORY:     Principal Problem:    Acute cholecystitis (4/23/2021)    Active Problems:    HTN (hypertension) (11/19/2012)      DM (diabetes mellitus) ([facility] (11/19/2012)      CKD (chronic kidney disease) stage 3, GFR 30-59 ml/min ([facility]) (5/20/2013)      CAD (coronary artery disease) (8/29/2016)      COPD (chronic obstructive pulmonary disease) ([facility] (8/29/2016)      S/P CABG x 2 (10/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 Chronic LICA occlusion. Similar to study of 3/29/16.  Carotid stenosis ([facility])     w/ known total occlusion of lt internal carotid artery; followed by pts vascular surgeon    Chronic kidney disease     COPD (chronic obstructive pulmonary disease) ([facility]     Dementia ([facility]     Depression     Diabetes ([facility]     type 2    Dyslipidemia     on Crestor; managed by pts PCP    Dyspnea     Hypercholesterolemia     Hypertension     Low back pain     Osteoarthrosis involving multiple sites     Polycythemia 12/9/2019    Skin cancer ([address]     squamous cell lesions of the skin    Stroke ([facility]     Venous (peripheral) insufficiency       Past Surgical History:   Procedure Laterality Date    CARDIAC CATHETERIZATION  08/24/2016    Mod calcification. Co-dom. oLM 50-70%. LAD 30%. Dx 30%. Cx 70% focal.  OM 80% focal.  RCA 30%.       HX ANGIOPLASTY  2009    2 stents placed and heart attack during the procedure    HX COLONOSCOPY  10/2003    neg; declines f/u    HX CORONARY ARTERY BYPASS GRAFT  2016     LIMA to LAD and SVG to OM1    HX HEART CATHETERIZATION  2013    HX HERNIA REPAIR      1970s    HX TONSIL AND ADENOIDECTOMY        Family History   Problem Relation Age of Onset    Parkinsonism Mother     Hypertension Mother     Cancer Father         lung    Heart defect Brother      History reviewed, no pertinent family history.   Social History     Tobacco Use    Smoking status: Former Smoker     Years: 2.00     Quit date: 1955     Years since quittin.2    Smokeless tobacco: Never Used   Substance Use Topics    Alcohol use: Yes     Frequency: 2-4 times a month     Comment: drinks weekly couple beers     Allergies   Allergen Reactions    Amaryl [Glimepiride] Drowsiness    Lipitor [Atorvastatin] Myalgia    Niacin Other (comments)     Facial blistering, swelling in face    Pravachol [Pravastatin] Myalgia    Zocor [Simvastatin] Myalgia      Current Facility-Administered Medications   Medication Dose Route Frequency    cloNIDine HCL (CATAPRES) tablet 0.1 mg  0.1 mg Oral BID    insulin lispro (HUMALOG) injection   SubCUTAneous AC&HS    piperacillin-tazobactam (ZOSYN) 3.375 g in 0.9% sodium chloride (MBP/ADV) 100 mL MBP  3.375 g IntraVENous Q6H    sodium chloride (NS) flush 5-40 mL  5-40 mL IntraVENous Q8H    sodium chloride (NS) flush 5-40 mL  5-40 mL IntraVENous PRN    acetaminophen (TYLENOL) tablet 650 mg  650 mg Oral Q6H PRN    Or    acetaminophen (TYLENOL) suppository 650 mg  650 mg Rectal Q6H PRN    polyethylene glycol (MIRALAX) packet 17 g  17 g Oral DAILY PRN    promethazine (PHENERGAN) tablet 12.5 mg  12.5 mg Oral Q6H PRN    Or    ondansetron (ZOFRAN) injection 4 mg  4 mg IntraVENous Q6H PRN    enoxaparin (LOVENOX) injection 40 mg  40 mg SubCUTAneous DAILY    glucose chewable tablet 16 g  4 Tab Oral PRN    glucagon (GLUCAGEN) injection 1 mg  1 mg IntraMUSCular PRN    dextrose (D50W) injection syrg 12.5-25 g  25-50 mL IntraVENous PRN    dextrose 5 % - 0.45% NaCl infusion  50 mL/hr IntraVENous CONTINUOUS    morphine injection 1 mg  1 mg IntraVENous Q4H PRN    naloxone (NARCAN) injection 0.4 mg  0.4 mg IntraVENous PRN    aspirin delayed-release tablet 81 mg  81 mg Oral DAILY    cycloSPORINE (RESTASIS) 0.05 % ophthalmic emulsion 1 Drop  1 Drop Both Eyes Q12H    isosorbide mononitrate ER (IMDUR) tablet 30 mg  30 mg Oral DAILY    memantine (NAMENDA) tablet 10 mg  10 mg Oral DAILY    metoprolol succinate (TOPROL-XL) XL tablet 50 mg  50 mg Oral DAILY    multivitamin, tx-iron-ca-min (THERA-M w/ IRON) tablet 1 Tab  1 Tab Oral DAILY    QUEtiapine (SEROquel) tablet 50 mg  50 mg Oral BID    temazepam (RESTORIL) capsule 7.5 mg  7.5 mg Oral QHS    gabapentin (NEURONTIN) capsule 300 mg  300 mg Oral DAILY        LAB AND IMAGING FINDINGS:     Lab Results   Component Value Date/Time    WBC 14.9 (H) 04/29/2021 05:07 AM    HGB 14.3 04/29/2021 05:07 AM    PLATELET 067 47/16/7491 05:07 AM     Lab Results   Component Value Date/Time    Sodium 139 04/29/2021 05:07 AM    Potassium 3.6 04/29/2021 05:07 AM    Chloride 106 04/29/2021 05:07 AM    CO2 29 04/29/2021 05:07 AM    BUN 10 04/29/2021 05:07 AM    Creatinine 1.29 04/29/2021 05:07 AM    Calcium 8.2 (L) 04/29/2021 05:07 AM    Magnesium 2.5 12/10/2019 11:40 AM    Phosphorus 2.5 12/10/2019 11:40 AM      Lab Results   Component Value Date/Time    Alk.  phosphatase 154 (H) 04/26/2021 01:45 AM    Protein, total 5.6 (L) 04/26/2021 01:45 AM    Albumin 2.2 (L) 04/26/2021 01:45 AM    Globulin 3.4 04/26/2021 01:45 AM     Lab Results   Component Value Date/Time    INR 1.1 10/23/2017 08:05 PM    Prothrombin time 13.4 10/23/2017 08:05 PM    aPTT 41.0 (H) 04/23/2021 12:45 PM      No results found for: IRON, FE, TIBC, IBCT, PSAT, FERR   No results found for: PH, PCO2, PO2  No components found for: [place]   Lab Results   Component Value Date/Time     04/23/2021 12:45 PM    CK - MB 1.2 04/23/2021 12:45 PM              Total time: 35 minutes  Counseling / coordination time, spent as noted above:   > 50% counseling / coordination: yes Yovany    Prolonged service was provided for  []30 min   []75 min in face to face time in the presence of the patient, spent as noted above.   Time Start:   Time End:

## 2021-04-29 NOTE — PROGRESS NOTES
Progress Note      Patient: Brenda Amato. Sex: male          DOA: 4/23/2021       YOB: 1935      Age:  82 y.o.        LOS:  LOS: 6 days             CHIEF COMPLAINT:  Acute cholecystitis in the setting of dementia    Subjective:     Patient awake and talking  Mild abdominal pain    Objective:      Visit Vitals  BP (!) 159/76   Pulse 76   Temp 97.8 °F (36.6 °C)   Resp 18   Ht 6' 2\" (1.88 m)   Wt 82.7 kg (182 lb 5 oz)   SpO2 96%   BMI 23.41 kg/m²       Physical Exam:  Gen:  Patient is in no distress. No complaint  HEENT and Neck:  PERRL, No cervical tenderness or masses  Lungs:  Clear bilaterally. No rales, no wheeze. Normal effort. Heart:  Regular Rate and Rhythm. No murmur or gallop. No JVD. No edema.   Abdomen:  Soft, non tender, no masses, no Hepatosplenomegally  Extremities:  No digital clubbing, no cyanosis  Neuro:  Alert and oriented, Normal affect, Cranial nerves intact, No sensory deficits        Lab/Data Reviewed:  BMP:   Lab Results   Component Value Date/Time     04/29/2021 05:07 AM    K 3.6 04/29/2021 05:07 AM     04/29/2021 05:07 AM    CO2 29 04/29/2021 05:07 AM    AGAP 4 04/29/2021 05:07 AM     (H) 04/29/2021 05:07 AM    BUN 10 04/29/2021 05:07 AM    CREA 1.29 04/29/2021 05:07 AM    GFRAA >60 04/29/2021 05:07 AM    GFRNA 53 (L) 04/29/2021 05:07 AM     CBC:   Lab Results   Component Value Date/Time    WBC 14.9 (H) 04/29/2021 05:07 AM    HGB 14.3 04/29/2021 05:07 AM    HCT 43.3 04/29/2021 05:07 AM     04/29/2021 05:07 AM           Assessment/Plan     Principal Problem:    Acute cholecystitis (4/23/2021)    Active Problems:    HTN (hypertension) (11/19/2012)      DM (diabetes mellitus) ([facility] [address] (11/19/2012)      CKD (chronic kidney disease) stage 3, GFR 30-59 ml/min ([facility]) (5/20/2013)      CAD (coronary artery disease) (8/29/2016)      COPD (chronic obstructive pulmonary disease) ([facility] (8/29/2016)      Mixed hyperlipidemia (1/24/2017)      S/P CABG x 2 (10/24/2016)      Goals of care, counseling/discussion ()      Dementia without behavioral disturbance (HCC) ()      Debility ()        Plan:  Discussed with surgery  Continue antibiotics  Very poor candidate for surgery.   Concern that he would pull out percutaneous biliary drain  Follow CBC in AM

## 2021-04-29 NOTE — ROUTINE PROCESS
Bedside shift change report given to Dawit Claire RN (oncoming nurse) by Germán Sanchez RN (offgoing nurse). Report included the following information SBAR, Kardex, Procedure Summary, Intake/Output, MAR and Recent Results. Opportunity for questions and clarification was provided.

## 2021-04-29 NOTE — ACP (ADVANCE CARE PLANNING)
Advanced Steps Advance Care Planning Conversation        Date of conversation:                     [address] [facility]   Participants:  Pt spouse is . [x]? Patient               [x]? Healthcare agent (already designated in existing ACP document)                          Name: Gia Salgado                           Relationship to Patient:daughter                           Phone number: [phone]      Advanced Steps® ACP Facilitator: Dwayne Lord RN, Jose Alejandro Sosa NP         Conversation Topics      Krissy Canela Jr. is a 82 y. o. year old with a past history of dementia, CAD, CKD, T2DM, PVD and COPD , who was admitted on 2021 from [facility]  with a diagnosis of acute cholecystitis .  Mr Sil Harvey seen along at bedside with Jose Alejandro Sosa, KATHY and this writer. He awake, alert and eating his lunch without assistance. He was alert x 2 (person and place). Pt is . Pt has Medical POA on file, It names his spouse as primary and his daughter, Neymar Han as surrogate. Spoke with daughter at bedside, who confirmed that spouse is , making her primary health care decision maker. Discussed with Nazario Traylor valera [facility] as directed in his AMD. Goals of care discussed including benefits and burdens, unfortunately this efforts will not cure or reverse dementia and if he were to survive cardiac arrest would likely be much more functionally debilitated. Nazario Traylor shared she would not wish to subject her Dad to these efforts. Goals of care DNR/DNI. Nazario Traylor was present at bedside to complete Physician Order for Scope of Treatment POST form. Discussed the benefits and burdens of artifical nutrition via NG or PEG tube. Explained that a feeding tube does not prevent a patient from aspiration of his oral secretions. If he is confused, hand restraints may be required to prevent patient for pulling the tube feeding tube out.  Ms. Bronston Hard that her Father enjoys eating. She agrees with NO Feeding Tube for any reason.         Goals of care DNR/DNI limited interventions. NO feeding Tube. Attending and BSRN aware of goals of care change.      Cardiopulmonary Resuscitation          Order Elected for CPR:  []? Attempt Resuscitation   [x]? Do Not Attempt Resuscitation     When NOT in Cardiopulmonary Arrest, Order Elected:       []? Comfort Measures  [x]? Limited Additional Interventions  []? Full Interventions     Artificially Administered Nutrition, Order Elected:    [x]? No Feeding Tube   []? Feeding Tube for a defined trial period  []? Feeding Tube long-term if indicated     The following was provided (check all that apply):                            Healthcare Decision Information:              [x]? Help with Breathing Facts              []? Tube Feeding Facts              [x]? CPR Facts                 [x]? Review of existing Advance Directive  []? Assistance with Completion of New Advance Directive   [x]? Review of [place] POST Form                                   Meeting Outcomes: Follow-up plan:                []? Schedule follow-up conversation to continue planning              []? Referred individual to Provider for additional questions/concerns   [x]? Advised patient/agent/surrogate to review completed POST form and update if needed with changes in condition, patient preferences or care setting. Informed Ms. Chicho Escamilla that if patients swallowing declines she may consider him to continue to eat and drink as he please and a more comfort focused approach.       []?  This note routed to one or more involved healthcare providers     Xenia SHIPMAN, RN, [facility]  Palliative Medicine Inpatient RN  Palliative COPE Line: [phone]

## 2021-04-29 NOTE — ROUTINE PROCESS
.Bedside and Verbal shift change report given to Jose Avina (oncoming nurse) by Bong Juarez RN (offgoing nurse). Report included the following information SBAR, Kardex, Procedure Summary, Intake/Output, MAR and Recent Results.

## 2021-04-29 NOTE — PROGRESS NOTES
Problem: Self Care Deficits Care Plan (Adult)  Goal: *Acute Goals and Plan of Care (Insert Text)  Description:  Occupational Therapy Goals  Initiated 4/26/2021 within 7 day(s). 1.  Patient will perform UB dressing with moderate assistance . 2.  Patient will perform lower body dressing with maximal assistance. 3.  Patient will perform grooming with moderate assistance . 4. Patient will perform toilet transfers with maximal assistance. 5.  Patient will perform all aspects of toileting with maximal assistance. 6.  Patient will participate in upper extremity therapeutic exercise/activities with minimal assistance/contact guard assist for 3-5 minutes. 7.  Patient will utilize energy conservation techniques during functional activities with verbal, visual, and tactile cues. Prior Level of Function: Pt is a resident at [facility] per chart review. Pt was previously receiving help for self-care tasks. Pt poor historian and unable to explain what he was receiving help with prior to hospital admission. Outcome: Progressing Towards Goal   OCCUPATIONAL THERAPY TREATMENT    Patient: Zoraida Christopher. (82 y.o. male)  Date: 4/29/2021  Diagnosis: Leukocytosis [D72.829]  Acute cholecystitis [K81.0] Acute cholecystitis       Precautions: Fall, Skin    Chart, occupational therapy assessment, plan of care, and goals were reviewed. ASSESSMENT:  Pt is pleasantly confused (oriented to self only) w/supportive daughter at bedside. Condom catheter dislodged. Pt requires Total Assist for dafne-care at bed level. Pt agreeable to EOB activity, requiring Max Assist for bed mobility 2/2 generalized weakness. Pt tolerates sitting EOB ~ 15 minutes performing ADL grooming tasks w/SBA. Pt returned to supine and left w/all needs within reach. Educated daughter on benefits of short SNF stay to increase overall strength for carryover w/ADLs and minimize risk of falls.    Progression toward goals:  []          Improving appropriately and progressing toward goals  [x]          Improving slowly and progressing toward goals  []          Not making progress toward goals and plan of care will be adjusted     PLAN:  Patient continues to benefit from skilled intervention to address the above impairments. Continue treatment per established plan of care. Discharge Recommendations:  Fredy Mcdaniel  Further Equipment Recommendations for Discharge:  N/A     SUBJECTIVE:   Patient stated I was a  in the [employer].     OBJECTIVE DATA SUMMARY:   Cognitive/Behavioral Status:  Neurologic State: Alert  Orientation Level: Oriented to person  Cognition: Follows commands  Safety/Judgement: Fall prevention    Functional Mobility and Transfers for ADLs:   Bed Mobility:  Rolling: Minimum assistance  Supine to Sit: Maximum assistance(w/HOB raised and SR)  Sit to Supine: Moderate assistance(assist w/BLE)  Scooting: Minimum assistance   Balance:  Sitting: Impaired    ADL Intervention:  Grooming  Position Performed: Seated edge of bed  Washing Face: Stand-by assistance  Washing Hands: Stand-by assistance  Brushing Teeth: Stand-by assistance    Pain:  Pain level pre-treatment: 0/10   Pain level post-treatment: 0/10    Activity Tolerance:    Good at EOB    Please refer to the flowsheet for vital signs taken during this treatment. After treatment:   []  Patient left in no apparent distress sitting up in chair  [x]  Patient left in no apparent distress in bed  [x]  Call bell left within reach  [x]  Nursing notified  [x]  daughter present  []  Bed alarm activated    COMMUNICATION/EDUCATION:   [] Role of Occupational Therapy in the acute care setting  [] Home safety education was provided and the patient/caregiver indicated understanding. [] Patient/family have participated as able in working towards goals and plan of care. [] Patient/family agree to work toward stated goals and plan of care.   [x] Patient/family understands intent and goals of therapy, but is neutral about his/her participation. [] Patient is unable to participate in goal setting and plan of care.       Thank you for this referral.  ASHIA Leslie  Time Calculation: 35 mins

## 2021-04-29 NOTE — PROGRESS NOTES
Problem: Diabetes Self-Management  Goal: *Disease process and treatment process  Description: Define diabetes and identify own type of diabetes; list 3 options for treating diabetes. Outcome: Progressing Towards Goal  Goal: *Incorporating nutritional management into lifestyle  Description: Describe effect of type, amount and timing of food on blood glucose; list 3 methods for planning meals. Outcome: Progressing Towards Goal  Goal: *Incorporating physical activity into lifestyle  Description: State effect of exercise on blood glucose levels. Outcome: Progressing Towards Goal  Goal: *Developing strategies to promote health/change behavior  Description: Define the ABC's of diabetes; identify appropriate screenings, schedule and personal plan for screenings. Outcome: Progressing Towards Goal  Goal: *Using medications safely  Description: State effect of diabetes medications on diabetes; name diabetes medication taking, action and side effects. Outcome: Progressing Towards Goal  Goal: *Monitoring blood glucose, interpreting and using results  Description: Identify recommended blood glucose targets  and personal targets. Outcome: Progressing Towards Goal  Goal: *Prevention, detection, treatment of acute complications  Description: List symptoms of hyper- and hypoglycemia; describe how to treat low blood sugar and actions for lowering  high blood glucose level. Outcome: Progressing Towards Goal  Goal: *Prevention, detection and treatment of chronic complications  Description: Define the natural course of diabetes and describe the relationship of blood glucose levels to long term complications of diabetes.   Outcome: Progressing Towards Goal  Goal: *Developing strategies to address psychosocial issues  Description: Describe feelings about living with diabetes; identify support needed and support network  Outcome: Progressing Towards Goal  Goal: *Insulin pump training  Outcome: Progressing Towards Goal  Goal: *Sick day guidelines  Outcome: Progressing Towards Goal  Goal: *Patient Specific Goal (EDIT GOAL, INSERT TEXT)  Outcome: Progressing Towards Goal     Problem: Patient Education: Go to Patient Education Activity  Goal: Patient/Family Education  Outcome: Progressing Towards Goal     Problem: Patient Education: Go to Patient Education Activity  Goal: Patient/Family Education  Outcome: Progressing Towards Goal     Problem: Falls - Risk of  Goal: *Absence of Falls  Description: Document Russ Conklin Fall Risk and appropriate interventions in the flowsheet. Outcome: Progressing Towards Goal  Note: Fall Risk Interventions:  Mobility Interventions: Strengthening exercises (ROM-active/passive), Utilize walker, cane, or other assistive device, PT Consult for mobility concerns, PT Consult for assist device competence    Mentation Interventions: Bed/chair exit alarm    Medication Interventions: Patient to call before getting OOB, Teach patient to arise slowly    Elimination Interventions: Bed/chair exit alarm, Call light in reach              Problem: Patient Education: Go to Patient Education Activity  Goal: Patient/Family Education  Outcome: Progressing Towards Goal     Problem: Pressure Injury - Risk of  Goal: *Prevention of pressure injury  Description: Document Zen Scale and appropriate interventions in the flowsheet.   Outcome: Progressing Towards Goal  Note: Pressure Injury Interventions:  Sensory Interventions: Assess changes in LOC, Keep linens dry and wrinkle-free, Minimize linen layers    Moisture Interventions: Absorbent underpads, Minimize layers, Moisture barrier    Activity Interventions: Increase time out of bed, Pressure redistribution bed/mattress(bed type)    Mobility Interventions: HOB 30 degrees or less, Pressure redistribution bed/mattress (bed type)    Nutrition Interventions: Document food/fluid/supplement intake    Friction and Shear Interventions: Apply protective barrier, creams and emollients, Minimize layers                Problem: Patient Education: Go to Patient Education Activity  Goal: Patient/Family Education  Outcome: Progressing Towards Goal     Problem: Pain  Goal: *Control of Pain  Outcome: Progressing Towards Goal     Problem: Patient Education: Go to Patient Education Activity  Goal: Patient/Family Education  Outcome: Progressing Towards Goal     Problem: Nausea/Vomiting (Adult)  Goal: *Absence of nausea/vomiting  Outcome: Progressing Towards Goal     Problem: Patient Education: Go to Patient Education Activity  Goal: Patient/Family Education  Outcome: Progressing Towards Goal     Problem: Patient Education: Go to Patient Education Activity  Goal: Patient/Family Education  Outcome: Progressing Towards Goal     Problem: Nutrition Deficit  Goal: *Optimize nutritional status  Outcome: Progressing Towards Goal

## 2021-04-29 NOTE — PROGRESS NOTES
Problem: Diabetes Self-Management  Goal: *Disease process and treatment process  Description: Define diabetes and identify own type of diabetes; list 3 options for treating diabetes. Outcome: Progressing Towards Goal  Goal: *Incorporating nutritional management into lifestyle  Description: Describe effect of type, amount and timing of food on blood glucose; list 3 methods for planning meals. Outcome: Progressing Towards Goal  Goal: *Incorporating physical activity into lifestyle  Description: State effect of exercise on blood glucose levels. Outcome: Progressing Towards Goal  Goal: *Developing strategies to promote health/change behavior  Description: Define the ABC's of diabetes; identify appropriate screenings, schedule and personal plan for screenings. Outcome: Progressing Towards Goal  Goal: *Using medications safely  Description: State effect of diabetes medications on diabetes; name diabetes medication taking, action and side effects. Outcome: Progressing Towards Goal  Goal: *Monitoring blood glucose, interpreting and using results  Description: Identify recommended blood glucose targets  and personal targets. Outcome: Progressing Towards Goal  Goal: *Prevention, detection, treatment of acute complications  Description: List symptoms of hyper- and hypoglycemia; describe how to treat low blood sugar and actions for lowering  high blood glucose level. Outcome: Progressing Towards Goal  Goal: *Prevention, detection and treatment of chronic complications  Description: Define the natural course of diabetes and describe the relationship of blood glucose levels to long term complications of diabetes.   Outcome: Progressing Towards Goal  Goal: *Developing strategies to address psychosocial issues  Description: Describe feelings about living with diabetes; identify support needed and support network  Outcome: Progressing Towards Goal  Goal: *Insulin pump training  Outcome: Progressing Towards Goal  Goal: *Sick day guidelines  Outcome: Progressing Towards Goal  Goal: *Patient Specific Goal (EDIT GOAL, INSERT TEXT)  Outcome: Progressing Towards Goal     Problem: Patient Education: Go to Patient Education Activity  Goal: Patient/Family Education  Outcome: Progressing Towards Goal     Problem: Patient Education: Go to Patient Education Activity  Goal: Patient/Family Education  Outcome: Progressing Towards Goal     Problem: Falls - Risk of  Goal: *Absence of Falls  Description: Document Tereza Pete Fall Risk and appropriate interventions in the flowsheet. Outcome: Progressing Towards Goal  Note: Fall Risk Interventions:  Mobility Interventions: Bed/chair exit alarm    Mentation Interventions: Bed/chair exit alarm    Medication Interventions: Patient to call before getting OOB    Elimination Interventions: Bed/chair exit alarm, Call light in reach              Problem: Pressure Injury - Risk of  Goal: *Prevention of pressure injury  Description: Document Zen Scale and appropriate interventions in the flowsheet.   Outcome: Progressing Towards Goal  Note: Pressure Injury Interventions:  Sensory Interventions: Assess changes in LOC    Moisture Interventions: Absorbent underpads, Apply protective barrier, creams and emollients, Internal/External urinary devices    Activity Interventions: Increase time out of bed    Mobility Interventions: HOB 30 degrees or less    Nutrition Interventions: Document food/fluid/supplement intake    Friction and Shear Interventions: Apply protective barrier, creams and emollients                Problem: Patient Education: Go to Patient Education Activity  Goal: Patient/Family Education  Outcome: Progressing Towards Goal     Problem: Pain  Goal: *Control of Pain  Outcome: Progressing Towards Goal     Problem: Nausea/Vomiting (Adult)  Goal: *Absence of nausea/vomiting  Outcome: Progressing Towards Goal     Problem: Patient Education: Go to Patient Education Activity  Goal: Patient/Family Education  Outcome: Progressing Towards Goal     Problem: Patient Education: Go to Patient Education Activity  Goal: Patient/Family Education  Outcome: Progressing Towards Goal     Problem: Nutrition Deficit  Goal: *Optimize nutritional status  Outcome: Progressing Towards Goal

## 2021-04-29 NOTE — PROGRESS NOTES
Spoke with Ada Harkins at [facility], they are willing to accept patient back at facility but, they do not accept weekend admissions. Notified MD Joanne Hernandez and MD Franchesca Drummond. Will continue to follow up.      Annita Holt RN, BSN   Care Management  [phone]

## 2021-04-30 LAB
ANION GAP SERPL CALC-SCNC: 5 MMOL/L (ref 3–18)
BASOPHILS # BLD: 0.1 K/UL (ref 0–0.1)
BASOPHILS NFR BLD: 0 % (ref 0–2)
BUN SERPL-MCNC: 11 MG/DL (ref 7–18)
BUN/CREAT SERPL: 9 (ref 12–20)
CALCIUM SERPL-MCNC: 7.6 MG/DL (ref 8.5–10.1)
CHLORIDE SERPL-SCNC: 104 MMOL/L (ref 100–111)
CO2 SERPL-SCNC: 29 MMOL/L (ref 21–32)
CREAT SERPL-MCNC: 1.25 MG/DL (ref 0.6–1.3)
DIFFERENTIAL METHOD BLD: ABNORMAL
EOSINOPHIL # BLD: 0.2 K/UL (ref 0–0.4)
EOSINOPHIL NFR BLD: 2 % (ref 0–5)
ERYTHROCYTE [DISTWIDTH] IN BLOOD BY AUTOMATED COUNT: 13.2 % (ref 11.6–14.5)
GLUCOSE BLD STRIP.AUTO-MCNC: 141 MG/DL (ref 70–110)
GLUCOSE BLD STRIP.AUTO-MCNC: 159 MG/DL (ref 70–110)
GLUCOSE BLD STRIP.AUTO-MCNC: 214 MG/DL (ref 70–110)
GLUCOSE SERPL-MCNC: 204 MG/DL (ref 74–99)
HCT VFR BLD AUTO: 41.6 % (ref 36–48)
HGB BLD-MCNC: 13.6 G/DL (ref 13–16)
LYMPHOCYTES # BLD: 1.9 K/UL (ref 0.9–3.6)
LYMPHOCYTES NFR BLD: 13 % (ref 21–52)
MAGNESIUM SERPL-MCNC: 2 MG/DL (ref 1.6–2.6)
MCH RBC QN AUTO: 28.3 PG (ref 24–34)
MCHC RBC AUTO-ENTMCNC: 32.7 G/DL (ref 31–37)
MCV RBC AUTO: 86.7 FL (ref 74–97)
MONOCYTES # BLD: 1.4 K/UL (ref 0.05–1.2)
MONOCYTES NFR BLD: 9 % (ref 3–10)
NEUTS SEG # BLD: 11.6 K/UL (ref 1.8–8)
NEUTS SEG NFR BLD: 76 % (ref 40–73)
PHOSPHATE SERPL-MCNC: 2.1 MG/DL (ref 2.5–4.9)
PLATELET # BLD AUTO: 323 K/UL (ref 135–420)
PMV BLD AUTO: 9.9 FL (ref 9.2–11.8)
POTASSIUM SERPL-SCNC: 3.2 MMOL/L (ref 3.5–5.5)
RBC # BLD AUTO: 4.8 M/UL (ref 4.35–5.65)
SODIUM SERPL-SCNC: 138 MMOL/L (ref 136–145)
WBC # BLD AUTO: 15.3 K/UL (ref 4.6–13.2)

## 2021-04-30 PROCEDURE — 85025 COMPLETE CBC W/AUTO DIFF WBC: CPT

## 2021-04-30 PROCEDURE — 65270000029 HC RM PRIVATE

## 2021-04-30 PROCEDURE — 74011250636 HC RX REV CODE- 250/636: Performed by: NURSE PRACTITIONER

## 2021-04-30 PROCEDURE — 82962 GLUCOSE BLOOD TEST: CPT

## 2021-04-30 PROCEDURE — 74011000258 HC RX REV CODE- 258: Performed by: HOSPITALIST

## 2021-04-30 PROCEDURE — 74011000250 HC RX REV CODE- 250: Performed by: NURSE PRACTITIONER

## 2021-04-30 PROCEDURE — 80048 BASIC METABOLIC PNL TOTAL CA: CPT

## 2021-04-30 PROCEDURE — 74011636637 HC RX REV CODE- 636/637: Performed by: HOSPITALIST

## 2021-04-30 PROCEDURE — 99231 SBSQ HOSP IP/OBS SF/LOW 25: CPT | Performed by: SURGERY

## 2021-04-30 PROCEDURE — 99232 SBSQ HOSP IP/OBS MODERATE 35: CPT | Performed by: HOSPITALIST

## 2021-04-30 PROCEDURE — 84100 ASSAY OF PHOSPHORUS: CPT

## 2021-04-30 PROCEDURE — 2709999900 HC NON-CHARGEABLE SUPPLY

## 2021-04-30 PROCEDURE — 74011250637 HC RX REV CODE- 250/637: Performed by: NURSE PRACTITIONER

## 2021-04-30 PROCEDURE — 83735 ASSAY OF MAGNESIUM: CPT

## 2021-04-30 PROCEDURE — 36415 COLL VENOUS BLD VENIPUNCTURE: CPT

## 2021-04-30 PROCEDURE — 74011250637 HC RX REV CODE- 250/637: Performed by: HOSPITALIST

## 2021-04-30 PROCEDURE — 97530 THERAPEUTIC ACTIVITIES: CPT

## 2021-04-30 PROCEDURE — 74011250636 HC RX REV CODE- 250/636: Performed by: HOSPITALIST

## 2021-04-30 RX ORDER — OXYCODONE AND ACETAMINOPHEN 5; 325 MG/1; MG/1
1 TABLET ORAL
Status: DISCONTINUED | OUTPATIENT
Start: 2021-04-30 | End: 2021-05-04 | Stop reason: HOSPADM

## 2021-04-30 RX ORDER — SODIUM,POTASSIUM PHOSPHATES 280-250MG
1 POWDER IN PACKET (EA) ORAL 2 TIMES DAILY
Status: COMPLETED | OUTPATIENT
Start: 2021-04-30 | End: 2021-05-02

## 2021-04-30 RX ORDER — ERYTHROMYCIN 5 MG/G
OINTMENT OPHTHALMIC EVERY 8 HOURS
Status: DISCONTINUED | OUTPATIENT
Start: 2021-04-30 | End: 2021-05-04 | Stop reason: HOSPADM

## 2021-04-30 RX ORDER — AMOXICILLIN AND CLAVULANATE POTASSIUM 875; 125 MG/1; MG/1
1 TABLET, FILM COATED ORAL 2 TIMES DAILY WITH MEALS
Status: DISCONTINUED | OUTPATIENT
Start: 2021-04-30 | End: 2021-05-04 | Stop reason: HOSPADM

## 2021-04-30 RX ADMIN — PIPERACILLIN AND TAZOBACTAM 3.38 G: 3; .375 INJECTION, POWDER, LYOPHILIZED, FOR SOLUTION INTRAVENOUS at 01:14

## 2021-04-30 RX ADMIN — MEMANTINE HYDROCHLORIDE 10 MG: 10 TABLET ORAL at 08:13

## 2021-04-30 RX ADMIN — CLONIDINE HYDROCHLORIDE 0.1 MG: 0.1 TABLET ORAL at 17:12

## 2021-04-30 RX ADMIN — POTASSIUM BICARBONATE 40 MEQ: 782 TABLET, EFFERVESCENT ORAL at 14:02

## 2021-04-30 RX ADMIN — ENOXAPARIN SODIUM 40 MG: 40 INJECTION SUBCUTANEOUS at 08:13

## 2021-04-30 RX ADMIN — QUETIAPINE FUMARATE 50 MG: 25 TABLET ORAL at 08:13

## 2021-04-30 RX ADMIN — PIPERACILLIN AND TAZOBACTAM 3.38 G: 3; .375 INJECTION, POWDER, LYOPHILIZED, FOR SOLUTION INTRAVENOUS at 05:41

## 2021-04-30 RX ADMIN — METOPROLOL SUCCINATE 50 MG: 50 TABLET, EXTENDED RELEASE ORAL at 08:13

## 2021-04-30 RX ADMIN — CYCLOSPORINE 1 DROP: 0.5 EMULSION OPHTHALMIC at 11:44

## 2021-04-30 RX ADMIN — POTASSIUM & SODIUM PHOSPHATES POWDER PACK 280-160-250 MG 1 PACKET: 280-160-250 PACK at 20:03

## 2021-04-30 RX ADMIN — CYCLOSPORINE 1 DROP: 0.5 EMULSION OPHTHALMIC at 22:26

## 2021-04-30 RX ADMIN — CLONIDINE HYDROCHLORIDE 0.1 MG: 0.1 TABLET ORAL at 08:13

## 2021-04-30 RX ADMIN — QUETIAPINE FUMARATE 50 MG: 25 TABLET ORAL at 20:03

## 2021-04-30 RX ADMIN — TEMAZEPAM 7.5 MG: 7.5 CAPSULE ORAL at 22:29

## 2021-04-30 RX ADMIN — Medication 1 TABLET: at 08:13

## 2021-04-30 RX ADMIN — INSULIN LISPRO 4 UNITS: 100 INJECTION, SOLUTION INTRAVENOUS; SUBCUTANEOUS at 12:10

## 2021-04-30 RX ADMIN — DEXTROSE MONOHYDRATE AND SODIUM CHLORIDE 50 ML/HR: 5; .45 INJECTION, SOLUTION INTRAVENOUS at 20:01

## 2021-04-30 RX ADMIN — DEXTROSE MONOHYDRATE AND SODIUM CHLORIDE 50 ML/HR: 5; .45 INJECTION, SOLUTION INTRAVENOUS at 19:35

## 2021-04-30 RX ADMIN — ERYTHROMYCIN: 5 OINTMENT OPHTHALMIC at 22:31

## 2021-04-30 RX ADMIN — ISOSORBIDE MONONITRATE 30 MG: 30 TABLET ORAL at 09:00

## 2021-04-30 RX ADMIN — ERYTHROMYCIN: 5 OINTMENT OPHTHALMIC at 13:59

## 2021-04-30 RX ADMIN — Medication 81 MG: at 08:13

## 2021-04-30 RX ADMIN — GABAPENTIN 300 MG: 300 CAPSULE ORAL at 08:13

## 2021-04-30 RX ADMIN — AMOXICILLIN AND CLAVULANATE POTASSIUM 1 TABLET: 875; 125 TABLET, FILM COATED ORAL at 17:12

## 2021-04-30 NOTE — PROGRESS NOTES
PROGRESS NOTE    Name: Fatoumata Davila. MRN: [id number]   : 1935     Date: 2021 Admission Date: 2021     Hospital Day: 8    Subjective:  Patient ate some of his breakfast this morning. No abdominal pain   Objective:    Vital Signs:  Visit Vitals  BP (!) 142/70 (BP 1 Location: Right upper arm, BP Patient Position: At rest)   Pulse 67   Temp 97.6 °F (36.4 °C)   Resp 16   Ht 6' 2\" (1.88 m)   Wt 82.7 kg (182 lb 5 oz)   SpO2 99%   BMI 23.41 kg/m²       Physical Exam:    General: in no apparent distress, well developed and well nourished, non-toxic,    HEENT: Normal,   Chest: normal   Lungs: normal air entry   Heart: Regular rate and rhythm or S1S2 present   Abdomen: abdomen is soft without significant tenderness, masses, organomegaly or guarding   Extremity: negative   Neuro: alert   Skin: Skin color, texture, turgor normal. No rashes or lesions    Data Review:  Recent Results (from the past 24 hour(s))   GLUCOSE, POC    Collection Time: 21  4:10 PM   Result Value Ref Range    Glucose (POC) 113 (H) 70 - 110 mg/dL   GLUCOSE, POC    Collection Time: 21 10:15 PM   Result Value Ref Range    Glucose (POC) 151 (H) 70 - 110 mg/dL   CBC WITH AUTOMATED DIFF    Collection Time: 21  2:35 AM   Result Value Ref Range    WBC 15.3 (H) 4.6 - 13.2 K/uL    RBC 4.80 4.35 - 5.65 M/uL    HGB 13.6 13.0 - 16.0 g/dL    HCT 41.6 36.0 - 48.0 %    MCV 86.7 74.0 - 97.0 FL    MCH 28.3 24.0 - 34.0 PG    MCHC 32.7 31.0 - 37.0 g/dL    RDW 13.2 11.6 - 14.5 %    PLATELET 574 773 - 833 K/uL    MPV 9.9 9.2 - 11.8 FL    NEUTROPHILS 76 (H) 40 - 73 %    LYMPHOCYTES 13 (L) 21 - 52 %    MONOCYTES 9 3 - 10 %    EOSINOPHILS 2 0 - 5 %    BASOPHILS 0 0 - 2 %    ABS. NEUTROPHILS 11.6 (H) 1.8 - 8.0 K/UL    ABS. LYMPHOCYTES 1.9 0.9 - 3.6 K/UL    ABS. MONOCYTES 1.4 (H) 0.05 - 1.2 K/UL    ABS. EOSINOPHILS 0.2 0.0 - 0.4 K/UL    ABS.  BASOPHILS 0.1 0.0 - 0.1 K/UL    DF AUTOMATED     METABOLIC PANEL, BASIC    Collection Time: 21 2:35 AM   Result Value Ref Range    Sodium 138 136 - 145 mmol/L    Potassium 3.2 (L) 3.5 - 5.5 mmol/L    Chloride 104 100 - 111 mmol/L    CO2 29 21 - 32 mmol/L    Anion gap 5 3.0 - 18 mmol/L    Glucose 204 (H) 74 - 99 mg/dL    BUN 11 7.0 - 18 MG/DL    Creatinine 1.25 0.6 - 1.3 MG/DL    BUN/Creatinine ratio 9 (L) 12 - 20      GFR est AA >60 >60 ml/min/1.73m2    GFR est non-AA 55 (L) >60 ml/min/1.73m2    Calcium 7.6 (L) 8.5 - 10.1 MG/DL   MAGNESIUM    Collection Time: 04/30/21  2:35 AM   Result Value Ref Range    Magnesium 2.0 1.6 - 2.6 mg/dL   PHOSPHORUS    Collection Time: 04/30/21  2:35 AM   Result Value Ref Range    Phosphorus 2.1 (L) 2.5 - 4.9 MG/DL   GLUCOSE, POC    Collection Time: 04/30/21  7:42 AM   Result Value Ref Range    Glucose (POC) 159 (H) 70 - 110 mg/dL   GLUCOSE, POC    Collection Time: 04/30/21 11:57 AM   Result Value Ref Range    Glucose (POC) 214 (H) 70 - 110 mg/dL       Current Medications:  No current facility-administered medications on file prior to encounter. Current Outpatient Medications on File Prior to Encounter   Medication Sig Dispense Refill    temazepam (RESTORIL) 7.5 mg capsule Take 7.5 mg by mouth nightly.  QUEtiapine (SEROqueL) 50 mg tablet Take 50 mg by mouth two (2) times a day.  memantine (Namenda) 10 mg tablet Take 10 mg by mouth daily.  metoprolol succinate (Toprol XL) 50 mg XL tablet Take 50 mg by mouth daily.  erythromycin (ILOTYCIN) ophthalmic ointment Apply 1/2 inch ribbon OS every 6 hours x 7 days 3.5 g 0    diclofenac (VOLTAREN) 1 % gel Apply 2 g to affected area four (4) times daily. 100 g 0    ezetimibe (ZETIA) 10 mg tablet Take 1 Tab by mouth nightly.  insulin detemir U-100 (LEVEMIR U-100 INSULIN) 100 unit/mL injection 60 Units by SubCUTAneous route daily. Indications: type 2 diabetes mellitus      erythromycin (ILOTYCIN) ophthalmic ointment Apply to both lower eyelids four times a day until done.  1 Tube 0    aspirin delayed-release 81 mg tablet Take 1 Tab by mouth daily. Indications: Cerebral Thromboembolism Prevention 30 Tab 0    cycloSPORINE (RESTASIS) 0.05 % ophthalmic emulsion Administer 1 Drop to both eyes every twelve (12) hours. Indications: Dry Eye 15 Each 0    gabapentin (NEURONTIN) 300 mg capsule Take 1 Cap by mouth daily. Indications: NEUROPATHIC PAIN, Restless Legs Syndrome 30 Cap 0    isosorbide mononitrate ER (IMDUR) 30 mg tablet Take 1 Tab by mouth daily. 30 Tab 0    multivit-min-FA-lycopen-lutein (CENTRUM SILVER) 0.4-300-250 mg-mcg-mcg tab Take 1 tab daily 30 Tab 0    furosemide (LASIX) 20 mg tablet Take 1 Tab by mouth daily. Indications: Edema 30 Tab 0    famotidine (PEPCID) 20 mg tablet Take 1 Tab by mouth two (2) times a day. Indications: PREVENTION OF STRESS ULCER 30 Tab 0    CALCIUM CARBONATE (CALTRATE 600 PO) Take 1 Tab by mouth daily.  lidocaine (LIDODERM) 5 % Apply patch to the affected area for 12 hours a day and remove for 12 hours a day. 30 Each 0    lactobacillus sp. 50 billion cpu (BIO-K PLUS) 50 billion cell -375 mg cap capsule Take 1 Cap by mouth daily. 30 Cap 0    OTHER Incentive spirometry- Use as directed 1 Each 0       Chart and notes reviewed. Data reviewed. I have evaluated and examined the patient. IMPRESSION:   · 81 y/o male presented with acute cholecystitis. Not a suitable surgical candidate. Severe dementia. DNR/DNI limited interventions   · Conservative IV abx       PLAN:/DISCUSION:   · WBC 15   · Discussed the patient's case with Dr. Edwin Lockett today. She has switched the patient over to Augmentin abx to see if he will tolerate the abx. We will monitor him over the weekend. · Continue to trend his WBC. · Dr. Edwin Lockett plans to discuss with the daughter whether she would even want any procedures for her father. Otherwise, he will continue with abx. · If she is amendable to procedures, will consider IR perc ruthann drain next week, if patient's WBC continues to be high.          Chauncey Amador Franchesca Drummond MD

## 2021-04-30 NOTE — PROGRESS NOTES
Shift Summary Note:  Assumed care of patient in bed awake picking up cup and drinking without difficulty. Patient oriented to self. No complaints of pain. Remains incontinent. Bed alarm on for safety, call bell within reach.   Patient Vitals for the past 12 hrs:   Temp Pulse Resp BP SpO2   04/30/21 0318 97.5 °F (36.4 °C) 75 16 (!) 166/81 98 %   04/29/21 2026 98.1 °F (36.7 °C) 77 18 (!) 165/77 97 %

## 2021-04-30 NOTE — ROUTINE PROCESS
Bedside shift change report given to GE Hendricks (oncoming nurse) by Familia Barrientos RN (offgoing nurse). Report included the following information SBAR, Kardex, Procedure Summary, Intake/Output, MAR and Recent Results. Opportunity for questions and clarification was provided.

## 2021-04-30 NOTE — PROGRESS NOTES
Discharge plan remains for patient to go back to [address] with [facility]. Confirmed with [facility] that they will accept patient back but, they do not accept weekend admissions. Confirmed with [facility] that they will resume his PT/OT/SN when patient is discharged. Patient added in SARAH and matched to [facility]. Patient needs resumption of care orders, notified MD Becerra. Patient will need medical transport back to [facility] at discharge. PCS filled out and placed in chart. Will continue to follow up.      Uzair Yin RN, BSN   Care Management  [phone]

## 2021-04-30 NOTE — PROGRESS NOTES
Problem: Diabetes Self-Management  Goal: *Disease process and treatment process  Description: Define diabetes and identify own type of diabetes; list 3 options for treating diabetes.   Outcome: Not Progressing Towards Goal

## 2021-04-30 NOTE — PROGRESS NOTES
Bedside sjhift received from [facility] with sbar  Alert to person only  No com[laints of pain   Physio in with patient  Refused b.s. per nurses aid  Bedside shift report given to Ruthie with sbar

## 2021-04-30 NOTE — PROGRESS NOTES
Problem: Mobility Impaired (Adult and Pediatric)  Goal: *Acute Goals and Plan of Care (Insert Text)  Description: Physical Therapy Goals  Initiated 4/24/2021 and to be accomplished within 7 day(s)  1. Patient will move from supine to sit and sit to supine , scoot up and down, and roll side to side in bed with moderate assistance . 2.  Patient will transfer from bed to chair and chair to bed with maximal assistance using the least restrictive device. 3.  Patient will perform sit to stand with moderate assistance . PLOF: Per EMR review, pt resides at [facility], has hx of falls. Dementia at baseline  Outcome: Progressing Towards Goal     PHYSICAL THERAPY TREATMENT    Patient: Caitlin Patrick (82 y.o. male)  Date: 4/30/2021  Diagnosis: Leukocytosis [D72.829]  Acute cholecystitis [K81.0] Acute cholecystitis       Precautions: Fall, Skin  PLOF: see above    ASSESSMENT:  Pt received in bed in [facility], agreeable to PT. Pt requires max A to come to sitting EOB at trunk and LEs with delayed initiation of movement noted. Pt noted to have poor sitting balance with need for constant support and L lateral lean noted. Pt given max verbal/tactile/visual cues for maintaining upright but with noted difficulty, even with scooting forward to get feet on the floor, pt able to tolerate sitting approx 10 min before fatiguing and was not safe to attempt standing this date. At end of session, pt was able to be positioned for comfort with total A x 2 for scooting up in bed. LEs elevated to float heels for pressure relief and pt left with all needs met and bed alarm on. Will continue to follow per POC. Progression toward goals:   [x]      Improving appropriately and progressing toward goals  []      Improving slowly and progressing toward goals  []      Not making progress toward goals and plan of care will be adjusted     PLAN:  Patient continues to benefit from skilled intervention to address the above impairments. Continue treatment per established plan of care. Discharge Recommendations:  [address] with [facility]   Further Equipment Recommendations for Discharge:  N/A     SUBJECTIVE:   Patient stated I feel drugged.     OBJECTIVE DATA SUMMARY:   Critical Behavior:  Neurologic State: Alert  Orientation Level: Oriented to person  Cognition: Poor safety awareness  Safety/Judgement: Fall prevention  Functional Mobility Training:  Bed Mobility:     Supine to Sit: Maximum assistance  Sit to Supine: Maximum assistance  Scooting: Maximum assistance;Assist x2         Transfers:     Not safe to complete this date    Balance:  Sitting: Impaired  Sitting - Static: Poor (constant support)  Sitting - Dynamic: Poor (constant support)    Pain:  Pain level pre-treatment: 0/10  Pain level post-treatment: 0/10       Activity Tolerance:   Good    Please refer to the flowsheet for vital signs taken during this treatment. After treatment:   [] Patient left in no apparent distress sitting up in chair  [x] Patient left in no apparent distress in bed  [x] Call bell left within reach  [x] Nursing notified  [] Caregiver present  [x] Bed alarm activated  [] SCDs applied      COMMUNICATION/EDUCATION:   [x]         Role of Physical Therapy in the acute care setting. [x]         Fall prevention education was provided and the patient/caregiver indicated understanding. [x]         Patient/family have participated as able in working toward goals and plan of care. [x]         Patient/family agree to work toward stated goals and plan of care. []         Patient understands intent and goals of therapy, but is neutral about his/her participation.   []         Patient is unable to participate in stated goals/plan of care: ongoing with therapy staff.  []         Other:        Georgeanne Bamberger   Time Calculation: 24 mins

## 2021-04-30 NOTE — PROGRESS NOTES
Problem: Falls - Risk of  Goal: *Absence of Falls  Description: Document Cardrell Canavan Fall Risk and appropriate interventions in the flowsheet.   Outcome: Progressing Towards Goal  Note: Fall Risk Interventions:  Mobility Interventions: Bed/chair exit alarm    Mentation Interventions: Bed/chair exit alarm, Door open when patient unattended    Medication Interventions: Bed/chair exit alarm    Elimination Interventions: Bed/chair exit alarm, Call light in reach

## 2021-05-01 LAB
ALBUMIN SERPL-MCNC: 2 G/DL (ref 3.4–5)
ALBUMIN/GLOB SERPL: 0.4 {RATIO} (ref 0.8–1.7)
ALP SERPL-CCNC: 158 U/L (ref 45–117)
ALT SERPL-CCNC: 47 U/L (ref 16–61)
ANION GAP SERPL CALC-SCNC: 4 MMOL/L (ref 3–18)
AST SERPL-CCNC: 50 U/L (ref 10–38)
BILIRUB DIRECT SERPL-MCNC: 0.1 MG/DL (ref 0–0.2)
BILIRUB SERPL-MCNC: 0.4 MG/DL (ref 0.2–1)
BUN SERPL-MCNC: 10 MG/DL (ref 7–18)
BUN/CREAT SERPL: 8 (ref 12–20)
CALCIUM SERPL-MCNC: 8.2 MG/DL (ref 8.5–10.1)
CHLORIDE SERPL-SCNC: 104 MMOL/L (ref 100–111)
CO2 SERPL-SCNC: 29 MMOL/L (ref 21–32)
CREAT SERPL-MCNC: 1.3 MG/DL (ref 0.6–1.3)
GLOBULIN SER CALC-MCNC: 4.7 G/DL (ref 2–4)
GLUCOSE BLD STRIP.AUTO-MCNC: 148 MG/DL (ref 70–110)
GLUCOSE BLD STRIP.AUTO-MCNC: 168 MG/DL (ref 70–110)
GLUCOSE BLD STRIP.AUTO-MCNC: 173 MG/DL (ref 70–110)
GLUCOSE BLD STRIP.AUTO-MCNC: 217 MG/DL (ref 70–110)
GLUCOSE BLD STRIP.AUTO-MCNC: 229 MG/DL (ref 70–110)
GLUCOSE SERPL-MCNC: 177 MG/DL (ref 74–99)
POTASSIUM SERPL-SCNC: 3.6 MMOL/L (ref 3.5–5.5)
PROT SERPL-MCNC: 6.7 G/DL (ref 6.4–8.2)
SODIUM SERPL-SCNC: 137 MMOL/L (ref 136–145)

## 2021-05-01 PROCEDURE — 65270000029 HC RM PRIVATE

## 2021-05-01 PROCEDURE — 82962 GLUCOSE BLOOD TEST: CPT

## 2021-05-01 PROCEDURE — 74011250637 HC RX REV CODE- 250/637: Performed by: HOSPITALIST

## 2021-05-01 PROCEDURE — 99222 1ST HOSP IP/OBS MODERATE 55: CPT | Performed by: SURGERY

## 2021-05-01 PROCEDURE — 80076 HEPATIC FUNCTION PANEL: CPT

## 2021-05-01 PROCEDURE — 77030040393 HC DRSG OPTIFOAM GENT MDII -B

## 2021-05-01 PROCEDURE — 80048 BASIC METABOLIC PNL TOTAL CA: CPT

## 2021-05-01 PROCEDURE — 2709999900 HC NON-CHARGEABLE SUPPLY

## 2021-05-01 PROCEDURE — 74011636637 HC RX REV CODE- 636/637: Performed by: HOSPITALIST

## 2021-05-01 PROCEDURE — 74011250637 HC RX REV CODE- 250/637: Performed by: NURSE PRACTITIONER

## 2021-05-01 PROCEDURE — 74011250636 HC RX REV CODE- 250/636: Performed by: NURSE PRACTITIONER

## 2021-05-01 PROCEDURE — 36415 COLL VENOUS BLD VENIPUNCTURE: CPT

## 2021-05-01 RX ADMIN — AMOXICILLIN AND CLAVULANATE POTASSIUM 1 TABLET: 875; 125 TABLET, FILM COATED ORAL at 17:19

## 2021-05-01 RX ADMIN — POTASSIUM & SODIUM PHOSPHATES POWDER PACK 280-160-250 MG 1 PACKET: 280-160-250 PACK at 08:46

## 2021-05-01 RX ADMIN — GABAPENTIN 300 MG: 300 CAPSULE ORAL at 08:45

## 2021-05-01 RX ADMIN — ERYTHROMYCIN: 5 OINTMENT OPHTHALMIC at 06:50

## 2021-05-01 RX ADMIN — POTASSIUM & SODIUM PHOSPHATES POWDER PACK 280-160-250 MG 1 PACKET: 280-160-250 PACK at 17:19

## 2021-05-01 RX ADMIN — ISOSORBIDE MONONITRATE 30 MG: 30 TABLET ORAL at 08:46

## 2021-05-01 RX ADMIN — QUETIAPINE FUMARATE 50 MG: 25 TABLET ORAL at 08:46

## 2021-05-01 RX ADMIN — ERYTHROMYCIN: 5 OINTMENT OPHTHALMIC at 17:25

## 2021-05-01 RX ADMIN — CLONIDINE HYDROCHLORIDE 0.1 MG: 0.1 TABLET ORAL at 17:19

## 2021-05-01 RX ADMIN — MEMANTINE HYDROCHLORIDE 10 MG: 10 TABLET ORAL at 08:46

## 2021-05-01 RX ADMIN — TEMAZEPAM 7.5 MG: 7.5 CAPSULE ORAL at 22:31

## 2021-05-01 RX ADMIN — AMOXICILLIN AND CLAVULANATE POTASSIUM 1 TABLET: 875; 125 TABLET, FILM COATED ORAL at 08:46

## 2021-05-01 RX ADMIN — Medication 81 MG: at 08:45

## 2021-05-01 RX ADMIN — INSULIN LISPRO 2 UNITS: 100 INJECTION, SOLUTION INTRAVENOUS; SUBCUTANEOUS at 22:31

## 2021-05-01 RX ADMIN — QUETIAPINE FUMARATE 50 MG: 25 TABLET ORAL at 17:19

## 2021-05-01 RX ADMIN — CLONIDINE HYDROCHLORIDE 0.1 MG: 0.1 TABLET ORAL at 08:46

## 2021-05-01 RX ADMIN — CYCLOSPORINE 1 DROP: 0.5 EMULSION OPHTHALMIC at 08:50

## 2021-05-01 RX ADMIN — Medication 1 TABLET: at 08:46

## 2021-05-01 RX ADMIN — METOPROLOL SUCCINATE 50 MG: 50 TABLET, EXTENDED RELEASE ORAL at 08:45

## 2021-05-01 RX ADMIN — INSULIN LISPRO 4 UNITS: 100 INJECTION, SOLUTION INTRAVENOUS; SUBCUTANEOUS at 18:28

## 2021-05-01 RX ADMIN — ENOXAPARIN SODIUM 40 MG: 40 INJECTION SUBCUTANEOUS at 08:45

## 2021-05-01 RX ADMIN — Medication 10 ML: at 22:36

## 2021-05-01 RX ADMIN — INSULIN LISPRO 4 UNITS: 100 INJECTION, SOLUTION INTRAVENOUS; SUBCUTANEOUS at 06:49

## 2021-05-01 RX ADMIN — CYCLOSPORINE 1 DROP: 0.5 EMULSION OPHTHALMIC at 22:34

## 2021-05-01 RX ADMIN — ERYTHROMYCIN: 5 OINTMENT OPHTHALMIC at 22:36

## 2021-05-01 NOTE — PROGRESS NOTES
Problem: Pain  Goal: *Control of Pain  Outcome: Progressing Towards Goal     Problem: Pain  Goal: *Control of Pain  Outcome: Progressing Towards Goal     Problem: Patient Education: Go to Patient Education Activity  Goal: Patient/Family Education  Outcome: Progressing Towards Goal     Problem: Nausea/Vomiting (Adult)  Goal: *Absence of nausea/vomiting  Outcome: Resolved/Not Met     Problem: Nutrition Deficit  Goal: *Optimize nutritional status  Outcome: Progressing Towards Goal   No c/o pain, eating with assistance.

## 2021-05-01 NOTE — PROGRESS NOTES
Progress Note      Patient: Suhail Donahue. Sex: male          DOA: 4/23/2021       YOB: 1935      Age:  82 y.o.        LOS:  LOS: 8 days               Subjective:     Patient noted piv has come out, nsg aware and applying pressure. Per nursing report, he did well with breakfast.    4:30 pm daughter cell phone went straight to . Called unit, daughter at bedside and extensive discussion held over the phone with her. She states she is 63years old, this is her cell phone, and she needs to figure out why phone is not ringing. She agrees to IR placement of drain, cardiology consult regarding preoperative clearance, and ultimately consideration of cholecystectomy if risk is felt to be acceptable. Per daughter, he has poor intake with dinner this evening. Assessment/Plan     1. acute cholecystitis. Switch to oral antibiotics and monitor. Continue to monitor p.o. intake. Consult IR regarding drain. Echo. Cardiology consult regarding perioperative management. 2. Hypertension  3. Hypokalemia. 4. 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 DM2. Diet clarified. Ssi. HS snack. 6. CKD stage 3  7. COPD   8. Dementia without behavioral disturbance   9. Dyslipidemia  10. Hypophosphatemia replete as needed  11. DVT prophylaxis  12. DNR/DNI, limited interventions per palliative care team. POST form completed. I discussed the case with Dr. Christian Goyal. Extensive discussion held with daughter at bedside, all questions answered to the best my ability. Objective:      Visit Vitals  BP (!) 192/85   Pulse 78   Temp 97.3 °F (36.3 °C)   Resp 16   Ht 6' 2\" (1.88 m)   Wt 82.7 kg (182 lb 5 oz)   SpO2 95%   BMI 23.41 kg/m²       Physical Exam:  Gen:  Patient awake and alert, answering some questions  HEENT and Neck:  PERRL, No cervical tenderness or masses  Lungs:  Clear bilateral anterior and infraaxillary fields. No rales, no wheeze. Normal effort. Heart:  Regular Rate and Rhythm.   No murmur or gallop. Abdomen:  Soft, non tender, nondistended nabs  Extremities:  No digital clubbing, no cyanosis.   No edema  Neuro:  Alert and oriented x1-2, Normal affect, Cranial nerves intact, No sensory deficits  Skin no rash to visible skin      Lab/Data Reviewed:  BMP:   Lab Results   Component Value Date/Time     05/01/2021 03:50 AM    K 3.6 05/01/2021 03:50 AM     05/01/2021 03:50 AM    CO2 29 05/01/2021 03:50 AM    AGAP 4 05/01/2021 03:50 AM     (H) 05/01/2021 03:50 AM    BUN 10 05/01/2021 03:50 AM    CREA 1.30 05/01/2021 03:50 AM    GFRAA >60 05/01/2021 03:50 AM    GFRNA 53 (L) 05/01/2021 03:50 AM     CBC:   No results found for: WBC, HGB, HGBEXT, HCT, HCTEXT, PLT, PLTEXT, HGBEXT, HCTEXT, PLTEXT

## 2021-05-01 NOTE — PROGRESS NOTES
Patient awake. Asked for \" ice cold water\" soup. Heated up soup and patient ate half of chicken salad 3 -4 teaspoon  Of soup and drinking water. No c/o pain able to turn with assistance for dafne care. 7:13 AM  Asleep & quiet no s/s of discomfort Insulin coverage given this morning, bed alarm on for safety.   Patient Vitals for the past 12 hrs:   Temp Pulse Resp BP SpO2   05/01/21 0423 97.7 °F (36.5 °C) 84 16 (!) 162/89 95 %   04/30/21 1955 97.7 °F (36.5 °C) 78 16 (!) 160/78 97 %

## 2021-05-01 NOTE — PROGRESS NOTES
General Surgery Consult      Va Escobar. Admit date: 2021    MRN: [id number]     : 1935     Age: 82 y.o. Attending Physician: Tony Jimenes MD, FACS      Subjective:     Oh Seay is a 82 y.o. male who we are following for evaluation of acute cholecystitis in the setting of multiple medical problems. No major issues over the last 24 hours. It seems that the patient appetite is not great and he has not been eating well. There has not been having significant abdominal pain and his vitals shows no fever or tachycardia. No labs back yet.     Patient Active Problem List    Diagnosis Date Noted    Goals of care, counseling/discussion     Dementia without behavioral disturbance ([address]     Debility     Acute cholecystitis 2021    Leukocytosis 2021    Fracture of nasal bones, sequela 12/10/2019    New onset seizure ([address] 2019    Polycythemia 2019    Flu 2018    Wheezing 2018    Open wound 10/24/2017    Cellulitis of right lower extremity 10/24/2017    Poorly controlled diabetes mellitus ([address] 10/24/2017    Deviated nasal septum 2017    Enuresis 2017    Mixed hyperlipidemia 2017    Type 2 diabetes mellitus without complication, with long-term current use of insulin ([address] 10/24/2016    S/P CABG x 2 10/24/2016    CAD (coronary artery disease) 2016    COPD (chronic obstructive pulmonary disease) ([address] 2016    Type 2 diabetes mellitus with stage 3 chronic kidney disease ([address] 2016    Venous (peripheral) insufficiency     Osteoarthrosis involving multiple sites     Low back pain     CHI (closed head injury) 2015    CKD (chronic kidney disease) stage 3, GFR 30-59 ml/min ([address] 2013    HTN (hypertension) 2012    Atherosclerosis of native arteries of the extremities with intermittent claudication 2012    Carotid artery occlusion without infarction 2012    DM (diabetes mellitus) ([facility] 2012    Spondylosis 2012    Spondylosis 2012    Hypercholesterolemia      Past Medical History:   Diagnosis Date    Anxiety     Arthritis     CAD (coronary artery disease)     s/p drug-eluting stents to RCA for high-grade stenoses in     Carotid duplex 2016    Mild <50% CYRUS stenosis. Chronic LICA occlusion. Similar to study of 3/29/16.  Carotid stenosis (HCC)     w/ known total occlusion of lt internal carotid artery; followed by pts vascular surgeon    Chronic kidney disease     COPD (chronic obstructive pulmonary disease) [facility]     Dementia ([facility]     Depression     Diabetes ([facility]     type 2    Dyslipidemia     on Crestor; managed by pts PCP    Dyspnea     Hypercholesterolemia     Hypertension     Low back pain     Osteoarthrosis involving multiple sites     Polycythemia 2019    Skin cancer ([facility]     squamous cell lesions of the skin    Stroke ([facility]     Venous (peripheral) insufficiency       Past Surgical History:   Procedure Laterality Date    CARDIAC CATHETERIZATION  2016    Mod calcification. Co-dom. oLM 50-70%. LAD 30%. Dx 30%. Cx 70% focal.  OM 80% focal.  RCA 30%.       HX ANGIOPLASTY      2 stents placed and heart attack during the procedure    HX COLONOSCOPY  10/2003    neg; declines f/u    HX CORONARY ARTERY BYPASS GRAFT  2016     LIMA to LAD and SVG to OM1    HX HEART CATHETERIZATION  2013    HX HERNIA REPAIR      1970s    HX TONSIL AND ADENOIDECTOMY        Social History     Tobacco Use    Smoking status: Former Smoker     Years: 2.00     Quit date: 1955     Years since quittin.3    Smokeless tobacco: Never Used   Substance Use Topics    Alcohol use: Yes     Frequency: 2-4 times a month     Comment: drinks weekly couple beers      Social History     Tobacco Use   Smoking Status Former Smoker    Years: 2.00    Quit date: 1955    Years since quittin.3   Smokeless Tobacco Never Used     Family History   Problem Relation Age of Onset    Parkinsonism Mother     Hypertension Mother     Cancer Father         lung    Heart defect Brother       Current Facility-Administered Medications   Medication Dose Route Frequency    amoxicillin-clavulanate (AUGMENTIN) 875-125 mg per tablet 1 Tab  1 Tab Oral BID WITH MEALS    erythromycin (ILOTYCIN) 5 mg/gram (0.5 %) ophthalmic ointment   Both Eyes Q8H    polyvinyl alcohol-povidon(PF) (REFRESH CLASSIC) 1.4-0.6 % ophthalmic solution 1 Drop  1 Drop Both Eyes PRN    potassium, sodium phosphates (NEUTRA-PHOS) packet 1 Packet  1 Packet Oral BID    oxyCODONE-acetaminophen (PERCOCET) 5-325 mg per tablet 1 Tab  1 Tab Oral Q8H PRN    cloNIDine HCL (CATAPRES) tablet 0.1 mg  0.1 mg Oral BID    insulin lispro (HUMALOG) injection   SubCUTAneous AC&HS    sodium chloride (NS) flush 5-40 mL  5-40 mL IntraVENous Q8H    sodium chloride (NS) flush 5-40 mL  5-40 mL IntraVENous PRN    acetaminophen (TYLENOL) tablet 650 mg  650 mg Oral Q6H PRN    Or    acetaminophen (TYLENOL) suppository 650 mg  650 mg Rectal Q6H PRN    polyethylene glycol (MIRALAX) packet 17 g  17 g Oral DAILY PRN    ondansetron (ZOFRAN) injection 4 mg  4 mg IntraVENous Q6H PRN    enoxaparin (LOVENOX) injection 40 mg  40 mg SubCUTAneous DAILY    glucose chewable tablet 16 g  4 Tab Oral PRN    glucagon (GLUCAGEN) injection 1 mg  1 mg IntraMUSCular PRN    dextrose (D50W) injection syrg 12.5-25 g  25-50 mL IntraVENous PRN    dextrose 5 % - 0.45% NaCl infusion  50 mL/hr IntraVENous CONTINUOUS    naloxone (NARCAN) injection 0.4 mg  0.4 mg IntraVENous PRN    aspirin delayed-release tablet 81 mg  81 mg Oral DAILY    cycloSPORINE (RESTASIS) 0.05 % ophthalmic emulsion 1 Drop  1 Drop Both Eyes Q12H    isosorbide mononitrate ER (IMDUR) tablet 30 mg  30 mg Oral DAILY    memantine (NAMENDA) tablet 10 mg  10 mg Oral DAILY    metoprolol succinate (TOPROL-XL) XL tablet 50 mg  50 mg Oral DAILY    multivitamin, tx-iron-ca-min (THERA-M w/ IRON) tablet 1 Tab  1 Tab Oral DAILY    QUEtiapine (SEROquel) tablet 50 mg  50 mg Oral BID    temazepam (RESTORIL) capsule 7.5 mg  7.5 mg Oral QHS    gabapentin (NEURONTIN) capsule 300 mg  300 mg Oral DAILY      Allergies   Allergen Reactions    Amaryl [Glimepiride] Drowsiness    Lipitor [Atorvastatin] Myalgia    Niacin Other (comments)     Facial blistering, swelling in face    Pravachol [Pravastatin] Myalgia    Zocor [Simvastatin] Myalgia        Review of Systems:  Pertinent items are noted in the History of Present Illness.     Objective:     Visit Vitals  BP (!) 160/78 (BP 1 Location: Right arm, BP Patient Position: At rest)   Pulse 78   Temp 97.7 °F (36.5 °C)   Resp 16   Ht 6' 2\" (1.88 m)   Wt 82.7 kg (182 lb 5 oz)   SpO2 97%   BMI 23.41 kg/m²       Physical Exam:      General:  in no apparent distress, afebrile and normal vitals                   Abdomen:   rounded, soft, nontender except for mild right upper quadrant tenderness, nondistended, no masses or organomegaly                 Imaging and Lab Review:     CBC:   Lab Results   Component Value Date/Time    WBC 15.3 (H) 04/30/2021 02:35 AM    RBC 4.80 04/30/2021 02:35 AM    HGB 13.6 04/30/2021 02:35 AM    HCT 41.6 04/30/2021 02:35 AM    PLATELET 468 21/01/3192 02:35 AM     BMP:   Lab Results   Component Value Date/Time    Glucose 204 (H) 04/30/2021 02:35 AM    Sodium 138 04/30/2021 02:35 AM    Potassium 3.2 (L) 04/30/2021 02:35 AM    Chloride 104 04/30/2021 02:35 AM    CO2 29 04/30/2021 02:35 AM    BUN 11 04/30/2021 02:35 AM    Creatinine 1.25 04/30/2021 02:35 AM    Calcium 7.6 (L) 04/30/2021 02:35 AM     CMP:  Lab Results   Component Value Date/Time    Glucose 204 (H) 04/30/2021 02:35 AM    Sodium 138 04/30/2021 02:35 AM    Potassium 3.2 (L) 04/30/2021 02:35 AM    Chloride 104 04/30/2021 02:35 AM    CO2 29 04/30/2021 02:35 AM    BUN 11 04/30/2021 02:35 AM    Creatinine 1.25 04/30/2021 02:35 AM    Calcium 7.6 (L) 04/30/2021 02:35 AM    Anion gap 5 04/30/2021 02:35 AM    BUN/Creatinine ratio 9 (L) 04/30/2021 02:35 AM    Alk. phosphatase 154 (H) 04/26/2021 01:45 AM    Protein, total 5.6 (L) 04/26/2021 01:45 AM    Albumin 2.2 (L) 04/26/2021 01:45 AM    Globulin 3.4 04/26/2021 01:45 AM    A-G Ratio 0.6 (L) 04/26/2021 01:45 AM       Recent Results (from the past 24 hour(s))   GLUCOSE, POC    Collection Time: 04/30/21  7:42 AM   Result Value Ref Range    Glucose (POC) 159 (H) 70 - 110 mg/dL   GLUCOSE, POC    Collection Time: 04/30/21 11:57 AM   Result Value Ref Range    Glucose (POC) 214 (H) 70 - 110 mg/dL   GLUCOSE, POC    Collection Time: 04/30/21 10:28 PM   Result Value Ref Range    Glucose (POC) 141 (H) 70 - 110 mg/dL       images and reports reviewed    Assessment:   Kaushik Duran is a 82 y.o. male who has multiple medical problems and he is presenting with a picture of acute cholecystitis. The patient is being observed currently however we will check his WBC today and if still increasing giving the fact that he  in the right upper quadrant he may need IR cholecystostomy tube. It seems that this is planned for Monday if he continued to get worse.      Plan:     Appreciate medicine management  Follow-up on the WBC over the next couple days  If it continues to increase despite the antibiotics and the medical therapy then the patient will need IR cholecystostomy tube  Continue with IV antibiotics    Please call me if you have any questions (cell phone: [phone])     Signed By: Antonio Carter MD     May 1, 2021

## 2021-05-01 NOTE — ROUTINE PROCESS
Bedside shift change report given to Ja Cabral RN (oncoming nurse) by Toney Oliver RN (offgoing nurse). Report included the following information SBAR, Kardex, Procedure Summary, Intake/Output, MAR and Recent Results. Opportunity for questions and clarification was provided.

## 2021-05-01 NOTE — PROGRESS NOTES
Problem: Diabetes Self-Management  Goal: *Disease process and treatment process  Description: Define diabetes and identify own type of diabetes; list 3 options for treating diabetes. Outcome: Progressing Towards Goal  Goal: *Incorporating nutritional management into lifestyle  Description: Describe effect of type, amount and timing of food on blood glucose; list 3 methods for planning meals. Outcome: Progressing Towards Goal  Goal: *Incorporating physical activity into lifestyle  Description: State effect of exercise on blood glucose levels. Outcome: Progressing Towards Goal  Goal: *Developing strategies to promote health/change behavior  Description: Define the ABC's of diabetes; identify appropriate screenings, schedule and personal plan for screenings. Outcome: Progressing Towards Goal  Goal: *Using medications safely  Description: State effect of diabetes medications on diabetes; name diabetes medication taking, action and side effects. Outcome: Progressing Towards Goal  Goal: *Monitoring blood glucose, interpreting and using results  Description: Identify recommended blood glucose targets  and personal targets. Outcome: Progressing Towards Goal  Goal: *Prevention, detection, treatment of acute complications  Description: List symptoms of hyper- and hypoglycemia; describe how to treat low blood sugar and actions for lowering  high blood glucose level. Outcome: Progressing Towards Goal  Goal: *Prevention, detection and treatment of chronic complications  Description: Define the natural course of diabetes and describe the relationship of blood glucose levels to long term complications of diabetes.   Outcome: Progressing Towards Goal  Goal: *Developing strategies to address psychosocial issues  Description: Describe feelings about living with diabetes; identify support needed and support network  Outcome: Progressing Towards Goal  Goal: *Insulin pump training  Outcome: Progressing Towards Goal  Goal: *Sick day guidelines  Outcome: Progressing Towards Goal  Goal: *Patient Specific Goal (EDIT GOAL, INSERT TEXT)  Outcome: Progressing Towards Goal     Problem: Patient Education: Go to Patient Education Activity  Goal: Patient/Family Education  Outcome: Progressing Towards Goal     Problem: Patient Education: Go to Patient Education Activity  Goal: Patient/Family Education  Outcome: Progressing Towards Goal     Problem: Falls - Risk of  Goal: *Absence of Falls  Description: Document Charline Bermudez Fall Risk and appropriate interventions in the flowsheet. Outcome: Progressing Towards Goal  Note: Fall Risk Interventions:  Mobility Interventions: Bed/chair exit alarm, Patient to call before getting OOB, Strengthening exercises (ROM-active/passive)    Mentation Interventions: Bed/chair exit alarm, More frequent rounding, Reorient patient, Toileting rounds, Update white board    Medication Interventions: Patient to call before getting OOB, Teach patient to arise slowly    Elimination Interventions: Bed/chair exit alarm, Call light in reach, Patient to call for help with toileting needs, Toileting schedule/hourly rounds              Problem: Patient Education: Go to Patient Education Activity  Goal: Patient/Family Education  Outcome: Progressing Towards Goal     Problem: Pressure Injury - Risk of  Goal: *Prevention of pressure injury  Description: Document Zen Scale and appropriate interventions in the flowsheet.   Outcome: Progressing Towards Goal  Note: Pressure Injury Interventions:  Sensory Interventions: Assess changes in LOC, Keep linens dry and wrinkle-free, Minimize linen layers    Moisture Interventions: Absorbent underpads    Activity Interventions: Increase time out of bed, Pressure redistribution bed/mattress(bed type)    Mobility Interventions: Assess need for specialty bed    Nutrition Interventions: Document food/fluid/supplement intake    Friction and Shear Interventions: Apply protective barrier, creams and emollients, Minimize layers                Problem: Patient Education: Go to Patient Education Activity  Goal: Patient/Family Education  Outcome: Progressing Towards Goal     Problem: Pain  Goal: *Control of Pain  Outcome: Progressing Towards Goal     Problem: Patient Education: Go to Patient Education Activity  Goal: Patient/Family Education  Outcome: Progressing Towards Goal     Problem: Nausea/Vomiting (Adult)  Goal: *Absence of nausea/vomiting  Outcome: Progressing Towards Goal     Problem: Patient Education: Go to Patient Education Activity  Goal: Patient/Family Education  Outcome: Progressing Towards Goal     Problem: Patient Education: Go to Patient Education Activity  Goal: Patient/Family Education  Outcome: Progressing Towards Goal     Problem: Nutrition Deficit  Goal: *Optimize nutritional status  Outcome: Progressing Towards Goal

## 2021-05-02 LAB
ALBUMIN SERPL-MCNC: 2.2 G/DL (ref 3.4–5)
ALBUMIN/GLOB SERPL: 0.4 {RATIO} (ref 0.8–1.7)
ALP SERPL-CCNC: 149 U/L (ref 45–117)
ALT SERPL-CCNC: 52 U/L (ref 16–61)
ANION GAP SERPL CALC-SCNC: 5 MMOL/L (ref 3–18)
AST SERPL-CCNC: 48 U/L (ref 10–38)
BASOPHILS # BLD: 0.1 K/UL (ref 0–0.1)
BASOPHILS NFR BLD: 1 % (ref 0–2)
BILIRUB DIRECT SERPL-MCNC: 0.1 MG/DL (ref 0–0.2)
BILIRUB SERPL-MCNC: 0.4 MG/DL (ref 0.2–1)
BUN SERPL-MCNC: 14 MG/DL (ref 7–18)
BUN/CREAT SERPL: 12 (ref 12–20)
CALCIUM SERPL-MCNC: 8.5 MG/DL (ref 8.5–10.1)
CHLORIDE SERPL-SCNC: 105 MMOL/L (ref 100–111)
CO2 SERPL-SCNC: 28 MMOL/L (ref 21–32)
CREAT SERPL-MCNC: 1.13 MG/DL (ref 0.6–1.3)
DIFFERENTIAL METHOD BLD: ABNORMAL
EOSINOPHIL # BLD: 0.3 K/UL (ref 0–0.4)
EOSINOPHIL NFR BLD: 2 % (ref 0–5)
ERYTHROCYTE [DISTWIDTH] IN BLOOD BY AUTOMATED COUNT: 13.6 % (ref 11.6–14.5)
GLOBULIN SER CALC-MCNC: 4.9 G/DL (ref 2–4)
GLUCOSE BLD STRIP.AUTO-MCNC: 121 MG/DL (ref 70–110)
GLUCOSE BLD STRIP.AUTO-MCNC: 187 MG/DL (ref 70–110)
GLUCOSE BLD STRIP.AUTO-MCNC: 203 MG/DL (ref 70–110)
GLUCOSE BLD STRIP.AUTO-MCNC: 242 MG/DL (ref 70–110)
GLUCOSE SERPL-MCNC: 118 MG/DL (ref 74–99)
HCT VFR BLD AUTO: 45.3 % (ref 36–48)
HGB BLD-MCNC: 14.4 G/DL (ref 13–16)
LYMPHOCYTES # BLD: 3 K/UL (ref 0.9–3.6)
LYMPHOCYTES NFR BLD: 21 % (ref 21–52)
MAGNESIUM SERPL-MCNC: 2.1 MG/DL (ref 1.6–2.6)
MCH RBC QN AUTO: 28.1 PG (ref 24–34)
MCHC RBC AUTO-ENTMCNC: 31.8 G/DL (ref 31–37)
MCV RBC AUTO: 88.5 FL (ref 74–97)
MONOCYTES # BLD: 1.1 K/UL (ref 0.05–1.2)
MONOCYTES NFR BLD: 8 % (ref 3–10)
NEUTS SEG # BLD: 9.4 K/UL (ref 1.8–8)
NEUTS SEG NFR BLD: 67 % (ref 40–73)
PHOSPHATE SERPL-MCNC: 2.2 MG/DL (ref 2.5–4.9)
PLATELET # BLD AUTO: 392 K/UL (ref 135–420)
PMV BLD AUTO: 10.1 FL (ref 9.2–11.8)
POTASSIUM SERPL-SCNC: 3.7 MMOL/L (ref 3.5–5.5)
PROT SERPL-MCNC: 7.1 G/DL (ref 6.4–8.2)
RBC # BLD AUTO: 5.12 M/UL (ref 4.35–5.65)
SODIUM SERPL-SCNC: 138 MMOL/L (ref 136–145)
WBC # BLD AUTO: 14.1 K/UL (ref 4.6–13.2)

## 2021-05-02 PROCEDURE — 83735 ASSAY OF MAGNESIUM: CPT

## 2021-05-02 PROCEDURE — 74011250637 HC RX REV CODE- 250/637: Performed by: NURSE PRACTITIONER

## 2021-05-02 PROCEDURE — 2709999900 HC NON-CHARGEABLE SUPPLY

## 2021-05-02 PROCEDURE — 74011636637 HC RX REV CODE- 636/637: Performed by: HOSPITALIST

## 2021-05-02 PROCEDURE — 74011000250 HC RX REV CODE- 250: Performed by: NURSE PRACTITIONER

## 2021-05-02 PROCEDURE — 65270000029 HC RM PRIVATE

## 2021-05-02 PROCEDURE — 99233 SBSQ HOSP IP/OBS HIGH 50: CPT | Performed by: SURGERY

## 2021-05-02 PROCEDURE — 74011250637 HC RX REV CODE- 250/637: Performed by: HOSPITALIST

## 2021-05-02 PROCEDURE — 80076 HEPATIC FUNCTION PANEL: CPT

## 2021-05-02 PROCEDURE — 80048 BASIC METABOLIC PNL TOTAL CA: CPT

## 2021-05-02 PROCEDURE — 74011250636 HC RX REV CODE- 250/636: Performed by: NURSE PRACTITIONER

## 2021-05-02 PROCEDURE — 99232 SBSQ HOSP IP/OBS MODERATE 35: CPT | Performed by: HOSPITALIST

## 2021-05-02 PROCEDURE — 82962 GLUCOSE BLOOD TEST: CPT

## 2021-05-02 PROCEDURE — 85025 COMPLETE CBC W/AUTO DIFF WBC: CPT

## 2021-05-02 PROCEDURE — 36415 COLL VENOUS BLD VENIPUNCTURE: CPT

## 2021-05-02 PROCEDURE — 84100 ASSAY OF PHOSPHORUS: CPT

## 2021-05-02 RX ADMIN — POTASSIUM & SODIUM PHOSPHATES POWDER PACK 280-160-250 MG 1 PACKET: 280-160-250 PACK at 10:20

## 2021-05-02 RX ADMIN — TEMAZEPAM 7.5 MG: 7.5 CAPSULE ORAL at 23:52

## 2021-05-02 RX ADMIN — METOPROLOL SUCCINATE 50 MG: 50 TABLET, EXTENDED RELEASE ORAL at 10:21

## 2021-05-02 RX ADMIN — Medication 10 ML: at 05:18

## 2021-05-02 RX ADMIN — CYCLOSPORINE 1 DROP: 0.5 EMULSION OPHTHALMIC at 10:40

## 2021-05-02 RX ADMIN — AMOXICILLIN AND CLAVULANATE POTASSIUM 1 TABLET: 875; 125 TABLET, FILM COATED ORAL at 19:29

## 2021-05-02 RX ADMIN — INSULIN LISPRO 4 UNITS: 100 INJECTION, SOLUTION INTRAVENOUS; SUBCUTANEOUS at 12:42

## 2021-05-02 RX ADMIN — GABAPENTIN 300 MG: 300 CAPSULE ORAL at 10:21

## 2021-05-02 RX ADMIN — DEXTROSE MONOHYDRATE AND SODIUM CHLORIDE 50 ML/HR: 5; .45 INJECTION, SOLUTION INTRAVENOUS at 05:17

## 2021-05-02 RX ADMIN — AMOXICILLIN AND CLAVULANATE POTASSIUM 1 TABLET: 875; 125 TABLET, FILM COATED ORAL at 10:20

## 2021-05-02 RX ADMIN — QUETIAPINE FUMARATE 50 MG: 25 TABLET ORAL at 10:21

## 2021-05-02 RX ADMIN — Medication 10 ML: at 19:29

## 2021-05-02 RX ADMIN — ERYTHROMYCIN: 5 OINTMENT OPHTHALMIC at 06:09

## 2021-05-02 RX ADMIN — CLONIDINE HYDROCHLORIDE 0.1 MG: 0.1 TABLET ORAL at 19:29

## 2021-05-02 RX ADMIN — ERYTHROMYCIN: 5 OINTMENT OPHTHALMIC at 19:30

## 2021-05-02 RX ADMIN — ISOSORBIDE MONONITRATE 30 MG: 30 TABLET ORAL at 10:21

## 2021-05-02 RX ADMIN — QUETIAPINE FUMARATE 50 MG: 25 TABLET ORAL at 19:29

## 2021-05-02 RX ADMIN — INSULIN LISPRO 4 UNITS: 100 INJECTION, SOLUTION INTRAVENOUS; SUBCUTANEOUS at 23:53

## 2021-05-02 RX ADMIN — MEMANTINE HYDROCHLORIDE 10 MG: 10 TABLET ORAL at 10:20

## 2021-05-02 RX ADMIN — ENOXAPARIN SODIUM 40 MG: 40 INJECTION SUBCUTANEOUS at 10:20

## 2021-05-02 RX ADMIN — Medication 1 TABLET: at 10:24

## 2021-05-02 RX ADMIN — CLONIDINE HYDROCHLORIDE 0.1 MG: 0.1 TABLET ORAL at 10:20

## 2021-05-02 RX ADMIN — Medication 81 MG: at 10:20

## 2021-05-02 NOTE — PROGRESS NOTES
Progress Note      Patient: Ute Lizarraga. Sex: male          DOA: 4/23/2021       YOB: 1935      Age:  82 y.o.        LOS:  LOS: 9 days               Subjective:     Wbc 14. 1. no fever or tachycardia. LFTs essentially unchanged. Patient denies chest pain, abdominal pain. He ate 75% of breakfast this morning, including supplements. Assessment/Plan     1. acute cholecystitis. Switched to oral antibiotics. Continue to monitor p.o. intake. Consult IR regarding drain. Echo. Cardiology consult regarding perioperative management. 2. Hypertension  3. Hypokalemia. Hypophosphatemia replete as needed  4. 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 DM2. Diet clarified. Ssi. HS snack. 6. CKD stage 3  7. COPD   8. Dementia without behavioral disturbance   9. Dyslipidemia  10. DVT prophylaxis  11. DNR/DNI, limited interventions per palliative care team. POST form completed. I discussed the case with Dr. Pritesh Shelton. Daughter Gia beavers [phone], h [phone]. Objective:      Visit Vitals  BP (!) 195/86   Pulse 72   Temp 97 °F (36.1 °C)   Resp 16   Ht 6' 2\" (1.88 m)   Wt 82.7 kg (182 lb 5 oz)   SpO2 96%   BMI 23.41 kg/m²       Physical Exam:  Gen:  Patient awake and alert, answering some questions. Near empty breakfast tray at bedside  HEENT and Neck:  PERRL, No cervical tenderness or masses  Lungs:  Clear bilateral anterior and infraaxillary fields. No rales, no wheeze. Normal effort. Heart:  Regular Rate and Rhythm. No murmur or gallop. Abdomen:  Soft, non tender, nondistended nabs  Extremities:  No digital clubbing, no cyanosis.   No edema  Neuro:  Alert and oriented x1-2, Normal affect, Cranial nerves intact, No sensory deficits  Skin no rash to visible skin      Lab/Data Reviewed:  BMP:   Lab Results   Component Value Date/Time     05/02/2021 03:01 AM    K 3.7 05/02/2021 03:01 AM     05/02/2021 03:01 AM    CO2 28 05/02/2021 03:01 AM    AGAP 5 05/02/2021 03:01 AM     (H) 05/02/2021 03:01 AM    BUN 14 05/02/2021 03:01 AM    CREA 1.13 05/02/2021 03:01 AM    GFRAA >60 05/02/2021 03:01 AM    GFRNA >60 05/02/2021 03:01 AM     CBC:   Lab Results   Component Value Date/Time    WBC 14.1 (H) 05/02/2021 03:01 AM    HGB 14.4 05/02/2021 03:01 AM    HCT 45.3 05/02/2021 03:01 AM     05/02/2021 03:01 AM

## 2021-05-02 NOTE — PROGRESS NOTES
General Surgery Consult      Christina Funez. Admit date: 2021    MRN: [id number]     : 1935     Age: 82 y.o. Attending Physician: Avery Stinson MD, FACS      Subjective:     Maria E Sabillon is a 82 y.o. male who we are following for evaluation of possible acute cholecystitis in the setting of multiple medical problems. The patient was seen initially by my partner Dr. Ricco Sidhu.  He was felt to be not a good surgical candidate so the plan was to ask interventional radiology to place a cholecystostomy tube. The tube has not been placed and the patient was managed by IV antibiotics. He has been doing relatively well however because of lack of appetite there is still consideration to place the IR drain on Monday. The patient has no fever or tachycardia. His last WBC was 2 days ago when he does not have a recent one.     Patient Active Problem List    Diagnosis Date Noted    Goals of care, counseling/discussion     Dementia without behavioral disturbance ([address]     Debility     Acute cholecystitis 2021    Leukocytosis 2021    Fracture of nasal bones, sequela 12/10/2019    New onset seizure ([address] 2019    Polycythemia 2019    Flu 2018    Wheezing 2018    Open wound 10/24/2017    Cellulitis of right lower extremity 10/24/2017    Poorly controlled diabetes mellitus ([address] 10/24/2017    Deviated nasal septum 2017    Enuresis 2017    Mixed hyperlipidemia 2017    Type 2 diabetes mellitus without complication, with long-term current use of insulin ([address] 10/24/2016    S/P CABG x 2 10/24/2016    CAD (coronary artery disease) 2016    COPD (chronic obstructive pulmonary disease) ([address] 2016    Type 2 diabetes mellitus with stage 3 chronic kidney disease ([address] 2016    Venous (peripheral) insufficiency     Osteoarthrosis involving multiple sites     Low back pain     CHI (closed head injury) 2015    CKD (chronic kidney disease) stage 3, GFR 30-59 ml/min ([facility]) 2013    HTN (hypertension) 2012    Atherosclerosis of native arteries of the extremities with intermittent claudication 2012    Carotid artery occlusion without infarction 2012    DM [facility]diabetes mellitus) [facility] 2012    Spondylosis 2012    Spondylosis 2012    Hypercholesterolemia      Past Medical History:   Diagnosis Date    Anxiety     Arthritis     CAD (coronary artery disease)     s/p drug-eluting stents to RCA for high-grade stenoses in     Carotid duplex 2016    Mild <50% CYRUS stenosis. Chronic LICA occlusion. Similar to study of 3/29/16. 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; followed by pts vascular surgeon    Chronic kidney disease     COPD (chronic obstructive pulmonary disease) [facility]     Dementia ([facility]     Depression     Diabetes ([facility]     type 2    Dyslipidemia     on Crestor; managed by pts PCP    Dyspnea     Hypercholesterolemia     Hypertension     Low back pain     Osteoarthrosis involving multiple sites     Polycythemia 2019    Skin cancer ([facility]     squamous cell lesions of the skin    Stroke ([facility]     Venous (peripheral) insufficiency       Past Surgical History:   Procedure Laterality Date    CARDIAC CATHETERIZATION  2016    Mod calcification. Co-dom. oLM 50-70%. LAD 30%. Dx 30%. Cx 70% focal.  OM 80% focal.  RCA 30%.       HX ANGIOPLASTY      2 stents placed and heart attack during the procedure    HX COLONOSCOPY  10/2003    neg; declines f/u    HX CORONARY ARTERY BYPASS GRAFT  2016     LIMA to LAD and SVG to OM1    HX HEART CATHETERIZATION  2013    HX HERNIA REPAIR      1970s    HX TONSIL AND ADENOIDECTOMY        Social History     Tobacco Use    Smoking status: Former Smoker     Years: 2.00     Quit date: 1955     Years since quittin.2    Smokeless tobacco: Never Used   Substance Use Topics  Alcohol use: Yes     Frequency: 2-4 times a month     Comment: drinks weekly couple beers      Social History     Tobacco Use   Smoking Status Former Smoker    Years: 2.00    Quit date: 1955    Years since quittin.3   Smokeless Tobacco Never Used     Family History   Problem Relation Age of Onset    Parkinsonism Mother     Hypertension Mother     Cancer Father         lung    Heart defect Brother       Current Facility-Administered Medications   Medication Dose Route Frequency    amoxicillin-clavulanate (AUGMENTIN) 875-125 mg per tablet 1 Tab  1 Tab Oral BID WITH MEALS    erythromycin (ILOTYCIN) 5 mg/gram (0.5 %) ophthalmic ointment   Both Eyes Q8H    polyvinyl alcohol-povidon(PF) (REFRESH CLASSIC) 1.4-0.6 % ophthalmic solution 1 Drop  1 Drop Both Eyes PRN    potassium, sodium phosphates (NEUTRA-PHOS) packet 1 Packet  1 Packet Oral BID    oxyCODONE-acetaminophen (PERCOCET) 5-325 mg per tablet 1 Tab  1 Tab Oral Q8H PRN    cloNIDine HCL (CATAPRES) tablet 0.1 mg  0.1 mg Oral BID    insulin lispro (HUMALOG) injection   SubCUTAneous AC&HS    sodium chloride (NS) flush 5-40 mL  5-40 mL IntraVENous Q8H    sodium chloride (NS) flush 5-40 mL  5-40 mL IntraVENous PRN    acetaminophen (TYLENOL) tablet 650 mg  650 mg Oral Q6H PRN    Or    acetaminophen (TYLENOL) suppository 650 mg  650 mg Rectal Q6H PRN    polyethylene glycol (MIRALAX) packet 17 g  17 g Oral DAILY PRN    ondansetron (ZOFRAN) injection 4 mg  4 mg IntraVENous Q6H PRN    enoxaparin (LOVENOX) injection 40 mg  40 mg SubCUTAneous DAILY    glucose chewable tablet 16 g  4 Tab Oral PRN    glucagon (GLUCAGEN) injection 1 mg  1 mg IntraMUSCular PRN    dextrose (D50W) injection syrg 12.5-25 g  25-50 mL IntraVENous PRN    dextrose 5 % - 0.45% NaCl infusion  50 mL/hr IntraVENous CONTINUOUS    naloxone (NARCAN) injection 0.4 mg  0.4 mg IntraVENous PRN    aspirin delayed-release tablet 81 mg  81 mg Oral DAILY    cycloSPORINE (RESTASIS) 0.05 % ophthalmic emulsion 1 Drop  1 Drop Both Eyes Q12H    isosorbide mononitrate ER (IMDUR) tablet 30 mg  30 mg Oral DAILY    memantine (NAMENDA) tablet 10 mg  10 mg Oral DAILY    metoprolol succinate (TOPROL-XL) XL tablet 50 mg  50 mg Oral DAILY    multivitamin, tx-iron-ca-min (THERA-M w/ IRON) tablet 1 Tab  1 Tab Oral DAILY    QUEtiapine (SEROquel) tablet 50 mg  50 mg Oral BID    temazepam (RESTORIL) capsule 7.5 mg  7.5 mg Oral QHS    gabapentin (NEURONTIN) capsule 300 mg  300 mg Oral DAILY      Allergies   Allergen Reactions    Amaryl [Glimepiride] Drowsiness    Lipitor [Atorvastatin] Myalgia    Niacin Other (comments)     Facial blistering, swelling in face    Pravachol [Pravastatin] Myalgia    Zocor [Simvastatin] Myalgia        Review of Systems:  Review of systems not obtained due to patient factors.     Objective:     Visit Vitals  BP (!) 144/85 (BP 1 Location: Left upper arm, BP Patient Position: At rest)   Pulse 72   Temp 97.2 °F (36.2 °C)   Resp 16   Ht 6' 2\" (1.88 m)   Wt 82.7 kg (182 lb 5 oz)   SpO2 94%   BMI 23.41 kg/m²       Physical Exam:      General:  in no apparent distress and uncooperative   Eyes:  conjunctivae and sclerae normal, pupils equal, round, reactive to light               Abdomen:   rounded, soft, nontender, nondistended, no masses or organomegaly                 Imaging and Lab Review:     CBC:   Lab Results   Component Value Date/Time    WBC 15.3 (H) 04/30/2021 02:35 AM    RBC 4.80 04/30/2021 02:35 AM    HGB 13.6 04/30/2021 02:35 AM    HCT 41.6 04/30/2021 02:35 AM    PLATELET 756 89/59/8862 02:35 AM     BMP:   Lab Results   Component Value Date/Time    Glucose 177 (H) 05/01/2021 03:50 AM    Sodium 137 05/01/2021 03:50 AM    Potassium 3.6 05/01/2021 03:50 AM    Chloride 104 05/01/2021 03:50 AM    CO2 29 05/01/2021 03:50 AM    BUN 10 05/01/2021 03:50 AM    Creatinine 1.30 05/01/2021 03:50 AM    Calcium 8.2 (L) 05/01/2021 03:50 AM     CMP:  Lab Results   Component Value Date/Time    Glucose 177 (H) 05/01/2021 03:50 AM    Sodium 137 05/01/2021 03:50 AM    Potassium 3.6 05/01/2021 03:50 AM    Chloride 104 05/01/2021 03:50 AM    CO2 29 05/01/2021 03:50 AM    BUN 10 05/01/2021 03:50 AM    Creatinine 1.30 05/01/2021 03:50 AM    Calcium 8.2 (L) 05/01/2021 03:50 AM    Anion gap 4 05/01/2021 03:50 AM    BUN/Creatinine ratio 8 (L) 05/01/2021 03:50 AM    Alk. phosphatase 158 (H) 05/01/2021 03:50 AM    Protein, total 6.7 05/01/2021 03:50 AM    Albumin 2.0 (L) 05/01/2021 03:50 AM    Globulin 4.7 (H) 05/01/2021 03:50 AM    A-G Ratio 0.4 (L) 05/01/2021 03:50 AM       Recent Results (from the past 24 hour(s))   METABOLIC PANEL, BASIC    Collection Time: 05/01/21  3:50 AM   Result Value Ref Range    Sodium 137 136 - 145 mmol/L    Potassium 3.6 3.5 - 5.5 mmol/L    Chloride 104 100 - 111 mmol/L    CO2 29 21 - 32 mmol/L    Anion gap 4 3.0 - 18 mmol/L    Glucose 177 (H) 74 - 99 mg/dL    BUN 10 7.0 - 18 MG/DL    Creatinine 1.30 0.6 - 1.3 MG/DL    BUN/Creatinine ratio 8 (L) 12 - 20      GFR est AA >60 >60 ml/min/1.73m2    GFR est non-AA 53 (L) >60 ml/min/1.73m2    Calcium 8.2 (L) 8.5 - 10.1 MG/DL   HEPATIC FUNCTION PANEL    Collection Time: 05/01/21  3:50 AM   Result Value Ref Range    Protein, total 6.7 6.4 - 8.2 g/dL    Albumin 2.0 (L) 3.4 - 5.0 g/dL    Globulin 4.7 (H) 2.0 - 4.0 g/dL    A-G Ratio 0.4 (L) 0.8 - 1.7      Bilirubin, total 0.4 0.2 - 1.0 MG/DL    Bilirubin, direct 0.1 0.0 - 0.2 MG/DL    Alk.  phosphatase 158 (H) 45 - 117 U/L    AST (SGOT) 50 (H) 10 - 38 U/L    ALT (SGPT) 47 16 - 61 U/L   GLUCOSE, POC    Collection Time: 05/01/21  6:40 AM   Result Value Ref Range    Glucose (POC) 229 (H) 70 - 110 mg/dL   GLUCOSE, POC    Collection Time: 05/01/21 11:59 AM   Result Value Ref Range    Glucose (POC) 148 (H) 70 - 110 mg/dL   GLUCOSE, POC    Collection Time: 05/01/21  4:20 PM   Result Value Ref Range    Glucose (POC) 168 (H) 70 - 110 mg/dL   GLUCOSE, POC    Collection Time: 05/01/21  6:06 PM Result Value Ref Range    Glucose (POC) 217 (H) 70 - 110 mg/dL   GLUCOSE, POC    Collection Time: 05/01/21  9:11 PM   Result Value Ref Range    Glucose (POC) 173 (H) 70 - 110 mg/dL       images and reports reviewed    Assessment:   Brent Barnes is a 82 y.o. male who has multiple medical conditions and had abdominal pain that he had CT scan and ultrasound that showed suggestion of possible acute cholecystitis. The patient was not felt to be a surgical candidate so the plan was to place a cholecystostomy tube by interventional radiology. The tube has not been placed yet but it was supposed to be done it seems this coming Monday. I think it is best to document the presence of acute cholecystitis before placing the tube. Maybe a HIDA scan would be appropriate if the patient can tolerated. If not, then interventional radiology has to place a cholecystostomy tube based on the CT scan and the ultrasound findings. For me the patient's abdominal exam is benign however it is hard to examine him because of his mental condition.      Plan:     Appreciate medicine management  Continue with current treatment with antibiotics  IR for cholecystostomy tube and/or HIDA scan to confirm the presence of cholecystitis  Dr. Nataliia Leung will be back tomorrow    Please call me if you have any questions (cell phone: [phone])     Signed By: Karla Merrill MD     May 2, 2021

## 2021-05-02 NOTE — PROGRESS NOTES
Problem: Falls - Risk of  Goal: *Absence of Falls  Description: Document Stuart Cervantes Fall Risk and appropriate interventions in the flowsheet. Outcome: Progressing Towards Goal  Note: Fall Risk Interventions:  Mobility Interventions: Bed/chair exit alarm, Patient to call before getting OOB    Mentation Interventions: Door open when patient unattended, Bed/chair exit alarm, More frequent rounding, Reorient patient, Room close to nurse's station    Medication Interventions: Patient to call before getting OOB, Teach patient to arise slowly, Bed/chair exit alarm    Elimination Interventions: Bed/chair exit alarm, Call light in reach, Patient to call for help with toileting needs              Problem: Patient Education: Go to Patient Education Activity  Goal: Patient/Family Education  Outcome: Progressing Towards Goal     Problem: Pressure Injury - Risk of  Goal: *Prevention of pressure injury  Description: Document Zen Scale and appropriate interventions in the flowsheet.   Outcome: Progressing Towards Goal  Note: Pressure Injury Interventions:  Sensory Interventions: Assess changes in LOC, Check visual cues for pain, Minimize linen layers, Assess need for specialty bed, Avoid rigorous massage over bony prominences, Keep linens dry and wrinkle-free, Pressure redistribution bed/mattress (bed type)    Moisture Interventions: Absorbent underpads, Minimize layers, Apply protective barrier, creams and emollients, Assess need for specialty bed, Check for incontinence Q2 hours and as needed    Activity Interventions: Pressure redistribution bed/mattress(bed type)    Mobility Interventions: HOB 30 degrees or less, Pressure redistribution bed/mattress (bed type), Assess need for specialty bed    Nutrition Interventions: Document food/fluid/supplement intake    Friction and Shear Interventions: Lift sheet, Lift team/patient mobility team, Minimize layers, HOB 30 degrees or less, Apply protective barrier, creams and emollients Problem: Patient Education: Go to Patient Education Activity  Goal: Patient/Family Education  Outcome: Progressing Towards Goal     Problem: Pain  Goal: *Control of Pain  Outcome: Progressing Towards Goal     Problem: Patient Education: Go to Patient Education Activity  Goal: Patient/Family Education  Outcome: Progressing Towards Goal     Problem: Nausea/Vomiting (Adult)  Goal: *Absence of nausea/vomiting  Outcome: Progressing Towards Goal     Problem: Patient Education: Go to Patient Education Activity  Goal: Patient/Family Education  Outcome: Progressing Towards Goal     Problem: Nutrition Deficit  Goal: *Optimize nutritional status  Outcome: Progressing Towards Goal

## 2021-05-02 NOTE — PROGRESS NOTES
Bedside and Verbal shift change report given to Kaylin Mccurdy RN (oncoming nurse) by Cheyenne Carrillo RN (offgoing nurse). Report included the following information SBAR, Kardex, ED Summary, Intake/Output and MAR.

## 2021-05-02 NOTE — PROGRESS NOTES
Problem: Diabetes Self-Management  Goal: *Disease process and treatment process  Description: Define diabetes and identify own type of diabetes; list 3 options for treating diabetes. Outcome: Progressing Towards Goal  Goal: *Incorporating nutritional management into lifestyle  Description: Describe effect of type, amount and timing of food on blood glucose; list 3 methods for planning meals. Outcome: Progressing Towards Goal  Goal: *Incorporating physical activity into lifestyle  Description: State effect of exercise on blood glucose levels. Outcome: Progressing Towards Goal  Goal: *Developing strategies to promote health/change behavior  Description: Define the ABC's of diabetes; identify appropriate screenings, schedule and personal plan for screenings. Outcome: Progressing Towards Goal  Goal: *Using medications safely  Description: State effect of diabetes medications on diabetes; name diabetes medication taking, action and side effects. Outcome: Progressing Towards Goal  Goal: *Monitoring blood glucose, interpreting and using results  Description: Identify recommended blood glucose targets  and personal targets. Outcome: Progressing Towards Goal  Goal: *Prevention, detection, treatment of acute complications  Description: List symptoms of hyper- and hypoglycemia; describe how to treat low blood sugar and actions for lowering  high blood glucose level. Outcome: Progressing Towards Goal  Goal: *Prevention, detection and treatment of chronic complications  Description: Define the natural course of diabetes and describe the relationship of blood glucose levels to long term complications of diabetes.   Outcome: Progressing Towards Goal  Goal: *Developing strategies to address psychosocial issues  Description: Describe feelings about living with diabetes; identify support needed and support network  Outcome: Progressing Towards Goal  Goal: *Insulin pump training  Outcome: Progressing Towards Goal  Goal: *Sick day guidelines  Outcome: Progressing Towards Goal  Goal: *Patient Specific Goal (EDIT GOAL, INSERT TEXT)  Outcome: Progressing Towards Goal     Problem: Falls - Risk of  Goal: *Absence of Falls  Description: Document Pan Fall Risk and appropriate interventions in the flowsheet. Outcome: Progressing Towards Goal  Note: Fall Risk Interventions:  Mobility Interventions: Bed/chair exit alarm, Patient to call before getting OOB    Mentation Interventions: Door open when patient unattended, Bed/chair exit alarm, More frequent rounding, Reorient patient, Room close to nurse's station    Medication Interventions: Patient to call before getting OOB, Teach patient to arise slowly, Bed/chair exit alarm    Elimination Interventions: Bed/chair exit alarm, Call light in reach, Patient to call for help with toileting needs              Problem: Pressure Injury - Risk of  Goal: *Prevention of pressure injury  Description: Document Zen Scale and appropriate interventions in the flowsheet.   Outcome: Progressing Towards Goal  Note: Pressure Injury Interventions:  Sensory Interventions: Assess changes in LOC, Check visual cues for pain, Minimize linen layers, Assess need for specialty bed, Avoid rigorous massage over bony prominences, Keep linens dry and wrinkle-free, Pressure redistribution bed/mattress (bed type)    Moisture Interventions: Absorbent underpads, Minimize layers, Apply protective barrier, creams and emollients, Assess need for specialty bed, Check for incontinence Q2 hours and as needed    Activity Interventions: Pressure redistribution bed/mattress(bed type)    Mobility Interventions: HOB 30 degrees or less, Pressure redistribution bed/mattress (bed type), Assess need for specialty bed    Nutrition Interventions: Document food/fluid/supplement intake    Friction and Shear Interventions: Lift sheet, Lift team/patient mobility team, Minimize layers, HOB 30 degrees or less, Apply protective barrier, creams and emollients                Problem: Nutrition Deficit  Goal: *Optimize nutritional status  Outcome: Progressing Towards Goal     Problem: Nausea/Vomiting (Adult)  Goal: *Absence of nausea/vomiting  Outcome: Progressing Towards Goal     Problem: Pain  Goal: *Control of Pain  Outcome: Progressing Towards Goal

## 2021-05-03 ENCOUNTER — APPOINTMENT (OUTPATIENT)
Dept: NON INVASIVE DIAGNOSTICS | Age: 86
DRG: 872 | End: 2021-05-03
Attending: HOSPITALIST
Payer: MEDICARE

## 2021-05-03 LAB
ALBUMIN SERPL-MCNC: 2.1 G/DL (ref 3.4–5)
ALBUMIN/GLOB SERPL: 0.6 {RATIO} (ref 0.8–1.7)
ALP SERPL-CCNC: 142 U/L (ref 45–117)
ALT SERPL-CCNC: 46 U/L (ref 16–61)
ANION GAP SERPL CALC-SCNC: 6 MMOL/L (ref 3–18)
AST SERPL-CCNC: 40 U/L (ref 10–38)
BASOPHILS # BLD: 0.1 K/UL (ref 0–0.1)
BASOPHILS NFR BLD: 1 % (ref 0–2)
BILIRUB DIRECT SERPL-MCNC: 0.1 MG/DL (ref 0–0.2)
BILIRUB SERPL-MCNC: 0.3 MG/DL (ref 0.2–1)
BUN SERPL-MCNC: 16 MG/DL (ref 7–18)
BUN/CREAT SERPL: 16 (ref 12–20)
CALCIUM SERPL-MCNC: 8 MG/DL (ref 8.5–10.1)
CHLORIDE SERPL-SCNC: 105 MMOL/L (ref 100–111)
CO2 SERPL-SCNC: 28 MMOL/L (ref 21–32)
CREAT SERPL-MCNC: 1.01 MG/DL (ref 0.6–1.3)
DIFFERENTIAL METHOD BLD: ABNORMAL
ECHO AO ROOT DIAM: 3.14 CM
ECHO LA AREA 4C: 9.92 CM2
ECHO LA VOL 2C: 23.54 ML (ref 18–58)
ECHO LA VOL 4C: 21.07 ML (ref 18–58)
ECHO LA VOL BP: 23.99 ML (ref 18–58)
ECHO LA VOL/BSA BIPLANE: 11.49 ML/M2 (ref 16–28)
ECHO LA VOLUME INDEX A2C: 11.28 ML/M2 (ref 16–28)
ECHO LA VOLUME INDEX A4C: 10.09 ML/M2 (ref 16–28)
ECHO LV INTERNAL DIMENSION DIASTOLIC: 4.49 CM (ref 4.2–5.9)
ECHO LV INTERNAL DIMENSION SYSTOLIC: 3.6 CM
ECHO LV IVSD: 0.74 CM (ref 0.6–1)
ECHO LV MASS 2D: 136.3 G (ref 88–224)
ECHO LV MASS INDEX 2D: 65.3 G/M2 (ref 49–115)
ECHO LV POSTERIOR WALL DIASTOLIC: 1.1 CM (ref 0.6–1)
ECHO LVOT DIAM: 1.78 CM
ECHO LVOT PEAK GRADIENT: 2.58 MMHG
ECHO LVOT PEAK VELOCITY: 80.33 CM/S
ECHO LVOT SV: 49.6 ML
ECHO LVOT VTI: 19.88 CM
ECHO MV A VELOCITY: 101.99 CM/S
ECHO MV E DECELERATION TIME (DT): 160.39 MS
ECHO MV E VELOCITY: 68.01 CM/S
ECHO MV E/A RATIO: 0.67
ECHO TV REGURGITANT MAX VELOCITY: 218.71 CM/S
ECHO TV REGURGITANT PEAK GRADIENT: 19.13 MMHG
EOSINOPHIL # BLD: 0.3 K/UL (ref 0–0.4)
EOSINOPHIL NFR BLD: 2 % (ref 0–5)
ERYTHROCYTE [DISTWIDTH] IN BLOOD BY AUTOMATED COUNT: 13.7 % (ref 11.6–14.5)
GLOBULIN SER CALC-MCNC: 3.3 G/DL (ref 2–4)
GLUCOSE BLD STRIP.AUTO-MCNC: 171 MG/DL (ref 70–110)
GLUCOSE BLD STRIP.AUTO-MCNC: 190 MG/DL (ref 70–110)
GLUCOSE BLD STRIP.AUTO-MCNC: 228 MG/DL (ref 70–110)
GLUCOSE BLD STRIP.AUTO-MCNC: 238 MG/DL (ref 70–110)
GLUCOSE SERPL-MCNC: 222 MG/DL (ref 74–99)
HCT VFR BLD AUTO: 41.4 % (ref 36–48)
HGB BLD-MCNC: 13.6 G/DL (ref 13–16)
INR PPP: 1.1 (ref 0.8–1.2)
LVOT MG: 1.23 MMHG
LYMPHOCYTES # BLD: 2.6 K/UL (ref 0.9–3.6)
LYMPHOCYTES NFR BLD: 20 % (ref 21–52)
MAGNESIUM SERPL-MCNC: 2.2 MG/DL (ref 1.6–2.6)
MCH RBC QN AUTO: 28.6 PG (ref 24–34)
MCHC RBC AUTO-ENTMCNC: 32.9 G/DL (ref 31–37)
MCV RBC AUTO: 87.2 FL (ref 74–97)
MONOCYTES # BLD: 1 K/UL (ref 0.05–1.2)
MONOCYTES NFR BLD: 8 % (ref 3–10)
NEUTS SEG # BLD: 8.4 K/UL (ref 1.8–8)
NEUTS SEG NFR BLD: 67 % (ref 40–73)
PHOSPHATE SERPL-MCNC: 2.4 MG/DL (ref 2.5–4.9)
PLATELET # BLD AUTO: 395 K/UL (ref 135–420)
PMV BLD AUTO: 10.1 FL (ref 9.2–11.8)
POTASSIUM SERPL-SCNC: 4.3 MMOL/L (ref 3.5–5.5)
PROT SERPL-MCNC: 5.4 G/DL (ref 6.4–8.2)
PROTHROMBIN TIME: 13.8 SEC (ref 11.5–15.2)
RBC # BLD AUTO: 4.75 M/UL (ref 4.35–5.65)
SODIUM SERPL-SCNC: 139 MMOL/L (ref 136–145)
WBC # BLD AUTO: 12.5 K/UL (ref 4.6–13.2)

## 2021-05-03 PROCEDURE — 99232 SBSQ HOSP IP/OBS MODERATE 35: CPT | Performed by: HOSPITALIST

## 2021-05-03 PROCEDURE — 85610 PROTHROMBIN TIME: CPT

## 2021-05-03 PROCEDURE — 74011636637 HC RX REV CODE- 636/637: Performed by: HOSPITALIST

## 2021-05-03 PROCEDURE — 85025 COMPLETE CBC W/AUTO DIFF WBC: CPT

## 2021-05-03 PROCEDURE — 97530 THERAPEUTIC ACTIVITIES: CPT

## 2021-05-03 PROCEDURE — 2709999900 HC NON-CHARGEABLE SUPPLY

## 2021-05-03 PROCEDURE — 80048 BASIC METABOLIC PNL TOTAL CA: CPT

## 2021-05-03 PROCEDURE — 83735 ASSAY OF MAGNESIUM: CPT

## 2021-05-03 PROCEDURE — 97535 SELF CARE MNGMENT TRAINING: CPT

## 2021-05-03 PROCEDURE — 74011250636 HC RX REV CODE- 250/636: Performed by: NURSE PRACTITIONER

## 2021-05-03 PROCEDURE — 36415 COLL VENOUS BLD VENIPUNCTURE: CPT

## 2021-05-03 PROCEDURE — 65270000029 HC RM PRIVATE

## 2021-05-03 PROCEDURE — 74011250637 HC RX REV CODE- 250/637: Performed by: NURSE PRACTITIONER

## 2021-05-03 PROCEDURE — 74011250637 HC RX REV CODE- 250/637: Performed by: HOSPITALIST

## 2021-05-03 PROCEDURE — 80076 HEPATIC FUNCTION PANEL: CPT

## 2021-05-03 PROCEDURE — 77030040831 HC BAG URINE DRNG MDII -A

## 2021-05-03 PROCEDURE — 82962 GLUCOSE BLOOD TEST: CPT

## 2021-05-03 PROCEDURE — 84100 ASSAY OF PHOSPHORUS: CPT

## 2021-05-03 PROCEDURE — 93306 TTE W/DOPPLER COMPLETE: CPT

## 2021-05-03 RX ADMIN — GABAPENTIN 300 MG: 300 CAPSULE ORAL at 09:24

## 2021-05-03 RX ADMIN — AMOXICILLIN AND CLAVULANATE POTASSIUM 1 TABLET: 875; 125 TABLET, FILM COATED ORAL at 09:23

## 2021-05-03 RX ADMIN — ISOSORBIDE MONONITRATE 30 MG: 30 TABLET ORAL at 09:24

## 2021-05-03 RX ADMIN — CYCLOSPORINE 1 DROP: 0.5 EMULSION OPHTHALMIC at 21:21

## 2021-05-03 RX ADMIN — CLONIDINE HYDROCHLORIDE 0.1 MG: 0.1 TABLET ORAL at 09:24

## 2021-05-03 RX ADMIN — Medication 1 TABLET: at 09:24

## 2021-05-03 RX ADMIN — ERYTHROMYCIN: 5 OINTMENT OPHTHALMIC at 21:27

## 2021-05-03 RX ADMIN — CLONIDINE HYDROCHLORIDE 0.1 MG: 0.1 TABLET ORAL at 17:58

## 2021-05-03 RX ADMIN — METOPROLOL SUCCINATE 50 MG: 50 TABLET, EXTENDED RELEASE ORAL at 09:24

## 2021-05-03 RX ADMIN — QUETIAPINE FUMARATE 50 MG: 25 TABLET ORAL at 09:23

## 2021-05-03 RX ADMIN — MEMANTINE HYDROCHLORIDE 10 MG: 10 TABLET ORAL at 09:24

## 2021-05-03 RX ADMIN — INSULIN LISPRO 2 UNITS: 100 INJECTION, SOLUTION INTRAVENOUS; SUBCUTANEOUS at 06:50

## 2021-05-03 RX ADMIN — INSULIN LISPRO 3 UNITS: 100 INJECTION, SOLUTION INTRAVENOUS; SUBCUTANEOUS at 17:59

## 2021-05-03 RX ADMIN — TEMAZEPAM 7.5 MG: 7.5 CAPSULE ORAL at 21:26

## 2021-05-03 RX ADMIN — Medication 10 ML: at 06:51

## 2021-05-03 RX ADMIN — ERYTHROMYCIN: 5 OINTMENT OPHTHALMIC at 06:53

## 2021-05-03 RX ADMIN — AMOXICILLIN AND CLAVULANATE POTASSIUM 1 TABLET: 875; 125 TABLET, FILM COATED ORAL at 17:58

## 2021-05-03 RX ADMIN — CYCLOSPORINE 1 DROP: 0.5 EMULSION OPHTHALMIC at 09:33

## 2021-05-03 RX ADMIN — INSULIN LISPRO 6 UNITS: 100 INJECTION, SOLUTION INTRAVENOUS; SUBCUTANEOUS at 22:44

## 2021-05-03 RX ADMIN — Medication 81 MG: at 09:24

## 2021-05-03 RX ADMIN — Medication 10 ML: at 17:59

## 2021-05-03 RX ADMIN — ERYTHROMYCIN: 5 OINTMENT OPHTHALMIC at 18:03

## 2021-05-03 RX ADMIN — ENOXAPARIN SODIUM 40 MG: 40 INJECTION SUBCUTANEOUS at 09:23

## 2021-05-03 RX ADMIN — QUETIAPINE FUMARATE 50 MG: 25 TABLET ORAL at 17:58

## 2021-05-03 RX ADMIN — CYCLOSPORINE 1 DROP: 0.5 EMULSION OPHTHALMIC at 00:03

## 2021-05-03 RX ADMIN — INSULIN LISPRO 6 UNITS: 100 INJECTION, SOLUTION INTRAVENOUS; SUBCUTANEOUS at 12:30

## 2021-05-03 NOTE — PROGRESS NOTES
Progress Note      Patient: Abilio Medrano. Sex: male          DOA: 4/23/2021       YOB: 1935      Age:  82 y.o.        LOS:  LOS: 10 days               Subjective:     Patient found sitting up in bed, feeding himself breakfast, states he feels great. Denies abdominal pain. Assessment/Plan     1. acute cholecystitis. Switched to oral antibiotics continue for 1 week after discharge. no need for drain at this time. Follow-up with surgery in clinic. 2. Hypertension  3. Hypokalemia. Hypophosphatemia replete as needed  4. 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 DM2. Diet clarified. Ssi. HS snack. 6. CKD stage 3  7. COPD   8. Dementia without behavioral disturbance   9. Dyslipidemia  10. DVT prophylaxis  11. DNR/DNI, limited interventions per palliative care team. POST form completed. I discussed the case with Dr. Leticia Zimmerman. Anticipate return to [facility]. 2:40 pm left message for daughter on cell    Daughter Aimee Garcia c [phone], h [phone]. Discussed with nursing, case management. Objective:      Visit Vitals  BP (!) 158/77 (BP 1 Location: Right upper arm, BP Patient Position: At rest)   Pulse 60   Temp 97.8 °F (36.6 °C)   Resp 16   Ht 6' 2\" (1.88 m)   Wt 82.7 kg (182 lb 5 oz)   SpO2 95%   BMI 23.41 kg/m²       Physical Exam:  Gen:  Patient awake and alert, answering questions and following commands. Found sitting up in bed eating breakfast.  HEENT and Neck:  PERRL, No cervical tenderness or masses  Lungs:  Clear bilateral anterior and infraaxillary fields. No rales, no wheeze. Normal effort. Heart:  Regular Rate and Rhythm. No murmur or gallop. Abdomen:  Soft, non tender, nondistended nabs  Extremities:  No digital clubbing, no cyanosis.   No edema  Neuro:  Alert and oriented x1-2, Normal affect, Cranial nerves intact, No sensory deficits  Skin no rash to visible skin      Lab/Data Reviewed:  BMP:   Lab Results   Component Value Date/Time  05/03/2021 03:50 AM    K 4.3 05/03/2021 03:50 AM     05/03/2021 03:50 AM    CO2 28 05/03/2021 03:50 AM    AGAP 6 05/03/2021 03:50 AM     (H) 05/03/2021 03:50 AM    BUN 16 05/03/2021 03:50 AM    CREA 1.01 05/03/2021 03:50 AM    GFRAA >60 05/03/2021 03:50 AM    GFRNA >60 05/03/2021 03:50 AM     CBC:   Lab Results   Component Value Date/Time    WBC 12.5 05/03/2021 03:50 AM    HGB 13.6 05/03/2021 03:50 AM    HCT 41.4 05/03/2021 03:50 AM     05/03/2021 03:50 AM

## 2021-05-03 NOTE — PROGRESS NOTES
Problem: Pain  Goal: *Control of Pain  Outcome: Progressing Towards Goal     Problem: Patient Education: Go to Patient Education Activity  Goal: Patient/Family Education  Outcome: Progressing Towards Goal     Problem: Nausea/Vomiting (Adult)  Goal: *Absence of nausea/vomiting  Outcome: Progressing Towards Goal     Problem: Patient Education: Go to Patient Education Activity  Goal: Patient/Family Education  Outcome: Progressing Towards Goal     Problem: Nutrition Deficit  Goal: *Optimize nutritional status  Outcome: Progressing Towards Goal

## 2021-05-03 NOTE — PROGRESS NOTES
Bedside and Verbal shift change report given to Yannick Veloz RN (oncoming nurse) by Brady Delatorre RN (offgoing nurse). Report included the following information SBAR, Kardex, ED Summary, Intake/Output and MAR.

## 2021-05-03 NOTE — PROGRESS NOTES
Discharge/Transition Planning    0930: Spoke with Dr Denise Vega and they are discussing placement of ROSIO drain. CM will follow up and see if [facility] will take with drain    1230: [facility] and left voicemail    1400[facility] [phone] and was disconnected by person answering. Called back and left voicemail . 1520: Called [facility] [address] and  Was able to reach [facility]. They state can come back tomorrow and fax summary and any scripts to [phone]: Completed PCS and faxed to [facility].  Called [facility] and scheduled transport on for tomorrow at  10 am   Cuco Chua RN BSN  Outcomes Manager    Pager # [phone]

## 2021-05-03 NOTE — PROGRESS NOTES
Problem: Self Care Deficits Care Plan (Adult)  Goal: *Acute Goals and Plan of Care (Insert Text)  Description:  Occupational Therapy Goals  Initiated 4/26/2021 within 7 day(s). 1.  Patient will perform UB dressing with moderate assistance . 2.  Patient will perform lower body dressing with maximal assistance. 3.  Patient will perform grooming with moderate assistance . 4. Patient will perform toilet transfers with maximal assistance. 5.  Patient will perform all aspects of toileting with maximal assistance. 6.  Patient will participate in upper extremity therapeutic exercise/activities with minimal assistance/contact guard assist for 3-5 minutes. 7.  Patient will utilize energy conservation techniques during functional activities with verbal, visual, and tactile cues. Prior Level of Function: Pt is a resident at [facility] per chart review. Pt was previously receiving help for self-care tasks. Pt poor historian and unable to explain what he was receiving help with prior to hospital admission. Outcome: Progressing Towards Goal   OCCUPATIONAL THERAPY TREATMENT    Patient: Deepika Cole (82 y.o. male)  Date: 5/3/2021  Diagnosis: Leukocytosis [D72.829]  Acute cholecystitis [K81.0] Acute cholecystitis       Precautions: Fall, Skin    Chart, occupational therapy assessment, plan of care, and goals were reviewed. ASSESSMENT:  Pt is plesantly confused. Finishing dinner upon entry. Pt and linens soiled. Pt requires increase time w/bed mobility to maneuver to EOB. Generalized weakness requires Mod Assist w/functional transfer to standing ion continent care. Pt tolerates ~ 10 seconds standing w/RW for support and returns to EOB. Pt returned to R side lying for incontinent care w/Total Assist. Pt meeting UB dressing goal today. Pt following commands donning gown w/Mod Assist for clothing mgt to pull to (B) shoulders.  Pt performs simple ADL grooming tasks at bed level, requiring assist for thoroughness. Pt left w/all items within reach. Appreciate Dipesh RAINES, assist.  Progression toward goals:  [x]          Improving appropriately and progressing toward goals  []          Improving slowly and progressing toward goals  []          Not making progress toward goals and plan of care will be adjusted     PLAN:  Patient continues to benefit from skilled intervention to address the above impairments. Continue treatment per established plan of care. Discharge Recommendations:  Fredy Mcdaniel  Further Equipment Recommendations for Discharge:  TBD by next level of care     SUBJECTIVE:   Patient stated Francies Comings now, easy.     OBJECTIVE DATA SUMMARY:   Cognitive/Behavioral Status:  Neurologic State: Alert  Orientation Level: Oriented to person  Cognition: Follows commands  Safety/Judgement: Fall prevention    Functional Mobility and Transfers for ADLs:   Bed Mobility:  Rolling: Moderate assistance  Supine to Sit: Additional time;Maximum assistance  Sit to Supine: Maximum assistance; Additional time  Scooting: Assist x2;Maximum assistance   Transfers:  Sit to Stand: Moderate assistance(w/RW from elevated surface)    Balance:  Sitting: Impaired  Standing: Impaired; With support    ADL Intervention:  Feeding  Food to Mouth: Stand-by assistance  Drink to Mouth: Stand-by assistance    Grooming  Position Performed: Other (comment)(supine w/HOB raised)  Washing Face: Stand-by assistance  Washing Hands: Minimum assistance(assist for thoroughness)    Upper Body [address]: Moderate assistance    Toileting  Toileting Assistance: Total assistance(dependent)  Bladder Hygiene: Total assistance (dependent)  Bowel Hygiene:  Total assistance (dependent)  Clothing Management: Total assistance (dependent)    Pain:  Pain level pre-treatment: 0/10   Pain level post-treatment: 0/10    Activity Tolerance:    Fair, pt fatigues easily    Please refer to the flowsheet for vital signs taken during this treatment. After treatment:   []  Patient left in no apparent distress sitting up in chair  [x]  Patient left in no apparent distress in bed  [x]  Call bell left within reach  [x]  Nursing notified  []  Caregiver present  []  Bed alarm activated    COMMUNICATION/EDUCATION:   [] Role of Occupational Therapy in the acute care setting  [] Home safety education was provided and the patient/caregiver indicated understanding. [] Patient/family have participated as able in working towards goals and plan of care. [] Patient/family agree to work toward stated goals and plan of care. [] Patient understands intent and goals of therapy, but is neutral about his/her participation. [x] Patient is unable to participate in goal setting and plan of care 2/2 cognitive deficits.       Thank you for this referral.  ASHIA Leslie  Time Calculation: 32 mins

## 2021-05-03 NOTE — PROGRESS NOTES
Problem: Falls - Risk of  Goal: *Absence of Falls  Description: Document Russ Conklin Fall Risk and appropriate interventions in the flowsheet. Outcome: Progressing Towards Goal  Note: Fall Risk Interventions:  Mobility Interventions: Bed/chair exit alarm, Patient to call before getting OOB    Mentation Interventions: Bed/chair exit alarm, Door open when patient unattended, Reorient patient, Room close to nurse's station, More frequent rounding, Adequate sleep, hydration, pain control    Medication Interventions: Bed/chair exit alarm, Patient to call before getting OOB, Teach patient to arise slowly    Elimination Interventions: Bed/chair exit alarm, Patient to call for help with toileting needs              Problem: Patient Education: Go to Patient Education Activity  Goal: Patient/Family Education  Outcome: Progressing Towards Goal     Problem: Pressure Injury - Risk of  Goal: *Prevention of pressure injury  Description: Document Zen Scale and appropriate interventions in the flowsheet. Outcome: Progressing Towards Goal  Note: Pressure Injury Interventions:  Sensory Interventions: Assess changes in LOC, Check visual cues for pain, Float heels, Minimize linen layers, Pressure redistribution bed/mattress (bed type)    Moisture Interventions: Absorbent underpads, Internal/External urinary devices, Moisture barrier, Minimize layers, Apply protective barrier, creams and emollients, Assess need for specialty bed    Activity Interventions: Pressure redistribution bed/mattress(bed type), Assess need for specialty bed    Mobility Interventions: Assess need for specialty bed, HOB 30 degrees or less, Pressure redistribution bed/mattress (bed type), Turn and reposition approx.  every two hours(pillow and wedges)    Nutrition Interventions: Document food/fluid/supplement intake    Friction and Shear Interventions: Minimize layers, HOB 30 degrees or less, Apply protective barrier, creams and emollients                Problem: Patient Education: Go to Patient Education Activity  Goal: Patient/Family Education  Outcome: Progressing Towards Goal     Problem: Pain  Goal: *Control of Pain  5/2/2021 2322 by Brenda Escobar RN  Outcome: Progressing Towards Goal  5/2/2021 2322 by Brenda Escobar RN  Outcome: Progressing Towards Goal     Problem: Patient Education: Go to Patient Education Activity  Goal: Patient/Family Education  5/2/2021 2322 by Brenda Escobar RN  Outcome: Progressing Towards Goal  5/2/2021 2322 by Brenda Escobar RN  Outcome: Progressing Towards Goal     Problem: Nausea/Vomiting (Adult)  Goal: *Absence of nausea/vomiting  Outcome: Progressing Towards Goal     Problem: Patient Education: Go to Patient Education Activity  Goal: Patient/Family Education  Outcome: Progressing Towards Goal     Problem: Nutrition Deficit  Goal: *Optimize nutritional status  Outcome: Progressing Towards Goal

## 2021-05-03 NOTE — PROGRESS NOTES
Palliative Medicine    Patient has a POST form, an advance medical directive (AMD) and a POA; on file. Patient is a DNR/DNI with Limited Additional Interventions and NO feeding tube. Care manager is now working on a discharge plan for patient, that is appropriate; given his current medical condition and level of functioning. With goals of care defined; the Palliative Care team will now sign off. Please reconsult team as needed/if appropriate. Thank you for the Palliative Medicine consult and allowing us to participate in the care of Nichola Bread M. [facility] [place]all[place] Medicine Inpatient   [facility] [address]: [phone] ([phone])

## 2021-05-03 NOTE — DIABETES MGMT
GLYCEMIC CONTROL PLAN OF CARE    Assessment:  Morning blood glucose: 190 mg/dl  IVF containing dextrose:  None  Steroids: none   Diet/TF:  DIET DIABETIC WITH OPTIONS Consistent Carb 2200kcal; Regular; No Conc. Sweets  DIET NUTRITIONAL SUPPLEMENTS HS Snack; Glucerna Shake    Recommendations:  Recommend adding basal insulin, lantus 10 units daily  Continue corrective insulin coverage as needed  Advanced corrective insulin to very insulin resistant dosing  Will continue inpatient monitoring. Most recent BG values:      Results for Samantha Boss (MRN [id number]) as of 5/3/2021 15:47   Ref. Range 5/2/2021 11:27 5/2/2021 16:02 5/2/2021 21:23 5/3/2021 05:32 5/3/2021 10:33   GLUCOSE,FAST - POC Latest Ref Range: 70 - 110 mg/dL 203 (H) 187 (H) 242 (H) 190 (H) 228 (H)     Within target range  mg/dl? No   Current A1C:  7.1%_ equivalent to an average blood glucose of 157 mg/dl over the past 2-3 months. Adequate glycemic control PTA? Yes   Current hospital diabetes medications:  Lispro corrective insulin coverage AC&HS  Previous days insulin requirements:  Lispro 8 units corrective insulin  Home diabetes medication:  Per pta med list. Unverified. Levemir 60 units daily  Education:  ___ see diabetes education record            _x_ diabetes education not indicated at this time.     [facility] RN CDE  Ext [phone]

## 2021-05-03 NOTE — PROGRESS NOTES
Nutrition Assessment     Type and Reason for Visit: Reassess    Nutrition Recommendations/Plan:   - Continue diabetic diet with no concentrated sweets. - Decrease frequency of Glucerna Shake to once daily. Nutrition Assessment:  DNR/DNI, no feeding tubes per goals of care. Intake improved over the past few days per chart and observed around 75% of lunch consumed today. Consuming supplements. Malnutrition Assessment:  Malnutrition Status: At risk for malnutrition (specify)(dementia)     Estimated Daily Nutrient Needs:  Energy (kcal):  8936-7936  Protein (g):  66-98       Fluid (ml/day):  6496-7948   CHO (g/day): 215-239    Nutrition Related Findings:  BM 5/3 per RN report. Confused with dementia. BG levels 190-228 mg/dL- SSI. MVI w/ iron. Current Nutrition Therapies:  DIET DIABETIC WITH OPTIONS Consistent Carb 2200kcal; Regular; No Conc. Sweets  DIET NUTRITIONAL SUPPLEMENTS Breakfast, Lunch, HS Snack; Glucerna Shake    Anthropometric Measures:  · Height:  6' 2\" (188 cm)  · Current Body Wt:  82.7 kg (182 lb 5.1 oz)  · BMI: 23.4    Nutrition Diagnosis:   · Altered nutrition-related lab values related to endocrine dysfunction as evidenced by lab values(hyperglycemia)    Nutrition Intervention:  Food and/or Nutrient Delivery: Continue current diet, Modify oral nutrition supplement, Vitamin supplement  Nutrition Education and Counseling: Education not indicated  Coordination of Nutrition Care: Continue to monitor while inpatient(pt discussed with RN)    Goals:  1. PO nutrition intake will continue to meet >75% of patients estimated nutritional needs over the next 7 days. 2. Blood glucose will be within target range of  mg/dL within the next 5-7 days.        Nutrition Monitoring and Evaluation:   Behavioral-Environmental Outcomes: None identified  Food/Nutrient Intake Outcomes: Food and nutrient intake, Supplement intake, Vitamin/mineral intake  Physical Signs/Symptoms Outcomes: Biochemical data, Chewing or swallowing, GI status, Meal time behavior, Nutrition focused physical findings    Discharge Planning:    Continue current diet     Electronically signed by Sheila Glasgow RD on 5/3/2021 at 1:47 PM    Contact Number: [phone]

## 2021-05-04 VITALS
SYSTOLIC BLOOD PRESSURE: 191 MMHG | RESPIRATION RATE: 16 BRPM | DIASTOLIC BLOOD PRESSURE: 88 MMHG | WEIGHT: 182 LBS | OXYGEN SATURATION: 96 % | BODY MASS INDEX: 23.36 KG/M2 | TEMPERATURE: 97.3 F | HEIGHT: 74 IN | HEART RATE: 78 BPM

## 2021-05-04 LAB — GLUCOSE BLD STRIP.AUTO-MCNC: 214 MG/DL (ref 70–110)

## 2021-05-04 PROCEDURE — 2709999900 HC NON-CHARGEABLE SUPPLY

## 2021-05-04 PROCEDURE — 74011250637 HC RX REV CODE- 250/637: Performed by: HOSPITALIST

## 2021-05-04 PROCEDURE — 74011636637 HC RX REV CODE- 636/637: Performed by: HOSPITALIST

## 2021-05-04 PROCEDURE — 99239 HOSP IP/OBS DSCHRG MGMT >30: CPT | Performed by: HOSPITALIST

## 2021-05-04 PROCEDURE — 74011250636 HC RX REV CODE- 250/636: Performed by: NURSE PRACTITIONER

## 2021-05-04 PROCEDURE — 82962 GLUCOSE BLOOD TEST: CPT

## 2021-05-04 PROCEDURE — 74011250637 HC RX REV CODE- 250/637: Performed by: NURSE PRACTITIONER

## 2021-05-04 RX ORDER — AMOXICILLIN AND CLAVULANATE POTASSIUM 875; 125 MG/1; MG/1
1 TABLET, FILM COATED ORAL 2 TIMES DAILY WITH MEALS
Qty: 14 TAB | Refills: 0 | Status: SHIPPED | OUTPATIENT
Start: 2021-05-04 | End: 2021-05-11

## 2021-05-04 RX ORDER — ERYTHROMYCIN 5 MG/G
0.5 OINTMENT OPHTHALMIC EVERY 8 HOURS
Qty: 4 G | Refills: 0 | Status: SHIPPED | OUTPATIENT
Start: 2021-05-04 | End: 2021-05-08

## 2021-05-04 RX ORDER — CLONIDINE HYDROCHLORIDE 0.1 MG/1
0.1 TABLET ORAL 2 TIMES DAILY
Qty: 60 TAB | Refills: 1 | Status: ON HOLD | OUTPATIENT
Start: 2021-05-04 | End: 2021-05-26 | Stop reason: SDUPTHER

## 2021-05-04 RX ADMIN — QUETIAPINE FUMARATE 50 MG: 25 TABLET ORAL at 10:03

## 2021-05-04 RX ADMIN — GABAPENTIN 300 MG: 300 CAPSULE ORAL at 10:02

## 2021-05-04 RX ADMIN — METOPROLOL SUCCINATE 50 MG: 50 TABLET, EXTENDED RELEASE ORAL at 10:02

## 2021-05-04 RX ADMIN — Medication 1 TABLET: at 10:03

## 2021-05-04 RX ADMIN — ERYTHROMYCIN: 5 OINTMENT OPHTHALMIC at 05:25

## 2021-05-04 RX ADMIN — ENOXAPARIN SODIUM 40 MG: 40 INJECTION SUBCUTANEOUS at 10:03

## 2021-05-04 RX ADMIN — Medication 81 MG: at 10:02

## 2021-05-04 RX ADMIN — ISOSORBIDE MONONITRATE 30 MG: 30 TABLET ORAL at 10:02

## 2021-05-04 RX ADMIN — INSULIN LISPRO 6 UNITS: 100 INJECTION, SOLUTION INTRAVENOUS; SUBCUTANEOUS at 06:08

## 2021-05-04 RX ADMIN — MEMANTINE HYDROCHLORIDE 10 MG: 10 TABLET ORAL at 10:03

## 2021-05-04 RX ADMIN — CYCLOSPORINE 1 DROP: 0.5 EMULSION OPHTHALMIC at 09:00

## 2021-05-04 RX ADMIN — AMOXICILLIN AND CLAVULANATE POTASSIUM 1 TABLET: 875; 125 TABLET, FILM COATED ORAL at 10:02

## 2021-05-04 RX ADMIN — CLONIDINE HYDROCHLORIDE 0.1 MG: 0.1 TABLET ORAL at 10:02

## 2021-05-04 NOTE — PROGRESS NOTES
Shift Summary Note:  Assumed care of patient awake and quiet. Drinking water but slept most of night. No s/s of distress, remains incontinet of urine and stool.   Patient Vitals for the past 12 hrs:   Temp Pulse Resp BP SpO2   05/04/21 0432 97.4 °F (36.3 °C) 84 16 (!) 176/79 94 %   05/03/21 2050  86  139/76    05/03/21 1929 97.3 °F (36.3 °C) 84 16 (!) 180/83 96 %

## 2021-05-04 NOTE — DISCHARGE SUMMARY
Discharge Summary    Patient: Viral Figueroa MRN: [id number]  CSN: [id number]    YOB: 1935  Age: 82 y.o. Sex: male    DOA: 4/23/2021 LOS:  LOS: 11 days   Discharge Date:      Admission Diagnoses: Leukocytosis [D72.829]  Acute cholecystitis [K81.0]    Discharge Diagnoses:    Problem List as of 5/4/2021 Date Reviewed: 9/29/2017          Codes Class Noted - Resolved    Goals of care, counseling/discussion ICD-10-CM: Z71.89  ICD-9-CM: V65.49  Unknown - Present        Dementia without behavioral disturbance ([facility].) ICD-10-CM: F03.90  ICD-9-CM: 294.20  Unknown - Present        Debility ICD-10-CM: R53.81  ICD-9-CM: 799.3  Unknown - Present        * (Principal) Acute cholecystitis ICD-10-CM: K81.0  ICD-9-CM: 575.0  4/23/2021 - Present        Leukocytosis ICD-10-CM: L50.269  ICD-9-CM: 288.60  4/23/2021 - Present        Fracture of nasal bones, sequela ICD-10-CM: S02. 2XXS  ICD-9-CM: 802.0  12/10/2019 - Present        New onset seizure ([facility] ICD-10-CM: R56.9  ICD-9-CM: 780.39  12/9/2019 - Present        Polycythemia ICD-10-CM: D75.1  ICD-9-CM: 238.4  12/9/2019 - Present        Flu ICD-10-CM: J11.1  ICD-9-CM: 487.1  3/22/2018 - Present        Wheezing ICD-10-CM: R06.2  ICD-9-CM: 786.07  3/22/2018 - Present        Open wound ICD-10-CM: T14. 8XXA  ICD-9-CM: 879.8  10/24/2017 - Present        Cellulitis of right lower extremity ICD-10-CM: L03.115  ICD-9-CM: 682.6  10/24/2017 - Present        Poorly controlled diabetes mellitus ([facility] ICD-10-CM: E11.65  ICD-9-CM: 250.00  10/24/2017 - Present        Deviated nasal septum ICD-10-CM: J34.2  ICD-9-CM: 470  6/28/2017 - Present        Enuresis ICD-10-CM: R32  ICD-9-CM: 788.30  2/27/2017 - Present        Mixed hyperlipidemia ICD-10-CM: E78.2  ICD-9-CM: 272.2  1/24/2017 - Present        Type 2 diabetes mellitus without complication, with long-term current use of insulin (HCC) ICD-10-CM: E11.9, Z79.4  ICD-9-CM: 250.00, V58.67  10/24/2016 - Present        S/P CABG x 2 ICD-10-CM: Z95.1  ICD-9-CM: V45.81  10/24/2016 - Present        CAD (coronary artery disease) ICD-10-CM: I25.10  ICD-9-CM: 414.00  8/29/2016 - Present        COPD (chronic obstructive pulmonary disease) ([facility]) (Chronic) ICD-10-CM: J44.9  ICD-9-CM: 496  8/29/2016 - Present        Type 2 diabetes mellitus with stage 3 chronic kidney disease (HCC) ICD-10-CM: E11.22, N18.30  ICD-9-CM: 250.40, 585.3  7/20/2016 - Present        Venous (peripheral) insufficiency (Chronic) ICD-10-CM: L67.6  ICD-9-CM: 459.81  Unknown - Present        Osteoarthrosis involving multiple sites (Chronic) ICD-10-CM: M15.9  ICD-9-CM: 715.89  Unknown - Present        Low back pain ICD-10-CM: M54.5  ICD-9-CM: 724.2  Unknown - Present        CHI (closed head injury) ICD-10-CM: S09. 90XA  ICD-9-CM: 959.01  5/11/2015 - Present        CKD (chronic kidney disease) stage 3, GFR 30-59 ml/min ([facility]) (Chronic) ICD-10-CM: N18.30  ICD-9-CM: 585.3  5/20/2013 - Present        HTN (hypertension) (Chronic) ICD-10-CM: I10  ICD-9-CM: 401.9  11/19/2012 - Present        Atherosclerosis of native arteries of the extremities with intermittent claudication ICD-10-CM: I70.219  ICD-9-CM: 440.21  11/19/2012 - Present        Carotid artery occlusion without infarction ICD-10-CM: I65.29  ICD-9-CM: 433.10  11/19/2012 - Present        DM (diabetes mellitus) ([facility] (Chronic) ICD-10-CM: E11.9  ICD-9-CM: 250.00  11/19/2012 - Present        Spondylosis ICD-10-CM: M47.9  ICD-9-CM: 721.90  11/19/2012 - Present        Spondylosis ICD-10-CM: M47.9  ICD-9-CM: 721.90  11/14/2012 - Present        Hypercholesterolemia (Chronic) ICD-10-CM: E78.00  ICD-9-CM: 272.0  Unknown - Present        RESOLVED: Unstable angina ([facility].) ICD-10-CM: I20.0  ICD-9-CM: 411.1  5/1/2014 - 6/10/2014        RESOLVED: Unstable angina ([facility] 75.) ICD-10-CM: I20.0  ICD-9-CM: 411.1  5/20/2013 - 5/23/2013        RESOLVED: Syncope and collapse ICD-10-CM: R55  ICD-9-CM: 780.2  11/19/2012 - 8/29/2016        RESOLVED: Cough ICD-10-CM: R05  ICD-9-CM: 786.2  11/19/2012 - 8/29/2016            Reason for Admission  82 y.o.  male with hx of dementia, CAD, CKD, T2DM, PVD, COPD who presented to the ED from [facility] with elevated WBC. He had labs done the prior day with WBC showing 26.6K. Pt is also c/o RLQ abdominal pain. He was seen in the ED on 4/14 and diagnosed with UTI and discharged back to [facility] with abx.     Work up in the ED noted stable vital signs, WBC 20.3, BUN 37, creatinine 1.76, total bilirubin 1.5, albumin 2.4, ALT 47, AST 50, Alk Phos 151. CXR streaky bibasilar opacities, similar to prior exam. CT a/p wo contrast concerning for acute cholecystitis- gallbladder distended with pericholecystic inflammation. Patient noted tachycardic. Surgery consulted and recommends admission for IV abx, not a surgical candidate. Pt started on IV Zosyn. Admitted to hospitalist service.      Discharge Condition: Good    PHYSICAL EXAM at discharge  Visit Vitals  BP (!) 191/88   Pulse 78   Temp 97.3 °F (36.3 °C)   Resp 16   Ht 6' 2\" (1.88 m)   Wt 82.6 kg (182 lb)   SpO2 96%   BMI 23.37 kg/m²       Gen:  Patient awake and alert, answering questions and following commands. Found sitting up in bed eating breakfast.  HEENT and Neck:  PERRL, No cervical tenderness or masses  Lungs:  Clear bilateral anterior and infraaxillary fields. No rales, no wheeze. Normal effort. Heart:  Regular Rate and Rhythm. No murmur or gallop. Abdomen:  Soft, non tender, nondistended nabs  Extremities:  No digital clubbing, no cyanosis. No edema  Neuro:  Alert and oriented x1-2, Normal affect, Cranial nerves intact, No sensory deficits  Skin no rash to visible skin      Hospital Course:   1. acute cholecystitis resolving, tolerating full diet, afebrile and leukocytosis resolving. Switched to oral antibiotics continue for 1 week after discharge. no need for drain at this time. Follow-up with surgery in clinic. 2. Hypertension, suboptimal control.  Clonidine added, may titrate if needed. 3. Hypokalemia. Hypophosphatemia repleted  4. 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 Continue metoprolol, zetia. Asa.   5.  DM2. Reduced insulin requirements during this admission. 6. CKD stage 3  7. COPD   8. Dementia without behavioral disturbance . Resume previous meds  9. Dyslipidemia continue zetia. 10. Sepsis poa (tachycardia, leukocytosis) resolving. source cholecystitis. 11. DVT prophylaxis was given in the form of lovenox subcut daily. 12. DNR/DNI, limited interventions per palliative care team. POST form completed and scanned into cc. return to [address] [place] [address]. Daughter Ada beavers [phone], h [phone] agrees, extensive discussion held over phone, all questions answered to the best of my ability.        Consults: General Surgery Dr. Agustin Favre NP Camillia Pastor    Significant Diagnostic Studies: labs:   Recent Results (from the past 24 hour(s))   GLUCOSE, POC    Collection Time: 05/03/21 10:33 AM   Result Value Ref Range    Glucose (POC) 228 (H) 70 - 110 mg/dL   ECHO ADULT COMPLETE    Collection Time: 05/03/21 12:24 PM   Result Value Ref Range    IVSd 0.74 0.60 - 1.00 cm    LVIDd 4.49 4.20 - 5.90 cm    LVIDs 3.60 cm    LVOT d 1.78 cm    LVPWd 1.10 (A) 0.60 - 1.00 cm    LVOT Peak Gradient 2.58 mmHg    Left Ventricular Outflow Tract Mean Gradient 1.23 mmHg    LVOT SV 49.6 mL    LVOT Peak Velocity 80.33 cm/s    LVOT VTI 19.88 cm    LA Volume 23.99 18.0 - 58.0 mL    LA Area 4C 9.92 cm2    LA Vol 2C 23.54 18.00 - 58.00 mL    LA Vol 4C 21.07 18.00 - 58.00 mL    MV A Filiberto 101.99 cm/s    Mitral Valve E Wave Deceleration Time 160.39 ms    MV E Filiberto 68.01 cm/s    Triscuspid Valve Regurgitation Peak Gradient 19.13 mmHg    TR Max Velocity 218.71 cm/s    Ao Root D 3.14 cm    MV E/A 0.67     LV Mass .3 88.0 - 224.0 g    LV Mass AL Index 65.3 49.0 - 115.0 g/m2    LA Vol Index 11.49 16.00 - 28.00 ml/m2    LA Vol Index 11.28 16.00 - 28.00 ml/m2    LA Vol Index 10.09 16.00 - 28.00 ml/m2   GLUCOSE, POC    Collection Time: 05/03/21  4:37 PM   Result Value Ref Range    Glucose (POC) 171 (H) 70 - 110 mg/dL   GLUCOSE, POC    Collection Time: 05/03/21  9:20 PM   Result Value Ref Range    Glucose (POC) 238 (H) 70 - 110 mg/dL   GLUCOSE, POC    Collection Time: 05/04/21  6:02 AM   Result Value Ref Range    Glucose (POC) 214 (H) 70 - 110 mg/dL     Results     Procedure Component Value Units Date/Time    CULTURE, BLOOD [[id number]] Collected: 04/23/21 1449    Order Status: Completed Specimen: Blood Updated: 04/29/21 0646     Special Requests: NO SPECIAL REQUESTS        Culture result: NO GROWTH 6 DAYS       CULTURE, BLOOD [[id number]] Collected: 04/23/21 1439    Order Status: Completed Specimen: Blood Updated: 04/29/21 0646     Special Requests: NO SPECIAL REQUESTS        Culture result: NO GROWTH 6 DAYS           IMAGING  US Results (most recent):  Results from [place] encounter on 04/23/21   US ABD LTD    Narrative EXAM: Ultrasound of the Right Upper Quadrant    INDICATION: elevated wbc, ?cholecystitis    TECHNIQUE: Ultrasound of the right upper quadrant performed with grayscale and  color Doppler. COMPARISON: Same day CT of the abdomen and pelvis    FINDINGS:    IVC: Unremarkable. Liver: The echotexture is coarsened. No focal lesion appreciated. The internal  vascularity is unremarkable. The portal vein measures 1.1 cm. The portal vein  demonstrates normal color and Spectral Doppler with hepatopedal flow. Biliary: No intrahepatic or extrahepatic biliary ductal dilatation. The CBD is  0.4 cm. Gallbladder: The gallbladder has a thickened wall. The gallbladder is distended  and has debris. Unable to evaluate Ortega's sign due to pain medication status. Pancreas: The head and body are unremarkable. The tail is obscured by bowel gas. Right kidney: The kidney is diminutive in size and has a thinned renal cortex.   No mass lesion or hydronephrosis appreciated. The internal vascularity is  unremarkable. Ascites: No ascites identified. Impression 1. Findings concerning for acute cholecystitis. 2.  Fatty infiltration of the liver. 3.  Chronic medical renal disease. CT Results (most recent):  [facility]   CT ABD PELV WO CONT    Narrative EXAM: CT of the Abdomen and Pelvis without IV contrast    CLINICAL INDICATION:  abd pain, leukocytosis,    TECHNIQUE: CT of the abdomen and pelvis. Sagittal and coronal reformations    All CT scans at this facility are performed using dose optimization technique as  appropriate to a performed exam, to include automated exposure control,  adjustment of the mA and/or kV according to patient size (including appropriate  matching for site specific examination) or use of iterative reconstruction  technique. IV CONTRAST: None    ENTERIC CONTRAST: None    COMPARISON: None    FINDINGS:   Limitations: The evaluation of the solid organs is limited due to the lack of IV  contrast.    Lower Chest: Mild atelectasis in the lung bases are noted. No consolidation or  effusion identified. The heart and pericardium are unremarkable. Peritoneum: No free air appreciated. No free fluid present. No fluid collections  present. Liver: No focal lesion appreciated. Biliary/Gallbladder: No intrahepatic or extrahepatic biliary ductal dilatation  appreciated. The gallbladder is distended with pericholecystic inflammation. Spleen: Unremarkable. Pancreas: No focal lesion appreciated. The pancreatic duct is unremarkable. No  peripancreatic inflammation or adenopathy. : The adrenal glands are unremarkable. No urolithiasis. No perinephric fat  stranding appreciated. No hydroureteronephrosis seen. No acute pathology in the  bladder. GI: The stomach is unremarkable. The small bowel is without evidence of  obstruction. No small bowel wall thickening.  The mesentery is without  inflammation or adenopathy. The appendix is unremarkable. No pericolonic  inflammation or wall thickening appreciated. Aorta and retroperitoneum: No aortic aneurysm seen. No periaortic adenopathy  seen. No fluid collections in the retroperitoneum appreciated. The IVC is  flattened. Abdominal wall/Inguinal: Small amount of fluid is noted in the inguinal tracts  bilaterally. Musculoskeletal: Multilevel degenerative disc disease and facet arthropathy. Bilateral hip osteoporosis noted      Impression  Findings concerning for acute cholecystitis. Consider correlation with  ultrasound and/or nuclear medicine hepatobiliary scan. XR Results (most recent):  Results from Hospital Encounter encounter on 04/23/21   XR CHEST PORT    Narrative EXAM: XR CHEST PORT    INDICATION: fever    COMPARISON: December 9, 2019    FINDINGS: A portable AP radiograph of the chest was obtained at 1320 hours. The  patient is on a cardiac monitor. Streaky bibasilar densities, similar to prior  exam.. Stable sequela previous cardiothoracic intervention. .  No acute bone  findings. .       Impression Streaky bibasilar opacities, similar to prior exam.       ECHO ADULT COMPLETE 5/3/21  There is a prior study available for comparison. Prior study date: 3/23/2018. As compared to the previous study, there are no significant changes. Left Ventricle Normal cavity size, wall thickness and systolic function (ejection fraction normal). Wall motion: normal. The estimated EF is 55 - 60%. Visually measured ejection fraction. There is inconclusive left ventricular diastolic function. Wall Scoring The left ventricular wall motion is normal.            Left Atrium Normal cavity size. Left Atrium volume index is 11 mL/m2. Right Ventricle Normal cavity size and global systolic function. Right Atrium Normal cavity size. Aortic Valve Normal valve structure, trileaflet valve structure and no stenosis. There is leaflet calcification. Trace aortic valve regurgitation. Mitral Valve Normal valve structure and no stenosis. Mitral annular calcification. Trace regurgitation. Tricuspid Valve Normal valve structure and no stenosis. Trace regurgitation. Pulmonic Valve Pulmonic valve not well visualized, but normal doppler findings. Aorta Normal aortic root. Pulmonary Artery Pulmonary arterial systolic pressure (PASP) is 27 mmHg. Pulmonary hypertension not suggested by Doppler findings. IVC/Hepatic Veins Normal structure. Normal central venous pressure (3 mmHg); IVC diameter is less than 21 mm and collapses more than 50% with respiration. Pericardium No evidence of pericardial effusion. Discharge Medications:     Current Discharge Medication List      START taking these medications    Details   amoxicillin-clavulanate (AUGMENTIN) 875-125 mg per tablet Take 1 Tab by mouth two (2) times daily (with meals) for 7 days. Qty: 14 Tab, Refills: 0      polyvinyl alcohol-povidon,PF, (REFRESH CLASSIC) 1.4-0.6 % ophthalmic solution Administer 1 Drop to both eyes as needed for Other (ocular dryness) for up to 30 days. Qty: 30 Each, Refills: 1      cloNIDine HCL (CATAPRES) 0.1 mg tablet Take 1 Tab by mouth two (2) times a day. Qty: 60 Tab, Refills: 1         CONTINUE these medications which have CHANGED    Details   erythromycin (ILOTYCIN) ophthalmic ointment Administer 0.5 g to both eyes every eight (8) hours for 4 days. Qty: 4 g, Refills: 0      insulin detemir U-100 (LEVEMIR U-100 INSULIN) 100 unit/mL injection 10 Units by SubCUTAneous route daily. Indications: type 2 diabetes mellitus      CONTINUE these medications which have NOT CHANGED    Details   temazepam (RESTORIL) 7.5 mg capsule Take 7.5 mg by mouth nightly. QUEtiapine (SEROqueL) 50 mg tablet Take 50 mg by mouth two (2) times a day. memantine (Namenda) 10 mg tablet Take 10 mg by mouth daily. metoprolol succinate (Toprol XL) 50 mg XL tablet Take 50 mg by mouth daily. diclofenac (VOLTAREN) 1 % gel Apply 2 g to affected area four (4) times daily. Qty: 100 g, Refills: 0      ezetimibe (ZETIA) 10 mg tablet Take 1 Tab by mouth nightly. aspirin delayed-release 81 mg tablet Take 1 Tab by mouth daily. Indications: Cerebral Thromboembolism Prevention  Qty: 30 Tab, Refills: 0      cycloSPORINE (RESTASIS) 0.05 % ophthalmic emulsion Administer 1 Drop to both eyes every twelve (12) hours. Indications: Dry Eye  Qty: 15 Each, Refills: 0      gabapentin (NEURONTIN) 300 mg capsule Take 1 Cap by mouth daily. Indications: NEUROPATHIC PAIN, Restless Legs Syndrome  Qty: 30 Cap, Refills: 0      isosorbide mononitrate ER (IMDUR) 30 mg tablet Take 1 Tab by mouth daily. Qty: 30 Tab, Refills: 0    Associated Diagnoses: Essential hypertension      multivit-min-FA-lycopen-lutein (CENTRUM SILVER) 0.4-300-250 mg-mcg-mcg tab Take 1 tab daily  Qty: 30 Tab, Refills: 0      furosemide (LASIX) 20 mg tablet Take 1 Tab by mouth daily. Indications: Edema  Qty: 30 Tab, Refills: 0      famotidine (PEPCID) 20 mg tablet Take 1 Tab by mouth two (2) times a day. Indications: PREVENTION OF STRESS ULCER  Qty: 30 Tab, Refills: 0      CALCIUM CARBONATE (CALTRATE 600 PO) Take 1 Tab by mouth daily. lidocaine (LIDODERM) 5 % Apply patch to the affected area for 12 hours a day and remove for 12 hours a day. Qty: 30 Each, Refills: 0      lactobacillus sp. 50 billion cpu (BIO-K PLUS) 50 billion cell -375 mg cap capsule Take 1 Cap by mouth daily. Qty: 30 Cap, Refills: 0      OTHER Incentive spirometry- Use as directed  Qty: 1 Each, Refills: 0    Associated Diagnoses: Influenza B             Activity: Activity as tolerated    Diet: Diabetic Diet 2200 kcal daily, no concentrated sweets. Wound Care: None needed    Follow-up:   Follow-up Appointments   Procedures    FOLLOW UP VISIT Appointment in: Other (Specify) 1. pcp 3 days. 2. Dr. Sosa Maple 1 week     1. pcp 3 days.   2. Dr. Sosa Maple 1 week     Standing Status: Standing     Number of Occurrences:   1     Order Specific Question:   Appointment in     Answer:    Other (Specify)       Minutes spent on discharge: >30

## 2021-05-04 NOTE — PROGRESS NOTES
Problem: Falls - Risk of  Goal: *Absence of Falls  Description: Document Niya Baker Fall Risk and appropriate interventions in the flowsheet.   Outcome: Progressing Towards Goal  Note: Fall Risk Interventions:  Mobility Interventions: Patient to call before getting OOB, PT Consult for mobility concerns    Mentation Interventions: Bed/chair exit alarm, Door open when patient unattended    Medication Interventions: Patient to call before getting OOB, Teach patient to arise slowly    Elimination Interventions: Call light in reach, Urinal in reach              Problem: Patient Education: Go to Patient Education Activity  Goal: Patient/Family Education  Outcome: Progressing Towards Goal

## 2021-05-04 NOTE — PROGRESS NOTES
Bedside and Verbal shift change report given to GE Hendricks (oncoming nurse) by Curt Trinidad RN (offgoing nurse). Report included the following information SBAR, Kardex, Procedure Summary, Intake/Output, MAR, Recent Results and Med Rec Status.

## 2021-05-04 NOTE — PROGRESS NOTES
Patient discharging back to [address] [place] [address] with [facility].   David Means RN - Outcomes Manager  [phone]

## 2021-05-04 NOTE — PROGRESS NOTES
Pt to return to [facility] today at [address] should be called to [phone]  Confirmed with [address] that facility can accept. Spoke with pt's daughter, Kelby Gooden and is aware and agreeable to transition today. Patient unable to understand and/or complete the \"Important Message from [address]". Reviewed document with patient's representative daughter, Mayela Corbett at phone number [phone] telephonically and addressed questions. A copy of the IMM letter left at bedside with patient. Original, with verbal signature noted, placed in patient's chart.     Hedy Bower MSW, [employer] ([employer])[facility]- [phone]

## 2021-05-04 NOTE — PROGRESS NOTES
Problem: Diabetes Self-Management  Goal: *Disease process and treatment process  Description: Define diabetes and identify own type of diabetes; list 3 options for treating diabetes. Outcome: Progressing Towards Goal  Goal: *Incorporating nutritional management into lifestyle  Description: Describe effect of type, amount and timing of food on blood glucose; list 3 methods for planning meals. Outcome: Progressing Towards Goal  Goal: *Incorporating physical activity into lifestyle  Description: State effect of exercise on blood glucose levels. Outcome: Progressing Towards Goal  Goal: *Using medications safely  Description: State effect of diabetes medications on diabetes; name diabetes medication taking, action and side effects. Outcome: Progressing Towards Goal  Goal: *Monitoring blood glucose, interpreting and using results  Description: Identify recommended blood glucose targets  and personal targets. Outcome: Progressing Towards Goal     Problem: Falls - Risk of  Goal: *Absence of Falls  Description: Document Emerald Salas Fall Risk and appropriate interventions in the flowsheet.   Outcome: Progressing Towards Goal  Note: Fall Risk Interventions:  Mobility Interventions: Communicate number of staff needed for ambulation/transfer, Patient to call before getting OOB, OT consult for ADLs, PT Consult for mobility concerns, PT Consult for assist device competence, Strengthening exercises (ROM-active/passive), Utilize walker, cane, or other assistive device    Mentation Interventions: Adequate sleep, hydration, pain control, Door open when patient unattended, Increase mobility, More frequent rounding, Reorient patient, Room close to nurse's station, Toileting rounds, Update white board    Medication Interventions: Teach patient to arise slowly, Patient to call before getting OOB    Elimination Interventions: Call light in reach, Patient to call for help with toileting needs, Toilet paper/wipes in reach, Toileting schedule/hourly rounds              Problem: Patient Education: Go to Patient Education Activity  Goal: Patient/Family Education  Outcome: Progressing Towards Goal     Problem: Pressure Injury - Risk of  Goal: *Prevention of pressure injury  Description: Document Zen Scale and appropriate interventions in the flowsheet. Outcome: Progressing Towards Goal  Note: Pressure Injury Interventions:  Sensory Interventions: Discuss PT/OT consult with provider, Keep linens dry and wrinkle-free, Maintain/enhance activity level, Minimize linen layers, Monitor skin under medical devices, Pressure redistribution bed/mattress (bed type), Assess changes in LOC, Check visual cues for pain    Moisture Interventions: Absorbent underpads, Check for incontinence Q2 hours and as needed, Internal/External urinary devices, Minimize layers    Activity Interventions: Increase time out of bed, Pressure redistribution bed/mattress(bed type), PT/OT evaluation    Mobility Interventions: HOB 30 degrees or less, Pressure redistribution bed/mattress (bed type), PT/OT evaluation    Nutrition Interventions: Document food/fluid/supplement intake, Discuss nutritional consult with provider, Offer support with meals,snacks and hydration    Friction and Shear Interventions: HOB 30 degrees or less, Minimize layers                Problem: Patient Education: Go to Patient Education Activity  Goal: Patient/Family Education  Outcome: Progressing Towards Goal     Problem: Diabetes Self-Management  Goal: *Developing strategies to promote health/change behavior  Description: Define the ABC's of diabetes; identify appropriate screenings, schedule and personal plan for screenings.   Outcome: Not Progressing Towards Goal     Problem: Pain  Goal: *Control of Pain  Outcome: Resolved/Met     Problem: Patient Education: Go to Patient Education Activity  Goal: Patient/Family Education  Outcome: Resolved/Met     Problem: Nausea/Vomiting (Adult)  Goal: *Absence of nausea/vomiting  Outcome: Resolved/Met     Problem: Patient Education: Go to Patient Education Activity  Goal: Patient/Family Education  Outcome: Resolved/Met     Problem: Nutrition Deficit  Goal: *Optimize nutritional status  Outcome: Resolved/Met

## 2021-05-04 NOTE — ROUTINE PROCESS
Bedside and Verbal shift change report given to Isaiah Pereyra (oncoming nurse) by Mitul Rodriguez RN (offgoing nurse). Report included the following information SBAR, Kardex, Intake/Output, MAR and Recent Results.

## 2021-05-04 NOTE — PROGRESS NOTES
attempted to  Complete a follow up visit with and Spiritual assessment of patient  In room 502 today but found him resting peacefully and unable to communicate at this time Chaplains will continue to follow and will provide pastoral care on an as needed/requested basis    Chaplain Good Michel   Board Certified [address] [place] [address]   [phone]

## 2021-05-21 ENCOUNTER — HOSPITAL ENCOUNTER (EMERGENCY)
Dept: CT IMAGING | Age: 86
Discharge: HOME | DRG: 445 | End: 2021-05-21
Attending: EMERGENCY MEDICINE
Payer: MEDICARE

## 2021-05-21 ENCOUNTER — APPOINTMENT (OUTPATIENT)
Dept: GENERAL RADIOLOGY | Age: 86
DRG: 445 | End: 2021-05-21
Attending: EMERGENCY MEDICINE
Payer: MEDICARE

## 2021-05-21 ENCOUNTER — HOSPITAL ENCOUNTER (INPATIENT)
Age: 86
LOS: 6 days | Discharge: HOME HEALTH | DRG: 445 | End: 2021-05-27
Attending: EMERGENCY MEDICINE | Admitting: INTERNAL MEDICINE
Payer: MEDICARE

## 2021-05-21 DIAGNOSIS — G25.81 RESTLESS LEG SYNDROME: ICD-10-CM

## 2021-05-21 DIAGNOSIS — K81.9 CHOLECYSTITIS: ICD-10-CM

## 2021-05-21 DIAGNOSIS — H10.30 ACUTE CONJUNCTIVITIS, UNSPECIFIED ACUTE CONJUNCTIVITIS TYPE, UNSPECIFIED LATERALITY: Primary | ICD-10-CM

## 2021-05-21 DIAGNOSIS — I10 ESSENTIAL HYPERTENSION: Chronic | ICD-10-CM

## 2021-05-21 DIAGNOSIS — L08.9 SKIN INFECTION: ICD-10-CM

## 2021-05-21 LAB
ALBUMIN SERPL-MCNC: 2.8 G/DL (ref 3.4–5)
ALBUMIN/GLOB SERPL: 0.6 {RATIO} (ref 0.8–1.7)
ALP SERPL-CCNC: 170 U/L (ref 45–117)
ALT SERPL-CCNC: 20 U/L (ref 16–61)
ANION GAP SERPL CALC-SCNC: 4 MMOL/L (ref 3–18)
APTT PPP: 37.1 SEC (ref 23–36.4)
AST SERPL-CCNC: 16 U/L (ref 10–38)
BASOPHILS # BLD: 0.1 K/UL (ref 0–0.1)
BASOPHILS NFR BLD: 0 % (ref 0–2)
BILIRUB SERPL-MCNC: 1.4 MG/DL (ref 0.2–1)
BUN SERPL-MCNC: 24 MG/DL (ref 7–18)
BUN/CREAT SERPL: 13 (ref 12–20)
CALCIUM SERPL-MCNC: 9.3 MG/DL (ref 8.5–10.1)
CHLORIDE SERPL-SCNC: 101 MMOL/L (ref 100–111)
CO2 SERPL-SCNC: 34 MMOL/L (ref 21–32)
CREAT SERPL-MCNC: 1.88 MG/DL (ref 0.6–1.3)
DIFFERENTIAL METHOD BLD: ABNORMAL
EOSINOPHIL # BLD: 0 K/UL (ref 0–0.4)
EOSINOPHIL NFR BLD: 0 % (ref 0–5)
ERYTHROCYTE [DISTWIDTH] IN BLOOD BY AUTOMATED COUNT: 14 % (ref 11.6–14.5)
GLOBULIN SER CALC-MCNC: 4.7 G/DL (ref 2–4)
GLUCOSE BLD STRIP.AUTO-MCNC: 126 MG/DL (ref 70–110)
GLUCOSE BLD STRIP.AUTO-MCNC: 155 MG/DL (ref 70–110)
GLUCOSE SERPL-MCNC: 229 MG/DL (ref 74–99)
HCT VFR BLD AUTO: 44.5 % (ref 36–48)
HGB BLD-MCNC: 14.3 G/DL (ref 13–16)
INR PPP: 1.3 (ref 0.8–1.2)
LACTATE BLD-SCNC: 1.45 MMOL/L (ref 0.4–2)
LYMPHOCYTES # BLD: 1.1 K/UL (ref 0.9–3.6)
LYMPHOCYTES NFR BLD: 6 % (ref 21–52)
MCH RBC QN AUTO: 28.7 PG (ref 24–34)
MCHC RBC AUTO-ENTMCNC: 32.1 G/DL (ref 31–37)
MCV RBC AUTO: 89.2 FL (ref 74–97)
MONOCYTES # BLD: 2.3 K/UL (ref 0.05–1.2)
MONOCYTES NFR BLD: 13 % (ref 3–10)
NEUTS SEG # BLD: 14.4 K/UL (ref 1.8–8)
NEUTS SEG NFR BLD: 80 % (ref 40–73)
PLATELET # BLD AUTO: 306 K/UL (ref 135–420)
PMV BLD AUTO: 10 FL (ref 9.2–11.8)
POTASSIUM SERPL-SCNC: 4.1 MMOL/L (ref 3.5–5.5)
PROT SERPL-MCNC: 7.5 G/DL (ref 6.4–8.2)
PROTHROMBIN TIME: 15.6 SEC (ref 11.5–15.2)
RBC # BLD AUTO: 4.99 M/UL (ref 4.35–5.65)
SODIUM SERPL-SCNC: 139 MMOL/L (ref 136–145)
WBC # BLD AUTO: 17.9 K/UL (ref 4.6–13.2)

## 2021-05-21 PROCEDURE — 74011250636 HC RX REV CODE- 250/636: Performed by: NURSE PRACTITIONER

## 2021-05-21 PROCEDURE — APPSS60 APP SPLIT SHARED TIME 46-60 MINUTES: Performed by: NURSE PRACTITIONER

## 2021-05-21 PROCEDURE — 85610 PROTHROMBIN TIME: CPT

## 2021-05-21 PROCEDURE — 77030040392 HC DRSG OPTIFOAM MDII -A

## 2021-05-21 PROCEDURE — 96375 TX/PRO/DX INJ NEW DRUG ADDON: CPT

## 2021-05-21 PROCEDURE — 74011250637 HC RX REV CODE- 250/637: Performed by: NURSE PRACTITIONER

## 2021-05-21 PROCEDURE — 77030040393 HC DRSG OPTIFOAM GENT MDII -B

## 2021-05-21 PROCEDURE — 74011000258 HC RX REV CODE- 258: Performed by: NURSE PRACTITIONER

## 2021-05-21 PROCEDURE — 85730 THROMBOPLASTIN TIME PARTIAL: CPT

## 2021-05-21 PROCEDURE — 82962 GLUCOSE BLOOD TEST: CPT

## 2021-05-21 PROCEDURE — 99285 EMERGENCY DEPT VISIT HI MDM: CPT

## 2021-05-21 PROCEDURE — 74176 CT ABD & PELVIS W/O CONTRAST: CPT

## 2021-05-21 PROCEDURE — 71045 X-RAY EXAM CHEST 1 VIEW: CPT

## 2021-05-21 PROCEDURE — 2709999900 HC NON-CHARGEABLE SUPPLY

## 2021-05-21 PROCEDURE — 83605 ASSAY OF LACTIC ACID: CPT

## 2021-05-21 PROCEDURE — 96365 THER/PROPH/DIAG IV INF INIT: CPT

## 2021-05-21 PROCEDURE — 65660000000 HC RM CCU STEPDOWN

## 2021-05-21 PROCEDURE — 96376 TX/PRO/DX INJ SAME DRUG ADON: CPT

## 2021-05-21 PROCEDURE — 80053 COMPREHEN METABOLIC PANEL: CPT

## 2021-05-21 PROCEDURE — 85025 COMPLETE CBC W/AUTO DIFF WBC: CPT

## 2021-05-21 PROCEDURE — 99223 1ST HOSP IP/OBS HIGH 75: CPT | Performed by: NURSE PRACTITIONER

## 2021-05-21 PROCEDURE — 74011000258 HC RX REV CODE- 258: Performed by: EMERGENCY MEDICINE

## 2021-05-21 PROCEDURE — 74011250636 HC RX REV CODE- 250/636: Performed by: EMERGENCY MEDICINE

## 2021-05-21 PROCEDURE — 74011250637 HC RX REV CODE- 250/637: Performed by: EMERGENCY MEDICINE

## 2021-05-21 RX ORDER — ERYTHROMYCIN 5 MG/G
OINTMENT OPHTHALMIC
Status: COMPLETED | OUTPATIENT
Start: 2021-05-21 | End: 2021-05-21

## 2021-05-21 RX ORDER — SODIUM CHLORIDE 0.9 % (FLUSH) 0.9 %
5-40 SYRINGE (ML) INJECTION EVERY 8 HOURS
Status: DISCONTINUED | OUTPATIENT
Start: 2021-05-21 | End: 2021-05-23

## 2021-05-21 RX ORDER — POLYETHYLENE GLYCOL 3350 17 G/17G
17 POWDER, FOR SOLUTION ORAL DAILY PRN
Status: DISCONTINUED | OUTPATIENT
Start: 2021-05-21 | End: 2021-05-28 | Stop reason: HOSPADM

## 2021-05-21 RX ORDER — MAGNESIUM SULFATE 100 %
4 CRYSTALS MISCELLANEOUS AS NEEDED
Status: DISCONTINUED | OUTPATIENT
Start: 2021-05-21 | End: 2021-05-28 | Stop reason: HOSPADM

## 2021-05-21 RX ORDER — ERYTHROMYCIN 5 MG/G
OINTMENT OPHTHALMIC EVERY 6 HOURS
Status: DISCONTINUED | OUTPATIENT
Start: 2021-05-21 | End: 2021-05-28 | Stop reason: HOSPADM

## 2021-05-21 RX ORDER — ACETAMINOPHEN 650 MG/1
650 SUPPOSITORY RECTAL
Status: DISCONTINUED | OUTPATIENT
Start: 2021-05-21 | End: 2021-05-28 | Stop reason: HOSPADM

## 2021-05-21 RX ORDER — ACETAMINOPHEN 325 MG/1
650 TABLET ORAL
Status: DISCONTINUED | OUTPATIENT
Start: 2021-05-21 | End: 2021-05-28 | Stop reason: HOSPADM

## 2021-05-21 RX ORDER — ONDANSETRON 2 MG/ML
4 INJECTION INTRAMUSCULAR; INTRAVENOUS
Status: DISCONTINUED | OUTPATIENT
Start: 2021-05-21 | End: 2021-05-28 | Stop reason: HOSPADM

## 2021-05-21 RX ORDER — SODIUM CHLORIDE 0.9 % (FLUSH) 0.9 %
5-40 SYRINGE (ML) INJECTION AS NEEDED
Status: DISCONTINUED | OUTPATIENT
Start: 2021-05-21 | End: 2021-05-28 | Stop reason: HOSPADM

## 2021-05-21 RX ORDER — VANCOMYCIN/0.9 % SOD CHLORIDE 1.5G/250ML
1500 PLASTIC BAG, INJECTION (ML) INTRAVENOUS ONCE
Status: COMPLETED | OUTPATIENT
Start: 2021-05-21 | End: 2021-05-21

## 2021-05-21 RX ORDER — CLINDAMYCIN HYDROCHLORIDE 300 MG/1
300 CAPSULE ORAL 4 TIMES DAILY
Qty: 28 CAPSULE | Refills: 0 | Status: SHIPPED | OUTPATIENT
Start: 2021-05-21 | End: 2021-05-26

## 2021-05-21 RX ORDER — DEXTROSE 50 % IN WATER (D50W) INTRAVENOUS SYRINGE
25-50 AS NEEDED
Status: DISCONTINUED | OUTPATIENT
Start: 2021-05-21 | End: 2021-05-28 | Stop reason: HOSPADM

## 2021-05-21 RX ORDER — SODIUM CHLORIDE 9 MG/ML
75 INJECTION, SOLUTION INTRAVENOUS CONTINUOUS
Status: DISCONTINUED | OUTPATIENT
Start: 2021-05-21 | End: 2021-05-24

## 2021-05-21 RX ORDER — INSULIN LISPRO 100 [IU]/ML
INJECTION, SOLUTION INTRAVENOUS; SUBCUTANEOUS EVERY 6 HOURS
Status: DISCONTINUED | OUTPATIENT
Start: 2021-05-21 | End: 2021-05-23

## 2021-05-21 RX ORDER — INSULIN LISPRO 100 [IU]/ML
INJECTION, SOLUTION INTRAVENOUS; SUBCUTANEOUS 3 TIMES DAILY
Status: ON HOLD | COMMUNITY
End: 2021-05-26 | Stop reason: SDUPTHER

## 2021-05-21 RX ORDER — PROMETHAZINE HYDROCHLORIDE 12.5 MG/1
12.5 TABLET ORAL
Status: DISCONTINUED | OUTPATIENT
Start: 2021-05-21 | End: 2021-05-28 | Stop reason: HOSPADM

## 2021-05-21 RX ORDER — ERYTHROMYCIN 5 MG/G
OINTMENT OPHTHALMIC
Qty: 3.5 G | Refills: 0 | Status: SHIPPED | OUTPATIENT
Start: 2021-05-21 | End: 2021-05-26 | Stop reason: SDUPTHER

## 2021-05-21 RX ADMIN — ERYTHROMYCIN: 5 OINTMENT OPHTHALMIC at 19:00

## 2021-05-21 RX ADMIN — VANCOMYCIN HYDROCHLORIDE 1500 MG: 10 INJECTION, POWDER, LYOPHILIZED, FOR SOLUTION INTRAVENOUS at 15:47

## 2021-05-21 RX ADMIN — Medication 10 ML: at 22:15

## 2021-05-21 RX ADMIN — PIPERACILLIN SODIUM AND TAZOBACTAM SODIUM 4.5 G: 4; .5 INJECTION, POWDER, LYOPHILIZED, FOR SOLUTION INTRAVENOUS at 14:05

## 2021-05-21 RX ADMIN — PIPERACILLIN AND TAZOBACTAM 2.25 G: 2; .25 INJECTION, POWDER, LYOPHILIZED, FOR SOLUTION INTRAVENOUS at 22:14

## 2021-05-21 RX ADMIN — Medication 5 ML: at 17:40

## 2021-05-21 RX ADMIN — SODIUM CHLORIDE 75 ML/HR: 900 INJECTION, SOLUTION INTRAVENOUS at 22:02

## 2021-05-21 RX ADMIN — ERYTHROMYCIN: 5 OINTMENT OPHTHALMIC at 14:11

## 2021-05-21 RX ADMIN — VANCOMYCIN HYDROCHLORIDE 1000 MG: 1 INJECTION, POWDER, LYOPHILIZED, FOR SOLUTION INTRAVENOUS at 14:25

## 2021-05-21 NOTE — ED PROVIDER NOTES
EMERGENCY DEPARTMENT HISTORY AND PHYSICAL EXAM    11:35 AM  Date: 5/21/2021  Patient Name: Suhail Donahue. History of Presenting Illness       History Provided By:     HPI: Suhail Donahue. is a 82 y.o. male with past past medical history of COPD, dementia, cholecystitis, depression, diabetes, presents from [facility] with an episode of decreased oxygen saturation. According to [facility] patient appeared unwell. Patient not hypoxic in the ED. PCP: None    Past History     Past Medical History:  Past Medical History:   Diagnosis Date    Anxiety     Arthritis     CAD (coronary artery disease)     s/p drug-eluting stents to RCA for high-grade stenoses in 2/09    Carotid duplex 08/26/2016    Mild <50% CYRUS stenosis. Chronic LICA occlusion. Similar to study of 3/29/16.  Carotid stenosis (HCC)     w/ known total occlusion of lt internal carotid artery; followed by pts vascular surgeon    Chronic kidney disease     COPD (chronic obstructive pulmonary disease) ([address]     Dementia ([address]     Depression     Diabetes ([address]     type 2    Dyslipidemia     on Crestor; managed by pts PCP    Dyspnea     Hypercholesterolemia     Hypertension     Low back pain     Osteoarthrosis involving multiple sites     Polycythemia 12/9/2019    Skin cancer ([address]     squamous cell lesions of the skin    Stroke ([address]     Venous (peripheral) insufficiency        Past Surgical History:  Past Surgical History:   Procedure Laterality Date    CARDIAC CATHETERIZATION  08/24/2016    Mod calcification. Co-dom. oLM 50-70%. LAD 30%. Dx 30%. Cx 70% focal.  OM 80% focal.  RCA 30%.       HX ANGIOPLASTY  2009    2 stents placed and heart attack during the procedure    HX COLONOSCOPY  10/2003    neg; declines f/u    HX CORONARY ARTERY BYPASS GRAFT  09/2016     LIMA to LAD and SVG to OM1    HX HEART CATHETERIZATION  5/2013    HX HERNIA REPAIR      1970s    HX TONSIL AND ADENOIDECTOMY         Family History:  Family History   Problem Relation Age of Onset   June Downy Parkinsonism Mother     Hypertension Mother    June Downy Cancer Father         lung    Heart defect Brother        Social History:  Social History     Tobacco Use    Smoking status: Former Smoker     Years: 2.00     Quit date: 1955     Years since quittin.4    Smokeless tobacco: Never Used   Substance Use Topics    Alcohol use: Yes     Comment: drinks weekly couple beers    Drug use: No       Allergies: Allergies   Allergen Reactions    Amaryl [Glimepiride] Drowsiness    Lipitor [Atorvastatin] Myalgia    Niacin Other (comments)     Facial blistering, swelling in face    Pravachol [Pravastatin] Myalgia    Zocor [Simvastatin] Myalgia       Review of Systems    Yordy  dementia  Physical Exam     Patient Vitals for the past 12 hrs:   Temp Pulse Resp BP SpO2   21 1445  84 15 (!) 143/67    21 1415  87 16 (!) 109/59 94 %   21 1400 98 °F (36.7 °C) 88 17 123/64 96 %   21 1345  90 18 133/61 97 %   21 1330     97 %   21 1315  88 17 110/61 96 %   21 1300  90 17 103/64 95 %   21 1245  91 18 112/60 94 %   21 1230  90 18 119/64 95 %   21 1215  92 20 (!) 115/52 94 %       Physical Exam  Vitals and nursing note reviewed. Constitutional:       Appearance: He is well-developed. HENT:      Head: Normocephalic and atraumatic. Eyes:      General:         Right eye: No discharge. Left eye: No discharge. Comments: Patient has bilateral conjunctivitis, white discharge   Cardiovascular:      Rate and Rhythm: Normal rate and regular rhythm. Heart sounds: Normal heart sounds. No murmur heard. Pulmonary:      Effort: No respiratory distress. Breath sounds: Normal breath sounds. No stridor. No wheezing or rales. Chest:      Chest wall: No tenderness. Abdominal:      General: Bowel sounds are normal. There is no distension. Palpations: Abdomen is soft. Tenderness: There is abdominal tenderness. There is no guarding or rebound. Genitourinary:     Comments: Patient has a superficial pressure ulcers on his butttocks Scrotum superficial ulcerated  Wound on scrotum does not appear deep  No concern for necrotizing fasciitis    Musculoskeletal:         General: Normal range of motion. Cervical back: Normal range of motion and neck supple. Skin:     General: Skin is warm and dry. Neurological:      Mental Status: He is alert and oriented to person, place, and time. Diagnostic Study Results     Labs -  Recent Results (from the past 12 hour(s))   CBC WITH AUTOMATED DIFF    Collection Time: 05/21/21 11:55 AM   Result Value Ref Range    WBC 17.9 (H) 4.6 - 13.2 K/uL    RBC 4.99 4.35 - 5.65 M/uL    HGB 14.3 13.0 - 16.0 g/dL    HCT 44.5 36.0 - 48.0 %    MCV 89.2 74.0 - 97.0 FL    MCH 28.7 24.0 - 34.0 PG    MCHC 32.1 31.0 - 37.0 g/dL    RDW 14.0 11.6 - 14.5 %    PLATELET 332 369 - 974 K/uL    MPV 10.0 9.2 - 11.8 FL    NEUTROPHILS 80 (H) 40 - 73 %    LYMPHOCYTES 6 (L) 21 - 52 %    MONOCYTES 13 (H) 3 - 10 %    EOSINOPHILS 0 0 - 5 %    BASOPHILS 0 0 - 2 %    ABS. NEUTROPHILS 14.4 (H) 1.8 - 8.0 K/UL    ABS. LYMPHOCYTES 1.1 0.9 - 3.6 K/UL    ABS. MONOCYTES 2.3 (H) 0.05 - 1.2 K/UL    ABS. EOSINOPHILS 0.0 0.0 - 0.4 K/UL    ABS.  BASOPHILS 0.1 0.0 - 0.1 K/UL    DF AUTOMATED     METABOLIC PANEL, COMPREHENSIVE    Collection Time: 05/21/21 11:55 AM   Result Value Ref Range    Sodium 139 136 - 145 mmol/L    Potassium 4.1 3.5 - 5.5 mmol/L    Chloride 101 100 - 111 mmol/L    CO2 34 (H) 21 - 32 mmol/L    Anion gap 4 3.0 - 18 mmol/L    Glucose 229 (H) 74 - 99 mg/dL    BUN 24 (H) 7.0 - 18 MG/DL    Creatinine 1.88 (H) 0.6 - 1.3 MG/DL    BUN/Creatinine ratio 13 12 - 20      GFR est AA 42 (L) >60 ml/min/1.73m2    GFR est non-AA 34 (L) >60 ml/min/1.73m2    Calcium 9.3 8.5 - 10.1 MG/DL    Bilirubin, total 1.4 (H) 0.2 - 1.0 MG/DL    ALT (SGPT) 20 16 - 61 U/L    AST (SGOT) 16 10 - 38 U/L    Alk. phosphatase 170 (H) 45 - 117 U/L    Protein, total 7.5 6.4 - 8.2 g/dL    Albumin 2.8 (L) 3.4 - 5.0 g/dL    Globulin 4.7 (H) 2.0 - 4.0 g/dL    A-G Ratio 0.6 (L) 0.8 - 1.7     PROTHROMBIN TIME + INR    Collection Time: 05/21/21 11:55 AM   Result Value Ref Range    Prothrombin time 15.6 (H) 11.5 - 15.2 sec    INR 1.3 (H) 0.8 - 1.2     PTT    Collection Time: 05/21/21 11:55 AM   Result Value Ref Range    aPTT 37.1 (H) 23.0 - 36.4 SEC   POC LACTIC ACID    Collection Time: 05/21/21  1:57 PM   Result Value Ref Range    Lactic Acid (POC) 1.45 0.40 - 2.00 mmol/L       Radiologic Studies -   CT ABD PELV WO CONT    Result Date: 5/21/2021  1. There may be mild persistent cholecystitis but findings are improved since 4/23/2021. HIDA scan may be obtained for confirmation if desired. 2.  Possible cystitis. 3.  Trace right pleural effusion. 4.  Mild interstitial edema and mild anasarca. XR CHEST PORT    Result Date: 5/21/2021  No acute findings or interval change. Low lung volumes with streaky bibasilar densities. Medical Decision Making     ED Course: Progress Notes, Reevaluation, and Consults:    11:35 AM Initial assessment performed. The patients presenting problems have been discussed, and they/their family are in agreement with the care plan formulated and outlined with them. I have encouraged them to ask questions as they arise throughout their visit. Provider Notes (Medical Decision Making):   Patient presents with concerns from [address] [place] [address] that he \"appears unwell\"  Patient not hypoxic. No pneumonia on x-ray. Awake and alert  Patient had some upper abdominal tenderness on exam  Leukocytosis noted on labs  CT abdomen pelvis ordered  Patient received IV antibiotics and fluids.   As per CT:  patient with possible mild persistent cholecystitis  Patient not a surgical candidate in previous admission  Noted to be elevated bilirubin 1.4  Also patient with ANNA, creatinine 1.88  We are going to consult IR for possible percutaneous cholecystostomy  Paged has not received call back. Dr. Dallas Cox will follow up  Patient will be admitted to hospitalist, discussed with Dr Lakesha Agosto Signs-Reviewed the patient's vital signs. Reviewed pt's pulse ox reading. Records Reviewed: old medical records  -I am the first provider for this patient.  -I reviewed the vital signs, available nursing notes, past medical history, past surgical history, family history and social history. Current Facility-Administered Medications   Medication Dose Route Frequency Provider Last Rate Last Admin    vancomycin (VANCOCIN) 1500 mg in  ml infusion  1,500 mg IntraVENous Abril Mccracken  mL/hr at 05/21/21 1547 1,500 mg at 05/21/21 1547     Current Outpatient Medications   Medication Sig Dispense Refill    erythromycin (ILOTYCIN) ophthalmic ointment Apply to affected eye(s) six (6) times a day for 7 days. 3.5 g 0    clindamycin (CLEOCIN) 300 mg capsule Take 1 Capsule by mouth four (4) times daily for 7 days. 28 Capsule 0    polyvinyl alcohol-povidon,PF, (REFRESH CLASSIC) 1.4-0.6 % ophthalmic solution Administer 1 Drop to both eyes as needed for Other (ocular dryness) for up to 30 days. 30 Each 1    cloNIDine HCL (CATAPRES) 0.1 mg tablet Take 1 Tab by mouth two (2) times a day. 60 Tab 1    insulin detemir U-100 (Levemir U-100 Insulin) 100 unit/mL injection 10 Units by SubCUTAneous route daily. Indications: type 2 diabetes mellitus 2 Vial 1    temazepam (RESTORIL) 7.5 mg capsule Take 7.5 mg by mouth nightly.  QUEtiapine (SEROqueL) 50 mg tablet Take 50 mg by mouth two (2) times a day.  memantine (Namenda) 10 mg tablet Take 10 mg by mouth daily.  metoprolol succinate (Toprol XL) 50 mg XL tablet Take 50 mg by mouth daily.  lidocaine (LIDODERM) 5 % Apply patch to the affected area for 12 hours a day and remove for 12 hours a day.  30 Each 0    diclofenac (VOLTAREN) 1 % gel Apply 2 g to affected area four (4) times daily. 100 g 0    lactobacillus sp. 50 billion cpu (BIO-K PLUS) 50 billion cell -375 mg cap capsule Take 1 Cap by mouth daily. 30 Cap 0    ezetimibe (ZETIA) 10 mg tablet Take 1 Tab by mouth nightly.  aspirin delayed-release 81 mg tablet Take 1 Tab by mouth daily. Indications: Cerebral Thromboembolism Prevention 30 Tab 0    cycloSPORINE (RESTASIS) 0.05 % ophthalmic emulsion Administer 1 Drop to both eyes every twelve (12) hours. Indications: Dry Eye 15 Each 0    gabapentin (NEURONTIN) 300 mg capsule Take 1 Cap by mouth daily. Indications: NEUROPATHIC PAIN, Restless Legs Syndrome 30 Cap 0    isosorbide mononitrate ER (IMDUR) 30 mg tablet Take 1 Tab by mouth daily. 30 Tab 0    multivit-min-FA-lycopen-lutein (CENTRUM SILVER) 0.4-300-250 mg-mcg-mcg tab Take 1 tab daily 30 Tab 0    OTHER Incentive spirometry- Use as directed 1 Each 0    furosemide (LASIX) 20 mg tablet Take 1 Tab by mouth daily. Indications: Edema 30 Tab 0    famotidine (PEPCID) 20 mg tablet Take 1 Tab by mouth two (2) times a day. Indications: PREVENTION OF STRESS ULCER 30 Tab 0    CALCIUM CARBONATE (CALTRATE 600 PO) Take 1 Tab by mouth daily. Clinical Impression     Clinical Impression:   1. Skin infection    2. Acute conjunctivitis, unspecified acute conjunctivitis type, unspecified laterality        Disposition: This note was dictated utilizing voice recognition software which may lead to typographical errors. I apologize in advance if the situation occurs. If questions arise please do not hesitate to contact me or call our department.     Celsa Read MD  11:36 AM

## 2021-05-21 NOTE — H&P
History & Physical    Patient: Lambert Reich MRN: [id number]  CSN: [id number]    YOB: 1935  Age: 82 y.o. Sex: male      DOA: 5/21/2021    Chief Complaint:   Chief Complaint   Patient presents with    O2/Oxygen          HPI:     Lambert Reich is a 82 y.o.  male with hx of cholecystitis, CAD, T2DM, HTN, dyslipidemia, CKD, COPD, CVA, dementia who presented to the ED from [facility] today as he appeared unwell. Notes indicate pt had an episode of decreased oxygen saturation. Work up in the ED note stable vital signs, oxygen sats 94% on RA,  POC lactic acid 1.45,  WBC 17.9, glucose 229, BUN 24, creatinine 1.88, total bilirubin 1.4, alk phos 170, CXR low lung volumes w/ streaky bibasilar densities. CT a/p may be mild persistent cholecystitis but findings improved since 4/23/2021, possible cystitis, trace right pleural effusion and mild interstitial edema and mild anasarca. He was started on IV Zosyn and Vancomycin. IR consulted. Pt also noted to have conjunctivitis, started on erythromycin. Pt is a/o to self, pleasantly confused, fidgets while in bed. No abdominal guarding or grimacing noted during physical exam.  Discussed with RN. Attempted to call daughter but no answer. Home meds verified by paperwork sent from [facility]. Pt admitted with cholecystitis in April 23, 2021 and discharged on May 4, 2021-not a surgical candidate, treated with IV abx and discharged on Augmentin x 7 days. Past Medical History:   Diagnosis Date    Anxiety     Arthritis     CAD (coronary artery disease)     s/p drug-eluting stents to RCA for high-grade stenoses in 2/09    Carotid duplex 08/26/2016    Mild <50% CYRUS stenosis. Chronic LICA occlusion. Similar to study of 3/29/16.     Carotid stenosis (HCC)     w/ known total occlusion of lt internal carotid artery; followed by pts vascular surgeon    Chronic kidney disease     [facility]     Dementia ([facility]     Depression     Diabetes ([facility]     type 2    Dyslipidemia     on Crestor; managed by pts PCP    Dyspnea     Hypercholesterolemia     Hypertension     Low back pain     Osteoarthrosis involving multiple sites     Polycythemia 2019    Skin cancer (HCC)     squamous cell lesions of the skin    Stroke ([facility]     Venous (peripheral) insufficiency        Past Surgical History:   Procedure Laterality Date    CARDIAC CATHETERIZATION  2016    Mod calcification. Co-dom. oLM 50-70%. LAD 30%. Dx 30%. Cx 70% focal.  OM 80% focal.  RCA 30%.  HX ANGIOPLASTY      2 stents placed and heart attack during the procedure    HX COLONOSCOPY  10/2003    neg; declines f/u    HX CORONARY ARTERY BYPASS GRAFT  2016     LIMA to LAD and SVG to OM1    HX HEART CATHETERIZATION  2013    HX HERNIA REPAIR      1970s    HX TONSIL AND ADENOIDECTOMY         Family History   Problem Relation Age of Onset    Parkinsonism Mother     Hypertension Mother     Cancer Father         lung    Heart defect Brother        Social History     Socioeconomic History    Marital status:      Spouse name: Not on file    Number of children: Not on file    Years of education: Not on file    Highest education level: Not on file   Tobacco Use    Smoking status: Former Smoker     Years: 2.00     Quit date: 1955     Years since quittin.1    Smokeless tobacco: Never Used   Substance and Sexual Activity    Alcohol use: Yes     Comment: drinks weekly couple beers    Drug use: No    Sexual activity: Never     Social Determinants of Health     Financial Resource Strain:     Difficulty of Paying Living Expenses:    Food Insecurity:     Worried About Running Out of Food in the Last Year:     Ran Out of Food in the Last Year:    Transportation Needs:     Lack of Transportation (Medical):      Lack of Transportation (Non-Medical):    Physical Activity:     Days of Exercise per Week:     Minutes of Exercise per Session:    Stress:     Feeling of Stress :    Social Connections:     Frequency of Communication with Friends and Family:     Frequency of Social Gatherings with Friends and Family:     Attends Cheondo[employer] Services:     Active Member of Clubs or Organizations:     Attends Club or Organization Meetings:     Marital Status:        Prior to Admission medications    Medication Sig Start Date End Date Taking? Authorizing Provider   erythromycin (ILOTYCIN) ophthalmic ointment Apply to affected eye(s) six (6) times a day for 7 days. 5/21/21  Yes Destiny Lynn MD   clindamycin (CLEOCIN) 300 mg capsule Take 1 Capsule by mouth four (4) times daily for 7 days. 5/21/21 5/28/21 Yes Destiny Lynn MD   polyvinyl alcohol-povidon,PF, (REFRESH CLASSIC) 1.4-0.6 % ophthalmic solution Administer 1 Drop to both eyes as needed for Other (ocular dryness) for up to 30 days. 5/4/21 6/3/21  Moe Gore MD   cloNIDine HCL (CATAPRES) 0.1 mg tablet Take 1 Tab by mouth two (2) times a day. 5/4/21   Moe Gore MD   insulin detemir U-100 (Levemir U-100 Insulin) 100 unit/mL injection 10 Units by SubCUTAneous route daily. Indications: type 2 diabetes mellitus 5/4/21   Moe Gore MD   temazepam (RESTORIL) 7.5 mg capsule Take 7.5 mg by mouth nightly. Provider, Historical   QUEtiapine (SEROqueL) 50 mg tablet Take 50 mg by mouth two (2) times a day. Provider, Historical   memantine (Namenda) 10 mg tablet Take 10 mg by mouth daily. Provider, Historical   metoprolol succinate (Toprol XL) 50 mg XL tablet Take 50 mg by mouth daily. Provider, Historical   lidocaine (LIDODERM) 5 % Apply patch to the affected area for 12 hours a day and remove for 12 hours a day. 1/31/20   Maggie Handy MD   diclofenac (VOLTAREN) 1 % gel Apply 2 g to affected area four (4) times daily.  1/31/20   Jacinta Craven MD   lactobacillus sp. 50 billion cpu (BIO-K PLUS) 50 billion cell -375 mg cap capsule Take 1 Cap by mouth daily. 19   Zuleyma Brian MD   ezetimibe (ZETIA) 10 mg tablet Take 1 Tab by mouth nightly. 10/17/19   Provider, Historical   aspirin delayed-release 81 mg tablet Take 1 Tab by mouth daily. Indications: Cerebral Thromboembolism Prevention 3/27/18   Daniel Hawthorne PA-C   cycloSPORINE (RESTASIS) 0.05 % ophthalmic emulsion Administer 1 Drop to both eyes every twelve (12) hours. Indications: Dry Eye 3/26/18   Daniel Hawthorne PA-C   gabapentin (NEURONTIN) 300 mg capsule Take 1 Cap by mouth daily. Indications: NEUROPATHIC PAIN, Restless Legs Syndrome 3/27/18   Daniel Hawthorne PA-C   isosorbide mononitrate ER (IMDUR) 30 mg tablet Take 1 Tab by mouth daily. 3/27/18   Daniel Hawthorne PA-C   multivit-min-FA-lycopen-lutein (CENTRUM SILVER) 0.4-300-250 mg-mcg-mcg tab Take 1 tab daily 3/26/18   Daniel Hawthorne PA-C   OTHER Incentive spirometry- Use as directed 3/26/18   Daniel Hawthorne PA-C   furosemide (LASIX) 20 mg tablet Take 1 Tab by mouth daily. Indications: Edema 3/26/18   Daniel Hawthorne PA-C   famotidine (PEPCID) 20 mg tablet Take 1 Tab by mouth two (2) times a day. Indications: PREVENTION OF STRESS ULCER 3/26/18   Daniel Hawthorne PA-C   CALCIUM CARBONATE (CALTRATE 600 PO) Take 1 Tab by mouth daily. Provider, Historical       Allergies   Allergen Reactions    Amaryl [Glimepiride] Drowsiness    Lipitor [Atorvastatin] Myalgia    Niacin Other (comments)     Facial blistering, swelling in face    Pravachol [Pravastatin] Myalgia    Zocor [Simvastatin] Myalgia         Unable to obtain Review of Systems due to pt hx dementia      Physical Exam:     Physical Exam:  Visit Vitals  BP (!) 143/67   Pulse 84   Temp 98 °F (36.7 °C)   Resp 15   SpO2 94%      O2 Device: None (Room air)    Temp (24hrs), Av °F (36.7 °C), Min:98 °F (36.7 °C), Max:98 °F (36.7 °C)    No intake/output data recorded. No intake/output data recorded.     General:  Alert, cooperative, no distress, appears stated age. Head: Normocephalic, without obvious abnormality, atraumatic. Eyes:  Bilateral conjunctivitis, ointment has already been placed. PERRL, EOMs intact. Nose: Nares normal. No drainage or sinus tenderness. Neck: Supple, symmetrical, trachea midline, no adenopathy, thyroid: no enlargement, no carotid bruit and no JVD. Lungs:   Clear to auscultation bilaterally. Heart:  Tachycardia, regular rhythm, S1, S2 normal.     Abdomen: Soft, non-tender- no grimacing or guarding on exam. Bowel sounds normal.    Extremities: Extremities normal, atraumatic, no cyanosis or edema. Pulses: 2+ and symmetric all extremities. Skin:  No rashes or lesions   Neurologic: AAO to self, No focal motor or sensory deficit. Labs Reviewed: All lab results for the last 24 hours reviewed. Recent Results (from the past 24 hour(s))   CBC WITH AUTOMATED DIFF    Collection Time: 05/21/21 11:55 AM   Result Value Ref Range    WBC 17.9 (H) 4.6 - 13.2 K/uL    RBC 4.99 4.35 - 5.65 M/uL    HGB 14.3 13.0 - 16.0 g/dL    HCT 44.5 36.0 - 48.0 %    MCV 89.2 74.0 - 97.0 FL    MCH 28.7 24.0 - 34.0 PG    MCHC 32.1 31.0 - 37.0 g/dL    RDW 14.0 11.6 - 14.5 %    PLATELET 011 408 - 184 K/uL    MPV 10.0 9.2 - 11.8 FL    NEUTROPHILS 80 (H) 40 - 73 %    LYMPHOCYTES 6 (L) 21 - 52 %    MONOCYTES 13 (H) 3 - 10 %    EOSINOPHILS 0 0 - 5 %    BASOPHILS 0 0 - 2 %    ABS. NEUTROPHILS 14.4 (H) 1.8 - 8.0 K/UL    ABS. LYMPHOCYTES 1.1 0.9 - 3.6 K/UL    ABS. MONOCYTES 2.3 (H) 0.05 - 1.2 K/UL    ABS. EOSINOPHILS 0.0 0.0 - 0.4 K/UL    ABS.  BASOPHILS 0.1 0.0 - 0.1 K/UL    DF AUTOMATED     METABOLIC PANEL, COMPREHENSIVE    Collection Time: 05/21/21 11:55 AM   Result Value Ref Range    Sodium 139 136 - 145 mmol/L    Potassium 4.1 3.5 - 5.5 mmol/L    Chloride 101 100 - 111 mmol/L    CO2 34 (H) 21 - 32 mmol/L    Anion gap 4 3.0 - 18 mmol/L    Glucose 229 (H) 74 - 99 mg/dL    BUN 24 (H) 7.0 - 18 MG/DL    Creatinine 1.88 (H) 0.6 - 1.3 MG/DL BUN/Creatinine ratio 13 12 - 20      GFR est AA 42 (L) >60 ml/min/1.73m2    GFR est non-AA 34 (L) >60 ml/min/1.73m2    Calcium 9.3 8.5 - 10.1 MG/DL    Bilirubin, total 1.4 (H) 0.2 - 1.0 MG/DL    ALT (SGPT) 20 16 - 61 U/L    AST (SGOT) 16 10 - 38 U/L    Alk. phosphatase 170 (H) 45 - 117 U/L    Protein, total 7.5 6.4 - 8.2 g/dL    Albumin 2.8 (L) 3.4 - 5.0 g/dL    Globulin 4.7 (H) 2.0 - 4.0 g/dL    A-G Ratio 0.6 (L) 0.8 - 1.7     PROTHROMBIN TIME + INR    Collection Time: 05/21/21 11:55 AM   Result Value Ref Range    Prothrombin time 15.6 (H) 11.5 - 15.2 sec    INR 1.3 (H) 0.8 - 1.2     PTT    Collection Time: 05/21/21 11:55 AM   Result Value Ref Range    aPTT 37.1 (H) 23.0 - 36.4 SEC   POC LACTIC ACID    Collection Time: 05/21/21  1:57 PM   Result Value Ref Range    Lactic Acid (POC) 1.45 0.40 - 2.00 mmol/L        XR Results (most recent):  Results from Hospital Encounter encounter on 05/21/21    XR CHEST PORT    Narrative  EXAM: XR CHEST PORT    INDICATION: episode of hypoxia    COMPARISON: 4/23/2021    FINDINGS: A portable AP radiograph of the chest was obtained at 1158 hours. The  patient is on a cardiac monitor. Low lung volumes. Minimal streaky bibasilar  densities similar to prior exam.. Stable cardiac silhouette. .  Sternotomy wires. Decreased bone mineral density. Osseous degenerative changes. .    Impression  No acute findings or interval change. Low lung volumes with streaky bibasilar densities. CT Results  (Last 48 hours)               05/21/21 1534  CT ABD PELV WO CONT Final result    Impression:      1. There may be mild persistent cholecystitis but findings are improved since   4/23/2021. HIDA scan may be obtained for confirmation if desired. 2.  Possible cystitis. 3.  Trace right pleural effusion. 4.  Mild interstitial edema and mild anasarca. Narrative:  CT ABDOMEN AND PELVIS WITHOUT ENHANCEMENT       INDICATION: Hypoxia, malaise, sepsis of unknown origin.        TECHNIQUE: Axial images obtained of the abdomen and pelvis without intravenous   contrast. Coronal and sagittal reformatted images of the abdomen and pelvis were   obtained. All CT scans at this facility are performed using dose optimization technique as   appropriate to a performed exam, to include automated exposure control,   adjustment of the mA and/or kV according to patient size (including appropriate   matching first site-specific examinations), or use of iterative reconstruction   technique. COMPARISON: 4/23/2021. Lack of intravenous contrast renders this study suboptimal for evaluating the   solid abdominal organs, vasculature and bowel. ABDOMEN FINDINGS:        Liver: Unremarkable. Spleen: Likely sequela of granulomatous infection spleen. Pancreas: Unremarkable. Biliary: There is heterogeneity in the gallbladder. Mild wall thickening. Transverse diameter is 4.2 cm, unchanged. There is mild adjacent haziness which   is improved since 4/23/2021. Bowel: Moderate colonic stool burden. Small bowel is normal in caliber. No   pneumatosis. Peritoneum/ Retroperitoneum: Unremarkable. Lymph Nodes: Unremarkable. Adrenal Glands: Unremarkable. Kidneys: Unremarkable. Vessels: Mild atherosclerosis. PELVIS FINDINGS:        Bladder/ Pelvic Organs: Mild bladder wall thickening. Lung Base: Severe coronary arteriosclerosis status post CABG. Trace right   pleural effusion. Mild subsegmental atelectasis or scarring at the lung bases. Calcified right hilar nodes and right lower lobe calcified granulomata. Mild   interstitial thickening. Bones/Soft tissues: Mild anasarca. Status post median sternotomy. Lumbar facet   hypertrophy and arthropathy. SI joint degenerative changes with bilateral   anterior spurring. Degenerative changes bilateral hips and throughout the spine.                    Procedures/imaging: see electronic medical records for all procedures/Xrays and details which were not copied into this note but were reviewed prior to creation of Plan      Assessment/Plan        Assessment  1. Cholecystitis  2. Acute kidney injury   3. Leukocytosis, suspect due to #1  4. Elevated LFTs  5. T2DM, A1C 7.1% in April 2021  6. Hypertension  7. CAD  8. Hyperlipidemia  9. COPD  10. Conjunctivitis   11. Dementia without behavioral disturbance  12. Debility  13. Advanced age          Plan  1. IR consulted by ED, appreciate assistance- notes indicate IR was paged and oncoming ED provider to follow up - please ensure IR aware in am  2. NPO, IVFs  3. Start IV Zosyn, pharmacy consult for dosing  4. Continue erythromycin for bilateral conjunctivitis  5. Consult wound care for wounds noted to scrotum and buttocks  6. Monitor accuchecks, correctional SSI   7. Monitor vital signs, weight, I/O per unit protocol  8. Monitor labs daily  9. PT, OT eval and treat  10. Fall, aspiration precautions  11. Consult CM to assist w/ dc planning          Diet: NPO, IVFs  DVT/GI Prophylaxis: SCD's  Code Status: DNR    Contact: daughter Ada Lima [phone] // [phone] called at 5:35 pm on both numbers- no answer, VM left. Attempted to call to update on hospitalization and to clarify code status- pt has POST form in chart but paperwork from [facility] indicates FULL code. Demographics in chart has [phone] listed as mobile #, called at 5:48 pm no answer VM left. Pt is a resident of [facility] [phone] // [phone]. Attempted to call x 2 to clarify code status, no answer. Discussed with patient at bedside about hospital admission and plan care. He understands and agrees. All questions answered. Disclaimer: Sections of this note are dictated using utilizing voice recognition software. Minor typographical errors may be present. If questions arise, please do not hesitate to contact me or call our department.         Peter Kelley, NP-C  [facility]  pager [phone]

## 2021-05-22 ENCOUNTER — APPOINTMENT (OUTPATIENT)
Dept: CT IMAGING | Age: 86
DRG: 445 | End: 2021-05-22
Attending: RADIOLOGY
Payer: MEDICARE

## 2021-05-22 LAB
ALBUMIN SERPL-MCNC: 2.7 G/DL (ref 3.4–5)
ALBUMIN/GLOB SERPL: 0.6 {RATIO} (ref 0.8–1.7)
ALP SERPL-CCNC: 163 U/L (ref 45–117)
ALT SERPL-CCNC: 20 U/L (ref 16–61)
ANION GAP SERPL CALC-SCNC: 5 MMOL/L (ref 3–18)
AST SERPL-CCNC: 24 U/L (ref 10–38)
BASOPHILS # BLD: 0 K/UL (ref 0–0.1)
BASOPHILS NFR BLD: 0 % (ref 0–2)
BILIRUB SERPL-MCNC: 1.6 MG/DL (ref 0.2–1)
BUN SERPL-MCNC: 23 MG/DL (ref 7–18)
BUN/CREAT SERPL: 13 (ref 12–20)
CALCIUM SERPL-MCNC: 9 MG/DL (ref 8.5–10.1)
CHLORIDE SERPL-SCNC: 106 MMOL/L (ref 100–111)
CO2 SERPL-SCNC: 32 MMOL/L (ref 21–32)
CREAT SERPL-MCNC: 1.77 MG/DL (ref 0.6–1.3)
DIFFERENTIAL METHOD BLD: ABNORMAL
EOSINOPHIL # BLD: 0 K/UL (ref 0–0.4)
EOSINOPHIL NFR BLD: 0 % (ref 0–5)
ERYTHROCYTE [DISTWIDTH] IN BLOOD BY AUTOMATED COUNT: 14.3 % (ref 11.6–14.5)
GLOBULIN SER CALC-MCNC: 4.9 G/DL (ref 2–4)
GLUCOSE BLD STRIP.AUTO-MCNC: 125 MG/DL (ref 70–110)
GLUCOSE BLD STRIP.AUTO-MCNC: 139 MG/DL (ref 70–110)
GLUCOSE BLD STRIP.AUTO-MCNC: 143 MG/DL (ref 70–110)
GLUCOSE SERPL-MCNC: 155 MG/DL (ref 74–99)
HCT VFR BLD AUTO: 47 % (ref 36–48)
HGB BLD-MCNC: 14.6 G/DL (ref 13–16)
LYMPHOCYTES # BLD: 1.6 K/UL (ref 0.9–3.6)
LYMPHOCYTES NFR BLD: 8 % (ref 21–52)
MCH RBC QN AUTO: 28.1 PG (ref 24–34)
MCHC RBC AUTO-ENTMCNC: 31.1 G/DL (ref 31–37)
MCV RBC AUTO: 90.4 FL (ref 74–97)
MONOCYTES # BLD: 2.8 K/UL (ref 0.05–1.2)
MONOCYTES NFR BLD: 14 % (ref 3–10)
MYELOCYTES NFR BLD MANUAL: 1 %
NEUTS BAND NFR BLD MANUAL: 2 %
NEUTS SEG # BLD: 15.2 K/UL (ref 1.8–8)
NEUTS SEG NFR BLD: 75 % (ref 40–73)
PLATELET # BLD AUTO: 299 K/UL (ref 135–420)
PLATELET COMMENTS,PCOM: ABNORMAL
PMV BLD AUTO: 10.4 FL (ref 9.2–11.8)
POTASSIUM SERPL-SCNC: 3.6 MMOL/L (ref 3.5–5.5)
PROT SERPL-MCNC: 7.6 G/DL (ref 6.4–8.2)
RBC # BLD AUTO: 5.2 M/UL (ref 4.35–5.65)
RBC MORPH BLD: ABNORMAL
SODIUM SERPL-SCNC: 143 MMOL/L (ref 136–145)
WBC # BLD AUTO: 19.8 K/UL (ref 4.6–13.2)

## 2021-05-22 PROCEDURE — 36415 COLL VENOUS BLD VENIPUNCTURE: CPT

## 2021-05-22 PROCEDURE — 74011250636 HC RX REV CODE- 250/636: Performed by: NURSE PRACTITIONER

## 2021-05-22 PROCEDURE — 77030040392 HC DRSG OPTIFOAM MDII -A

## 2021-05-22 PROCEDURE — 99152 MOD SED SAME PHYS/QHP 5/>YRS: CPT

## 2021-05-22 PROCEDURE — 80053 COMPREHEN METABOLIC PANEL: CPT

## 2021-05-22 PROCEDURE — 85025 COMPLETE CBC W/AUTO DIFF WBC: CPT

## 2021-05-22 PROCEDURE — 82962 GLUCOSE BLOOD TEST: CPT

## 2021-05-22 PROCEDURE — 74011250637 HC RX REV CODE- 250/637: Performed by: EMERGENCY MEDICINE

## 2021-05-22 PROCEDURE — 65660000000 HC RM CCU STEPDOWN

## 2021-05-22 PROCEDURE — 74011250637 HC RX REV CODE- 250/637: Performed by: NURSE PRACTITIONER

## 2021-05-22 PROCEDURE — 2709999900 HC NON-CHARGEABLE SUPPLY

## 2021-05-22 PROCEDURE — 74011250636 HC RX REV CODE- 250/636: Performed by: RADIOLOGY

## 2021-05-22 PROCEDURE — 0F9430Z DRAINAGE OF GALLBLADDER WITH DRAINAGE DEVICE, PERCUTANEOUS APPROACH: ICD-10-PCS | Performed by: RADIOLOGY

## 2021-05-22 PROCEDURE — 99232 SBSQ HOSP IP/OBS MODERATE 35: CPT | Performed by: EMERGENCY MEDICINE

## 2021-05-22 PROCEDURE — 87205 SMEAR GRAM STAIN: CPT

## 2021-05-22 PROCEDURE — 74011000258 HC RX REV CODE- 258: Performed by: NURSE PRACTITIONER

## 2021-05-22 RX ORDER — MIDAZOLAM HYDROCHLORIDE 1 MG/ML
1 INJECTION, SOLUTION INTRAMUSCULAR; INTRAVENOUS AS NEEDED
Status: DISCONTINUED | OUTPATIENT
Start: 2021-05-22 | End: 2021-05-22 | Stop reason: HOSPADM

## 2021-05-22 RX ORDER — FENTANYL CITRATE 50 UG/ML
50 INJECTION, SOLUTION INTRAMUSCULAR; INTRAVENOUS AS NEEDED
Status: DISCONTINUED | OUTPATIENT
Start: 2021-05-22 | End: 2021-05-22 | Stop reason: HOSPADM

## 2021-05-22 RX ORDER — MEMANTINE HYDROCHLORIDE 10 MG/1
10 TABLET ORAL DAILY
Status: DISCONTINUED | OUTPATIENT
Start: 2021-05-23 | End: 2021-05-28 | Stop reason: HOSPADM

## 2021-05-22 RX ORDER — GABAPENTIN 100 MG/1
100 CAPSULE ORAL DAILY
Status: DISCONTINUED | OUTPATIENT
Start: 2021-05-23 | End: 2021-05-28 | Stop reason: HOSPADM

## 2021-05-22 RX ORDER — MIDAZOLAM HYDROCHLORIDE 1 MG/ML
2 INJECTION, SOLUTION INTRAMUSCULAR; INTRAVENOUS ONCE
Status: COMPLETED | OUTPATIENT
Start: 2021-05-22 | End: 2021-05-22

## 2021-05-22 RX ORDER — NALOXONE HYDROCHLORIDE 0.4 MG/ML
0.2 INJECTION, SOLUTION INTRAMUSCULAR; INTRAVENOUS; SUBCUTANEOUS
Status: DISCONTINUED | OUTPATIENT
Start: 2021-05-22 | End: 2021-05-28 | Stop reason: HOSPADM

## 2021-05-22 RX ORDER — SODIUM CHLORIDE 0.9 % (FLUSH) 0.9 %
5-40 SYRINGE (ML) INJECTION AS NEEDED
Status: DISCONTINUED | OUTPATIENT
Start: 2021-05-22 | End: 2021-05-28 | Stop reason: HOSPADM

## 2021-05-22 RX ORDER — HYDRALAZINE HYDROCHLORIDE 20 MG/ML
10 INJECTION INTRAMUSCULAR; INTRAVENOUS
Status: DISCONTINUED | OUTPATIENT
Start: 2021-05-22 | End: 2021-05-28 | Stop reason: HOSPADM

## 2021-05-22 RX ORDER — FLUMAZENIL 0.1 MG/ML
0.2 INJECTION INTRAVENOUS
Status: DISCONTINUED | OUTPATIENT
Start: 2021-05-22 | End: 2021-05-22 | Stop reason: HOSPADM

## 2021-05-22 RX ORDER — NALOXONE HYDROCHLORIDE 0.4 MG/ML
0.2 INJECTION, SOLUTION INTRAMUSCULAR; INTRAVENOUS; SUBCUTANEOUS
Status: DISCONTINUED | OUTPATIENT
Start: 2021-05-22 | End: 2021-05-22

## 2021-05-22 RX ORDER — FAMOTIDINE 20 MG/1
20 TABLET, FILM COATED ORAL DAILY
Status: DISCONTINUED | OUTPATIENT
Start: 2021-05-23 | End: 2021-05-28 | Stop reason: HOSPADM

## 2021-05-22 RX ORDER — METOPROLOL SUCCINATE 50 MG/1
50 TABLET, EXTENDED RELEASE ORAL DAILY
Status: DISCONTINUED | OUTPATIENT
Start: 2021-05-23 | End: 2021-05-22

## 2021-05-22 RX ORDER — METOPROLOL SUCCINATE 50 MG/1
50 TABLET, EXTENDED RELEASE ORAL DAILY
Status: DISCONTINUED | OUTPATIENT
Start: 2021-05-22 | End: 2021-05-28 | Stop reason: HOSPADM

## 2021-05-22 RX ORDER — EZETIMIBE 10 MG/1
10 TABLET ORAL
Status: DISCONTINUED | OUTPATIENT
Start: 2021-05-22 | End: 2021-05-28 | Stop reason: HOSPADM

## 2021-05-22 RX ORDER — FENTANYL CITRATE 50 UG/ML
100 INJECTION, SOLUTION INTRAMUSCULAR; INTRAVENOUS ONCE
Status: COMPLETED | OUTPATIENT
Start: 2021-05-22 | End: 2021-05-22

## 2021-05-22 RX ORDER — SODIUM CHLORIDE 0.9 % (FLUSH) 0.9 %
5-40 SYRINGE (ML) INJECTION EVERY 8 HOURS
Status: DISCONTINUED | OUTPATIENT
Start: 2021-05-22 | End: 2021-05-23

## 2021-05-22 RX ORDER — ASPIRIN 81 MG/1
81 TABLET ORAL DAILY
Status: DISCONTINUED | OUTPATIENT
Start: 2021-05-23 | End: 2021-05-28 | Stop reason: HOSPADM

## 2021-05-22 RX ADMIN — PIPERACILLIN AND TAZOBACTAM 2.25 G: 2; .25 INJECTION, POWDER, LYOPHILIZED, FOR SOLUTION INTRAVENOUS at 04:21

## 2021-05-22 RX ADMIN — Medication 10 ML: at 17:13

## 2021-05-22 RX ADMIN — MIDAZOLAM 2 MG: 1 INJECTION INTRAMUSCULAR; INTRAVENOUS at 13:05

## 2021-05-22 RX ADMIN — FENTANYL CITRATE 100 MCG: 50 INJECTION, SOLUTION INTRAMUSCULAR; INTRAVENOUS at 13:05

## 2021-05-22 RX ADMIN — PIPERACILLIN AND TAZOBACTAM 2.25 G: 2; .25 INJECTION, POWDER, LYOPHILIZED, FOR SOLUTION INTRAVENOUS at 17:12

## 2021-05-22 RX ADMIN — SODIUM CHLORIDE 75 ML/HR: 900 INJECTION, SOLUTION INTRAVENOUS at 12:14

## 2021-05-22 RX ADMIN — PIPERACILLIN AND TAZOBACTAM 2.25 G: 2; .25 INJECTION, POWDER, LYOPHILIZED, FOR SOLUTION INTRAVENOUS at 11:07

## 2021-05-22 RX ADMIN — ERYTHROMYCIN: 5 OINTMENT OPHTHALMIC at 11:23

## 2021-05-22 RX ADMIN — ERYTHROMYCIN: 5 OINTMENT OPHTHALMIC at 20:23

## 2021-05-22 RX ADMIN — METOPROLOL SUCCINATE 50 MG: 50 TABLET, EXTENDED RELEASE ORAL at 17:20

## 2021-05-22 RX ADMIN — ERYTHROMYCIN: 5 OINTMENT OPHTHALMIC at 06:00

## 2021-05-22 RX ADMIN — Medication 10 ML: at 05:56

## 2021-05-22 RX ADMIN — ERYTHROMYCIN: 5 OINTMENT OPHTHALMIC at 00:00

## 2021-05-22 NOTE — PROGRESS NOTES
Preprocedure Assessment      Today 5/22/2021     Indication/Symptoms:   Idalmis Alicia is a 82 y.o. With acute cholecystitis presents for GB drainage    The H & P and/or progress notes and any available imaging were reviewed. The risks, indications and possible alternatives to the procedure, including doing nothing, were discussed and informed consent was obtained. Physical Exam:      Heart:   RRR   Lungs:   CTA, no wheezes, rhonchi or rales. The patient is an appropriate candidate to undergo the planned procedure and sedation.     Darryl Gavin MD

## 2021-05-22 NOTE — ROUTINE PROCESS
TRANSFER - OUT REPORT: 
 
Verbal report given to Nadja Fernandez on [facility].  being transferred to [address] [place] [address] for routine progression of care Report consisted of patients Situation, Background, Assessment and  
Recommendations(SBAR). Information from the following report(s) SBAR, ED Summary and MAR was reviewed with the receiving nurse. Lines:  
Peripheral IV 05/21/21 Right Antecubital (Active) Opportunity for questions and clarification was provided. Patient transported with: 
 [facility]

## 2021-05-22 NOTE — PROGRESS NOTES
TRANSFER - IN REPORT:    Verbal report received from Jeannine(name) on [facility]  being received from ED(unit) for routine progression of care      Report consisted of patients Situation, Background, Assessment and   Recommendations(SBAR). Information from the following report(s) SBAR, Kardex, ED Summary, Intake/Output, MAR, Recent Results and Cardiac Rhythm NSR/ST was reviewed with the receiving nurse. Opportunity for questions and clarification was provided. Assessment completed upon patients arrival to unit and care assumed.

## 2021-05-22 NOTE — ROUTINE PROCESS
Called patient's daughter and gave her an update post procedure. Answered all her questions satisfactorily and let her know patient did well with procedure.

## 2021-05-22 NOTE — ROUTINE PROCESS
4312 Written shift change report given to [facility] NAN Valdes RN (oncoming nurse) by Pastor Rupert RN (offgoing nurse). Report included the following information SBAR, Intake/Output, MAR, Recent Results, Cardiac Rhythm SR and Quality Measures. [address] IR personal Mcdonnell Ramp here to take patient for procedure. Report given NS infusing at 75 ML/HR. No sign of infiltration noted. 1400 Patient back from IR. Patient awake and confused. Drain with drain bag intact to right upper quad with small amount of dark/ black drainage. Order to [facility] gravity per nurse Paul Tijerina. 1953 Bedside and Verbal shift change report given to Arturo Roberts RN (oncoming nurse) by Jose Seals RN (offgoing nurse). Report included the following information SBAR, Kardex, MAR and Recent Results. SITUATION:  
 Code Status: DNR  Reason for Admission: Acute cholecystitis [K81.0] [facility] day: 1  Problem List:  
   
Hospital Problems  Date Reviewed: 9/29/2017 Codes Class Noted POA Acute cholecystitis ICD-10-CM: K81.0 ICD-9-CM: 575.0  4/23/2021 Unknown BACKGROUND:  
 Past Medical History:  
Past Medical History:  
Diagnosis Date  Anxiety  Arthritis  CAD (coronary artery disease) s/p drug-eluting stents to RCA for high-grade stenoses in 2/09  Carotid duplex 08/26/2016 Mild <50% CYRUS stenosis. Chronic LICA occlusion. Similar to study of 3/29/16.  Carotid stenosis (HCC)   
 w/ known total occlusion of lt internal carotid artery; followed by pts vascular surgeon  Chronic kidney disease  COPD (chronic obstructive pulmonary disease) (HCC)  Dementia ([address]  Depression  Diabetes ([address] type 2  Dyslipidemia   
 on Crestor; managed by pts PCP  Dyspnea  Hypercholesterolemia  Hypertension  Low back pain  Osteoarthrosis involving multiple sites  Polycythemia 12/9/2019  
 Skin cancer (HCC)   
 squamous cell lesions of the skin  Stroke ([address]  Venous (peripheral) insufficiency Patient taking anticoagulants no ASSESSMENT:  
 Changes in Assessment Throughout Shift: patient received a choli drain in IR.  Patient has Central Line: no Reasons if yes: N/A 
 Patient has Kim Cath: no Reasons if yes: N/A  Last Vitals: 
  
Vitals:  
 05/22/21 1325 05/22/21 1330 05/22/21 1359 05/22/21 1557 BP: (!) 165/67 (!) 152/66  125/87 Pulse: 88 89  100 Resp: 18 16  18 Temp:    98.6 °F (37 °C) SpO2: 97% 99%  100% Weight:      
Height:   6' 2.02\" (1.88 m)  IV and DRAINS (will only show if present) Peripheral IV 05/21/21 Right Antecubital-Site Assessment: Clean, dry, & intact Peripheral IV 05/21/21 Distal;Left;Posterior Forearm-Site Assessment: Clean, dry, & intact Biliary Drain 05/22/21 Abdomen-Site Assessment: Clean, dry, & intact  WOUND (if present) Wound Type:  Pressure sore to sacrum. Friction moisture to scrotum Dressing present Dressing Present : Yes Wound Concerns/Notes:  none  PAIN Pain Assessment Pain Intensity 1: 0 (05/22/21 1330) Patient Stated Pain Goal: 0 
o Interventions for Pain:  none 
o Intervention effective: N/A 
o Time of last intervention: N/A  
o Reassessment Completed: N/A  Last 3 Weights: 
Last 3 Recorded Weights in this Encounter 05/21/21 2329 Weight: 73.8 kg (162 lb 12.8 oz) Weight change:  INTAKE/OUPUT Current Shift: No intake/output data recorded. Last three shifts: 05/21 0701 - 05/22 1900 In: 9404 [P.O.:50; I.V.:1265] Out: -  
 
 LAB RESULTS Recent Labs  
  05/22/21 
6918 05/21/21 
1155 WBC 19.8* 17.9* HGB 14.6 14.3 HCT 47.0 44.5  306 Recent Labs  
  05/22/21 
7583 05/21/21 
1155  139  
K 3.6 4.1 * 229* BUN 23* 24* CREA 1.77* 1.88* CA 9.0 9.3 INR  --  1.3*  
 
 
RECOMMENDATIONS AND DISCHARGE PLANNING 1. Pending tests/procedures/ Plan of Care or Other Needs: none 2.  Discharge plan for patient and Needs/Barriers: back to assistant living 3. Estimated Discharge Date: 5/26/21 Posted on Whiteboard in Patients Room: no   
 
4. The patient's care plan was reviewed with the oncoming nurse. \"HEALS\" SAFETY CHECK Fall Risk Total Score: 2 Safety Measures: Safety Measures: Bed/Chair alarm on, Bed/Chair-Wheels locked, Bed in low position, Call light within reach A safety check occurred in the patient's room between off going nurse and oncoming nurse listed above. The safety check included the below items Area Items H High Alert Medications - Verify all high alert medication drips (heparin, PCA, etc.) E Equipment - Suction is set up for ALL patients (with yanker) - Red plugs utilized for all equipment (IV pumps, etc.) - WOWs wiped down at end of shift. 
- Room stocked with oxygen, suction, and other unit-specific supplies A Alarms - Bed alarm is set for fall risk patients - Ensure chair alarm is in place and activated if patient is up in a chair L Lines - Check IV for any infiltration - Kim bag is empty if patient has a Kim - Tubing and IV bags are labeled Audelia Sethsam Safety - Room is clean, patient is clean, and equipment is clean. - Hallways are clear from equipment besides carts. - Fall bracelet on for fall risk patients - Ensure room is clear and free of clutter - Suction is set up for ALL patients (with zafar) - Hallways are clear from equipment besides carts. - Isolation precautions followed, supplies available outside room, sign posted Daljit Palomares RN

## 2021-05-22 NOTE — PROCEDURES
RADIOLOGY POST PROCEDURE NOTE     May 22, 2021       1:29 PM     Preoperative Diagnosis:   Cholecystitis    Postoperative Diagnosis:  Same. :  Earnest Alpers    Assistant:  None. Type of Anesthesia: moderate sedation    Procedure/Description:  CT guided GB drainage    Findings: Thick dark bile. Estimated blood Loss:  Minimal    Specimen Removed:   Yes, micro. Blood transfusions:  None. Implants:  None.     Complications: None    Condition: Stable    Discharge Plan:  continue present therapy    Markus Del Cid MD

## 2021-05-22 NOTE — PROGRESS NOTES
Problem: Diabetes Self-Management  Goal: *Disease process and treatment process  Description: Define diabetes and identify own type of diabetes; list 3 options for treating diabetes. Outcome: Progressing Towards Goal  Goal: *Incorporating nutritional management into lifestyle  Description: Describe effect of type, amount and timing of food on blood glucose; list 3 methods for planning meals. Outcome: Progressing Towards Goal  Goal: *Incorporating physical activity into lifestyle  Description: State effect of exercise on blood glucose levels. Outcome: Progressing Towards Goal  Goal: *Developing strategies to promote health/change behavior  Description: Define the ABC's of diabetes; identify appropriate screenings, schedule and personal plan for screenings. Outcome: Progressing Towards Goal  Goal: *Using medications safely  Description: State effect of diabetes medications on diabetes; name diabetes medication taking, action and side effects. Outcome: Progressing Towards Goal  Goal: *Monitoring blood glucose, interpreting and using results  Description: Identify recommended blood glucose targets  and personal targets. Outcome: Progressing Towards Goal  Goal: *Prevention, detection, treatment of acute complications  Description: List symptoms of hyper- and hypoglycemia; describe how to treat low blood sugar and actions for lowering  high blood glucose level. Outcome: Progressing Towards Goal  Goal: *Prevention, detection and treatment of chronic complications  Description: Define the natural course of diabetes and describe the relationship of blood glucose levels to long term complications of diabetes.   Outcome: Progressing Towards Goal  Goal: *Developing strategies to address psychosocial issues  Description: Describe feelings about living with diabetes; identify support needed and support network  Outcome: Progressing Towards Goal  Goal: *Insulin pump training  Outcome: Progressing Towards Goal  Goal: *Sick day guidelines  Outcome: Progressing Towards Goal  Goal: *Patient Specific Goal (EDIT GOAL, INSERT TEXT)  Outcome: Progressing Towards Goal     Problem: Patient Education: Go to Patient Education Activity  Goal: Patient/Family Education  Outcome: Progressing Towards Goal     Problem: Pressure Injury - Risk of  Goal: *Prevention of pressure injury  Description: Document Zen Scale and appropriate interventions in the flowsheet. Outcome: Progressing Towards Goal  Note: Pressure Injury Interventions:  Sensory Interventions: Assess changes in LOC, Avoid rigorous massage over bony prominences, Check visual cues for pain, Discuss PT/OT consult with provider, Keep linens dry and wrinkle-free, Pressure redistribution bed/mattress (bed type), Turn and reposition approx. every two hours (pillows and wedges if needed)    Moisture Interventions: Absorbent underpads, Check for incontinence Q2 hours and as needed    Activity Interventions: Pressure redistribution bed/mattress(bed type), PT/OT evaluation    Mobility Interventions: HOB 30 degrees or less, Pressure redistribution bed/mattress (bed type), PT/OT evaluation, Turn and reposition approx. every two hours(pillow and wedges)    Nutrition Interventions: Document food/fluid/supplement intake, Discuss nutritional consult with provider    Friction and Shear Interventions: Apply protective barrier, creams and emollients, Foam dressings/transparent film/skin sealants, Lift team/patient mobility team                Problem: Patient Education: Go to Patient Education Activity  Goal: Patient/Family Education  Outcome: Progressing Towards Goal     Problem: Falls - Risk of  Goal: *Absence of Falls  Description: Document Cheryl Leal Fall Risk and appropriate interventions in the flowsheet.   Outcome: Progressing Towards Goal  Note: Fall Risk Interventions:       Mentation Interventions: Adequate sleep, hydration, pain control, More frequent rounding, Reorient patient    Medication Interventions: Evaluate medications/consider consulting pharmacy, Bed/chair exit alarm    Elimination Interventions: Call light in reach, Bed/chair exit alarm              Problem: Patient Education: Go to Patient Education Activity  Goal: Patient/Family Education  Outcome: Progressing Towards Goal     Problem: Pain  Goal: *Control of Pain  Outcome: Progressing Towards Goal     Problem: Patient Education: Go to Patient Education Activity  Goal: Patient/Family Education  Outcome: Progressing Towards Goal     Problem: Falls - Risk of  Goal: *Absence of Falls  Description: Document Dougherty Fall Risk and appropriate interventions in the flowsheet.   Outcome: Progressing Towards Goal  Note: Fall Risk Interventions:       Mentation Interventions: Adequate sleep, hydration, pain control, More frequent rounding, Reorient patient    Medication Interventions: Evaluate medications/consider consulting pharmacy, Bed/chair exit alarm    Elimination Interventions: Call light in reach, Bed/chair exit alarm              Problem: Patient Education: Go to Patient Education Activity  Goal: Patient/Family Education  Outcome: Progressing Towards Goal

## 2021-05-22 NOTE — PROGRESS NOTES
[facility] Hospitalist Group  Progress Note    Patient: Kiara Valdez. Age: 82 y.o. : 1935 MR#: [id number] SSN: [id number]  Date/Time: 2021    Subjective:     Patient is laying in bed in no apparent distress, awake, follows simple commands. Denies any pain    Assessment/Plan:       1. Cholecystitis  2. Acute kidney injury   3. Leukocytosis, suspect due to #1  4. Elevated LFTs  5. T2DM, A1C 7.1% in 2021  6. Hypertension  7. CAD  8. Hyperlipidemia  9. COPD  10. Conjunctivitis   11. Dementia without behavioral disturbance  12.  Debility  Advanced age     PLAN  Continue antibiotics  I have consulted general surgery  Status post placement of cholecystotomy tube by IR  Monitor renal function, I's and O's, IV fluids  Speech therapy evaluation  Monitor blood pressures, resume Toprol  Monitor labs  PT OT  I called and updated patient's daughter regarding patient's medical over the phone      Case discussed with:  [x]Patient  [x]Family  [x]Nursing  []Case Management  DVT Prophylaxis:  []Lovenox  []Hep SQ  [x]SCDs  []Coumadin   []On Heparin gtt    Objective:   VS:   Visit Vitals  BP (!) 152/66 (BP 1 Location: Left upper arm, BP Patient Position: At rest;Supine;Lying)   Pulse 89   Temp 98.4 °F (36.9 °C)   Resp 16   Ht 6' 2.02\" (1.88 m)   Wt 73.8 kg (162 lb 12.8 oz)   SpO2 99%   BMI 20.89 kg/m²      Tmax/24hrs: Temp (24hrs), Av.6 °F (37 °C), Min:97.8 °F (36.6 °C), Max:100 °F (37.8 °C)    Input/Output:     Intake/Output Summary (Last 24 hours) at 2021 1507  Last data filed at 2021 1214  Gross per 24 hour   Intake 1215 ml   Output    Net 1215 ml       General:  Awake, follows simple commands  Cardiovascular:  S1S2+, RRR  Pulmonary:  CTA b/l  GI:  Soft, BS+, NT, ND  Extremities:  trace edema      Labs:    Recent Results (from the past 24 hour(s))   GLUCOSE, POC    Collection Time: 21  7:59 PM   Result Value Ref Range    Glucose (POC) 155 (H) 70 - 110 mg/dL GLUCOSE, POC    Collection Time: 05/21/21 11:39 PM   Result Value Ref Range    Glucose (POC) 126 (H) 70 - 110 mg/dL   GLUCOSE, POC    Collection Time: 05/22/21  6:03 AM   Result Value Ref Range    Glucose (POC) 139 (H) 70 - 554 mg/dL   METABOLIC PANEL, COMPREHENSIVE    Collection Time: 05/22/21  6:33 AM   Result Value Ref Range    Sodium 143 136 - 145 mmol/L    Potassium 3.6 3.5 - 5.5 mmol/L    Chloride 106 100 - 111 mmol/L    CO2 32 21 - 32 mmol/L    Anion gap 5 3.0 - 18 mmol/L    Glucose 155 (H) 74 - 99 mg/dL    BUN 23 (H) 7.0 - 18 MG/DL    Creatinine 1.77 (H) 0.6 - 1.3 MG/DL    BUN/Creatinine ratio 13 12 - 20      GFR est AA 45 (L) >60 ml/min/1.73m2    GFR est non-AA 37 (L) >60 ml/min/1.73m2    Calcium 9.0 8.5 - 10.1 MG/DL    Bilirubin, total 1.6 (H) 0.2 - 1.0 MG/DL    ALT (SGPT) 20 16 - 61 U/L    AST (SGOT) 24 10 - 38 U/L    Alk. phosphatase 163 (H) 45 - 117 U/L    Protein, total 7.6 6.4 - 8.2 g/dL    Albumin 2.7 (L) 3.4 - 5.0 g/dL    Globulin 4.9 (H) 2.0 - 4.0 g/dL    A-G Ratio 0.6 (L) 0.8 - 1.7     CBC WITH AUTOMATED DIFF    Collection Time: 05/22/21  6:33 AM   Result Value Ref Range    WBC 19.8 (H) 4.6 - 13.2 K/uL    RBC 5.20 4.35 - 5.65 M/uL    HGB 14.6 13.0 - 16.0 g/dL    HCT 47.0 36.0 - 48.0 %    MCV 90.4 74.0 - 97.0 FL    MCH 28.1 24.0 - 34.0 PG    MCHC 31.1 31.0 - 37.0 g/dL    RDW 14.3 11.6 - 14.5 %    PLATELET 588 883 - 893 K/uL    MPV 10.4 9.2 - 11.8 FL    NEUTROPHILS 75 (H) 40 - 73 %    BAND NEUTROPHILS 2 %    LYMPHOCYTES 8 (L) 21 - 52 %    MONOCYTES 14 (H) 3 - 10 %    EOSINOPHILS 0 0 - 5 %    BASOPHILS 0 0 - 2 %    MYELOCYTES 1 %    ABS. NEUTROPHILS 15.2 (H) 1.8 - 8.0 K/UL    ABS. LYMPHOCYTES 1.6 0.9 - 3.6 K/UL    ABS. MONOCYTES 2.8 (H) 0.05 - 1.2 K/UL    ABS. EOSINOPHILS 0.0 0.0 - 0.4 K/UL    ABS.  BASOPHILS 0.0 0.0 - 0.1 K/UL    DF MANUAL      PLATELET COMMENTS ADEQUATE PLATELETS      RBC COMMENTS NORMOCYTIC, NORMOCHROMIC     GLUCOSE, POC    Collection Time: 05/22/21  8:12 AM   Result Value Ref Range    Glucose (POC) 143 (H) 70 - 110 mg/dL   GLUCOSE, POC    Collection Time: 05/22/21 11:44 AM   Result Value Ref Range    Glucose (POC) 125 (H) 70 - 110 mg/dL     Additional Data Reviewed:      Signed By: Avril Baez MD     May 22, 2021

## 2021-05-22 NOTE — ROUTINE PROCESS
TRANSFER - OUT REPORT: 
 
Verbal report given to [facility] RN on [employer]  being transferred to [address] [place] [address] for routine progression of care Report consisted of patients Situation, Background, Assessment and  
Recommendations(SBAR). Information from the following report(s) SBAR, Kardex, Procedure Summary, Intake/Output, MAR and Recent Results was reviewed with the receiving nurse. Lines:  
Peripheral IV 05/21/21 Right Antecubital (Active) Site Assessment Clean, dry, & intact 05/22/21 1200 Phlebitis Assessment 0 05/22/21 1200 Infiltration Assessment 0 05/22/21 1200 Dressing Status Clean, dry, & intact 05/22/21 1200 Dressing Type Tape;Transparent 05/22/21 1200 Hub Color/Line Status Pink;Capped 05/22/21 1200 Action Taken Open ports on tubing capped 05/22/21 1200 Alcohol Cap Used Yes 05/22/21 1200 Peripheral IV 05/21/21 Distal;Left;Posterior Forearm (Active) Site Assessment Clean, dry, & intact 05/22/21 1200 Phlebitis Assessment 0 05/22/21 1200 Infiltration Assessment 0 05/22/21 1200 Dressing Status Clean, dry, & intact 05/22/21 1200 Dressing Type Tape;Transparent 05/22/21 1200 Hub Color/Line Status Blue; Infusing 05/22/21 1200 Action Taken Open ports on tubing capped 05/22/21 1200 Alcohol Cap Used Yes 05/22/21 1200 Opportunity for questions and clarification was provided. Patient transported with: 
 Registered Nurse

## 2021-05-23 LAB
ALBUMIN SERPL-MCNC: 2.4 G/DL (ref 3.4–5)
ALBUMIN/GLOB SERPL: 0.5 {RATIO} (ref 0.8–1.7)
ALP SERPL-CCNC: 149 U/L (ref 45–117)
ALT SERPL-CCNC: 25 U/L (ref 16–61)
ANION GAP SERPL CALC-SCNC: 7 MMOL/L (ref 3–18)
AST SERPL-CCNC: 32 U/L (ref 10–38)
BASOPHILS # BLD: 0 K/UL (ref 0–0.1)
BASOPHILS NFR BLD: 0 % (ref 0–2)
BILIRUB SERPL-MCNC: 1.8 MG/DL (ref 0.2–1)
BUN SERPL-MCNC: 24 MG/DL (ref 7–18)
BUN/CREAT SERPL: 15 (ref 12–20)
CALCIUM SERPL-MCNC: 8.5 MG/DL (ref 8.5–10.1)
CHLORIDE SERPL-SCNC: 112 MMOL/L (ref 100–111)
CO2 SERPL-SCNC: 31 MMOL/L (ref 21–32)
CREAT SERPL-MCNC: 1.61 MG/DL (ref 0.6–1.3)
DIFFERENTIAL METHOD BLD: ABNORMAL
EOSINOPHIL # BLD: 0 K/UL (ref 0–0.4)
EOSINOPHIL NFR BLD: 0 % (ref 0–5)
ERYTHROCYTE [DISTWIDTH] IN BLOOD BY AUTOMATED COUNT: 14.3 % (ref 11.6–14.5)
GLOBULIN SER CALC-MCNC: 4.8 G/DL (ref 2–4)
GLUCOSE BLD STRIP.AUTO-MCNC: 146 MG/DL (ref 70–110)
GLUCOSE BLD STRIP.AUTO-MCNC: 146 MG/DL (ref 70–110)
GLUCOSE BLD STRIP.AUTO-MCNC: 154 MG/DL (ref 70–110)
GLUCOSE BLD STRIP.AUTO-MCNC: 186 MG/DL (ref 70–110)
GLUCOSE SERPL-MCNC: 146 MG/DL (ref 74–99)
HCT VFR BLD AUTO: 46.3 % (ref 36–48)
HGB BLD-MCNC: 14.4 G/DL (ref 13–16)
LYMPHOCYTES # BLD: 2.2 K/UL (ref 0.9–3.6)
LYMPHOCYTES NFR BLD: 14 % (ref 21–52)
MAGNESIUM SERPL-MCNC: 2.4 MG/DL (ref 1.6–2.6)
MCH RBC QN AUTO: 28.5 PG (ref 24–34)
MCHC RBC AUTO-ENTMCNC: 31.1 G/DL (ref 31–37)
MCV RBC AUTO: 91.5 FL (ref 74–97)
MONOCYTES # BLD: 1.4 K/UL (ref 0.05–1.2)
MONOCYTES NFR BLD: 9 % (ref 3–10)
NEUTS SEG # BLD: 12.4 K/UL (ref 1.8–8)
NEUTS SEG NFR BLD: 77 % (ref 40–73)
PLATELET # BLD AUTO: 287 K/UL (ref 135–420)
PLATELET COMMENTS,PCOM: ABNORMAL
PMV BLD AUTO: 10.6 FL (ref 9.2–11.8)
POTASSIUM SERPL-SCNC: 3.3 MMOL/L (ref 3.5–5.5)
PROT SERPL-MCNC: 7.2 G/DL (ref 6.4–8.2)
RBC # BLD AUTO: 5.06 M/UL (ref 4.35–5.65)
RBC MORPH BLD: ABNORMAL
SODIUM SERPL-SCNC: 150 MMOL/L (ref 136–145)
WBC # BLD AUTO: 16 K/UL (ref 4.6–13.2)

## 2021-05-23 PROCEDURE — 99221 1ST HOSP IP/OBS SF/LOW 40: CPT | Performed by: SURGERY

## 2021-05-23 PROCEDURE — 80053 COMPREHEN METABOLIC PANEL: CPT

## 2021-05-23 PROCEDURE — 74011250636 HC RX REV CODE- 250/636: Performed by: EMERGENCY MEDICINE

## 2021-05-23 PROCEDURE — 82962 GLUCOSE BLOOD TEST: CPT

## 2021-05-23 PROCEDURE — 65660000000 HC RM CCU STEPDOWN

## 2021-05-23 PROCEDURE — 97161 PT EVAL LOW COMPLEX 20 MIN: CPT

## 2021-05-23 PROCEDURE — 74011250636 HC RX REV CODE- 250/636: Performed by: NURSE PRACTITIONER

## 2021-05-23 PROCEDURE — 77030037878 HC DRSG MEPILEX >48IN BORD MOLN -B

## 2021-05-23 PROCEDURE — 92610 EVALUATE SWALLOWING FUNCTION: CPT

## 2021-05-23 PROCEDURE — 74011250637 HC RX REV CODE- 250/637: Performed by: EMERGENCY MEDICINE

## 2021-05-23 PROCEDURE — 85025 COMPLETE CBC W/AUTO DIFF WBC: CPT

## 2021-05-23 PROCEDURE — 92526 ORAL FUNCTION THERAPY: CPT

## 2021-05-23 PROCEDURE — 36415 COLL VENOUS BLD VENIPUNCTURE: CPT

## 2021-05-23 PROCEDURE — 83735 ASSAY OF MAGNESIUM: CPT

## 2021-05-23 PROCEDURE — 99232 SBSQ HOSP IP/OBS MODERATE 35: CPT | Performed by: EMERGENCY MEDICINE

## 2021-05-23 PROCEDURE — 2709999900 HC NON-CHARGEABLE SUPPLY

## 2021-05-23 PROCEDURE — 74011636637 HC RX REV CODE- 636/637: Performed by: EMERGENCY MEDICINE

## 2021-05-23 PROCEDURE — 97530 THERAPEUTIC ACTIVITIES: CPT

## 2021-05-23 PROCEDURE — 74011000258 HC RX REV CODE- 258: Performed by: NURSE PRACTITIONER

## 2021-05-23 RX ORDER — CLONIDINE HYDROCHLORIDE 0.1 MG/1
0.1 TABLET ORAL EVERY 12 HOURS
Status: DISCONTINUED | OUTPATIENT
Start: 2021-05-23 | End: 2021-05-24

## 2021-05-23 RX ORDER — INSULIN LISPRO 100 [IU]/ML
INJECTION, SOLUTION INTRAVENOUS; SUBCUTANEOUS
Status: DISCONTINUED | OUTPATIENT
Start: 2021-05-23 | End: 2021-05-28 | Stop reason: HOSPADM

## 2021-05-23 RX ORDER — CLONIDINE HYDROCHLORIDE 0.1 MG/1
0.1 TABLET ORAL 2 TIMES DAILY
Status: DISCONTINUED | OUTPATIENT
Start: 2021-05-23 | End: 2021-05-23

## 2021-05-23 RX ORDER — MENTHOL AND ZINC OXIDE .44; 20.625 G/100G; G/100G
OINTMENT TOPICAL 3 TIMES DAILY
Status: DISCONTINUED | OUTPATIENT
Start: 2021-05-23 | End: 2021-05-28 | Stop reason: HOSPADM

## 2021-05-23 RX ORDER — ISOSORBIDE MONONITRATE 30 MG/1
30 TABLET, EXTENDED RELEASE ORAL DAILY
Status: DISCONTINUED | OUTPATIENT
Start: 2021-05-24 | End: 2021-05-28 | Stop reason: HOSPADM

## 2021-05-23 RX ADMIN — ERYTHROMYCIN: 5 OINTMENT OPHTHALMIC at 17:17

## 2021-05-23 RX ADMIN — Medication: at 17:16

## 2021-05-23 RX ADMIN — Medication 81 MG: at 09:54

## 2021-05-23 RX ADMIN — METOPROLOL SUCCINATE 50 MG: 50 TABLET, EXTENDED RELEASE ORAL at 09:54

## 2021-05-23 RX ADMIN — PIPERACILLIN AND TAZOBACTAM 2.25 G: 2; .25 INJECTION, POWDER, LYOPHILIZED, FOR SOLUTION INTRAVENOUS at 12:54

## 2021-05-23 RX ADMIN — GABAPENTIN 100 MG: 100 CAPSULE ORAL at 09:54

## 2021-05-23 RX ADMIN — EZETIMIBE 10 MG: 10 TABLET ORAL at 00:05

## 2021-05-23 RX ADMIN — HYDRALAZINE HYDROCHLORIDE 10 MG: 20 INJECTION, SOLUTION INTRAMUSCULAR; INTRAVENOUS at 17:14

## 2021-05-23 RX ADMIN — CLONIDINE HYDROCHLORIDE 0.1 MG: 0.1 TABLET ORAL at 23:32

## 2021-05-23 RX ADMIN — MEMANTINE HYDROCHLORIDE 10 MG: 10 TABLET ORAL at 09:54

## 2021-05-23 RX ADMIN — Medication 10 ML: at 00:06

## 2021-05-23 RX ADMIN — PIPERACILLIN AND TAZOBACTAM 2.25 G: 2; .25 INJECTION, POWDER, LYOPHILIZED, FOR SOLUTION INTRAVENOUS at 00:05

## 2021-05-23 RX ADMIN — EZETIMIBE 10 MG: 10 TABLET ORAL at 23:32

## 2021-05-23 RX ADMIN — FAMOTIDINE 20 MG: 20 TABLET, FILM COATED ORAL at 09:54

## 2021-05-23 RX ADMIN — PIPERACILLIN AND TAZOBACTAM 2.25 G: 2; .25 INJECTION, POWDER, LYOPHILIZED, FOR SOLUTION INTRAVENOUS at 23:32

## 2021-05-23 RX ADMIN — ERYTHROMYCIN: 5 OINTMENT OPHTHALMIC at 02:07

## 2021-05-23 RX ADMIN — PIPERACILLIN AND TAZOBACTAM 2.25 G: 2; .25 INJECTION, POWDER, LYOPHILIZED, FOR SOLUTION INTRAVENOUS at 17:59

## 2021-05-23 RX ADMIN — SODIUM CHLORIDE 75 ML/HR: 900 INJECTION, SOLUTION INTRAVENOUS at 23:25

## 2021-05-23 RX ADMIN — INSULIN LISPRO 2 UNITS: 100 INJECTION, SOLUTION INTRAVENOUS; SUBCUTANEOUS at 23:48

## 2021-05-23 RX ADMIN — ERYTHROMYCIN: 5 OINTMENT OPHTHALMIC at 23:47

## 2021-05-23 RX ADMIN — Medication: at 23:54

## 2021-05-23 RX ADMIN — ERYTHROMYCIN: 5 OINTMENT OPHTHALMIC at 12:55

## 2021-05-23 NOTE — PROGRESS NOTES
5/22/2021 20:20--Bedside report received from previous nurse, Madhuri Mckinney RN.   25:13- Assessment completed- see flow sheet. 21:00- Noted patient attempting to pull out IV on Left Hand- hand and forearm wrapped with a gauze dressing . Bed alarm set on for safety. 5/23/2021 06:00- Patient has periods of restless and hollering out loud-Patient crying and repeatedly saying out loud,  \"Do not hurt my baby, do not hurt my baby\"  This writer and oncoming nurse, GE Oliveira spent some time giving TLC/  Patient crying out loud saying that the \"baby is dead\"   Patient calmed down after given him reassurance and emotional support.

## 2021-05-23 NOTE — PROGRESS NOTES
Problem: Mobility Impaired (Adult and Pediatric)  Goal: *Acute Goals and Plan of Care (Insert Text)  Description: Physical Therapy Goals  Initiated 5/23/2021 and to be accomplished within 7 day(s)  1. Patient will move from supine to sit and sit to supine , scoot up and down, and roll side to side in bed with minimal assistance/contact guard assist.    2.  Patient will transfer from bed to chair and chair to bed with maximal assistance using the least restrictive device. 3.  Patient will perform sit to stand with moderate assistance . 4. Patient will ambulate with maximal assistance for 3 feet with the least restrictive device. PLOF: Pt disoriented x4, confused throughout session and distractible. Chart review for PLOF: from [address] [place] [address] but unsure of mobility skills. Outcome: Progressing Towards Goal    PHYSICAL THERAPY EVALUATION    Patient: Carlos Elaine (82 y.o. male)  Date: 5/23/2021  Primary Diagnosis: Acute cholecystitis [K81.0]        Precautions:  Fall, Aspiration, Skin    PLOF: see above     ASSESSMENT :  Pt cleared to participate in PT session, pt received semi-reclined in bed and agreeable to therapy session. Based on the objective data described below, the patient presents with decreased endurance, decreased strength, decreased balance reactions, gait deviations, increased confusion, and decreased independence in functional mobility. Pt completing LE mobility to command, unable to fully extend B knees, ~-10degrees of extension. Pt distracted by TV, turned off for session, then pt reading whiteboard. Pt reoriented to time and place. Poor command following for mobility, modA for supine to sit with HOB elevated. Sitting on EOB with fair sitting balance, intermittent posterior lean. Able to move LEs to command in sitting but unable to perform MMT due to confusion. Pt attempted standing x3 reps with maxA, unable to deweight bottom from bed.  Pt then requesting to return to supine, assisting with scooting towards HOB in sitting with maxA, pt not able to follow command to return to supine. MaxA to supine and maxA to scoot toward HOB in supine. Pt positioned for comfort and educated to call for assist before getting up, pt verbalized understanding. Pt left with all needs met and call bell in reach. RN notified of position and participation. Bed alarm activated. Pt will be placed on 3 day trial to continue to assess true PLOF and progress towards goals. Patient will benefit from skilled intervention to address the above impairments. Patient's rehabilitation potential is considered to be Fair  Factors which may influence rehabilitation potential include:   []         None noted  [x]         Mental ability/status  [x]         Medical condition  [x]         Home/family situation and support systems  [x]         Safety awareness  []         Pain tolerance/management  []         Other:      PLAN :  Recommendations and Planned Interventions:   [x]           Bed Mobility Training             []    Neuromuscular Re-Education  [x]           Transfer Training                   []    Orthotic/Prosthetic Training  [x]           Gait Training                          []    Modalities  [x]           Therapeutic Exercises           []    Edema Management/Control  [x]           Therapeutic Activities            [x]    Family Training/Education  [x]           Patient Education  []           Other (comment):    Frequency/Duration: Patient will be followed by physical therapy 1-2 times per day/4-7 days per week to address goals. Discharge Recommendations: Fredy Mcdaniel  Further Equipment Recommendations for Discharge: TBD pending progress, currently hospital bed, wheelchair     SUBJECTIVE:   Patient stated Alejandro Peggy is that running through the trees! Shital Curry    OBJECTIVE DATA SUMMARY:     Past Medical History:   Diagnosis Date    Anxiety     Arthritis     CAD (coronary artery disease)     s/p drug-eluting stents to RCA for high-grade stenoses in 2/09    Carotid duplex 08/26/2016    Mild <50% CYRUS stenosis. Chronic LICA occlusion. Similar to study of 3/29/16. Carotid stenosis (HCC)     w/ known total occlusion of lt internal carotid artery; followed by pts vascular surgeon    Chronic kidney disease     COPD (chronic obstructive pulmonary disease) ([facility]     Dementia ([facility]     Depression     Diabetes ([facility]     type 2    Dyslipidemia     on Crestor; managed by pts PCP    Dyspnea     Hypercholesterolemia     Hypertension     Low back pain     Osteoarthrosis involving multiple sites     Polycythemia 12/9/2019    Skin cancer ([facility]     squamous cell lesions of the skin    Stroke ([facility]     Venous (peripheral) insufficiency      Past Surgical History:   Procedure Laterality Date    CARDIAC CATHETERIZATION  08/24/2016    Mod calcification. Co-dom. oLM 50-70%. LAD 30%. Dx 30%. Cx 70% focal.  OM 80% focal.  RCA 30%. HX ANGIOPLASTY  2009    2 stents placed and heart attack during the procedure    HX COLONOSCOPY  10/2003    neg; declines f/u    HX CORONARY ARTERY BYPASS GRAFT  09/2016     LIMA to LAD and SVG to OM1    HX HEART CATHETERIZATION  5/2013    HX HERNIA REPAIR      1970s    HX TONSIL AND ADENOIDECTOMY       Barriers to Learning/Limitations: AMS  Compensate with: Visual Cues, Verbal Cues, and Tactile Cues  Home Situation:  Home Situation  Home Environment: [address] [place] [address] Name: [address] [place] [address]  # Steps to Enter: 0  One/Two Story Residence: One story  Living Alone: No  Support Systems: Family member(s)  Patient Expects to be Discharged to[de-identified] Skilled nursing facility  Current DME Used/Available at Home: Other (comment)  Critical Behavior:  Neurologic State: Alert  Orientation Level: Disoriented X4  Cognition: Decreased attention/concentration;Decreased command following; Impaired decision making;Poor safety awareness  Safety/Judgement: Decreased awareness of environment;Lack of insight into deficits; Fall prevention  Psychosocial  Patient Behaviors: Confused    Strength:    Strength: Generally decreased, functional (3/5 )    Tone & Sensation:   Tone: Normal    Sensation:  (unable to fully assess )    Range Of Motion:  AROM: Generally decreased, functional    PROM: Generally decreased, functional    Posture:  Posture (WDL): Exceptions to WDL  Posture Assessment: Forward head;Trunk flexion  Functional Mobility:  Bed Mobility:     Supine to Sit: Moderate assistance (poor command following, distracted throughout )  Sit to Supine: Maximum assistance  Scooting: Maximum assistance  Transfers:  Sit to Stand: Maximum assistance (unable to deweight bottom, decreased participation )    Balance:   Sitting: Impaired; With support  Sitting - Static: Fair (occasional)  Sitting - Dynamic: Poor (constant support)  Standing:  (unable to fully stand )    Pain:  Pt reporting \"no\" when asked if he had any pain    Activity Tolerance:   Poor activity tolerance, limited due to confusion. Please refer to the flowsheet for vital signs taken during this treatment. After treatment:   []         Patient left in no apparent distress sitting up in chair  [x]         Patient left in no apparent distress in bed  [x]         Call bell left within reach  [x]         Nursing notified  []         Caregiver present  [x]         Bed alarm activated  []         SCDs applied    COMMUNICATION/EDUCATION:   [x]         Role of Physical Therapy in the acute care setting. [x]         Fall prevention education was provided and the patient/caregiver indicated understanding. [x]         Patient/family have participated as able in goal setting and plan of care. [x]         Patient/family agree to work toward stated goals and plan of care. []         Patient understands intent and goals of therapy, but is neutral about his/her participation.   []         Patient is unable to participate in goal setting/plan of care: ongoing with therapy staff.  []         Other:     Thank you for this referral.  Shena Godinez, PT   Time Calculation: 25 mins      Eval Complexity: History: MEDIUM  Complexity : 1-2 comorbidities / personal factors will impact the outcome/ POC Exam:LOW Complexity : 1-2 Standardized tests and measures addressing body structure, function, activity limitation and / or participation in recreation  Presentation: LOW Complexity : Stable, uncomplicated  Clinical Decision Making:Low Complexity low  Overall Complexity:LOW

## 2021-05-23 NOTE — PROGRESS NOTES
Problem: Pressure Injury - Risk of  Goal: *Prevention of pressure injury  Description: Document Zen Scale and appropriate interventions in the flowsheet. Outcome: Progressing Towards Goal  Note: Pressure Injury Interventions:  Sensory Interventions: Assess changes in LOC, Assess need for specialty bed, Float heels, Keep linens dry and wrinkle-free, Maintain/enhance activity level, Minimize linen layers, Pad between skin to skin, Pressure redistribution bed/mattress (bed type), Turn and reposition approx. every two hours (pillows and wedges if needed)    Moisture Interventions: Absorbent underpads, Internal/External urinary devices, Check for incontinence Q2 hours and as needed, Apply protective barrier, creams and emollients, Maintain skin hydration (lotion/cream), Minimize layers, Moisture barrier    Activity Interventions: Pressure redistribution bed/mattress(bed type), PT/OT evaluation, Assess need for specialty bed    Mobility Interventions: Pressure redistribution bed/mattress (bed type), HOB 30 degrees or less, Float heels, PT/OT evaluation, Turn and reposition approx. every two hours(pillow and wedges)    Nutrition Interventions: Document food/fluid/supplement intake    Friction and Shear Interventions: Minimize layers, Lift sheet, HOB 30 degrees or less, Foam dressings/transparent film/skin sealants, Apply protective barrier, creams and emollients                Problem: Patient Education: Go to Patient Education Activity  Goal: Patient/Family Education  Note: Unable to educate patient. Problem: Falls - Risk of  Goal: *Absence of Falls  Description: Document Miguel Shove Fall Risk and appropriate interventions in the flowsheet.   Outcome: Progressing Towards Goal  Note: Fall Risk Interventions:       Mentation Interventions: Adequate sleep, hydration, pain control, Bed/chair exit alarm, Door open when patient unattended, Evaluate medications/consider consulting pharmacy, More frequent rounding, Reorient patient, Room close to nurse's station, Update white board, Toileting rounds    Medication Interventions: Teach patient to arise slowly, Patient to call before getting OOB, Evaluate medications/consider consulting pharmacy, Bed/chair exit alarm, Assess postural VS orthostatic hypotension    Elimination Interventions: Toileting schedule/hourly rounds, Urinal in reach, Toilet paper/wipes in reach, Stay With Me (per policy), Patient to call for help with toileting needs, Elevated toilet seat, Call light in reach, Bed/chair exit alarm              Problem: Patient Education: Go to Patient Education Activity  Goal: Patient/Family Education  Note: Unable to educate patient     Problem: Falls - Risk of  Goal: *Absence of Falls  Description: Document Burrell Canavan Fall Risk and appropriate interventions in the flowsheet. Outcome: Progressing Towards Goal  Note: Fall Risk Interventions:       Mentation Interventions: Adequate sleep, hydration, pain control, Bed/chair exit alarm, Door open when patient unattended, Evaluate medications/consider consulting pharmacy, More frequent rounding, Reorient patient, Room close to nurse's station, Update white board, Toileting rounds    Medication Interventions: Teach patient to arise slowly, Patient to call before getting OOB, Evaluate medications/consider consulting pharmacy, Bed/chair exit alarm, Assess postural VS orthostatic hypotension    Elimination Interventions:  Toileting schedule/hourly rounds, Urinal in reach, Toilet paper/wipes in reach, Stay With Me (per policy), Patient to call for help with toileting needs, Elevated toilet seat, Call light in reach, Bed/chair exit alarm              Problem: Patient Education: Go to Patient Education Activity  Goal: Patient/Family Education  Note: Unable to educate patient     Problem: Pain  Goal: *Control of Pain  Outcome: Progressing Towards Goal     Problem: Patient Education: Go to Patient Education Activity  Goal: Patient/Family Education  Note: Unable to educate patient

## 2021-05-23 NOTE — PROGRESS NOTES
[facility]  Progress Note    Patient: Kaushik Linares. Age: 82 y.o. : 1935 MR#: [id number] SSN: [id number]  Date/Time: 2021    Subjective:     Patient is laying in bed in no apparent distress, awake, follows simple commands    Assessment/Plan:       1. Cholecystitis  2. Acute kidney injury   3. Leukocytosis, suspect due to #1  4. Elevated LFTs  5. T2DM, A1C 7.1% in 2021  6. Hypertension  7. CAD  8. Hyperlipidemia  9. COPD  10. Conjunctivitis   11. Dementia without behavioral disturbance  12. Debility  Advanced age     PLAN  Continue antibiotics  Status post cholecystostomy tube placement by IR  Monitor renal function, I's and O's, IV fluids  Diet as per speech  Monitor blood pressures, on Toprol, resume preadmission clonidine and Imdur.   As needed hydralazine  Monitor labs  PT OT  Discussed with RN  Patient is DNR    Case discussed with:  [x]Patient  [x]Family  [x]Nursing  []Case Management  DVT Prophylaxis:  []Lovenox  []Hep SQ  [x]SCDs  []Coumadin   []On Heparin gtt    Objective:   VS:   Visit Vitals  BP (!) 186/92 (BP 1 Location: Left upper arm, BP Patient Position: At rest)   Pulse 92   Temp 98.4 °F (36.9 °C)   Resp 18   Ht 6' 2.02\" (1.88 m)   Wt 73.8 kg (162 lb 12.8 oz)   SpO2 99%   BMI 20.89 kg/m²      Tmax/24hrs: Temp (24hrs), Av °F (36.7 °C), Min:97.4 °F (36.3 °C), Max:98.6 °F (37 °C)    Input/Output:     Intake/Output Summary (Last 24 hours) at 2021 1545  Last data filed at 2021 0600  Gross per 24 hour   Intake 1032.5 ml   Output 50 ml   Net 982.5 ml       General:  Awake, follows simple commands  Cardiovascular:  S1S2+, RRR  Pulmonary:  CTA b/l  GI:  Soft, BS+, NT, ND, has cholecystotomy tube with serosanguineous drainage  Extremities:  trace edema        Labs:    Recent Results (from the past 24 hour(s))   METABOLIC PANEL, COMPREHENSIVE    Collection Time: 21  4:00 AM   Result Value Ref Range    Sodium 150 (H) 136 - 145 mmol/L    Potassium 3.3 (L) 3.5 - 5.5 mmol/L    Chloride 112 (H) 100 - 111 mmol/L    CO2 31 21 - 32 mmol/L    Anion gap 7 3.0 - 18 mmol/L    Glucose 146 (H) 74 - 99 mg/dL    BUN 24 (H) 7.0 - 18 MG/DL    Creatinine 1.61 (H) 0.6 - 1.3 MG/DL    BUN/Creatinine ratio 15 12 - 20      GFR est AA 50 (L) >60 ml/min/1.73m2    GFR est non-AA 41 (L) >60 ml/min/1.73m2    Calcium 8.5 8.5 - 10.1 MG/DL    Bilirubin, total 1.8 (H) 0.2 - 1.0 MG/DL    ALT (SGPT) 25 16 - 61 U/L    AST (SGOT) 32 10 - 38 U/L    Alk. phosphatase 149 (H) 45 - 117 U/L    Protein, total 7.2 6.4 - 8.2 g/dL    Albumin 2.4 (L) 3.4 - 5.0 g/dL    Globulin 4.8 (H) 2.0 - 4.0 g/dL    A-G Ratio 0.5 (L) 0.8 - 1.7     CBC WITH AUTOMATED DIFF    Collection Time: 05/23/21  4:00 AM   Result Value Ref Range    WBC 16.0 (H) 4.6 - 13.2 K/uL    RBC 5.06 4.35 - 5.65 M/uL    HGB 14.4 13.0 - 16.0 g/dL    HCT 46.3 36.0 - 48.0 %    MCV 91.5 74.0 - 97.0 FL    MCH 28.5 24.0 - 34.0 PG    MCHC 31.1 31.0 - 37.0 g/dL    RDW 14.3 11.6 - 14.5 %    PLATELET 271 461 - 233 K/uL    MPV 10.6 9.2 - 11.8 FL    NEUTROPHILS 77 (H) 40 - 73 %    LYMPHOCYTES 14 (L) 21 - 52 %    MONOCYTES 9 3 - 10 %    EOSINOPHILS 0 0 - 5 %    BASOPHILS 0 0 - 2 %    ABS. NEUTROPHILS 12.4 (H) 1.8 - 8.0 K/UL    ABS. LYMPHOCYTES 2.2 0.9 - 3.6 K/UL    ABS. MONOCYTES 1.4 (H) 0.05 - 1.2 K/UL    ABS. EOSINOPHILS 0.0 0.0 - 0.4 K/UL    ABS.  BASOPHILS 0.0 0.0 - 0.1 K/UL    DF MANUAL      PLATELET COMMENTS ADEQUATE PLATELETS      RBC COMMENTS NORMOCYTIC, NORMOCHROMIC     MAGNESIUM    Collection Time: 05/23/21  4:00 AM   Result Value Ref Range    Magnesium 2.4 1.6 - 2.6 mg/dL   GLUCOSE, POC    Collection Time: 05/23/21  7:19 AM   Result Value Ref Range    Glucose (POC) 146 (H) 70 - 110 mg/dL   GLUCOSE, POC    Collection Time: 05/23/21 12:01 PM   Result Value Ref Range    Glucose (POC) 146 (H) 70 - 110 mg/dL     Additional Data Reviewed:      Signed By: Chantal Dunn MD     May 23, 2021

## 2021-05-23 NOTE — CONSULTS
Consult    Patient: Manuel Amin MRN: [id number]  SSN: [id number]    YOB: 1935  Age: 82 y.o. Sex: male      Subjective:      Manuel Amin is a 82 y.o. male who is being seen for acute cholecystitis. Patient is well-known to me with a previous admission to the hospital with acute cholecystitis a few weeks ago. Patient has multiple underlying comorbidities as well as severe dementia. Patient is only oriented to himself. He lives in a nursing facility. He was made DNR and DNI with limited interventions during his last hospital admission. Patient presents back to the hospital as he was noted to not be feeling well at his facility. He was found to have a white count of 19. On CT scan he had persistence but improved signs of acute cholecystitis. He currently denies any abdominal pain. He is pleasantly confused.     Allergies   Allergen Reactions    Amaryl [Glimepiride] Drowsiness    Lipitor [Atorvastatin] Myalgia    Niacin Other (comments)     Facial blistering, swelling in face    Pravachol [Pravastatin] Myalgia    Zocor [Simvastatin] Myalgia     Current Facility-Administered Medications   Medication Dose Route Frequency Provider Last Rate Last Admin    sodium chloride (NS) flush 5-40 mL  5-40 mL IntraVENous PRN Chirag Leonard MD        naloxone [facility]) injection 0.2 mg  0.2 mg IntraVENous EVERY 2 MINUTES AS NEEDED Chirag Leonard MD        ezetimibe (ZETIA) tablet 10 mg  10 mg Oral QHS Hiram Tatum MD   10 mg at 05/23/21 0005    famotidine (PEPCID) tablet 20 mg  20 mg Oral DAILY Hiram Tatum MD   20 mg at 05/23/21 0954    memantine (NAMENDA) tablet 10 mg  10 mg Oral DAILY Hiram Tatum MD   10 mg at 05/23/21 0954    polyvinyl alcohol-povidon(PF) (REFRESH CLASSIC) 1.4-0.6 % ophthalmic solution 1 Drop  1 Drop Both Eyes PRN Hiram Tatum MD        hydrALAZINE (APRESOLINE) 20 mg/mL injection 10 mg  10 mg IntraVENous Q6H PRN Hiram Tatum MD  metoprolol succinate (TOPROL-XL) XL tablet 50 mg  50 mg Oral DAILY Samantha Hatfield MD   50 mg at 05/23/21 0954    aspirin delayed-release tablet 81 mg  81 mg Oral DAILY Samantha Hatfield MD   81 mg at 05/23/21 0954    gabapentin (NEURONTIN) capsule 100 mg  100 mg Oral DAILY Samantha Hatfield MD   100 mg at 05/23/21 0954    sodium chloride (NS) flush 5-40 mL  5-40 mL IntraVENous PRN Kwong , NP        acetaminophen (TYLENOL) tablet 650 mg  650 mg Oral Q6H PRN Kwong Sa, NP        Or    acetaminophen (TYLENOL) suppository 650 mg  650 mg Rectal Q6H PRN Varghese Piña, NP        polyethylene glycol (MIRALAX) packet 17 g  17 g Oral DAILY PRN Varghese Piña, NP        promethazine (PHENERGAN) tablet 12.5 mg  12.5 mg Oral Q6H PRN Kwong , NP        Or    ondansetron (ZOFRAN) injection 4 mg  4 mg IntraVENous Q6H PRN Varghese Piña, NP        erythromycin (ILOTYCIN) 5 mg/gram (0.5 %) ophthalmic ointment   Both Eyes Q6H Varghese Piña, NP   Given at 05/23/21 0207    insulin lispro (HUMALOG) injection   SubCUTAneous Q6H Varghese Piña, NP        glucose chewable tablet 16 g  4 Tablet Oral PRN Kwong Sa, NP        glucagon (GLUCAGEN) injection 1 mg  1 mg IntraMUSCular PRN Varghese Piña, NP        dextrose (D50W) injection syrg 12.5-25 g  25-50 mL IntraVENous PRN Varghese Piña, NP        0.9% sodium chloride infusion  75 mL/hr IntraVENous CONTINUOUS Varghese Piña NP   Stopped at 05/23/21 0000    piperacillin-tazobactam (ZOSYN) 2.25 g in 0.9% sodium chloride (MBP/ADV) 50 mL MBP  2.25 g IntraVENous Q6H Varghese Piña, NP   Held at 05/23/21 0219     Past Medical History:   Diagnosis Date    Anxiety     Arthritis     CAD (coronary artery disease)     s/p drug-eluting stents to RCA for high-grade stenoses in 2/09    Carotid duplex 08/26/2016    Mild <50% CYRUS stenosis. Chronic LICA occlusion. Similar to study of 3/29/16.     Carotid stenosis (HCC)     w/ known total occlusion of lt internal carotid artery; followed by pts vascular surgeon    Chronic kidney disease     [facility]     D[facility]     Depression     Diabetes ([facility]     type 2    Dyslipidemia     on Crestor; managed by pts PCP    Dyspnea     Hypercholesterolemia     Hypertension     Low back pain     Osteoarthrosis involving multiple sites     Polycythemia 2019    Skin cancer ([facility]     squamous cell lesions of the skin    Stroke ([facility]     Venous (peripheral) insufficiency      Past Surgical History:   Procedure Laterality Date    CARDIAC CATHETERIZATION  2016    Mod calcification. Co-dom. oLM 50-70%. LAD 30%. Dx 30%. Cx 70% focal.  OM 80% focal.  RCA 30%.  HX ANGIOPLASTY      2 stents placed and heart attack during the procedure    HX COLONOSCOPY  10/2003    neg; declines f/u    HX CORONARY ARTERY BYPASS GRAFT  2016     LIMA to LAD and SVG to OM1    HX HEART CATHETERIZATION  2013    HX HERNIA REPAIR      1970s    HX TONSIL AND ADENOIDECTOMY        Social History     Tobacco Use    Smoking status: Former Smoker     Years: 2.00     Quit date: 1955     Years since quittin.4    Smokeless tobacco: Never Used   Substance Use Topics    Alcohol use: Yes     Comment: drinks weekly couple beers     Family History   Problem Relation Age of Onset    Parkinsonism Mother     Hypertension Mother     Cancer Father         lung    Heart defect Brother            Review of Systems:    General: Denies fevers, chills, night sweats, fatigue, weight loss, or weight gain.     HEENT: Denies changes in auditory or visual acuity, recurrent pharyngitis, epistaxis, chronic rhinorrhea, vertigo    Respiratory: Denies increasing shortness of breath, productive cough, hemoptysis    Cardiac: Denies known history of cardiac disease, heart murmur, palpitations    GI: Denies dysphagia, recurrent emesis, hematemesis, changes in bowel habits, hematochezia, melena    : Denies hematuria frequency urgency dysuria    Musculoskeletal: Denies fractures, dislocations    Neurologic: Denies history of CVA, paralysis paresthesias, recurrent cephalgia, seizures    Endocrine: Denies polyuria, polydipsia, polyphagia, heat and cold intolerance    Lymph/heme: Denies a history of malignancy, anemia, bruising, blood transfusions    Integumentary: Negative for dermatitis     Objective:     Vitals:    05/22/21 1557 05/22/21 2028 05/23/21 0831 05/23/21 1105   BP: 125/87 (!) 174/88 (!) 189/77 (!) 186/92   Pulse: 100 99 86 92   Resp: 18 18 19 18   Temp: 98.6 °F (37 °C) 97.4 °F (36.3 °C) 97.6 °F (36.4 °C) 98.4 °F (36.9 °C)   SpO2: 100% 99% 98% 99%   Weight:       Height:            General: no acute distress, nontoxic in appearance. Head: Normocephalic, atraumatic  Mouth: Clear, no overt lesions, oral mucosa is pink and moist.  Neck: Supple, no masses, no adenopathy or carotid bruits, trachea midline  Resp: Clear to auscultation bilaterally, no wheezing, rhonchi, or rales, excursions normal and symmetrical.  Cardio: Regular rate and rhythm, no murmurs, clicks, gallops, or rubs. Abdomen: soft, nontender, nondistended, normoactive bowel sounds, no hernias. Percutaneous cholecystostomy tube with bilious output. Extremities: Warm, well perfused, no tenderness or swelling, normal gait/station, without edema or varicosities  Neuro: Sensation and strength grossly intact and symmetrical.  Psych: Alert and oriented to person, place, and time. Assessment:   Patient is a 27-year-old male presenting with continuing and resolving acute cholecystitis. Patient has undergone a percutaneous cholecystostomy tube yesterday by IR. Patient looks well today. White count decreased to 16. He denies any abdominal pain. Plan:   Continue with cholecystostomy tube. Given the patient's age, comorbidities, severe dementia, he is not a good candidate for any surgical intervention. He is also DNR/DNI with limited interventions. The cholecystostomy tube will be definitive management for his acute cholecystitis. He should follow-up with us or IR as an outpatient for management of his cholecystostomy tube.     Signed By: Ezekiel Saba MD     May 23, 2021

## 2021-05-23 NOTE — PROGRESS NOTES
Problem: Dysphagia (Adult)  Goal: *Acute Goals and Plan of Care (Insert Text)  Description:     Patient will:  1. Tolerate PO trials with 0 s/s overt distress in 4/5 trials  2. Utilize compensatory swallow strategies/maneuvers (decrease bite/sip, size/rate, alt. liq/sol) with min cues in 4/5 trials  3. Perform oral-motor/laryngeal exercises to increase oropharyngeal swallow function with min cues  4. Complete an objective swallow study (i.e., MBSS) to assess swallow integrity, r/o aspiration, and determine of safest LRD, min A as indicated/ordered by MD     Rec:     Mech-soft diet with nectar-thick liquids  Aspiration precautions  HOB >45 during po intake, remain >30 for 30-45 minutes after po   Small bites/sips; alternate liquid/solid with slow feeding rate   Oral care TID  Meds per pt preference  MBS to further assess oropharyngeal swallow fxn next business date    Outcome: [facility] EVALUATION/TREATMENT    Patient: Mounika Briceño (82 y.o. male)  Date: 5/23/2021  Primary Diagnosis: Acute cholecystitis [K81.0]        Precautions:   Fall, Aspiration, Skin  PLOF: As per H&P    ASSESSMENT :  Based on the objective data described below, the patient presents with mild-mod oropharyngeal dysphagia. Pt with immediate strong cough post thin liquid trials. Improved tolerance of reg solid, mech soft, puree, and nectar-thick liquid trials. Pt demo labored mastication of reg solids with min lingual spread; resolved with mech soft solid trials. Laryngeal elevation weak/delayed to palpation. Pt  appears safe for initiation of mech soft solids with nectar-thick liquids, aspiration precautions, oral care TID, and meds as tolerated. He may require assistance with PO or meal set up. Rec MBS next business date, or as medical condition permits, to further assess oropharyngeal swallow fxn and r/o aspiration. D/w RN.      TREATMENT :  Skilled therapy initiated; Educated pt on aspiration precautions and importance of compensatory swallow techniques to decrease aspiration risk (decrease rate of intake & sip/bite size, upright @HOB for all po intake and ~30 minutes after po); verbalized comprehension - suspect very poor carryover. Patient will benefit from skilled intervention to address the above impairments. Patient's rehabilitation potential is considered to be Fair  Factors which may influence rehabilitation potential include:   []            None noted  [x]            Mental ability/status  [x]            Medical condition  []            Home/family situation and support systems  [x]            Safety awareness  []            Pain tolerance/management  []            Other:      PLAN :  Recommendations and Planned Interventions: See above  Frequency/Duration: Patient will be followed by speech-language pathology 1-2 times per day/3-5 days per week to address goals. Discharge Recommendations: Fredy Mcdaniel and To Be Determined     SUBJECTIVE:   Patient stated Michael Teagan me more of that juice. OBJECTIVE:     Past Medical History:   Diagnosis Date    Anxiety     Arthritis     CAD (coronary artery disease)     s/p drug-eluting stents to RCA for high-grade stenoses in 2/09    Carotid duplex 08/26/2016    Mild <50% CYRUS stenosis. Chronic LICA occlusion. Similar to study of 3/29/16.     Carotid stenosis (HCC)     w/ known total occlusion of lt internal carotid artery; followed by pts vascular surgeon    Chronic kidney disease     COPD (chronic obstructive pulmonary disease) ([address]     Dementia ([address]     Depression     Diabetes ([address]     type 2    Dyslipidemia     on Crestor; managed by pts PCP    Dyspnea     Hypercholesterolemia     Hypertension     Low back pain     Osteoarthrosis involving multiple sites     Polycythemia 12/9/2019    Skin cancer ([address]     squamous cell lesions of the skin    Stroke ([address]     Venous (peripheral) insufficiency      Past Surgical History: Procedure Laterality Date    CARDIAC CATHETERIZATION  08/24/2016    Mod calcification. Co-dom. oLM 50-70%. LAD 30%. Dx 30%. Cx 70% focal.  OM 80% focal.  RCA 30%. HX ANGIOPLASTY  2009    2 stents placed and heart attack during the procedure    HX COLONOSCOPY  10/2003    neg; declines f/u    HX CORONARY ARTERY BYPASS GRAFT  09/2016     LIMA to LAD and SVG to OM1    HX HEART CATHETERIZATION  5/2013    HX HERNIA REPAIR      1970s    HX TONSIL AND ADENOIDECTOMY       Prior Level of Function/Home Situation: see below  [address]: [address] [place] [address] Name: [address] [facility] [address]  # Steps to Enter: 0  One/Two Story Residence: One story  Living Alone: No  Support Systems: Family member(s)  Patient Expects to be Discharged to[de-identified] Skilled nursing facility  Current DME Used/Available at Home: Other (comment)    Diet prior to admission: unknown  Current Diet:  mech soft with nectar-thick liquids     Cognitive and Communication Status:  Neurologic State: Alert  Orientation Level: Disoriented X4  Cognition: Decreased attention/concentration, Decreased command following, Impaired decision making, Poor safety awareness  Perception: Appears intact  Perseveration: Perseverates during conversation, Perseverates during mobility  Safety/Judgement: Decreased awareness of environment, Lack of insight into deficits, Fall prevention  Oral Assessment:  Oral Assessment  Labial: No impairment  Dentition: Natural  Oral Hygiene: adequate  Lingual: Decreased rate;Decreased strength  Velum: No impairment  Mandible: No impairment  P.O. Trials:  Patient Position: 45 at [facility]  Vocal quality prior to P.O.: No impairment  Consistency Presented: Thin liquid; Solid;Puree;Mechanical soft  How Presented: Successive swallows; Self-fed/presented;Cup/sip;Spoon;Straw  Bolus Acceptance: No impairment  Bolus Formation/Control: Impaired  Type of Impairment: Delayed;Mastication  Propulsion: Delayed (# of seconds)  Oral Residue: None  Initiation of Swallow: No impairment  Laryngeal Elevation: Weak  Aspiration Signs/Symptoms: Strong cough  Pharyngeal Phase Characteristics: Suspected pharyngeal residue;Poor endurance;Multiple swallows  Effective Modifications: Small sips and bites; Alternate liquids/solids  Cues for Modifications: Moderate     Oral Phase Severity: Mild-moderate  Pharyngeal Phase Severity : Mild-moderate    PAIN:  Start of Eval: 0  End of Eval: 0     After treatment:   []            Patient left in no apparent distress sitting up in chair  [x]            Patient left in no apparent distress in bed  [x]            Call bell left within reach  [x]            Nursing notified  []            Family present  []            Caregiver present  []            Bed alarm activated    COMMUNICATION/EDUCATION:   [x]            Aspiration precautions; swallow safety; compensatory techniques. [x]            Patient/family have participated as able in goal setting and plan of care. []            Patient/family agree to work toward stated goals and plan of care. []            Patient understands intent and goals of therapy; neutral about participation. []            Patient unable to participate in goal setting/plan of care; educ ongoing with interdisciplinary staff  [x]         Posted safety precautions in patient's room.     Thank you for this referral.    Magy Montgomery M.S. CCC-SLP/L  Speech-Language Pathologist

## 2021-05-24 ENCOUNTER — APPOINTMENT (OUTPATIENT)
Dept: GENERAL RADIOLOGY | Age: 86
DRG: 445 | End: 2021-05-24
Attending: EMERGENCY MEDICINE
Payer: MEDICARE

## 2021-05-24 LAB
ALBUMIN SERPL-MCNC: 2.6 G/DL (ref 3.4–5)
ALBUMIN/GLOB SERPL: 0.5 {RATIO} (ref 0.8–1.7)
ALP SERPL-CCNC: 151 U/L (ref 45–117)
ALT SERPL-CCNC: 27 U/L (ref 16–61)
ANION GAP SERPL CALC-SCNC: 3 MMOL/L (ref 3–18)
AST SERPL-CCNC: 28 U/L (ref 10–38)
BASOPHILS # BLD: 0.1 K/UL (ref 0–0.1)
BASOPHILS NFR BLD: 1 % (ref 0–2)
BILIRUB SERPL-MCNC: 1.1 MG/DL (ref 0.2–1)
BUN SERPL-MCNC: 22 MG/DL (ref 7–18)
BUN/CREAT SERPL: 14 (ref 12–20)
CALCIUM SERPL-MCNC: 8.3 MG/DL (ref 8.5–10.1)
CHLORIDE SERPL-SCNC: 120 MMOL/L (ref 100–111)
CO2 SERPL-SCNC: 30 MMOL/L (ref 21–32)
CREAT SERPL-MCNC: 1.55 MG/DL (ref 0.6–1.3)
DIFFERENTIAL METHOD BLD: ABNORMAL
EOSINOPHIL # BLD: 0.1 K/UL (ref 0–0.4)
EOSINOPHIL NFR BLD: 0 % (ref 0–5)
ERYTHROCYTE [DISTWIDTH] IN BLOOD BY AUTOMATED COUNT: 14.3 % (ref 11.6–14.5)
GLOBULIN SER CALC-MCNC: 5 G/DL (ref 2–4)
GLUCOSE BLD STRIP.AUTO-MCNC: 165 MG/DL (ref 70–110)
GLUCOSE BLD STRIP.AUTO-MCNC: 167 MG/DL (ref 70–110)
GLUCOSE BLD STRIP.AUTO-MCNC: 169 MG/DL (ref 70–110)
GLUCOSE BLD STRIP.AUTO-MCNC: 190 MG/DL (ref 70–110)
GLUCOSE SERPL-MCNC: 183 MG/DL (ref 74–99)
HCT VFR BLD AUTO: 50.9 % (ref 36–48)
HGB BLD-MCNC: 15.7 G/DL (ref 13–16)
LYMPHOCYTES # BLD: 2 K/UL (ref 0.9–3.6)
LYMPHOCYTES NFR BLD: 16 % (ref 21–52)
MAGNESIUM SERPL-MCNC: 2.6 MG/DL (ref 1.6–2.6)
MCH RBC QN AUTO: 28.1 PG (ref 24–34)
MCHC RBC AUTO-ENTMCNC: 30.8 G/DL (ref 31–37)
MCV RBC AUTO: 91.1 FL (ref 74–97)
MONOCYTES # BLD: 1.4 K/UL (ref 0.05–1.2)
MONOCYTES NFR BLD: 11 % (ref 3–10)
NEUTS SEG # BLD: 8.8 K/UL (ref 1.8–8)
NEUTS SEG NFR BLD: 72 % (ref 40–73)
PLATELET # BLD AUTO: 300 K/UL (ref 135–420)
PMV BLD AUTO: 10.2 FL (ref 9.2–11.8)
POTASSIUM SERPL-SCNC: 3.3 MMOL/L (ref 3.5–5.5)
PROT SERPL-MCNC: 7.6 G/DL (ref 6.4–8.2)
RBC # BLD AUTO: 5.59 M/UL (ref 4.35–5.65)
SODIUM SERPL-SCNC: 153 MMOL/L (ref 136–145)
WBC # BLD AUTO: 12.3 K/UL (ref 4.6–13.2)

## 2021-05-24 PROCEDURE — 82962 GLUCOSE BLOOD TEST: CPT

## 2021-05-24 PROCEDURE — 74011250637 HC RX REV CODE- 250/637: Performed by: EMERGENCY MEDICINE

## 2021-05-24 PROCEDURE — 65660000000 HC RM CCU STEPDOWN

## 2021-05-24 PROCEDURE — 97166 OT EVAL MOD COMPLEX 45 MIN: CPT

## 2021-05-24 PROCEDURE — 92611 MOTION FLUOROSCOPY/SWALLOW: CPT

## 2021-05-24 PROCEDURE — 74230 X-RAY XM SWLNG FUNCJ C+: CPT

## 2021-05-24 PROCEDURE — 74011250636 HC RX REV CODE- 250/636: Performed by: NURSE PRACTITIONER

## 2021-05-24 PROCEDURE — 36415 COLL VENOUS BLD VENIPUNCTURE: CPT

## 2021-05-24 PROCEDURE — 2709999900 HC NON-CHARGEABLE SUPPLY

## 2021-05-24 PROCEDURE — 85025 COMPLETE CBC W/AUTO DIFF WBC: CPT

## 2021-05-24 PROCEDURE — 74011000258 HC RX REV CODE- 258: Performed by: NURSE PRACTITIONER

## 2021-05-24 PROCEDURE — 97535 SELF CARE MNGMENT TRAINING: CPT

## 2021-05-24 PROCEDURE — 83735 ASSAY OF MAGNESIUM: CPT

## 2021-05-24 PROCEDURE — 92526 ORAL FUNCTION THERAPY: CPT

## 2021-05-24 PROCEDURE — 80053 COMPREHEN METABOLIC PANEL: CPT

## 2021-05-24 PROCEDURE — 74011000250 HC RX REV CODE- 250: Performed by: EMERGENCY MEDICINE

## 2021-05-24 PROCEDURE — 74011636637 HC RX REV CODE- 636/637: Performed by: EMERGENCY MEDICINE

## 2021-05-24 PROCEDURE — 99232 SBSQ HOSP IP/OBS MODERATE 35: CPT | Performed by: EMERGENCY MEDICINE

## 2021-05-24 RX ORDER — CLONIDINE HYDROCHLORIDE 0.1 MG/1
0.1 TABLET ORAL EVERY 8 HOURS
Status: DISCONTINUED | OUTPATIENT
Start: 2021-05-24 | End: 2021-05-28 | Stop reason: HOSPADM

## 2021-05-24 RX ORDER — SODIUM CHLORIDE 450 MG/100ML
75 INJECTION, SOLUTION INTRAVENOUS CONTINUOUS
Status: DISCONTINUED | OUTPATIENT
Start: 2021-05-24 | End: 2021-05-25

## 2021-05-24 RX ORDER — AMLODIPINE BESYLATE 5 MG/1
5 TABLET ORAL DAILY
Status: DISCONTINUED | OUTPATIENT
Start: 2021-05-24 | End: 2021-05-28 | Stop reason: HOSPADM

## 2021-05-24 RX ADMIN — Medication 81 MG: at 13:56

## 2021-05-24 RX ADMIN — ERYTHROMYCIN: 5 OINTMENT OPHTHALMIC at 23:52

## 2021-05-24 RX ADMIN — SODIUM CHLORIDE 75 ML/HR: 450 INJECTION, SOLUTION INTRAVENOUS at 18:35

## 2021-05-24 RX ADMIN — PIPERACILLIN AND TAZOBACTAM 2.25 G: 2; .25 INJECTION, POWDER, LYOPHILIZED, FOR SOLUTION INTRAVENOUS at 06:45

## 2021-05-24 RX ADMIN — EZETIMIBE 10 MG: 10 TABLET ORAL at 22:16

## 2021-05-24 RX ADMIN — BARIUM SULFATE 60 ML: 0.81 POWDER, FOR SUSPENSION ORAL at 11:00

## 2021-05-24 RX ADMIN — MEMANTINE HYDROCHLORIDE 10 MG: 10 TABLET ORAL at 13:56

## 2021-05-24 RX ADMIN — PIPERACILLIN AND TAZOBACTAM 2.25 G: 2; .25 INJECTION, POWDER, LYOPHILIZED, FOR SOLUTION INTRAVENOUS at 18:42

## 2021-05-24 RX ADMIN — PIPERACILLIN AND TAZOBACTAM 2.25 G: 2; .25 INJECTION, POWDER, LYOPHILIZED, FOR SOLUTION INTRAVENOUS at 14:04

## 2021-05-24 RX ADMIN — CLONIDINE HYDROCHLORIDE 0.1 MG: 0.1 TABLET ORAL at 13:56

## 2021-05-24 RX ADMIN — ISOSORBIDE MONONITRATE 30 MG: 30 TABLET, EXTENDED RELEASE ORAL at 13:56

## 2021-05-24 RX ADMIN — BARIUM SULFATE 700 MG: 700 TABLET ORAL at 10:56

## 2021-05-24 RX ADMIN — METOPROLOL SUCCINATE 50 MG: 50 TABLET, EXTENDED RELEASE ORAL at 13:56

## 2021-05-24 RX ADMIN — AMLODIPINE BESYLATE 5 MG: 5 TABLET ORAL at 18:49

## 2021-05-24 RX ADMIN — INSULIN LISPRO 2 UNITS: 100 INJECTION, SOLUTION INTRAVENOUS; SUBCUTANEOUS at 16:57

## 2021-05-24 RX ADMIN — Medication: at 22:27

## 2021-05-24 RX ADMIN — GABAPENTIN 100 MG: 100 CAPSULE ORAL at 13:56

## 2021-05-24 RX ADMIN — BARIUM SULFATE 45 ML: 400 PASTE ORAL at 10:56

## 2021-05-24 RX ADMIN — BARIUM SULFATE 60 ML: 400 SUSPENSION ORAL at 10:55

## 2021-05-24 RX ADMIN — ERYTHROMYCIN: 5 OINTMENT OPHTHALMIC at 06:48

## 2021-05-24 RX ADMIN — FAMOTIDINE 20 MG: 20 TABLET, FILM COATED ORAL at 13:56

## 2021-05-24 RX ADMIN — CLONIDINE HYDROCHLORIDE 0.1 MG: 0.1 TABLET ORAL at 22:17

## 2021-05-24 RX ADMIN — INSULIN LISPRO 2 UNITS: 100 INJECTION, SOLUTION INTRAVENOUS; SUBCUTANEOUS at 22:23

## 2021-05-24 RX ADMIN — Medication: at 18:29

## 2021-05-24 NOTE — PROGRESS NOTES
Physician Progress Note      Radha Mo  [facility] #:                  [id number]  :                       1935  ADMIT DATE:       2021 11:23 AM  DISCH DATE:  RESPONDING  PROVIDER #:        Alicia Lindquist MD        QUERY TEXT:    Stage of Chronic Kidney Disease: Please provide further specificity, if known. Clinical indicators include: ckd, bun, creatinine, chronic kidney disease, bun/creatinine, gfr  Options provided:  -- Chronic kidney disease stage 1  -- Chronic kidney disease stage 2  -- Chronic kidney disease stage 3  -- Chronic kidney disease stage 3a  -- Chronic kidney disease stage 3b  -- Chronic kidney disease stage 4  -- Chronic kidney disease stage 5  -- Chronic kidney disease stage 5, requiring dialysis  -- End stage renal disease  -- Other - I will add my own diagnosis  -- Disagree - Not applicable / Not valid  -- Disagree - Clinically Unable to determine / Unknown        PROVIDER RESPONSE TEXT:    The patient has chronic kidney disease stage 3a. QUERY TEXT:    Dear hospitalist,    Pt admitted with cholecystitis. Noted documentation of arterial ulcers on 2021 wound care note by ordered wound consultant. If possible, please document in progress notes and discharge summary:    The medical record reflects the following:  Risk Factors: dementia, debility, sepsis    Clinical Indicators: \"Tips of toes, left foot, 1st & 3rd toes, arterial ulcer, 100% dry black eschar, open to air, no periwound redness, POA. \"  and  \"Left lateral ankle, arterial ulcer, 1w1m1ub, 100% dry black eschar, no periwound redness, POA\"  per Oswaldo SHIPMAN RN, CWOCN, [address], wound care note, 2021. Treatment: \"Topical treatment orders per nursing unit skin care protocol. \"  and \"Ulcers on tips of toes should stay open to air. \"  per Oswaldo SHIPMAN, GE, [address], [address], wound care note, 2021.     Thank you,  Adriana Roy RN, [facility]  Office:  [phone]  Options provided:  -- arterial ulcer to left lateral ankle and arterial ulcer to tips of toes, left foot, 1st and 3rd toes confirmed present on admission  -- arterial ulcer to left lateral ankle and arterial ulcer to tips of toes, left foot, 1st and 3rd toes confirmed not present on admission  -- arterial ulcer to left lateral ankle and arterial ulcer to tips of toes, left foot, 1st and 3rd toes ruled out  -- Other - I will add my own diagnosis  -- Disagree - Not applicable / Not valid  -- Disagree - Clinically unable to determine / Unknown  -- Refer to Clinical Documentation Reviewer    PROVIDER RESPONSE TEXT:    The diagnosis of 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 Query created by:  Jonn Moreira on 5/24/2021 4:12 PM      Electronically signed by:  Justine Funez MD 5/24/2021 5:39 PM

## 2021-05-24 NOTE — CONSULTS
Infectious Disease Consultation Note        Reason: Recurrent cholecystitis    Current abx Prior abx   Piperacillin/tazobactam since 5/21/2021      Lines:       Assessment :    82 y.o.  male with hx of cholecystitis, CAD, T2DM-last hemoglobin A1c 7.1 on 4/23/2021, HTN, dyslipidemia, CKD, COPD, CVA, dementia who [facility] as he appeared unwell. Hospitalization at [facility] 4/23/2021-5/4/2021 for acute cholecystitis. Now with significant leukocytosis, tachycardia, tachypnea on admission    Clinical presentation consistent with sepsis-present on admission due to partially treated cholecystitis status post IR guided cholecystostomy tube placement on 5/22/2021. Cultures-pending    Acute kidney injury-likely due to sepsis. Gradually improving    Recommendations:    1. Continue piperacillin/tazobactam  2. Follow-up results of IR guided drainage cultures and modify antibiotics as needed  3. Needs to follow-up with surgery as outpatient. Unfortunately patient is at high risk of recurrent cholecystitis when drain removed in absence of surgical intervention    Thank you for consultation request. Above plan was discussed in details with patient,  and dr Darline Durbin. Please call me if any further questions or concerns. Will continue to participate in the care of this patient. HPI:  82 y.o.  male with hx of cholecystitis, CAD, T2DM-last hemoglobin A1c 7.1 on 4/23/2021, HTN, dyslipidemia, CKD, COPD, CVA, dementia who [facility] as he appeared unwell. Patient is unable to provide any history. I obtained history from review of records, talking with the hospitalist.  Patient was recently admitted to [facility] 4/23/2021-5/4/2021 for acute cholecystitis. Patient was evaluated by surgery team.  Alanna Faes not to be a good surgical candidate. Patient was treated with intravenous antibiotics.   Discharged on oral Augmentin for 7 days to be completed on 5/11/2021. He was sent to [facility] on 5/21/2021 for appearing unwell from nursing home. Noted to have leukocytosis evidence of acute kidney injury and tachycardia. CT abdomen did show evidence of continued cholecystitis however seems to have improved image was compared to previous studies. Patient was started on piperacillin/tazobactam.  Interventional radiology was consulted. Status post IR guided drainage on 5/22/2021. I have been consulted for further recommendations. Patient denies any abdominal pain, nausea, vomiting. Denies any chest pain, shortness of breath. Reliability of review of system is questionable. Past Medical History:   Diagnosis Date    Anxiety     Arthritis     CAD (coronary artery disease)     s/p drug-eluting stents to RCA for high-grade stenoses in 2/09    Carotid duplex 08/26/2016    Mild <50% CYRUS stenosis. Chronic LICA occlusion. Similar to study of 3/29/16.  Carotid stenosis (HCC)     w/ known total occlusion of lt internal carotid artery; followed by pts vascular surgeon    Chronic kidney disease     COPD (chronic obstructive pulmonary disease) ([address]     Dementia ([address]     Depression     Diabetes ([address]     type 2    Dyslipidemia     on Crestor; managed by pts PCP    Dyspnea     Hypercholesterolemia     Hypertension     Low back pain     Osteoarthrosis involving multiple sites     Polycythemia 12/9/2019    Skin cancer ([address]     squamous cell lesions of the skin    Stroke ([address]     Venous (peripheral) insufficiency        Past Surgical History:   Procedure Laterality Date    CARDIAC CATHETERIZATION  08/24/2016    Mod calcification. Co-dom. oLM 50-70%. LAD 30%. Dx 30%. Cx 70% focal.  OM 80% focal.  RCA 30%.       HX ANGIOPLASTY  2009    2 stents placed and heart attack during the procedure    HX COLONOSCOPY  10/2003    neg; declines f/u    HX CORONARY ARTERY BYPASS GRAFT  09/2016     LIMA to LAD and SVG to OM1    HX HEART CATHETERIZATION  5/2013    HX HERNIA REPAIR      1970s    HX TONSIL AND ADENOIDECTOMY         home Medication List    Details   erythromycin (ILOTYCIN) ophthalmic ointment Apply to affected eye(s) six (6) times a day for 7 days. Qty: 3.5 g, Refills: 0      clindamycin (CLEOCIN) 300 mg capsule Take 1 Capsule by mouth four (4) times daily for 7 days. Qty: 28 Capsule, Refills: 0       Details   insulin lispro (HUMALOG) 100 unit/mL injection by SubCUTAneous route three (3) times daily. cloNIDine HCL (CATAPRES) 0.1 mg tablet Take 1 Tab by mouth two (2) times a day. Qty: 60 Tab, Refills: 1      insulin detemir U-100 (Levemir U-100 Insulin) 100 unit/mL injection 10 Units by SubCUTAneous route daily. Indications: type 2 diabetes mellitus  Qty: 2 Vial, Refills: 1      temazepam (RESTORIL) 7.5 mg capsule Take 7.5 mg by mouth nightly. QUEtiapine (SEROqueL) 50 mg tablet Take 50 mg by mouth two (2) times a day. memantine (Namenda) 10 mg tablet Take 10 mg by mouth daily. metoprolol succinate (Toprol XL) 50 mg XL tablet Take 50 mg by mouth daily. lidocaine (LIDODERM) 5 % Apply patch to the affected area for 12 hours a day and remove for 12 hours a day. Qty: 30 Each, Refills: 0      diclofenac (VOLTAREN) 1 % gel Apply 2 g to affected area four (4) times daily. Qty: 100 g, Refills: 0      lactobacillus sp. 50 billion cpu (BIO-K PLUS) 50 billion cell -375 mg cap capsule Take 1 Cap by mouth daily. Qty: 30 Cap, Refills: 0      ezetimibe (ZETIA) 10 mg tablet Take 1 Tab by mouth nightly. aspirin delayed-release 81 mg tablet Take 1 Tab by mouth daily. Indications: Cerebral Thromboembolism Prevention  Qty: 30 Tab, Refills: 0      gabapentin (NEURONTIN) 300 mg capsule Take 1 Cap by mouth daily. Indications: NEUROPATHIC PAIN, Restless Legs Syndrome  Qty: 30 Cap, Refills: 0      isosorbide mononitrate ER (IMDUR) 30 mg tablet Take 1 Tab by mouth daily.   Qty: 30 Tab, Refills: 0    Associated Diagnoses: Essential hypertension      multivit-min-FA-lycopen-lutein (CENTRUM SILVER) 0.4-300-250 mg-mcg-mcg tab Take 1 tab daily  Qty: 30 Tab, Refills: 0      furosemide (LASIX) 20 mg tablet Take 1 Tab by mouth daily. Indications: Edema  Qty: 30 Tab, Refills: 0      famotidine (PEPCID) 20 mg tablet Take 1 Tab by mouth two (2) times a day. Indications: PREVENTION OF STRESS ULCER  Qty: 30 Tab, Refills: 0      CALCIUM CARBONATE (CALTRATE 600 PO) Take 1 Tab by mouth daily. polyvinyl alcohol-povidon,PF, (REFRESH CLASSIC) 1.4-0.6 % ophthalmic solution Administer 1 Drop to both eyes as needed for Other (ocular dryness) for up to 30 days. Qty: 30 Each, Refills: 1      cycloSPORINE (RESTASIS) 0.05 % ophthalmic emulsion Administer 1 Drop to both eyes every twelve (12) hours.  Indications: Dry Eye  Qty: 15 Each, Refills: 0      OTHER Incentive spirometry- Use as directed  Qty: 1 Each, Refills: 0    Associated Diagnoses: Influenza B             Current Facility-Administered Medications   Medication Dose Route Frequency    menthol-zinc oxide (CALMOSEPTINE) 0.44-20.6 % ointment   Topical TID    isosorbide mononitrate ER (IMDUR) tablet 30 mg  30 mg Oral DAILY    cloNIDine HCL (CATAPRES) tablet 0.1 mg  0.1 mg Oral Q12H    insulin lispro (HUMALOG) injection   SubCUTAneous AC&HS    sodium chloride (NS) flush 5-40 mL  5-40 mL IntraVENous PRN    naloxone (NARCAN) injection 0.2 mg  0.2 mg IntraVENous EVERY 2 MINUTES AS NEEDED    ezetimibe (ZETIA) tablet 10 mg  10 mg Oral QHS    famotidine (PEPCID) tablet 20 mg  20 mg Oral DAILY    memantine (NAMENDA) tablet 10 mg  10 mg Oral DAILY    polyvinyl alcohol-povidon(PF) (REFRESH CLASSIC) 1.4-0.6 % ophthalmic solution 1 Drop  1 Drop Both Eyes PRN    hydrALAZINE (APRESOLINE) 20 mg/mL injection 10 mg  10 mg IntraVENous Q6H PRN    metoprolol succinate (TOPROL-XL) XL tablet 50 mg  50 mg Oral DAILY    aspirin delayed-release tablet 81 mg  81 mg Oral DAILY    gabapentin (NEURONTIN) capsule 100 mg  100 mg Oral DAILY    sodium chloride (NS) flush 5-40 mL  5-40 mL IntraVENous PRN    acetaminophen (TYLENOL) tablet 650 mg  650 mg Oral Q6H PRN    Or    acetaminophen (TYLENOL) suppository 650 mg  650 mg Rectal Q6H PRN    polyethylene glycol (MIRALAX) packet 17 g  17 g Oral DAILY PRN    promethazine (PHENERGAN) tablet 12.5 mg  12.5 mg Oral Q6H PRN    Or    ondansetron (ZOFRAN) injection 4 mg  4 mg IntraVENous Q6H PRN    erythromycin (ILOTYCIN) 5 mg/gram (0.5 %) ophthalmic ointment   Both Eyes Q6H    glucose chewable tablet 16 g  4 Tablet Oral PRN    glucagon (GLUCAGEN) injection 1 mg  1 mg IntraMUSCular PRN    dextrose (D50W) injection syrg 12.5-25 g  25-50 mL IntraVENous PRN    0.9% sodium chloride infusion  75 mL/hr IntraVENous CONTINUOUS    piperacillin-tazobactam (ZOSYN) 2.25 g in 0.9% sodium chloride (MBP/ADV) 50 mL MBP  2.25 g IntraVENous Q6H       Allergies: Amaryl [glimepiride], Lipitor [atorvastatin], Niacin, Pravachol [pravastatin], and Zocor [simvastatin]    Family History   Problem Relation Age of Onset    Parkinsonism Mother     Hypertension Mother     Cancer Father         lung    Heart defect Brother      Social History     Socioeconomic History    Marital status:      Spouse name: Not on file    Number of children: Not on file    Years of education: Not on file    Highest education level: Not on file   Occupational History    Not on file   Tobacco Use    Smoking status: Former Smoker     Years: 2.00     Quit date: 1955     Years since quittin.4    Smokeless tobacco: Never Used   Substance and Sexual Activity    Alcohol use: Yes     Comment: drinks weekly couple beers    Drug use: No    Sexual activity: Never   Other Topics Concern    Not on file   Social History Narrative    Not on file     Social Determinants of Health     Financial Resource Strain:     Difficulty of Paying Living Expenses:    Food Insecurity:     Worried About Running Out of [place] in the Last Year:    [address] in the Last Year:    Transportation Needs:     Lack of Transportation (Medical):  Lack of Transportation (Non-Medical):    Physical Activity:     Days of Exercise per Week:     Minutes of Exercise per Session:    Stress:     Feeling of Stress :    Social Connections:     Frequency of Communication with Friends and Family:     Frequency of Social Gatherings with Friends and Family:     Attends [employer] Services:     Active Member of Clubs or Organizations:     Attends Club or Organization Meetings:     Marital Status:    Intimate Partner Violence:     Fear of Current or Ex-Partner:     Emotionally Abused:     Physically Abused:     Sexually Abused:      Social History     Tobacco Use   Smoking Status Former Smoker    Years: 2.00    Quit date: 1955    Years since quittin.4   Smokeless Tobacco Never Used        Temp (24hrs), Av °F (36.7 °C), Min:97.1 °F (36.2 °C), Max:98.8 °F (37.1 °C)    Visit Vitals  BP (!) 170/90 (BP 1 Location: Left upper arm, BP Patient Position: At rest)   Pulse 86   Temp 97.8 °F (36.6 °C)   Resp 18   Ht 6' 2.02\" (1.88 m)   Wt 74.5 kg (164 lb 3.2 oz)   SpO2 96%   BMI 21.07 kg/m²       ROS: Unable to obtain detailed review of system due to patient factors    Physical Exam:    General:  Alert, appears anxious              Head: Normocephalic, without obvious abnormality, atraumatic. Eyes:  Bilateral conjunctivitis, ointment has already been placed. EOMs intact. Nose: Nares normal. No drainage or sinus tenderness. Neck: Supple, symmetrical, trachea midline, no adenopathy, thyroid: no enlargement, no carotid bruit and no JVD. Lungs:   Clear to auscultation bilaterally. Heart:  Tachycardia, regular rhythm, S1, S2 normal.     Abdomen: Soft, non-tender- no grimacing or guarding on exam. Bowel sounds normal.    Extremities: Extremities normal, atraumatic, no cyanosis or edema. Pulses: 2+ and symmetric all extremities. Skin:  No rashes or lesions   Neurologic: AAO to self, No focal motor or sensory deficit. Labs: Results:   Chemistry Recent Labs     05/24/21  0530 05/23/21  0400 05/22/21  0633   * 146* 155*   * 150* 143   K 3.3* 3.3* 3.6   * 112* 106   CO2 30 31 32   BUN 22* 24* 23*   CREA 1.55* 1.61* 1.77*   CA 8.3* 8.5 9.0   AGAP 3 7 5   BUCR 14 15 13   * 149* 163*   TP 7.6 7.2 7.6   ALB 2.6* 2.4* 2.7*   GLOB 5.0* 4.8* 4.9*   AGRAT 0.5* 0.5* 0.6*      CBC w/Diff Recent Labs     05/24/21 0530 05/23/21  0400 05/22/21  0633   WBC 12.3 16.0* 19.8*   RBC 5.59 5.06 5.20   HGB 15.7 14.4 14.6   HCT 50.9* 46.3 47.0    287 299   GRANS 72 77* 75*   LYMPH 16* 14* 8*   EOS 0 0 0      Microbiology Recent Labs     05/22/21  1320   CULT Culture performed on Unspun Fluid  NO GROWTH THUS FAR          RADIOLOGY:    All available imaging studies/reports in [facility] care for this admission were reviewed      Disclaimer: Sections of this note are dictated utilizing voice recognition software, which may have resulted in some phonetic based errors in grammar and contents. Even though attempts were made to correct all the mistakes, some may have been missed, and remained in the body of the document. If questions arise, please contact our department.     Dr. Jason Rios, Infectious Disease Specialist  [phone]  May 24, 2021  9:35 AM

## 2021-05-24 NOTE — PROGRESS NOTES
Received report on pt.from off going RN. Resting quietly in bed on rounds. Denies pain when asked. Confused to all except name and BD. Reoriented but no signs of retaining  or understanding. HOB elevated. Bed alarm on. Side rails up x3. No acute distress noted. Will cont to monitor for any changes in status. 0900 Pt bathed, cleaned of inct urine and stool, linens changed, bilateral heel boots applied, SCD's applied, mepilex applied to bilateral heels and mepilex on left lateral ankle. condom cath applied. Moisture barrier to stg 2 slightly open, denuded skin area in sacral fold. Preventive mepilex applied to sacrum. HOB elevated 30 degrees. PT gets very agitated and yells out when care being given. IV infiltrated and was removed. Difficult to obtain IV site due to pt pulling away and being uncooperative. 1200 new iv site obtaind. Iv flds restarted and zosyn given. before zosyn was completely empty and before  PRN  Hydralazine had been given pt had pulled iv out. 1400 attempting to obtain another IV site but pt is resisting and pulling away too badly. 1700 with assist of 2 and pt still fighting was able to obtain an IV site. PRN Hydralazine given for elevated BP, iv fluids restarted and scheduled zosyn hung to infuse. 1730  pt given partial bath and cleaned of inct urine and small amt of stool. calmoseptine ordered and applied to reddened, sacral area, sacral fold stg 2 and groin and scrotal area redness. mepilex intact to bilateral heels and left lateral area pressure wound. Pt still confused but more agitated than earlier. Bed alarms on. Side rails up x 3. Call bell at side. TV on for distraction. 1945 Bedside and Verbal shift change report given to Rk (oncoming nurse) by Dion Foote RN (offgoing nurse). Report given with Grace RAMOS and MAR.

## 2021-05-24 NOTE — WOUND CARE
Physical Exam 
Pulmonary: Musculoskeletal:  
     Legs: 
 
 
Room 215: Focused wound assess Discussed care with primary nurse [facility] RN. Inner gluteal cleft, moisture associated skin damage (MASD), POA. Left buttock, friction injury, fleshy part of skin, 1x1x0.1cm, 100% red base, POA. Heels are intact, yellow high risk for falls socks with treads in use, heel prevention boots in place. Left lateral ankle, arterial ulcer, 7m9w1ox, 100% dry black eschar, no periwound redness, POA. Tips of toes, left foot, 1st & 3rd toes, arterial ulcer, 100% dry black eschar, open to air, no periwound redness, POA. Mid back, dermatological rash, 3x2.5x0.1cm, red base, flaky edges, silicone dressing in place & is appropriate, POA. Pt is confused, mixes his words up & repeats the same words together. Pt is challenging to agree to bed linen change, Primofit application for urine incontinence. Topical treatment orders per nursing unit skin care protocol. Calmoseptine oint in place for sacrum & scrotum & is appropriate. Silicone dressing in place on mid back & is appropriate. Ulcers on tips of toes should stay open to air. Pt on Naveen bed. Pt on lift team schedule for turning & repositioning. Heel prevention boots in place. Will turn over care to nursing staff at this time.  
Silviano SHIPMAN, RN, [facility], [address]

## 2021-05-24 NOTE — CONSULTS
Interventional Radiology Progress Note    Patient: Juana Ziegler. Sex: male          DOA: 5/21/2021    YOB: 1935      Age:  82 y.o.        LOS:  LOS: 3 days             Interval History:     IR Cholecystostomy placed over the weekend due to acute cholecystitis. The patient denies pain at his drain site, abdominal pain, nausea, vomiting, fever or chills    Objective:      Visit Vitals  BP (!) 183/64 (BP 1 Location: Left upper arm, BP Patient Position: At rest)   Pulse 82   Temp 97.8 °F (36.6 °C)   Resp 20   Ht 6' 2.02\" (1.88 m)   Wt 74.5 kg (164 lb 3.2 oz)   SpO2 95%   BMI 21.07 kg/m²     Physical Exam:  Constitutional: NAD. A&Ox4. Respiratory: Normal respiratory effort. Symmetrical rise and fall of chest.    Cardiovascular: Regular rate. Gastrointestinal: Soft, NT, ND. Intake and Output:  Current Shift:  No intake/output data recorded. Last three shifts:  05/22 1901 - 05/24 0700  In: 1412.5 [P.O.:480; I.V.:932.5]  Out: 150 [Urine:150]    Lab/Data Reviewed: All lab results for the last 24 hours reviewed. No leukocytosis  H&H stable    Assessment/Plan     Active Problems:    Acute cholecystitis (4/23/2021)    Patient is POD#2 s/p cholecystostomy drain placement by Dr. Eusebio Méndez. From an IR standpoint, the patient is doing well    Please follow with Surgery as an outpatient - IR does not have a drain clinic. Recommend maintaining drain for 2 months prior to removal unless surgery is planned.     Please flush drain with 10 cc sterile saline towards the body every shift (daily as an outpatient) and maintain drain dressings clean and dry    Thank you,  VIVEK Hurt

## 2021-05-24 NOTE — WOUND CARE
Physical Exam  
Room 215: pt off the unit to radiology.  
Reginaldo YANEZN, RN, [facility], [address]

## 2021-05-24 NOTE — PROGRESS NOTES
[facility]  [facility]    Patient: Kaushik Linares. Age: 82 y.o. : 1935 MR#: [id number] SSN: [id number]  Date/Time: 2021    Subjective:     Patient is laying in bed in no apparent distress, awake, follows simple commands    Assessment/Plan:       1. Cholecystitis  2. Acute kidney injury   3. Leukocytosis, suspect due to #1  4. Elevated LFTs  5. T2DM, A1C 7.1% in 2021  6. Hypertension  7. CAD  8. Hyperlipidemia  9. COPD  10. Conjunctivitis   11. Dementia without behavioral disturbance  12. Debility  Advanced age  Hypernatremia    PLAN  Continue antibiotics, ID consulted  Status post cholecystostomy tube  Monitor sodium and renal function, hypotonic IV fluids, nephrology consulted  Diet as per speech  Monitor blood pressures, on metoprolol, increase clonidine, on Imdur, add amlodipine. Continue as needed hydralazine  Monitor labs  PT OT recommend SNF  Discussed with RN  Patient is DNR  I called patient's daughter at phone #[phone] and updated her regarding patient's care. I also discussed with her regarding the therapy recommendations for skilled nursing facility.    consulted for skilled nursing facility    Case discussed with:  [x]Patient  [x]Family  [x]Nursing  []Case Management  DVT Prophylaxis:  []Lovenox  []Hep SQ  [x]SCDs  []Coumadin   []On Heparin gtt    Objective:   VS:   Visit Vitals  BP (!) 183/64 (BP 1 Location: Left upper arm, BP Patient Position: At rest)   Pulse 82   Temp 97.8 °F (36.6 °C)   Resp 20   Ht 6' 2.02\" (1.88 m)   Wt 74.5 kg (164 lb 3.2 oz)   SpO2 95%   BMI 21.07 kg/m²      Tmax/24hrs: Temp (24hrs), Av.8 °F (36.6 °C), Min:97.1 °F (36.2 °C), Max:98.8 °F (37.1 °C)    Input/Output:   No intake or output data in the 24 hours ending 21 1750    General:  Awake, follows commands  Cardiovascular:  S1S2+, RRR  Pulmonary:  CTA b/l  GI:  Soft, BS+, NT, ND, has cholecystotomy tube  Extremities:  trace edema        Labs: Recent Results (from the past 24 hour(s))   GLUCOSE, POC    Collection Time: 05/23/21  8:26 PM   Result Value Ref Range    Glucose (POC) 186 (H) 70 - 151 mg/dL   METABOLIC PANEL, COMPREHENSIVE    Collection Time: 05/24/21  5:30 AM   Result Value Ref Range    Sodium 153 (H) 136 - 145 mmol/L    Potassium 3.3 (L) 3.5 - 5.5 mmol/L    Chloride 120 (H) 100 - 111 mmol/L    CO2 30 21 - 32 mmol/L    Anion gap 3 3.0 - 18 mmol/L    Glucose 183 (H) 74 - 99 mg/dL    BUN 22 (H) 7.0 - 18 MG/DL    Creatinine 1.55 (H) 0.6 - 1.3 MG/DL    BUN/Creatinine ratio 14 12 - 20      GFR est AA 52 (L) >60 ml/min/1.73m2    GFR est non-AA 43 (L) >60 ml/min/1.73m2    Calcium 8.3 (L) 8.5 - 10.1 MG/DL    Bilirubin, total 1.1 (H) 0.2 - 1.0 MG/DL    ALT (SGPT) 27 16 - 61 U/L    AST (SGOT) 28 10 - 38 U/L    Alk. phosphatase 151 (H) 45 - 117 U/L    Protein, total 7.6 6.4 - 8.2 g/dL    Albumin 2.6 (L) 3.4 - 5.0 g/dL    Globulin 5.0 (H) 2.0 - 4.0 g/dL    A-G Ratio 0.5 (L) 0.8 - 1.7     CBC WITH AUTOMATED DIFF    Collection Time: 05/24/21  5:30 AM   Result Value Ref Range    WBC 12.3 4.6 - 13.2 K/uL    RBC 5.59 4.35 - 5.65 M/uL    HGB 15.7 13.0 - 16.0 g/dL    HCT 50.9 (H) 36.0 - 48.0 %    MCV 91.1 74.0 - 97.0 FL    MCH 28.1 24.0 - 34.0 PG    MCHC 30.8 (L) 31.0 - 37.0 g/dL    RDW 14.3 11.6 - 14.5 %    PLATELET 639 432 - 552 K/uL    MPV 10.2 9.2 - 11.8 FL    NEUTROPHILS 72 40 - 73 %    LYMPHOCYTES 16 (L) 21 - 52 %    MONOCYTES 11 (H) 3 - 10 %    EOSINOPHILS 0 0 - 5 %    BASOPHILS 1 0 - 2 %    ABS. NEUTROPHILS 8.8 (H) 1.8 - 8.0 K/UL    ABS. LYMPHOCYTES 2.0 0.9 - 3.6 K/UL    ABS. MONOCYTES 1.4 (H) 0.05 - 1.2 K/UL    ABS. EOSINOPHILS 0.1 0.0 - 0.4 K/UL    ABS.  BASOPHILS 0.1 0.0 - 0.1 K/UL    DF AUTOMATED     MAGNESIUM    Collection Time: 05/24/21  5:30 AM   Result Value Ref Range    Magnesium 2.6 1.6 - 2.6 mg/dL   GLUCOSE, POC    Collection Time: 05/24/21  7:37 AM   Result Value Ref Range    Glucose (POC) 169 (H) 70 - 110 mg/dL   GLUCOSE, POC    Collection Time: 05/24/21 11:13 AM   Result Value Ref Range    Glucose (POC) 165 (H) 70 - 110 mg/dL   GLUCOSE, POC    Collection Time: 05/24/21  4:39 PM   Result Value Ref Range    Glucose (POC) 190 (H) 70 - 110 mg/dL     Additional Data Reviewed:      Signed By: David Romero MD     May 24, 2021

## 2021-05-24 NOTE — PROGRESS NOTES
Problem: Self Care Deficits Care Plan (Adult)  Goal: *Acute Goals and Plan of Care (Insert Text)  Description: Occupational Therapy Goals  Initiated 2021 within 7 day(s). 1.  Patient will perform upper body dressing with supervision/set-up. 2.  Patient will perform bed mobility in preparation for selfcare with minimal assistance/contact guard assist.  3.  Patient will perform grooming task/functional activity sitting EOB for 4-7 minutes with supervision/set-up, F+ balance. .  4.  Patient will participate in upper extremity therapeutic exercise/activities with supervision/set-up for 5-8 minutes. 5.  Patient will follow 70% of commands during self care tasks with minimal cues. Prior Level of Function: Patient from [address] [place] [address] where he received assist with self-care tasks. Patient stated he waslked with \"something\" but unable to state to this OT what was used. Patient a poor historian, pleasantly confused and oriented to person only. Outcome: Progressing Towards Goal     OCCUPATIONAL THERAPY EVALUATION    Patient: Flako Olivia. (82 y.o. male)  Date: 2021  Primary Diagnosis: Acute cholecystitis [K81.0]  Precautions:  Fall, Aspiration, Skin      ASSESSMENT :  Patient cleared to participate in OT evaluation by RN. Upon entering the room, the patient was supine in bed, alert, and agreeable to participate in OT evaluation. Patient states his name is \"Bacilio\" and  correctly however reports he is in [facility]. Patient is pleasantly confused but does follow commands with moderate - max verbal cues and tactile cues for initiation. Patient performed supine to sit with max assist, rolling in bed for pericare with moderate assist and toileting tasks with total assistance. Patient able to perform grooming with set-up but required min assist for thoroughness and vcs for redirection to task 2/2 decreased attention. Patient min assist for doffing soiled gown and donning new.  Patient left semi-reclined Review of Systems/Medical History          Cardiovascular  EKG reviewed, Hyperlipidemia, Hypertension controlled, DVT  Comment: DVT hx in the past,  Pulmonary  Smoker ex-smoker  ,        GI/Hepatic    GERD well controlled,   Comment: Hx of gastritis     Kidney stones, Chronic kidney disease (GFR=35 (PAT bloodwork)) stage 3,   Comment: R renal calculus    Renal artery stenosis     Endo/Other  Diabetes well controlled type 2 Oral agent,      GYN       Hematology  Anemia ,     Musculoskeletal    Arthritis     Neurology  Negative neurology ROS      Psychology   Negative psychology ROS              Physical Exam    Airway    Mallampati score: II  TM Distance: <3 FB  Neck ROM: limited     Dental       Cardiovascular  Rhythm: regular, Rate: normal, Cardiovascular exam normal    Pulmonary  Pulmonary exam normal Breath sounds clear to auscultation,     Other Findings        Anesthesia Plan  ASA Score- 3     Anesthesia Type- IV sedation with anesthesia with ASA Monitors  Additional Monitors:   Airway Plan:     Comment: I have seen the patient and reviewed the history  Patient to receive IV sedation with full ASA monitors  Risks discussed with the patient, consent signed        Plan Factors-    Induction- intravenous  Postoperative Plan-     Informed Consent- Anesthetic plan and risks discussed with patient  I personally reviewed this patient with the CRNA  Discussed and agreed on the Anesthesia Plan with the CRNA  Dahiana Kirkpatrick in bed, prevalon boots redonned and tv on. Based on the objective data described below, the patient presents with decreased strength, decreased independence, decreased safety awareness, decreased functional balance, and decreased functional mobility, which impedes pt performance in basic self-care/ADL tasks. Patient would benefit from a 3 day trial of skilled OT for progression toward goals, active participation, and learning/carry-over ability. Patient will benefit from skilled intervention to address the above impairments. Patient's rehabilitation potential is considered to be Fair  Factors which may influence rehabilitation potential include:   []             None noted  [x]             Mental ability/status  [x]             Medical condition  [x]             Home/family situation and support systems  [x]             Safety awareness  []             Pain tolerance/management  []             Other:      PLAN :  Recommendations and Planned Interventions:   [x]               Self Care Training                  [x]      Therapeutic Activities  [x]               Functional Mobility Training   [x]      Cognitive Retraining  [x]               Therapeutic Exercises           [x]      Endurance Activities  [x]               Balance Training                    [x]      Neuromuscular Re-Education  []               Visual/Perceptual Training     [x]      Home Safety Training  [x]               Patient Education                   [x]      Family Training/Education  []               Other (comment):    Frequency/Duration: Patient will be followed by occupational therapy 3 times a week to address goals.   Discharge Recommendations: Return to SNF with continued 24/7 Assistance  Further Equipment Recommendations for Discharge: hospital bed, mechanical lift, and wheelchair      SUBJECTIVE:   Patient stated \"It doesn't feel clean referring to face while washing with cloth    OBJECTIVE DATA SUMMARY:     Past Medical History: Diagnosis Date    Anxiety     Arthritis     CAD (coronary artery disease)     s/p drug-eluting stents to RCA for high-grade stenoses in 2/09    Carotid duplex 08/26/2016    Mild <50% CYRUS stenosis. Chronic LICA occlusion. Similar to study of 3/29/16. Carotid stenosis (HCC)     w/ known total occlusion of lt internal carotid artery; followed by pts vascular surgeon    Chronic kidney disease     COPD (chronic obstructive pulmonary disease) ([facility]     Dementia ([facility]     Depression     Diabetes ([facility]     type 2    Dyslipidemia     on Crestor; managed by pts PCP    Dyspnea     Hypercholesterolemia     Hypertension     Low back pain     Osteoarthrosis involving multiple sites     Polycythemia 12/9/2019    Skin cancer ([facility]     squamous cell lesions of the skin    Stroke ([facility]     Venous (peripheral) insufficiency      Past Surgical History:   Procedure Laterality Date    CARDIAC CATHETERIZATION  08/24/2016    Mod calcification. Co-dom. oLM 50-70%. LAD 30%. Dx 30%. Cx 70% focal.  OM 80% focal.  RCA 30%.       HX ANGIOPLASTY  2009    2 stents placed and heart attack during the procedure    HX COLONOSCOPY  10/2003    neg; declines f/u    HX CORONARY ARTERY BYPASS GRAFT  09/2016     LIMA to LAD and SVG to OM1    HX HEART CATHETERIZATION  5/2013    HX HERNIA REPAIR      1970s    HX TONSIL AND ADENOIDECTOMY       Barriers to Learning/Limitations: yes;  cognitive  Compensate with: visual, verbal, tactile, kinesthetic cues/model    Home Situation:   Home Situation  Home Environment: [address] [place] [address] Name: [address] [place] [address],[address]  # Steps to Enter: 0  One/Two Story Residence: One story  Living Alone: No  Support Systems: Family member(s)  Patient Expects to be Discharged to[de-identified] Skilled nursing facility  Current DME Used/Available at Home: Other (comment)  []  Right hand dominant   []  Left hand dominant    Cognitive/Behavioral Status:  Neurologic State: Alert;Confused  Orientation Level: Oriented to person;Disoriented to time;Disoriented to situation;Disoriented to place (able to state )  Cognition: Follows commands;Decreased attention/concentration  Safety/Judgement: Fall prevention;Decreased insight into deficits; Decreased awareness of environment    Skin: Intact; bruising observed on R forearm  Edema: None noted    Vision/Perceptual:    Tracking: Able to track stimulus in all quadrants w/o difficulty        Coordination: BUE     Fine Motor Skills-Upper: Left Intact; Right Intact    Gross Motor Skills-Upper: Left Intact; Right Intact    Balance:  Sitting: Impaired; With support  Sitting - Static: Fair (occasional)  Sitting - Dynamic: Poor (constant support)    Strength: BUE    Strength: Generally decreased, functional       Tone & Sensation: BUE    Tone: Normal  Sensation:  (appears intact; unable to formally assess)      Range of Motion: BUE    AROM: Generally decreased, functional  PROM: Generally decreased, functional       Functional Mobility and Transfers for ADLs:  Bed Mobility:  Rolling: Moderate assistance (to B sides; hand over hand to bed rail; pt able to maintain )     Sit to Supine: Maximum assistance  Scooting: Maximum assistance  Transfers:  Sit to Stand:  (not safe as pt with decreased attention)         ADL Assessment:   Feeding: Contact guard assistance;Minimum assistance    Oral Facial Hygiene/Grooming: Contact guard assistance;Minimum assistance    Bathing: Maximum assistance    Upper Body Dressing: Minimum assistance    Lower Body Dressing: Maximum assistance; Total assistance    Toileting: Maximum assistance; Total assistance       ADL Intervention:  Grooming  Grooming Assistance: Minimum assistance  Position Performed:  (semi reclined in bed)  Washing Face: Minimum assistance (for thoroughness)    Upper Body Dressing Assistance  Dressing Assistance: Minimum assistance  Hospital Gown: Minimum  assistance (mod- max vcs)    Toileting  Toileting Assistance:  Total assistance(dependent)  Bladder Hygiene: Total assistance (dependent)    Cognitive Retraining  Safety/Judgement: Fall prevention;Decreased insight into deficits; Decreased awareness of environment      Pain:  Pain level pre-treatment: 0/10   Pain level post-treatment: -/10   Pain Intervention(s): Medication (see MAR); Response to intervention: Nurse notified, See doc flow    Activity Tolerance:   Fair    Please refer to the flowsheet for vital signs taken during this treatment. After treatment:   [] Patient left in no apparent distress sitting up in chair  [x] Patient left in no apparent distress in bed  [x] Call bell left within reach  [x] Nursing notified  [] Caregiver present  [x] Bed alarm activated    COMMUNICATION/EDUCATION:   [x] Role of Occupational Therapy in the acute care setting  [x] Home safety education was provided and the patient/caregiver indicated understanding. [x] Patient/family have participated as able in goal setting and plan of care. [x] Patient/family agree to work toward stated goals and plan of care. [] Patient understands intent and goals of therapy, but is neutral about his/her participation. [] Patient is unable to participate in goal setting and plan of care. Thank you for this referral.  Ashley Calderon, OTR/L  Time Calculation: 24 mins    Eval Complexity: History: MEDIUM Complexity : Expanded review of history including physical, cognitive and psychosocial  history ; Examination: HIGH Complexity : 5 or more performance deficits relating to physical, cognitive , or psychosocial skils that result in activity limitations and / or participation restrictions; Decision Making:MEDIUM Complexity : Patient may present with comorbidities that affect occupational performnce.  Miniml to moderate modification of tasks or assistance (eg, physical or verbal ) with assesment(s) is necessary to enable patient to complete evaluation

## 2021-05-24 NOTE — PROGRESS NOTES
MBS completed with recs of [facility] soft diet and nectar-thick liquids, meds as tolerated. Full report to follow.      Thank you for this referral.    Ramona Horner M.S. CCC-SLP/L  Speech-Language Pathologist

## 2021-05-24 NOTE — PROGRESS NOTES
Problem: Dysphagia (Adult)  Goal: *Acute Goals and Plan of Care (Insert Text)  Description:     Patient will:  1. Tolerate PO trials with 0 s/s overt distress in 4/5 trials  2. Utilize compensatory swallow strategies/maneuvers (decrease bite/sip, size/rate, alt. liq/sol) with min cues in 4/5 trials  3. Perform oral-motor/laryngeal exercises to increase oropharyngeal swallow function with min cues  4. Complete an objective swallow study (i.e., MBSS) to assess swallow integrity, r/o aspiration, and determine of safest LRD, min A as indicated/ordered by MD  - met 5/24/2021    Rec:     Mech-soft diet with nectar-thick liquids  Aspiration precautions  HOB >45 during po intake, remain >30 for 30-45 minutes after po   Small bites/sips; alternate liquid/solid with slow feeding rate   Oral care TID  Meds per pt preference    Outcome: Progressing Towards Goal     SPEECH PATHOLOGY MODIFIED BARIUM SWALLOW STUDY & TREATMENT    Patient: Hilaria Ragland (82 y.o. male)  Date: 5/24/2021  Primary Diagnosis: Acute cholecystitis [K81.0]        Precautions:   Fall, Aspiration, Skin    ASSESSMENT :  Based on the objective data described below, the patient presents with mild-mod oral and mod pharyngeal dysphagia c/b SILENT aspiration of thin liquids +/- straw. Pt tolerated reg solid, puree, nectar-thick +/- straw, and 13 mm Ba pill with nectar-thick wash without aspiration/penetration events. Labored mastication of solids observed with positive clearance during cyclic ingestion. Deficits include decreased tongue base retraction, weak laryngeal elevation/excursion, and impaired pharyngeal motility/sensation. Recommend mech soft diet with nectar-thick liquids, aspiration precautions, oral care TID, and meds as tolerated. TREATMENT :  Treatment provided post diagnostic testing including oropharyngeal anatomy/physiology, MBS results, diet recommendations and compensatory strategies/positioning.   Pt able to verbalize understanding - suspect limited carryover. Will continue to follow. Patient will benefit from skilled intervention to address the above impairments. Patient's rehabilitation potential is considered to be Fair  Factors which may influence rehabilitation potential include:   []              None noted  [x]              Mental ability/status  [x]              Medical condition  []              Home/family situation and support systems  [x]              Safety awareness  []              Pain tolerance/management  []              Other:      PLAN :  Recommendations and Planned Interventions: See above  Frequency/Duration: Patient will be followed by speech-language pathology 1-2 times per day/3-5 days per week to address goals. Discharge Recommendations: Fredy Mcdaniel and To Be Determined     SUBJECTIVE:   Patient stated I've lived in [address] [place] [address]. OBJECTIVE:     Past Medical History:   Diagnosis Date    Anxiety     Arthritis     CAD (coronary artery disease)     s/p drug-eluting stents to RCA for high-grade stenoses in 2/09    Carotid duplex 08/26/2016    Mild <50% CYRUS stenosis. Chronic LICA occlusion. Similar to study of 3/29/16. Carotid stenosis (HCC)     w/ known total occlusion of lt internal carotid artery; followed by pts vascular surgeon    Chronic kidney disease     COPD (chronic obstructive pulmonary disease) ([address]     Dementia ([address]     Depression     Diabetes ([address]     type 2    Dyslipidemia     on Crestor; managed by pts PCP    Dyspnea     Hypercholesterolemia     Hypertension     Low back pain     Osteoarthrosis involving multiple sites     Polycythemia 12/9/2019    Skin cancer ([address]     squamous cell lesions of the skin    Stroke ([address]     Venous (peripheral) insufficiency      Past Surgical History:   Procedure Laterality Date    CARDIAC CATHETERIZATION  08/24/2016    Mod calcification. Co-dom. oLM 50-70%. LAD 30%. Dx 30%. Cx 70% focal.  OM 80% focal.  RCA 30%.       HX ANGIOPLASTY  2009    2 stents placed and heart attack during the procedure    HX COLONOSCOPY  10/2003    neg; declines f/u    HX CORONARY ARTERY BYPASS GRAFT  09/2016     LIMA to LAD and SVG to [address]  5/2013    HX HERNIA REPAIR      1970s    HX TONSIL AND ADENOIDECTOMY       Prior Level of Function/Home Situation: see below  [address] [place] [address]: [address] [place] [address] Name: Magdalena Macias [place]  # Steps to Enter: 0  One/Two Story Residence: One story  Living Alone: No  Support Systems: Family member(s)  Patient Expects to be Discharged to[de-identified] Skilled nursing facility  Current DME Used/Available at Home: Other (comment)  Diet prior to admission: mech soft with nectar-thick  Current Diet:  mech soft with nectar-thick   Radiologist:    Film Views: Lateral;Fluoro  Patient Position: 90 in fluoro chair    Trial 1: Trial 2:   Consistency Presented: Thin liquid Consistency Presented: Nectar thick liquid;Puree; Solid;Pill/Tablet   How Presented: Self-fed/presented;Cup/sip;Straw How Presented: Self-fed/presented;Cup/sip;Spoon;Straw;Successive swallows         Bolus Acceptance: Impaired Bolus Acceptance: Impaired   Bolus Formation/Control: Impaired: Delayed; Incomplete;Poor;Posterior;Premature spillage Bolus Formation/Control: Impaired: Delayed; Posterior;Mastication   Propulsion: Delayed (# of seconds); Discoordination Propulsion: Delayed (# of seconds); Discoordination   Oral Residue: None Oral Residue: None   Initiation of Swallow: Triggered at pyriform sinus(es) Initiation of Swallow: Triggered at valleculae   Timing: No impairment Timing: No impairment   Penetration: None Penetration: None   Aspiration/Timing: Silent ;During Aspiration/Timing: No evidence of aspiration   Pharyngeal Clearance: Vallecular residue;Pyriform residue ;10-50% Pharyngeal Clearance: Vallecular residue;Pyriform residue ; Less than 10%   Attempted Modifications: Small sips and bites Attempted Modifications: Small sips and bites; Alternate liquids/solids   Effective Modifications: None Effective Modifications: Alternate liquids/solids (small bites/sips)   Cues for Modifications: Moderate Cues for Modifications: Minimal-Mod         Decreased Tongue Base Retraction?: Yes  Laryngeal Elevation: Minimal movement of larynx/epiglottis  Aspiration/Penetration Score: 8 (Aspiration-Contrast passes cords/glottis with no effort to eject, ie/silent aspiration)  Pharyngeal Symmetry: Not assessed  Pharyngeal-Esophageal Segment: No impairment  Pharyngeal Dysfunction: Decreased pharyngeal wall constriction;Decreased elevation/closure;Decreased strength;Decreased tongue base retraction    Oral Phase Severity: Mild-moderate  Pharyngeal Phase Severity: Moderate    8-point Penetration-Aspiration Scale: Score 8    PAIN:  Pt reports 0/10 pain or discomfort prior to MBS. Pt reports 0/10 pain or discomfort post MBS. COMMUNICATION/EDUCATION:   [x]  Patient educated regarding MBS results and diet recommendations. []  Patient/family have participated as able in goal setting and plan of care. [x]  Patient/family agree to work toward stated goals and plan of care. []  Patient understands intent and goals of therapy, but is neutral about his/her participation. []  Patient is unable to participate in goal setting and plan of care.     Thank you for this referral.    Farzaneh Corona M.S. CCC-SLP/L  Speech-Language Pathologist

## 2021-05-24 NOTE — ROUTINE PROCESS
Bedside and Verbal shift change report given to [address] (oncoming nurse) by Olden Mcburney (offgoing nurse). Report included the following information SBAR, Kardex, MAR, Recent Results and Cardiac Rhythm SR. Patient remains confuse and disoriented, bed alarm on.

## 2021-05-24 NOTE — DIABETES MGMT
Diabetes Plan of Care    T2DM with A1c of 7.1% (4/23/2021)  Home diabetes medications: Unverified  Levemir insulin 10 units daily  Correctional lispro insulin    Patient is 82year old with dementia was admitted on 5/21/2021 from [facility] with report of decreased oxygen saturation. Noted the following assessment:  Recurrent cholecystitis  Status post cholecystectomy tube placement by IR on 5/22/2021  Sepsis    5/24: Patient in [facility] unit. Glycemic assessment:  POC BG 5/23: 186  POC BG 5/24 at time of review: 169, 169  Lab BG 5/24: 183    Recommendation(s): correctional lispro insulin as ordered. Most recent blood glucose values: Within target range : Yes. Current A1c 7.1% (4/23/2021) which is equivalent to estimated average blood glucose of 157 mg/dL during the past 2-3 months    Adequate glycemic control PTA: Yes. Current hospital diabetes medications:  Correctional lispro insulin ACHS.  Normal sensitivity dose    TDD previous day 5/23:  Lispro: 2 units    Diet: diabetic consistent carb mechanical soft; 2 nectar/2 mildly thick; nutr suppl: magic cups with all meals    Goals: Blood glucose will be within target of 70 - 180 mg/dl by: 5/27/2021    Education:  _____ Refer to Diabetes Education Record                       ___X__ Education not indicated at this time       Eleanor Davison RN [facility]  Pager: [phone]

## 2021-05-24 NOTE — PROGRESS NOTES
Reason for Readmission:    Acute cholecystitis [K81.0]         RUR Score and Risk Level:      33 and high     Level of Readmission:    1   (1-3)   (1 - First Readmission, 2- Second Readmission within 30 days or multiple admissions over previous 90 days, 3- Greater than two readmissions within 30 days or multiple admissions over previous 90 days)      Care Conference scheduled:   (consider for all patient's with readmission level of 2, should definitely be scheduled for all patients with readmission level of 3)       Resources/supports as identified by patient/family:  Assisted living . Home health       Top Challenges facing patient (as identified by patient/family and CM): Finances/Medication cost?     Did not voice  Transportation      Medical transport  Support system or lack thereof? Daughter assisted living  Living arrangements? At assisted living   Self-care/ADLs/Cognition? Needs assist        Current Advanced Directive/Advance Care Plan:        Healthcare Decision Maker:   Secondary Decision Maker: Edgewood Lambert - Daughter - [phone]    Click here to complete [address] including selection of the Healthcare Decision Maker Relationship (ie \"Primary\")           Plan for utilizing home health:   yes. Likelihood of additional readmission:                Transition of Care Plan:    Based on readmission, the patient's previous Plan of Care   has been evaluated and/or modified. The current Transition of Care Plan is:            Initial assessment completed with relative(s). Cognitive status of patient: disoriented. Face sheet information confirmed:  yes. The patient designates daughter to participate in his discharge plan and to receive any needed information. This patient lives in a [address]   Patient is not able to navigate steps as needed. Prior to hospitalization, patient was considered to be independent with ADLs/IADLS : no .  If not independent,  patient needs assist with : dressing, bathing, food preparation, cooking, toileting and grooming    Patient has a current ACP document on file: yes  The other:  Medical transport will be available to transport patient home upon discharge. The patient already has [address], W/C, Shower chair, BSC, and  medical equipment available in the home. Patient is currently active with home health. If active, agency name is [facility]. Patient has not stayed in a skilled nursing facility or rehab. Was  stay within last 60 days : no. This patient is on dialysis :no        List of available Home Health agencies were provided and reviewed with the patient prior to discharge. Freedom of choice signed: yes, for [facility]. Currently, the discharge plan is Home with [address]. The patient states that he can obtain his medications from the pharmacy, and take his medications as directed. Patient's current insurance is Medicare and [address] [place] [address]. Care Management Interventions  PCP Verified by CM:  Yes  Mode of Transport at Discharge: BLS  Transition of Care Consult (CM Consult): Assisted Living, [address]: No  Reason Outside [place]: Patient already serviced by other home care/hospice agency  Current Support Network: Lori [address] Follow Up Transport: Other (see comment)  The Plan for Transition of Care is Related to the Following Treatment Goals : Assisted Living with [address]  The Patient and/or Patient Representative was Provided with a Choice of Provider and Agrees with the Discharge Plan?: Yes  Freedom of Choice List was Provided with Basic Dialogue that Supports the Patient's Individualized Plan of Care/Goals, Treatment Preferences and Shares the Quality Data Associated with the Providers?: Yes  Discharge Location  Discharge Placement: Assisted Living (with [address])        Yaw Darren RN BSN  Outcomes Manager    Pager # [phone]

## 2021-05-24 NOTE — PROGRESS NOTES
conducted an initial consultation and Spiritual Assessment for Leia Ramirez, who is a 82 y. o.,male. Patient's Primary Language is: [place]. According to the patient's EMR Denominational Affiliation is: [facility].     The reason the Patient came to the hospital is:   Patient Active Problem List    Diagnosis Date Noted    Goals of care, counseling/discussion     Dementia without behavioral disturbance ([address]     Debility     Acute cholecystitis 04/23/2021    Leukocytosis 04/23/2021    Fracture of nasal bones, sequela 12/10/2019    New onset seizure ([address] 12/09/2019    Polycythemia 12/09/2019    Flu 03/22/2018    Wheezing 03/22/2018    Open wound 10/24/2017    Cellulitis of right lower extremity 10/24/2017    Poorly controlled diabetes mellitus ([address] 10/24/2017    Deviated nasal septum 06/28/2017    Enuresis 02/27/2017    Mixed hyperlipidemia 01/24/2017    Type 2 diabetes mellitus without complication, with long-term current use of insulin ([address] 10/24/2016    S/P CABG x 2 10/24/2016    CAD (coronary artery disease) 08/29/2016    COPD (chronic obstructive pulmonary disease) ([address] 08/29/2016    Type 2 diabetes mellitus with stage 3 chronic kidney disease ([address] 07/20/2016    Venous (peripheral) insufficiency     Osteoarthrosis involving multiple sites     Low back pain     CHI (closed head injury) 05/11/2015    CKD (chronic kidney disease) stage 3, GFR 30-59 ml/min ([address] 05/20/2013    HTN (hypertension) 11/19/2012    Atherosclerosis of native arteries of the extremities with intermittent claudication 11/19/2012    Carotid artery occlusion without infarction 11/19/2012    DM (diabetes mellitus) ([address] 11/19/2012    Spondylosis 11/19/2012    Spondylosis 11/14/2012    Hypercholesterolemia         The  provided the following Interventions:  Initiated a relationship of care and support.    Explored issues of cristina, belief, spirituality and Adventism/ritual needs while hospitalized. Listened empathically. Provided chaplaincy education. Provided information about Spiritual Care Services. Offered prayer and assurance of continued prayers on patient's behalf. Chart reviewed. The following outcomes where achieved:  Patient shared limited information about both their medical narrative and spiritual journey/beliefs. Assessment:  There are no spiritual or Synagogue issues which require intervention at this time. According to the , the patient is very confused and he did not why he came to the hospital.    Plan:  Slade Hernandez will continue to follow and will provide pastoral care on an as needed/requested basis.  recommends bedside caregivers page  on duty if patient shows signs of acute spiritual or emotional distress.     [facility]   [phone]

## 2021-05-24 NOTE — PROGRESS NOTES
Comprehensive Nutrition Assessment    Type and Reason for Visit: Initial, Wound    Nutrition Recommendations/Plan:  - Continue diabetic mechanical soft diet with nectar thickened liquids. - Add supplements: Magic Cup, TID  - Consider changing IVF to 1/2 NS due to hypernatremia & recommend potassium replacement. Nutrition Assessment:  Admitted with acute cholecystitis, cholecystostomy tube placed 5/22, not a good candidate for surgical intervention. Started on mechanical soft diet with nectar thickened liquids per SLP yesterday with fair to good intake of meals yesterday. Diet resumed s/p MBS today and off floor during visit. DNR/DNI, no feeding tubes per goals of care. Malnutrition Assessment:  Malnutrition Status: At risk for malnutrition (specify) (dementia)      Nutrition History and Allergies: PMHx- CAD, DM, HTN, dyslipidemia, CKD, COPD, CVA and dementia. Presented from nursing home appearing unwell.   Fluctuations in wt hx per chart review. NKFA. Estimated Daily Nutrient Needs:  Energy (kcal): 9112-8635; Weight Used for Energy Requirements: Current (75 kg)  Protein (g): ; Weight Used for Protein Requirements: Current (1.2-1.5)  Fluid (ml/day): 9481-3420; Method Used for Fluid Requirements: 1 ml/kcal    Nutrition Related Findings:  BM 5/23. Hypernatremia on NS at 75 mL/hr. Confused.       Wounds:    Pressure injury, Stage II, Moisture associate skin damage, Deep tissue injury       Current Nutrition Therapies:  DIET DIABETIC CONSISTENT CARB Mechanical Soft; 2 Nectar/2 Mildly Thick; AHA-LOW-CHOL FAT    Anthropometric Measures:  · Height:  6' 2.02\" (188 cm)  · Current Body Wt:  74.5 kg (164 lb 3.9 oz)   · Admission Body Wt:  162 lb 12.8 oz    · Usual Body Wt:  86.2 kg (190 lb) (3/18/19)     · Ideal Body Wt:  190 lbs:  86.4 %   · BMI Category:  Underweight (BMI less than 22) age over 72       Nutrition Diagnosis:   · Inadequate energy intake related to swallowing difficulty, increased demand for energy/nutrients (appetite) as evidenced by intake 26-50%    Nutrition Interventions:   Food and/or Nutrient Delivery: Continue current diet, Start oral nutrition supplement, IV fluid delivery  Nutrition Education and Counseling: Education not indicated  Coordination of Nutrition Care: Continue to monitor while inpatient, Speech therapy    Goals:  PO nutrition intake will meet >75% of patient estimated nutritional needs within the next 7 days.        Nutrition Monitoring and Evaluation:   Behavioral-Environmental Outcomes: None identified  Food/Nutrient Intake Outcomes: Food and nutrient intake, Supplement intake, IVF intake  Physical Signs/Symptoms Outcomes: Biochemical data, Chewing or swallowing, GI status, Meal time behavior, Nutrition focused physical findings    Discharge Planning:    Continue oral nutrition supplement, Continue current diet     Electronically signed by Gavin Patino RD on 5/24/2021 at 1:47 PM    Contact: [phone]

## 2021-05-24 NOTE — PROGRESS NOTES
PROGRESS NOTE    Name: Hilaria Ragland MRN: [id number]   : 1935     Date: 2021 Admission Date: 2021     Hospital Day: 4    Subjective:  No acute events overnight. Patient resting in bed. No complaints  Objective:    Vital Signs:  Visit Vitals  BP (!) 170/90 (BP 1 Location: Left upper arm, BP Patient Position: At rest)   Pulse 86   Temp 97.8 °F (36.6 °C)   Resp 18   Ht 6' 2.02\" (1.88 m)   Wt 74.5 kg (164 lb 3.2 oz)   SpO2 96%   BMI 21.07 kg/m²       Physical Exam:    General: in no apparent distress,    HEENT: Normal,   Neck: No abnormally enlarged lymph nodes. Chest: normal   Lungs: normal air entry   Heart: Regular rate and rhythm or S1S2 present   Abdomen: abdomen is soft without significant tenderness, ruthann tube with bilious output    Extremity: negative   Neuro: Confused. Oriented to self   Skin: Skin color, texture, turgor normal. No rashes or lesions    Data Review:  Recent Results (from the past 24 hour(s))   GLUCOSE, POC    Collection Time: 21 12:01 PM   Result Value Ref Range    Glucose (POC) 146 (H) 70 - 110 mg/dL   GLUCOSE, POC    Collection Time: 21  5:06 PM   Result Value Ref Range    Glucose (POC) 154 (H) 70 - 110 mg/dL   GLUCOSE, POC    Collection Time: 21  8:26 PM   Result Value Ref Range    Glucose (POC) 186 (H) 70 - 161 mg/dL   METABOLIC PANEL, COMPREHENSIVE    Collection Time: 21  5:30 AM   Result Value Ref Range    Sodium 153 (H) 136 - 145 mmol/L    Potassium 3.3 (L) 3.5 - 5.5 mmol/L    Chloride 120 (H) 100 - 111 mmol/L    CO2 30 21 - 32 mmol/L    Anion gap 3 3.0 - 18 mmol/L    Glucose 183 (H) 74 - 99 mg/dL    BUN 22 (H) 7.0 - 18 MG/DL    Creatinine 1.55 (H) 0.6 - 1.3 MG/DL    BUN/Creatinine ratio 14 12 - 20      GFR est AA 52 (L) >60 ml/min/1.73m2    GFR est non-AA 43 (L) >60 ml/min/1.73m2    Calcium 8.3 (L) 8.5 - 10.1 MG/DL    Bilirubin, total 1.1 (H) 0.2 - 1.0 MG/DL    ALT (SGPT) 27 16 - 61 U/L    AST (SGOT) 28 10 - 38 U/L    Alk.  phosphatase 151 (H) 45 - 117 U/L    Protein, total 7.6 6.4 - 8.2 g/dL    Albumin 2.6 (L) 3.4 - 5.0 g/dL    Globulin 5.0 (H) 2.0 - 4.0 g/dL    A-G Ratio 0.5 (L) 0.8 - 1.7     CBC WITH AUTOMATED DIFF    Collection Time: 05/24/21  5:30 AM   Result Value Ref Range    WBC 12.3 4.6 - 13.2 K/uL    RBC 5.59 4.35 - 5.65 M/uL    HGB 15.7 13.0 - 16.0 g/dL    HCT 50.9 (H) 36.0 - 48.0 %    MCV 91.1 74.0 - 97.0 FL    MCH 28.1 24.0 - 34.0 PG    MCHC 30.8 (L) 31.0 - 37.0 g/dL    RDW 14.3 11.6 - 14.5 %    PLATELET 518 245 - 549 K/uL    MPV 10.2 9.2 - 11.8 FL    NEUTROPHILS 72 40 - 73 %    LYMPHOCYTES 16 (L) 21 - 52 %    MONOCYTES 11 (H) 3 - 10 %    EOSINOPHILS 0 0 - 5 %    BASOPHILS 1 0 - 2 %    ABS. NEUTROPHILS 8.8 (H) 1.8 - 8.0 K/UL    ABS. LYMPHOCYTES 2.0 0.9 - 3.6 K/UL    ABS. MONOCYTES 1.4 (H) 0.05 - 1.2 K/UL    ABS. EOSINOPHILS 0.1 0.0 - 0.4 K/UL    ABS. BASOPHILS 0.1 0.0 - 0.1 K/UL    DF AUTOMATED     MAGNESIUM    Collection Time: 05/24/21  5:30 AM   Result Value Ref Range    Magnesium 2.6 1.6 - 2.6 mg/dL   GLUCOSE, POC    Collection Time: 05/24/21  7:37 AM   Result Value Ref Range    Glucose (POC) 169 (H) 70 - 110 mg/dL       Current Medications:  No current facility-administered medications on file prior to encounter. Current Outpatient Medications on File Prior to Encounter   Medication Sig Dispense Refill    insulin lispro (HUMALOG) 100 unit/mL injection by SubCUTAneous route three (3) times daily.  cloNIDine HCL (CATAPRES) 0.1 mg tablet Take 1 Tab by mouth two (2) times a day. 60 Tab 1    insulin detemir U-100 (Levemir U-100 Insulin) 100 unit/mL injection 10 Units by SubCUTAneous route daily. Indications: type 2 diabetes mellitus 2 Vial 1    temazepam (RESTORIL) 7.5 mg capsule Take 7.5 mg by mouth nightly.  QUEtiapine (SEROqueL) 50 mg tablet Take 50 mg by mouth two (2) times a day.  memantine (Namenda) 10 mg tablet Take 10 mg by mouth daily.       metoprolol succinate (Toprol XL) 50 mg XL tablet Take 50 mg by mouth daily.      lidocaine (LIDODERM) 5 % Apply patch to the affected area for 12 hours a day and remove for 12 hours a day. 30 Each 0    diclofenac (VOLTAREN) 1 % gel Apply 2 g to affected area four (4) times daily. 100 g 0    lactobacillus sp. 50 billion cpu (BIO-K PLUS) 50 billion cell -375 mg cap capsule Take 1 Cap by mouth daily. 30 Cap 0    ezetimibe (ZETIA) 10 mg tablet Take 1 Tab by mouth nightly.  aspirin delayed-release 81 mg tablet Take 1 Tab by mouth daily. Indications: Cerebral Thromboembolism Prevention 30 Tab 0    gabapentin (NEURONTIN) 300 mg capsule Take 1 Cap by mouth daily. Indications: NEUROPATHIC PAIN, Restless Legs Syndrome 30 Cap 0    isosorbide mononitrate ER (IMDUR) 30 mg tablet Take 1 Tab by mouth daily. 30 Tab 0    multivit-min-FA-lycopen-lutein (CENTRUM SILVER) 0.4-300-250 mg-mcg-mcg tab Take 1 tab daily 30 Tab 0    furosemide (LASIX) 20 mg tablet Take 1 Tab by mouth daily. Indications: Edema 30 Tab 0    famotidine (PEPCID) 20 mg tablet Take 1 Tab by mouth two (2) times a day. Indications: PREVENTION OF STRESS ULCER 30 Tab 0    CALCIUM CARBONATE (CALTRATE 600 PO) Take 1 Tab by mouth daily.  polyvinyl alcohol-povidon,PF, (REFRESH CLASSIC) 1.4-0.6 % ophthalmic solution Administer 1 Drop to both eyes as needed for Other (ocular dryness) for up to 30 days. 30 Each 1    cycloSPORINE (RESTASIS) 0.05 % ophthalmic emulsion Administer 1 Drop to both eyes every twelve (12) hours. Indications: Dry Eye (Patient not taking: Reported on 5/21/2021) 15 Each 0    OTHER Incentive spirometry- Use as directed 1 Each 0       Chart and notes reviewed. Data reviewed. I have evaluated and examined the patient. IMPRESSION:   · 81 y/o male presenting with ongoing acute cholecystitis. S/p Ir cholecystostomy tube placement. Wbc is normal today. No fevers. No abdominal pain. PLAN:/DISCUSION:   · Can be resumed on appropriate diet   · With normal wbc he will not need abx on discharge. · He should be discharged with drain in place and follow up with surgery or Ir as outpatient for drain management   · Please call with further issues         Sg Mejia MD

## 2021-05-25 LAB
ANION GAP SERPL CALC-SCNC: 4 MMOL/L (ref 3–18)
BASOPHILS # BLD: 0.2 K/UL (ref 0–0.1)
BASOPHILS NFR BLD: 1 % (ref 0–2)
BUN SERPL-MCNC: 22 MG/DL (ref 7–18)
BUN/CREAT SERPL: 15 (ref 12–20)
CALCIUM SERPL-MCNC: 7.1 MG/DL (ref 8.5–10.1)
CHLORIDE SERPL-SCNC: 118 MMOL/L (ref 100–111)
CO2 SERPL-SCNC: 32 MMOL/L (ref 21–32)
CREAT SERPL-MCNC: 1.51 MG/DL (ref 0.6–1.3)
DIFFERENTIAL METHOD BLD: ABNORMAL
EOSINOPHIL # BLD: 0.2 K/UL (ref 0–0.4)
EOSINOPHIL NFR BLD: 1 % (ref 0–5)
ERYTHROCYTE [DISTWIDTH] IN BLOOD BY AUTOMATED COUNT: 14.3 % (ref 11.6–14.5)
GLUCOSE BLD STRIP.AUTO-MCNC: 149 MG/DL (ref 70–110)
GLUCOSE BLD STRIP.AUTO-MCNC: 151 MG/DL (ref 70–110)
GLUCOSE BLD STRIP.AUTO-MCNC: 192 MG/DL (ref 70–110)
GLUCOSE BLD STRIP.AUTO-MCNC: 221 MG/DL (ref 70–110)
GLUCOSE SERPL-MCNC: 186 MG/DL (ref 74–99)
HCT VFR BLD AUTO: 44.2 % (ref 36–48)
HGB BLD-MCNC: 13.3 G/DL (ref 13–16)
LYMPHOCYTES # BLD: 2.5 K/UL (ref 0.9–3.6)
LYMPHOCYTES NFR BLD: 16 % (ref 21–52)
MAGNESIUM SERPL-MCNC: 2.4 MG/DL (ref 1.6–2.6)
MCH RBC QN AUTO: 27.7 PG (ref 24–34)
MCHC RBC AUTO-ENTMCNC: 30.1 G/DL (ref 31–37)
MCV RBC AUTO: 92.1 FL (ref 74–97)
MONOCYTES # BLD: 1.6 K/UL (ref 0.05–1.2)
MONOCYTES NFR BLD: 10 % (ref 3–10)
NEUTS SEG # BLD: 11 K/UL (ref 1.8–8)
NEUTS SEG NFR BLD: 72 % (ref 40–73)
PLATELET # BLD AUTO: 306 K/UL (ref 135–420)
PLATELET COMMENTS,PCOM: ABNORMAL
PMV BLD AUTO: 10.6 FL (ref 9.2–11.8)
POTASSIUM SERPL-SCNC: 3.3 MMOL/L (ref 3.5–5.5)
RBC # BLD AUTO: 4.8 M/UL (ref 4.35–5.65)
RBC MORPH BLD: ABNORMAL
SODIUM SERPL-SCNC: 145 MMOL/L (ref 136–145)
SODIUM SERPL-SCNC: 153 MMOL/L (ref 136–145)
SODIUM SERPL-SCNC: 154 MMOL/L (ref 136–145)
WBC # BLD AUTO: 15.5 K/UL (ref 4.6–13.2)

## 2021-05-25 PROCEDURE — 74011000258 HC RX REV CODE- 258: Performed by: NURSE PRACTITIONER

## 2021-05-25 PROCEDURE — 83735 ASSAY OF MAGNESIUM: CPT

## 2021-05-25 PROCEDURE — 65660000000 HC RM CCU STEPDOWN

## 2021-05-25 PROCEDURE — 85025 COMPLETE CBC W/AUTO DIFF WBC: CPT

## 2021-05-25 PROCEDURE — 84295 ASSAY OF SERUM SODIUM: CPT

## 2021-05-25 PROCEDURE — 74011250636 HC RX REV CODE- 250/636: Performed by: EMERGENCY MEDICINE

## 2021-05-25 PROCEDURE — 97530 THERAPEUTIC ACTIVITIES: CPT

## 2021-05-25 PROCEDURE — 99232 SBSQ HOSP IP/OBS MODERATE 35: CPT | Performed by: EMERGENCY MEDICINE

## 2021-05-25 PROCEDURE — 2709999900 HC NON-CHARGEABLE SUPPLY

## 2021-05-25 PROCEDURE — 74011250637 HC RX REV CODE- 250/637: Performed by: INTERNAL MEDICINE

## 2021-05-25 PROCEDURE — 74011250636 HC RX REV CODE- 250/636: Performed by: NURSE PRACTITIONER

## 2021-05-25 PROCEDURE — 82962 GLUCOSE BLOOD TEST: CPT

## 2021-05-25 PROCEDURE — 74011250637 HC RX REV CODE- 250/637: Performed by: EMERGENCY MEDICINE

## 2021-05-25 PROCEDURE — 74011636637 HC RX REV CODE- 636/637: Performed by: EMERGENCY MEDICINE

## 2021-05-25 PROCEDURE — 97116 GAIT TRAINING THERAPY: CPT

## 2021-05-25 PROCEDURE — 36415 COLL VENOUS BLD VENIPUNCTURE: CPT

## 2021-05-25 PROCEDURE — 80048 BASIC METABOLIC PNL TOTAL CA: CPT

## 2021-05-25 PROCEDURE — 74011250636 HC RX REV CODE- 250/636: Performed by: INTERNAL MEDICINE

## 2021-05-25 RX ORDER — METRONIDAZOLE 500 MG/1
500 TABLET ORAL EVERY 12 HOURS
Status: DISCONTINUED | OUTPATIENT
Start: 2021-05-25 | End: 2021-05-28 | Stop reason: HOSPADM

## 2021-05-25 RX ORDER — DEXTROSE AND POTASSIUM CHLORIDE 5; .15 G/100ML; G/100ML
SOLUTION INTRAVENOUS CONTINUOUS
Status: DISCONTINUED | OUTPATIENT
Start: 2021-05-25 | End: 2021-05-26

## 2021-05-25 RX ORDER — CEFPODOXIME PROXETIL 200 MG/1
400 TABLET, FILM COATED ORAL EVERY 12 HOURS
Status: DISCONTINUED | OUTPATIENT
Start: 2021-05-25 | End: 2021-05-28 | Stop reason: HOSPADM

## 2021-05-25 RX ADMIN — ERYTHROMYCIN: 5 OINTMENT OPHTHALMIC at 06:34

## 2021-05-25 RX ADMIN — METRONIDAZOLE 500 MG: 500 TABLET ORAL at 22:11

## 2021-05-25 RX ADMIN — FAMOTIDINE 20 MG: 20 TABLET, FILM COATED ORAL at 08:43

## 2021-05-25 RX ADMIN — EZETIMIBE 10 MG: 10 TABLET ORAL at 22:11

## 2021-05-25 RX ADMIN — ISOSORBIDE MONONITRATE 30 MG: 30 TABLET, EXTENDED RELEASE ORAL at 08:44

## 2021-05-25 RX ADMIN — PIPERACILLIN AND TAZOBACTAM 2.25 G: 2; .25 INJECTION, POWDER, LYOPHILIZED, FOR SOLUTION INTRAVENOUS at 05:21

## 2021-05-25 RX ADMIN — CLONIDINE HYDROCHLORIDE 0.1 MG: 0.1 TABLET ORAL at 05:21

## 2021-05-25 RX ADMIN — ERYTHROMYCIN: 5 OINTMENT OPHTHALMIC at 17:50

## 2021-05-25 RX ADMIN — CEFPODOXIME PROXETIL 400 MG: 200 TABLET, FILM COATED ORAL at 17:35

## 2021-05-25 RX ADMIN — POTASSIUM CHLORIDE AND DEXTROSE MONOHYDRATE: 150; 5 INJECTION, SOLUTION INTRAVENOUS at 08:48

## 2021-05-25 RX ADMIN — INSULIN LISPRO 2 UNITS: 100 INJECTION, SOLUTION INTRAVENOUS; SUBCUTANEOUS at 16:30

## 2021-05-25 RX ADMIN — CLONIDINE HYDROCHLORIDE 0.1 MG: 0.1 TABLET ORAL at 17:35

## 2021-05-25 RX ADMIN — INSULIN LISPRO 4 UNITS: 100 INJECTION, SOLUTION INTRAVENOUS; SUBCUTANEOUS at 12:20

## 2021-05-25 RX ADMIN — Medication 81 MG: at 08:44

## 2021-05-25 RX ADMIN — METOPROLOL SUCCINATE 50 MG: 50 TABLET, EXTENDED RELEASE ORAL at 08:44

## 2021-05-25 RX ADMIN — CLONIDINE HYDROCHLORIDE 0.1 MG: 0.1 TABLET ORAL at 22:11

## 2021-05-25 RX ADMIN — GABAPENTIN 100 MG: 100 CAPSULE ORAL at 08:44

## 2021-05-25 RX ADMIN — Medication: at 22:17

## 2021-05-25 RX ADMIN — AMLODIPINE BESYLATE 5 MG: 5 TABLET ORAL at 08:44

## 2021-05-25 RX ADMIN — MEMANTINE HYDROCHLORIDE 10 MG: 10 TABLET ORAL at 08:44

## 2021-05-25 RX ADMIN — POTASSIUM BICARBONATE 40 MEQ: 782 TABLET, EFFERVESCENT ORAL at 17:34

## 2021-05-25 RX ADMIN — PIPERACILLIN AND TAZOBACTAM 2.25 G: 2; .25 INJECTION, POWDER, LYOPHILIZED, FOR SOLUTION INTRAVENOUS at 01:31

## 2021-05-25 RX ADMIN — HYDRALAZINE HYDROCHLORIDE 10 MG: 20 INJECTION, SOLUTION INTRAMUSCULAR; INTRAVENOUS at 17:34

## 2021-05-25 RX ADMIN — Medication: at 17:53

## 2021-05-25 NOTE — PROGRESS NOTES
Patient seen and examined, orders placed  Please see consult note to follow    Please call with questions    Bandar Cuenca MD [facility]  Cell [phone]  Pager: [phone]

## 2021-05-25 NOTE — PROGRESS NOTES
Physician Progress Note      Thais Schroeder  CSN #:                  [id number]  :                       1935  ADMIT DATE:       2021 11:23 AM  [address] [place] DATE:  RESPONDING  PROVIDER #:        Jaime Dao MD          QUERY TEXT:    Dear hospitalist,    Pt admitted with acute cholecystitis. Noted documentation of sepsis on 2021 consult note by ordered infectious disease consultant Dr. Karin Mansfield. If possible, please document in progress notes and discharge summary:    The medical record reflects the following:  Risk Factors: acute cholecystitis, diabetes    Clinical Indicators:  \"Clinical presentation consistent with sepsis-present on admission due to partially treated cholecystitis status post IR guided cholecystostomy tube placement on 2021. Cultures-pending. \"  per Dr. Karin Mansfield, consult note, 2021. WBC:  17.9 on 2021 and 15.5 on 2021.  heart rate:  >90 beats/minute since admission    Treatment: D5% w/ 20meq IV @ 125ml/hr, Zosyn 2.25g IV    Thank you,  MELISSA Esteban, RN, CDS  Office:  [phone]  Options provided:  -- Sepsis confirmed present on admission  -- Sepsis confirmed not present on admission  -- Sepsis ruled out  -- Defer to infectious disease consultant documentation regarding sepsis  -- Other - I will add my own diagnosis  -- Disagree - Not applicable / Not valid  -- Disagree - Clinically unable to determine / Unknown  -- Refer to Clinical Documentation Reviewer    PROVIDER RESPONSE TEXT:    The diagnosis of Sepsis was ruled out. Query created by:  Valerio Ridley on 2021 3:39 PM      Electronically signed by:  Jaime Dao MD 2021 5:08 PM

## 2021-05-25 NOTE — PROGRESS NOTES
Discharge/Transition Planning    1030: [address] and asked to be transferred to discuss pt coming back and baseline function. On hold and then phone cut off. Called again and transferred to someone who stated CM needs to talk to nurse and transferred CM quickly. Received a voicemail. Noted OT said return to SNF with 24 hour assist and PT said SNF. [facility] and not [facility]. Pt with Dementia. Does not follow commands well to participate with therapy so SNF rehab may not be great option if cannot participate or follow command. Called daughter, Marlin Otoole and left voicemail. If [facility] will accept back, would be better for pt to be in own environment and can work with PT/OT with [address] [place] [address] and facility    1330: Spoke with Timmie Duane at [place], Nursing Director. Pt is at baseline. He will not participate with therapy at all and does not follow commands often. Dementia is progressing. Updated Timmie Duane and went over therapy evals. Pt will not participate on with a SNF rehab level and they will accept back to [facility]    1410: Called daughter again and left voicemail to please call CM back as soon as possible to discuss discharge plan    1500: Spoke with daughter about MD and therapy recommending SNF. Notified also Assisted Living will take patient back. Daughter stated if they are fine with drain, she would much rather have pt back at Assisted Living. CM went over chart with Lambert but will verify again okay with drain.  Will need care needed in detail  Elva Gonzalez RN BSN  Outcomes Manager    Pager # [phone]

## 2021-05-25 NOTE — PROGRESS NOTES
Infectious Disease progress Note        Reason: Recurrent cholecystitis    Current abx Prior abx   Piperacillin/tazobactam since 5/21/2021      Lines:       Assessment :    82 y.o.  male with hx of cholecystitis, CAD, T2DM-last hemoglobin A1c 7.1 on 4/23/2021, HTN, dyslipidemia, CKD, COPD, CVA, dementia who presented to the ED from [facility] on 5/21/2021 as he appeared unwell. Hospitalization at [facility] 4/23/2021-5/4/2021 for acute cholecystitis. Now with significant leukocytosis, tachycardia, tachypnea on admission    Clinical presentation consistent with sepsis-present on admission due to partially treated cholecystitis status post IR guided cholecystostomy tube placement on 5/22/2021. Cultures- no growth - d/w micro lab    Acute kidney injury-likely due to sepsis. Gradually improving    Hypernatremia-could be due to piperacillin/tazobactam, decreased p.o. intake    Recommendations:    1. D/c piperacillin/tazobactam. Start po cefpodoxime, metronidazole till 6/5/21  2. Needs to follow-up with surgery as outpatient. Unfortunately patient is at high risk of recurrent cholecystitis when drain removed in absence of surgical intervention       Above plan was discussed in details with patient,  and dr Gilles Negro. Please call me if any further questions or concerns. Will continue to participate in the care of this patient. HPI:    Patient denies any abdominal pain, nausea, vomiting. Denies any chest pain, shortness of breath. Reliability of review of system is questionable.      home Medication List    Details   erythromycin (ILOTYCIN) ophthalmic ointment Apply to affected eye(s) six (6) times a day for 7 days. Qty: 3.5 g, Refills: 0      clindamycin (CLEOCIN) 300 mg capsule Take 1 Capsule by mouth four (4) times daily for 7 days. Qty: 28 Capsule, Refills: 0       Details   insulin lispro (HUMALOG) 100 unit/mL injection by SubCUTAneous route three (3) times daily.       cloNIDine HCL (CATAPRES) 0.1 mg tablet Take 1 Tab by mouth two (2) times a day. Qty: 60 Tab, Refills: 1      insulin detemir U-100 (Levemir U-100 Insulin) 100 unit/mL injection 10 Units by SubCUTAneous route daily. Indications: type 2 diabetes mellitus  Qty: 2 Vial, Refills: 1      temazepam (RESTORIL) 7.5 mg capsule Take 7.5 mg by mouth nightly. QUEtiapine (SEROqueL) 50 mg tablet Take 50 mg by mouth two (2) times a day. memantine (Namenda) 10 mg tablet Take 10 mg by mouth daily. metoprolol succinate (Toprol XL) 50 mg XL tablet Take 50 mg by mouth daily. lidocaine (LIDODERM) 5 % Apply patch to the affected area for 12 hours a day and remove for 12 hours a day. Qty: 30 Each, Refills: 0      diclofenac (VOLTAREN) 1 % gel Apply 2 g to affected area four (4) times daily. Qty: 100 g, Refills: 0      lactobacillus sp. 50 billion cpu (BIO-K PLUS) 50 billion cell -375 mg cap capsule Take 1 Cap by mouth daily. Qty: 30 Cap, Refills: 0      ezetimibe (ZETIA) 10 mg tablet Take 1 Tab by mouth nightly. aspirin delayed-release 81 mg tablet Take 1 Tab by mouth daily. Indications: Cerebral Thromboembolism Prevention  Qty: 30 Tab, Refills: 0      gabapentin (NEURONTIN) 300 mg capsule Take 1 Cap by mouth daily. Indications: NEUROPATHIC PAIN, Restless Legs Syndrome  Qty: 30 Cap, Refills: 0      isosorbide mononitrate ER (IMDUR) 30 mg tablet Take 1 Tab by mouth daily. Qty: 30 Tab, Refills: 0    Associated Diagnoses: Essential hypertension      multivit-min-FA-lycopen-lutein (CENTRUM SILVER) 0.4-300-250 mg-mcg-mcg tab Take 1 tab daily  Qty: 30 Tab, Refills: 0      furosemide (LASIX) 20 mg tablet Take 1 Tab by mouth daily. Indications: Edema  Qty: 30 Tab, Refills: 0      famotidine (PEPCID) 20 mg tablet Take 1 Tab by mouth two (2) times a day. Indications: PREVENTION OF STRESS ULCER  Qty: 30 Tab, Refills: 0      CALCIUM CARBONATE (CALTRATE 600 PO) Take 1 Tab by mouth daily.       polyvinyl alcohol-povidon,PF, (REFRESH CLASSIC) 1.4-0.6 % ophthalmic solution Administer 1 Drop to both eyes as needed for Other (ocular dryness) for up to 30 days. Qty: 30 Each, Refills: 1      cycloSPORINE (RESTASIS) 0.05 % ophthalmic emulsion Administer 1 Drop to both eyes every twelve (12) hours.  Indications: Dry Eye  Qty: 15 Each, Refills: 0      OTHER Incentive spirometry- Use as directed  Qty: 1 Each, Refills: 0    Associated Diagnoses: Influenza B             Current Facility-Administered Medications   Medication Dose Route Frequency    dextrose 5% with KCl 20 mEq/L infusion   IntraVENous CONTINUOUS    cloNIDine HCL (CATAPRES) tablet 0.1 mg  0.1 mg Oral Q8H    amLODIPine (NORVASC) tablet 5 mg  5 mg Oral DAILY    menthol-zinc oxide (CALMOSEPTINE) 0.44-20.6 % ointment   Topical TID    isosorbide mononitrate ER (IMDUR) tablet 30 mg  30 mg Oral DAILY    insulin lispro (HUMALOG) injection   SubCUTAneous AC&HS    sodium chloride (NS) flush 5-40 mL  5-40 mL IntraVENous PRN    naloxone (NARCAN) injection 0.2 mg  0.2 mg IntraVENous EVERY 2 MINUTES AS NEEDED    ezetimibe (ZETIA) tablet 10 mg  10 mg Oral QHS    famotidine (PEPCID) tablet 20 mg  20 mg Oral DAILY    memantine (NAMENDA) tablet 10 mg  10 mg Oral DAILY    polyvinyl alcohol-povidon(PF) (REFRESH CLASSIC) 1.4-0.6 % ophthalmic solution 1 Drop  1 Drop Both Eyes PRN    hydrALAZINE (APRESOLINE) 20 mg/mL injection 10 mg  10 mg IntraVENous Q6H PRN    metoprolol succinate (TOPROL-XL) XL tablet 50 mg  50 mg Oral DAILY    aspirin delayed-release tablet 81 mg  81 mg Oral DAILY    gabapentin (NEURONTIN) capsule 100 mg  100 mg Oral DAILY    sodium chloride (NS) flush 5-40 mL  5-40 mL IntraVENous PRN    acetaminophen (TYLENOL) tablet 650 mg  650 mg Oral Q6H PRN    Or    acetaminophen (TYLENOL) suppository 650 mg  650 mg Rectal Q6H PRN    polyethylene glycol (MIRALAX) packet 17 g  17 g Oral DAILY PRN    promethazine (PHENERGAN) tablet 12.5 mg  12.5 mg Oral Q6H PRN    Or    ondansetron (ZOFRAN) injection 4 mg  4 mg IntraVENous Q6H PRN    erythromycin (ILOTYCIN) 5 mg/gram (0.5 %) ophthalmic ointment   Both Eyes Q6H    glucose chewable tablet 16 g  4 Tablet Oral PRN    glucagon (GLUCAGEN) injection 1 mg  1 mg IntraMUSCular PRN    dextrose (D50W) injection syrg 12.5-25 g  25-50 mL IntraVENous PRN    piperacillin-tazobactam (ZOSYN) 2.25 g in 0.9% sodium chloride (MBP/ADV) 50 mL MBP  2.25 g IntraVENous Q6H       Allergies: Amaryl [glimepiride], Lipitor [atorvastatin], Niacin, Pravachol [pravastatin], and Zocor [simvastatin]    Family History   Problem Relation Age of Onset    Parkinsonism Mother     Hypertension Mother     Cancer Father         lung    Heart defect Brother      Social History     Socioeconomic History    Marital status:      Spouse name: Not on file    Number of children: Not on file    Years of education: Not on file    Highest education level: Not on file   Occupational History    Not on file   Tobacco Use    Smoking status: Former Smoker     Years: 2.00     Quit date: 1955     Years since quittin.4    Smokeless tobacco: Never Used   Substance and Sexual Activity    Alcohol use: Yes     Comment: drinks weekly couple beers    Drug use: No    Sexual activity: Never   Other Topics Concern    Not on file   Social History Narrative    Not on file     Social Determinants of Health     Financial Resource Strain:     Difficulty of Paying Living Expenses:    Food Insecurity:     Worried About Running Out of Food in the Last Year:     Ran Out of Food in the Last Year:    Transportation Needs:     Lack of Transportation (Medical):      Lack of Transportation (Non-Medical):    Physical Activity:     Days of Exercise per Week:     Minutes of Exercise per Session:    Stress:     Feeling of Stress :    Social Connections:     Frequency of Communication with Friends and Family:     Frequency of Social Gatherings with Friends and Family:     Attends Rastafarian Services:     Active Member of Clubs or Organizations:     Attends Club or Organization Meetings:     Marital Status:    Intimate Partner Violence:     Fear of Current or Ex-Partner:     Emotionally Abused:     Physically Abused:     Sexually Abused:      Social History     Tobacco Use   Smoking Status Former Smoker    Years: 2.00    Quit date: 1955    Years since quittin.4   Smokeless Tobacco Never Used        Temp (24hrs), Av.8 °F (36.6 °C), Min:97.5 °F (36.4 °C), Max:98.1 °F (36.7 °C)    Visit Vitals  /79 (BP 1 Location: Left upper arm, BP Patient Position: At rest)   Pulse 72   Temp 98.1 °F (36.7 °C)   Resp 20   Ht 6' 2.02\" (1.88 m)   Wt 74.5 kg (164 lb 3.2 oz)   SpO2 97%   BMI 21.07 kg/m²       ROS: Unable to obtain detailed review of system due to patient factors    Physical Exam:    General:  Alert, appears anxious              Head: Normocephalic, without obvious abnormality, atraumatic. Eyes:  Bilateral conjunctivitis, ointment has already been placed. EOMs intact. Nose: Nares normal. No drainage or sinus tenderness. Neck: Supple, symmetrical, trachea midline, no adenopathy, thyroid: no enlargement, no carotid bruit and no JVD. Lungs:   Clear to auscultation bilaterally. Heart:  Tachycardia, regular rhythm, S1, S2 normal.     Abdomen: Soft, non-tender- no grimacing or guarding on exam. Bowel sounds normal.    Extremities: Extremities normal, atraumatic, no cyanosis or edema. Pulses: 2+ and symmetric all extremities. Skin:  No rashes or lesions   Neurologic: AAO to self, No focal motor or sensory deficit.          Labs: Results:   Chemistry Recent Labs     21  0310 21  0530 21  0400   * 183* 146*   * 153* 150*   K 3.3* 3.3* 3.3*   * 120* 112*   CO2 32 30 31   BUN 22* 22* 24*   CREA 1.51* 1.55* 1.61*   CA 7.1* 8.3* 8.5   AGAP 4 3 7   BUCR 15 14 15   AP  --  151* 149*   TP  --  7.6 7.2   ALB  --  2.6* 2.4*   GLOB  --  5.0* 4.8*   AGRAT  --  0.5* 0.5*      CBC w/Diff Recent Labs     05/25/21  0310 05/24/21  0530 05/23/21  0400   WBC 15.5* 12.3 16.0*   RBC 4.80 5.59 5.06   HGB 13.3 15.7 14.4   HCT 44.2 50.9* 46.3    300 287   GRANS 72 72 77*   LYMPH 16* 16* 14*   EOS 1 0 0      Microbiology Recent Labs     05/22/21  1320   CULT Culture performed on Unspun Fluid  NO GROWTH THUS FAR          RADIOLOGY:    All available imaging studies/reports in [facility] for this admission were reviewed      Disclaimer: Sections of this note are dictated utilizing voice recognition software, which may have resulted in some phonetic based errors in grammar and contents. Even though attempts were made to correct all the mistakes, some may have been missed, and remained in the body of the document. If questions arise, please contact our department.     Dr. Delvis Diaz, Infectious Disease Specialist  [phone]  May 25, 2021  9:35 AM

## 2021-05-25 NOTE — CONSULTS
Consult Note      Consult requested by: Sunny Hurley MD    ADMIT DATE: 5/21/2021    CONSULT DATE: May 25, 2021           Admission diagnosis: sepsis /cholecystitis  Reason for Nephrology Consultation: hypernatremia     Assessment and plan:    #1 hypovolemic hypernatremia, secondary to free water deficit. Patient was on honey thickened nectar consistency fluid intake, poor free water intake likely causing his hypernatremia. Possible that Zosyn may have contributed as well and has been discontinued by ID which is appreciated. Urine studies have been ordered  #2 acute kidney injury on chronic kidney disease stage III  #3 chronic kidney disease stage III with a baseline creatinine in the low 1 range, etiology appears to be secondary to diabetic nephropathy, does have proteinuria will quantify  #4 sepsis secondary to cholecystitis  #5 coronary artery disease and dyslipidemia  #6 diabetes mellitus type 2  #7 hypertension  #8 hypokalemia    Plan:    #1 D5W +20 mEq of potassium chloride at 125 cc an hour  #2 replace potassium  #3 patient to drink 8 ounces every 6 hours of free water, nursing miscellaneous order placed  #4 follow sodium every 6 hours, goal of 145 by tomorrow  #5 follow ID recommendations  #6 renal panel in the morning    Please call with questions,    Krissy Prieto MD FASN  Cell [phone]  Pager: [phone]    HPI:   Patient is a 51-year-old  male with history of cholecystitis coronary artery disease type 2 diabetes, hypertension, dyslipidemia chronic kidney disease stage III, COPD, CVA and dementia presented with tachypnea, leukocytosis tachycardia sepsis-like picture secondary to partially treated cholecystitis s/p IR guided cholecystostomy tube. Patient was managed on antibiotics per ID recommendations. During the process patient developed an ANNA which is likely due to sepsis and improved gradually. Patient also had hyponatremia secondary to decreased p.o. fluid free water. Nephrology was consulted for hyponatremia and ANNA and CKD. Past Medical History:   Diagnosis Date    Anxiety     Arthritis     CAD (coronary artery disease)     s/p drug-eluting stents to RCA for high-grade stenoses in     Carotid duplex 2016    Mild <50% CYRUS stenosis. Chronic LICA occlusion. Similar to study of 3/29/16.  Carotid stenosis (HCC)     w/ known total occlusion of lt internal carotid artery; followed by pts vascular surgeon    Chronic kidney disease     [facility]ch[facility]     Dementia [facility]     Depression     Diabetes ([facility]     type 2    Dyslipidemia     on Crestor; managed by pts PCP    Dyspnea     Hypercholesterolemia     Hypertension     Low back pain     Osteoarthrosis involving multiple sites     Polycythemia 2019    Skin cancer ([facility]     squamous cell lesions of the skin    Stroke ([facility]     Venous (peripheral) insufficiency       Past Surgical History:   Procedure Laterality Date    CARDIAC CATHETERIZATION  2016    Mod calcification. Co-dom. oLM 50-70%. LAD 30%. Dx 30%. Cx 70% focal.  OM 80% focal.  RCA 30%.       HX ANGIOPLASTY      2 stents placed and heart attack during the procedure    HX COLONOSCOPY  10/2003    neg; declines f/u    HX CORONARY ARTERY BYPASS GRAFT  2016     LIMA to LAD and SVG to OM1    HX HEART CATHETERIZATION  2013    HX HERNIA REPAIR      1970s    HX TONSIL AND ADENOIDECTOMY         Social History     Socioeconomic History    Marital status:      Spouse name: Not on file    Number of children: Not on file    Years of education: Not on file    Highest education level: Not on file   Occupational History    Not on file   Tobacco Use    Smoking status: Former Smoker     Years: 2.00     Quit date: 1955     Years since quittin.1    Smokeless tobacco: Never Used   Substance and Sexual Activity    Alcohol use: Yes     Comment: drinks weekly couple beers   [facility] Drug use: No    Sexual activity: Never   Other Topics Concern    Not on file   Social History Narrative    Not on file     Social Determinants of Health     Financial Resource Strain:     Difficulty of Paying Living Expenses:    Food Insecurity:     Worried About Running Out of Food in the Last Year:     [address] in the Last Year:    Transportation Needs:     Lack of Transportation (Medical):  Lack of Transportation (Non-Medical):    Physical Activity:     Days of Exercise per Week:     Minutes of Exercise per Session:    Stress:     Feeling of Stress :    Social Connections:     Frequency of Communication with Friends and Family:     Frequency of Social Gatherings with Friends and Family:     Attends Congregational Services:     Active Member of Clubs or Organizations:     Attends Club or Organization Meetings:     Marital Status:    Intimate Partner Violence:     Fear of Current or Ex-Partner:     Emotionally Abused:     Physically Abused:     Sexually Abused:        Family History   Problem Relation Age of Onset    Parkinsonism Mother     Hypertension Mother     Cancer Father         lung    Heart defect Brother      Allergies   Allergen Reactions    Amaryl [Glimepiride] Drowsiness    Lipitor [Atorvastatin] Myalgia    Niacin Other (comments)     Facial blistering, swelling in face    Pravachol [Pravastatin] Myalgia    Zocor [Simvastatin] Myalgia        Home Medications:     Medications Prior to Admission   Medication Sig    insulin lispro (HUMALOG) 100 unit/mL injection by SubCUTAneous route three (3) times daily.  cloNIDine HCL (CATAPRES) 0.1 mg tablet Take 1 Tab by mouth two (2) times a day.  insulin detemir U-100 (Levemir U-100 Insulin) 100 unit/mL injection 10 Units by SubCUTAneous route daily. Indications: type 2 diabetes mellitus    temazepam (RESTORIL) 7.5 mg capsule Take 7.5 mg by mouth nightly.     QUEtiapine (SEROqueL) 50 mg tablet Take 50 mg by mouth two (2) times a day.  memantine (Namenda) 10 mg tablet Take 10 mg by mouth daily.  metoprolol succinate (Toprol XL) 50 mg XL tablet Take 50 mg by mouth daily.  lidocaine (LIDODERM) 5 % Apply patch to the affected area for 12 hours a day and remove for 12 hours a day.  diclofenac (VOLTAREN) 1 % gel Apply 2 g to affected area four (4) times daily.  lactobacillus sp. 50 billion cpu (BIO-K PLUS) 50 billion cell -375 mg cap capsule Take 1 Cap by mouth daily.  ezetimibe (ZETIA) 10 mg tablet Take 1 Tab by mouth nightly.  aspirin delayed-release 81 mg tablet Take 1 Tab by mouth daily. Indications: Cerebral Thromboembolism Prevention    gabapentin (NEURONTIN) 300 mg capsule Take 1 Cap by mouth daily. Indications: NEUROPATHIC PAIN, Restless Legs Syndrome    isosorbide mononitrate ER (IMDUR) 30 mg tablet Take 1 Tab by mouth daily.  multivit-min-FA-lycopen-lutein (CENTRUM SILVER) 0.4-300-250 mg-mcg-mcg tab Take 1 tab daily    furosemide (LASIX) 20 mg tablet Take 1 Tab by mouth daily. Indications: Edema    famotidine (PEPCID) 20 mg tablet Take 1 Tab by mouth two (2) times a day. Indications: PREVENTION OF STRESS ULCER    CALCIUM CARBONATE (CALTRATE 600 PO) Take 1 Tab by mouth daily.  polyvinyl alcohol-povidon,PF, (REFRESH CLASSIC) 1.4-0.6 % ophthalmic solution Administer 1 Drop to both eyes as needed for Other (ocular dryness) for up to 30 days.  cycloSPORINE (RESTASIS) 0.05 % ophthalmic emulsion Administer 1 Drop to both eyes every twelve (12) hours.  Indications: Dry Eye (Patient not taking: Reported on 5/21/2021)    OTHER Incentive spirometry- Use as directed       Current Inpatient Medications:     Current Facility-Administered Medications   Medication Dose Route Frequency    dextrose 5% with KCl 20 mEq/L infusion   IntraVENous CONTINUOUS    cefpodoxime (VANTIN) tablet 400 mg  400 mg Oral Q12H    metroNIDAZOLE (FLAGYL) tablet 500 mg  500 mg Oral Q12H    cloNIDine HCL (CATAPRES) tablet 0.1 mg 0.1 mg Oral Q8H    amLODIPine (NORVASC) tablet 5 mg  5 mg Oral DAILY    menthol-zinc oxide (CALMOSEPTINE) 0.44-20.6 % ointment   Topical TID    isosorbide mononitrate ER (IMDUR) tablet 30 mg  30 mg Oral DAILY    insulin lispro (HUMALOG) injection   SubCUTAneous AC&HS    sodium chloride (NS) flush 5-40 mL  5-40 mL IntraVENous PRN    naloxone (NARCAN) injection 0.2 mg  0.2 mg IntraVENous EVERY 2 MINUTES AS NEEDED    ezetimibe (ZETIA) tablet 10 mg  10 mg Oral QHS    famotidine (PEPCID) tablet 20 mg  20 mg Oral DAILY    memantine (NAMENDA) tablet 10 mg  10 mg Oral DAILY    polyvinyl alcohol-povidon(PF) (REFRESH CLASSIC) 1.4-0.6 % ophthalmic solution 1 Drop  1 Drop Both Eyes PRN    hydrALAZINE (APRESOLINE) 20 mg/mL injection 10 mg  10 mg IntraVENous Q6H PRN    metoprolol succinate (TOPROL-XL) XL tablet 50 mg  50 mg Oral DAILY    aspirin delayed-release tablet 81 mg  81 mg Oral DAILY    gabapentin (NEURONTIN) capsule 100 mg  100 mg Oral DAILY    sodium chloride (NS) flush 5-40 mL  5-40 mL IntraVENous PRN    acetaminophen (TYLENOL) tablet 650 mg  650 mg Oral Q6H PRN    Or    acetaminophen (TYLENOL) suppository 650 mg  650 mg Rectal Q6H PRN    polyethylene glycol (MIRALAX) packet 17 g  17 g Oral DAILY PRN    promethazine (PHENERGAN) tablet 12.5 mg  12.5 mg Oral Q6H PRN    Or    ondansetron (ZOFRAN) injection 4 mg  4 mg IntraVENous Q6H PRN    erythromycin (ILOTYCIN) 5 mg/gram (0.5 %) ophthalmic ointment   Both Eyes Q6H    glucose chewable tablet 16 g  4 Tablet Oral PRN    glucagon (GLUCAGEN) injection 1 mg  1 mg IntraMUSCular PRN    dextrose (D50W) injection syrg 12.5-25 g  25-50 mL IntraVENous PRN       Review of Systems:   No fever or chills. No sore throat. No cough or hemoptysis. No shortness of breath or chest pain. No orthopnea or paroxysmal nocturnal dyspnea. Good appetite. No nausea, vomiting, abdominal pain, melena or hematochezia. No constipation or diarrhea.  No dysuria, no gross hematuria of voiding difficulties. No ankle swelling, no joint paints. No muscle aches. No skin changes. No dizziness or lightheadedness. No headaches. Physical Assessment:     Vitals:    05/25/21 0435 05/25/21 0738 05/25/21 1100 05/25/21 1539   BP: (!) 180/78 106/79 (!) 178/80 (!) 212/87   Pulse: 94 72 82 76   Resp: 20 20 18 18   Temp: 97.5 °F (36.4 °C) 98.1 °F (36.7 °C) 97.9 °F (36.6 °C) 97.6 °F (36.4 °C)   SpO2: 100% 97% 95% 98%   Weight:       Height:         Last 3 Recorded Weights in this Encounter    05/21/21 2329 05/24/21 0328   Weight: 73.8 kg (162 lb 12.8 oz) 74.5 kg (164 lb 3.2 oz)     Admission weight: Weight: 73.8 kg (162 lb 12.8 oz) (05/21/21 2329)      Intake/Output Summary (Last 24 hours) at 5/25/2021 1736  Last data filed at 5/25/2021 0738  Gross per 24 hour   Intake 1198.75 ml   Output 55 ml   Net 1143.75 ml       Patient is in no apparent distress. HEENT: mmm  Neck: no cervical lymphadenopathy or thyromegaly. Lungs: good air entry, clear to auscultation bilaterally. Trachea at the midline. Cardiovascular system: S1, S2, regular rate and rhythm. Abdomen: soft, non tender, non distended. Positive bowel sounds. Extremities: no clubbing, cyanosis or edema. Integumentary: skin is grossly intact. Neurologic: Alert, oriented time three.      Data Review:    Labs: Results:       Chemistry Recent Labs     05/25/21  1017 05/25/21  0310 05/24/21  0530 05/23/21  0400   GLU  --  186* 183* 146*   * 154* 153* 150*   K  --  3.3* 3.3* 3.3*   CL  --  118* 120* 112*   CO2  --  32 30 31   BUN  --  22* 22* 24*   CREA  --  1.51* 1.55* 1.61*   CA  --  7.1* 8.3* 8.5   AGAP  --  4 3 7   BUCR  --  15 14 15   AP  --   --  151* 149*   TP  --   --  7.6 7.2   ALB  --   --  2.6* 2.4*   GLOB  --   --  5.0* 4.8*   AGRAT  --   --  0.5* 0.5*         CBC w/Diff Recent Labs     05/25/21  0310 05/24/21  0530 05/23/21  0400   WBC 15.5* 12.3 16.0*   RBC 4.80 5.59 5.06   HGB 13.3 15.7 14.4   HCT 44.2 50.9* 46.3    300 287   GRANS 72 72 77*   LYMPH 16* 16* 14*   EOS 1 0 0         Iron/Ferritin No results for input(s): IRON in the last 72 hours. No lab exists for component: TIBCCALC   PTH/VIT D No results for input(s): PTH in the last 72 hours.     No lab exists for component: VITD           Alla Gonzalez MD  5/25/2021  5:36 PM      May 25, 2021

## 2021-05-25 NOTE — ROUTINE PROCESS
Bedside and Verbal shift change report given to [facility] RN (oncoming nurse) by Linda Gandhi (offgoing nurse). Report included the following information SBAR, Kardex, MAR, Recent Results and Cardiac Rhythm SR. Patient remains confuse and disoriented, bed alarm on.

## 2021-05-25 NOTE — PROGRESS NOTES
Nutrition Note    Admitted with acute cholecystitis, cholecystostomy tube placed 5/22, not a good candidate for surgical intervention. Started on mechanical soft diet with nectar thickened liquids with fair to good intake of meals 5/23 per chart. MBS yesterday & not interested in breakfast meal today per nursing. Upon visit today, set pt up for meal and encourage po intake, consuming a few bites. DNR/DNI, no feeding tubes per goals of care. IVF modified yesterday to D5 with 20 mEq/L KCL at 125 mL/hr (providing 150 gm dextrose, 510 kcal per day). BM 5/23. Pt progressing towards nutritional goals. Nutrition Recommendations/Plan:  - Continue diabetic mechanical soft diet with nectar thickened liquids & Magic Cup TID. - Set pt up for meals and encourage po intake as tolerated.   - Continue IVF per MD.    Electronically signed by Linda Ayala RD on 5/25/2021 at 10:42 AM    Contact: [phone]

## 2021-05-25 NOTE — PROGRESS NOTES
[facility]  Progress Note    Patient: Shante Glaser. Age: 82 y.o. : 1935 MR#: [id number] SSN: [id number]  Date/Time: 2021    Subjective:     Patient is laying in bed in no apparent distress, awake, follows simple commands    Assessment/Plan:       1. Cholecystitis  2. Acute kidney injury   3. Leukocytosis, suspect due to #1  4. Elevated LFTs  5. T2DM, A1C 7.1% in 2021  6. Hypertension  7. CAD  8. Hyperlipidemia  9. COPD  10. Conjunctivitis   11. Dementia without behavioral disturbance  12. Debility  Advanced age  Hypernatremia    PLAN  Continue antibiotics per ID  Status post cholecystostomy tube  Monitor electrolytes, nephrology following, hypotonic IV fluids  Diet as per speech   metoprolol, increase clonidine, on Imdur, amlodipine.   Discussed with RN and she will give the as needed hydralazine for this blood pressure and monitor response  Monitor labs  PT OT  Discussed with RN, discussed with   Patient is DNR  I called patient phone #[phone] and left a message      Case discussed with:  [x]Patient  [x]Family  [x]Nursing  []Case Management  DVT Prophylaxis:  []Lovenox  []Hep SQ  [x]SCDs  []Coumadin   []On Heparin gtt    Objective:   VS:   Visit Vitals  BP (!) 212/87 (BP 1 Location: Left upper arm, BP Patient Position: At rest;Lying) Comment: Reported to RN Bailey   Pulse 76   Temp 97.6 °F (36.4 °C)   Resp 18   Ht 6' 2.02\" (1.88 m)   Wt 74.5 kg (164 lb 3.2 oz)   SpO2 98%   BMI 21.07 kg/m²      Tmax/24hrs: Temp (24hrs), Av.8 °F (36.6 °C), Min:97.5 °F (36.4 °C), Max:98.1 °F (36.7 °C)    Input/Output:     Intake/Output Summary (Last 24 hours) at 2021 1713  Last data filed at 2021 3357  Gross per 24 hour   Intake 1198.75 ml   Output 55 ml   Net 1143.75 ml       General:  Awake, follows simple commands  Cardiovascular:  S1S2+, RRR  Pulmonary:  CTA b/l  GI:  Soft, BS+, NT, ND has cholecystotomy tube with serosanguineous fluid  Extremities:  trace edema          Labs:    Recent Results (from the past 24 hour(s))   GLUCOSE, POC    Collection Time: 05/24/21 10:23 PM   Result Value Ref Range    Glucose (POC) 167 (H) 70 - 110 mg/dL   CBC WITH AUTOMATED DIFF    Collection Time: 05/25/21  3:10 AM   Result Value Ref Range    WBC 15.5 (H) 4.6 - 13.2 K/uL    RBC 4.80 4.35 - 5.65 M/uL    HGB 13.3 13.0 - 16.0 g/dL    HCT 44.2 36.0 - 48.0 %    MCV 92.1 74.0 - 97.0 FL    MCH 27.7 24.0 - 34.0 PG    MCHC 30.1 (L) 31.0 - 37.0 g/dL    RDW 14.3 11.6 - 14.5 %    PLATELET 337 373 - 046 K/uL    MPV 10.6 9.2 - 11.8 FL    NEUTROPHILS 72 40 - 73 %    LYMPHOCYTES 16 (L) 21 - 52 %    MONOCYTES 10 3 - 10 %    EOSINOPHILS 1 0 - 5 %    BASOPHILS 1 0 - 2 %    ABS. NEUTROPHILS 11.0 (H) 1.8 - 8.0 K/UL    ABS. LYMPHOCYTES 2.5 0.9 - 3.6 K/UL    ABS. MONOCYTES 1.6 (H) 0.05 - 1.2 K/UL    ABS. EOSINOPHILS 0.2 0.0 - 0.4 K/UL    ABS.  BASOPHILS 0.2 (H) 0.0 - 0.1 K/UL    DF AUTOMATED      PLATELET COMMENTS ADEQUATE PLATELETS      RBC COMMENTS NORMOCYTIC, NORMOCHROMIC     METABOLIC PANEL, BASIC    Collection Time: 05/25/21  3:10 AM   Result Value Ref Range    Sodium 154 (H) 136 - 145 mmol/L    Potassium 3.3 (L) 3.5 - 5.5 mmol/L    Chloride 118 (H) 100 - 111 mmol/L    CO2 32 21 - 32 mmol/L    Anion gap 4 3.0 - 18 mmol/L    Glucose 186 (H) 74 - 99 mg/dL    BUN 22 (H) 7.0 - 18 MG/DL    Creatinine 1.51 (H) 0.6 - 1.3 MG/DL    BUN/Creatinine ratio 15 12 - 20      GFR est AA 54 (L) >60 ml/min/1.73m2    GFR est non-AA 44 (L) >60 ml/min/1.73m2    Calcium 7.1 (L) 8.5 - 10.1 MG/DL   MAGNESIUM    Collection Time: 05/25/21  3:10 AM   Result Value Ref Range    Magnesium 2.4 1.6 - 2.6 mg/dL   GLUCOSE, POC    Collection Time: 05/25/21  7:43 AM   Result Value Ref Range    Glucose (POC) 151 (H) 70 - 110 mg/dL   SODIUM    Collection Time: 05/25/21 10:17 AM   Result Value Ref Range    Sodium 153 (H) 136 - 145 mmol/L   GLUCOSE, POC    Collection Time: 05/25/21 11:02 AM   Result Value Ref Range Glucose (POC) 221 (H) 70 - 110 mg/dL     Additional Data Reviewed:      Signed By: Ivet Oviedo MD     May 25, 2021

## 2021-05-26 LAB
ALBUMIN SERPL-MCNC: 2.3 G/DL (ref 3.4–5)
ANION GAP SERPL CALC-SCNC: 3 MMOL/L (ref 3–18)
BACTERIA SPEC CULT: NORMAL
BACTERIA SPEC CULT: NORMAL
BASOPHILS # BLD: 0.1 K/UL (ref 0–0.1)
BASOPHILS NFR BLD: 1 % (ref 0–2)
BUN SERPL-MCNC: 16 MG/DL (ref 7–18)
BUN/CREAT SERPL: 12 (ref 12–20)
CALCIUM SERPL-MCNC: 7.7 MG/DL (ref 8.5–10.1)
CHLORIDE SERPL-SCNC: 111 MMOL/L (ref 100–111)
CO2 SERPL-SCNC: 31 MMOL/L (ref 21–32)
CREAT SERPL-MCNC: 1.33 MG/DL (ref 0.6–1.3)
DIFFERENTIAL METHOD BLD: ABNORMAL
EOSINOPHIL # BLD: 0.3 K/UL (ref 0–0.4)
EOSINOPHIL NFR BLD: 2 % (ref 0–5)
ERYTHROCYTE [DISTWIDTH] IN BLOOD BY AUTOMATED COUNT: 13.8 % (ref 11.6–14.5)
GLUCOSE BLD STRIP.AUTO-MCNC: 164 MG/DL (ref 70–110)
GLUCOSE BLD STRIP.AUTO-MCNC: 176 MG/DL (ref 70–110)
GLUCOSE BLD STRIP.AUTO-MCNC: 177 MG/DL (ref 70–110)
GLUCOSE BLD STRIP.AUTO-MCNC: 193 MG/DL (ref 70–110)
GLUCOSE SERPL-MCNC: 201 MG/DL (ref 74–99)
GRAM STN SPEC: NORMAL
GRAM STN SPEC: NORMAL
HCT VFR BLD AUTO: 41.9 % (ref 36–48)
HGB BLD-MCNC: 13.1 G/DL (ref 13–16)
LYMPHOCYTES # BLD: 2.3 K/UL (ref 0.9–3.6)
LYMPHOCYTES NFR BLD: 17 % (ref 21–52)
MAGNESIUM SERPL-MCNC: 2.1 MG/DL (ref 1.6–2.6)
MCH RBC QN AUTO: 28 PG (ref 24–34)
MCHC RBC AUTO-ENTMCNC: 31.3 G/DL (ref 31–37)
MCV RBC AUTO: 89.5 FL (ref 74–97)
MONOCYTES # BLD: 1 K/UL (ref 0.05–1.2)
MONOCYTES NFR BLD: 8 % (ref 3–10)
NEUTS SEG # BLD: 9.4 K/UL (ref 1.8–8)
NEUTS SEG NFR BLD: 72 % (ref 40–73)
PHOSPHATE SERPL-MCNC: 1.7 MG/DL (ref 2.5–4.9)
PLATELET # BLD AUTO: 264 K/UL (ref 135–420)
PMV BLD AUTO: 10.6 FL (ref 9.2–11.8)
POTASSIUM SERPL-SCNC: 3.5 MMOL/L (ref 3.5–5.5)
RBC # BLD AUTO: 4.68 M/UL (ref 4.35–5.65)
SERVICE CMNT-IMP: NORMAL
SODIUM SERPL-SCNC: 145 MMOL/L (ref 136–145)
SODIUM SERPL-SCNC: 148 MMOL/L (ref 136–145)
WBC # BLD AUTO: 13.2 K/UL (ref 4.6–13.2)

## 2021-05-26 PROCEDURE — 80048 BASIC METABOLIC PNL TOTAL CA: CPT

## 2021-05-26 PROCEDURE — 84295 ASSAY OF SERUM SODIUM: CPT

## 2021-05-26 PROCEDURE — 2709999900 HC NON-CHARGEABLE SUPPLY

## 2021-05-26 PROCEDURE — 65660000000 HC RM CCU STEPDOWN

## 2021-05-26 PROCEDURE — 36415 COLL VENOUS BLD VENIPUNCTURE: CPT

## 2021-05-26 PROCEDURE — 85025 COMPLETE CBC W/AUTO DIFF WBC: CPT

## 2021-05-26 PROCEDURE — 82040 ASSAY OF SERUM ALBUMIN: CPT

## 2021-05-26 PROCEDURE — 82962 GLUCOSE BLOOD TEST: CPT

## 2021-05-26 PROCEDURE — 74011636637 HC RX REV CODE- 636/637: Performed by: EMERGENCY MEDICINE

## 2021-05-26 PROCEDURE — 74011250637 HC RX REV CODE- 250/637: Performed by: NURSE PRACTITIONER

## 2021-05-26 PROCEDURE — 74011250636 HC RX REV CODE- 250/636: Performed by: EMERGENCY MEDICINE

## 2021-05-26 PROCEDURE — 99239 HOSP IP/OBS DSCHRG MGMT >30: CPT | Performed by: EMERGENCY MEDICINE

## 2021-05-26 PROCEDURE — 84100 ASSAY OF PHOSPHORUS: CPT

## 2021-05-26 PROCEDURE — 74011250637 HC RX REV CODE- 250/637: Performed by: EMERGENCY MEDICINE

## 2021-05-26 PROCEDURE — 83735 ASSAY OF MAGNESIUM: CPT

## 2021-05-26 PROCEDURE — 74011250637 HC RX REV CODE- 250/637: Performed by: INTERNAL MEDICINE

## 2021-05-26 RX ORDER — MENTHOL AND ZINC OXIDE .44; 20.625 G/100G; G/100G
OINTMENT TOPICAL
Qty: 1 TUBE | Refills: 0 | Status: SHIPPED | OUTPATIENT
Start: 2021-05-26

## 2021-05-26 RX ORDER — CLONIDINE HYDROCHLORIDE 0.1 MG/1
0.1 TABLET ORAL EVERY 8 HOURS
Qty: 90 TABLET | Refills: 0 | Status: SHIPPED | OUTPATIENT
Start: 2021-05-26

## 2021-05-26 RX ORDER — AMLODIPINE BESYLATE 5 MG/1
5 TABLET ORAL DAILY
Qty: 30 TABLET | Refills: 0 | Status: SHIPPED | OUTPATIENT
Start: 2021-05-26

## 2021-05-26 RX ORDER — POLYETHYLENE GLYCOL 3350 17 G/17G
17 POWDER, FOR SOLUTION ORAL
Qty: 15 PACKET | Refills: 0 | Status: SHIPPED | OUTPATIENT
Start: 2021-05-26

## 2021-05-26 RX ORDER — METRONIDAZOLE 500 MG/1
500 TABLET ORAL EVERY 12 HOURS
Qty: 21 TABLET | Refills: 0 | Status: SHIPPED | OUTPATIENT
Start: 2021-05-26 | End: 2021-06-05

## 2021-05-26 RX ORDER — METOPROLOL SUCCINATE 50 MG/1
50 TABLET, EXTENDED RELEASE ORAL DAILY
Qty: 30 TABLET | Refills: 0 | Status: SHIPPED | OUTPATIENT
Start: 2021-05-26

## 2021-05-26 RX ORDER — ASPIRIN 81 MG/1
81 TABLET ORAL DAILY
Qty: 30 TABLET | Refills: 0 | Status: SHIPPED | OUTPATIENT
Start: 2021-05-26

## 2021-05-26 RX ORDER — MEMANTINE HYDROCHLORIDE 10 MG/1
10 TABLET ORAL DAILY
Qty: 30 TABLET | Refills: 0 | Status: SHIPPED | OUTPATIENT
Start: 2021-05-26

## 2021-05-26 RX ORDER — EZETIMIBE 10 MG/1
10 TABLET ORAL
Qty: 30 TABLET | Refills: 0 | Status: SHIPPED | OUTPATIENT
Start: 2021-05-26

## 2021-05-26 RX ORDER — ISOSORBIDE MONONITRATE 30 MG/1
30 TABLET, EXTENDED RELEASE ORAL DAILY
Qty: 30 TABLET | Refills: 0 | Status: SHIPPED | OUTPATIENT
Start: 2021-05-26

## 2021-05-26 RX ORDER — INSULIN LISPRO 100 [IU]/ML
INJECTION, SOLUTION INTRAVENOUS; SUBCUTANEOUS
Qty: 1 VIAL | Refills: 0 | Status: SHIPPED | OUTPATIENT
Start: 2021-05-26

## 2021-05-26 RX ORDER — FAMOTIDINE 20 MG/1
20 TABLET, FILM COATED ORAL DAILY
Qty: 30 TABLET | Refills: 0 | Status: SHIPPED | OUTPATIENT
Start: 2021-05-26

## 2021-05-26 RX ORDER — GABAPENTIN 300 MG/1
300 CAPSULE ORAL DAILY
Qty: 30 CAPSULE | Refills: 0 | Status: SHIPPED | OUTPATIENT
Start: 2021-05-26

## 2021-05-26 RX ORDER — CEFPODOXIME PROXETIL 200 MG/1
400 TABLET, FILM COATED ORAL EVERY 12 HOURS
Qty: 42 TABLET | Refills: 0 | Status: SHIPPED | OUTPATIENT
Start: 2021-05-26 | End: 2021-06-05

## 2021-05-26 RX ORDER — ERYTHROMYCIN 5 MG/G
OINTMENT OPHTHALMIC
Qty: 3.5 G | Refills: 0 | Status: SHIPPED | OUTPATIENT
Start: 2021-05-26

## 2021-05-26 RX ADMIN — FAMOTIDINE 20 MG: 20 TABLET, FILM COATED ORAL at 11:30

## 2021-05-26 RX ADMIN — CLONIDINE HYDROCHLORIDE 0.1 MG: 0.1 TABLET ORAL at 21:30

## 2021-05-26 RX ADMIN — CEFPODOXIME PROXETIL 400 MG: 200 TABLET, FILM COATED ORAL at 21:30

## 2021-05-26 RX ADMIN — ERYTHROMYCIN: 5 OINTMENT OPHTHALMIC at 11:58

## 2021-05-26 RX ADMIN — GABAPENTIN 100 MG: 100 CAPSULE ORAL at 11:30

## 2021-05-26 RX ADMIN — DIBASIC SODIUM PHOSPHATE, MONOBASIC POTASSIUM PHOSPHATE AND MONOBASIC SODIUM PHOSPHATE 1 TABLET: 852; 155; 130 TABLET ORAL at 19:12

## 2021-05-26 RX ADMIN — DIBASIC SODIUM PHOSPHATE, MONOBASIC POTASSIUM PHOSPHATE AND MONOBASIC SODIUM PHOSPHATE 1 TABLET: 852; 155; 130 TABLET ORAL at 11:41

## 2021-05-26 RX ADMIN — CLONIDINE HYDROCHLORIDE 0.1 MG: 0.1 TABLET ORAL at 05:34

## 2021-05-26 RX ADMIN — Medication: at 17:43

## 2021-05-26 RX ADMIN — MEMANTINE HYDROCHLORIDE 10 MG: 10 TABLET ORAL at 11:31

## 2021-05-26 RX ADMIN — CLONIDINE HYDROCHLORIDE 0.1 MG: 0.1 TABLET ORAL at 14:21

## 2021-05-26 RX ADMIN — METRONIDAZOLE 500 MG: 500 TABLET ORAL at 21:30

## 2021-05-26 RX ADMIN — ERYTHROMYCIN: 5 OINTMENT OPHTHALMIC at 05:36

## 2021-05-26 RX ADMIN — AMLODIPINE BESYLATE 5 MG: 5 TABLET ORAL at 11:30

## 2021-05-26 RX ADMIN — CEFPODOXIME PROXETIL 400 MG: 200 TABLET, FILM COATED ORAL at 11:31

## 2021-05-26 RX ADMIN — INSULIN LISPRO 2 UNITS: 100 INJECTION, SOLUTION INTRAVENOUS; SUBCUTANEOUS at 11:33

## 2021-05-26 RX ADMIN — HYDRALAZINE HYDROCHLORIDE 10 MG: 20 INJECTION, SOLUTION INTRAMUSCULAR; INTRAVENOUS at 11:31

## 2021-05-26 RX ADMIN — ERYTHROMYCIN: 5 OINTMENT OPHTHALMIC at 19:12

## 2021-05-26 RX ADMIN — Medication: at 11:34

## 2021-05-26 RX ADMIN — Medication: at 22:00

## 2021-05-26 RX ADMIN — EZETIMIBE 10 MG: 10 TABLET ORAL at 21:30

## 2021-05-26 RX ADMIN — INSULIN LISPRO 2 UNITS: 100 INJECTION, SOLUTION INTRAVENOUS; SUBCUTANEOUS at 21:36

## 2021-05-26 RX ADMIN — METOPROLOL SUCCINATE 50 MG: 50 TABLET, EXTENDED RELEASE ORAL at 11:30

## 2021-05-26 RX ADMIN — POLYETHYLENE GLYCOL 3350 17 G: 17 POWDER, FOR SOLUTION ORAL at 11:30

## 2021-05-26 RX ADMIN — INSULIN LISPRO 2 UNITS: 100 INJECTION, SOLUTION INTRAVENOUS; SUBCUTANEOUS at 08:45

## 2021-05-26 RX ADMIN — INSULIN LISPRO 2 UNITS: 100 INJECTION, SOLUTION INTRAVENOUS; SUBCUTANEOUS at 17:41

## 2021-05-26 RX ADMIN — Medication 81 MG: at 11:30

## 2021-05-26 RX ADMIN — METRONIDAZOLE 500 MG: 500 TABLET ORAL at 11:31

## 2021-05-26 RX ADMIN — ERYTHROMYCIN: 5 OINTMENT OPHTHALMIC at 00:44

## 2021-05-26 RX ADMIN — ISOSORBIDE MONONITRATE 30 MG: 30 TABLET, EXTENDED RELEASE ORAL at 11:30

## 2021-05-26 NOTE — PROGRESS NOTES
Discharge/Transition Planning RETURN TO [facility]. [address] RN PHOEBE TO CALL REPORT [phone] PATIENT ACCEPTED TO [facility] [address]

## 2021-05-26 NOTE — PROGRESS NOTES
In Patient Progress note      Admit Date: 5/21/2021          Impression:     #1 hypovolemic hypernatremia, secondary to free water deficit. Patient was on honey thickened nectar consistency fluid intake, poor free water intake likely causing his hypernatremia. Possible that Zosyn may have contributed as well and has been discontinued by ID which is appreciated. Urine studies have been ordered  #2 acute kidney injury on chronic kidney disease stage III  #3 chronic kidney disease stage III with a baseline creatinine in the low 1 range, etiology appears to be secondary to diabetic nephropathy, does have proteinuria will quantify  #4 sepsis secondary to cholecystitis  #5 coronary artery disease and dyslipidemia  #6 diabetes mellitus type 2  #7 hypertension  #8 hypokalemia     Plan:     #1 scheduled PO intake nectar thickened water 8 oz q6hrs   #2 replace potassium cit/bicarb 20 meq daily   #3 renal panel next week   #4 follow ID recommendations    F/u with nephro after discharge . Please call with questions,     Ekaterina Cedeño MD FASN  Cell [phone]  Pager: [phone]  Subjective:     - No acute over night events. - respiratory - stable  - hemodynamics - stable, no pressrs  - UOP- good  - Nutrition - ok    Objective:     Visit Vitals  BP (!) 131/54 (BP 1 Location: Left upper arm, BP Patient Position: At rest;Lying;Supine) Comment: GE Oliveira aware of BP   Pulse 64   Temp 97.7 °F (36.5 °C)   Resp 18   Ht 6' 2.02\" (1.88 m)   Wt 74.5 kg (164 lb 3.2 oz)   SpO2 99%   BMI 21.07 kg/m²         Intake/Output Summary (Last 24 hours) at 5/26/2021 1635  Last data filed at 5/26/2021 1233  Gross per 24 hour   Intake 784 ml   Output 1000 ml   Net -216 ml       Physical Exam:     Patient is in no apparent distress. HEENT: mmm  Neck: no cervical lymphadenopathy or thyromegaly. Lungs: good air entry, clear to auscultation bilaterally. Trachea at the midline. Cardiovascular system: S1, S2, regular rate and rhythm. Abdomen: soft, non tender, non distended. Positive bowel sounds. Extremities: no clubbing, cyanosis or edema. Integumentary: skin is grossly intact. Neurologic: Alert, oriented time three.        Data Review:    Recent Labs     05/26/21 0519   WBC 13.2   RBC 4.68   HCT 41.9   MCV 89.5   MCH 28.0   MCHC 31.3   RDW 13.8     Recent Labs     05/26/21  1107 05/26/21  0519 05/25/21  1816 05/25/21  1017 05/25/21  0310 05/24/21  0530   BUN  --  16  --   --  22* 22*   CREA  --  1.33*  --   --  1.51* 1.55*   CA  --  7.7*  --   --  7.1* 8.3*   ALB  --  2.3*  --   --   --  2.6*   K  --  3.5  --   --  3.3* 3.3*   * 145 145 153* 154* 153*   CL  --  111  --   --  118* 120*   CO2  --  31  --   --  32 30   PHOS  --  1.7*  --   --   --   --    GLU  --  201*  --   --  186* 183*       Rashmi Orellana MD

## 2021-05-26 NOTE — PROGRESS NOTES
Discharge/Transition Planning    0845: [id number] [facility] referral to [facility] which patient active with  0940: Completed PCS and faxed to [facility]. First available is 230pm.   1050: Called Hugo [facility] at [address] and left voicemail notifying pt definitely discharging like discussed yesterday and Home Health set back up with [facility]. 1140: Called Philae Pro at [address] again and left voicemail  1300: Called daughter and updated on discharge and Home health  1310: Received call back from [employer] [facility] at [address] and notified now they cannot accept patient with drain as they dont have the nursing staff daily to accommodate. Sent Perfect serve to Dr Glaser Salvage  1400: Cancelled transport  [phone]: Received voicemail from [employer] at [address] that she spoke with directors and [facility] will come daily to flush drain and he can come back. Called back x 2 received voicemail  1500: Amandeep Jauregui and again no answer on main line. Left voicemail  1515: Amandeep Jauregui and no answer  1550: Called [facility] asked to speak with Donnamae Pro . Spoke with Donnamae Pro and she is trying to get a hold of daughter to see if agreeable to plan . If daughter agreeable can take tomorrow as too late today.  Called daughter myself and left voicemail    Kelsie Torres RN BSN  Outcomes Manager    Pager # [phone]

## 2021-05-26 NOTE — ROUTINE PROCESS
Bedside and Verbal shift change report given to [facility] RN (oncoming nurse) by Rk (off going nurse). Report included the following information SBAR, MAR, VS, and summary of care. Patient quietly resting, bed alarm on and call light in reach.

## 2021-05-26 NOTE — DISCHARGE INSTRUCTIONS
Patient Education        Learning About Acute Cholecystitis  What is cholecystitis? Cholecystitis (say \"koh-lih-sis-TY-tus\") is inflammation of the gallbladder. The gallbladder stores bile. Bile helps the body digest food. Normally, the bile flows from the gallbladder to the small intestine. A gallstone stuck in the cystic duct is most often the cause of sudden (acute) cholecystitis. The cystic duct is the tube that carries the bile out of the gallbladder. The gallstone blocks the bile from leaving the gallbladder. This results in an irritated and swollen gallbladder. The disease can also be caused by infection or trauma, such as an injury from a car accident. Cholecystitis has to be treated right away. You will probably have to go to the hospital. Surgery is the usual treatment. What are the symptoms? Symptoms include:  · Steady and severe pain in the upper right part of belly. This is the most common symptom. The pain can sometimes move to your back or right shoulder blade. It may last for more than 6 hours. · Nausea or vomiting. · A fever. How is it treated? The main way to treat this disease is surgery to remove the gallbladder. This surgery can often be done through small cuts (incisions) in the belly. This is called a laparoscopic cholecystectomy. In some cases, you may need a more extensive surgery. You may need surgery as soon as possible. The doctor may try to reduce swelling and irritation in the gallbladder before removing it. You may be given fluids and antibiotics through an IV. You may also be given pain medicine. Follow-up care is a key part of your treatment and safety. Be sure to make and go to all appointments, and call your doctor if you are having problems. It's also a good idea to know your test results and keep a list of the medicines you take. Where can you learn more?   Go to http://www.gray.com/  Enter T940 in the search box to learn more about \"Learning About Acute Cholecystitis. \"  Current as of: April 15, 2020               Content Version: 12.8  © 2006-2021 Healthwise, [facility]. Care instructions adapted under license by [employer] (which disclaims liability or warranty for this information). If you have questions about a medical condition or this instruction, always ask your healthcare professional. [place] [address] any warranty or liability for your use of this information.

## 2021-05-26 NOTE — PROGRESS NOTES
Infectious Disease progress Note        Reason: Recurrent cholecystitis    Current abx Prior abx   Piperacillin/tazobactam since 5/21/2021      Lines:       Assessment :    82 y.o.  male with hx of cholecystitis, CAD, T2DM-last hemoglobin A1c 7.1 on 4/23/2021, HTN, dyslipidemia, CKD, COPD, CVA, dementia who presented to the ED from [facility] on 5/21/2021 as he appeared unwell. Hospitalization at [facility] 4/23/2021-5/4/2021 for acute cholecystitis. Now with significant leukocytosis, tachycardia, tachypnea on admission    Clinical presentation consistent with sepsis-present on admission due to partially treated cholecystitis status post IR guided cholecystostomy tube placement on 5/22/2021. Cultures- no growth - d/w micro lab    Acute kidney injury-likely due to sepsis. Gradually improving    Hypernatremia-could be due to piperacillin/tazobactam, decreased p.o. intake-improved    Clinically better on today's exam    Recommendations:    1.  continue po cefpodoxime, metronidazole till 6/5/21  2. Needs to follow-up with surgery as outpatient. Unfortunately patient is at high risk of recurrent cholecystitis when drain removed in absence of surgical intervention       Above plan was discussed in details with patient,  and dr Enrrique Romo. Please call me if any further questions or concerns. Will continue to participate in the care of this patient. HPI:    Patient denies any abdominal pain, nausea, vomiting. Denies any chest pain, shortness of breath. home Medication List    Details   erythromycin (ILOTYCIN) ophthalmic ointment Apply to affected eye(s) six (6) times a day for 7 days. Qty: 3.5 g, Refills: 0      clindamycin (CLEOCIN) 300 mg capsule Take 1 Capsule by mouth four (4) times daily for 7 days. Qty: 28 Capsule, Refills: 0       Details   insulin lispro (HUMALOG) 100 unit/mL injection by SubCUTAneous route three (3) times daily.       cloNIDine HCL (CATAPRES) 0.1 mg tablet Take 1 Tab by mouth two (2) times a day. Qty: 60 Tab, Refills: 1      insulin detemir U-100 (Levemir U-100 Insulin) 100 unit/mL injection 10 Units by SubCUTAneous route daily. Indications: type 2 diabetes mellitus  Qty: 2 Vial, Refills: 1      temazepam (RESTORIL) 7.5 mg capsule Take 7.5 mg by mouth nightly. QUEtiapine (SEROqueL) 50 mg tablet Take 50 mg by mouth two (2) times a day. memantine (Namenda) 10 mg tablet Take 10 mg by mouth daily. metoprolol succinate (Toprol XL) 50 mg XL tablet Take 50 mg by mouth daily. lidocaine (LIDODERM) 5 % Apply patch to the affected area for 12 hours a day and remove for 12 hours a day. Qty: 30 Each, Refills: 0      diclofenac (VOLTAREN) 1 % gel Apply 2 g to affected area four (4) times daily. Qty: 100 g, Refills: 0      lactobacillus sp. 50 billion cpu (BIO-K PLUS) 50 billion cell -375 mg cap capsule Take 1 Cap by mouth daily. Qty: 30 Cap, Refills: 0      ezetimibe (ZETIA) 10 mg tablet Take 1 Tab by mouth nightly. aspirin delayed-release 81 mg tablet Take 1 Tab by mouth daily. Indications: Cerebral Thromboembolism Prevention  Qty: 30 Tab, Refills: 0      gabapentin (NEURONTIN) 300 mg capsule Take 1 Cap by mouth daily. Indications: NEUROPATHIC PAIN, Restless Legs Syndrome  Qty: 30 Cap, Refills: 0      isosorbide mononitrate ER (IMDUR) 30 mg tablet Take 1 Tab by mouth daily. Qty: 30 Tab, Refills: 0    Associated Diagnoses: Essential hypertension      multivit-min-FA-lycopen-lutein (CENTRUM SILVER) 0.4-300-250 mg-mcg-mcg tab Take 1 tab daily  Qty: 30 Tab, Refills: 0      furosemide (LASIX) 20 mg tablet Take 1 Tab by mouth daily. Indications: Edema  Qty: 30 Tab, Refills: 0      famotidine (PEPCID) 20 mg tablet Take 1 Tab by mouth two (2) times a day. Indications: PREVENTION OF STRESS ULCER  Qty: 30 Tab, Refills: 0      CALCIUM CARBONATE (CALTRATE 600 PO) Take 1 Tab by mouth daily.       polyvinyl alcohol-povidon,PF, (REFRESH CLASSIC) 1.4-0.6 % ophthalmic solution Administer 1 Drop to both eyes as needed for Other (ocular dryness) for up to 30 days. Qty: 30 Each, Refills: 1      cycloSPORINE (RESTASIS) 0.05 % ophthalmic emulsion Administer 1 Drop to both eyes every twelve (12) hours.  Indications: Dry Eye  Qty: 15 Each, Refills: 0      OTHER Incentive spirometry- Use as directed  Qty: 1 Each, Refills: 0    Associated Diagnoses: Influenza B             Current Facility-Administered Medications   Medication Dose Route Frequency    cefpodoxime (VANTIN) tablet 400 mg  400 mg Oral Q12H    metroNIDAZOLE (FLAGYL) tablet 500 mg  500 mg Oral Q12H    cloNIDine HCL (CATAPRES) tablet 0.1 mg  0.1 mg Oral Q8H    amLODIPine (NORVASC) tablet 5 mg  5 mg Oral DAILY    menthol-zinc oxide (CALMOSEPTINE) 0.44-20.6 % ointment   Topical TID    isosorbide mononitrate ER (IMDUR) tablet 30 mg  30 mg Oral DAILY    insulin lispro (HUMALOG) injection   SubCUTAneous AC&HS    sodium chloride (NS) flush 5-40 mL  5-40 mL IntraVENous PRN    naloxone (NARCAN) injection 0.2 mg  0.2 mg IntraVENous EVERY 2 MINUTES AS NEEDED    ezetimibe (ZETIA) tablet 10 mg  10 mg Oral QHS    famotidine (PEPCID) tablet 20 mg  20 mg Oral DAILY    memantine (NAMENDA) tablet 10 mg  10 mg Oral DAILY    polyvinyl alcohol-povidon(PF) (REFRESH CLASSIC) 1.4-0.6 % ophthalmic solution 1 Drop  1 Drop Both Eyes PRN    hydrALAZINE (APRESOLINE) 20 mg/mL injection 10 mg  10 mg IntraVENous Q6H PRN    metoprolol succinate (TOPROL-XL) XL tablet 50 mg  50 mg Oral DAILY    aspirin delayed-release tablet 81 mg  81 mg Oral DAILY    gabapentin (NEURONTIN) capsule 100 mg  100 mg Oral DAILY    sodium chloride (NS) flush 5-40 mL  5-40 mL IntraVENous PRN    acetaminophen (TYLENOL) tablet 650 mg  650 mg Oral Q6H PRN    Or    acetaminophen (TYLENOL) suppository 650 mg  650 mg Rectal Q6H PRN    polyethylene glycol (MIRALAX) packet 17 g  17 g Oral DAILY PRN    promethazine (PHENERGAN) tablet 12.5 mg  12.5 mg Oral Q6H PRN    Or    ondansetron (ZOFRAN) injection 4 mg  4 mg IntraVENous Q6H PRN    erythromycin (ILOTYCIN) 5 mg/gram (0.5 %) ophthalmic ointment   Both Eyes Q6H    glucose chewable tablet 16 g  4 Tablet Oral PRN    glucagon (GLUCAGEN) injection 1 mg  1 mg IntraMUSCular PRN    dextrose (D50W) injection syrg 12.5-25 g  25-50 mL IntraVENous PRN       Allergies: Amaryl [glimepiride], Lipitor [atorvastatin], Niacin, Pravachol [pravastatin], and Zocor [simvastatin]    Family History   Problem Relation Age of Onset    Parkinsonism Mother     Hypertension Mother     Cancer Father         lung    Heart defect Brother      Social History     Socioeconomic History    Marital status:      Spouse name: Not on file    Number of children: Not on file    Years of education: Not on file    Highest education level: Not on file   Occupational History    Not on file   Tobacco Use    Smoking status: Former Smoker     Years: 2.00     Quit date: 1955     Years since quittin.4    Smokeless tobacco: Never Used   Substance and Sexual Activity    Alcohol use: Yes     Comment: drinks weekly couple beers    Drug use: No    Sexual activity: Never   Other Topics Concern    Not on file   Social History Narrative    Not on file     Social Determinants of Health     Financial Resource Strain:     Difficulty of Paying Living Expenses:    Food Insecurity:     Worried About Running Out of Food in the Last Year:     Ran Out of Food in the Last Year:    Transportation Needs:     Lack of Transportation (Medical):      Lack of Transportation (Non-Medical):    Physical Activity:     Days of Exercise per Week:     Minutes of Exercise per Session:    Stress:     Feeling of Stress :    Social Connections:     Frequency of Communication with Friends and Family:     Frequency of Social Gatherings with Friends and Family:     Attends Bahai Services:     Active Member of Clubs or Organizations:     Attends Club or Organization Meetings:     Marital Status:    Intimate Partner Violence:     Fear of Current or Ex-Partner:     Emotionally Abused:     Physically Abused:     Sexually Abused:      Social History     Tobacco Use   Smoking Status Former Smoker    Years: 2.00    Quit date: 1955    Years since quittin.4   Smokeless Tobacco Never Used        Temp (24hrs), Av °F (36.7 °C), Min:97.6 °F (36.4 °C), Max:98.5 °F (36.9 °C)    Visit Vitals  BP (!) 182/75 (BP 1 Location: Left upper arm, BP Patient Position: At rest;Lying;Supine) Comment: RN Roxanne informed of BP   Pulse 61   Temp 98.5 °F (36.9 °C)   Resp 18   Ht 6' 2.02\" (1.88 m)   Wt 74.5 kg (164 lb 3.2 oz)   SpO2 96%   BMI 21.07 kg/m²       ROS: Unable to obtain detailed review of system due to patient factors    Physical Exam:    General:  Alert, appears anxious              Head: Normocephalic, without obvious abnormality, atraumatic. Eyes:  Bilateral conjunctivitis, ointment has already been placed. EOMs intact. Nose: Nares normal. No drainage or sinus tenderness. Neck: Supple, symmetrical, trachea midline, no adenopathy, thyroid: no enlargement, no carotid bruit and no JVD. Lungs:   Clear to auscultation bilaterally. Heart:  Tachycardia, regular rhythm, S1, S2 normal.     Abdomen: Soft, non-tender- no grimacing or guarding on exam. Bowel sounds normal.    Extremities: Extremities normal, atraumatic, no cyanosis or edema. Pulses: 2+ and symmetric all extremities. Skin:  No rashes or lesions   Neurologic: AAO to self, No focal motor or sensory deficit.          Labs: Results:   Chemistry Recent Labs     21  0519 21  1816 21  1017 21  0310 21  0530   *  --   --  186* 183*    145 153* 154* 153*   K 3.5  --   --  3.3* 3.3*     --   --  118* 120*   CO2 31  --   --  32 30   BUN 16  --   --  22* 22*   CREA 1.33*  --   --  1.51* 1.55*   CA 7.7*  --   --  7.1* 8.3*   AGAP 3  --   --  4 3   BUCR 12  --   --  15 14 AP  --   --   --   --  151*   TP  --   --   --   --  7.6   ALB  --   --   --   --  2.6*   GLOB  --   --   --   --  5.0*   AGRAT  --   --   --   --  0.5*      CBC w/Diff Recent Labs     05/26/21  0519 05/25/21  0310 05/24/21  0530   WBC 13.2 15.5* 12.3   RBC 4.68 4.80 5.59   HGB 13.1 13.3 15.7   HCT 41.9 44.2 50.9*    306 300   GRANS 72 72 72   LYMPH 17* 16* 16*   EOS 2 1 0      Microbiology No results for input(s): CULT in the last 72 hours. RADIOLOGY:    All available imaging studies/reports in [facility] for this admission were reviewed      Disclaimer: Sections of this note are dictated utilizing voice recognition software, which may have resulted in some phonetic based errors in grammar and contents. Even though attempts were made to correct all the mistakes, some may have been missed, and remained in the body of the document. If questions arise, please contact our department.     Dr. Warren Connor, Infectious Disease Specialist  [phone]  May 26, 2021  9:35 AM

## 2021-05-26 NOTE — DISCHARGE SUMMARY
[address], [address]     [facility] SUMMARY    Name: Cuco Root MRN: [id number]   Age / Sex: 82 y.o. / male CSN: [id number]   YOB: 1935 Length of Stay: 5 days   Admit Date: 5/21/2021 Discharge Date:        PRIMARY CARE PHYSICIAN: None      DISCHARGE DIAGNOSES:    1. Cholecystitis  2. Acute kidney injury   3. Leukocytosis, suspect due to #1  4. Elevated LFTs  5. T2DM, A1C 7.1% in April 2021  6. Hypertension  7. CAD  8. Hyperlipidemia  9. COPD  10. Conjunctivitis   11. Dementia without behavioral disturbance  12. Debility  Advanced age  Hypernatremia    Patient is DNR and DNI and has a post form    CONSULTS CALLED: Nephrology, ID      PROCEDURES DONE: Percutaneous cholecystotomy tube placement by interventional radiology      Darien Price 67: This is a 68-year-old male with past medical history of recent cholecystitis which was medically managed with antibiotics and history of hypertension and chronic kidney disease was sent to the ED from assisted living facility. In the ED patient was evaluated and was noted to have persistent cholecystitis. Patient was started on antibiotics and surgery was consulted. Surgery recommended interventional radiology consultation for cholecystotomy tube placement. Cholecystotomy tube was placed by interventional radiology. Patient was also noted to have some acute kidney injury and hypernatremia. Nephrology saw the patient. Patient received IV fluids. Patient was also noted to have elevated blood pressures and the blood pressure regimen was titrated. PT OT were consulted. ID was consulted and patient has been transitioned to p.o. antibiotics. Case management has been involved and after discussions with family the plan is for the patient to transition back to assisted living facility with home health care. I have discussed with the  today.       MEDICATIONS ON DISCHARGE:    Current Discharge Medication List      START taking these medications    Details   erythromycin (ILOTYCIN) ophthalmic ointment Apply to affected eye(s) six (6) times a day for 3 days. Qty: 3.5 g, Refills: 0  Start date: 5/26/2021      amLODIPine (NORVASC) 5 mg tablet Take 1 Tablet by mouth daily. Indications: high blood pressure  Qty: 30 Tablet, Refills: 0  Start date: 5/26/2021      cefpodoxime (VANTIN) 200 mg tablet Take 2 Tablets by mouth every twelve (12) hours for 10 days. Qty: 42 Tablet, Refills: 0  Start date: 5/26/2021, End date: 6/5/2021    Associated Diagnoses: Cholecystitis      menthol-zinc oxide (CALMOSEPTINE) 0.44-20.6 % oint Apply topically to sacral area 3 times daily for 10 days  Indications: skin irritation  Qty: 1 Tube, Refills: 0  Start date: 5/26/2021      metroNIDAZOLE (FLAGYL) 500 mg tablet Take 1 Tablet by mouth every twelve (12) hours for 10 days. Qty: 21 Tablet, Refills: 0  Start date: 5/26/2021, End date: 6/5/2021    Associated Diagnoses: Cholecystitis      phosphorus (K PHOS NEUTRAL) 250 mg tablet Take 1 Tablet by mouth two (2) times a day. Indications: low amount of phosphate in the blood  Qty: 4 Tablet, Refills: 0  Start date: 5/26/2021      polyethylene glycol (MIRALAX) 17 gram packet Take 1 Packet by mouth daily as needed for Constipation. Indications: constipation  Qty: 15 Packet, Refills: 0  Start date: 5/26/2021      !! OTHER Check a CBC, CMP, magnesium in 4 days, results to PCP immediately. Diagnosischolecystitis  Qty: 1 Each, Refills: 0  Start date: 5/26/2021      !! OTHER Graded compression stockings bilateral lower extremitiesuse as directed  Qty: 1 Each, Refills: 0  Start date: 5/26/2021       !! - Potential duplicate medications found. Please discuss with provider. CONTINUE these medications which have CHANGED    Details   lactobacillus sp. 50 billion cpu (BIO-K PLUS) 50 billion cell -375 mg cap capsule Take 1 Capsule by mouth daily for 14 days.   Qty: 14 Capsule, Refills: 0  Start date: 5/26/2021, End date: 6/9/2021      ezetimibe (ZETIA) 10 mg tablet Take 1 Tablet by mouth nightly. Indications: excessive fat in the blood  Qty: 30 Tablet, Refills: 0  Start date: 5/26/2021      memantine (Namenda) 10 mg tablet Take 1 Tablet by mouth daily. 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 Indications: dry eye  Qty: 30 Each, Refills: 1  Start date: 5/26/2021, End date: 6/25/2021      aspirin delayed-release 81 mg tablet Take 1 Tablet by mouth daily. Indications: prevention for a blood clot going to the brain  Qty: 30 Tablet, Refills: 0  Start date: 5/26/2021      cloNIDine HCL (CATAPRES) 0.1 mg tablet Take 1 Tablet by mouth every eight (8) hours. Indications: high blood pressure  Qty: 90 Tablet, Refills: 0  Start date: 5/26/2021      famotidine (Pepcid) 20 mg tablet Take 1 Tablet by mouth daily. Indications: stress ulcer prevention  Qty: 30 Tablet, Refills: 0  Start date: 5/26/2021      gabapentin (NEURONTIN) 300 mg capsule Take 1 Capsule by mouth daily.  Indications: neuropathic pain, restless legs syndrome, an extreme discomfort in the calf muscles when sitting or lying down  Qty: 30 Capsule, Refills: 0  Start date: 5/26/2021    Associated Diagnoses: Restless leg syndrome      insulin lispro (HUMALOG) 100 unit/mL injection Check FSBS AC*HS  For sugars between 160 and 200- Give 2 units SQ,  For sugars between 201 and 250, Give 3 units SQ,  For sugars Between 251 and 300, give 5 units SQ,  For Sugars between 301 and 350, give 7 units SQ  For sugars between 351 and 400, give 8 units SQ and contact PCP  Indications: type 2 diabetes mellitus  Qty: 1 Vial, Refills: 0  Start date: 5/26/2021      isosorbide mononitrate ER (IMDUR) 30 mg tablet Take 1 Tablet by mouth daily.  Qty: 30 Tablet, Refills: 0  Start date: 5/26/2021    Associated Diagnoses: Essential hypertension      metoprolol succinate (Toprol XL) 50 mg XL tablet Take 1 Tablet by mouth daily. Indications: high blood pressure  Qty: 30 Tablet, Refills: 0  Start date: 5/26/2021      insulin detemir U-100 (Levemir U-100 Insulin) 100 unit/mL injection 4 Units by SubCUTAneous route daily. Indications: type 2 diabetes mellitus  Qty: 1 Vial, Refills: 0  Start date: 5/26/2021         CONTINUE these medications which have NOT CHANGED    Details   multivit-min-FA-lycopen-lutein (CENTRUM SILVER) 0.4-300-250 mg-mcg-mcg tab Take 1 tab daily  Qty: 30 Tab, Refills: 0      !! OTHER Incentive spirometry- Use as directed  Qty: 1 Each, Refills: 0    Associated Diagnoses: Influenza B       !! - Potential duplicate medications found. Please discuss with provider. STOP taking these medications       temazepam (RESTORIL) 7.5 mg capsule Comments:   Reason for Stopping:         QUEtiapine (SEROqueL) 50 mg tablet Comments:   Reason for Stopping:         lidocaine (LIDODERM) 5 % Comments:   Reason for Stopping:         diclofenac (VOLTAREN) 1 % gel Comments:   Reason for Stopping:         furosemide (LASIX) 20 mg tablet Comments:   Reason for Stopping:         CALCIUM CARBONATE (CALTRATE 600 PO) Comments:   Reason for Stopping:         cycloSPORINE (RESTASIS) 0.05 % ophthalmic emulsion Comments:   Reason for Stopping:                 DISCHARGE PHYSICAL EXAMINATION:    General:  Awake, follows simple commands  Cardiovascular:  S1S2+, RRR  Pulmonary:  CTA b/l  GI:  Soft, BS+, NT, ND, has cholecystotomy tube in place which is draining serosanguineous fluid  Extremities:  trace edema      CONDITION ON DISCHARGE: Stable.       DISPOSITION: Assisted living facility with home health care      FOLLOW-UP RECOMMENDATIONS:   Follow-up Information     Follow up With Specialties Details Why Contact Info    None    None ([phone]) Patient stated that they have no PCP            OTHER INSTRUCTIONS:  Diet- Cardiac, Diabetic, mechanical soft diet with nectar thickened liquids  Magic cup 3 times daily with meals  scheduled PO intake- nectar thickened water 8 oz q6hrs   Activity - as tolerated  FALL PRECAUTIONS  ASPIRATION PRECAUTIONS  DECUBITUS PRECAUTIONS  Head of bed greater than 30 degrees at all times  Incentive Spirometry Q30 minutes when awake  Graded compression stockings bilateral lower extremities use as directed  PT, OT Evaluate and Treat  Check CBC, CMP, Mg in 3 days, results to supervising physician and PCP immediately  Notify PCP immediately if patient does not have a bowel movement for 2 consecutive days  Routine cholecystostomy drain care- Please flush drain with 10 cc sterile saline towards the body every shift (daily as an outpatient) and maintain drain dressings clean and dry  F/u with PCP in 1 week  F/u with Surgeon in 10 days  Follow-up with nephrologist in 10 days      TIME SPENT ON DISCHARGE ACTIVITIES: More than 35 minutes. Dragon medical dictation software was used for portions of this report. Unintended errors may occur.       Signed:  Elie Peabody, MD      5/26/2021

## 2021-05-26 NOTE — PROGRESS NOTES
Complete patient care provided,  As well as a full set of vital signs obtained. Patient due to discharge.

## 2021-05-27 VITALS
WEIGHT: 164.2 LBS | HEIGHT: 74 IN | DIASTOLIC BLOOD PRESSURE: 83 MMHG | OXYGEN SATURATION: 97 % | RESPIRATION RATE: 20 BRPM | SYSTOLIC BLOOD PRESSURE: 158 MMHG | HEART RATE: 66 BPM | TEMPERATURE: 97.4 F | BODY MASS INDEX: 21.07 KG/M2

## 2021-05-27 LAB
GLUCOSE BLD STRIP.AUTO-MCNC: 206 MG/DL (ref 70–110)
GLUCOSE BLD STRIP.AUTO-MCNC: 215 MG/DL (ref 70–110)
SODIUM SERPL-SCNC: 146 MMOL/L (ref 136–145)

## 2021-05-27 PROCEDURE — 36415 COLL VENOUS BLD VENIPUNCTURE: CPT

## 2021-05-27 PROCEDURE — 74011250637 HC RX REV CODE- 250/637: Performed by: EMERGENCY MEDICINE

## 2021-05-27 PROCEDURE — 92526 ORAL FUNCTION THERAPY: CPT

## 2021-05-27 PROCEDURE — 84295 ASSAY OF SERUM SODIUM: CPT

## 2021-05-27 PROCEDURE — 97535 SELF CARE MNGMENT TRAINING: CPT

## 2021-05-27 PROCEDURE — 74011250637 HC RX REV CODE- 250/637: Performed by: INTERNAL MEDICINE

## 2021-05-27 PROCEDURE — 82962 GLUCOSE BLOOD TEST: CPT

## 2021-05-27 PROCEDURE — 99232 SBSQ HOSP IP/OBS MODERATE 35: CPT | Performed by: EMERGENCY MEDICINE

## 2021-05-27 PROCEDURE — 74011636637 HC RX REV CODE- 636/637: Performed by: EMERGENCY MEDICINE

## 2021-05-27 PROCEDURE — 2709999900 HC NON-CHARGEABLE SUPPLY

## 2021-05-27 RX ORDER — MENTHOL AND ZINC OXIDE .44; 20.625 G/100G; G/100G
OINTMENT TOPICAL 3 TIMES DAILY
Status: DISCONTINUED | OUTPATIENT
Start: 2021-05-27 | End: 2021-05-28 | Stop reason: HOSPADM

## 2021-05-27 RX ADMIN — CLONIDINE HYDROCHLORIDE 0.1 MG: 0.1 TABLET ORAL at 15:10

## 2021-05-27 RX ADMIN — ERYTHROMYCIN: 5 OINTMENT OPHTHALMIC at 05:21

## 2021-05-27 RX ADMIN — ERYTHROMYCIN: 5 OINTMENT OPHTHALMIC at 15:11

## 2021-05-27 RX ADMIN — INSULIN LISPRO 4 UNITS: 100 INJECTION, SOLUTION INTRAVENOUS; SUBCUTANEOUS at 17:40

## 2021-05-27 RX ADMIN — Medication 81 MG: at 10:04

## 2021-05-27 RX ADMIN — GABAPENTIN 100 MG: 100 CAPSULE ORAL at 10:03

## 2021-05-27 RX ADMIN — Medication: at 10:04

## 2021-05-27 RX ADMIN — CEFPODOXIME PROXETIL 400 MG: 200 TABLET, FILM COATED ORAL at 10:04

## 2021-05-27 RX ADMIN — METRONIDAZOLE 500 MG: 500 TABLET ORAL at 10:04

## 2021-05-27 RX ADMIN — CLONIDINE HYDROCHLORIDE 0.1 MG: 0.1 TABLET ORAL at 05:20

## 2021-05-27 RX ADMIN — MEMANTINE HYDROCHLORIDE 10 MG: 10 TABLET ORAL at 10:04

## 2021-05-27 RX ADMIN — DIBASIC SODIUM PHOSPHATE, MONOBASIC POTASSIUM PHOSPHATE AND MONOBASIC SODIUM PHOSPHATE 1 TABLET: 852; 155; 130 TABLET ORAL at 18:13

## 2021-05-27 RX ADMIN — DIBASIC SODIUM PHOSPHATE, MONOBASIC POTASSIUM PHOSPHATE AND MONOBASIC SODIUM PHOSPHATE 1 TABLET: 852; 155; 130 TABLET ORAL at 10:04

## 2021-05-27 RX ADMIN — INSULIN LISPRO 4 UNITS: 100 INJECTION, SOLUTION INTRAVENOUS; SUBCUTANEOUS at 13:11

## 2021-05-27 RX ADMIN — ISOSORBIDE MONONITRATE 30 MG: 30 TABLET, EXTENDED RELEASE ORAL at 10:04

## 2021-05-27 RX ADMIN — FAMOTIDINE 20 MG: 20 TABLET, FILM COATED ORAL at 10:04

## 2021-05-27 RX ADMIN — ERYTHROMYCIN: 5 OINTMENT OPHTHALMIC at 00:00

## 2021-05-27 RX ADMIN — METOPROLOL SUCCINATE 50 MG: 50 TABLET, EXTENDED RELEASE ORAL at 10:04

## 2021-05-27 RX ADMIN — Medication: at 18:12

## 2021-05-27 RX ADMIN — AMLODIPINE BESYLATE 5 MG: 5 TABLET ORAL at 10:04

## 2021-05-27 NOTE — PROGRESS NOTES
Problem: Self Care Deficits Care Plan (Adult)  Goal: *Acute Goals and Plan of Care (Insert Text)  Description: Occupational Therapy Goals  Initiated 5/24/2021 within 7 day(s). 1.  Patient will perform upper body dressing with supervision/set-up. 2.  Patient will perform bed mobility in preparation for selfcare with minimal assistance/contact guard assist.  3.  Patient will perform grooming task/functional activity sitting EOB for 4-7 minutes with supervision/set-up, F+ balance. .  4.  Patient will participate in upper extremity therapeutic exercise/activities with supervision/set-up for 5-8 minutes. 5.  Patient will follow 70% of commands during self care tasks with minimal cues. Prior Level of Function: Patient from [address] [place] [address] where he received assist with self-care tasks. Patient stated he waslked with \"something\" but unable to state to this OT what was used. Patient a poor historian, pleasantly confused and oriented to person only. Outcome: Progressing Towards Goal   OCCUPATIONAL THERAPY TREATMENT    Patient: Anika Norris (82 y.o. male)  Date: 5/27/2021  Diagnosis: Acute cholecystitis [K81.0] <principal problem not specified>       Precautions: Fall, Aspiration, Skin  PLOF: Patient from [address] [place] [address] where he received assist with self-care tasks. Patient stated he waslked with \"something\" but unable to state to this OT what was used. Patient a poor historian, pleasantly confused and oriented to person only. Chart, occupational therapy assessment, plan of care, and goals were reviewed. ASSESSMENT:  Pt cleared by RN for OT tx at this time. Pt presented supine in bed upon therapist arrival yelling out he wanted his food. Pt pleasantly confused throughout session requiring mod/max cueing for redirection. Pt required MOD A rolling L/R utilizing SR's to reposition properly to mid line. This ANG provided S/U for self feeding task and placed tray table in front of pt.  Pt proceeded to take his knife and use it to eat his magic up, pt was given spoon and he used that for his thickened orange juice required redirection. SLP joined for increased safety to assess aspiration during meal. Pt was left for comfort with HOB elevated, SLP present, bed alarm active, and all needs left within reach. Progression toward goals:  []          Improving appropriately and progressing toward goals  [x]          Improving slowly and progressing toward goals  []          Not making progress toward goals and plan of care will be adjusted     PLAN:  Patient continues to benefit from skilled intervention to address the above impairments. Continue treatment per established plan of care. Discharge Recommendations: Back to SNF with 24/7 assistance  Further Equipment Recommendations for Discharge: w/c, hospital bed, mechanical lift     SUBJECTIVE:   Patient stated  [place] \"     OBJECTIVE DATA SUMMARY:   Cognitive/Behavioral Status:  Neurologic State: Alert, Confused  Orientation Level: Disoriented to place, Disoriented to situation, Disoriented to time  Cognition: Decreased attention/concentration, Impaired decision making  Safety/Judgement: Fall prevention, Decreased insight into deficits, Decreased awareness of environment    Functional Mobility and Transfers for ADLs:   Bed Mobility:  Rolling: Moderate assistance     Balance:  Sitting: Impaired; With support    ADL Intervention:  Feeding  Container Management: Minimum assistance  Utensil Management: Supervision      Pain:  Pain level pre-treatment: 0/10   Pain level post-treatment: 0/10    Activity Tolerance:    Fair   Please refer to the flowsheet for vital signs taken during this treatment.   After treatment:   []  Patient left in no apparent distress sitting up in chair  [x]  Patient left in no apparent distress in bed  [x]  Call bell left within reach  [x]  Nursing notified  []  Caregiver present  [x]  Bed alarm activated    COMMUNICATION/EDUCATION:   [x] Role of Occupational Therapy in the acute care setting  [] Home safety education was provided and the patient/caregiver indicated understanding. [] Patient/family have participated as able in working towards goals and plan of care. [x] Patient/family agree to work toward stated goals and plan of care. [] Patient understands intent and goals of therapy, but is neutral about his/her participation. [] Patient is unable to participate in goal setting and plan of care.       Thank you for this referral.  ASHIA Schmitt  Time Calculation: 24 mins

## 2021-05-27 NOTE — PROGRESS NOTES
Discharge planning    9:00 am [address] ([phone]) and left a voice message for Ashley Cunningham concerning accepting patient back to assisted living today. Waiting for a return call. 9:42 am. Received call from Ashley Cunningham with [address] [place] [address]. Per Ashley Cunningham, they have not been able to reach the daughter. The daughter has to agree to hospice in order for patient to return. The facility doesn't have nurses that can provide drain care. Hospice nurses can accommodate daily drain care. This writer provided Ashley Cunningham with phone numbers on file for the daughter. Ashley Cunningham will call writer back after speaking with the daughter.     RAMONITA Moulton, RN  Pager # [phone]  Care Manager
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
## 2021-05-27 NOTE — PROGRESS NOTES
Bedside and Verbal shift change report given to [address] [place] [address] (oncoming nurse) by Fernando Danielle RN (offgoing nurse). Report given with Grace RAMOS and MAR.

## 2021-05-27 NOTE — PROGRESS NOTES
Discharge planning    1:31 pm Received call from [facility], daughter. She stated \" I just wanted to give you an update. I am going to go with hospice with [facility] so he can return to [facility]. \"    1:35 pm. Spoke with Tawana Watson at [facility]. Per Tawana Watson, patient can return but no later than 6 p.m.    1:45 pm. Called [facility] to setup transportation. Per Sixto Zurita, 6:30 p.m is earliest for today. 1:50 pm. Fernando Alicea to see if she would agree for patient to be picked up at 6:30 pm. Waiting for a return call. 2:50 pm. Spoke with Bernard Driver with [facility] and  time is 6 pm    3:10 pm. Cancelled transport with [facility] for 6:30 p.m    3:13 pm. Spoke with Tawana Watson at [facility] and gave pick time. She stated \" I will have someone here to receive him. \".    3:14 pm. Betsy Pearson, daughter to notify of d/c this evening and left a message. Discharge order noted for today to [address] [place] [address] Confirmed with Tawana Watson today. Transport to facility has been arranged through [facility] at 6 pm.  Per Tawana Watson, she has d/c summary. Bedside RN, Nia Nguyen, has been updated to the transition plan. Discharge information has been updated on the AVS.  Please call report to [phone].      AMEENA.  RAMONITA Arechiga, RN  Pager # [phone]  Care Manager

## 2021-05-27 NOTE — PROGRESS NOTES
Infectious Disease progress Note        Reason: Recurrent cholecystitis    Current abx Prior abx   Piperacillin/tazobactam  5/21/2021-5/25  Cefpodoxime, metronidazole since 5/25      Lines:       Assessment :    82 y.o.  male with hx of cholecystitis, CAD, T2DM-last hemoglobin A1c 7.1 on 4/23/2021, HTN, dyslipidemia, CKD, COPD, CVA, dementia who presented to the ED from [facility] on 5/21/2021 as he appeared unwell. Hospitalization at [facility] 4/23/2021-5/4/2021 for acute cholecystitis. Now with significant leukocytosis, tachycardia, tachypnea on admission    Clinical presentation consistent with sepsis-present on admission due to partially treated cholecystitis status post IR guided cholecystostomy tube placement on 5/22/2021. Cultures- no growth - d/w micro lab    Acute kidney injury-likely due to sepsis. Gradually improving    Hypernatremia-could be due to piperacillin/tazobactam, decreased p.o. intake-improved    Clinically better from infectious disease standpoint    Recommendations:    1.  continue po cefpodoxime, metronidazole till 6/5/21  2. Needs to follow-up with surgery as outpatient. Unfortunately patient is at high risk of recurrent cholecystitis when drain removed in absence of surgical intervention. 3. Follow-up palliative care recommendations regarding goals of care       Above plan was discussed in details with patient,  and dr Brandy Clark. Please call me if any further questions or concerns. Will continue to participate in the care of this patient. HPI:    Patient denies any abdominal pain, nausea, vomiting. Denies any chest pain, shortness of breath. home Medication List    Details   erythromycin (ILOTYCIN) ophthalmic ointment Apply to affected eye(s) six (6) times a day for 7 days. Qty: 3.5 g, Refills: 0      clindamycin (CLEOCIN) 300 mg capsule Take 1 Capsule by mouth four (4) times daily for 7 days.   Qty: 28 Capsule, Refills: 0       Details   insulin lispro (HUMALOG) 100 unit/mL injection by SubCUTAneous route three (3) times daily. cloNIDine HCL (CATAPRES) 0.1 mg tablet Take 1 Tab by mouth two (2) times a day. Qty: 60 Tab, Refills: 1      insulin detemir U-100 (Levemir U-100 Insulin) 100 unit/mL injection 10 Units by SubCUTAneous route daily. Indications: type 2 diabetes mellitus  Qty: 2 Vial, Refills: 1      temazepam (RESTORIL) 7.5 mg capsule Take 7.5 mg by mouth nightly. QUEtiapine (SEROqueL) 50 mg tablet Take 50 mg by mouth two (2) times a day. memantine (Namenda) 10 mg tablet Take 10 mg by mouth daily. metoprolol succinate (Toprol XL) 50 mg XL tablet Take 50 mg by mouth daily. lidocaine (LIDODERM) 5 % Apply patch to the affected area for 12 hours a day and remove for 12 hours a day. Qty: 30 Each, Refills: 0      diclofenac (VOLTAREN) 1 % gel Apply 2 g to affected area four (4) times daily. Qty: 100 g, Refills: 0      lactobacillus sp. 50 billion cpu (BIO-K PLUS) 50 billion cell -375 mg cap capsule Take 1 Cap by mouth daily. Qty: 30 Cap, Refills: 0      ezetimibe (ZETIA) 10 mg tablet Take 1 Tab by mouth nightly. aspirin delayed-release 81 mg tablet Take 1 Tab by mouth daily. Indications: Cerebral Thromboembolism Prevention  Qty: 30 Tab, Refills: 0      gabapentin (NEURONTIN) 300 mg capsule Take 1 Cap by mouth daily. Indications: NEUROPATHIC PAIN, Restless Legs Syndrome  Qty: 30 Cap, Refills: 0      isosorbide mononitrate ER (IMDUR) 30 mg tablet Take 1 Tab by mouth daily. Qty: 30 Tab, Refills: 0    Associated Diagnoses: Essential hypertension      multivit-min-FA-lycopen-lutein (CENTRUM SILVER) 0.4-300-250 mg-mcg-mcg tab Take 1 tab daily  Qty: 30 Tab, Refills: 0      furosemide (LASIX) 20 mg tablet Take 1 Tab by mouth daily. Indications: Edema  Qty: 30 Tab, Refills: 0      famotidine (PEPCID) 20 mg tablet Take 1 Tab by mouth two (2) times a day.  Indications: PREVENTION OF STRESS ULCER  Qty: 30 Tab, Refills: 0      CALCIUM CARBONATE (CALTRATE 600 PO) Take 1 Tab by mouth daily. polyvinyl alcohol-povidon,PF, (REFRESH CLASSIC) 1.4-0.6 % ophthalmic solution Administer 1 Drop to both eyes as needed for Other (ocular dryness) for up to 30 days. Qty: 30 Each, Refills: 1      cycloSPORINE (RESTASIS) 0.05 % ophthalmic emulsion Administer 1 Drop to both eyes every twelve (12) hours.  Indications: Dry Eye  Qty: 15 Each, Refills: 0      OTHER Incentive spirometry- Use as directed  Qty: 1 Each, Refills: 0    Associated Diagnoses: Influenza B             Current Facility-Administered Medications   Medication Dose Route Frequency    phosphorus (K PHOS NEUTRAL) 250 mg tablet 1 Tablet  1 Tablet Oral BID    cefpodoxime (VANTIN) tablet 400 mg  400 mg Oral Q12H    metroNIDAZOLE (FLAGYL) tablet 500 mg  500 mg Oral Q12H    cloNIDine HCL (CATAPRES) tablet 0.1 mg  0.1 mg Oral Q8H    amLODIPine (NORVASC) tablet 5 mg  5 mg Oral DAILY    menthol-zinc oxide (CALMOSEPTINE) 0.44-20.6 % ointment   Topical TID    isosorbide mononitrate ER (IMDUR) tablet 30 mg  30 mg Oral DAILY    insulin lispro (HUMALOG) injection   SubCUTAneous AC&HS    sodium chloride (NS) flush 5-40 mL  5-40 mL IntraVENous PRN    naloxone (NARCAN) injection 0.2 mg  0.2 mg IntraVENous EVERY 2 MINUTES AS NEEDED    ezetimibe (ZETIA) tablet 10 mg  10 mg Oral QHS    famotidine (PEPCID) tablet 20 mg  20 mg Oral DAILY    memantine (NAMENDA) tablet 10 mg  10 mg Oral DAILY    polyvinyl alcohol-povidon(PF) (REFRESH CLASSIC) 1.4-0.6 % ophthalmic solution 1 Drop  1 Drop Both Eyes PRN    hydrALAZINE (APRESOLINE) 20 mg/mL injection 10 mg  10 mg IntraVENous Q6H PRN    metoprolol succinate (TOPROL-XL) XL tablet 50 mg  50 mg Oral DAILY    aspirin delayed-release tablet 81 mg  81 mg Oral DAILY    gabapentin (NEURONTIN) capsule 100 mg  100 mg Oral DAILY    sodium chloride (NS) flush 5-40 mL  5-40 mL IntraVENous PRN    acetaminophen (TYLENOL) tablet 650 mg  650 mg Oral Q6H PRN    Or    acetaminophen (TYLENOL) suppository 650 mg  650 mg Rectal Q6H PRN    polyethylene glycol (MIRALAX) packet 17 g  17 g Oral DAILY PRN    promethazine (PHENERGAN) tablet 12.5 mg  12.5 mg Oral Q6H PRN    Or    ondansetron (ZOFRAN) injection 4 mg  4 mg IntraVENous Q6H PRN    erythromycin (ILOTYCIN) 5 mg/gram (0.5 %) ophthalmic ointment   Both Eyes Q6H    glucose chewable tablet 16 g  4 Tablet Oral PRN    glucagon (GLUCAGEN) injection 1 mg  1 mg IntraMUSCular PRN    dextrose (D50W) injection syrg 12.5-25 g  25-50 mL IntraVENous PRN       Allergies: Amaryl [glimepiride], Lipitor [atorvastatin], Niacin, Pravachol [pravastatin], and Zocor [simvastatin]    Family History   Problem Relation Age of Onset    Parkinsonism Mother     Hypertension Mother     Cancer Father         lung    Heart defect Brother      Social History     Socioeconomic History    Marital status:      Spouse name: Not on file    Number of children: Not on file    Years of education: Not on file    Highest education level: Not on file   Occupational History    Not on file   Tobacco Use    Smoking status: Former Smoker     Years: 2.00     Quit date: 1955     Years since quittin.4    Smokeless tobacco: Never Used   Substance and Sexual Activity    Alcohol use: Yes     Comment: drinks weekly couple beers    Drug use: No    Sexual activity: Never   Other Topics Concern    Not on file   Social History Narrative    Not on file     Social Determinants of Health     Financial Resource Strain:     Difficulty of Paying Living Expenses:    Food Insecurity:     Worried About Running Out of Food in the Last Year:     Ran Out of Food in the Last Year:    Transportation Needs:     Lack of Transportation (Medical):      Lack of Transportation (Non-Medical):    Physical Activity:     Days of Exercise per Week:     Minutes of Exercise per Session:    Stress:     Feeling of Stress :    Social Connections:     Frequency of Communication with Friends and Family:     Frequency of Social Gatherings with Friends and Family:     Attends [employer] Services:     Active Member of Clubs or Organizations:     Attends Club or Organization Meetings:     Marital Status:    Intimate Partner Violence:     Fear of Current or Ex-Partner:     Emotionally Abused:     Physically Abused:     Sexually Abused:      Social History     Tobacco Use   Smoking Status Former Smoker    Years: 2.00    Quit date: 1955    Years since quittin.4   Smokeless Tobacco Never Used        Temp (24hrs), Av °F (36.7 °C), Min:97.6 °F (36.4 °C), Max:98.3 °F (36.8 °C)    Visit Vitals  BP (!) 151/74 (BP 1 Location: Left upper arm)   Pulse 73   Temp 98.3 °F (36.8 °C)   Resp 18   Ht 6' 2.02\" (1.88 m)   Wt 74.5 kg (164 lb 3.2 oz)   SpO2 96%   BMI 21.07 kg/m²       ROS: Unable to obtain detailed review of system due to patient factors    Physical Exam:    General:  Alert, appears anxious              Head: Normocephalic, without obvious abnormality, atraumatic. Eyes:  Bilateral conjunctivitis, ointment has already been placed. EOMs intact. Nose: Nares normal. No drainage or sinus tenderness. Neck: Supple, symmetrical, trachea midline, no adenopathy, thyroid: no enlargement, no carotid bruit and no JVD. Lungs:   Clear to auscultation bilaterally. Heart:  Tachycardia, regular rhythm, S1, S2 normal.     Abdomen: Soft, non-tender- no grimacing or guarding on exam. Bowel sounds normal.    Extremities: Extremities normal, atraumatic, no cyanosis or edema. Pulses: 2+ and symmetric all extremities. Skin:  No rashes or lesions   Neurologic: AAO to self, No focal motor or sensory deficit.          Labs: Results:   Chemistry Recent Labs     21  1107 21  0519 21  1816 21  0310   GLU  --  201*  --  186*   * 145 145 154*   K  --  3.5  --  3.3*   CL  --  111  --  118*   CO2  --  31  --  32   BUN  --  16  --  22*   CREA  --  1.33* --  1.51*   CA  --  7.7*  --  7.1*   AGAP  --  3  --  4   BUCR  --  12  --  15   ALB  --  2.3*  --   --       CBC w/Diff Recent Labs     05/26/21  0519 05/25/21  0310   WBC 13.2 15.5*   RBC 4.68 4.80   HGB 13.1 13.3   HCT 41.9 44.2    306   GRANS 72 72   LYMPH 17* 16*   EOS 2 1      Microbiology No results for input(s): CULT in the last 72 hours. RADIOLOGY:    All available imaging studies/reports in [facility] for this admission were reviewed      Disclaimer: Sections of this note are dictated utilizing voice recognition software, which may have resulted in some phonetic based errors in grammar and contents. Even though attempts were made to correct all the mistakes, some may have been missed, and remained in the body of the document. If questions arise, please contact our department.     Dr. Keiko Brewster, Infectious Disease Specialist  [phone]  May 27, 2021  9:35 AM

## 2021-05-27 NOTE — ROUTINE PROCESS
0745  Received report on pt.from off going RN. Resting quietly in bed on rounds. Denies c/o pain or SOB at this time. No acute distress noted. oriented x 1 only. Bed alarms on. Side rails up x3. Will cont to monitor for any changes in status. 1200 pt sitting up and feeding self. Restless. Pushes covers off and says hes getting up. reoriented frequently. Bed alarm on.  
 
1830 cleaned and changed of inct urine. Waiting for transport for pts discharge. 1850 report called to [facility]. 1930 discharged to [facility] by medical transport. Bag of clothing sent with pt as well as folder with discharge inst, prescriptions and copy of DNR. No distress noted at time of transport

## 2021-05-27 NOTE — PROGRESS NOTES
In Patient Progress note      Admit Date: 5/21/2021          Impression:     #1 hypovolemic hypernatremia, secondary to free water deficit. Patient was on honey thickened nectar consistency fluid intake, poor free water intake likely causing his hypernatremia. Possible that Zosyn may have contributed as well and has been discontinued by ID which is appreciated. Urine studies have been ordered  #2 acute kidney injury on chronic kidney disease stage III  #3 chronic kidney disease stage III with a baseline creatinine in the low 1 range, etiology appears to be secondary to diabetic nephropathy, does have proteinuria will quantify  #4 sepsis secondary to cholecystitis  #5 coronary artery disease and dyslipidemia  #6 diabetes mellitus type 2  #7 hypertension  #8 hypokalemia     Plan:     #1 scheduled PO intake nectar thickened water 8 oz q6hrs , discussed with nursing   #2 follow ID recommendations    F/u with nephro 2-3 weeks after discharge . Please call with questions,     Sudhir Allen MD [facility]  Cell [phone]  Pager: [phone]  Subjective:     - No acute over night events. - respiratory - stable  - hemodynamics - stable, no pressrs  - UOP- good  - Nutrition - ok    Objective:     Visit Vitals  BP (!) 158/83   Pulse 66   Temp 97.4 °F (36.3 °C)   Resp 20   Ht 6' 2.02\" (1.88 m)   Wt 74.5 kg (164 lb 3.2 oz)   SpO2 97%   BMI 21.07 kg/m²         Intake/Output Summary (Last 24 hours) at 5/27/2021 1720  Last data filed at 5/26/2021 1730  Gross per 24 hour   Intake 120 ml   Output    Net 120 ml       Physical Exam:     Patient is in no apparent distress. HEENT: mmm  Neck: no cervical lymphadenopathy or thyromegaly. Lungs: good air entry, clear to auscultation bilaterally. Trachea at the midline. Cardiovascular system: S1, S2, regular rate and rhythm. Abdomen: soft, non tender, non distended. Positive bowel sounds. Extremities: no clubbing, cyanosis or edema. Integumentary: skin is grossly intact. Neurologic: Alert, oriented time three.        Data Review:    Recent Labs     05/26/21 0519   WBC 13.2   RBC 4.68   HCT 41.9   MCV 89.5   MCH 28.0   MCHC 31.3   RDW 13.8     Recent Labs     05/27/21  0745 05/26/21  1107 05/26/21  0519 05/25/21  1816 05/25/21  1017 05/25/21  0310   BUN  --   --  16  --   --  22*   CREA  --   --  1.33*  --   --  1.51*   CA  --   --  7.7*  --   --  7.1*   ALB  --   --  2.3*  --   --   --    K  --   --  3.5  --   --  3.3*   * 148* 145 145 153* 154*   CL  --   --  111  --   --  118*   CO2  --   --  31  --   --  32   PHOS  --   --  1.7*  --   --   --    GLU  --   --  201*  --   --  186*       Marilyn Barnard MD

## 2021-05-27 NOTE — PROGRESS NOTES
[facility] Hospitalist Group  Progress Note    Patient: Flako Olivia. Age: 82 y.o. : 1935 MR#: [id number] SSN: [id number]  Date/Time: 2021    Subjective:     Patient sitting in bed in NAD, awake, follows simple commands. Remains pleasantly confused    Assessment/Plan:       1. Cholecystitis  2. Acute kidney injury   3. Leukocytosis, suspect due to #1  4. Elevated LFTs  5. T2DM, A1C 7.1% in 2021  6. Hypertension  7. CAD  8. Hyperlipidemia  9. COPD  10. Conjunctivitis   11. Dementia without behavioral disturbance  12. Debility  Advanced age  Hypernatremia    PLAN  On abx per ID  S/p cholecystotomy tube placement by IR  Sodium improving, nephro input appreciated  Diet as per speech  Metoprolol, clonidine, imdur, amlodipine, monitor BP  PT OT  D/w RN  Patient is DNR and DNI  Await transition to [facility].  D/w         Case discussed with:  [x]Patient  [x]Family  [x]Nursing  []Case Management  DVT Prophylaxis:  []Lovenox  []Hep SQ  [x]SCDs  []Coumadin   []On Heparin gtt    Objective:   VS:   Visit Vitals  BP (!) 158/83   Pulse 66   Temp 97.4 °F (36.3 °C)   Resp 20   Ht 6' 2.02\" (1.88 m)   Wt 74.5 kg (164 lb 3.2 oz)   SpO2 97%   BMI 21.07 kg/m²      Tmax/24hrs: Temp (24hrs), Av.8 °F (36.6 °C), Min:97.3 °F (36.3 °C), Max:98.3 °F (36.8 °C)    Input/Output:   No intake or output data in the 24 hours ending 21 1820    General:  Awake, follows simple commands  Cardiovascular:  S1S2+, RRR  Pulmonary:  CTA b/l  GI:  Soft, BS+, NT, ND, has cholecystotomy tube in place  Extremities:  trace edema            Labs:    Recent Results (from the past 24 hour(s))   GLUCOSE, POC    Collection Time: 21  9:36 PM   Result Value Ref Range    Glucose (POC) 177 (H) 70 - 110 mg/dL   SODIUM    Collection Time: 21  7:45 AM   Result Value Ref Range    Sodium 146 (H) 136 - 145 mmol/L   GLUCOSE, POC    Collection Time: 21 11:43 AM   Result Value Ref Range    Glucose (POC) 206 (H) 70 - 110 mg/dL   GLUCOSE, POC    Collection Time: 05/27/21  4:31 PM   Result Value Ref Range    Glucose (POC) 215 (H) 70 - 110 mg/dL     Additional Data Reviewed:      Signed By: Jael Do MD     May 27, 2021

## 2022-06-17 NOTE — PROGRESS NOTES
Patient is needing prep instructions please. Thank you   Progress Note      Patient: Brent Osorio. Sex: male          DOA: 4/23/2021       YOB: 1935      Age:  82 y.o.        LOS:  LOS: 7 days             CHIEF COMPLAINT:  Acute cholecystitis in the setting of dementia    Subjective:     Patient reports he had some abdominal pain earlier today. For breakfast yesterday, patient ate 25% of his meal, including supplement. For dinner 2 nights ago, he ate 50 to 75%. Message left for daughter, she called back to the unit leaving her cell phone number, but [facility] to [facility]. Assessment/Plan     1. acute cholecystitis. Switch to oral antibiotics and monitor. Monitor po intake. poor candidate for surgery. Consider drain with abdominal binder, trying to reach daughter to see if she wishes to pursue this option. 2. Hypertension  3. Hypokalemia. Replete as needed. Check mag and po4.   4. 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 DM2.   6. CKD stage 3  7. COPD   8. Dementia without behavioral disturbance   9. Dyslipidemia  10. Hypophosphatemia replete as needed  11. DVT prophylaxis  12. DNR/DNI, limited interventions per palliative care team. POST form completed. I discussed the case with Dr. Gregory Alvarado. Called daughter x 3 before noon, cell goes straight to . No answer at home number. 5:15 pm again daughter's cell phone goes straight to . Objective:      Visit Vitals  BP (!) 142/70 (BP 1 Location: Right upper arm, BP Patient Position: At rest)   Pulse 67   Temp 97.6 °F (36.4 °C)   Resp 16   Ht 6' 2\" (1.88 m)   Wt 82.7 kg (182 lb 5 oz)   SpO2 99%   BMI 23.41 kg/m²       Physical Exam:  Gen:  Patient awake and alert, answering some questions  HEENT and Neck:  PERRL, No cervical tenderness or masses  Lungs:  Clear bilateral anterior and infraaxillary fields. No rales, no wheeze. Normal effort. Heart:  Regular Rate and Rhythm. No murmur or gallop.    Abdomen:  Soft, non tender, nondistended nabs  Extremities:  No digital clubbing, no cyanosis.   No edema  Neuro:  Alert and oriented x1-2, Normal affect, Cranial nerves intact, No sensory deficits  Skin no rash to visible skin      Lab/Data Reviewed:  BMP:   Lab Results   Component Value Date/Time     04/30/2021 02:35 AM    K 3.2 (L) 04/30/2021 02:35 AM     04/30/2021 02:35 AM    CO2 29 04/30/2021 02:35 AM    AGAP 5 04/30/2021 02:35 AM     (H) 04/30/2021 02:35 AM    BUN 11 04/30/2021 02:35 AM    CREA 1.25 04/30/2021 02:35 AM    GFRAA >60 04/30/2021 02:35 AM    GFRNA 55 (L) 04/30/2021 02:35 AM     CBC:   Lab Results   Component Value Date/Time    WBC 15.3 (H) 04/30/2021 02:35 AM    HGB 13.6 04/30/2021 02:35 AM    HCT 41.6 04/30/2021 02:35 AM     04/30/2021 02:35 AM

## 2023-01-23 NOTE — PROGRESS NOTES
End of Shift Note     Bedside and verbal shift change report given to Marylee Amabile, RN (On coming nurse) by Sruthi Read RN (Off going nurse).   Report included the following information:      --Procedure Summary     --MAR,     --Recent Results     --Med Rec Status Yes

## 2023-05-24 NOTE — PROGRESS NOTES
Bedside and Verbal shift change report given to Francisco Javier RN (oncoming nurse) by Marylee Galt, RN (offgoing nurse). Report included the following information SBAR and Kardex. None Done